# Patient Record
Sex: FEMALE | Race: WHITE | Employment: OTHER
[De-identification: names, ages, dates, MRNs, and addresses within clinical notes are randomized per-mention and may not be internally consistent; named-entity substitution may affect disease eponyms.]

---

## 2017-02-06 ENCOUNTER — HOSPITAL ENCOUNTER (OUTPATIENT)
Dept: PHYSICAL THERAPY | Facility: CLINIC | Age: 67
Setting detail: THERAPIES SERIES
Discharge: HOME | End: 2017-02-06
Payer: COMMERCIAL

## 2017-02-06 PROCEDURE — 97110 THERAPEUTIC EXERCISES: CPT

## 2017-02-08 ENCOUNTER — HOSPITAL ENCOUNTER (OUTPATIENT)
Dept: PHYSICAL THERAPY | Facility: CLINIC | Age: 67
Setting detail: THERAPIES SERIES
Discharge: HOME | End: 2017-02-08
Payer: COMMERCIAL

## 2017-02-08 PROCEDURE — 97110 THERAPEUTIC EXERCISES: CPT

## 2017-02-15 ENCOUNTER — HOSPITAL ENCOUNTER (OUTPATIENT)
Dept: PHYSICAL THERAPY | Facility: CLINIC | Age: 67
Setting detail: THERAPIES SERIES
Discharge: HOME | End: 2017-02-15
Payer: COMMERCIAL

## 2017-02-15 PROCEDURE — 97016 VASOPNEUMATIC DEVICE THERAPY: CPT

## 2017-02-15 PROCEDURE — 97110 THERAPEUTIC EXERCISES: CPT

## 2017-02-16 ENCOUNTER — HOSPITAL ENCOUNTER (OUTPATIENT)
Dept: PHYSICAL THERAPY | Facility: CLINIC | Age: 67
Setting detail: THERAPIES SERIES
Discharge: HOME | End: 2017-02-16
Payer: COMMERCIAL

## 2017-02-16 PROCEDURE — 97110 THERAPEUTIC EXERCISES: CPT

## 2017-02-16 PROCEDURE — 97016 VASOPNEUMATIC DEVICE THERAPY: CPT

## 2017-02-20 ENCOUNTER — HOSPITAL ENCOUNTER (OUTPATIENT)
Dept: PHYSICAL THERAPY | Facility: CLINIC | Age: 67
Setting detail: THERAPIES SERIES
Discharge: HOME | End: 2017-02-20
Payer: COMMERCIAL

## 2017-02-20 PROCEDURE — 97110 THERAPEUTIC EXERCISES: CPT

## 2017-02-22 ENCOUNTER — HOSPITAL ENCOUNTER (OUTPATIENT)
Dept: PHYSICAL THERAPY | Facility: CLINIC | Age: 67
Setting detail: THERAPIES SERIES
Discharge: HOME | End: 2017-02-22
Payer: COMMERCIAL

## 2017-02-22 PROCEDURE — 97110 THERAPEUTIC EXERCISES: CPT

## 2017-03-01 ENCOUNTER — HOSPITAL ENCOUNTER (OUTPATIENT)
Dept: PHYSICAL THERAPY | Facility: CLINIC | Age: 67
Setting detail: THERAPIES SERIES
Discharge: HOME | End: 2017-03-01
Payer: COMMERCIAL

## 2017-03-01 PROCEDURE — 97110 THERAPEUTIC EXERCISES: CPT

## 2017-03-03 ENCOUNTER — HOSPITAL ENCOUNTER (OUTPATIENT)
Dept: PHYSICAL THERAPY | Facility: CLINIC | Age: 67
Setting detail: THERAPIES SERIES
Discharge: HOME | End: 2017-03-03
Payer: COMMERCIAL

## 2017-03-03 PROCEDURE — 97110 THERAPEUTIC EXERCISES: CPT

## 2017-03-06 ENCOUNTER — HOSPITAL ENCOUNTER (OUTPATIENT)
Dept: PHYSICAL THERAPY | Facility: CLINIC | Age: 67
Setting detail: THERAPIES SERIES
Discharge: HOME | End: 2017-03-06
Payer: COMMERCIAL

## 2017-03-06 PROCEDURE — 97110 THERAPEUTIC EXERCISES: CPT

## 2017-03-07 ENCOUNTER — HOSPITAL ENCOUNTER (OUTPATIENT)
Dept: PHYSICAL THERAPY | Facility: CLINIC | Age: 67
Setting detail: THERAPIES SERIES
Discharge: HOME | End: 2017-03-07
Payer: COMMERCIAL

## 2017-03-07 PROCEDURE — 97110 THERAPEUTIC EXERCISES: CPT

## 2017-06-15 ENCOUNTER — OFFICE VISIT (OUTPATIENT)
Dept: INTERNAL MEDICINE CLINIC | Age: 67
End: 2017-06-15
Payer: COMMERCIAL

## 2017-06-15 VITALS
TEMPERATURE: 97.9 F | WEIGHT: 249 LBS | HEIGHT: 67 IN | BODY MASS INDEX: 39.08 KG/M2 | SYSTOLIC BLOOD PRESSURE: 100 MMHG | HEART RATE: 80 BPM | RESPIRATION RATE: 20 BRPM | DIASTOLIC BLOOD PRESSURE: 70 MMHG

## 2017-06-15 DIAGNOSIS — K21.9 GASTROESOPHAGEAL REFLUX DISEASE WITHOUT ESOPHAGITIS: ICD-10-CM

## 2017-06-15 DIAGNOSIS — R10.9 ABDOMINAL PAIN, UNSPECIFIED LOCATION: Primary | ICD-10-CM

## 2017-06-15 DIAGNOSIS — K57.92 DIVERTICULITIS OF INTESTINE WITHOUT PERFORATION OR ABSCESS WITHOUT BLEEDING, UNSPECIFIED PART OF INTESTINAL TRACT: ICD-10-CM

## 2017-06-15 DIAGNOSIS — I10 ESSENTIAL HYPERTENSION: ICD-10-CM

## 2017-06-15 PROCEDURE — 99214 OFFICE O/P EST MOD 30 MIN: CPT | Performed by: INTERNAL MEDICINE

## 2017-06-15 RX ORDER — METRONIDAZOLE 500 MG/1
500 TABLET ORAL 3 TIMES DAILY
Qty: 30 TABLET | Refills: 0 | Status: SHIPPED | OUTPATIENT
Start: 2017-06-15 | End: 2017-06-25

## 2017-06-15 RX ORDER — PANTOPRAZOLE SODIUM 40 MG/1
40 TABLET, DELAYED RELEASE ORAL DAILY
Qty: 30 TABLET | Refills: 0 | Status: SHIPPED | OUTPATIENT
Start: 2017-06-15 | End: 2018-01-13 | Stop reason: SDUPTHER

## 2017-06-15 RX ORDER — LOSARTAN POTASSIUM 50 MG/1
50 TABLET ORAL DAILY
Qty: 90 TABLET | Refills: 1 | Status: SHIPPED | OUTPATIENT
Start: 2017-06-15 | End: 2018-02-12 | Stop reason: SDUPTHER

## 2017-06-15 ASSESSMENT — PATIENT HEALTH QUESTIONNAIRE - PHQ9
2. FEELING DOWN, DEPRESSED OR HOPELESS: 0
SUM OF ALL RESPONSES TO PHQ9 QUESTIONS 1 & 2: 0
1. LITTLE INTEREST OR PLEASURE IN DOING THINGS: 0
SUM OF ALL RESPONSES TO PHQ QUESTIONS 1-9: 0

## 2017-06-26 ENCOUNTER — TELEPHONE (OUTPATIENT)
Dept: INTERNAL MEDICINE CLINIC | Age: 67
End: 2017-06-26

## 2017-06-26 DIAGNOSIS — K57.92 DIVERTICULITIS OF INTESTINE WITHOUT PERFORATION OR ABSCESS WITHOUT BLEEDING, UNSPECIFIED PART OF INTESTINAL TRACT: ICD-10-CM

## 2017-06-26 DIAGNOSIS — K21.9 GASTROESOPHAGEAL REFLUX DISEASE WITHOUT ESOPHAGITIS: ICD-10-CM

## 2017-06-26 DIAGNOSIS — R10.9 ABDOMINAL PAIN, UNSPECIFIED SITE: Primary | ICD-10-CM

## 2017-07-25 ENCOUNTER — OFFICE VISIT (OUTPATIENT)
Dept: GASTROENTEROLOGY | Age: 67
End: 2017-07-25
Payer: COMMERCIAL

## 2017-07-25 ENCOUNTER — HOSPITAL ENCOUNTER (OUTPATIENT)
Age: 67
Discharge: HOME | End: 2017-07-25
Payer: COMMERCIAL

## 2017-07-25 VITALS
HEIGHT: 67 IN | OXYGEN SATURATION: 95 % | SYSTOLIC BLOOD PRESSURE: 135 MMHG | DIASTOLIC BLOOD PRESSURE: 76 MMHG | BODY MASS INDEX: 39.41 KG/M2 | TEMPERATURE: 97.7 F | HEART RATE: 66 BPM | WEIGHT: 251.1 LBS

## 2017-07-25 DIAGNOSIS — K58.0 IRRITABLE BOWEL SYNDROME WITH DIARRHEA: ICD-10-CM

## 2017-07-25 DIAGNOSIS — K21.9 GASTROESOPHAGEAL REFLUX DISEASE WITHOUT ESOPHAGITIS: Primary | ICD-10-CM

## 2017-07-25 DIAGNOSIS — R10.11 RIGHT UPPER QUADRANT ABDOMINAL PAIN: ICD-10-CM

## 2017-07-25 LAB — TSH SERPL DL<=0.05 MIU/L-ACNC: 1.97 MIU/L (ref 0.3–5)

## 2017-07-25 PROCEDURE — 83516 IMMUNOASSAY NONANTIBODY: CPT

## 2017-07-25 PROCEDURE — 36415 COLL VENOUS BLD VENIPUNCTURE: CPT

## 2017-07-25 PROCEDURE — 99244 OFF/OP CNSLTJ NEW/EST MOD 40: CPT | Performed by: INTERNAL MEDICINE

## 2017-07-25 PROCEDURE — 84443 ASSAY THYROID STIM HORMONE: CPT

## 2017-07-25 ASSESSMENT — ENCOUNTER SYMPTOMS
DIARRHEA: 0
RECTAL PAIN: 0
WHEEZING: 0
BACK PAIN: 0
SINUS PRESSURE: 0
SHORTNESS OF BREATH: 0
ABDOMINAL PAIN: 1
CONSTIPATION: 0
ANAL BLEEDING: 0
VOMITING: 0
CHOKING: 0
SORE THROAT: 0
NAUSEA: 1
COUGH: 1
ABDOMINAL DISTENTION: 0
TROUBLE SWALLOWING: 0
BLOOD IN STOOL: 0
VOICE CHANGE: 0
RHINORRHEA: 0
FACIAL SWELLING: 0

## 2017-07-26 LAB — TISSUE TRANSGLUTAMINASE IGA: 0.4 U/ML

## 2017-08-17 ENCOUNTER — HOSPITAL ENCOUNTER (OUTPATIENT)
Age: 67
Setting detail: OUTPATIENT SURGERY
Discharge: HOME | End: 2017-08-17
Attending: INTERNAL MEDICINE | Admitting: INTERNAL MEDICINE
Payer: COMMERCIAL

## 2017-08-17 VITALS
RESPIRATION RATE: 16 BRPM | SYSTOLIC BLOOD PRESSURE: 125 MMHG | OXYGEN SATURATION: 95 % | WEIGHT: 249.9 LBS | HEIGHT: 67 IN | HEART RATE: 60 BPM | TEMPERATURE: 97.7 F | DIASTOLIC BLOOD PRESSURE: 57 MMHG | BODY MASS INDEX: 39.22 KG/M2

## 2017-08-17 PROCEDURE — 99153 MOD SED SAME PHYS/QHP EA: CPT | Performed by: INTERNAL MEDICINE

## 2017-08-17 PROCEDURE — 3609012400 HC EGD TRANSORAL BIOPSY SINGLE/MULTIPLE: Performed by: INTERNAL MEDICINE

## 2017-08-17 PROCEDURE — 88305 TISSUE EXAM BY PATHOLOGIST: CPT

## 2017-08-17 PROCEDURE — 2580000003 HC RX 258: Performed by: INTERNAL MEDICINE

## 2017-08-17 PROCEDURE — 7100000011 HC PHASE II RECOVERY - ADDTL 15 MIN: Performed by: INTERNAL MEDICINE

## 2017-08-17 PROCEDURE — 99152 MOD SED SAME PHYS/QHP 5/>YRS: CPT | Performed by: INTERNAL MEDICINE

## 2017-08-17 PROCEDURE — 7100000010 HC PHASE II RECOVERY - FIRST 15 MIN: Performed by: INTERNAL MEDICINE

## 2017-08-17 RX ORDER — FENTANYL CITRATE 50 UG/ML
INJECTION, SOLUTION INTRAMUSCULAR; INTRAVENOUS PRN
Status: DISCONTINUED | OUTPATIENT
Start: 2017-08-17 | End: 2017-08-18 | Stop reason: HOSPADM

## 2017-08-17 RX ORDER — MIDAZOLAM HYDROCHLORIDE 1 MG/ML
INJECTION INTRAMUSCULAR; INTRAVENOUS PRN
Status: DISCONTINUED | OUTPATIENT
Start: 2017-08-17 | End: 2017-08-18 | Stop reason: HOSPADM

## 2017-08-17 RX ORDER — SODIUM CHLORIDE, SODIUM LACTATE, POTASSIUM CHLORIDE, CALCIUM CHLORIDE 600; 310; 30; 20 MG/100ML; MG/100ML; MG/100ML; MG/100ML
INJECTION, SOLUTION INTRAVENOUS CONTINUOUS
Status: DISCONTINUED | OUTPATIENT
Start: 2017-08-17 | End: 2017-08-18 | Stop reason: HOSPADM

## 2017-08-17 RX ADMIN — SODIUM CHLORIDE, POTASSIUM CHLORIDE, SODIUM LACTATE AND CALCIUM CHLORIDE: 600; 310; 30; 20 INJECTION, SOLUTION INTRAVENOUS at 07:56

## 2017-08-17 ASSESSMENT — PAIN SCALES - GENERAL
PAINLEVEL_OUTOF10: 0

## 2017-08-17 ASSESSMENT — PAIN - FUNCTIONAL ASSESSMENT: PAIN_FUNCTIONAL_ASSESSMENT: 0-10

## 2017-08-18 ENCOUNTER — TELEPHONE (OUTPATIENT)
Dept: INTERNAL MEDICINE CLINIC | Age: 67
End: 2017-08-18

## 2017-08-18 LAB — SURGICAL PATHOLOGY REPORT: NORMAL

## 2017-08-23 ENCOUNTER — OFFICE VISIT (OUTPATIENT)
Dept: GASTROENTEROLOGY | Age: 67
End: 2017-08-23
Payer: COMMERCIAL

## 2017-08-23 VITALS
OXYGEN SATURATION: 95 % | WEIGHT: 251 LBS | TEMPERATURE: 98.1 F | SYSTOLIC BLOOD PRESSURE: 137 MMHG | BODY MASS INDEX: 39.39 KG/M2 | DIASTOLIC BLOOD PRESSURE: 79 MMHG | HEART RATE: 65 BPM | HEIGHT: 67 IN

## 2017-08-23 DIAGNOSIS — R10.11 RIGHT UPPER QUADRANT ABDOMINAL PAIN: Primary | ICD-10-CM

## 2017-08-23 DIAGNOSIS — K21.9 GASTROESOPHAGEAL REFLUX DISEASE WITHOUT ESOPHAGITIS: ICD-10-CM

## 2017-08-23 PROCEDURE — 99214 OFFICE O/P EST MOD 30 MIN: CPT | Performed by: INTERNAL MEDICINE

## 2017-08-23 RX ORDER — CITALOPRAM 10 MG/1
10 TABLET ORAL DAILY
Qty: 30 TABLET | Refills: 3 | Status: SHIPPED | OUTPATIENT
Start: 2017-08-23 | End: 2018-01-13

## 2017-08-23 ASSESSMENT — ENCOUNTER SYMPTOMS
ABDOMINAL DISTENTION: 0
BLOOD IN STOOL: 0
SINUS PRESSURE: 0
WHEEZING: 0
RECTAL PAIN: 0
FACIAL SWELLING: 0
CONSTIPATION: 0
ABDOMINAL PAIN: 1
SORE THROAT: 0
COUGH: 1
TROUBLE SWALLOWING: 0
VOICE CHANGE: 0
NAUSEA: 0
ANAL BLEEDING: 0
SHORTNESS OF BREATH: 0
VOMITING: 0
CHOKING: 0
RHINORRHEA: 0
BACK PAIN: 0
DIARRHEA: 0

## 2017-09-06 DIAGNOSIS — K21.9 GASTROESOPHAGEAL REFLUX DISEASE WITHOUT ESOPHAGITIS: ICD-10-CM

## 2017-09-06 PROBLEM — K31.7 FUNDIC GLAND POLYPOSIS OF STOMACH: Status: ACTIVE | Noted: 2017-08-17

## 2017-10-12 ENCOUNTER — OFFICE VISIT (OUTPATIENT)
Dept: INTERNAL MEDICINE CLINIC | Age: 67
End: 2017-10-12
Payer: COMMERCIAL

## 2017-10-12 VITALS
TEMPERATURE: 98.4 F | BODY MASS INDEX: 40.18 KG/M2 | WEIGHT: 256 LBS | HEART RATE: 60 BPM | DIASTOLIC BLOOD PRESSURE: 70 MMHG | HEIGHT: 67 IN | SYSTOLIC BLOOD PRESSURE: 128 MMHG | RESPIRATION RATE: 16 BRPM

## 2017-10-12 DIAGNOSIS — K21.9 GASTROESOPHAGEAL REFLUX DISEASE WITHOUT ESOPHAGITIS: ICD-10-CM

## 2017-10-12 DIAGNOSIS — E78.00 HIGH CHOLESTEROL: ICD-10-CM

## 2017-10-12 DIAGNOSIS — I10 ESSENTIAL HYPERTENSION: Primary | ICD-10-CM

## 2017-10-12 PROCEDURE — 99214 OFFICE O/P EST MOD 30 MIN: CPT | Performed by: INTERNAL MEDICINE

## 2017-10-12 RX ORDER — PANTOPRAZOLE SODIUM 40 MG/1
40 TABLET, DELAYED RELEASE ORAL DAILY
Qty: 90 TABLET | Refills: 1 | Status: SHIPPED | OUTPATIENT
Start: 2017-10-12 | End: 2018-03-25

## 2017-10-12 NOTE — PROGRESS NOTES
Benjy Saravia is a 74 y.o. female who presents for   Chief Complaint   Patient presents with    Hypertension     4 month check    Other     c/o yeast infection under stomach    Other     had egd in aug by Dr Margot Zavaleta Maintenance     due for tdap and flu will get at work. due dexa and dm screen    Other     not on protonix at this time but does want to go back    and follow up of chronic medical problems. Patient Active Problem List   Diagnosis    HTN (hypertension)    Dyslipidemia    Osteoarthritis    GERD (gastroesophageal reflux disease)    Hyperlipidemia    Hypertension    Obesity    Allergic rhinitis    Osteoporosis    Arthritis of knee    Biliary dyskinesia    Right upper quadrant abdominal pain    Fundic gland polyposis of stomach     HPI  Here for follow-up on blood pressure and cholesterol and patient wants a refill on Protonix and also wants to discuss about the rash under the abdomen    Current Outpatient Prescriptions   Medication Sig Dispense Refill    citalopram (CELEXA) 10 MG tablet Take 1 tablet by mouth daily 30 tablet 3    losartan (COZAAR) 50 MG tablet Take 1 tablet by mouth daily 90 tablet 1    pantoprazole (PROTONIX) 40 MG tablet Take 1 tablet by mouth daily 30 tablet 0     No current facility-administered medications for this visit.         No Known Allergies    Past Medical History:   Diagnosis Date    Allergic rhinitis     Bladder prolapse     Constipation     Fundic gland polyposis of stomach 08/17/2017    per EGD    GERD (gastroesophageal reflux disease)     Hiatal hernia     History of cardiac cath 12/2002    pt denies blockage    Hyperlipidemia     borderline    Hypertension     MRSA (methicillin resistant staph aureus) culture positive 11/01/2016    nasal    MRSA carrier     follows with ID    Obesity     Osteoarthritis     Thyroid disease     goiter    Wears glasses        Past Surgical History:   Procedure Laterality Date    BMI 40.09 kg/m²   BP Readings from Last 3 Encounters:   10/12/17 128/70   08/23/17 137/79   08/17/17 (!) 125/57         Constitutional:  Matheus Perez is oriented to place, person and time ,appears well-developed and well-nourished  HEENT:  Atraumatic and normocephalic, external ears normal bilaterally, nose normal no oropharyngeal exudate and is clear and moist  Eyes:  EOCM normal; conjunctivae normal; PERRLA bilaterally  Neck:  Normal range of motion, neck supple, no JVD and no thyromegaly  Cardiovascular:  RRR, normal heart sounds and intact distal pulses  Pulmonary:  effort normal and breath sounds normal bilaterally,no wheezes or rales, no respiratory distress  Abdominal:  Soft, non-tender; normal bowel sounds, no masses  Musculoskeletal:  Normal range of motion and no edema or tenderness bilaterally  No lymphadenopathy  Neurological:  alert, oriented, and normal reflexes bilaterally  Skin: warm and dry  Psychiatric:  normal mood and effect; behavior normal.    Labs:   Lab Results   Component Value Date    LABA1C 5.5 12/06/2011     Lab Results   Component Value Date    CHOL 249 (H) 07/16/2013     Lab Results   Component Value Date    HDL 45 07/16/2013     No results found for: [facility]  Lab Results   Component Value Date    TRIG 168 (H) 07/16/2013     No components found for: [place], [place]  Lab Results   Component Value Date    WBC 6.3 11/14/2016    HGB 13.7 11/14/2016    HCT 41.2 11/14/2016    MCV 90.4 11/14/2016     11/14/2016     Lab Results   Component Value Date    INR 0.9 11/01/2016    PROTIME 10.0 11/01/2016     Lab Results   Component Value Date    GLUCOSE 115 (H) 11/14/2016    CREATININE 0.68 11/14/2016    BUN 17 11/14/2016     11/14/2016    K 3.9 11/14/2016     11/14/2016    CO2 25 11/14/2016     Lab Results   Component Value Date    ALT 17 11/01/2016    AST 16 11/01/2016    ALKPHOS 74 11/01/2016    BILITOT 0.26 (L) 11/01/2016     Lab Results   Component Value Date    LABPROT 6.4 08/14/2012 BRYANTBU 4.2 11/01/2016     Lab Results   Component Value Date    TSH 1.97 07/25/2017     Assessment:  1. Essential hypertension    2. Gastroesophageal reflux disease without esophagitis    3. High cholesterol        Plan:  Patient's blood pressure stable on current medications and continue losartan as before  Patient's last cholesterol was 249 and HDL 45 and will repeat lab work  Restart on [employer] for acid reflux and patient's EGD findings reviewed  Advised patient to use nystatin cream for rash under the abdomen which is almost resolved  Review in 6 months           1. Jennifer received counseling on the following healthy behaviors: nutrition and exercise    2. Prior labs and health maintenance reviewed. 3.  Discussed use, benefit, and side effects of prescribed medications. Barriers to medication compliance addressed. All her questions were answered. Pt voiced understanding. Aida Alvarado will continue current medications, diet and exercise. No orders of the defined types were placed in this encounter. Completed Refills               Requested Prescriptions      No prescriptions requested or ordered in this encounter     4. Patient given educational materials - see patient instructions    5. Was a self-tracking handout given in paper form or via [employer]?   NO    Orders Placed This Encounter   Procedures    Comprehensive Metabolic Panel     Standing Status:   Future     Standing Expiration Date:   10/12/2018    Lipid Panel     Standing Status:   Future     Standing Expiration Date:   10/12/2018     Order Specific Question:   Is Patient Fasting?/# of Hours     Answer:   12    CBC     Standing Status:   Future     Standing Expiration Date:   10/12/2018    TSH without Reflex     Standing Status:   Future     Standing Expiration Date:   10/12/2018    Vitamin D 25 Hydroxy     Standing Status:   Future     Standing Expiration Date:   10/12/2018    Magnesium     Standing Status:   Future

## 2017-10-18 ENCOUNTER — HOSPITAL ENCOUNTER (OUTPATIENT)
Age: 67
Discharge: HOME | End: 2017-10-18
Payer: COMMERCIAL

## 2017-10-18 DIAGNOSIS — E78.00 HIGH CHOLESTEROL: ICD-10-CM

## 2017-10-18 DIAGNOSIS — K21.9 GASTROESOPHAGEAL REFLUX DISEASE WITHOUT ESOPHAGITIS: ICD-10-CM

## 2017-10-18 DIAGNOSIS — I10 ESSENTIAL HYPERTENSION: ICD-10-CM

## 2017-10-18 LAB
ALBUMIN SERPL-MCNC: 4.3 G/DL (ref 3.5–5.2)
ALBUMIN/GLOBULIN RATIO: ABNORMAL (ref 1–2.5)
ALP BLD-CCNC: 84 U/L (ref 35–104)
ALT SERPL-CCNC: 19 U/L (ref 5–33)
ANION GAP SERPL CALCULATED.3IONS-SCNC: 11 MMOL/L (ref 9–17)
AST SERPL-CCNC: 16 U/L
BILIRUB SERPL-MCNC: 0.38 MG/DL (ref 0.3–1.2)
BUN BLDV-MCNC: 15 MG/DL (ref 8–23)
BUN/CREAT BLD: 19 (ref 9–20)
CALCIUM SERPL-MCNC: 9.4 MG/DL (ref 8.6–10.4)
CHLORIDE BLD-SCNC: 104 MMOL/L (ref 98–107)
CHOLESTEROL/HDL RATIO: 5
CHOLESTEROL: 229 MG/DL
CO2: 26 MMOL/L (ref 20–31)
CREAT SERPL-MCNC: 0.78 MG/DL (ref 0.5–0.9)
GFR AFRICAN AMERICAN: >60 ML/MIN
GFR NON-AFRICAN AMERICAN: >60 ML/MIN
GFR SERPL CREATININE-BSD FRML MDRD: ABNORMAL ML/MIN/{1.73_M2}
GFR SERPL CREATININE-BSD FRML MDRD: ABNORMAL ML/MIN/{1.73_M2}
GLUCOSE BLD-MCNC: 103 MG/DL (ref 70–99)
HCT VFR BLD CALC: 42.1 % (ref 36–46)
HDLC SERPL-MCNC: 46 MG/DL
HEMOGLOBIN: 13.9 G/DL (ref 12–16)
LDL CHOLESTEROL: 139 MG/DL (ref 0–130)
MAGNESIUM: 2.2 MG/DL (ref 1.6–2.6)
MCH RBC QN AUTO: 30.2 PG (ref 26–34)
MCHC RBC AUTO-ENTMCNC: 33.1 G/DL (ref 31–37)
MCV RBC AUTO: 91.2 FL (ref 80–100)
PDW BLD-RTO: 14.7 % (ref 11.5–14.5)
PLATELET # BLD: 239 K/UL (ref 130–400)
PMV BLD AUTO: 8.2 FL (ref 6–12)
POTASSIUM SERPL-SCNC: 4.2 MMOL/L (ref 3.7–5.3)
RBC # BLD: 4.61 M/UL (ref 4–5.2)
SODIUM BLD-SCNC: 141 MMOL/L (ref 135–144)
TOTAL PROTEIN: 7.4 G/DL (ref 6.4–8.3)
TRIGL SERPL-MCNC: 222 MG/DL
TSH SERPL DL<=0.05 MIU/L-ACNC: 1.87 MIU/L (ref 0.3–5)
VITAMIN B-12: 810 PG/ML (ref 211–946)
VITAMIN D 25-HYDROXY: 17.5 NG/ML (ref 30–100)
VLDLC SERPL CALC-MCNC: ABNORMAL MG/DL (ref 1–30)
WBC # BLD: 6.7 K/UL (ref 3.5–11)

## 2017-10-18 PROCEDURE — 85027 COMPLETE CBC AUTOMATED: CPT

## 2017-10-18 PROCEDURE — 82607 VITAMIN B-12: CPT

## 2017-10-18 PROCEDURE — 80061 LIPID PANEL: CPT

## 2017-10-18 PROCEDURE — 83735 ASSAY OF MAGNESIUM: CPT

## 2017-10-18 PROCEDURE — 84443 ASSAY THYROID STIM HORMONE: CPT

## 2017-10-18 PROCEDURE — 82306 VITAMIN D 25 HYDROXY: CPT

## 2017-10-18 PROCEDURE — 80053 COMPREHEN METABOLIC PANEL: CPT

## 2017-10-18 PROCEDURE — 36415 COLL VENOUS BLD VENIPUNCTURE: CPT

## 2017-10-19 ENCOUNTER — TELEPHONE (OUTPATIENT)
Dept: INTERNAL MEDICINE CLINIC | Age: 67
End: 2017-10-19

## 2017-10-19 DIAGNOSIS — R79.89 LOW VITAMIN D LEVEL: Primary | ICD-10-CM

## 2018-01-13 ENCOUNTER — HOSPITAL ENCOUNTER (EMERGENCY)
Age: 68
Discharge: HOME | End: 2018-01-13
Attending: EMERGENCY MEDICINE
Payer: MEDICARE

## 2018-01-13 VITALS
SYSTOLIC BLOOD PRESSURE: 144 MMHG | DIASTOLIC BLOOD PRESSURE: 83 MMHG | WEIGHT: 251 LBS | HEIGHT: 67 IN | TEMPERATURE: 98 F | RESPIRATION RATE: 16 BRPM | BODY MASS INDEX: 39.39 KG/M2 | HEART RATE: 82 BPM | OXYGEN SATURATION: 96 %

## 2018-01-13 DIAGNOSIS — R10.13 ABDOMINAL PAIN, EPIGASTRIC: Primary | ICD-10-CM

## 2018-01-13 LAB
-: ABNORMAL
ABSOLUTE EOS #: 0.2 K/UL (ref 0–0.4)
ABSOLUTE IMMATURE GRANULOCYTE: ABNORMAL K/UL (ref 0–0.3)
ABSOLUTE LYMPH #: 1.3 K/UL (ref 1–4.8)
ABSOLUTE MONO #: 0.3 K/UL (ref 0.2–0.8)
ALBUMIN SERPL-MCNC: 4.2 G/DL (ref 3.5–5.2)
ALBUMIN/GLOBULIN RATIO: ABNORMAL (ref 1–2.5)
ALP BLD-CCNC: 87 U/L (ref 35–104)
ALT SERPL-CCNC: 15 U/L (ref 5–33)
AMORPHOUS: ABNORMAL
ANION GAP SERPL CALCULATED.3IONS-SCNC: 12 MMOL/L (ref 9–17)
AST SERPL-CCNC: 16 U/L
BACTERIA: ABNORMAL
BASOPHILS # BLD: 0 % (ref 0–2)
BASOPHILS ABSOLUTE: 0 K/UL (ref 0–0.2)
BILIRUB SERPL-MCNC: 0.26 MG/DL (ref 0.3–1.2)
BILIRUBIN DIRECT: 0.1 MG/DL
BILIRUBIN URINE: NEGATIVE
BILIRUBIN, INDIRECT: 0.16 MG/DL (ref 0–1)
BUN BLDV-MCNC: 18 MG/DL (ref 8–23)
BUN/CREAT BLD: 26 (ref 9–20)
CALCIUM SERPL-MCNC: 9.5 MG/DL (ref 8.6–10.4)
CASTS UA: ABNORMAL /LPF
CHLORIDE BLD-SCNC: 103 MMOL/L (ref 98–107)
CO2: 27 MMOL/L (ref 20–31)
COLOR: YELLOW
COMMENT UA: ABNORMAL
CREAT SERPL-MCNC: 0.7 MG/DL (ref 0.5–0.9)
CRYSTALS, UA: ABNORMAL /HPF
DIFFERENTIAL TYPE: ABNORMAL
EOSINOPHILS RELATIVE PERCENT: 3 % (ref 1–4)
EPITHELIAL CELLS UA: ABNORMAL /HPF (ref 0–5)
GFR AFRICAN AMERICAN: >60 ML/MIN
GFR NON-AFRICAN AMERICAN: >60 ML/MIN
GFR SERPL CREATININE-BSD FRML MDRD: ABNORMAL ML/MIN/{1.73_M2}
GFR SERPL CREATININE-BSD FRML MDRD: ABNORMAL ML/MIN/{1.73_M2}
GLOBULIN: ABNORMAL G/DL (ref 1.5–3.8)
GLUCOSE BLD-MCNC: 130 MG/DL (ref 70–99)
GLUCOSE URINE: NEGATIVE
HCT VFR BLD CALC: 38.8 % (ref 36–46)
HEMOGLOBIN: 13 G/DL (ref 12–16)
IMMATURE GRANULOCYTES: ABNORMAL %
KETONES, URINE: NEGATIVE
LEUKOCYTE ESTERASE, URINE: NEGATIVE
LIPASE: 46 U/L (ref 13–60)
LYMPHOCYTES # BLD: 18 % (ref 24–44)
MCH RBC QN AUTO: 30.5 PG (ref 26–34)
MCHC RBC AUTO-ENTMCNC: 33.5 G/DL (ref 31–37)
MCV RBC AUTO: 91 FL (ref 80–100)
MONOCYTES # BLD: 5 % (ref 1–7)
MUCUS: ABNORMAL
NITRITE, URINE: NEGATIVE
OTHER OBSERVATIONS UA: ABNORMAL
PDW BLD-RTO: 14.1 % (ref 11.5–14.5)
PH UA: 7 (ref 5–8)
PLATELET # BLD: 252 K/UL (ref 130–400)
PLATELET ESTIMATE: ABNORMAL
PMV BLD AUTO: 8.7 FL (ref 6–12)
POTASSIUM SERPL-SCNC: 4.1 MMOL/L (ref 3.7–5.3)
PROTEIN UA: NEGATIVE
RBC # BLD: 4.26 M/UL (ref 4–5.2)
RBC # BLD: ABNORMAL 10*6/UL
RBC UA: ABNORMAL /HPF (ref 0–2)
RENAL EPITHELIAL, UA: ABNORMAL /HPF
SEG NEUTROPHILS: 74 % (ref 36–66)
SEGMENTED NEUTROPHILS ABSOLUTE COUNT: 5.5 K/UL (ref 1.8–7.7)
SODIUM BLD-SCNC: 142 MMOL/L (ref 135–144)
SPECIFIC GRAVITY UA: 1.01 (ref 1–1.03)
TOTAL PROTEIN: 7.2 G/DL (ref 6.4–8.3)
TRICHOMONAS: ABNORMAL
TURBIDITY: ABNORMAL
URINE HGB: NEGATIVE
UROBILINOGEN, URINE: NORMAL
WBC # BLD: 7.4 K/UL (ref 3.5–11)
WBC # BLD: ABNORMAL 10*3/UL
WBC UA: ABNORMAL /HPF (ref 0–5)
YEAST: ABNORMAL

## 2018-01-13 PROCEDURE — 6360000002 HC RX W HCPCS: Performed by: NURSE PRACTITIONER

## 2018-01-13 PROCEDURE — 81001 URINALYSIS AUTO W/SCOPE: CPT

## 2018-01-13 PROCEDURE — 85025 COMPLETE CBC W/AUTO DIFF WBC: CPT

## 2018-01-13 PROCEDURE — 80076 HEPATIC FUNCTION PANEL: CPT

## 2018-01-13 PROCEDURE — 96365 THER/PROPH/DIAG IV INF INIT: CPT

## 2018-01-13 PROCEDURE — 96361 HYDRATE IV INFUSION ADD-ON: CPT

## 2018-01-13 PROCEDURE — 83690 ASSAY OF LIPASE: CPT

## 2018-01-13 PROCEDURE — 80048 BASIC METABOLIC PNL TOTAL CA: CPT

## 2018-01-13 PROCEDURE — C9113 INJ PANTOPRAZOLE SODIUM, VIA: HCPCS | Performed by: NURSE PRACTITIONER

## 2018-01-13 PROCEDURE — 2580000003 HC RX 258: Performed by: NURSE PRACTITIONER

## 2018-01-13 PROCEDURE — 99284 EMERGENCY DEPT VISIT MOD MDM: CPT

## 2018-01-13 RX ORDER — SODIUM CHLORIDE 9 MG/ML
INJECTION, SOLUTION INTRAVENOUS CONTINUOUS
Status: DISCONTINUED | OUTPATIENT
Start: 2018-01-13 | End: 2018-01-13 | Stop reason: HOSPADM

## 2018-01-13 RX ORDER — SUCRALFATE 1 G/1
1 TABLET ORAL 4 TIMES DAILY
Qty: 40 TABLET | Refills: 0 | Status: SHIPPED | OUTPATIENT
Start: 2018-01-13 | End: 2018-03-25

## 2018-01-13 RX ADMIN — SODIUM CHLORIDE 100 ML/HR: 9 INJECTION, SOLUTION INTRAVENOUS at 11:32

## 2018-01-13 RX ADMIN — SODIUM CHLORIDE 80 MG: 9 INJECTION, SOLUTION INTRAVENOUS at 11:32

## 2018-01-14 ASSESSMENT — ENCOUNTER SYMPTOMS
WHEEZING: 0
SINUS PRESSURE: 0
ABDOMINAL PAIN: 1
SHORTNESS OF BREATH: 0
COUGH: 0
SORE THROAT: 0
CONSTIPATION: 0
COLOR CHANGE: 0
DIARRHEA: 0
VOMITING: 0
NAUSEA: 0
RHINORRHEA: 0

## 2018-02-12 DIAGNOSIS — I10 ESSENTIAL HYPERTENSION: ICD-10-CM

## 2018-02-12 RX ORDER — LOSARTAN POTASSIUM 50 MG/1
50 TABLET ORAL DAILY
Qty: 90 TABLET | Refills: 0 | Status: SHIPPED | OUTPATIENT
Start: 2018-02-12 | End: 2018-04-30 | Stop reason: SDUPTHER

## 2018-03-25 ENCOUNTER — APPOINTMENT (OUTPATIENT)
Dept: CT IMAGING | Age: 68
DRG: 394 | End: 2018-03-25
Payer: MEDICARE

## 2018-03-25 ENCOUNTER — HOSPITAL ENCOUNTER (INPATIENT)
Age: 68
LOS: 1 days | Discharge: HOME | DRG: 394 | End: 2018-03-26
Attending: EMERGENCY MEDICINE | Admitting: INTERNAL MEDICINE
Payer: MEDICARE

## 2018-03-25 DIAGNOSIS — K42.9 PERIUMBILICAL HERNIA: ICD-10-CM

## 2018-03-25 DIAGNOSIS — K57.32 DIVERTICULITIS OF LARGE INTESTINE WITHOUT PERFORATION OR ABSCESS WITHOUT BLEEDING: ICD-10-CM

## 2018-03-25 DIAGNOSIS — K56.609 SBO (SMALL BOWEL OBSTRUCTION) (HCC): Primary | ICD-10-CM

## 2018-03-25 PROBLEM — K43.0 INCARCERATED INCISIONAL HERNIA: Status: ACTIVE | Noted: 2018-03-25

## 2018-03-25 LAB
ABSOLUTE EOS #: 0.2 K/UL (ref 0–0.4)
ABSOLUTE IMMATURE GRANULOCYTE: ABNORMAL K/UL (ref 0–0.3)
ABSOLUTE LYMPH #: 1.8 K/UL (ref 1–4.8)
ABSOLUTE MONO #: 0.8 K/UL (ref 0.2–0.8)
ALBUMIN SERPL-MCNC: 4.1 G/DL (ref 3.5–5.2)
ALBUMIN/GLOBULIN RATIO: ABNORMAL (ref 1–2.5)
ALP BLD-CCNC: 86 U/L (ref 35–104)
ALT SERPL-CCNC: 14 U/L (ref 5–33)
ANION GAP SERPL CALCULATED.3IONS-SCNC: 14 MMOL/L (ref 9–17)
APPEARANCE: NORMAL
AST SERPL-CCNC: 11 U/L
BASOPHILS # BLD: 1 % (ref 0–2)
BASOPHILS ABSOLUTE: 0.1 K/UL (ref 0–0.2)
BILIRUB SERPL-MCNC: 0.28 MG/DL (ref 0.3–1.2)
BILIRUBIN DIRECT: 0.09 MG/DL
BILIRUBIN URINE: NEGATIVE
BILIRUBIN, INDIRECT: 0.19 MG/DL (ref 0–1)
BILIRUBIN, POC: NORMAL
BLOOD URINE, POC: NORMAL
BUN BLDV-MCNC: 24 MG/DL (ref 8–23)
BUN/CREAT BLD: 43 (ref 9–20)
CALCIUM SERPL-MCNC: 9.6 MG/DL (ref 8.6–10.4)
CHLORIDE BLD-SCNC: 103 MMOL/L (ref 98–107)
CHP ED QC CHECK: NORMAL
CLARITY, POC: CLEAR
CO2: 22 MMOL/L (ref 20–31)
COLOR, POC: NORMAL
COLOR: YELLOW
COMMENT UA: NORMAL
CREAT SERPL-MCNC: 0.56 MG/DL (ref 0.5–0.9)
DIFFERENTIAL TYPE: ABNORMAL
EOSINOPHILS RELATIVE PERCENT: 2 % (ref 1–4)
GFR AFRICAN AMERICAN: >60 ML/MIN
GFR NON-AFRICAN AMERICAN: >60 ML/MIN
GFR SERPL CREATININE-BSD FRML MDRD: ABNORMAL ML/MIN/{1.73_M2}
GFR SERPL CREATININE-BSD FRML MDRD: ABNORMAL ML/MIN/{1.73_M2}
GLOBULIN: ABNORMAL G/DL (ref 1.5–3.8)
GLUCOSE BLD-MCNC: 128 MG/DL (ref 70–99)
GLUCOSE URINE, POC: NORMAL
GLUCOSE URINE: NEGATIVE
HCT VFR BLD CALC: 41.2 % (ref 36–46)
HEMOGLOBIN: 13.3 G/DL (ref 12–16)
IMMATURE GRANULOCYTES: ABNORMAL %
KETONES, POC: NORMAL
KETONES, URINE: NEGATIVE
LACTIC ACID: 0.6 MMOL/L (ref 0.5–2.2)
LEUKOCYTE EST, POC: NORMAL
LEUKOCYTE ESTERASE, URINE: NEGATIVE
LIPASE: 51 U/L (ref 13–60)
LYMPHOCYTES # BLD: 16 % (ref 24–44)
MCH RBC QN AUTO: 29.7 PG (ref 26–34)
MCHC RBC AUTO-ENTMCNC: 32.4 G/DL (ref 31–37)
MCV RBC AUTO: 91.5 FL (ref 80–100)
MONOCYTES # BLD: 7 % (ref 1–7)
NITRITE, POC: NORMAL
NITRITE, URINE: NEGATIVE
NRBC AUTOMATED: ABNORMAL PER 100 WBC
PDW BLD-RTO: 13.2 % (ref 11.5–14.5)
PH UA: 5.5 (ref 5–8)
PH, POC: 5.5
PLATELET # BLD: 233 K/UL (ref 130–400)
PLATELET ESTIMATE: ABNORMAL
PMV BLD AUTO: ABNORMAL FL (ref 6–12)
POTASSIUM SERPL-SCNC: 3.9 MMOL/L (ref 3.7–5.3)
PROTEIN UA: NEGATIVE
PROTEIN, POC: NORMAL
RBC # BLD: 4.5 M/UL (ref 4–5.2)
RBC # BLD: ABNORMAL 10*6/UL
SEG NEUTROPHILS: 74 % (ref 36–66)
SEGMENTED NEUTROPHILS ABSOLUTE COUNT: 8 K/UL (ref 1.8–7.7)
SODIUM BLD-SCNC: 139 MMOL/L (ref 135–144)
SPECIFIC GRAVITY UA: 1.02 (ref 1–1.03)
SPECIFIC GRAVITY, POC: 1.02
TOTAL PROTEIN: 7.2 G/DL (ref 6.4–8.3)
TURBIDITY: CLEAR
URINE HGB: NEGATIVE
UROBILINOGEN, POC: 0.2
UROBILINOGEN, URINE: NORMAL
WBC # BLD: 10.9 K/UL (ref 3.5–11)
WBC # BLD: ABNORMAL 10*3/UL

## 2018-03-25 PROCEDURE — 2500000003 HC RX 250 WO HCPCS: Performed by: INTERNAL MEDICINE

## 2018-03-25 PROCEDURE — C9113 INJ PANTOPRAZOLE SODIUM, VIA: HCPCS | Performed by: EMERGENCY MEDICINE

## 2018-03-25 PROCEDURE — 80076 HEPATIC FUNCTION PANEL: CPT

## 2018-03-25 PROCEDURE — 74177 CT ABD & PELVIS W/CONTRAST: CPT

## 2018-03-25 PROCEDURE — 85025 COMPLETE CBC W/AUTO DIFF WBC: CPT

## 2018-03-25 PROCEDURE — 2500000003 HC RX 250 WO HCPCS: Performed by: EMERGENCY MEDICINE

## 2018-03-25 PROCEDURE — 96374 THER/PROPH/DIAG INJ IV PUSH: CPT

## 2018-03-25 PROCEDURE — 99285 EMERGENCY DEPT VISIT HI MDM: CPT

## 2018-03-25 PROCEDURE — 2580000003 HC RX 258: Performed by: INTERNAL MEDICINE

## 2018-03-25 PROCEDURE — 96361 HYDRATE IV INFUSION ADD-ON: CPT

## 2018-03-25 PROCEDURE — 81003 URINALYSIS AUTO W/O SCOPE: CPT

## 2018-03-25 PROCEDURE — 6370000000 HC RX 637 (ALT 250 FOR IP): Performed by: INTERNAL MEDICINE

## 2018-03-25 PROCEDURE — 6360000002 HC RX W HCPCS: Performed by: INTERNAL MEDICINE

## 2018-03-25 PROCEDURE — 83605 ASSAY OF LACTIC ACID: CPT

## 2018-03-25 PROCEDURE — 6360000004 HC RX CONTRAST MEDICATION: Performed by: EMERGENCY MEDICINE

## 2018-03-25 PROCEDURE — 2580000003 HC RX 258: Performed by: EMERGENCY MEDICINE

## 2018-03-25 PROCEDURE — 83690 ASSAY OF LIPASE: CPT

## 2018-03-25 PROCEDURE — 80048 BASIC METABOLIC PNL TOTAL CA: CPT

## 2018-03-25 PROCEDURE — 99222 1ST HOSP IP/OBS MODERATE 55: CPT | Performed by: INTERNAL MEDICINE

## 2018-03-25 PROCEDURE — 6360000002 HC RX W HCPCS: Performed by: EMERGENCY MEDICINE

## 2018-03-25 PROCEDURE — 1200000000 HC SEMI PRIVATE

## 2018-03-25 RX ORDER — ACETAMINOPHEN 325 MG/1
650 TABLET ORAL EVERY 4 HOURS PRN
Status: DISCONTINUED | OUTPATIENT
Start: 2018-03-25 | End: 2018-03-26 | Stop reason: HOSPADM

## 2018-03-25 RX ORDER — SODIUM CHLORIDE 0.9 % (FLUSH) 0.9 %
10 SYRINGE (ML) INJECTION EVERY 12 HOURS SCHEDULED
Status: DISCONTINUED | OUTPATIENT
Start: 2018-03-25 | End: 2018-03-26 | Stop reason: HOSPADM

## 2018-03-25 RX ORDER — LOSARTAN POTASSIUM 50 MG/1
50 TABLET ORAL DAILY
Status: DISCONTINUED | OUTPATIENT
Start: 2018-03-25 | End: 2018-03-26 | Stop reason: HOSPADM

## 2018-03-25 RX ORDER — NICOTINE 21 MG/24HR
1 PATCH, TRANSDERMAL 24 HOURS TRANSDERMAL DAILY PRN
Status: DISCONTINUED | OUTPATIENT
Start: 2018-03-25 | End: 2018-03-26 | Stop reason: HOSPADM

## 2018-03-25 RX ORDER — CIPROFLOXACIN 2 MG/ML
400 INJECTION, SOLUTION INTRAVENOUS ONCE
Status: DISCONTINUED | OUTPATIENT
Start: 2018-03-25 | End: 2018-03-25 | Stop reason: SDUPTHER

## 2018-03-25 RX ORDER — MAGNESIUM SULFATE 1 G/100ML
1 INJECTION INTRAVENOUS PRN
Status: DISCONTINUED | OUTPATIENT
Start: 2018-03-25 | End: 2018-03-26 | Stop reason: HOSPADM

## 2018-03-25 RX ORDER — ONDANSETRON 2 MG/ML
4 INJECTION INTRAMUSCULAR; INTRAVENOUS ONCE
Status: COMPLETED | OUTPATIENT
Start: 2018-03-25 | End: 2018-03-25

## 2018-03-25 RX ORDER — SODIUM CHLORIDE 0.9 % (FLUSH) 0.9 %
10 SYRINGE (ML) INJECTION PRN
Status: DISCONTINUED | OUTPATIENT
Start: 2018-03-25 | End: 2018-03-26 | Stop reason: HOSPADM

## 2018-03-25 RX ORDER — SODIUM CHLORIDE 9 MG/ML
INJECTION, SOLUTION INTRAVENOUS CONTINUOUS
Status: DISCONTINUED | OUTPATIENT
Start: 2018-03-25 | End: 2018-03-26

## 2018-03-25 RX ORDER — BISACODYL 10 MG
10 SUPPOSITORY, RECTAL RECTAL DAILY PRN
Status: DISCONTINUED | OUTPATIENT
Start: 2018-03-25 | End: 2018-03-26 | Stop reason: HOSPADM

## 2018-03-25 RX ORDER — 0.9 % SODIUM CHLORIDE 0.9 %
50 INTRAVENOUS SOLUTION INTRAVENOUS ONCE
Status: COMPLETED | OUTPATIENT
Start: 2018-03-25 | End: 2018-03-25

## 2018-03-25 RX ORDER — PANTOPRAZOLE SODIUM 40 MG/10ML
40 INJECTION, POWDER, LYOPHILIZED, FOR SOLUTION INTRAVENOUS ONCE
Status: COMPLETED | OUTPATIENT
Start: 2018-03-25 | End: 2018-03-25

## 2018-03-25 RX ORDER — SODIUM CHLORIDE 0.9 % (FLUSH) 0.9 %
10 SYRINGE (ML) INJECTION
Status: DISCONTINUED | OUTPATIENT
Start: 2018-03-25 | End: 2018-03-26 | Stop reason: HOSPADM

## 2018-03-25 RX ORDER — SODIUM CHLORIDE 9 MG/ML
INJECTION, SOLUTION INTRAVENOUS CONTINUOUS
Status: DISCONTINUED | OUTPATIENT
Start: 2018-03-25 | End: 2018-03-25

## 2018-03-25 RX ORDER — CIPROFLOXACIN 2 MG/ML
400 INJECTION, SOLUTION INTRAVENOUS EVERY 12 HOURS
Status: DISCONTINUED | OUTPATIENT
Start: 2018-03-25 | End: 2018-03-26 | Stop reason: HOSPADM

## 2018-03-25 RX ORDER — 0.9 % SODIUM CHLORIDE 0.9 %
10 VIAL (ML) INJECTION ONCE
Status: COMPLETED | OUTPATIENT
Start: 2018-03-25 | End: 2018-03-25

## 2018-03-25 RX ORDER — MORPHINE SULFATE 4 MG/ML
4 INJECTION, SOLUTION INTRAMUSCULAR; INTRAVENOUS
Status: DISCONTINUED | OUTPATIENT
Start: 2018-03-25 | End: 2018-03-26 | Stop reason: HOSPADM

## 2018-03-25 RX ORDER — ONDANSETRON 2 MG/ML
4 INJECTION INTRAMUSCULAR; INTRAVENOUS EVERY 6 HOURS PRN
Status: DISCONTINUED | OUTPATIENT
Start: 2018-03-25 | End: 2018-03-26 | Stop reason: HOSPADM

## 2018-03-25 RX ORDER — MORPHINE SULFATE 2 MG/ML
2 INJECTION, SOLUTION INTRAMUSCULAR; INTRAVENOUS
Status: DISCONTINUED | OUTPATIENT
Start: 2018-03-25 | End: 2018-03-26 | Stop reason: HOSPADM

## 2018-03-25 RX ADMIN — CIPROFLOXACIN 400 MG: 2 INJECTION, SOLUTION INTRAVENOUS at 13:03

## 2018-03-25 RX ADMIN — SODIUM CHLORIDE 50 ML: 9 INJECTION, SOLUTION INTRAVENOUS at 09:01

## 2018-03-25 RX ADMIN — SODIUM CHLORIDE: 9 INJECTION, SOLUTION INTRAVENOUS at 07:57

## 2018-03-25 RX ADMIN — ACETAMINOPHEN 650 MG: 325 TABLET ORAL at 14:19

## 2018-03-25 RX ADMIN — SODIUM CHLORIDE: 9 INJECTION, SOLUTION INTRAVENOUS at 12:54

## 2018-03-25 RX ADMIN — PANTOPRAZOLE SODIUM 40 MG: 40 INJECTION, POWDER, FOR SOLUTION INTRAVENOUS at 08:00

## 2018-03-25 RX ADMIN — Medication 0.5 MG: at 11:11

## 2018-03-25 RX ADMIN — SODIUM CHLORIDE: 9 INJECTION, SOLUTION INTRAVENOUS at 17:08

## 2018-03-25 RX ADMIN — ONDANSETRON 4 MG: 2 INJECTION INTRAMUSCULAR; INTRAVENOUS at 11:08

## 2018-03-25 RX ADMIN — LOSARTAN POTASSIUM 50 MG: 50 TABLET, FILM COATED ORAL at 14:20

## 2018-03-25 RX ADMIN — METRONIDAZOLE 500 MG: 500 INJECTION, SOLUTION INTRAVENOUS at 20:13

## 2018-03-25 RX ADMIN — METRONIDAZOLE 500 MG: 500 INJECTION, SOLUTION INTRAVENOUS at 11:16

## 2018-03-25 RX ADMIN — Medication 10 ML: at 09:01

## 2018-03-25 RX ADMIN — IOPAMIDOL 125 ML: 755 INJECTION, SOLUTION INTRAVENOUS at 09:01

## 2018-03-25 RX ADMIN — ACETAMINOPHEN 650 MG: 325 TABLET ORAL at 23:28

## 2018-03-25 ASSESSMENT — PAIN DESCRIPTION - LOCATION
LOCATION: HEAD

## 2018-03-25 ASSESSMENT — PAIN SCALES - GENERAL
PAINLEVEL_OUTOF10: 0
PAINLEVEL_OUTOF10: 4
PAINLEVEL_OUTOF10: 8
PAINLEVEL_OUTOF10: 2
PAINLEVEL_OUTOF10: 2
PAINLEVEL_OUTOF10: 3

## 2018-03-25 ASSESSMENT — PAIN DESCRIPTION - ONSET
ONSET: ON-GOING
ONSET: GRADUAL

## 2018-03-25 ASSESSMENT — PAIN DESCRIPTION - FREQUENCY
FREQUENCY: CONTINUOUS
FREQUENCY: CONTINUOUS

## 2018-03-25 ASSESSMENT — PAIN DESCRIPTION - PROGRESSION
CLINICAL_PROGRESSION: GRADUALLY IMPROVING
CLINICAL_PROGRESSION: GRADUALLY WORSENING

## 2018-03-25 ASSESSMENT — PAIN DESCRIPTION - ORIENTATION
ORIENTATION: ANTERIOR
ORIENTATION: ANTERIOR

## 2018-03-25 ASSESSMENT — PAIN DESCRIPTION - PAIN TYPE
TYPE: ACUTE PAIN

## 2018-03-25 ASSESSMENT — PAIN DESCRIPTION - DESCRIPTORS
DESCRIPTORS: ACHING;DISCOMFORT
DESCRIPTORS: ACHING;HEADACHE;THROBBING
DESCRIPTORS: HEADACHE

## 2018-03-25 NOTE — PLAN OF CARE
Problem: Pain:  Intervention: Opioid analgesia side-effects  Pt has not  Requested any oral or  IV meds except for tylenol for  Her headache. Intervention: Assess barriers to pain control  No barriers noted. Intervention: Promote participation in pain management plan  Pt able to   Understand the meds ordered. Comments: Continue plan of care. Monitor for  abd. Pain and headache .

## 2018-03-25 NOTE — H&P
[facility]    HISTORY AND PHYSICAL EXAMINATION            Date:   3/25/2018  Patient name: Ca Denis  Date of admission:  3/25/2018  7:23 AM  MRN:   [id number]  Account:  [de-identified]  YOB: 1950  PCP:    yRanne Villa MD  Room:   2011/2011-02  Code Status:    Full Code    Chief Complaint:     Chief Complaint   Patient presents with    Abdominal Pain     past several days       History Obtained From:     patient, electronic medical record    History of Present Illness: The patient is a 74 y.o.   female who presents with Abdominal Pain (past several days)   and she is admitted to the hospital for the management of  abd pain. She has had this for many months. She has seen her pcp who offered to do ct scan, but she declined. She also saw gi and had egd done. After that, was diagnosed by gi with IBS and placed on meds, which she stopped. She has tried a multitude of meds for gerd without relief. This past week the pain got much worse and last night she couldn't sleep. She has noticed an umbilical bulge for months, tender at times. Usually the pain has been epigastric, but also sometimes blq. Yesterday had nausea and diarrhea also. No vomiting/f/c/s/s/bleeding.         Past Medical History:     Past Medical History:   Diagnosis Date    Allergic rhinitis     Bladder prolapse     Constipation     Fundic gland polyposis of stomach 08/17/2017    per EGD    GERD (gastroesophageal reflux disease)     Hiatal hernia     History of cardiac cath 12/2002    pt denies blockage    Hyperlipidemia     borderline    Hypertension     MRSA (methicillin resistant staph aureus) culture positive 11/01/2016    nasal    MRSA carrier     follows with ID    Obesity     Osteoarthritis     Thyroid disease     goiter    Wears glasses         Past Surgical History:     Past Surgical History:   Procedure Laterality Date negative for swelling/edema   ALLERGIC/IMMUNOLOGIC:  negative for urticaria , itching  ENDOCRINE:  negative increase in drinking, increase in urination, hot or cold intolerance  MUSCULOSKELETAL:  negative joint pains, muscle aches, swelling of joints  NEUROLOGICAL:  negative for headaches, dizziness, lightheadedness, numbness, pain, tingling extremities  BEHAVIOR/PSYCH:  negative for depression, anxiety    Physical Exam:   BP (!) 143/90   Pulse 77   Temp 98.2 °F (36.8 °C) (Oral)   Resp 16   Ht 5' 6.93\" (1.7 m)   Wt 258 lb (117 kg)   LMP  (LMP Unknown)   SpO2 97%   BMI 40.49 kg/m²   Temp (24hrs), Av.2 °F (36.8 °C), Min:98.2 °F (36.8 °C), Max:98.2 °F (36.8 °C)    No results for input(s): POCGLU in the last 72 hours. No intake or output data in the 24 hours ending 18 1243    General Appearance:  alert, well appearing, and in no acute distress  Mental status: oriented to person, place, and time with normal affect  Head:  normocephalic, atraumatic. Eye: no icterus, redness, pupils equal and reactive, extraocular eye movements intact, conjunctiva clear  Ear: normal external ear, no discharge, hearing intact  Nose:  no drainage noted  Mouth: mucous membranes moist  Neck: supple, no carotid bruits, thyroid not palpable  Lungs: Bilateral equal air entry, clear to ausculation, no wheezing, rales or rhonchi, normal effort  Cardiovascular: normal rate, regular rhythm, no murmur, gallop, rub.   Abdomen: Soft, nondistended, normal bowel sounds, no hepatomegaly or splenomegaly; obese, mildly ttp LLQ and periumbilical  Neurologic: There are no new focal motor or sensory deficits, normal muscle tone and bulk, no abnormal sensation, normal speech, cranial nerves II through XII grossly intact  Skin: No gross lesions, rashes, bruising or bleeding on exposed skin area  Extremities:  peripheral pulses palpable, no pedal edema or calf pain with palpation  Psych: normal affect     Investigations:      Laboratory Testing:  Recent Results (from the past 24 hour(s))   Basic Metabolic Prof    Collection Time: 03/25/18  7:33 AM   Result Value Ref Range    Glucose 128 (H) 70 - 99 mg/dL    BUN 24 (H) 8 - 23 mg/dL    CREATININE 0.56 0.50 - 0.90 mg/dL    Bun/Cre Ratio 43 (H) 9 - 20    Calcium 9.6 8.6 - 10.4 mg/dL    Sodium 139 135 - 144 mmol/L    Potassium 3.9 3.7 - 5.3 mmol/L    Chloride 103 98 - 107 mmol/L    CO2 22 20 - 31 mmol/L    Anion Gap 14 9 - 17 mmol/L    GFR Non-African American >60 >60 mL/min    GFR African American >60 >60 mL/min    GFR Comment          GFR Staging NOT REPORTED    CBC with DIFF    Collection Time: 03/25/18  7:33 AM   Result Value Ref Range    WBC 10.9 3.5 - 11.0 k/uL    RBC 4.50 4.0 - 5.2 m/uL    Hemoglobin 13.3 12.0 - 16.0 g/dL    Hematocrit 41.2 36 - 46 %    MCV 91.5 80 - 100 fL    MCH 29.7 26 - 34 pg    MCHC 32.4 31 - 37 g/dL    RDW 13.2 11.5 - 14.5 %    Platelets 473 693 - 107 k/uL    MPV NOT REPORTED 6.0 - 12.0 fL    NRBC Automated NOT REPORTED per 100 WBC    Differential Type NOT REPORTED     Immature Granulocytes NOT REPORTED 0 %    Absolute Immature Granulocyte NOT REPORTED 0.00 - 0.30 k/uL    WBC Morphology NOT REPORTED     RBC Morphology NOT REPORTED     Platelet Estimate NOT REPORTED     Seg Neutrophils 74 (H) 36 - 66 %    Lymphocytes 16 (L) 24 - 44 %    Monocytes 7 1 - 7 %    Eosinophils % 2 1 - 4 %    Basophils 1 0 - 2 %    Segs Absolute 8.00 (H) 1.8 - 7.7 k/uL    Absolute Lymph # 1.80 1.0 - 4.8 k/uL    Absolute Mono # 0.80 0.2 - 0.8 k/uL    Absolute Eos # 0.20 0.0 - 0.4 k/uL    Basophils # 0.10 0.0 - 0.2 k/uL   Lipase    Collection Time: 03/25/18  7:33 AM   Result Value Ref Range    Lipase 51 13 - 60 U/L   Liver Profile    Collection Time: 03/25/18  7:33 AM   Result Value Ref Range    Alb 4.1 3.5 - 5.2 g/dL    Alkaline Phosphatase 86 35 - 104 U/L    ALT 14 5 - 33 U/L    AST 11 <32 U/L    Total Bilirubin 0.28 (L) 0.3 - 1.2 mg/dL    Bilirubin, Direct 0.09 <0.31 mg/dL    Bilirubin, Indirect

## 2018-03-25 NOTE — ED PROVIDER NOTES
[facility] [place] [facility]  eMERGENCY dEPARTMENT eNCOUnter      Pt Name: Jermain Ramos  MRN: [id number]  [place] 1950  Date of evaluation: 3/25/2018  Provider: Genoveva Taylor MD    CHIEF COMPLAINT       Chief Complaint   Patient presents with    Abdominal Pain     past several days         HISTORY OF PRESENT ILLNESS   (Location/Symptom, Timing/Onset, Context/Setting, Quality, Duration, Modifying Factors, Severity)  Note limiting factors. Jermain Ramos is a 74 y.o. female who presents to the emergency department With a chief complaint of upper and midabdominal pain that she has had all week, worse in the last 3 mornings. She has had this problem when she would drink coffee in the past and she did have some coffee a couple days in a row. Patient had a negative EGD in August of last year. Last colonoscopy was 5 years ago. She reports nausea but no vomiting, hematemesis or hematochezia. The history is provided by the patient. Nursing Notes were reviewed. REVIEW OF SYSTEMS    (2-9 systems for level 4, 10 or more for level 5)     Review of Systems   All other systems reviewed and are negative. Except as noted above the remainder of the review of systems was reviewed and negative.        PAST MEDICAL HISTORY     Past Medical History:   Diagnosis Date    Allergic rhinitis     Bladder prolapse     Constipation     Fundic gland polyposis of stomach 08/17/2017    per EGD    GERD (gastroesophageal reflux disease)     Hiatal hernia     History of cardiac cath 12/2002    pt denies blockage    Hyperlipidemia     borderline    Hypertension     MRSA (methicillin resistant staph aureus) culture positive 11/01/2016    nasal    MRSA carrier     follows with ID    Obesity     Osteoarthritis     Thyroid disease     goiter    Wears glasses          SURGICAL HISTORY       Past Surgical History:   Procedure Laterality Date    CHOLECYSTECTOMY, LAPAROSCOPIC  11/15/2016    CHOLECYSTECTOMY, LAPAROSCOPIC  11/15/2016    COLONOSCOPY  2013    neg    ENDOSCOPY, COLON, DIAGNOSTIC      EGD    KNEE ARTHROSCOPY Left     lateral release    [place] EGD TRANSORAL BIOPSY SINGLE/MULTIPLE N/A 8/17/2017    EGD BIOPSY performed by Lloyd Red MD at [address] Left 9/24/2012    knee    TOTAL KNEE ARTHROPLASTY Right 11/10/2014    knee    UPPER GASTROINTESTINAL ENDOSCOPY  08/17/2017    Multiple small gastric polyps, no esophagitis noted no Melara's mucosa. No hiatal hernia, pathology benign fundic gland polyps         CURRENT MEDICATIONS       Current Discharge Medication List      CONTINUE these medications which have NOT CHANGED    Details   losartan (COZAAR) 50 MG tablet Take 1 tablet by mouth daily  Qty: 90 tablet, Refills: 0    Associated Diagnoses: Essential hypertension             ALLERGIES     Patient has no known allergies. FAMILY HISTORY       Family History   Problem Relation Age of Onset    Heart Disease Mother     COPD Father     Cancer Brother      thyroid, prostate, lung          SOCIAL HISTORY       Social History     Social History    Marital status: Single     Spouse name: N/A    Number of children: N/A    Years of education: N/A     Social History Main Topics    Smoking status: Former Smoker     Packs/day: 1.00     Years: 3.00    Smokeless tobacco: Never Used      Comment: quit smoking age 23    Alcohol use Yes      Comment: very rare    Drug use: No    Sexual activity: Not Asked     Other Topics Concern    None     Social History Narrative    None       SCREENINGS             PHYSICAL EXAM    (up to 7 for level 4, 8 or more for level 5)     ED Triage Vitals [03/25/18 0723]   BP Temp Temp Source Pulse Resp SpO2 Height Weight   (!) 152/79 98.2 °F (36.8 °C) Oral 81 15 96 % -- 258 lb (117 kg)       Physical Exam   Constitutional: She is oriented to person, place, and time. She appears well-developed and well-nourished. She appears distressed.    HENT: Head: Normocephalic. Right Ear: External ear normal.   Left Ear: External ear normal.   Mouth/Throat: Oropharynx is clear and moist.   Eyes: EOM are normal. No scleral icterus. Neck: Neck supple. Cardiovascular: Normal rate, regular rhythm and normal heart sounds. Pulmonary/Chest: Effort normal. No respiratory distress. She has wheezes. She has no rales. Abdominal: Normal appearance. She exhibits no distension and no fluid wave. Bowel sounds are decreased. There is no hepatosplenomegaly. There is tenderness in the epigastric area and periumbilical area. There is no rigidity and no guarding. Musculoskeletal: She exhibits no edema or tenderness. Neurological: She is alert and oriented to person, place, and time. No cranial nerve deficit. She exhibits normal muscle tone. Coordination normal.   Skin: Skin is warm and dry. No rash noted. She is not diaphoretic. No pallor. Nursing note and vitals reviewed. DIAGNOSTIC RESULTS     EKG: All EKG's are interpreted by the Emergency Department Physician who either signs or Co-signs this chart in the absence of a cardiologist.    RADIOLOGY:   Non-plain film images such as CT, Ultrasound and MRI are read by the radiologist. Plain radiographic images are visualized and preliminarily interpreted by the emergency physician with the below findings:    Interpretation per the Radiologist below, if available at the time of this note:    CT ABDOMEN PELVIS W IV CONTRAST   Final Result   Narrow neck periumbilical small bowel containing hernia with findings of   early obstruction. Sigmoid diverticulosis. There is mild pericolonic stranding, which may   represent mild acute diverticulitis in the appropriate clinical setting.                ED BEDSIDE ULTRASOUND:   Performed by ED Physician - none    LABS:  Labs Reviewed   BASIC METABOLIC PANEL - Abnormal; Notable for the following:        Result Value    Glucose 128 (*)     BUN 24 (*)     Bun/Cre Ratio 43 (*) All other components within normal limits   CBC WITH AUTO DIFFERENTIAL - Abnormal; Notable for the following:     Seg Neutrophils 74 (*)     Lymphocytes 16 (*)     Segs Absolute 8.00 (*)     All other components within normal limits   HEPATIC FUNCTION PANEL - Abnormal; Notable for the following: Total Bilirubin 0.28 (*)     All other components within normal limits   POCT URINALYSIS DIPSTICK - Normal   LIPASE   URINE RT REFLEX TO CULTURE   LACTIC ACID       All other labs were within normal range or not returned as of this dictation. EMERGENCY DEPARTMENT COURSE and DIFFERENTIAL DIAGNOSIS/MDM:   Vitals:    Vitals:    03/25/18 0723 03/25/18 1116 03/25/18 1158 03/25/18 1322   BP: (!) 152/79 (!) 143/90  (!) 153/75   Pulse: 81 77 76    Resp: 15 16 18    Temp: 98.2 °F (36.8 °C)  98 °F (36.7 °C)    TempSrc: Oral  Oral    SpO2: 96% 97% 97%    Weight: 258 lb (117 kg)      Height:   5' 6.93\" (1.7 m)        CT scan of the abdomen revealed sigmoid diverticulitis and periumbilical hernia with concern for partial small bowel obstruction. Patient's findings were discussed with Dr. Gopal Dubois who is admitting her. Dr. Savanah Hamilton has been consulted for surgical management. She was started on IV antibiotics in the emergency department. MDM    CONSULTS:  IP CONSULT TO HOSPITALIST  IP CONSULT TO GENERAL SURGERY  IP CONSULT TO GENERAL SURGERY    PROCEDURES:  Unless otherwise noted below, none     Procedures    FINAL IMPRESSION      1. SBO (small bowel obstruction)    2. Periumbilical hernia    3.  Diverticulitis of large intestine without perforation or abscess without bleeding          DISPOSITION/PLAN   DISPOSITION Admitted 03/25/2018 10:47:31 AM      PATIENT REFERRED TO:  Reginaldo Herrera MD  [address] [place] [address] [id number]            DISCHARGE MEDICATIONS:  Current Discharge Medication List             Problem List:  Patient Active Problem List   Diagnosis Code    HTN (hypertension) I10    Dyslipidemia E78.5    Osteoarthritis M19.90    GERD (gastroesophageal reflux disease) K21.9    Hyperlipidemia E78.5    Hypertension I10    Obesity E66.9    Allergic rhinitis J30.9    Osteoporosis M81.0    Arthritis of knee M17.10    Biliary dyskinesia K82.8    Right upper quadrant abdominal pain R10.11    Fundic gland polyposis of stomach K31.7    SBO (small bowel obstruction) K56.609    Incarcerated incisional hernia K43.0    Sigmoid diverticulitis K57.32           Summation      Patient Course:  Stable    ED Medications administered this visit:    Medications   0.9 % sodium chloride infusion ( Intravenous Handoff 3/25/18 1135)   sodium chloride flush 0.9 % injection 10 mL (not administered)   losartan (COZAAR) tablet 50 mg (not administered)   sodium chloride flush 0.9 % injection 10 mL (not administered)   sodium chloride flush 0.9 % injection 10 mL (not administered)   magnesium sulfate 1 g in dextrose 5% 100 mL IVPB (not administered)   acetaminophen (TYLENOL) tablet 650 mg (not administered)   morphine injection 2 mg (not administered)     Or   morphine injection 4 mg (not administered)   magnesium hydroxide (MILK OF MAGNESIA) 400 MG/5ML suspension 30 mL (not administered)   bisacodyl (DULCOLAX) suppository 10 mg (not administered)   ondansetron (ZOFRAN) injection 4 mg (not administered)   nicotine (NICODERM CQ) 21 MG/24HR 1 patch (not administered)   0.9 % sodium chloride infusion ( Intravenous New Bag 3/25/18 1254)   enoxaparin (LOVENOX) injection 40 mg (not administered)   ciprofloxacin (CIPRO) IVPB 400 mg (400 mg Intravenous New Bag 3/25/18 1303)   metronidazole (FLAGYL) 500 mg in NaCl 100 mL IVPB premix (not administered)   pantoprazole (PROTONIX) injection 40 mg (40 mg Intravenous Given 3/25/18 0800)     And   sodium chloride (PF) 0.9 % injection 10 mL (10 mLs Intravenous Given 3/25/18 0901)   0.9 % sodium chloride bolus (0 mLs Intravenous Stopped 3/25/18 0908)   iopamidol

## 2018-03-25 NOTE — CONSULTS
General Surgery   Consultation        PATIENT NAME: Nuzhat Naylor   YOB: 1950    ADMISSION DATE: 3/25/2018  7:23 AM     Admitting Provider: Dr. Otto Rincon Physician: Dr. Lorin Berumen: 3/25/2018    Consult Regarding:  Incarcerated incisional ventral hernia and mild sigmoid diverticulitis    HISTORY OF PRESENT ILLNESS:  The patient is a 74 y.o. female being consulted for incarcerated incisional ventral hernia and mild sigmoid diverticulitis on CT abd/pelvis. Patient has been dealing with diffuse abdominal pain for past 4 months off and on. Denies nausea/vomiting. But for past week, patient's abdominal pain has gradually been getting worse. Last night, patient could not tolerate the pain and came to ED for further evaluation. WBC of 10.9. No fever. Due to CT abd/pelvis finding, our service was consulted for further evaluation. Patient's s/p laparoscopic cholecystectomy in 2016. S/p EGD with finding of multiple small gastric polyps, but no other pathologic findings. Path report was benign. Patient had two colonoscopies done in the past once in 2005 (benign polyp removed) and another one in 2013 (no pathology).         Past Medical History:        Diagnosis Date    Allergic rhinitis     Bladder prolapse     Constipation     Fundic gland polyposis of stomach 08/17/2017    per EGD    GERD (gastroesophageal reflux disease)     Hiatal hernia     History of cardiac cath 12/2002    pt denies blockage    Hyperlipidemia     borderline    Hypertension     MRSA (methicillin resistant staph aureus) culture positive 11/01/2016    nasal    MRSA carrier     follows with ID    Obesity     Osteoarthritis     Thyroid disease     goiter    Wears glasses        Past Surgical History:        Procedure Laterality Date    CHOLECYSTECTOMY, LAPAROSCOPIC  11/15/2016    CHOLECYSTECTOMY, LAPAROSCOPIC  11/15/2016    COLONOSCOPY  2013    neg    ENDOSCOPY, COLON, DIAGNOSTIC

## 2018-03-26 VITALS
WEIGHT: 258.7 LBS | RESPIRATION RATE: 16 BRPM | HEIGHT: 67 IN | OXYGEN SATURATION: 96 % | SYSTOLIC BLOOD PRESSURE: 139 MMHG | TEMPERATURE: 97.7 F | DIASTOLIC BLOOD PRESSURE: 68 MMHG | BODY MASS INDEX: 40.6 KG/M2 | HEART RATE: 60 BPM

## 2018-03-26 LAB
ANION GAP SERPL CALCULATED.3IONS-SCNC: 10 MMOL/L (ref 9–17)
BUN BLDV-MCNC: 12 MG/DL (ref 8–23)
BUN/CREAT BLD: 18 (ref 9–20)
CALCIUM SERPL-MCNC: 8.8 MG/DL (ref 8.6–10.4)
CHLORIDE BLD-SCNC: 104 MMOL/L (ref 98–107)
CO2: 25 MMOL/L (ref 20–31)
CREAT SERPL-MCNC: 0.65 MG/DL (ref 0.5–0.9)
GFR AFRICAN AMERICAN: >60 ML/MIN
GFR NON-AFRICAN AMERICAN: >60 ML/MIN
GFR SERPL CREATININE-BSD FRML MDRD: NORMAL ML/MIN/{1.73_M2}
GFR SERPL CREATININE-BSD FRML MDRD: NORMAL ML/MIN/{1.73_M2}
GLUCOSE BLD-MCNC: 96 MG/DL (ref 70–99)
HCT VFR BLD CALC: 37.8 % (ref 36–46)
HEMOGLOBIN: 12.2 G/DL (ref 12–16)
LACTIC ACID: 0.7 MMOL/L (ref 0.5–2.2)
MCH RBC QN AUTO: 29.3 PG (ref 26–34)
MCHC RBC AUTO-ENTMCNC: 32.4 G/DL (ref 31–37)
MCV RBC AUTO: 90.4 FL (ref 80–100)
NRBC AUTOMATED: NORMAL PER 100 WBC
PDW BLD-RTO: 13.3 % (ref 11.5–14.5)
PLATELET # BLD: 220 K/UL (ref 130–400)
PMV BLD AUTO: NORMAL FL (ref 6–12)
POTASSIUM SERPL-SCNC: 4.2 MMOL/L (ref 3.7–5.3)
RBC # BLD: 4.18 M/UL (ref 4–5.2)
SODIUM BLD-SCNC: 139 MMOL/L (ref 135–144)
WBC # BLD: 6.3 K/UL (ref 3.5–11)

## 2018-03-26 PROCEDURE — 6370000000 HC RX 637 (ALT 250 FOR IP): Performed by: INTERNAL MEDICINE

## 2018-03-26 PROCEDURE — 83605 ASSAY OF LACTIC ACID: CPT

## 2018-03-26 PROCEDURE — 2580000003 HC RX 258: Performed by: INTERNAL MEDICINE

## 2018-03-26 PROCEDURE — 6360000002 HC RX W HCPCS: Performed by: INTERNAL MEDICINE

## 2018-03-26 PROCEDURE — 2500000003 HC RX 250 WO HCPCS: Performed by: INTERNAL MEDICINE

## 2018-03-26 PROCEDURE — 85027 COMPLETE CBC AUTOMATED: CPT

## 2018-03-26 PROCEDURE — 36415 COLL VENOUS BLD VENIPUNCTURE: CPT

## 2018-03-26 PROCEDURE — 80048 BASIC METABOLIC PNL TOTAL CA: CPT

## 2018-03-26 PROCEDURE — 99232 SBSQ HOSP IP/OBS MODERATE 35: CPT | Performed by: INTERNAL MEDICINE

## 2018-03-26 RX ORDER — ACETAMINOPHEN 325 MG/1
650 TABLET ORAL EVERY 4 HOURS PRN
Qty: 120 TABLET | Refills: 3 | Status: ON HOLD | COMMUNITY
Start: 2018-03-26 | End: 2019-10-04 | Stop reason: HOSPADM

## 2018-03-26 RX ORDER — METRONIDAZOLE 500 MG/1
500 TABLET ORAL 3 TIMES DAILY
Qty: 21 TABLET | Refills: 0 | Status: SHIPPED | OUTPATIENT
Start: 2018-03-26 | End: 2018-04-02

## 2018-03-26 RX ORDER — CIPROFLOXACIN 500 MG/1
500 TABLET, FILM COATED ORAL 2 TIMES DAILY
Qty: 14 TABLET | Refills: 0 | Status: SHIPPED | OUTPATIENT
Start: 2018-03-26 | End: 2018-04-02

## 2018-03-26 RX ADMIN — Medication 10 ML: at 07:45

## 2018-03-26 RX ADMIN — LOSARTAN POTASSIUM 50 MG: 50 TABLET, FILM COATED ORAL at 07:45

## 2018-03-26 RX ADMIN — CIPROFLOXACIN 400 MG: 2 INJECTION, SOLUTION INTRAVENOUS at 00:56

## 2018-03-26 RX ADMIN — METRONIDAZOLE 500 MG: 500 INJECTION, SOLUTION INTRAVENOUS at 04:44

## 2018-03-26 NOTE — PROGRESS NOTES
D/C home. 3. Will start low fiber diet   4. Will need C-scope in  6 weeks  5. OOB/IS , DVT/GI prophylaxis  6. Supportive care  7. Medical management per primary        Electronically signed by Chantelle Mccoy DO  on 3/26/2018 at 5:33 AM   Pager: [phone]    General Surgery Attending Attestation Note    Patient was seen and examined. I agree with the above resident's exam, assessment and plan.      Doing well  Ok to advance to low fiber diet  Hernia remains reduced  Oral abx for one week  Ok to discharge home and follow up as outpatient for hernia repair    Reinier Fine, [address] [facility], [facility], [facility], [address]  Office: [phone]  Pager: [phone]

## 2018-03-26 NOTE — DISCHARGE SUMMARY
[facility]    Discharge Summary     Patient ID: Sudarshan Boyer  :  1950   MRN: [id number]     ACCOUNT:  [de-identified]   Patient's PCP: Adalgisa Hernandez MD  Admit Date: 3/25/2018   Discharge Date: 3/26/2018     Length of Stay: 1  Code Status:  Full Code  Admitting Physician: Julián Light,   Discharge Physician: Julián Light DO     Active Discharge Diagnoses:     Primary Problem  Incarcerated incisional hernia      Hospital Problems  Active Hospital Problems    Diagnosis Date Noted    SBO (small bowel obstruction) [K56.609] 2018    Incarcerated incisional hernia [K43.0] 2018    Sigmoid diverticulitis [K57.32] 2018    Obesity [E66.9]     Dyslipidemia [E78.5] 10/20/2011    HTN (hypertension) [I10] 10/20/2011       Admission Condition:  fair     Discharged Condition: good    Hospital Stay:     Hospital Course: Sudarshan Boyer is a 74 y.o. female who was admitted for the management of   Incarcerated incisional hernia , presented to ER with Abdominal Pain (past several days)    Admitted with abd pain-initial ct scan concerning for sbo. Evaluated by surgery and had incarcerated ventral hernia reduced-had immediate pain relief. Also saw diverticulitis (mild) on ct scan and was on iv cipro/flagyl. Diet advanced and tolerated.   Will need colonoscopy in 6 weeks      Significant therapeutic interventions: see above    Significant Diagnostic Studies:   Labs / Micro:  CBC:   Lab Results   Component Value Date    WBC 6.3 2018    RBC 4.18 2018    RBC 3.98 2011    HGB 12.2 2018    HCT 37.8 2018    MCV 90.4 2018    MCH 29.3 2018    MCHC 32.4 2018    RDW 13.3 2018     2018     2011     CMP:    Lab Results   Component Value Date    GLUCOSE 96 2018    GLUCOSE 94 2011     2018    K 4.2 2018     2018 tablet by mouth 2 times daily for 7 days     metroNIDAZOLE 500 MG tablet  Commonly known as:  FLAGYL  Take 1 tablet by mouth 3 times daily for 7 days        CONTINUE taking these medications    losartan 50 MG tablet  Commonly known as:  COZAAR  Take 1 tablet by mouth daily        STOP taking these medications    pantoprazole 40 MG tablet  Commonly known as:  PROTONIX     sucralfate 1 GM tablet  Commonly known as:  CARAFATE           Where to Get Your Medications      These medications were sent to [facility], [address], [facility] [address]    Phone:  [phone]   · ciprofloxacin 500 MG tablet  · metroNIDAZOLE 500 MG tablet     You can get these medications from any pharmacy    You don't need a prescription for these medications  · acetaminophen 325 MG tablet         Time Spent on discharge is  20 mins in patient examination, evaluation, counseling as well as medication reconciliation, prescriptions for required medications, discharge plan and follow up. Electronically signed by   Mirna Light DO  3/26/2018  11:59 AM      Thank you Dr. Ella Warner MD for the opportunity to be involved in this patient's care.

## 2018-03-27 ENCOUNTER — HOSPITAL ENCOUNTER (EMERGENCY)
Age: 68
Discharge: HOME | End: 2018-03-27
Attending: EMERGENCY MEDICINE
Payer: MEDICARE

## 2018-03-27 ENCOUNTER — TELEPHONE (OUTPATIENT)
Dept: INTERNAL MEDICINE CLINIC | Age: 68
End: 2018-03-27

## 2018-03-27 VITALS
HEART RATE: 88 BPM | RESPIRATION RATE: 18 BRPM | DIASTOLIC BLOOD PRESSURE: 71 MMHG | SYSTOLIC BLOOD PRESSURE: 144 MMHG | OXYGEN SATURATION: 95 % | HEIGHT: 67 IN | BODY MASS INDEX: 40.55 KG/M2 | TEMPERATURE: 98.1 F | WEIGHT: 258.38 LBS

## 2018-03-27 DIAGNOSIS — K29.00 ACUTE SUPERFICIAL GASTRITIS WITHOUT HEMORRHAGE: Primary | ICD-10-CM

## 2018-03-27 PROCEDURE — 99283 EMERGENCY DEPT VISIT LOW MDM: CPT

## 2018-03-27 PROCEDURE — 6370000000 HC RX 637 (ALT 250 FOR IP): Performed by: NURSE PRACTITIONER

## 2018-03-27 RX ORDER — RANITIDINE 150 MG/1
150 TABLET ORAL 2 TIMES DAILY
Qty: 60 TABLET | Refills: 0 | Status: SHIPPED | OUTPATIENT
Start: 2018-03-27 | End: 2018-04-30 | Stop reason: CLARIF

## 2018-03-27 RX ORDER — FAMOTIDINE 20 MG/1
20 TABLET, FILM COATED ORAL DAILY
Status: DISCONTINUED | OUTPATIENT
Start: 2018-03-27 | End: 2018-03-27 | Stop reason: HOSPADM

## 2018-03-27 RX ADMIN — FAMOTIDINE 20 MG: 20 TABLET, FILM COATED ORAL at 17:59

## 2018-03-27 ASSESSMENT — PAIN SCALES - GENERAL: PAINLEVEL_OUTOF10: 10

## 2018-03-27 NOTE — ED PROVIDER NOTES
[address] [place] [address] ED  eMERGENCY dEPARTMENT eNCOUnter      Pt Name: Radha Hammond  MRN: [id number]  [place] 1950  Date of evaluation: 3/27/2018  Provider: Marlon Cash NP    [address] [place] [address]       Chief Complaint   Patient presents with    Abdominal Pain    Emesis         HISTORY OF PRESENT ILLNESS  (Location/Symptom, Timing/Onset, Context/Setting, Quality, Duration, Modifying Factors, Severity.)   Radha Hammond is a 74 y.o. female who presents to the emergency department Complains of a burning feeling in the stomach. She was discharged yesterday from the hospital for a small incarcerated hernia as well as diverticulitis. She was started on IV Cipro and Flagyl and tolerated very well. Last night she began oral antibiotics and stated she had some stomach burning after taking Flagyl. Today she took a second dose of Flagyl and had intense stomach burning. She ate and felt the burning had decreased and she had also taken an antacid. She then took a dose of Cipro and ended up vomiting and having an increase in stomach pain and burning. She denies any hematemesis, hematochezia, melena. No rash. She states she is passing gas as well as tolerating solid food well. Nursing Notes were reviewed. ALLERGIES     Patient has no known allergies.     CURRENT MEDICATIONS       Previous Medications    ACETAMINOPHEN (TYLENOL) 325 MG TABLET    Take 2 tablets by mouth every 4 hours as needed for Pain    CIPROFLOXACIN (CIPRO) 500 MG TABLET    Take 1 tablet by mouth 2 times daily for 7 days    LOSARTAN (COZAAR) 50 MG TABLET    Take 1 tablet by mouth daily    METRONIDAZOLE (FLAGYL) 500 MG TABLET    Take 1 tablet by mouth 3 times daily for 7 days       PAST MEDICAL HISTORY         Diagnosis Date    Allergic rhinitis     Bladder prolapse     Constipation     Fundic gland polyposis of stomach 08/17/2017    per EGD    GERD (gastroesophageal reflux disease)     Hiatal hernia     History of cardiac cath review of systems was reviewed and negative. PHYSICAL EXAM    (up to 7 for level 4, 8 or more for level 5)     ED Triage Vitals [18 1705]   BP Temp Temp Source Pulse Resp SpO2 Height Weight   (!) 174/105 98.1 °F (36.7 °C) Oral 90 20 96 % 5' 7\" (1.702 m) 258 lb 6.1 oz (117.2 kg)     General Appearance:  Alert, cooperative, Ears comfortable. Head:  Normocephalic, without obvious abnormality, atraumatic. Eyes:  conjunctiva/corneas clear, EOM's intact. Sclera anicteric. ENT: Mucous membranes moist.     Neck: Supple, symmetrical, trachea midline, no adenopathy. Lungs:   Respirations eupneic. Lungs CTA   Chest Wall:  Nontender   Heart:   Abdomen:           Extremities:   Pulses:   Skin:   Neurologic:      RRR  Soft, nontender. Bowel sounds in all 4 quadrants. Palpation of the abdomen reveals very mild tenderness over the right lower quadrant. No tenderness with palpation over the central abdomen around the umbilicus. No discoloration noted. .      Full range of motion. Radial/ulnar pulses 3+    No rashes or lesions to exposed skin. Alert and oriented X 3. Motor grossly normal.  Speech clear. No orders to display           LABS:  Labs Reviewed - No data to display    All other labs were within normal range or not returned as of this dictation. EMERGENCY DEPARTMENT COURSE and DIFFERENTIAL DIAGNOSIS/MDM:   Vitals:    Vitals:    18 1705 18 1736   BP: (!) 174/105 (!) 144/71   Pulse: 90 88   Resp: 20 18   Temp: 98.1 °F (36.7 °C)    TempSrc: Oral    SpO2: 96% 95%   Weight: 258 lb 6.1 oz (117.2 kg)    Height: 5' 7\" (1.702 m)        Medical Decision Makin-year-old female with complaints of gastritis-like discomfort after taking oral doses of Flagyl and she is taking Carafate in the past however Carafate will be avoided secondary to her frequency of antibiotic dosing. She takes a total of 5 doses a day between Cipro and Flagyl. She will be started on Zantac.

## 2018-04-01 ENCOUNTER — HOSPITAL ENCOUNTER (EMERGENCY)
Age: 68
Discharge: HOME | End: 2018-04-01
Attending: EMERGENCY MEDICINE
Payer: MEDICARE

## 2018-04-01 ENCOUNTER — APPOINTMENT (OUTPATIENT)
Dept: GENERAL RADIOLOGY | Age: 68
End: 2018-04-01
Payer: MEDICARE

## 2018-04-01 VITALS
RESPIRATION RATE: 18 BRPM | OXYGEN SATURATION: 98 % | WEIGHT: 258 LBS | HEIGHT: 67 IN | SYSTOLIC BLOOD PRESSURE: 158 MMHG | HEART RATE: 71 BPM | DIASTOLIC BLOOD PRESSURE: 82 MMHG | BODY MASS INDEX: 40.49 KG/M2 | TEMPERATURE: 97.8 F

## 2018-04-01 DIAGNOSIS — Z87.19 HISTORY OF UMBILICAL HERNIA: ICD-10-CM

## 2018-04-01 DIAGNOSIS — K59.00 CONSTIPATION, UNSPECIFIED CONSTIPATION TYPE: ICD-10-CM

## 2018-04-01 DIAGNOSIS — R10.84 GENERALIZED ABDOMINAL PAIN: Primary | ICD-10-CM

## 2018-04-01 LAB
ABSOLUTE EOS #: 0.3 K/UL (ref 0–0.4)
ABSOLUTE IMMATURE GRANULOCYTE: ABNORMAL K/UL (ref 0–0.3)
ABSOLUTE LYMPH #: 1.9 K/UL (ref 1–4.8)
ABSOLUTE MONO #: 0.6 K/UL (ref 0.2–0.8)
ANION GAP SERPL CALCULATED.3IONS-SCNC: 14 MMOL/L (ref 9–17)
BASOPHILS # BLD: 1 % (ref 0–2)
BASOPHILS ABSOLUTE: 0.1 K/UL (ref 0–0.2)
BUN BLDV-MCNC: 21 MG/DL (ref 8–23)
BUN/CREAT BLD: 28 (ref 9–20)
CALCIUM SERPL-MCNC: 8.7 MG/DL (ref 8.6–10.4)
CHLORIDE BLD-SCNC: 103 MMOL/L (ref 98–107)
CO2: 24 MMOL/L (ref 20–31)
CREAT SERPL-MCNC: 0.74 MG/DL (ref 0.5–0.9)
DIFFERENTIAL TYPE: ABNORMAL
EOSINOPHILS RELATIVE PERCENT: 3 % (ref 1–4)
GFR AFRICAN AMERICAN: >60 ML/MIN
GFR NON-AFRICAN AMERICAN: >60 ML/MIN
GFR SERPL CREATININE-BSD FRML MDRD: ABNORMAL ML/MIN/{1.73_M2}
GFR SERPL CREATININE-BSD FRML MDRD: ABNORMAL ML/MIN/{1.73_M2}
GLUCOSE BLD-MCNC: 111 MG/DL (ref 70–99)
HCT VFR BLD CALC: 40 % (ref 36–46)
HEMOGLOBIN: 13.4 G/DL (ref 12–16)
IMMATURE GRANULOCYTES: ABNORMAL %
LACTIC ACID: 0.6 MMOL/L (ref 0.5–2.2)
LYMPHOCYTES # BLD: 20 % (ref 24–44)
MCH RBC QN AUTO: 30 PG (ref 26–34)
MCHC RBC AUTO-ENTMCNC: 33.6 G/DL (ref 31–37)
MCV RBC AUTO: 89.2 FL (ref 80–100)
MONOCYTES # BLD: 6 % (ref 1–7)
NRBC AUTOMATED: ABNORMAL PER 100 WBC
PDW BLD-RTO: 13.4 % (ref 11.5–14.5)
PLATELET # BLD: 273 K/UL (ref 130–400)
PLATELET ESTIMATE: ABNORMAL
PMV BLD AUTO: ABNORMAL FL (ref 6–12)
POTASSIUM SERPL-SCNC: 3.8 MMOL/L (ref 3.7–5.3)
RBC # BLD: 4.48 M/UL (ref 4–5.2)
RBC # BLD: ABNORMAL 10*6/UL
SEG NEUTROPHILS: 70 % (ref 36–66)
SEGMENTED NEUTROPHILS ABSOLUTE COUNT: 6.7 K/UL (ref 1.8–7.7)
SODIUM BLD-SCNC: 141 MMOL/L (ref 135–144)
WBC # BLD: 9.6 K/UL (ref 3.5–11)
WBC # BLD: ABNORMAL 10*3/UL

## 2018-04-01 PROCEDURE — C9113 INJ PANTOPRAZOLE SODIUM, VIA: HCPCS | Performed by: NURSE PRACTITIONER

## 2018-04-01 PROCEDURE — 96374 THER/PROPH/DIAG INJ IV PUSH: CPT

## 2018-04-01 PROCEDURE — 85025 COMPLETE CBC W/AUTO DIFF WBC: CPT

## 2018-04-01 PROCEDURE — 6360000002 HC RX W HCPCS: Performed by: NURSE PRACTITIONER

## 2018-04-01 PROCEDURE — 99284 EMERGENCY DEPT VISIT MOD MDM: CPT

## 2018-04-01 PROCEDURE — 80048 BASIC METABOLIC PNL TOTAL CA: CPT

## 2018-04-01 PROCEDURE — 96375 TX/PRO/DX INJ NEW DRUG ADDON: CPT

## 2018-04-01 PROCEDURE — 74022 RADEX COMPL AQT ABD SERIES: CPT

## 2018-04-01 PROCEDURE — 83605 ASSAY OF LACTIC ACID: CPT

## 2018-04-01 PROCEDURE — 96376 TX/PRO/DX INJ SAME DRUG ADON: CPT

## 2018-04-01 RX ORDER — MORPHINE SULFATE 2 MG/ML
2 INJECTION, SOLUTION INTRAMUSCULAR; INTRAVENOUS ONCE
Status: COMPLETED | OUTPATIENT
Start: 2018-04-01 | End: 2018-04-01

## 2018-04-01 RX ORDER — MORPHINE SULFATE 2 MG/ML
1 INJECTION, SOLUTION INTRAMUSCULAR; INTRAVENOUS ONCE
Status: COMPLETED | OUTPATIENT
Start: 2018-04-01 | End: 2018-04-01

## 2018-04-01 RX ORDER — 0.9 % SODIUM CHLORIDE 0.9 %
10 VIAL (ML) INJECTION ONCE
Status: DISCONTINUED | OUTPATIENT
Start: 2018-04-01 | End: 2018-04-02 | Stop reason: HOSPADM

## 2018-04-01 RX ORDER — ONDANSETRON 2 MG/ML
4 INJECTION INTRAMUSCULAR; INTRAVENOUS ONCE
Status: COMPLETED | OUTPATIENT
Start: 2018-04-01 | End: 2018-04-01

## 2018-04-01 RX ORDER — DICYCLOMINE HYDROCHLORIDE 10 MG/1
10 CAPSULE ORAL EVERY 6 HOURS PRN
Qty: 20 CAPSULE | Refills: 0 | Status: SHIPPED | OUTPATIENT
Start: 2018-04-01 | End: 2018-04-30 | Stop reason: CLARIF

## 2018-04-01 RX ORDER — POLYETHYLENE GLYCOL 3350 17 G/17G
17 POWDER, FOR SOLUTION ORAL DAILY
Qty: 1 BOTTLE | Refills: 0 | Status: SHIPPED | OUTPATIENT
Start: 2018-04-01 | End: 2018-04-08

## 2018-04-01 RX ORDER — PANTOPRAZOLE SODIUM 40 MG/10ML
40 INJECTION, POWDER, LYOPHILIZED, FOR SOLUTION INTRAVENOUS ONCE
Status: COMPLETED | OUTPATIENT
Start: 2018-04-01 | End: 2018-04-01

## 2018-04-01 RX ADMIN — ONDANSETRON 4 MG: 2 INJECTION INTRAMUSCULAR; INTRAVENOUS at 21:14

## 2018-04-01 RX ADMIN — PANTOPRAZOLE SODIUM 40 MG: 40 INJECTION, POWDER, FOR SOLUTION INTRAVENOUS at 21:13

## 2018-04-01 RX ADMIN — MORPHINE SULFATE 1 MG: 2 INJECTION, SOLUTION INTRAMUSCULAR; INTRAVENOUS at 22:21

## 2018-04-01 RX ADMIN — MORPHINE SULFATE 1 MG: 2 INJECTION, SOLUTION INTRAMUSCULAR; INTRAVENOUS at 21:20

## 2018-04-01 ASSESSMENT — ENCOUNTER SYMPTOMS
VOMITING: 0
SHORTNESS OF BREATH: 0
BACK PAIN: 0
NAUSEA: 1
DIARRHEA: 0
ABDOMINAL PAIN: 1
CONSTIPATION: 0

## 2018-04-01 ASSESSMENT — PAIN SCALES - GENERAL
PAINLEVEL_OUTOF10: 4
PAINLEVEL_OUTOF10: 10
PAINLEVEL_OUTOF10: 3
PAINLEVEL_OUTOF10: 4
PAINLEVEL_OUTOF10: 2

## 2018-04-01 ASSESSMENT — PAIN DESCRIPTION - DESCRIPTORS: DESCRIPTORS: SHARP;STABBING

## 2018-04-01 ASSESSMENT — PAIN DESCRIPTION - ORIENTATION: ORIENTATION: MID

## 2018-04-01 ASSESSMENT — PAIN DESCRIPTION - PAIN TYPE: TYPE: ACUTE PAIN

## 2018-04-01 ASSESSMENT — PAIN DESCRIPTION - LOCATION: LOCATION: ABDOMEN

## 2018-04-30 ENCOUNTER — OFFICE VISIT (OUTPATIENT)
Dept: INTERNAL MEDICINE CLINIC | Age: 68
End: 2018-04-30
Payer: MEDICARE

## 2018-04-30 VITALS
SYSTOLIC BLOOD PRESSURE: 120 MMHG | HEIGHT: 67 IN | BODY MASS INDEX: 40.78 KG/M2 | WEIGHT: 259.8 LBS | TEMPERATURE: 98.5 F | RESPIRATION RATE: 16 BRPM | HEART RATE: 64 BPM | DIASTOLIC BLOOD PRESSURE: 70 MMHG

## 2018-04-30 DIAGNOSIS — I10 ESSENTIAL HYPERTENSION: ICD-10-CM

## 2018-04-30 DIAGNOSIS — K43.6 VENTRAL HERNIA WITH OBSTRUCTION AND WITHOUT GANGRENE: ICD-10-CM

## 2018-04-30 DIAGNOSIS — R79.89 LOW VITAMIN D LEVEL: Primary | ICD-10-CM

## 2018-04-30 PROCEDURE — 99214 OFFICE O/P EST MOD 30 MIN: CPT | Performed by: INTERNAL MEDICINE

## 2018-04-30 PROCEDURE — 1090F PRES/ABSN URINE INCON ASSESS: CPT | Performed by: INTERNAL MEDICINE

## 2018-04-30 PROCEDURE — G8427 DOCREV CUR MEDS BY ELIG CLIN: HCPCS | Performed by: INTERNAL MEDICINE

## 2018-04-30 PROCEDURE — G8400 PT W/DXA NO RESULTS DOC: HCPCS | Performed by: INTERNAL MEDICINE

## 2018-04-30 PROCEDURE — 4040F PNEUMOC VAC/ADMIN/RCVD: CPT | Performed by: INTERNAL MEDICINE

## 2018-04-30 PROCEDURE — 1036F TOBACCO NON-USER: CPT | Performed by: INTERNAL MEDICINE

## 2018-04-30 PROCEDURE — 3017F COLORECTAL CA SCREEN DOC REV: CPT | Performed by: INTERNAL MEDICINE

## 2018-04-30 PROCEDURE — 1123F ACP DISCUSS/DSCN MKR DOCD: CPT | Performed by: INTERNAL MEDICINE

## 2018-04-30 PROCEDURE — G8417 CALC BMI ABV UP PARAM F/U: HCPCS | Performed by: INTERNAL MEDICINE

## 2018-04-30 RX ORDER — LOSARTAN POTASSIUM 50 MG/1
50 TABLET ORAL DAILY
Qty: 90 TABLET | Refills: 1 | Status: SHIPPED | OUTPATIENT
Start: 2018-04-30 | End: 2018-07-11 | Stop reason: SDUPTHER

## 2018-04-30 ASSESSMENT — PATIENT HEALTH QUESTIONNAIRE - PHQ9
2. FEELING DOWN, DEPRESSED OR HOPELESS: 0
1. LITTLE INTEREST OR PLEASURE IN DOING THINGS: 0
SUM OF ALL RESPONSES TO PHQ9 QUESTIONS 1 & 2: 0
SUM OF ALL RESPONSES TO PHQ QUESTIONS 1-9: 0

## 2018-05-21 ENCOUNTER — ANESTHESIA EVENT (OUTPATIENT)
Dept: OPERATING ROOM | Age: 68
End: 2018-05-21
Payer: MEDICARE

## 2018-05-22 ENCOUNTER — HOSPITAL ENCOUNTER (OUTPATIENT)
Age: 68
Setting detail: OBSERVATION
Discharge: HOME | End: 2018-05-23
Attending: SURGERY | Admitting: SURGERY
Payer: MEDICARE

## 2018-05-22 ENCOUNTER — ANESTHESIA (OUTPATIENT)
Dept: OPERATING ROOM | Age: 68
End: 2018-05-22
Payer: MEDICARE

## 2018-05-22 VITALS — OXYGEN SATURATION: 99 % | TEMPERATURE: 86.5 F | DIASTOLIC BLOOD PRESSURE: 74 MMHG | SYSTOLIC BLOOD PRESSURE: 137 MMHG

## 2018-05-22 DIAGNOSIS — Z98.890 S/P REPAIR OF VENTRAL HERNIA: Primary | ICD-10-CM

## 2018-05-22 DIAGNOSIS — Z87.19 S/P REPAIR OF VENTRAL HERNIA: Primary | ICD-10-CM

## 2018-05-22 PROBLEM — K43.2 INCISIONAL HERNIA WITHOUT OBSTRUCTION OR GANGRENE: Status: ACTIVE | Noted: 2018-05-22

## 2018-05-22 LAB
GFR NON-AFRICAN AMERICAN: >60 ML/MIN
GFR SERPL CREATININE-BSD FRML MDRD: >60 ML/MIN
GFR SERPL CREATININE-BSD FRML MDRD: NORMAL ML/MIN/{1.73_M2}
HCT VFR BLD CALC: 41.4 % (ref 36.3–47.1)
HEMOGLOBIN: 13 G/DL (ref 11.9–15.1)
MCH RBC QN AUTO: 29.6 PG (ref 25.2–33.5)
MCHC RBC AUTO-ENTMCNC: 31.4 G/DL (ref 28.4–34.8)
MCV RBC AUTO: 94.3 FL (ref 82.6–102.9)
NRBC AUTOMATED: 0 PER 100 WBC
PDW BLD-RTO: 13.9 % (ref 11.8–14.4)
PLATELET # BLD: 219 K/UL (ref 138–453)
PMV BLD AUTO: 10.6 FL (ref 8.1–13.5)
POC CHLORIDE: 110 MMOL/L (ref 98–107)
POC CREATININE: 0.63 MG/DL (ref 0.51–1.19)
POC IONIZED CALCIUM: 1.21 MMOL/L (ref 1.15–1.33)
POC POTASSIUM: 4.1 MMOL/L (ref 3.5–4.5)
POC SODIUM: 142 MMOL/L (ref 138–146)
RBC # BLD: 4.39 M/UL (ref 3.95–5.11)
WBC # BLD: 6.5 K/UL (ref 3.5–11.3)

## 2018-05-22 PROCEDURE — 6360000002 HC RX W HCPCS: Performed by: SURGERY

## 2018-05-22 PROCEDURE — G0378 HOSPITAL OBSERVATION PER HR: HCPCS

## 2018-05-22 PROCEDURE — 6370000000 HC RX 637 (ALT 250 FOR IP): Performed by: ANESTHESIOLOGY

## 2018-05-22 PROCEDURE — S2900 ROBOTIC SURGICAL SYSTEM: HCPCS | Performed by: SURGERY

## 2018-05-22 PROCEDURE — 6360000002 HC RX W HCPCS: Performed by: NURSE ANESTHETIST, CERTIFIED REGISTERED

## 2018-05-22 PROCEDURE — 84295 ASSAY OF SERUM SODIUM: CPT

## 2018-05-22 PROCEDURE — 2500000003 HC RX 250 WO HCPCS: Performed by: NURSE ANESTHETIST, CERTIFIED REGISTERED

## 2018-05-22 PROCEDURE — 82565 ASSAY OF CREATININE: CPT

## 2018-05-22 PROCEDURE — 2580000003 HC RX 258: Performed by: ANESTHESIOLOGY

## 2018-05-22 PROCEDURE — 3700000000 HC ANESTHESIA ATTENDED CARE: Performed by: SURGERY

## 2018-05-22 PROCEDURE — 2580000003 HC RX 258: Performed by: SURGERY

## 2018-05-22 PROCEDURE — 6360000002 HC RX W HCPCS: Performed by: ANESTHESIOLOGY

## 2018-05-22 PROCEDURE — 3600000009 HC SURGERY ROBOT BASE: Performed by: SURGERY

## 2018-05-22 PROCEDURE — 84132 ASSAY OF SERUM POTASSIUM: CPT

## 2018-05-22 PROCEDURE — 82330 ASSAY OF CALCIUM: CPT

## 2018-05-22 PROCEDURE — 7100000001 HC PACU RECOVERY - ADDTL 15 MIN: Performed by: SURGERY

## 2018-05-22 PROCEDURE — 2720000003 HC MISC SUTURE/STAPLES/RELOADS/ETC: Performed by: SURGERY

## 2018-05-22 PROCEDURE — C1781 MESH (IMPLANTABLE): HCPCS | Performed by: SURGERY

## 2018-05-22 PROCEDURE — 2580000003 HC RX 258: Performed by: STUDENT IN AN ORGANIZED HEALTH CARE EDUCATION/TRAINING PROGRAM

## 2018-05-22 PROCEDURE — 3700000001 HC ADD 15 MINUTES (ANESTHESIA): Performed by: SURGERY

## 2018-05-22 PROCEDURE — 85027 COMPLETE CBC AUTOMATED: CPT

## 2018-05-22 PROCEDURE — C1760 CLOSURE DEV, VASC: HCPCS | Performed by: SURGERY

## 2018-05-22 PROCEDURE — 7100000000 HC PACU RECOVERY - FIRST 15 MIN: Performed by: SURGERY

## 2018-05-22 PROCEDURE — 6370000000 HC RX 637 (ALT 250 FOR IP): Performed by: STUDENT IN AN ORGANIZED HEALTH CARE EDUCATION/TRAINING PROGRAM

## 2018-05-22 PROCEDURE — 2500000003 HC RX 250 WO HCPCS: Performed by: SURGERY

## 2018-05-22 PROCEDURE — 82435 ASSAY OF BLOOD CHLORIDE: CPT

## 2018-05-22 PROCEDURE — 3600000019 HC SURGERY ROBOT ADDTL 15MIN: Performed by: SURGERY

## 2018-05-22 PROCEDURE — S0028 INJECTION, FAMOTIDINE, 20 MG: HCPCS | Performed by: ANESTHESIOLOGY

## 2018-05-22 PROCEDURE — 2500000003 HC RX 250 WO HCPCS: Performed by: ANESTHESIOLOGY

## 2018-05-22 DEVICE — MESH HERN DIA4.5IN CIR W/ ECHO PS POS SYS VENTRALIGHT ST: Type: IMPLANTABLE DEVICE | Status: FUNCTIONAL

## 2018-05-22 RX ORDER — BUPIVACAINE HYDROCHLORIDE 5 MG/ML
INJECTION, SOLUTION EPIDURAL; INTRACAUDAL PRN
Status: DISCONTINUED | OUTPATIENT
Start: 2018-05-22 | End: 2018-05-22 | Stop reason: HOSPADM

## 2018-05-22 RX ORDER — MAGNESIUM HYDROXIDE 1200 MG/15ML
LIQUID ORAL CONTINUOUS PRN
Status: COMPLETED | OUTPATIENT
Start: 2018-05-22 | End: 2018-05-22

## 2018-05-22 RX ORDER — SODIUM CHLORIDE, SODIUM LACTATE, POTASSIUM CHLORIDE, CALCIUM CHLORIDE 600; 310; 30; 20 MG/100ML; MG/100ML; MG/100ML; MG/100ML
INJECTION, SOLUTION INTRAVENOUS CONTINUOUS
Status: DISCONTINUED | OUTPATIENT
Start: 2018-05-22 | End: 2018-05-22

## 2018-05-22 RX ORDER — ACETAMINOPHEN 325 MG/1
650 TABLET ORAL EVERY 4 HOURS PRN
Status: DISCONTINUED | OUTPATIENT
Start: 2018-05-22 | End: 2018-05-23 | Stop reason: HOSPADM

## 2018-05-22 RX ORDER — FENTANYL CITRATE 50 UG/ML
50 INJECTION, SOLUTION INTRAMUSCULAR; INTRAVENOUS EVERY 5 MIN PRN
Status: COMPLETED | OUTPATIENT
Start: 2018-05-22 | End: 2018-05-22

## 2018-05-22 RX ORDER — HYDROCODONE BITARTRATE AND ACETAMINOPHEN 5; 325 MG/1; MG/1
1 TABLET ORAL EVERY 6 HOURS PRN
Qty: 30 TABLET | Refills: 0 | Status: SHIPPED | OUTPATIENT
Start: 2018-05-22 | End: 2018-05-29

## 2018-05-22 RX ORDER — DEXAMETHASONE SODIUM PHOSPHATE 10 MG/ML
INJECTION INTRAMUSCULAR; INTRAVENOUS PRN
Status: DISCONTINUED | OUTPATIENT
Start: 2018-05-22 | End: 2018-05-22 | Stop reason: SDUPTHER

## 2018-05-22 RX ORDER — FENTANYL CITRATE 50 UG/ML
50 INJECTION, SOLUTION INTRAMUSCULAR; INTRAVENOUS EVERY 5 MIN PRN
Status: DISCONTINUED | OUTPATIENT
Start: 2018-05-22 | End: 2018-05-23 | Stop reason: HOSPADM

## 2018-05-22 RX ORDER — KETOROLAC TROMETHAMINE 30 MG/ML
INJECTION, SOLUTION INTRAMUSCULAR; INTRAVENOUS PRN
Status: DISCONTINUED | OUTPATIENT
Start: 2018-05-22 | End: 2018-05-22 | Stop reason: SDUPTHER

## 2018-05-22 RX ORDER — MORPHINE SULFATE 4 MG/ML
2 INJECTION, SOLUTION INTRAMUSCULAR; INTRAVENOUS
Status: DISCONTINUED | OUTPATIENT
Start: 2018-05-22 | End: 2018-05-23 | Stop reason: HOSPADM

## 2018-05-22 RX ORDER — FENTANYL CITRATE 50 UG/ML
INJECTION, SOLUTION INTRAMUSCULAR; INTRAVENOUS
Status: DISPENSED
Start: 2018-05-22 | End: 2018-05-23

## 2018-05-22 RX ORDER — ONDANSETRON 2 MG/ML
INJECTION INTRAMUSCULAR; INTRAVENOUS PRN
Status: DISCONTINUED | OUTPATIENT
Start: 2018-05-22 | End: 2018-05-22 | Stop reason: SDUPTHER

## 2018-05-22 RX ORDER — ONDANSETRON 2 MG/ML
4 INJECTION INTRAMUSCULAR; INTRAVENOUS EVERY 6 HOURS PRN
Status: DISCONTINUED | OUTPATIENT
Start: 2018-05-22 | End: 2018-05-23 | Stop reason: HOSPADM

## 2018-05-22 RX ORDER — OXYCODONE HYDROCHLORIDE AND ACETAMINOPHEN 5; 325 MG/1; MG/1
1 TABLET ORAL EVERY 4 HOURS PRN
Status: DISCONTINUED | OUTPATIENT
Start: 2018-05-22 | End: 2018-05-23 | Stop reason: HOSPADM

## 2018-05-22 RX ORDER — SODIUM CHLORIDE 0.9 % (FLUSH) 0.9 %
10 SYRINGE (ML) INJECTION PRN
Status: DISCONTINUED | OUTPATIENT
Start: 2018-05-22 | End: 2018-05-23 | Stop reason: HOSPADM

## 2018-05-22 RX ORDER — HYDROCODONE BITARTRATE AND ACETAMINOPHEN 5; 325 MG/1; MG/1
1 TABLET ORAL
Status: COMPLETED | OUTPATIENT
Start: 2018-05-22 | End: 2018-05-22

## 2018-05-22 RX ORDER — ROCURONIUM BROMIDE 10 MG/ML
INJECTION, SOLUTION INTRAVENOUS PRN
Status: DISCONTINUED | OUTPATIENT
Start: 2018-05-22 | End: 2018-05-22 | Stop reason: SDUPTHER

## 2018-05-22 RX ORDER — SODIUM CHLORIDE 0.9 % (FLUSH) 0.9 %
10 SYRINGE (ML) INJECTION EVERY 12 HOURS SCHEDULED
Status: DISCONTINUED | OUTPATIENT
Start: 2018-05-22 | End: 2018-05-23 | Stop reason: HOSPADM

## 2018-05-22 RX ORDER — FENTANYL CITRATE 50 UG/ML
INJECTION, SOLUTION INTRAMUSCULAR; INTRAVENOUS PRN
Status: DISCONTINUED | OUTPATIENT
Start: 2018-05-22 | End: 2018-05-22 | Stop reason: SDUPTHER

## 2018-05-22 RX ORDER — MIDAZOLAM HYDROCHLORIDE 1 MG/ML
INJECTION INTRAMUSCULAR; INTRAVENOUS PRN
Status: DISCONTINUED | OUTPATIENT
Start: 2018-05-22 | End: 2018-05-22 | Stop reason: SDUPTHER

## 2018-05-22 RX ORDER — ONDANSETRON 4 MG/1
4 TABLET, FILM COATED ORAL EVERY 8 HOURS PRN
Qty: 20 TABLET | Refills: 0 | Status: SHIPPED | OUTPATIENT
Start: 2018-05-22 | End: 2018-06-19 | Stop reason: CLARIF

## 2018-05-22 RX ORDER — DOCUSATE SODIUM 100 MG/1
100 CAPSULE, LIQUID FILLED ORAL 2 TIMES DAILY PRN
Qty: 30 CAPSULE | Refills: 0 | Status: SHIPPED | OUTPATIENT
Start: 2018-05-22 | End: 2018-06-19 | Stop reason: CLARIF

## 2018-05-22 RX ORDER — LIDOCAINE HYDROCHLORIDE 10 MG/ML
1 INJECTION, SOLUTION EPIDURAL; INFILTRATION; INTRACAUDAL; PERINEURAL
Status: DISCONTINUED | OUTPATIENT
Start: 2018-05-22 | End: 2018-05-22 | Stop reason: HOSPADM

## 2018-05-22 RX ORDER — OXYCODONE HYDROCHLORIDE AND ACETAMINOPHEN 5; 325 MG/1; MG/1
2 TABLET ORAL EVERY 4 HOURS PRN
Status: DISCONTINUED | OUTPATIENT
Start: 2018-05-22 | End: 2018-05-23 | Stop reason: HOSPADM

## 2018-05-22 RX ORDER — GLYCOPYRROLATE 1 MG/5 ML
SYRINGE (ML) INTRAVENOUS PRN
Status: DISCONTINUED | OUTPATIENT
Start: 2018-05-22 | End: 2018-05-22 | Stop reason: SDUPTHER

## 2018-05-22 RX ORDER — LOSARTAN POTASSIUM 50 MG/1
50 TABLET ORAL DAILY
Status: DISCONTINUED | OUTPATIENT
Start: 2018-05-22 | End: 2018-05-23 | Stop reason: HOSPADM

## 2018-05-22 RX ORDER — LIDOCAINE HYDROCHLORIDE 10 MG/ML
INJECTION, SOLUTION EPIDURAL; INFILTRATION; INTRACAUDAL; PERINEURAL PRN
Status: DISCONTINUED | OUTPATIENT
Start: 2018-05-22 | End: 2018-05-22 | Stop reason: SDUPTHER

## 2018-05-22 RX ORDER — PROPOFOL 10 MG/ML
INJECTION, EMULSION INTRAVENOUS PRN
Status: DISCONTINUED | OUTPATIENT
Start: 2018-05-22 | End: 2018-05-22 | Stop reason: SDUPTHER

## 2018-05-22 RX ADMIN — ROCURONIUM BROMIDE 50 MG: 10 INJECTION INTRAVENOUS at 10:33

## 2018-05-22 RX ADMIN — FENTANYL CITRATE 50 MCG: 50 INJECTION INTRAMUSCULAR; INTRAVENOUS at 12:41

## 2018-05-22 RX ADMIN — Medication 0.2 MG: at 11:09

## 2018-05-22 RX ADMIN — FENTANYL CITRATE 50 MCG: 50 INJECTION INTRAMUSCULAR; INTRAVENOUS at 12:09

## 2018-05-22 RX ADMIN — FAMOTIDINE 20 MG: 10 INJECTION INTRAVENOUS at 09:59

## 2018-05-22 RX ADMIN — LIDOCAINE HYDROCHLORIDE 50 MG: 10 INJECTION, SOLUTION EPIDURAL; INFILTRATION; INTRACAUDAL; PERINEURAL at 10:31

## 2018-05-22 RX ADMIN — LOSARTAN POTASSIUM 50 MG: 50 TABLET, FILM COATED ORAL at 17:21

## 2018-05-22 RX ADMIN — ONDANSETRON 4 MG: 2 INJECTION INTRAMUSCULAR; INTRAVENOUS at 11:51

## 2018-05-22 RX ADMIN — HYDROCODONE BITARTRATE AND ACETAMINOPHEN 1 TABLET: 5; 325 TABLET ORAL at 14:05

## 2018-05-22 RX ADMIN — SUGAMMADEX 200 MG: 100 INJECTION, SOLUTION INTRAVENOUS at 12:01

## 2018-05-22 RX ADMIN — PROPOFOL 200 MG: 10 INJECTION, EMULSION INTRAVENOUS at 10:31

## 2018-05-22 RX ADMIN — Medication 2 G: at 10:48

## 2018-05-22 RX ADMIN — Medication 10 ML: at 20:09

## 2018-05-22 RX ADMIN — FENTANYL CITRATE 50 MCG: 50 INJECTION INTRAMUSCULAR; INTRAVENOUS at 10:39

## 2018-05-22 RX ADMIN — DEXAMETHASONE SODIUM PHOSPHATE 10 MG: 10 INJECTION INTRAMUSCULAR; INTRAVENOUS at 11:12

## 2018-05-22 RX ADMIN — HYDROCODONE BITARTRATE AND ACETAMINOPHEN 1 TABLET: 5; 325 TABLET ORAL at 13:15

## 2018-05-22 RX ADMIN — FENTANYL CITRATE 50 MCG: 50 INJECTION INTRAMUSCULAR; INTRAVENOUS at 12:20

## 2018-05-22 RX ADMIN — MIDAZOLAM HYDROCHLORIDE 2 MG: 1 INJECTION, SOLUTION INTRAMUSCULAR; INTRAVENOUS at 10:28

## 2018-05-22 RX ADMIN — FENTANYL CITRATE 50 MCG: 50 INJECTION INTRAMUSCULAR; INTRAVENOUS at 12:54

## 2018-05-22 RX ADMIN — SODIUM CHLORIDE, POTASSIUM CHLORIDE, SODIUM LACTATE AND CALCIUM CHLORIDE: 600; 310; 30; 20 INJECTION, SOLUTION INTRAVENOUS at 09:44

## 2018-05-22 RX ADMIN — FENTANYL CITRATE 50 MCG: 50 INJECTION INTRAMUSCULAR; INTRAVENOUS at 13:16

## 2018-05-22 RX ADMIN — FENTANYL CITRATE 50 MCG: 50 INJECTION INTRAMUSCULAR; INTRAVENOUS at 12:34

## 2018-05-22 RX ADMIN — ROCURONIUM BROMIDE 10 MG: 10 INJECTION INTRAVENOUS at 11:32

## 2018-05-22 RX ADMIN — OXYCODONE HYDROCHLORIDE AND ACETAMINOPHEN 1 TABLET: 5; 325 TABLET ORAL at 18:09

## 2018-05-22 RX ADMIN — OXYCODONE HYDROCHLORIDE AND ACETAMINOPHEN 2 TABLET: 5; 325 TABLET ORAL at 23:38

## 2018-05-22 RX ADMIN — FENTANYL CITRATE 50 MCG: 50 INJECTION INTRAMUSCULAR; INTRAVENOUS at 10:31

## 2018-05-22 RX ADMIN — KETOROLAC TROMETHAMINE 30 MG: 30 INJECTION, SOLUTION INTRAMUSCULAR; INTRAVENOUS at 11:52

## 2018-05-22 ASSESSMENT — PULMONARY FUNCTION TESTS
PIF_VALUE: 19
PIF_VALUE: 24
PIF_VALUE: 20
PIF_VALUE: 18
PIF_VALUE: 23
PIF_VALUE: 52
PIF_VALUE: 26
PIF_VALUE: 25
PIF_VALUE: 26
PIF_VALUE: 27
PIF_VALUE: 20
PIF_VALUE: 0
PIF_VALUE: 27
PIF_VALUE: 24
PIF_VALUE: 26
PIF_VALUE: 19
PIF_VALUE: 27
PIF_VALUE: 1
PIF_VALUE: 0
PIF_VALUE: 26
PIF_VALUE: 3
PIF_VALUE: 3
PIF_VALUE: 19
PIF_VALUE: 5
PIF_VALUE: 27
PIF_VALUE: 22
PIF_VALUE: 27
PIF_VALUE: 18
PIF_VALUE: 23
PIF_VALUE: 26
PIF_VALUE: 27
PIF_VALUE: 1
PIF_VALUE: 20
PIF_VALUE: 27
PIF_VALUE: 26
PIF_VALUE: 27
PIF_VALUE: 24
PIF_VALUE: 18
PIF_VALUE: 12
PIF_VALUE: 24
PIF_VALUE: 19
PIF_VALUE: 3
PIF_VALUE: 25
PIF_VALUE: 19
PIF_VALUE: 18
PIF_VALUE: 27
PIF_VALUE: 24
PIF_VALUE: 24
PIF_VALUE: 27
PIF_VALUE: 24
PIF_VALUE: 27
PIF_VALUE: 4
PIF_VALUE: 27
PIF_VALUE: 4
PIF_VALUE: 1
PIF_VALUE: 49
PIF_VALUE: 26
PIF_VALUE: 26
PIF_VALUE: 3
PIF_VALUE: 27
PIF_VALUE: 26
PIF_VALUE: 1
PIF_VALUE: 4
PIF_VALUE: 20
PIF_VALUE: 26
PIF_VALUE: 4
PIF_VALUE: 26
PIF_VALUE: 6
PIF_VALUE: 20
PIF_VALUE: 5
PIF_VALUE: 27
PIF_VALUE: 4
PIF_VALUE: 37
PIF_VALUE: 18
PIF_VALUE: 27
PIF_VALUE: 19
PIF_VALUE: 18
PIF_VALUE: 24
PIF_VALUE: 26
PIF_VALUE: 19
PIF_VALUE: 4
PIF_VALUE: 19
PIF_VALUE: 0
PIF_VALUE: 26
PIF_VALUE: 4
PIF_VALUE: 27
PIF_VALUE: 19
PIF_VALUE: 5
PIF_VALUE: 27
PIF_VALUE: 19
PIF_VALUE: 1
PIF_VALUE: 27
PIF_VALUE: 24
PIF_VALUE: 25
PIF_VALUE: 26
PIF_VALUE: 18
PIF_VALUE: 27
PIF_VALUE: 27
PIF_VALUE: 26
PIF_VALUE: 18
PIF_VALUE: 20
PIF_VALUE: 24
PIF_VALUE: 24
PIF_VALUE: 3
PIF_VALUE: 24
PIF_VALUE: 20
PIF_VALUE: 0
PIF_VALUE: 15
PIF_VALUE: 27
PIF_VALUE: 24
PIF_VALUE: 19

## 2018-05-22 ASSESSMENT — PAIN SCALES - GENERAL
PAINLEVEL_OUTOF10: 10
PAINLEVEL_OUTOF10: 6
PAINLEVEL_OUTOF10: 10
PAINLEVEL_OUTOF10: 7
PAINLEVEL_OUTOF10: 10
PAINLEVEL_OUTOF10: 5
PAINLEVEL_OUTOF10: 10
PAINLEVEL_OUTOF10: 3
PAINLEVEL_OUTOF10: 10

## 2018-05-22 ASSESSMENT — PAIN SCALES - WONG BAKER
WONGBAKER_NUMERICALRESPONSE: 0

## 2018-05-22 ASSESSMENT — PAIN - FUNCTIONAL ASSESSMENT: PAIN_FUNCTIONAL_ASSESSMENT: 0-10

## 2018-05-23 VITALS
BODY MASS INDEX: 40.02 KG/M2 | HEART RATE: 78 BPM | RESPIRATION RATE: 16 BRPM | WEIGHT: 255 LBS | HEIGHT: 67 IN | OXYGEN SATURATION: 94 % | DIASTOLIC BLOOD PRESSURE: 73 MMHG | TEMPERATURE: 98.2 F | SYSTOLIC BLOOD PRESSURE: 144 MMHG

## 2018-05-23 PROCEDURE — 96374 THER/PROPH/DIAG INJ IV PUSH: CPT

## 2018-05-23 PROCEDURE — G0378 HOSPITAL OBSERVATION PER HR: HCPCS

## 2018-05-23 PROCEDURE — 2580000003 HC RX 258: Performed by: STUDENT IN AN ORGANIZED HEALTH CARE EDUCATION/TRAINING PROGRAM

## 2018-05-23 PROCEDURE — 6360000002 HC RX W HCPCS: Performed by: STUDENT IN AN ORGANIZED HEALTH CARE EDUCATION/TRAINING PROGRAM

## 2018-05-23 PROCEDURE — 6370000000 HC RX 637 (ALT 250 FOR IP): Performed by: STUDENT IN AN ORGANIZED HEALTH CARE EDUCATION/TRAINING PROGRAM

## 2018-05-23 RX ADMIN — MORPHINE SULFATE 2 MG: 4 INJECTION INTRAVENOUS at 02:03

## 2018-05-23 RX ADMIN — Medication 10 ML: at 08:46

## 2018-05-23 RX ADMIN — LOSARTAN POTASSIUM 50 MG: 50 TABLET, FILM COATED ORAL at 08:46

## 2018-05-23 RX ADMIN — OXYCODONE HYDROCHLORIDE AND ACETAMINOPHEN 2 TABLET: 5; 325 TABLET ORAL at 12:40

## 2018-05-23 RX ADMIN — OXYCODONE HYDROCHLORIDE AND ACETAMINOPHEN 1 TABLET: 5; 325 TABLET ORAL at 04:53

## 2018-05-23 RX ADMIN — MAGNESIUM HYDROXIDE 30 ML: 400 SUSPENSION ORAL at 08:50

## 2018-05-23 RX ADMIN — OXYCODONE HYDROCHLORIDE AND ACETAMINOPHEN 1 TABLET: 5; 325 TABLET ORAL at 04:07

## 2018-05-23 RX ADMIN — OXYCODONE HYDROCHLORIDE AND ACETAMINOPHEN 2 TABLET: 5; 325 TABLET ORAL at 08:46

## 2018-05-23 ASSESSMENT — PAIN SCALES - GENERAL
PAINLEVEL_OUTOF10: 4
PAINLEVEL_OUTOF10: 4
PAINLEVEL_OUTOF10: 7
PAINLEVEL_OUTOF10: 4
PAINLEVEL_OUTOF10: 7
PAINLEVEL_OUTOF10: 9
PAINLEVEL_OUTOF10: 8

## 2018-05-29 ENCOUNTER — TELEPHONE (OUTPATIENT)
Dept: INTERNAL MEDICINE CLINIC | Age: 68
End: 2018-05-29

## 2018-06-19 ENCOUNTER — OFFICE VISIT (OUTPATIENT)
Dept: INTERNAL MEDICINE CLINIC | Age: 68
End: 2018-06-19
Payer: MEDICARE

## 2018-06-19 VITALS
BODY MASS INDEX: 39.96 KG/M2 | TEMPERATURE: 98.4 F | WEIGHT: 254.6 LBS | SYSTOLIC BLOOD PRESSURE: 122 MMHG | HEART RATE: 68 BPM | DIASTOLIC BLOOD PRESSURE: 74 MMHG | HEIGHT: 67 IN

## 2018-06-19 DIAGNOSIS — H00.011 HORDEOLUM EXTERNUM OF RIGHT UPPER EYELID: Primary | ICD-10-CM

## 2018-06-19 DIAGNOSIS — Z87.19 S/P REPAIR OF VENTRAL HERNIA: ICD-10-CM

## 2018-06-19 DIAGNOSIS — Z98.890 S/P REPAIR OF VENTRAL HERNIA: ICD-10-CM

## 2018-06-19 DIAGNOSIS — I10 ESSENTIAL HYPERTENSION: ICD-10-CM

## 2018-06-19 PROCEDURE — 4040F PNEUMOC VAC/ADMIN/RCVD: CPT | Performed by: INTERNAL MEDICINE

## 2018-06-19 PROCEDURE — G8400 PT W/DXA NO RESULTS DOC: HCPCS | Performed by: INTERNAL MEDICINE

## 2018-06-19 PROCEDURE — 1123F ACP DISCUSS/DSCN MKR DOCD: CPT | Performed by: INTERNAL MEDICINE

## 2018-06-19 PROCEDURE — 1090F PRES/ABSN URINE INCON ASSESS: CPT | Performed by: INTERNAL MEDICINE

## 2018-06-19 PROCEDURE — 3017F COLORECTAL CA SCREEN DOC REV: CPT | Performed by: INTERNAL MEDICINE

## 2018-06-19 PROCEDURE — 1036F TOBACCO NON-USER: CPT | Performed by: INTERNAL MEDICINE

## 2018-06-19 PROCEDURE — 99214 OFFICE O/P EST MOD 30 MIN: CPT | Performed by: INTERNAL MEDICINE

## 2018-06-19 PROCEDURE — G8417 CALC BMI ABV UP PARAM F/U: HCPCS | Performed by: INTERNAL MEDICINE

## 2018-06-19 PROCEDURE — G8427 DOCREV CUR MEDS BY ELIG CLIN: HCPCS | Performed by: INTERNAL MEDICINE

## 2018-06-19 RX ORDER — CEPHALEXIN 500 MG/1
500 CAPSULE ORAL 3 TIMES DAILY
Qty: 21 CAPSULE | Refills: 0 | Status: SHIPPED | OUTPATIENT
Start: 2018-06-19 | End: 2018-06-26

## 2018-07-11 ENCOUNTER — TELEPHONE (OUTPATIENT)
Dept: INTERNAL MEDICINE CLINIC | Age: 68
End: 2018-07-11

## 2018-07-11 DIAGNOSIS — I10 ESSENTIAL HYPERTENSION: ICD-10-CM

## 2018-07-11 RX ORDER — LOSARTAN POTASSIUM 50 MG/1
50 TABLET ORAL DAILY
Qty: 10 TABLET | Refills: 0 | Status: SHIPPED | OUTPATIENT
Start: 2018-07-11 | End: 2019-01-07 | Stop reason: SDUPTHER

## 2018-08-07 ENCOUNTER — HOSPITAL ENCOUNTER (OUTPATIENT)
Age: 68
Discharge: HOME | End: 2018-08-07

## 2018-08-07 LAB — RUBV IGG SER QL: 18.3 IU/ML

## 2018-08-07 PROCEDURE — 86765 RUBEOLA ANTIBODY: CPT

## 2018-08-07 PROCEDURE — 86762 RUBELLA ANTIBODY: CPT

## 2018-08-07 PROCEDURE — 86735 MUMPS ANTIBODY: CPT

## 2018-08-07 PROCEDURE — 86787 VARICELLA-ZOSTER ANTIBODY: CPT

## 2018-08-07 PROCEDURE — 86481 TB AG RESPONSE T-CELL SUSP: CPT

## 2018-08-08 LAB
MEASLES IMMUNE (IGG): 4.05
MUV IGG SER QL: 2.72
VZV IGG SER QL IA: 1.48

## 2018-08-10 LAB — T-SPOT TB TEST: NORMAL

## 2019-01-07 DIAGNOSIS — I10 ESSENTIAL HYPERTENSION: ICD-10-CM

## 2019-01-07 RX ORDER — LOSARTAN POTASSIUM 50 MG/1
50 TABLET ORAL DAILY
Qty: 30 TABLET | Refills: 0 | Status: ON HOLD | OUTPATIENT
Start: 2019-01-07 | End: 2019-02-19 | Stop reason: SDUPTHER

## 2019-01-16 ENCOUNTER — APPOINTMENT (OUTPATIENT)
Dept: GENERAL RADIOLOGY | Age: 69
End: 2019-01-16
Payer: MEDICARE

## 2019-01-16 ENCOUNTER — HOSPITAL ENCOUNTER (EMERGENCY)
Age: 69
Discharge: HOME | End: 2019-01-16
Attending: EMERGENCY MEDICINE
Payer: MEDICARE

## 2019-01-16 VITALS
HEIGHT: 67 IN | BODY MASS INDEX: 39.24 KG/M2 | SYSTOLIC BLOOD PRESSURE: 154 MMHG | DIASTOLIC BLOOD PRESSURE: 69 MMHG | WEIGHT: 250 LBS | HEART RATE: 96 BPM | TEMPERATURE: 98.8 F | RESPIRATION RATE: 18 BRPM | OXYGEN SATURATION: 94 %

## 2019-01-16 DIAGNOSIS — J20.9 ACUTE BRONCHITIS, UNSPECIFIED ORGANISM: Primary | ICD-10-CM

## 2019-01-16 PROCEDURE — 6360000002 HC RX W HCPCS: Performed by: EMERGENCY MEDICINE

## 2019-01-16 PROCEDURE — 94664 DEMO&/EVAL PT USE INHALER: CPT

## 2019-01-16 PROCEDURE — 99283 EMERGENCY DEPT VISIT LOW MDM: CPT

## 2019-01-16 PROCEDURE — 6370000000 HC RX 637 (ALT 250 FOR IP): Performed by: EMERGENCY MEDICINE

## 2019-01-16 PROCEDURE — 71046 X-RAY EXAM CHEST 2 VIEWS: CPT

## 2019-01-16 PROCEDURE — 94640 AIRWAY INHALATION TREATMENT: CPT

## 2019-01-16 RX ORDER — ALBUTEROL SULFATE 2.5 MG/3ML
2.5 SOLUTION RESPIRATORY (INHALATION) ONCE
Status: COMPLETED | OUTPATIENT
Start: 2019-01-16 | End: 2019-01-16

## 2019-01-16 RX ORDER — PREDNISONE 10 MG/1
TABLET ORAL
Qty: 30 TABLET | Refills: 0 | Status: SHIPPED | OUTPATIENT
Start: 2019-01-16 | End: 2019-01-28 | Stop reason: CLARIF

## 2019-01-16 RX ORDER — AZITHROMYCIN 250 MG/1
500 TABLET, FILM COATED ORAL ONCE
Status: COMPLETED | OUTPATIENT
Start: 2019-01-16 | End: 2019-01-16

## 2019-01-16 RX ORDER — AZITHROMYCIN 250 MG/1
TABLET, FILM COATED ORAL
Qty: 1 PACKET | Refills: 0 | Status: SHIPPED | OUTPATIENT
Start: 2019-01-16 | End: 2019-01-28 | Stop reason: CLARIF

## 2019-01-16 RX ORDER — PREDNISONE 20 MG/1
40 TABLET ORAL ONCE
Status: COMPLETED | OUTPATIENT
Start: 2019-01-16 | End: 2019-01-16

## 2019-01-16 RX ADMIN — PREDNISONE 40 MG: 20 TABLET ORAL at 21:37

## 2019-01-16 RX ADMIN — AZITHROMYCIN 500 MG: 250 TABLET, FILM COATED ORAL at 21:37

## 2019-01-16 RX ADMIN — ALBUTEROL SULFATE 2.5 MG: 2.5 SOLUTION RESPIRATORY (INHALATION) at 21:34

## 2019-01-16 ASSESSMENT — ENCOUNTER SYMPTOMS
EYE DISCHARGE: 0
VOMITING: 0
COLOR CHANGE: 0
EYE REDNESS: 0
ABDOMINAL PAIN: 0
WHEEZING: 1
DIARRHEA: 0
FACIAL SWELLING: 0
SHORTNESS OF BREATH: 1
CONSTIPATION: 0
COUGH: 1

## 2019-01-24 ENCOUNTER — HOSPITAL ENCOUNTER (OUTPATIENT)
Dept: MAMMOGRAPHY | Age: 69
Discharge: HOME | End: 2019-01-26
Payer: MEDICARE

## 2019-01-24 DIAGNOSIS — Z12.31 ENCOUNTER FOR SCREENING MAMMOGRAM FOR BREAST CANCER: ICD-10-CM

## 2019-01-24 PROCEDURE — 77063 BREAST TOMOSYNTHESIS BI: CPT

## 2019-01-28 ENCOUNTER — OFFICE VISIT (OUTPATIENT)
Dept: INTERNAL MEDICINE CLINIC | Age: 69
End: 2019-01-28
Payer: MEDICARE

## 2019-01-28 VITALS
HEART RATE: 72 BPM | SYSTOLIC BLOOD PRESSURE: 128 MMHG | RESPIRATION RATE: 169 BRPM | BODY MASS INDEX: 40.1 KG/M2 | WEIGHT: 256 LBS | TEMPERATURE: 98.1 F | DIASTOLIC BLOOD PRESSURE: 74 MMHG

## 2019-01-28 DIAGNOSIS — E66.01 MORBID OBESITY WITH BMI OF 40.0-44.9, ADULT (HCC): ICD-10-CM

## 2019-01-28 DIAGNOSIS — I51.7 LEFT VENTRICULAR HYPERTROPHY: Primary | ICD-10-CM

## 2019-01-28 DIAGNOSIS — J01.90 ACUTE BACTERIAL SINUSITIS: ICD-10-CM

## 2019-01-28 DIAGNOSIS — R21 RASH: ICD-10-CM

## 2019-01-28 DIAGNOSIS — I10 ESSENTIAL HYPERTENSION: ICD-10-CM

## 2019-01-28 DIAGNOSIS — B96.89 ACUTE BACTERIAL SINUSITIS: ICD-10-CM

## 2019-01-28 DIAGNOSIS — E55.9 VITAMIN D DEFICIENCY: ICD-10-CM

## 2019-01-28 PROCEDURE — G8484 FLU IMMUNIZE NO ADMIN: HCPCS | Performed by: INTERNAL MEDICINE

## 2019-01-28 PROCEDURE — G8400 PT W/DXA NO RESULTS DOC: HCPCS | Performed by: INTERNAL MEDICINE

## 2019-01-28 PROCEDURE — 1090F PRES/ABSN URINE INCON ASSESS: CPT | Performed by: INTERNAL MEDICINE

## 2019-01-28 PROCEDURE — 1123F ACP DISCUSS/DSCN MKR DOCD: CPT | Performed by: INTERNAL MEDICINE

## 2019-01-28 PROCEDURE — 3017F COLORECTAL CA SCREEN DOC REV: CPT | Performed by: INTERNAL MEDICINE

## 2019-01-28 PROCEDURE — 4040F PNEUMOC VAC/ADMIN/RCVD: CPT | Performed by: INTERNAL MEDICINE

## 2019-01-28 PROCEDURE — 99214 OFFICE O/P EST MOD 30 MIN: CPT | Performed by: INTERNAL MEDICINE

## 2019-01-28 PROCEDURE — 1101F PT FALLS ASSESS-DOCD LE1/YR: CPT | Performed by: INTERNAL MEDICINE

## 2019-01-28 PROCEDURE — G8417 CALC BMI ABV UP PARAM F/U: HCPCS | Performed by: INTERNAL MEDICINE

## 2019-01-28 PROCEDURE — G8427 DOCREV CUR MEDS BY ELIG CLIN: HCPCS | Performed by: INTERNAL MEDICINE

## 2019-01-28 PROCEDURE — 1036F TOBACCO NON-USER: CPT | Performed by: INTERNAL MEDICINE

## 2019-01-28 RX ORDER — LOSARTAN POTASSIUM 50 MG/1
50 TABLET ORAL DAILY
Qty: 90 TABLET | Refills: 1 | Status: CANCELLED | OUTPATIENT
Start: 2019-01-28

## 2019-01-28 RX ORDER — LOSARTAN POTASSIUM 50 MG/1
50 TABLET ORAL DAILY
Qty: 90 TABLET | Refills: 1 | Status: SHIPPED | OUTPATIENT
Start: 2019-01-28 | End: 2019-07-25 | Stop reason: SDUPTHER

## 2019-01-28 RX ORDER — CLOTRIMAZOLE AND BETAMETHASONE DIPROPIONATE 10; .64 MG/G; MG/G
CREAM TOPICAL
Qty: 1 TUBE | Refills: 0 | Status: ON HOLD | OUTPATIENT
Start: 2019-01-28 | End: 2019-11-13

## 2019-01-28 ASSESSMENT — PATIENT HEALTH QUESTIONNAIRE - PHQ9
SUM OF ALL RESPONSES TO PHQ QUESTIONS 1-9: 0
SUM OF ALL RESPONSES TO PHQ9 QUESTIONS 1 & 2: 0
SUM OF ALL RESPONSES TO PHQ QUESTIONS 1-9: 0
2. FEELING DOWN, DEPRESSED OR HOPELESS: 0
1. LITTLE INTEREST OR PLEASURE IN DOING THINGS: 0

## 2019-02-08 ENCOUNTER — HOSPITAL ENCOUNTER (OUTPATIENT)
Age: 69
Discharge: HOME | End: 2019-02-08
Payer: MEDICARE

## 2019-02-08 DIAGNOSIS — E55.9 VITAMIN D DEFICIENCY: ICD-10-CM

## 2019-02-08 DIAGNOSIS — I10 ESSENTIAL HYPERTENSION: ICD-10-CM

## 2019-02-08 DIAGNOSIS — I51.7 LEFT VENTRICULAR HYPERTROPHY: ICD-10-CM

## 2019-02-08 LAB
ALBUMIN SERPL-MCNC: 4 G/DL (ref 3.5–5.2)
ALBUMIN/GLOBULIN RATIO: 1.4 (ref 1–2.5)
ALP BLD-CCNC: 84 U/L (ref 35–104)
ALT SERPL-CCNC: 15 U/L (ref 5–33)
ANION GAP SERPL CALCULATED.3IONS-SCNC: 12 MMOL/L (ref 9–17)
AST SERPL-CCNC: 12 U/L
BILIRUB SERPL-MCNC: 0.37 MG/DL (ref 0.3–1.2)
BUN BLDV-MCNC: 15 MG/DL (ref 8–23)
BUN/CREAT BLD: ABNORMAL (ref 9–20)
CALCIUM SERPL-MCNC: 9.5 MG/DL (ref 8.6–10.4)
CHLORIDE BLD-SCNC: 106 MMOL/L (ref 98–107)
CHOLESTEROL/HDL RATIO: 5.2
CHOLESTEROL: 206 MG/DL
CO2: 24 MMOL/L (ref 20–31)
CREAT SERPL-MCNC: 0.71 MG/DL (ref 0.5–0.9)
GFR AFRICAN AMERICAN: >60 ML/MIN
GFR NON-AFRICAN AMERICAN: >60 ML/MIN
GFR SERPL CREATININE-BSD FRML MDRD: ABNORMAL ML/MIN/{1.73_M2}
GFR SERPL CREATININE-BSD FRML MDRD: ABNORMAL ML/MIN/{1.73_M2}
GLUCOSE BLD-MCNC: 109 MG/DL (ref 70–99)
HCT VFR BLD CALC: 41.6 % (ref 36.3–47.1)
HDLC SERPL-MCNC: 40 MG/DL
HEMOGLOBIN: 13.1 G/DL (ref 11.9–15.1)
LDL CHOLESTEROL: 131 MG/DL (ref 0–130)
MCH RBC QN AUTO: 30.1 PG (ref 25.2–33.5)
MCHC RBC AUTO-ENTMCNC: 31.5 G/DL (ref 28.4–34.8)
MCV RBC AUTO: 95.6 FL (ref 82.6–102.9)
NRBC AUTOMATED: 0 PER 100 WBC
PDW BLD-RTO: 14.3 % (ref 11.8–14.4)
PLATELET # BLD: 252 K/UL (ref 138–453)
PMV BLD AUTO: 10.7 FL (ref 8.1–13.5)
POTASSIUM SERPL-SCNC: 4.1 MMOL/L (ref 3.7–5.3)
RBC # BLD: 4.35 M/UL (ref 3.95–5.11)
SODIUM BLD-SCNC: 142 MMOL/L (ref 135–144)
TOTAL PROTEIN: 6.9 G/DL (ref 6.4–8.3)
TRIGL SERPL-MCNC: 177 MG/DL
TSH SERPL DL<=0.05 MIU/L-ACNC: 1.55 MIU/L (ref 0.3–5)
VITAMIN D 25-HYDROXY: 12.6 NG/ML (ref 30–100)
VLDLC SERPL CALC-MCNC: ABNORMAL MG/DL (ref 1–30)
WBC # BLD: 5.8 K/UL (ref 3.5–11.3)

## 2019-02-08 PROCEDURE — 80061 LIPID PANEL: CPT

## 2019-02-08 PROCEDURE — 80053 COMPREHEN METABOLIC PANEL: CPT

## 2019-02-08 PROCEDURE — 82306 VITAMIN D 25 HYDROXY: CPT

## 2019-02-08 PROCEDURE — 84443 ASSAY THYROID STIM HORMONE: CPT

## 2019-02-08 PROCEDURE — 36415 COLL VENOUS BLD VENIPUNCTURE: CPT

## 2019-02-08 PROCEDURE — 85027 COMPLETE CBC AUTOMATED: CPT

## 2019-02-19 ENCOUNTER — ANESTHESIA (OUTPATIENT)
Dept: OPERATING ROOM | Age: 69
End: 2019-02-19
Payer: MEDICARE

## 2019-02-19 ENCOUNTER — HOSPITAL ENCOUNTER (OUTPATIENT)
Age: 69
Setting detail: OUTPATIENT SURGERY
Discharge: HOME | End: 2019-02-19
Attending: SURGERY | Admitting: SURGERY
Payer: MEDICARE

## 2019-02-19 ENCOUNTER — ANESTHESIA EVENT (OUTPATIENT)
Dept: OPERATING ROOM | Age: 69
End: 2019-02-19
Payer: MEDICARE

## 2019-02-19 VITALS
TEMPERATURE: 98.6 F | BODY MASS INDEX: 39.39 KG/M2 | HEIGHT: 67 IN | SYSTOLIC BLOOD PRESSURE: 150 MMHG | HEART RATE: 82 BPM | DIASTOLIC BLOOD PRESSURE: 63 MMHG | RESPIRATION RATE: 18 BRPM | OXYGEN SATURATION: 99 % | WEIGHT: 251 LBS

## 2019-02-19 VITALS — SYSTOLIC BLOOD PRESSURE: 118 MMHG | DIASTOLIC BLOOD PRESSURE: 62 MMHG | OXYGEN SATURATION: 98 %

## 2019-02-19 DIAGNOSIS — Z98.890 S/P COLONOSCOPY: Primary | ICD-10-CM

## 2019-02-19 PROCEDURE — 2580000003 HC RX 258: Performed by: ANESTHESIOLOGY

## 2019-02-19 PROCEDURE — 3609008300 HC SIGMOIDOSCOPY W/BIOPSY SINGLE/MULTIPLE: Performed by: SURGERY

## 2019-02-19 PROCEDURE — 2500000003 HC RX 250 WO HCPCS: Performed by: NURSE ANESTHETIST, CERTIFIED REGISTERED

## 2019-02-19 PROCEDURE — 3700000001 HC ADD 15 MINUTES (ANESTHESIA): Performed by: SURGERY

## 2019-02-19 PROCEDURE — 7100000040 HC SPAR PHASE II RECOVERY - FIRST 15 MIN: Performed by: SURGERY

## 2019-02-19 PROCEDURE — 7100000041 HC SPAR PHASE II RECOVERY - ADDTL 15 MIN: Performed by: SURGERY

## 2019-02-19 PROCEDURE — 6360000002 HC RX W HCPCS: Performed by: NURSE ANESTHETIST, CERTIFIED REGISTERED

## 2019-02-19 PROCEDURE — 3700000000 HC ANESTHESIA ATTENDED CARE: Performed by: SURGERY

## 2019-02-19 RX ORDER — LIDOCAINE HYDROCHLORIDE 10 MG/ML
INJECTION, SOLUTION EPIDURAL; INFILTRATION; INTRACAUDAL; PERINEURAL PRN
Status: DISCONTINUED | OUTPATIENT
Start: 2019-02-19 | End: 2019-02-19 | Stop reason: SDUPTHER

## 2019-02-19 RX ORDER — SODIUM CHLORIDE, SODIUM LACTATE, POTASSIUM CHLORIDE, CALCIUM CHLORIDE 600; 310; 30; 20 MG/100ML; MG/100ML; MG/100ML; MG/100ML
INJECTION, SOLUTION INTRAVENOUS CONTINUOUS
Status: CANCELLED | OUTPATIENT
Start: 2019-02-19

## 2019-02-19 RX ORDER — GLYCOPYRROLATE 1 MG/5 ML
SYRINGE (ML) INTRAVENOUS PRN
Status: DISCONTINUED | OUTPATIENT
Start: 2019-02-19 | End: 2019-02-19 | Stop reason: SDUPTHER

## 2019-02-19 RX ORDER — LIDOCAINE HYDROCHLORIDE 10 MG/ML
1 INJECTION, SOLUTION EPIDURAL; INFILTRATION; INTRACAUDAL; PERINEURAL
Status: CANCELLED | OUTPATIENT
Start: 2019-02-19 | End: 2019-02-19

## 2019-02-19 RX ORDER — SODIUM CHLORIDE, SODIUM LACTATE, POTASSIUM CHLORIDE, CALCIUM CHLORIDE 600; 310; 30; 20 MG/100ML; MG/100ML; MG/100ML; MG/100ML
INJECTION, SOLUTION INTRAVENOUS CONTINUOUS
Status: DISCONTINUED | OUTPATIENT
Start: 2019-02-19 | End: 2019-02-19 | Stop reason: HOSPADM

## 2019-02-19 RX ORDER — PROPOFOL 10 MG/ML
INJECTION, EMULSION INTRAVENOUS PRN
Status: DISCONTINUED | OUTPATIENT
Start: 2019-02-19 | End: 2019-02-19 | Stop reason: SDUPTHER

## 2019-02-19 RX ORDER — MIDAZOLAM HYDROCHLORIDE 1 MG/ML
1 INJECTION INTRAMUSCULAR; INTRAVENOUS EVERY 5 MIN PRN
Status: CANCELLED | OUTPATIENT
Start: 2019-02-19

## 2019-02-19 RX ADMIN — SODIUM CHLORIDE, POTASSIUM CHLORIDE, SODIUM LACTATE AND CALCIUM CHLORIDE: 600; 310; 30; 20 INJECTION, SOLUTION INTRAVENOUS at 11:18

## 2019-02-19 RX ADMIN — PROPOFOL 20 MG: 10 INJECTION, EMULSION INTRAVENOUS at 13:00

## 2019-02-19 RX ADMIN — PROPOFOL 20 MG: 10 INJECTION, EMULSION INTRAVENOUS at 13:14

## 2019-02-19 RX ADMIN — PROPOFOL 20 MG: 10 INJECTION, EMULSION INTRAVENOUS at 13:08

## 2019-02-19 RX ADMIN — Medication 0.1 MG: at 13:01

## 2019-02-19 RX ADMIN — LIDOCAINE HYDROCHLORIDE 50 MG: 10 INJECTION, SOLUTION EPIDURAL; INFILTRATION; INTRACAUDAL; PERINEURAL at 12:45

## 2019-02-19 RX ADMIN — PHENYLEPHRINE HYDROCHLORIDE 100 MCG: 10 INJECTION INTRAVENOUS at 12:54

## 2019-02-19 RX ADMIN — Medication 0.1 MG: at 12:53

## 2019-02-19 RX ADMIN — PROPOFOL 20 MG: 10 INJECTION, EMULSION INTRAVENOUS at 13:17

## 2019-02-19 RX ADMIN — PHENYLEPHRINE HYDROCHLORIDE 100 MCG: 10 INJECTION INTRAVENOUS at 13:00

## 2019-02-19 RX ADMIN — Medication 0.1 MG: at 13:07

## 2019-02-19 RX ADMIN — PROPOFOL 20 MG: 10 INJECTION, EMULSION INTRAVENOUS at 13:04

## 2019-02-19 RX ADMIN — PHENYLEPHRINE HYDROCHLORIDE 100 MCG: 10 INJECTION INTRAVENOUS at 13:07

## 2019-02-19 RX ADMIN — PROPOFOL 30 MG: 10 INJECTION, EMULSION INTRAVENOUS at 12:47

## 2019-02-19 RX ADMIN — PROPOFOL 20 MG: 10 INJECTION, EMULSION INTRAVENOUS at 12:48

## 2019-02-19 RX ADMIN — PROPOFOL 20 MG: 10 INJECTION, EMULSION INTRAVENOUS at 13:20

## 2019-02-19 RX ADMIN — PROPOFOL 20 MG: 10 INJECTION, EMULSION INTRAVENOUS at 13:03

## 2019-02-19 RX ADMIN — GLUCAGON HYDROCHLORIDE 1 MG: KIT at 13:13

## 2019-02-19 RX ADMIN — PROPOFOL 20 MG: 10 INJECTION, EMULSION INTRAVENOUS at 13:11

## 2019-02-19 RX ADMIN — PROPOFOL 20 MG: 10 INJECTION, EMULSION INTRAVENOUS at 12:52

## 2019-02-19 RX ADMIN — PROPOFOL 20 MG: 10 INJECTION, EMULSION INTRAVENOUS at 12:50

## 2019-02-19 RX ADMIN — PROPOFOL 30 MG: 10 INJECTION, EMULSION INTRAVENOUS at 12:58

## 2019-02-19 RX ADMIN — PROPOFOL 50 MG: 10 INJECTION, EMULSION INTRAVENOUS at 12:55

## 2019-02-19 RX ADMIN — PROPOFOL 20 MG: 10 INJECTION, EMULSION INTRAVENOUS at 12:51

## 2019-02-19 ASSESSMENT — PULMONARY FUNCTION TESTS
PIF_VALUE: 0
PIF_VALUE: 0
PIF_VALUE: 1
PIF_VALUE: 0
PIF_VALUE: 1
PIF_VALUE: 0
PIF_VALUE: 1
PIF_VALUE: 0
PIF_VALUE: 1
PIF_VALUE: 2
PIF_VALUE: 0
PIF_VALUE: 1
PIF_VALUE: 1
PIF_VALUE: 0
PIF_VALUE: 1
PIF_VALUE: 0
PIF_VALUE: 1
PIF_VALUE: 0
PIF_VALUE: 1
PIF_VALUE: 1
PIF_VALUE: 0
PIF_VALUE: 0

## 2019-02-19 ASSESSMENT — PAIN - FUNCTIONAL ASSESSMENT: PAIN_FUNCTIONAL_ASSESSMENT: 0-10

## 2019-02-19 ASSESSMENT — ENCOUNTER SYMPTOMS
STRIDOR: 0
SHORTNESS OF BREATH: 0

## 2019-02-19 ASSESSMENT — PAIN SCALES - GENERAL
PAINLEVEL_OUTOF10: 0

## 2019-03-05 ENCOUNTER — HOSPITAL ENCOUNTER (OUTPATIENT)
Dept: GENERAL RADIOLOGY | Age: 69
Discharge: HOME | End: 2019-03-07
Payer: MEDICARE

## 2019-03-05 DIAGNOSIS — Z98.890 S/P COLONOSCOPY: ICD-10-CM

## 2019-03-05 PROCEDURE — 74270 X-RAY XM COLON 1CNTRST STD: CPT

## 2019-03-05 PROCEDURE — A4641 RADIOPHARM DX AGENT NOC: HCPCS | Performed by: STUDENT IN AN ORGANIZED HEALTH CARE EDUCATION/TRAINING PROGRAM

## 2019-03-05 PROCEDURE — 6360000004 HC RX CONTRAST MEDICATION: Performed by: STUDENT IN AN ORGANIZED HEALTH CARE EDUCATION/TRAINING PROGRAM

## 2019-03-05 RX ADMIN — BARIUM SULFATE 500 ML: 1.05 SUSPENSION ORAL; RECTAL at 10:14

## 2019-03-07 ENCOUNTER — HOSPITAL ENCOUNTER (OUTPATIENT)
Dept: CT IMAGING | Age: 69
Discharge: HOME | End: 2019-03-09
Payer: MEDICARE

## 2019-03-07 ENCOUNTER — TELEPHONE (OUTPATIENT)
Dept: SURGERY | Age: 69
End: 2019-03-07

## 2019-03-07 DIAGNOSIS — Z87.19 HISTORY OF DIVERTICULITIS: ICD-10-CM

## 2019-03-07 PROCEDURE — 2580000003 HC RX 258: Performed by: SURGERY

## 2019-03-07 PROCEDURE — 74177 CT ABD & PELVIS W/CONTRAST: CPT

## 2019-03-07 PROCEDURE — 6360000004 HC RX CONTRAST MEDICATION: Performed by: SURGERY

## 2019-03-07 RX ORDER — SODIUM CHLORIDE 0.9 % (FLUSH) 0.9 %
10 SYRINGE (ML) INJECTION PRN
Status: DISCONTINUED | OUTPATIENT
Start: 2019-03-07 | End: 2019-03-10 | Stop reason: HOSPADM

## 2019-03-07 RX ADMIN — SODIUM CHLORIDE, PRESERVATIVE FREE 10 ML: 5 INJECTION INTRAVENOUS at 16:38

## 2019-03-07 RX ADMIN — IOHEXOL 50 ML: 240 INJECTION, SOLUTION INTRATHECAL; INTRAVASCULAR; INTRAVENOUS; ORAL at 16:38

## 2019-03-07 RX ADMIN — IOPAMIDOL 100 ML: 755 INJECTION, SOLUTION INTRAVENOUS at 16:39

## 2019-03-08 ENCOUNTER — TELEPHONE (OUTPATIENT)
Dept: SURGERY | Age: 69
End: 2019-03-08

## 2019-03-08 RX ORDER — CIPROFLOXACIN 500 MG/1
500 TABLET, FILM COATED ORAL 2 TIMES DAILY
Qty: 14 TABLET | Refills: 0 | Status: SHIPPED | OUTPATIENT
Start: 2019-03-08 | End: 2019-03-15

## 2019-03-08 RX ORDER — METRONIDAZOLE 500 MG/1
500 TABLET ORAL 3 TIMES DAILY
Qty: 21 TABLET | Refills: 0 | Status: SHIPPED | OUTPATIENT
Start: 2019-03-08 | End: 2019-03-15

## 2019-03-27 ENCOUNTER — TELEPHONE (OUTPATIENT)
Dept: INTERNAL MEDICINE CLINIC | Age: 69
End: 2019-03-27

## 2019-03-28 ENCOUNTER — HOSPITAL ENCOUNTER (OUTPATIENT)
Dept: NON INVASIVE DIAGNOSTICS | Age: 69
Discharge: HOME | End: 2019-03-28
Payer: MEDICARE

## 2019-03-28 DIAGNOSIS — I10 ESSENTIAL HYPERTENSION: ICD-10-CM

## 2019-03-28 DIAGNOSIS — I51.7 LEFT VENTRICULAR HYPERTROPHY: ICD-10-CM

## 2019-03-28 LAB
LV EF: 65 %
LVEF MODALITY: NORMAL

## 2019-03-28 PROCEDURE — 93306 TTE W/DOPPLER COMPLETE: CPT

## 2019-04-03 ENCOUNTER — TELEPHONE (OUTPATIENT)
Dept: INTERNAL MEDICINE CLINIC | Age: 69
End: 2019-04-03

## 2019-04-09 ENCOUNTER — HOSPITAL ENCOUNTER (OUTPATIENT)
Age: 69
Setting detail: SPECIMEN
Discharge: HOME | End: 2019-04-09
Payer: MEDICARE

## 2019-04-09 ENCOUNTER — TELEPHONE (OUTPATIENT)
Dept: INTERNAL MEDICINE CLINIC | Age: 69
End: 2019-04-09

## 2019-04-09 DIAGNOSIS — N39.0 URINARY TRACT INFECTION WITHOUT HEMATURIA, SITE UNSPECIFIED: Primary | ICD-10-CM

## 2019-04-09 LAB
-: ABNORMAL
AMORPHOUS: ABNORMAL
BACTERIA: ABNORMAL
BILIRUBIN URINE: NEGATIVE
CASTS UA: ABNORMAL /LPF
COLOR: YELLOW
COMMENT UA: ABNORMAL
CRYSTALS, UA: ABNORMAL /HPF
EPITHELIAL CELLS UA: ABNORMAL /HPF (ref 0–5)
GLUCOSE URINE: NEGATIVE
KETONES, URINE: NEGATIVE
LEUKOCYTE ESTERASE, URINE: ABNORMAL
MUCUS: ABNORMAL
NITRITE, URINE: NEGATIVE
OTHER OBSERVATIONS UA: ABNORMAL
PH UA: 6.5 (ref 5–8)
PROTEIN UA: NEGATIVE
RBC UA: ABNORMAL /HPF (ref 0–2)
RENAL EPITHELIAL, UA: ABNORMAL /HPF
SPECIFIC GRAVITY UA: 1 (ref 1–1.03)
TRICHOMONAS: ABNORMAL
TURBIDITY: CLEAR
URINE HGB: ABNORMAL
UROBILINOGEN, URINE: NORMAL
WBC UA: ABNORMAL /HPF (ref 0–5)
YEAST: ABNORMAL

## 2019-04-09 PROCEDURE — 87086 URINE CULTURE/COLONY COUNT: CPT

## 2019-04-09 PROCEDURE — 81001 URINALYSIS AUTO W/SCOPE: CPT

## 2019-04-09 RX ORDER — CIPROFLOXACIN 500 MG/1
500 TABLET, FILM COATED ORAL 2 TIMES DAILY
Qty: 14 TABLET | Refills: 0 | Status: SHIPPED | OUTPATIENT
Start: 2019-04-09 | End: 2019-04-16 | Stop reason: ALTCHOICE

## 2019-04-10 LAB
CULTURE: NORMAL
Lab: NORMAL
SPECIMEN DESCRIPTION: NORMAL

## 2019-04-16 ENCOUNTER — OFFICE VISIT (OUTPATIENT)
Dept: INTERNAL MEDICINE CLINIC | Age: 69
End: 2019-04-16
Payer: MEDICARE

## 2019-04-16 VITALS
SYSTOLIC BLOOD PRESSURE: 128 MMHG | HEIGHT: 67 IN | OXYGEN SATURATION: 95 % | DIASTOLIC BLOOD PRESSURE: 88 MMHG | WEIGHT: 248.2 LBS | TEMPERATURE: 98.8 F | HEART RATE: 85 BPM | BODY MASS INDEX: 38.96 KG/M2

## 2019-04-16 DIAGNOSIS — K62.5 RECTAL BLEEDING: ICD-10-CM

## 2019-04-16 DIAGNOSIS — I10 ESSENTIAL HYPERTENSION: ICD-10-CM

## 2019-04-16 DIAGNOSIS — N39.0 URINARY TRACT INFECTION WITHOUT HEMATURIA, SITE UNSPECIFIED: Primary | ICD-10-CM

## 2019-04-16 PROCEDURE — 1036F TOBACCO NON-USER: CPT | Performed by: INTERNAL MEDICINE

## 2019-04-16 PROCEDURE — 3017F COLORECTAL CA SCREEN DOC REV: CPT | Performed by: INTERNAL MEDICINE

## 2019-04-16 PROCEDURE — G8400 PT W/DXA NO RESULTS DOC: HCPCS | Performed by: INTERNAL MEDICINE

## 2019-04-16 PROCEDURE — 4040F PNEUMOC VAC/ADMIN/RCVD: CPT | Performed by: INTERNAL MEDICINE

## 2019-04-16 PROCEDURE — 1090F PRES/ABSN URINE INCON ASSESS: CPT | Performed by: INTERNAL MEDICINE

## 2019-04-16 PROCEDURE — G8427 DOCREV CUR MEDS BY ELIG CLIN: HCPCS | Performed by: INTERNAL MEDICINE

## 2019-04-16 PROCEDURE — G8417 CALC BMI ABV UP PARAM F/U: HCPCS | Performed by: INTERNAL MEDICINE

## 2019-04-16 PROCEDURE — 1123F ACP DISCUSS/DSCN MKR DOCD: CPT | Performed by: INTERNAL MEDICINE

## 2019-04-16 PROCEDURE — 99214 OFFICE O/P EST MOD 30 MIN: CPT | Performed by: INTERNAL MEDICINE

## 2019-04-16 NOTE — PROGRESS NOTES
Renaldo Salinas is a 79 y.o. female who presents for   Chief Complaint   Patient presents with    Urinary Tract Infection     follow up on UTI she had 4/9/19   [facility] Health Maintenance     will discuss with provider    and follow up of chronic medical problems. Patient Active Problem List   Diagnosis    HTN (hypertension)    Dyslipidemia    Osteoarthritis    GERD (gastroesophageal reflux disease)    Hyperlipidemia    Hypertension    Obesity    Allergic rhinitis    Osteoporosis    Arthritis of knee    Biliary dyskinesia    Right upper quadrant abdominal pain    Fundic gland polyposis of stomach    SBO (small bowel obstruction) ([address]    Incarcerated incisional hernia    Sigmoid diverticulitis    Incisional hernia without obstruction or gangrene    Chalazion     HPI  Here for follow-up on blood pressure and also follow-up on urinary tract infection and also wants to discuss about upcoming surgery and now patient feeling better denies any complaints    Current Outpatient Medications   Medication Sig Dispense Refill    losartan (COZAAR) 50 MG tablet Take 1 tablet by mouth daily 90 tablet 1    clotrimazole-betamethasone (LOTRISONE) 1-0.05 % cream Apply topically 2 times daily. 1 Tube 0    acetaminophen (TYLENOL) 325 MG tablet Take 2 tablets by mouth every 4 hours as needed for Pain 120 tablet 3     No current facility-administered medications for this visit.         No Known Allergies    Past Medical History:   Diagnosis Date    Allergic rhinitis     Bladder prolapse     Constipation     Fundic gland polyposis of stomach 08/17/2017    per EGD    GERD (gastroesophageal reflux disease)     Hiatal hernia     History of cardiac cath 12/2002    pt denies blockage    Hyperlipidemia     borderline    Hypertension     MRSA (methicillin resistant staph aureus) culture positive 11/01/2016    nasal    MRSA carrier     follows with ID    Obesity     Osteoarthritis     Thyroid disease     goiter    headedness or syncope   Psychiatric       : Negative for dysphoric mood, sleep disturbances, nervous or anxious, or decreased concentration   All other review of systems was negative    Objective  Physical Examination:    Nursing note reviewed    /88 (Site: Left Upper Arm, Position: Sitting, Cuff Size: Large Adult)   Pulse 85   Temp 98.8 °F (37.1 °C) (Oral)   Ht 5' 7\" (1.702 m)   Wt 248 lb 3.2 oz (112.6 kg)   LMP  (LMP Unknown)   SpO2 95%   BMI 38.87 kg/m²   BP Readings from Last 3 Encounters:   04/16/19 128/88   02/19/19 (!) 150/63   02/19/19 118/62         Constitutional:  Donte Rodriguez is oriented to place, person and time ,appears well-developed and well-nourished  HEENT:  Atraumatic and normocephalic, external ears normal bilaterally, nose normal no oropharyngeal exudate and is clear and moist  Eyes:  EOCM normal; conjunctivae normal; PERRLA bilaterally  Neck:  Normal range of motion, neck supple, no JVD and no thyromegaly  Cardiovascular:  RRR, normal heart sounds and intact distal pulses  Pulmonary:  effort normal and breath sounds normal bilaterally,no wheezes or rales, no respiratory distress  Abdominal:  Soft, non-tender; normal bowel sounds, no masses  Musculoskeletal:  Normal range of motion and no edema or tenderness bilaterally  No lymphadenopathy  Neurological:  alert, oriented, and normal reflexes bilaterally  Skin: warm and dry  Psychiatric:  normal mood and effect; behavior normal.    Labs:   Lab Results   Component Value Date    LABA1C 5.5 12/06/2011     Lab Results   Component Value Date    CHOL 206 (H) 02/08/2019     Lab Results   Component Value Date    HDL 40 (L) 02/08/2019     No results found for: [facility]  Lab Results   Component Value Date    TRIG 177 (H) 02/08/2019     No components found for: CHOLHDL  Lab Results   Component Value Date    WBC 5.8 02/08/2019    HGB 13.1 02/08/2019    HCT 41.6 02/08/2019    MCV 95.6 02/08/2019     02/08/2019     Lab Results   Component Value Date    INR 0.9 11/01/2016    PROTIME 10.0 11/01/2016     Lab Results   Component Value Date    GLUCOSE 109 (H) 02/08/2019    CREATININE 0.71 02/08/2019    BUN 15 02/08/2019     02/08/2019    K 4.1 02/08/2019     02/08/2019    CO2 24 02/08/2019     Lab Results   Component Value Date    ALT 15 02/08/2019    AST 12 02/08/2019    ALKPHOS 84 02/08/2019    BILITOT 0.37 02/08/2019     Lab Results   Component Value Date    LABPROT 6.4 08/14/2012    LABALBU 4.0 02/08/2019     Lab Results   Component Value Date    TSH 1.55 02/08/2019     Assessment:  1. Urinary tract infection without hematuria, site unspecified    2. Essential hypertension    3. Rectal bleeding        Plan:  Patient's urinalysis shows trace of 2-5 RBC otherwise negative for any infection and patient completed Cipro and feeling better and advised patient to repeat the urinalysis  Patient blood pressure stable on current medications including losartan and advised patient to check with pharmacy about any recall issues  Patient had evaluation done by surgery for rectal bleeding and started on Flagyl and Cipro and patient's symptoms improved and patient had a barium enema done and also had a colonoscopy done showed a day persistent nondraining in the sigmoid colon and colonoscopy was not able to be done because of the narrowing and so patient was referred to Dr. Bettina Mehta colorectal surgery and having scheduled procedure on 16 May  Review as scheduled           1. Jennifer received counseling on the following healthy behaviors: nutrition and exercise    2. Prior labs and health maintenance reviewed. 3.  Discussed use, benefit, and side effects of prescribed medications. Barriers to medication compliance addressed. All her questions were answered. Pt voiced understanding. Maddie will continue current medications, diet and exercise. No orders of the defined types were placed in this encounter.          Completed Refills               Requested Prescriptions      No prescriptions requested or ordered in this encounter     4. Patient given educational materials - see patient instructions    5. Was a self-tracking handout given in paper form or via e-volohart? NO    Orders Placed This Encounter   Procedures    Urinalysis     Standing Status:   Future     Standing Expiration Date:   4/15/2020     Order Specific Question:   SPECIFY(EX-CATH,MIDSTREAM,CYSTO,ETC)? Answer:   midstream     No follow-ups on file. Patient voiced understanding and agreed to treatment plan. Electronically signed by Moses Ruby MD on 4/16/2019 at 2:52 PM    This note is created with a voice recognition program and while intend to generate a document that accurately reflects the content of the visit, no guarantee can be provided that every mistake has been identified and corrected by editing.

## 2019-04-16 NOTE — PROGRESS NOTES
Visit Information    Have you changed or started any medications since your last visit including any over-the-counter medicines, vitamins, or herbal medicines? no   Are you having any side effects from any of your medications? -  no  Have you stopped taking any of your medications? Is so, why? -  no    Have you seen any other physician or provider since your last visit? No  Have you had any other diagnostic tests since your last visit? No  Have you been seen in the emergency room and/or had an admission to a hospital since we last saw you? No  Have you had your routine dental cleaning in the past 6 months? yes     Have you activated your [employer] account? If not, what are your barriers?  Yes     Patient Care Team:  Sandy Hernandez MD as PCP - General (Internal Medicine)  Sandy Hernandez MD as PCP - S Attributed Provider    Medical History Review  Past Medical, Family, and Social History reviewed and does not contribute to the patient presenting condition    Health Maintenance   Topic Date Due    Hepatitis C screen  1950    DTaP/Tdap/Td vaccine (1 - Tdap) 05/14/1971    Shingles Vaccine (1 of 2) 05/14/2002    Diabetes screen  12/06/2014    DEXA (modify frequency per FRAX score)  05/14/2017    Pneumococcal 65+ years Vaccine (1 of 2 - PCV13) 05/14/2017    Flu vaccine (Season Ended) 09/01/2019    Potassium monitoring  02/08/2020    Creatinine monitoring  02/08/2020    Breast cancer screen  01/24/2021    Colon cancer screen colonoscopy  05/17/2023    Lipid screen  02/08/2024

## 2019-05-03 ENCOUNTER — HOSPITAL ENCOUNTER (OUTPATIENT)
Dept: PREADMISSION TESTING | Age: 69
Discharge: HOME | End: 2019-05-07
Payer: MEDICARE

## 2019-05-03 VITALS
SYSTOLIC BLOOD PRESSURE: 146 MMHG | DIASTOLIC BLOOD PRESSURE: 80 MMHG | OXYGEN SATURATION: 97 % | HEART RATE: 60 BPM | RESPIRATION RATE: 14 BRPM | WEIGHT: 250.31 LBS | BODY MASS INDEX: 39.29 KG/M2 | HEIGHT: 67 IN

## 2019-05-03 LAB
ABO/RH: NORMAL
ANION GAP SERPL CALCULATED.3IONS-SCNC: 8 MMOL/L (ref 9–17)
ANTIBODY SCREEN: NEGATIVE
ARM BAND NUMBER: NORMAL
BUN BLDV-MCNC: 24 MG/DL (ref 8–23)
BUN/CREAT BLD: 39 (ref 9–20)
CALCIUM SERPL-MCNC: 9.4 MG/DL (ref 8.6–10.4)
CHLORIDE BLD-SCNC: 107 MMOL/L (ref 98–107)
CO2: 25 MMOL/L (ref 20–31)
CREAT SERPL-MCNC: 0.62 MG/DL (ref 0.5–0.9)
EXPIRATION DATE: NORMAL
GFR AFRICAN AMERICAN: >60 ML/MIN
GFR NON-AFRICAN AMERICAN: >60 ML/MIN
GFR SERPL CREATININE-BSD FRML MDRD: ABNORMAL ML/MIN/{1.73_M2}
GFR SERPL CREATININE-BSD FRML MDRD: ABNORMAL ML/MIN/{1.73_M2}
GLUCOSE BLD-MCNC: 102 MG/DL (ref 70–99)
HCT VFR BLD CALC: 40.7 % (ref 36.3–47.1)
HEMOGLOBIN: 12.8 G/DL (ref 11.9–15.1)
MCH RBC QN AUTO: 30 PG (ref 25.2–33.5)
MCHC RBC AUTO-ENTMCNC: 31.4 G/DL (ref 28.4–34.8)
MCV RBC AUTO: 95.5 FL (ref 82.6–102.9)
NRBC AUTOMATED: 0 PER 100 WBC
PDW BLD-RTO: 13.9 % (ref 11.8–14.4)
PLATELET # BLD: 188 K/UL (ref 138–453)
PMV BLD AUTO: 10.4 FL (ref 8.1–13.5)
POTASSIUM SERPL-SCNC: 4.3 MMOL/L (ref 3.7–5.3)
RBC # BLD: 4.26 M/UL (ref 3.95–5.11)
SODIUM BLD-SCNC: 140 MMOL/L (ref 135–144)
WBC # BLD: 7.5 K/UL (ref 3.5–11.3)

## 2019-05-03 PROCEDURE — 86850 RBC ANTIBODY SCREEN: CPT

## 2019-05-03 PROCEDURE — 85027 COMPLETE CBC AUTOMATED: CPT

## 2019-05-03 PROCEDURE — 80048 BASIC METABOLIC PNL TOTAL CA: CPT

## 2019-05-03 PROCEDURE — 93005 ELECTROCARDIOGRAM TRACING: CPT

## 2019-05-03 PROCEDURE — 86901 BLOOD TYPING SEROLOGIC RH(D): CPT

## 2019-05-03 PROCEDURE — 36415 COLL VENOUS BLD VENIPUNCTURE: CPT

## 2019-05-03 PROCEDURE — 86900 BLOOD TYPING SEROLOGIC ABO: CPT

## 2019-05-03 RX ORDER — MULTIVIT-MIN/IRON/FOLIC ACID/K 18-600-40
2 CAPSULE ORAL DAILY
COMMUNITY

## 2019-05-03 NOTE — PRE-PROCEDURE INSTRUCTIONS
ARRIVE AT [facility] [address] ON Thursday, 5/16/2019 at 10:30 AM    Once you enter the hospital lobby, take the elevators to the second floor. Check-In is at the surgery registration desk. If you have been given a blood band, you must bring it with you the day of surgery. Continue to take your home medications as you normally do up to and including the night before surgery with the exception of any blood thinning medications. Please stop any blood thinning medications as directed by your surgeon or prescribing physician. Failure to stop certain medications may interfere with your scheduled surgery. These may include:  Aspirin, Warfarin (Coumadin), Clopidogrel (Plavix), Ibuprofen (Motrin, Advil), Naproxen (Aleve), Meloxicam (Mobic), Celecoxib (Celebrex), Eliquis, Pradaxa, Xarelto, Effient, Fish Oil, Herbal supplements. Motrin   Tylenol is OK for pain. Please take the following medication(s) the day of surgery with a small sip of water:  Losartan    Please use your inhalers at home the day of surgery. PREPARING FOR YOUR SURGERY:     Before surgery, you can play an important role in your own health. Because skin is not sterile, we need to be sure that your skin is as free of germs as possible before surgery by carefully washing before surgery. Preparing or prepping skin before surgery can reduce the risk of a surgical site infection.   Do not shave the area of your body where your surgery will be performed unless you received specific permission from your physician. You will need to shower at home the night before surgery and the morning of surgery with a special soap called chlorhexidine gluconate (CHG*). *Not to be used by people allergic to Chlorhexidine Gluconate (CHG). Following these instructions will help you be sure that your skin is clean before surgery. Instructions on cleaning your skin before surgery:      The night before your surgery:      You will need to shower with of surgery. Also, NO lotion, perfume or deodorant to be used the day of surgery. No nail polish on the operative extremity (arm/leg surgeries)    · If you are staying overnight with us, please bring a small bag of necessary personal items.  Please wear loose, comfortable clothing. If you are potentially going to have a cast or brace bring clothing that will fit over them.  In case of illness - If you have cold or flu like symptoms (high fever, runny nose, sore throat, cough, etc.) rash, nausea, vomiting, loose stools, and/or recent contact with someone who has a contagious disease (chicken pox, measles, etc.) Please call your doctor before coming to the hospital.     If your child is having surgery please make arrangements for any other children to be cared for at home on the day of surgery. Other children are not permitted in recovery room and we want you to be able to spend time with the patient. If other arrangements are not available then we suggest that you have a second adult to stay in the waiting room. Day of Surgery/Procedure:    As a patient at [place] [address] you can expect quality medical and nursing care that is centered on your individual needs. Our goal is to make your surgical experience as comfortable as possible    . Transportation After Your Surgery/Procedure: You will need a friend or family member to drive you home after your procedure. Your  must be 25years of age or older and able to sign off on your discharge instructions. A taxi cab or any other form of public transportation is not acceptable. Your friend or family member must stay at the hospital throughout your procedure. Someone must remain with you for the first 24 hours after your surgery if you receive anesthesia or medication.   If you do not have someone to stay with you, your

## 2019-05-03 NOTE — H&P
History and Physical Service   [facility] 12    HISTORY AND PHYSICAL EXAMINATION            Date of Evaluation: 5/3/2019  Patient name: Hillary Kelly  MRN:   [id number]  YOB: 1950  PCP:    Srinivasan Montoya MD    History Obtained From:     Patient, medical records    History of Present Illness: This is Hillary Kelly a 79 y.o. female who presents today for a PAT appointment for scheduled surgery on 5/16/19 at 12pm for a colonoscopy with abdominal exploration and bowel resection low anterior with possible colostomy vs. Ileostomy by Dr. Bernardo Dodd for diverticulitis. [facility] was found to have a mass and a stricture in the sigmoid area after Dr Janet Whitmore was unable to perform a colonoscopy due to stricture. [facility] has had a history of diverticulitis for the last 9 years. Denies any abdominal pain at this time but states when lifting heavy objects has right lower quadrant pain. Left lower quadrant pain occurs with palpation. Denies recent illness fever or chills. Denies chest pain or shortness of breath.           Past Medical History:     Past Medical History:   Diagnosis Date    Allergic rhinitis     Bladder prolapse     Constipation     5/2019 resolved    Fundic gland polyposis of stomach 08/17/2017    per EGD    GERD (gastroesophageal reflux disease)     Hiatal hernia     History of cardiac cath 12/2002    pt denies blockage    Hyperlipidemia     borderline    Hypertension     MRSA (methicillin resistant staph aureus) culture positive 11/01/2016    nasal    MRSA carrier     follows with ID    Obesity     Osteoarthritis     Thyroid disease     goiter    Wears glasses         Past Surgical History:     Past Surgical History:   Procedure Laterality Date    CHOLECYSTECTOMY, LAPAROSCOPIC  11/15/2016    CHOLECYSTECTOMY, LAPAROSCOPIC  11/15/2016    COLONOSCOPY  2013    neg    ENDOSCOPY, COLON, DIAGNOSTIC      EGD    KNEE ARTHROSCOPY Left     lateral release    [facility] EGD TRANSORAL BIOPSY SINGLE/MULTIPLE N/A 8/17/2017    EGD BIOPSY performed by Conchita Sanchez MD at [address] [facility] OFFICE/OUTPT [facility] N/A 5/22/2018    XI ROBOTIC LAPAROSCOPIC UMBILICAL HERNIA REPAIR WITH MESH, POSSIBLE OPEN performed by Albaro Morris IV, DO at [facility]  02/19/2019    SIGMOIDOSCOPY N/A 2/19/2019    SIGMOIDOSCOPY BIOPSY FLEXIBLE performed by Karishma Messina DO at [address] [address] Left 9/24/2012    knee    TOTAL KNEE ARTHROPLASTY Right 11/10/2014    knee    UMBILICAL HERNIA REPAIR  05/22/2018    UPPER GASTROINTESTINAL ENDOSCOPY  08/17/2017    Multiple small gastric polyps, no esophagitis noted no Melara's mucosa. No hiatal hernia, pathology benign fundic gland polyps        Medications Prior to Admission:     Prior to Admission medications    Medication Sig Start Date End Date Taking? Authorizing Provider   Cholecalciferol (VITAMIN D) 2000 units CAPS capsule Take 1 capsule by mouth daily   Yes Historical Provider, MD   losartan (COZAAR) 50 MG tablet Take 1 tablet by mouth daily 1/28/19   Jarret Dalton MD   clotrimazole-betamethasone (LOTRISONE) 1-0.05 % cream Apply topically 2 times daily. 1/28/19   Jarret Dalton MD   acetaminophen (TYLENOL) 325 MG tablet Take 2 tablets by mouth every 4 hours as needed for Pain 3/26/18   David Light DO        Allergies:     Patient has no known allergies. Social History:     Tobacco:    reports that she quit smoking about 44 years ago. She has a 3.00 pack-year smoking history. She has never used smokeless tobacco.  Alcohol:      reports that she drinks alcohol. Drug Use:  reports that she does not use drugs. Family History:     Family History   Problem Relation Age of Onset    Heart Disease Mother     COPD Father     Cancer Brother         thyroid, prostate, lung       Review of Systems:     Positive and Negative as described in HPI.     CONSTITUTIONAL:  negative for fevers, chills, sweats, fatigue, weight loss  HEENT:  negative for vision, hearing changes, runny nose, throat pain  RESPIRATORY:  negative for shortness of breath, cough, congestion, wheezing. CARDIOVASCULAR:  negative for chest pain, palpitations. GASTROINTESTINAL:  negative for nausea, vomiting, diarrhea, constipation, change in bowel habits, abdominal pain   GENITOURINARY:  negative for difficulty of urination, burning with urination, frequency   INTEGUMENT:  negative for rash, skin lesions, easy bruising   HEMATOLOGIC/LYMPHATIC:  negative for swelling/edema   ALLERGIC/IMMUNOLOGIC:  negative for urticaria , itching  ENDOCRINE:  negative increase in drinking, increase in urination, hot or cold intolerance  MUSCULOSKELETAL:  negative joint pains, muscle aches, Right foot pain when walking. NEUROLOGICAL:  negative for headaches, dizziness,   BEHAVIOR/PSYCH:  negative for depression, anxiety    Physical Exam:   BP (!) 146/80   Pulse 60   Resp 14   Ht 5' 7\" (1.702 m)   Wt 250 lb 5 oz (113.5 kg)   LMP  (LMP Unknown)   SpO2 97%   BMI 39.20 kg/m²   No LMP recorded (lmp unknown). Patient is postmenopausal.  No obstetric history on file. No results for input(s): POCGLU in the last 72 hours. General Appearance:  alert, well appearing, and in no acute distress  Mental status: oriented to person, place, and time with normal affect  Head:  normocephalic, atraumatic. Eye: no icterus, redness, pupils equal and reactive, extraocular eye movements intact, conjunctiva clear  Ear: normal external ear, no discharge, hearing intact  Nose:  no drainage noted  Mouth: mucous membranes moist  Neck: supple, no carotid bruits, thyroid not palpable  Lungs: Bilateral equal air entry, clear to ausculation, no wheezing, rales or rhonchi, normal effort  Cardiovascular: normal rate, regular rhythm, no murmur, gallop, rub. Abdomen: Soft,  nondistended, normal bowel sounds, no hepatomegaly or splenomegaly.  Left abdominal tenderness on palpation. Neurologic: There are no new focal motor or sensory deficits, normal muscle tone and bulk, no abnormal sensation, normal speech, cranial nerves II through XII grossly intact  Skin: No gross lesions, rashes, bruising or bleeding on exposed skin area  Extremities:  peripheral pulses palpable, no pedal edema or calf pain with palpation  Psych: normal affect     Investigations:      Laboratory Testing:  Recent Results (from the past 24 hour(s))   CBC    Collection Time: 05/03/19 12:38 PM   Result Value Ref Range    WBC 7.5 3.5 - 11.3 k/uL    RBC 4.26 3.95 - 5.11 m/uL    Hemoglobin 12.8 11.9 - 15.1 g/dL    Hematocrit 40.7 36.3 - 47.1 %    MCV 95.5 82.6 - 102.9 fL    MCH 30.0 25.2 - 33.5 pg    MCHC 31.4 28.4 - 34.8 g/dL    RDW 13.9 11.8 - 14.4 %    Platelets 315 304 - 408 k/uL    MPV 10.4 8.1 - 13.5 fL    NRBC Automated 0.0 0.0 per 100 WBC       Recent Labs     05/03/19  1238   HGB 12.8   HCT 40.7   WBC 7.5   MCV 95.5       Imaging/Diagnostics:        Diagnosis:      1. Diverticulitis    Plans:     1.  Colonoscopy with abdominal exploration and bowel resection low anterior with possible colostomy vs. ileostomy    Earlean Leventhal, APRN - CNP  5/3/2019  12:58 PM

## 2019-05-06 LAB
EKG ATRIAL RATE: 55 BPM
EKG P AXIS: 49 DEGREES
EKG P-R INTERVAL: 194 MS
EKG Q-T INTERVAL: 430 MS
EKG QRS DURATION: 104 MS
EKG QTC CALCULATION (BAZETT): 411 MS
EKG R AXIS: 1 DEGREES
EKG T AXIS: 45 DEGREES
EKG VENTRICULAR RATE: 55 BPM

## 2019-05-13 ENCOUNTER — HOSPITAL ENCOUNTER (OUTPATIENT)
Age: 69
Setting detail: SPECIMEN
Discharge: HOME | DRG: 330 | End: 2019-05-13
Payer: MEDICARE

## 2019-05-13 DIAGNOSIS — N39.0 URINARY TRACT INFECTION WITHOUT HEMATURIA, SITE UNSPECIFIED: ICD-10-CM

## 2019-05-13 DIAGNOSIS — I10 ESSENTIAL HYPERTENSION: ICD-10-CM

## 2019-05-13 LAB
BILIRUBIN URINE: NEGATIVE
COLOR: YELLOW
COMMENT UA: NORMAL
GLUCOSE URINE: NEGATIVE
KETONES, URINE: NEGATIVE
LEUKOCYTE ESTERASE, URINE: NEGATIVE
NITRITE, URINE: NEGATIVE
PH UA: 6 (ref 5–8)
PROTEIN UA: NEGATIVE
SPECIFIC GRAVITY UA: 1.02 (ref 1–1.03)
TURBIDITY: CLEAR
URINE HGB: NEGATIVE
UROBILINOGEN, URINE: NORMAL

## 2019-05-13 PROCEDURE — 81003 URINALYSIS AUTO W/O SCOPE: CPT

## 2019-05-15 ENCOUNTER — ANESTHESIA EVENT (OUTPATIENT)
Dept: OPERATING ROOM | Age: 69
DRG: 330 | End: 2019-05-15
Payer: MEDICARE

## 2019-05-16 ENCOUNTER — HOSPITAL ENCOUNTER (INPATIENT)
Age: 69
LOS: 6 days | Discharge: HOME HEALTH | DRG: 330 | End: 2019-05-22
Attending: COLON & RECTAL SURGERY | Admitting: COLON & RECTAL SURGERY
Payer: MEDICARE

## 2019-05-16 ENCOUNTER — ANESTHESIA (OUTPATIENT)
Dept: OPERATING ROOM | Age: 69
DRG: 330 | End: 2019-05-16
Payer: MEDICARE

## 2019-05-16 VITALS — SYSTOLIC BLOOD PRESSURE: 105 MMHG | OXYGEN SATURATION: 97 % | TEMPERATURE: 98.2 F | DIASTOLIC BLOOD PRESSURE: 53 MMHG

## 2019-05-16 DIAGNOSIS — Z90.49 S/P COLECTOMY: Primary | ICD-10-CM

## 2019-05-16 LAB
GLUCOSE BLD-MCNC: 193 MG/DL (ref 65–105)
GLUCOSE BLD-MCNC: 99 MG/DL (ref 65–105)

## 2019-05-16 PROCEDURE — 0DN80ZZ RELEASE SMALL INTESTINE, OPEN APPROACH: ICD-10-PCS | Performed by: COLON & RECTAL SURGERY

## 2019-05-16 PROCEDURE — 2580000003 HC RX 258: Performed by: ANESTHESIOLOGY

## 2019-05-16 PROCEDURE — 3700000001 HC ADD 15 MINUTES (ANESTHESIA): Performed by: COLON & RECTAL SURGERY

## 2019-05-16 PROCEDURE — 0WPG0JZ REMOVAL OF SYNTHETIC SUBSTITUTE FROM PERITONEAL CAVITY, OPEN APPROACH: ICD-10-PCS | Performed by: COLON & RECTAL SURGERY

## 2019-05-16 PROCEDURE — 2500000003 HC RX 250 WO HCPCS: Performed by: NURSE ANESTHETIST, CERTIFIED REGISTERED

## 2019-05-16 PROCEDURE — 3600000012 HC SURGERY LEVEL 2 ADDTL 15MIN: Performed by: COLON & RECTAL SURGERY

## 2019-05-16 PROCEDURE — 2709999900 HC NON-CHARGEABLE SUPPLY: Performed by: COLON & RECTAL SURGERY

## 2019-05-16 PROCEDURE — 2780000010 HC IMPLANT OTHER: Performed by: COLON & RECTAL SURGERY

## 2019-05-16 PROCEDURE — 6370000000 HC RX 637 (ALT 250 FOR IP): Performed by: ANESTHESIOLOGY

## 2019-05-16 PROCEDURE — 2500000003 HC RX 250 WO HCPCS: Performed by: STUDENT IN AN ORGANIZED HEALTH CARE EDUCATION/TRAINING PROGRAM

## 2019-05-16 PROCEDURE — 0DQ80ZZ REPAIR SMALL INTESTINE, OPEN APPROACH: ICD-10-PCS | Performed by: COLON & RECTAL SURGERY

## 2019-05-16 PROCEDURE — 6360000002 HC RX W HCPCS: Performed by: NURSE ANESTHETIST, CERTIFIED REGISTERED

## 2019-05-16 PROCEDURE — 6360000002 HC RX W HCPCS: Performed by: ANESTHESIOLOGY

## 2019-05-16 PROCEDURE — 2720000010 HC SURG SUPPLY STERILE: Performed by: COLON & RECTAL SURGERY

## 2019-05-16 PROCEDURE — 0D1M0Z4 BYPASS DESCENDING COLON TO CUTANEOUS, OPEN APPROACH: ICD-10-PCS | Performed by: COLON & RECTAL SURGERY

## 2019-05-16 PROCEDURE — 76942 ECHO GUIDE FOR BIOPSY: CPT | Performed by: ANESTHESIOLOGY

## 2019-05-16 PROCEDURE — 88307 TISSUE EXAM BY PATHOLOGIST: CPT

## 2019-05-16 PROCEDURE — 3600000002 HC SURGERY LEVEL 2 BASE: Performed by: COLON & RECTAL SURGERY

## 2019-05-16 PROCEDURE — 82947 ASSAY GLUCOSE BLOOD QUANT: CPT

## 2019-05-16 PROCEDURE — 3700000000 HC ANESTHESIA ATTENDED CARE: Performed by: COLON & RECTAL SURGERY

## 2019-05-16 PROCEDURE — 2500000003 HC RX 250 WO HCPCS: Performed by: ANESTHESIOLOGY

## 2019-05-16 PROCEDURE — 7100000001 HC PACU RECOVERY - ADDTL 15 MIN: Performed by: COLON & RECTAL SURGERY

## 2019-05-16 PROCEDURE — 7100000000 HC PACU RECOVERY - FIRST 15 MIN: Performed by: COLON & RECTAL SURGERY

## 2019-05-16 PROCEDURE — 2000000000 HC ICU R&B

## 2019-05-16 DEVICE — CLIP LIG L TI 6 YEL HNDL FOR OPN AND ENDOSCP SGL APPL: Type: IMPLANTABLE DEVICE | Site: ABDOMEN | Status: FUNCTIONAL

## 2019-05-16 DEVICE — CLIP LIG M TI 6 SIL HNDL FOR OPN AND ENDOSCP SGL APPL: Type: IMPLANTABLE DEVICE | Site: ABDOMEN | Status: FUNCTIONAL

## 2019-05-16 DEVICE — CLIP LIG M L TI 6 GRN HNDL FOR OPN AND ENDOSCP SGL APPL: Type: IMPLANTABLE DEVICE | Site: ABDOMEN | Status: FUNCTIONAL

## 2019-05-16 RX ORDER — POTASSIUM CHLORIDE 7.45 MG/ML
10 INJECTION INTRAVENOUS PRN
Status: DISCONTINUED | OUTPATIENT
Start: 2019-05-16 | End: 2019-05-22 | Stop reason: HOSPADM

## 2019-05-16 RX ORDER — EPHEDRINE SULFATE/0.9% NACL/PF 50 MG/5 ML
SYRINGE (ML) INTRAVENOUS PRN
Status: DISCONTINUED | OUTPATIENT
Start: 2019-05-16 | End: 2019-05-16 | Stop reason: SDUPTHER

## 2019-05-16 RX ORDER — SODIUM CHLORIDE 0.9 % (FLUSH) 0.9 %
10 SYRINGE (ML) INJECTION EVERY 12 HOURS SCHEDULED
Status: DISCONTINUED | OUTPATIENT
Start: 2019-05-16 | End: 2019-05-16 | Stop reason: HOSPADM

## 2019-05-16 RX ORDER — ROPIVACAINE HYDROCHLORIDE 2 MG/ML
INJECTION, SOLUTION EPIDURAL; INFILTRATION; PERINEURAL PRN
Status: DISCONTINUED | OUTPATIENT
Start: 2019-05-16 | End: 2019-05-16

## 2019-05-16 RX ORDER — OXYCODONE HYDROCHLORIDE AND ACETAMINOPHEN 5; 325 MG/1; MG/1
1 TABLET ORAL
Status: DISCONTINUED | OUTPATIENT
Start: 2019-05-16 | End: 2019-05-16 | Stop reason: HOSPADM

## 2019-05-16 RX ORDER — FENTANYL CITRATE 50 UG/ML
25 INJECTION, SOLUTION INTRAMUSCULAR; INTRAVENOUS EVERY 5 MIN PRN
Status: DISCONTINUED | OUTPATIENT
Start: 2019-05-16 | End: 2019-05-16 | Stop reason: HOSPADM

## 2019-05-16 RX ORDER — ONDANSETRON 4 MG/1
4 TABLET, FILM COATED ORAL EVERY 12 HOURS PRN
Qty: 10 TABLET | Refills: 0 | Status: SHIPPED | OUTPATIENT
Start: 2019-05-16 | End: 2019-07-25 | Stop reason: ALTCHOICE

## 2019-05-16 RX ORDER — SODIUM CHLORIDE 0.9 % (FLUSH) 0.9 %
10 SYRINGE (ML) INJECTION EVERY 12 HOURS SCHEDULED
Status: DISCONTINUED | OUTPATIENT
Start: 2019-05-17 | End: 2019-05-22 | Stop reason: HOSPADM

## 2019-05-16 RX ORDER — PIPERACILLIN SODIUM, TAZOBACTAM SODIUM 3; .375 G/15ML; G/15ML
INJECTION, POWDER, LYOPHILIZED, FOR SOLUTION INTRAVENOUS PRN
Status: DISCONTINUED | OUTPATIENT
Start: 2019-05-16 | End: 2019-05-16 | Stop reason: SDUPTHER

## 2019-05-16 RX ORDER — MORPHINE SULFATE 2 MG/ML
2 INJECTION, SOLUTION INTRAMUSCULAR; INTRAVENOUS
Status: DISCONTINUED | OUTPATIENT
Start: 2019-05-16 | End: 2019-05-22 | Stop reason: HOSPADM

## 2019-05-16 RX ORDER — FENTANYL CITRATE 50 UG/ML
INJECTION, SOLUTION INTRAMUSCULAR; INTRAVENOUS PRN
Status: DISCONTINUED | OUTPATIENT
Start: 2019-05-16 | End: 2019-05-16 | Stop reason: SDUPTHER

## 2019-05-16 RX ORDER — PROPOFOL 10 MG/ML
INJECTION, EMULSION INTRAVENOUS PRN
Status: DISCONTINUED | OUTPATIENT
Start: 2019-05-16 | End: 2019-05-16 | Stop reason: SDUPTHER

## 2019-05-16 RX ORDER — DOCUSATE SODIUM 100 MG/1
100 CAPSULE, LIQUID FILLED ORAL 2 TIMES DAILY PRN
Qty: 40 CAPSULE | Refills: 0 | Status: SHIPPED | OUTPATIENT
Start: 2019-05-16 | End: 2019-07-25 | Stop reason: ALTCHOICE

## 2019-05-16 RX ORDER — OXYCODONE HYDROCHLORIDE AND ACETAMINOPHEN 5; 325 MG/1; MG/1
2 TABLET ORAL EVERY 4 HOURS PRN
Status: DISCONTINUED | OUTPATIENT
Start: 2019-05-16 | End: 2019-05-20

## 2019-05-16 RX ORDER — POTASSIUM CHLORIDE 20 MEQ/1
40 TABLET, EXTENDED RELEASE ORAL PRN
Status: DISCONTINUED | OUTPATIENT
Start: 2019-05-16 | End: 2019-05-22 | Stop reason: HOSPADM

## 2019-05-16 RX ORDER — ROCURONIUM BROMIDE 10 MG/ML
INJECTION, SOLUTION INTRAVENOUS PRN
Status: DISCONTINUED | OUTPATIENT
Start: 2019-05-16 | End: 2019-05-16 | Stop reason: SDUPTHER

## 2019-05-16 RX ORDER — SODIUM CHLORIDE 0.9 % (FLUSH) 0.9 %
10 SYRINGE (ML) INJECTION PRN
Status: DISCONTINUED | OUTPATIENT
Start: 2019-05-16 | End: 2019-05-22 | Stop reason: HOSPADM

## 2019-05-16 RX ORDER — SODIUM CHLORIDE, SODIUM LACTATE, POTASSIUM CHLORIDE, CALCIUM CHLORIDE 600; 310; 30; 20 MG/100ML; MG/100ML; MG/100ML; MG/100ML
INJECTION, SOLUTION INTRAVENOUS CONTINUOUS
Status: DISCONTINUED | OUTPATIENT
Start: 2019-05-17 | End: 2019-05-18

## 2019-05-16 RX ORDER — ALBUTEROL SULFATE 90 UG/1
AEROSOL, METERED RESPIRATORY (INHALATION) PRN
Status: DISCONTINUED | OUTPATIENT
Start: 2019-05-16 | End: 2019-05-16 | Stop reason: SDUPTHER

## 2019-05-16 RX ORDER — ONDANSETRON 2 MG/ML
4 INJECTION INTRAMUSCULAR; INTRAVENOUS
Status: DISCONTINUED | OUTPATIENT
Start: 2019-05-16 | End: 2019-05-16 | Stop reason: HOSPADM

## 2019-05-16 RX ORDER — SODIUM CHLORIDE 9 MG/ML
INJECTION, SOLUTION INTRAVENOUS CONTINUOUS
Status: DISCONTINUED | OUTPATIENT
Start: 2019-05-17 | End: 2019-05-16

## 2019-05-16 RX ORDER — DEXAMETHASONE SODIUM PHOSPHATE 10 MG/ML
INJECTION INTRAMUSCULAR; INTRAVENOUS PRN
Status: DISCONTINUED | OUTPATIENT
Start: 2019-05-16 | End: 2019-05-16 | Stop reason: SDUPTHER

## 2019-05-16 RX ORDER — ROPIVACAINE HYDROCHLORIDE 2 MG/ML
INJECTION, SOLUTION EPIDURAL; INFILTRATION; PERINEURAL CONTINUOUS PRN
Status: DISCONTINUED | OUTPATIENT
Start: 2019-05-16 | End: 2019-05-16 | Stop reason: SDUPTHER

## 2019-05-16 RX ORDER — MAGNESIUM SULFATE 1 G/100ML
1 INJECTION INTRAVENOUS PRN
Status: DISCONTINUED | OUTPATIENT
Start: 2019-05-16 | End: 2019-05-22 | Stop reason: HOSPADM

## 2019-05-16 RX ORDER — ROPIVACAINE HYDROCHLORIDE 2 MG/ML
INJECTION, SOLUTION EPIDURAL; INFILTRATION; PERINEURAL PRN
Status: DISCONTINUED | OUTPATIENT
Start: 2019-05-16 | End: 2019-05-16 | Stop reason: SDUPTHER

## 2019-05-16 RX ORDER — SODIUM CHLORIDE, SODIUM LACTATE, POTASSIUM CHLORIDE, CALCIUM CHLORIDE 600; 310; 30; 20 MG/100ML; MG/100ML; MG/100ML; MG/100ML
INJECTION, SOLUTION INTRAVENOUS CONTINUOUS
Status: DISCONTINUED | OUTPATIENT
Start: 2019-05-17 | End: 2019-05-16

## 2019-05-16 RX ORDER — MIDAZOLAM HYDROCHLORIDE 1 MG/ML
INJECTION INTRAMUSCULAR; INTRAVENOUS PRN
Status: DISCONTINUED | OUTPATIENT
Start: 2019-05-16 | End: 2019-05-16 | Stop reason: SDUPTHER

## 2019-05-16 RX ORDER — LIDOCAINE HYDROCHLORIDE 10 MG/ML
1 INJECTION, SOLUTION EPIDURAL; INFILTRATION; INTRACAUDAL; PERINEURAL
Status: DISCONTINUED | OUTPATIENT
Start: 2019-05-17 | End: 2019-05-16 | Stop reason: HOSPADM

## 2019-05-16 RX ORDER — CEFAZOLIN SODIUM 1 G/3ML
INJECTION, POWDER, FOR SOLUTION INTRAMUSCULAR; INTRAVENOUS PRN
Status: DISCONTINUED | OUTPATIENT
Start: 2019-05-16 | End: 2019-05-16 | Stop reason: SDUPTHER

## 2019-05-16 RX ORDER — OXYCODONE HYDROCHLORIDE AND ACETAMINOPHEN 5; 325 MG/1; MG/1
1 TABLET ORAL EVERY 6 HOURS PRN
Qty: 20 TABLET | Refills: 0 | Status: SHIPPED | OUTPATIENT
Start: 2019-05-16 | End: 2019-05-21

## 2019-05-16 RX ORDER — FENTANYL CITRATE 50 UG/ML
50 INJECTION, SOLUTION INTRAMUSCULAR; INTRAVENOUS EVERY 5 MIN PRN
Status: DISCONTINUED | OUTPATIENT
Start: 2019-05-16 | End: 2019-05-16 | Stop reason: HOSPADM

## 2019-05-16 RX ORDER — OXYCODONE HYDROCHLORIDE AND ACETAMINOPHEN 5; 325 MG/1; MG/1
1 TABLET ORAL EVERY 4 HOURS PRN
Status: DISCONTINUED | OUTPATIENT
Start: 2019-05-16 | End: 2019-05-20

## 2019-05-16 RX ORDER — ONDANSETRON 2 MG/ML
INJECTION INTRAMUSCULAR; INTRAVENOUS PRN
Status: DISCONTINUED | OUTPATIENT
Start: 2019-05-16 | End: 2019-05-16 | Stop reason: SDUPTHER

## 2019-05-16 RX ORDER — MORPHINE SULFATE 4 MG/ML
4 INJECTION, SOLUTION INTRAMUSCULAR; INTRAVENOUS
Status: DISCONTINUED | OUTPATIENT
Start: 2019-05-16 | End: 2019-05-22 | Stop reason: HOSPADM

## 2019-05-16 RX ORDER — LIDOCAINE HYDROCHLORIDE 20 MG/ML
INJECTION, SOLUTION EPIDURAL; INFILTRATION; INTRACAUDAL; PERINEURAL PRN
Status: DISCONTINUED | OUTPATIENT
Start: 2019-05-16 | End: 2019-05-16 | Stop reason: SDUPTHER

## 2019-05-16 RX ORDER — METOPROLOL TARTRATE 5 MG/5ML
INJECTION INTRAVENOUS PRN
Status: DISCONTINUED | OUTPATIENT
Start: 2019-05-16 | End: 2019-05-16 | Stop reason: SDUPTHER

## 2019-05-16 RX ORDER — SODIUM CHLORIDE 0.9 % (FLUSH) 0.9 %
10 SYRINGE (ML) INJECTION PRN
Status: DISCONTINUED | OUTPATIENT
Start: 2019-05-16 | End: 2019-05-16 | Stop reason: HOSPADM

## 2019-05-16 RX ORDER — HYDROMORPHONE HCL 110MG/55ML
PATIENT CONTROLLED ANALGESIA SYRINGE INTRAVENOUS PRN
Status: DISCONTINUED | OUTPATIENT
Start: 2019-05-16 | End: 2019-05-16 | Stop reason: SDUPTHER

## 2019-05-16 RX ORDER — ONDANSETRON 2 MG/ML
4 INJECTION INTRAMUSCULAR; INTRAVENOUS EVERY 6 HOURS PRN
Status: DISCONTINUED | OUTPATIENT
Start: 2019-05-16 | End: 2019-05-17 | Stop reason: SDUPTHER

## 2019-05-16 RX ADMIN — MIDAZOLAM 2 MG: 1 INJECTION INTRAMUSCULAR; INTRAVENOUS at 16:45

## 2019-05-16 RX ADMIN — SODIUM CHLORIDE, POTASSIUM CHLORIDE, SODIUM LACTATE AND CALCIUM CHLORIDE: 600; 310; 30; 20 INJECTION, SOLUTION INTRAVENOUS at 18:32

## 2019-05-16 RX ADMIN — FENTANYL CITRATE 50 MCG: 50 INJECTION, SOLUTION INTRAMUSCULAR; INTRAVENOUS at 23:03

## 2019-05-16 RX ADMIN — ROPIVACAINE HYDROCHLORIDE 20 ML: 2 INJECTION, SOLUTION EPIDURAL; INFILTRATION at 22:03

## 2019-05-16 RX ADMIN — METOPROLOL TARTRATE 2.5 MG: 1 INJECTION, SOLUTION INTRAVENOUS at 17:49

## 2019-05-16 RX ADMIN — ROCURONIUM BROMIDE 25 MG: 10 INJECTION, SOLUTION INTRAVENOUS at 17:36

## 2019-05-16 RX ADMIN — ROPIVACAINE HYDROCHLORIDE 20 ML: 2 INJECTION, SOLUTION EPIDURAL; INFILTRATION at 21:59

## 2019-05-16 RX ADMIN — FENTANYL CITRATE 100 MCG: 50 INJECTION, SOLUTION INTRAMUSCULAR; INTRAVENOUS at 16:58

## 2019-05-16 RX ADMIN — HYDROMORPHONE HYDROCHLORIDE 1 MG: 2 INJECTION INTRAMUSCULAR; INTRAVENOUS; SUBCUTANEOUS at 17:43

## 2019-05-16 RX ADMIN — SODIUM CHLORIDE, POTASSIUM CHLORIDE, SODIUM LACTATE AND CALCIUM CHLORIDE: 600; 310; 30; 20 INJECTION, SOLUTION INTRAVENOUS at 13:23

## 2019-05-16 RX ADMIN — ROCURONIUM BROMIDE 30 MG: 10 INJECTION, SOLUTION INTRAVENOUS at 19:32

## 2019-05-16 RX ADMIN — SODIUM CHLORIDE, POTASSIUM CHLORIDE, SODIUM LACTATE AND CALCIUM CHLORIDE: 600; 310; 30; 20 INJECTION, SOLUTION INTRAVENOUS at 21:05

## 2019-05-16 RX ADMIN — Medication 10 MG: at 17:23

## 2019-05-16 RX ADMIN — HYDROMORPHONE HYDROCHLORIDE 0.5 MG: 2 INJECTION INTRAMUSCULAR; INTRAVENOUS; SUBCUTANEOUS at 17:39

## 2019-05-16 RX ADMIN — SUGAMMADEX 200 MG: 100 INJECTION, SOLUTION INTRAVENOUS at 22:08

## 2019-05-16 RX ADMIN — METRONIDAZOLE 500 MG: 500 INJECTION, SOLUTION INTRAVENOUS at 17:03

## 2019-05-16 RX ADMIN — SODIUM CHLORIDE, POTASSIUM CHLORIDE, SODIUM LACTATE AND CALCIUM CHLORIDE: 600; 310; 30; 20 INJECTION, SOLUTION INTRAVENOUS at 19:27

## 2019-05-16 RX ADMIN — ROCURONIUM BROMIDE 50 MG: 10 INJECTION, SOLUTION INTRAVENOUS at 16:48

## 2019-05-16 RX ADMIN — FENTANYL CITRATE 50 MCG: 50 INJECTION, SOLUTION INTRAMUSCULAR; INTRAVENOUS at 23:21

## 2019-05-16 RX ADMIN — METOPROLOL TARTRATE 2.5 MG: 1 INJECTION, SOLUTION INTRAVENOUS at 18:00

## 2019-05-16 RX ADMIN — LIDOCAINE HYDROCHLORIDE 100 MG: 20 INJECTION, SOLUTION EPIDURAL; INFILTRATION; INTRACAUDAL; PERINEURAL at 16:48

## 2019-05-16 RX ADMIN — SODIUM CHLORIDE, POTASSIUM CHLORIDE, SODIUM LACTATE AND CALCIUM CHLORIDE: 600; 310; 30; 20 INJECTION, SOLUTION INTRAVENOUS at 17:43

## 2019-05-16 RX ADMIN — ROCURONIUM BROMIDE 25 MG: 10 INJECTION, SOLUTION INTRAVENOUS at 18:00

## 2019-05-16 RX ADMIN — HYDROMORPHONE HYDROCHLORIDE 0.5 MG: 2 INJECTION INTRAMUSCULAR; INTRAVENOUS; SUBCUTANEOUS at 17:57

## 2019-05-16 RX ADMIN — ONDANSETRON 4 MG: 2 INJECTION, SOLUTION INTRAMUSCULAR; INTRAVENOUS at 16:59

## 2019-05-16 RX ADMIN — DEXAMETHASONE SODIUM PHOSPHATE 10 MG: 10 INJECTION INTRAMUSCULAR; INTRAVENOUS at 16:59

## 2019-05-16 RX ADMIN — Medication 2 PUFF: at 19:31

## 2019-05-16 RX ADMIN — CEFAZOLIN 2000 MG: 1 INJECTION, POWDER, FOR SOLUTION INTRAMUSCULAR; INTRAVENOUS at 16:54

## 2019-05-16 RX ADMIN — PIPERACILLIN AND TAZOBACTAM 3.38 G: 3; .375 INJECTION, POWDER, LYOPHILIZED, FOR SOLUTION INTRAVENOUS; PARENTERAL at 21:05

## 2019-05-16 RX ADMIN — Medication 10 MG: at 17:33

## 2019-05-16 RX ADMIN — FENTANYL CITRATE 50 MCG: 50 INJECTION, SOLUTION INTRAMUSCULAR; INTRAVENOUS at 22:40

## 2019-05-16 RX ADMIN — PROPOFOL 150 MG: 10 INJECTION, EMULSION INTRAVENOUS at 16:48

## 2019-05-16 RX ADMIN — FENTANYL CITRATE 150 MCG: 50 INJECTION, SOLUTION INTRAMUSCULAR; INTRAVENOUS at 16:48

## 2019-05-16 RX ADMIN — FAMOTIDINE 20 MG: 10 INJECTION, SOLUTION INTRAVENOUS at 23:48

## 2019-05-16 RX ADMIN — Medication 2 PUFF: at 19:38

## 2019-05-16 ASSESSMENT — PULMONARY FUNCTION TESTS
PIF_VALUE: 28
PIF_VALUE: 21
PIF_VALUE: 29
PIF_VALUE: 28
PIF_VALUE: 29
PIF_VALUE: 21
PIF_VALUE: 25
PIF_VALUE: 26
PIF_VALUE: 28
PIF_VALUE: 13
PIF_VALUE: 27
PIF_VALUE: 24
PIF_VALUE: 27
PIF_VALUE: 23
PIF_VALUE: 30
PIF_VALUE: 31
PIF_VALUE: 13
PIF_VALUE: 30
PIF_VALUE: 25
PIF_VALUE: 28
PIF_VALUE: 15
PIF_VALUE: 28
PIF_VALUE: 30
PIF_VALUE: 26
PIF_VALUE: 2
PIF_VALUE: 29
PIF_VALUE: 21
PIF_VALUE: 14
PIF_VALUE: 29
PIF_VALUE: 26
PIF_VALUE: 27
PIF_VALUE: 27
PIF_VALUE: 28
PIF_VALUE: 27
PIF_VALUE: 12
PIF_VALUE: 32
PIF_VALUE: 13
PIF_VALUE: 27
PIF_VALUE: 28
PIF_VALUE: 24
PIF_VALUE: 31
PIF_VALUE: 26
PIF_VALUE: 1
PIF_VALUE: 28
PIF_VALUE: 27
PIF_VALUE: 26
PIF_VALUE: 3
PIF_VALUE: 27
PIF_VALUE: 30
PIF_VALUE: 27
PIF_VALUE: 20
PIF_VALUE: 21
PIF_VALUE: 27
PIF_VALUE: 26
PIF_VALUE: 27
PIF_VALUE: 27
PIF_VALUE: 25
PIF_VALUE: 30
PIF_VALUE: 23
PIF_VALUE: 28
PIF_VALUE: 25
PIF_VALUE: 25
PIF_VALUE: 31
PIF_VALUE: 24
PIF_VALUE: 26
PIF_VALUE: 24
PIF_VALUE: 30
PIF_VALUE: 27
PIF_VALUE: 29
PIF_VALUE: 28
PIF_VALUE: 27
PIF_VALUE: 29
PIF_VALUE: 21
PIF_VALUE: 26
PIF_VALUE: 24
PIF_VALUE: 18
PIF_VALUE: 21
PIF_VALUE: 26
PIF_VALUE: 28
PIF_VALUE: 27
PIF_VALUE: 28
PIF_VALUE: 13
PIF_VALUE: 31
PIF_VALUE: 30
PIF_VALUE: 21
PIF_VALUE: 27
PIF_VALUE: 26
PIF_VALUE: 27
PIF_VALUE: 27
PIF_VALUE: 26
PIF_VALUE: 13
PIF_VALUE: 25
PIF_VALUE: 27
PIF_VALUE: 26
PIF_VALUE: 27
PIF_VALUE: 28
PIF_VALUE: 32
PIF_VALUE: 26
PIF_VALUE: 26
PIF_VALUE: 3
PIF_VALUE: 27
PIF_VALUE: 26
PIF_VALUE: 13
PIF_VALUE: 27
PIF_VALUE: 0
PIF_VALUE: 26
PIF_VALUE: 15
PIF_VALUE: 30
PIF_VALUE: 28
PIF_VALUE: 30
PIF_VALUE: 17
PIF_VALUE: 26
PIF_VALUE: 27
PIF_VALUE: 27
PIF_VALUE: 10
PIF_VALUE: 29
PIF_VALUE: 31
PIF_VALUE: 25
PIF_VALUE: 27
PIF_VALUE: 24
PIF_VALUE: 27
PIF_VALUE: 32
PIF_VALUE: 30
PIF_VALUE: 29
PIF_VALUE: 27
PIF_VALUE: 24
PIF_VALUE: 27
PIF_VALUE: 27
PIF_VALUE: 21
PIF_VALUE: 29
PIF_VALUE: 2
PIF_VALUE: 28
PIF_VALUE: 26
PIF_VALUE: 30
PIF_VALUE: 16
PIF_VALUE: 27
PIF_VALUE: 26
PIF_VALUE: 27
PIF_VALUE: 27
PIF_VALUE: 29
PIF_VALUE: 27
PIF_VALUE: 24
PIF_VALUE: 36
PIF_VALUE: 30
PIF_VALUE: 26
PIF_VALUE: 32
PIF_VALUE: 30
PIF_VALUE: 29
PIF_VALUE: 27
PIF_VALUE: 19
PIF_VALUE: 28
PIF_VALUE: 31
PIF_VALUE: 26
PIF_VALUE: 21
PIF_VALUE: 27
PIF_VALUE: 30
PIF_VALUE: 28
PIF_VALUE: 27
PIF_VALUE: 30
PIF_VALUE: 25
PIF_VALUE: 21
PIF_VALUE: 32
PIF_VALUE: 25
PIF_VALUE: 24
PIF_VALUE: 30
PIF_VALUE: 21
PIF_VALUE: 25
PIF_VALUE: 26
PIF_VALUE: 32
PIF_VALUE: 23
PIF_VALUE: 22
PIF_VALUE: 25
PIF_VALUE: 20
PIF_VALUE: 28
PIF_VALUE: 27
PIF_VALUE: 31
PIF_VALUE: 3
PIF_VALUE: 33
PIF_VALUE: 24
PIF_VALUE: 27
PIF_VALUE: 35
PIF_VALUE: 3
PIF_VALUE: 26
PIF_VALUE: 30
PIF_VALUE: 29
PIF_VALUE: 14
PIF_VALUE: 24
PIF_VALUE: 27
PIF_VALUE: 29
PIF_VALUE: 28
PIF_VALUE: 31
PIF_VALUE: 27
PIF_VALUE: 21
PIF_VALUE: 24
PIF_VALUE: 26
PIF_VALUE: 28
PIF_VALUE: 13
PIF_VALUE: 26
PIF_VALUE: 33
PIF_VALUE: 22
PIF_VALUE: 26
PIF_VALUE: 26
PIF_VALUE: 30
PIF_VALUE: 26
PIF_VALUE: 28
PIF_VALUE: 27
PIF_VALUE: 21
PIF_VALUE: 21
PIF_VALUE: 26
PIF_VALUE: 27
PIF_VALUE: 29
PIF_VALUE: 26
PIF_VALUE: 24
PIF_VALUE: 24
PIF_VALUE: 27
PIF_VALUE: 26
PIF_VALUE: 21
PIF_VALUE: 27
PIF_VALUE: 30
PIF_VALUE: 13
PIF_VALUE: 31
PIF_VALUE: 26
PIF_VALUE: 28
PIF_VALUE: 27
PIF_VALUE: 26
PIF_VALUE: 30
PIF_VALUE: 31
PIF_VALUE: 26
PIF_VALUE: 21
PIF_VALUE: 25
PIF_VALUE: 23
PIF_VALUE: 2
PIF_VALUE: 28
PIF_VALUE: 21
PIF_VALUE: 27
PIF_VALUE: 14
PIF_VALUE: 24
PIF_VALUE: 25
PIF_VALUE: 29
PIF_VALUE: 26
PIF_VALUE: 3
PIF_VALUE: 21
PIF_VALUE: 25
PIF_VALUE: 32
PIF_VALUE: 18
PIF_VALUE: 26
PIF_VALUE: 21
PIF_VALUE: 29
PIF_VALUE: 27
PIF_VALUE: 21
PIF_VALUE: 0
PIF_VALUE: 3
PIF_VALUE: 28
PIF_VALUE: 1
PIF_VALUE: 24
PIF_VALUE: 26
PIF_VALUE: 31
PIF_VALUE: 29
PIF_VALUE: 28
PIF_VALUE: 26
PIF_VALUE: 23
PIF_VALUE: 27
PIF_VALUE: 22
PIF_VALUE: 27
PIF_VALUE: 29
PIF_VALUE: 26
PIF_VALUE: 28
PIF_VALUE: 13
PIF_VALUE: 28
PIF_VALUE: 28
PIF_VALUE: 34
PIF_VALUE: 25
PIF_VALUE: 27
PIF_VALUE: 13
PIF_VALUE: 32
PIF_VALUE: 31
PIF_VALUE: 27
PIF_VALUE: 27
PIF_VALUE: 18
PIF_VALUE: 27
PIF_VALUE: 23
PIF_VALUE: 27
PIF_VALUE: 29
PIF_VALUE: 27
PIF_VALUE: 31
PIF_VALUE: 30
PIF_VALUE: 31
PIF_VALUE: 30
PIF_VALUE: 28
PIF_VALUE: 29
PIF_VALUE: 28
PIF_VALUE: 27
PIF_VALUE: 27
PIF_VALUE: 26
PIF_VALUE: 27
PIF_VALUE: 26
PIF_VALUE: 31
PIF_VALUE: 24
PIF_VALUE: 25
PIF_VALUE: 26
PIF_VALUE: 26
PIF_VALUE: 24
PIF_VALUE: 25
PIF_VALUE: 27
PIF_VALUE: 27
PIF_VALUE: 26
PIF_VALUE: 26
PIF_VALUE: 2
PIF_VALUE: 26
PIF_VALUE: 20
PIF_VALUE: 27
PIF_VALUE: 28
PIF_VALUE: 30
PIF_VALUE: 30
PIF_VALUE: 24
PIF_VALUE: 27
PIF_VALUE: 26
PIF_VALUE: 26
PIF_VALUE: 22
PIF_VALUE: 21
PIF_VALUE: 29
PIF_VALUE: 29

## 2019-05-16 ASSESSMENT — PAIN DESCRIPTION - LOCATION
LOCATION: ABDOMEN
LOCATION: ABDOMEN

## 2019-05-16 ASSESSMENT — PAIN DESCRIPTION - FREQUENCY
FREQUENCY: CONTINUOUS
FREQUENCY: CONTINUOUS

## 2019-05-16 ASSESSMENT — PAIN DESCRIPTION - ONSET
ONSET: GRADUAL
ONSET: AWAKENED FROM SLEEP

## 2019-05-16 ASSESSMENT — PAIN - FUNCTIONAL ASSESSMENT
PAIN_FUNCTIONAL_ASSESSMENT: PREVENTS OR INTERFERES WITH ALL ACTIVE AND SOME PASSIVE ACTIVITIES
PAIN_FUNCTIONAL_ASSESSMENT: PREVENTS OR INTERFERES SOME ACTIVE ACTIVITIES AND ADLS
PAIN_FUNCTIONAL_ASSESSMENT: 0-10

## 2019-05-16 ASSESSMENT — PAIN SCALES - GENERAL
PAINLEVEL_OUTOF10: 10

## 2019-05-16 ASSESSMENT — PAIN DESCRIPTION - PROGRESSION
CLINICAL_PROGRESSION: GRADUALLY WORSENING
CLINICAL_PROGRESSION: NOT CHANGED

## 2019-05-16 ASSESSMENT — PAIN DESCRIPTION - ORIENTATION
ORIENTATION: ANTERIOR
ORIENTATION: ANTERIOR

## 2019-05-16 ASSESSMENT — PAIN DESCRIPTION - DESCRIPTORS
DESCRIPTORS: BURNING
DESCRIPTORS: BURNING

## 2019-05-16 ASSESSMENT — PAIN DESCRIPTION - PAIN TYPE
TYPE: ACUTE PAIN;SURGICAL PAIN
TYPE: ACUTE PAIN;SURGICAL PAIN

## 2019-05-16 NOTE — ANESTHESIA PRE PROCEDURE
Right upper quadrant abdominal pain R10.11    Fundic gland polyposis of stomach K31.7    SBO (small bowel obstruction) ([address] K56.609    Incarcerated incisional hernia K43.0    Sigmoid diverticulitis K57.32    Incisional hernia without obstruction or gangrene K43.2    Chalazion H00.19       Past Medical History:        Diagnosis Date    Allergic rhinitis     Bladder prolapse     Constipation     5/2019 resolved    Fundic gland polyposis of stomach 08/17/2017    per EGD    GERD (gastroesophageal reflux disease)     Hiatal hernia     History of cardiac cath 12/2002    pt denies blockage    Hyperlipidemia     borderline    Hypertension     MRSA (methicillin resistant staph aureus) culture positive 11/01/2016    nasal    MRSA carrier     follows with ID    Obesity     Osteoarthritis     Thyroid disease     goiter    Wears glasses        Past Surgical History:        Procedure Laterality Date    CHOLECYSTECTOMY, LAPAROSCOPIC  11/15/2016    CHOLECYSTECTOMY, LAPAROSCOPIC  11/15/2016    COLONOSCOPY  2013    neg    ENDOSCOPY, COLON, DIAGNOSTIC      EGD    KNEE ARTHROSCOPY Left     lateral release    [facility] EGD TRANSORAL BIOPSY SINGLE/MULTIPLE N/A 8/17/2017    EGD BIOPSY performed by Jacinda Wilde MD at [address] [facility] OFFICE/OUTPT VISIT,PROCEDURE ONLY N/A 5/22/2018    XI ROBOTIC LAPAROSCOPIC UMBILICAL HERNIA REPAIR WITH MESH, POSSIBLE OPEN performed by Derian Morris IV, DO at [facility] 20  02/19/2019    SIGMOIDOSCOPY N/A 2/19/2019    SIGMOIDOSCOPY BIOPSY FLEXIBLE performed by Max Cotter DO at [address] [facility] Left 9/24/2012    knee    TOTAL KNEE ARTHROPLASTY Right 11/10/2014    knee    UMBILICAL HERNIA REPAIR  05/22/2018    UPPER GASTROINTESTINAL ENDOSCOPY  08/17/2017    Multiple small gastric polyps, no esophagitis noted no Melara's mucosa.   No hiatal hernia, pathology benign fundic gland polyps       Social History:    Social History Tobacco Use    Smoking status: Former Smoker     Packs/day: 1.00     Years: 3.00     Pack years: 3.00     Last attempt to quit: 1975     Years since quittin.4    Smokeless tobacco: Never Used    Tobacco comment: quit smoking age 23   Substance Use Topics    Alcohol use: Yes     Comment: very rare                                Counseling given: Not Answered  Comment: quit smoking age 23      Vital Signs (Current):   Vitals:    19 1302 19 1302 19 1321   BP:  (!) 14466    Pulse:  95    Resp:  16    Temp:  98.2 °F (36.8 °C)    TempSrc:  Oral    SpO2:  94%    Weight: 250 lb 3.6 oz (113.5 kg)  239 lb (108.4 kg)   Height: 5' 7\" (1.702 m)                                                BP Readings from Last 3 Encounters:   19 (!) 144/66   19 (!) 146/80   19 128/88       NPO Status: Time of last liquid consumption:                         Time of last solid consumption:                         Date of last liquid consumption: 05/15/19                        Date of last solid food consumption: 05/15/19    BMI:   Wt Readings from Last 3 Encounters:   19 239 lb (108.4 kg)   19 250 lb 5 oz (113.5 kg)   19 248 lb 3.2 oz (112.6 kg)     Body mass index is 37.43 kg/m².     CBC:   Lab Results   Component Value Date    WBC 7.5 2019    RBC 4.26 2019    RBC 3.98 2011    HGB 12.8 2019    HCT 40.7 2019    MCV 95.5 2019    RDW 13.9 2019     2019     2011       CMP:   Lab Results   Component Value Date     2019    K 4.3 2019     2019    CO2 25 2019    BUN 24 2019    CREATININE 0.62 2019    GFRAA >60 2019    LABGLOM >60 2019    GLUCOSE 102 2019    GLUCOSE 94 2011    PROT 6.9 2019    CALCIUM 9.4 2019    BILITOT 0.37 2019    ALKPHOS 84 2019    AST 12 2019    ALT 15 2019       POC Tests: Recent Labs     05/16/19  1322   POCGLU 99       Coags:   Lab Results   Component Value Date    PROTIME 10.0 11/01/2016    INR 0.9 11/01/2016       HCG (If Applicable): No results found for: PREGTESTUR, PREGSERUM, HCG, HCGQUANT     ABGs: No results found for: PHART, PO2ART, QDS0KVY, UJI5WMJ, BEART, R9CGMWDC     Type & Screen (If Applicable):  No results found for: LABABO, LABRH    Anesthesia Evaluation    Airway: Mallampati: I  TM distance: >3 FB   Neck ROM: full  Mouth opening: > = 3 FB Dental:          Pulmonary:                              Cardiovascular:    (+) hypertension:,     (-)  angina                Neuro/Psych:               GI/Hepatic/Renal:             Endo/Other:                     Abdominal:           Vascular:                                        Anesthesia Plan      general     ASA 2             Anesthetic plan and risks discussed with patient.                       Marisol Limon MD   5/16/2019

## 2019-05-17 LAB
ABSOLUTE EOS #: 0 K/UL (ref 0–0.44)
ABSOLUTE IMMATURE GRANULOCYTE: 0 K/UL (ref 0–0.3)
ABSOLUTE LYMPH #: 0.5 K/UL (ref 1.1–3.7)
ABSOLUTE MONO #: 1 K/UL (ref 0.1–1.2)
ANION GAP SERPL CALCULATED.3IONS-SCNC: 11 MMOL/L (ref 9–17)
BASOPHILS # BLD: 0 % (ref 0–2)
BASOPHILS ABSOLUTE: 0 K/UL (ref 0–0.2)
BUN BLDV-MCNC: 13 MG/DL (ref 8–23)
BUN/CREAT BLD: 18 (ref 9–20)
CALCIUM IONIZED: 1.25 MMOL/L (ref 1.13–1.33)
CALCIUM SERPL-MCNC: 8.6 MG/DL (ref 8.6–10.4)
CHLORIDE BLD-SCNC: 105 MMOL/L (ref 98–107)
CO2: 24 MMOL/L (ref 20–31)
CREAT SERPL-MCNC: 0.73 MG/DL (ref 0.5–0.9)
DIFFERENTIAL TYPE: ABNORMAL
EOSINOPHILS RELATIVE PERCENT: 0 % (ref 1–4)
ESTIMATED AVERAGE GLUCOSE: 105 MG/DL
GFR AFRICAN AMERICAN: >60 ML/MIN
GFR NON-AFRICAN AMERICAN: >60 ML/MIN
GFR SERPL CREATININE-BSD FRML MDRD: ABNORMAL ML/MIN/{1.73_M2}
GFR SERPL CREATININE-BSD FRML MDRD: ABNORMAL ML/MIN/{1.73_M2}
GLUCOSE BLD-MCNC: 204 MG/DL (ref 70–99)
HBA1C MFR BLD: 5.3 % (ref 4–6)
HCT VFR BLD CALC: 40.7 % (ref 36.3–47.1)
HEMOGLOBIN: 12.8 G/DL (ref 11.9–15.1)
IMMATURE GRANULOCYTES: 0 %
LYMPHOCYTES # BLD: 3 % (ref 24–43)
MAGNESIUM: 1.7 MG/DL (ref 1.6–2.6)
MCH RBC QN AUTO: 30.1 PG (ref 25.2–33.5)
MCHC RBC AUTO-ENTMCNC: 31.4 G/DL (ref 28.4–34.8)
MCV RBC AUTO: 95.8 FL (ref 82.6–102.9)
MONOCYTES # BLD: 6 % (ref 3–12)
NRBC AUTOMATED: 0 PER 100 WBC
PDW BLD-RTO: 13.8 % (ref 11.8–14.4)
PHOSPHORUS: 3.6 MG/DL (ref 2.6–4.5)
PLATELET # BLD: 203 K/UL (ref 138–453)
PLATELET ESTIMATE: ABNORMAL
PMV BLD AUTO: 10.6 FL (ref 8.1–13.5)
POTASSIUM SERPL-SCNC: 3.9 MMOL/L (ref 3.7–5.3)
RBC # BLD: 4.25 M/UL (ref 3.95–5.11)
RBC # BLD: ABNORMAL 10*6/UL
SEG NEUTROPHILS: 91 % (ref 36–65)
SEGMENTED NEUTROPHILS ABSOLUTE COUNT: 15.2 K/UL (ref 1.5–8.1)
SODIUM BLD-SCNC: 140 MMOL/L (ref 135–144)
WBC # BLD: 16.7 K/UL (ref 3.5–11.3)
WBC # BLD: ABNORMAL 10*3/UL

## 2019-05-17 PROCEDURE — 80048 BASIC METABOLIC PNL TOTAL CA: CPT

## 2019-05-17 PROCEDURE — 83735 ASSAY OF MAGNESIUM: CPT

## 2019-05-17 PROCEDURE — 6360000002 HC RX W HCPCS: Performed by: STUDENT IN AN ORGANIZED HEALTH CARE EDUCATION/TRAINING PROGRAM

## 2019-05-17 PROCEDURE — 83036 HEMOGLOBIN GLYCOSYLATED A1C: CPT

## 2019-05-17 PROCEDURE — 6370000000 HC RX 637 (ALT 250 FOR IP): Performed by: STUDENT IN AN ORGANIZED HEALTH CARE EDUCATION/TRAINING PROGRAM

## 2019-05-17 PROCEDURE — 99232 SBSQ HOSP IP/OBS MODERATE 35: CPT | Performed by: INTERNAL MEDICINE

## 2019-05-17 PROCEDURE — 2580000003 HC RX 258: Performed by: STUDENT IN AN ORGANIZED HEALTH CARE EDUCATION/TRAINING PROGRAM

## 2019-05-17 PROCEDURE — 99211 OFF/OP EST MAY X REQ PHY/QHP: CPT

## 2019-05-17 PROCEDURE — 82330 ASSAY OF CALCIUM: CPT

## 2019-05-17 PROCEDURE — 2500000003 HC RX 250 WO HCPCS: Performed by: STUDENT IN AN ORGANIZED HEALTH CARE EDUCATION/TRAINING PROGRAM

## 2019-05-17 PROCEDURE — 84100 ASSAY OF PHOSPHORUS: CPT

## 2019-05-17 PROCEDURE — 85025 COMPLETE CBC W/AUTO DIFF WBC: CPT

## 2019-05-17 PROCEDURE — 6360000002 HC RX W HCPCS: Performed by: SURGERY

## 2019-05-17 PROCEDURE — 36415 COLL VENOUS BLD VENIPUNCTURE: CPT

## 2019-05-17 PROCEDURE — 2060000000 HC ICU INTERMEDIATE R&B

## 2019-05-17 RX ORDER — ONDANSETRON 4 MG/1
4 TABLET, ORALLY DISINTEGRATING ORAL EVERY 6 HOURS PRN
Status: DISCONTINUED | OUTPATIENT
Start: 2019-05-17 | End: 2019-05-22 | Stop reason: HOSPADM

## 2019-05-17 RX ORDER — ONDANSETRON 2 MG/ML
4 INJECTION INTRAMUSCULAR; INTRAVENOUS EVERY 6 HOURS PRN
Status: DISCONTINUED | OUTPATIENT
Start: 2019-05-17 | End: 2019-05-22 | Stop reason: HOSPADM

## 2019-05-17 RX ORDER — MAGNESIUM SULFATE 1 G/100ML
1 INJECTION INTRAVENOUS
Status: COMPLETED | OUTPATIENT
Start: 2019-05-17 | End: 2019-05-17

## 2019-05-17 RX ADMIN — OXYCODONE AND ACETAMINOPHEN 2 TABLET: 5; 325 TABLET ORAL at 20:11

## 2019-05-17 RX ADMIN — TAZOBACTAM SODIUM AND PIPERACILLIN SODIUM 3.38 G: 375; 3 INJECTION, SOLUTION INTRAVENOUS at 20:09

## 2019-05-17 RX ADMIN — MORPHINE SULFATE 4 MG: 4 INJECTION INTRAVENOUS at 09:18

## 2019-05-17 RX ADMIN — MORPHINE SULFATE 2 MG: 2 INJECTION, SOLUTION INTRAMUSCULAR; INTRAVENOUS at 00:42

## 2019-05-17 RX ADMIN — MORPHINE SULFATE 4 MG: 4 INJECTION INTRAVENOUS at 02:39

## 2019-05-17 RX ADMIN — SODIUM CHLORIDE, POTASSIUM CHLORIDE, SODIUM LACTATE AND CALCIUM CHLORIDE: 600; 310; 30; 20 INJECTION, SOLUTION INTRAVENOUS at 00:42

## 2019-05-17 RX ADMIN — TAZOBACTAM SODIUM AND PIPERACILLIN SODIUM 3.38 G: 375; 3 INJECTION, SOLUTION INTRAVENOUS at 02:19

## 2019-05-17 RX ADMIN — TAZOBACTAM SODIUM AND PIPERACILLIN SODIUM 3.38 G: 375; 3 INJECTION, SOLUTION INTRAVENOUS at 11:01

## 2019-05-17 RX ADMIN — FAMOTIDINE 20 MG: 10 INJECTION, SOLUTION INTRAVENOUS at 09:18

## 2019-05-17 RX ADMIN — SODIUM CHLORIDE, POTASSIUM CHLORIDE, SODIUM LACTATE AND CALCIUM CHLORIDE: 600; 310; 30; 20 INJECTION, SOLUTION INTRAVENOUS at 09:00

## 2019-05-17 RX ADMIN — MAGNESIUM SULFATE HEPTAHYDRATE 1 G: 1 INJECTION, SOLUTION INTRAVENOUS at 07:43

## 2019-05-17 RX ADMIN — SODIUM CHLORIDE, POTASSIUM CHLORIDE, SODIUM LACTATE AND CALCIUM CHLORIDE: 600; 310; 30; 20 INJECTION, SOLUTION INTRAVENOUS at 16:32

## 2019-05-17 RX ADMIN — FAMOTIDINE 20 MG: 10 INJECTION, SOLUTION INTRAVENOUS at 20:10

## 2019-05-17 RX ADMIN — ONDANSETRON 4 MG: 2 INJECTION INTRAMUSCULAR; INTRAVENOUS at 02:37

## 2019-05-17 RX ADMIN — MAGNESIUM SULFATE HEPTAHYDRATE 1 G: 1 INJECTION, SOLUTION INTRAVENOUS at 06:19

## 2019-05-17 RX ADMIN — MORPHINE SULFATE 4 MG: 4 INJECTION INTRAVENOUS at 05:37

## 2019-05-17 RX ADMIN — OXYCODONE AND ACETAMINOPHEN 2 TABLET: 5; 325 TABLET ORAL at 13:20

## 2019-05-17 ASSESSMENT — PAIN DESCRIPTION - LOCATION
LOCATION: ABDOMEN

## 2019-05-17 ASSESSMENT — PAIN DESCRIPTION - PROGRESSION
CLINICAL_PROGRESSION: GRADUALLY IMPROVING
CLINICAL_PROGRESSION: NOT CHANGED
CLINICAL_PROGRESSION: NOT CHANGED

## 2019-05-17 ASSESSMENT — PAIN DESCRIPTION - ORIENTATION
ORIENTATION: ANTERIOR;UPPER
ORIENTATION: MID;UPPER
ORIENTATION: ANTERIOR;UPPER
ORIENTATION: ANTERIOR;UPPER
ORIENTATION: MID;LEFT;UPPER

## 2019-05-17 ASSESSMENT — PAIN - FUNCTIONAL ASSESSMENT
PAIN_FUNCTIONAL_ASSESSMENT: ACTIVITIES ARE NOT PREVENTED
PAIN_FUNCTIONAL_ASSESSMENT: PREVENTS OR INTERFERES SOME ACTIVE ACTIVITIES AND ADLS
PAIN_FUNCTIONAL_ASSESSMENT: PREVENTS OR INTERFERES SOME ACTIVE ACTIVITIES AND ADLS

## 2019-05-17 ASSESSMENT — PAIN DESCRIPTION - FREQUENCY
FREQUENCY: CONTINUOUS

## 2019-05-17 ASSESSMENT — PAIN DESCRIPTION - DESCRIPTORS
DESCRIPTORS: BURNING
DESCRIPTORS: BURNING
DESCRIPTORS: ACHING;BURNING
DESCRIPTORS: BURNING;ACHING

## 2019-05-17 ASSESSMENT — PAIN DESCRIPTION - PAIN TYPE
TYPE: ACUTE PAIN;SURGICAL PAIN
TYPE: SURGICAL PAIN
TYPE: SURGICAL PAIN
TYPE: ACUTE PAIN;SURGICAL PAIN
TYPE: ACUTE PAIN;SURGICAL PAIN

## 2019-05-17 ASSESSMENT — PAIN SCALES - GENERAL
PAINLEVEL_OUTOF10: 8
PAINLEVEL_OUTOF10: 6
PAINLEVEL_OUTOF10: 8
PAINLEVEL_OUTOF10: 9
PAINLEVEL_OUTOF10: 7
PAINLEVEL_OUTOF10: 10

## 2019-05-17 ASSESSMENT — PAIN DESCRIPTION - ONSET
ONSET: GRADUAL
ONSET: ON-GOING

## 2019-05-17 NOTE — ANESTHESIA PROCEDURE NOTES
Peripheral Block bilateral TAP Block    Patient location during procedure: OR  Start time: 5/16/2019 9:50 PM  End time: 5/16/2019 10:04 PM  Staffing  Anesthesiologist: Glenys Ham MD  Preanesthetic Checklist  Completed: patient identified, site marked, surgical consent, pre-op evaluation, timeout performed, IV checked, risks and benefits discussed, monitors and equipment checked, anesthesia consent given, oxygen available and patient being monitored  Peripheral Block  Patient position: supine  Prep: ChloraPrep  Patient monitoring: continuous pulse ox, cardiac monitor, continuous capnometry, frequent blood pressure checks and IV access  Block type: TAP  Laterality: bilateral  Injection technique: single-shot  Procedures: ultrasound guided  Provider prep: sterile gloves and mask  Needle  Needle type: Tuohy   Needle gauge: 20 G  Needle length: 10 cm  Needle localization: anatomical landmarks and ultrasound guidance  Needle insertion depth: 10 cm  Assessment  Injection assessment: negative aspiration for heme  Paresthesia pain: none  Slow fractionated injection: yes  Hemodynamics: stable  Reason for block: procedure for pain and at surgeon's request

## 2019-05-18 LAB
ANION GAP SERPL CALCULATED.3IONS-SCNC: 9 MMOL/L (ref 9–17)
BUN BLDV-MCNC: 12 MG/DL (ref 8–23)
BUN/CREAT BLD: 17 (ref 9–20)
CALCIUM SERPL-MCNC: 8.9 MG/DL (ref 8.6–10.4)
CHLORIDE BLD-SCNC: 105 MMOL/L (ref 98–107)
CO2: 26 MMOL/L (ref 20–31)
CREAT SERPL-MCNC: 0.69 MG/DL (ref 0.5–0.9)
GFR AFRICAN AMERICAN: >60 ML/MIN
GFR NON-AFRICAN AMERICAN: >60 ML/MIN
GFR SERPL CREATININE-BSD FRML MDRD: ABNORMAL ML/MIN/{1.73_M2}
GFR SERPL CREATININE-BSD FRML MDRD: ABNORMAL ML/MIN/{1.73_M2}
GLUCOSE BLD-MCNC: 129 MG/DL (ref 70–99)
HCT VFR BLD CALC: 36.7 % (ref 36.3–47.1)
HEMOGLOBIN: 11.3 G/DL (ref 11.9–15.1)
MAGNESIUM: 2.1 MG/DL (ref 1.6–2.6)
MCH RBC QN AUTO: 29.8 PG (ref 25.2–33.5)
MCHC RBC AUTO-ENTMCNC: 30.8 G/DL (ref 28.4–34.8)
MCV RBC AUTO: 96.8 FL (ref 82.6–102.9)
NRBC AUTOMATED: 0 PER 100 WBC
PDW BLD-RTO: 14.5 % (ref 11.8–14.4)
PLATELET # BLD: 199 K/UL (ref 138–453)
PMV BLD AUTO: 10.7 FL (ref 8.1–13.5)
POTASSIUM SERPL-SCNC: 3.9 MMOL/L (ref 3.7–5.3)
RBC # BLD: 3.79 M/UL (ref 3.95–5.11)
SODIUM BLD-SCNC: 140 MMOL/L (ref 135–144)
WBC # BLD: 16.9 K/UL (ref 3.5–11.3)

## 2019-05-18 PROCEDURE — 83735 ASSAY OF MAGNESIUM: CPT

## 2019-05-18 PROCEDURE — 97163 PT EVAL HIGH COMPLEX 45 MIN: CPT

## 2019-05-18 PROCEDURE — 97165 OT EVAL LOW COMPLEX 30 MIN: CPT

## 2019-05-18 PROCEDURE — 80048 BASIC METABOLIC PNL TOTAL CA: CPT

## 2019-05-18 PROCEDURE — 6360000002 HC RX W HCPCS: Performed by: STUDENT IN AN ORGANIZED HEALTH CARE EDUCATION/TRAINING PROGRAM

## 2019-05-18 PROCEDURE — 2500000003 HC RX 250 WO HCPCS: Performed by: STUDENT IN AN ORGANIZED HEALTH CARE EDUCATION/TRAINING PROGRAM

## 2019-05-18 PROCEDURE — 97535 SELF CARE MNGMENT TRAINING: CPT

## 2019-05-18 PROCEDURE — 97530 THERAPEUTIC ACTIVITIES: CPT

## 2019-05-18 PROCEDURE — 97162 PT EVAL MOD COMPLEX 30 MIN: CPT

## 2019-05-18 PROCEDURE — 2060000000 HC ICU INTERMEDIATE R&B

## 2019-05-18 PROCEDURE — 2580000003 HC RX 258: Performed by: STUDENT IN AN ORGANIZED HEALTH CARE EDUCATION/TRAINING PROGRAM

## 2019-05-18 PROCEDURE — 6370000000 HC RX 637 (ALT 250 FOR IP): Performed by: STUDENT IN AN ORGANIZED HEALTH CARE EDUCATION/TRAINING PROGRAM

## 2019-05-18 PROCEDURE — 85027 COMPLETE CBC AUTOMATED: CPT

## 2019-05-18 PROCEDURE — 36415 COLL VENOUS BLD VENIPUNCTURE: CPT

## 2019-05-18 RX ORDER — DEXTROSE, SODIUM CHLORIDE, AND POTASSIUM CHLORIDE 5; .45; .15 G/100ML; G/100ML; G/100ML
INJECTION INTRAVENOUS CONTINUOUS
Status: DISCONTINUED | OUTPATIENT
Start: 2019-05-18 | End: 2019-05-21

## 2019-05-18 RX ORDER — LOSARTAN POTASSIUM 50 MG/1
50 TABLET ORAL DAILY
Status: DISCONTINUED | OUTPATIENT
Start: 2019-05-18 | End: 2019-05-22 | Stop reason: HOSPADM

## 2019-05-18 RX ADMIN — POTASSIUM CHLORIDE, DEXTROSE MONOHYDRATE AND SODIUM CHLORIDE: 150; 5; 450 INJECTION, SOLUTION INTRAVENOUS at 18:58

## 2019-05-18 RX ADMIN — ONDANSETRON 4 MG: 2 INJECTION INTRAMUSCULAR; INTRAVENOUS at 15:46

## 2019-05-18 RX ADMIN — SODIUM CHLORIDE, PRESERVATIVE FREE 10 ML: 5 INJECTION INTRAVENOUS at 08:52

## 2019-05-18 RX ADMIN — SODIUM CHLORIDE, PRESERVATIVE FREE 10 ML: 5 INJECTION INTRAVENOUS at 20:58

## 2019-05-18 RX ADMIN — TAZOBACTAM SODIUM AND PIPERACILLIN SODIUM 3.38 G: 375; 3 INJECTION, SOLUTION INTRAVENOUS at 04:15

## 2019-05-18 RX ADMIN — TAZOBACTAM SODIUM AND PIPERACILLIN SODIUM 3.38 G: 375; 3 INJECTION, SOLUTION INTRAVENOUS at 11:20

## 2019-05-18 RX ADMIN — FAMOTIDINE 20 MG: 10 INJECTION, SOLUTION INTRAVENOUS at 20:58

## 2019-05-18 RX ADMIN — OXYCODONE AND ACETAMINOPHEN 2 TABLET: 5; 325 TABLET ORAL at 04:54

## 2019-05-18 RX ADMIN — LOSARTAN POTASSIUM 50 MG: 50 TABLET, FILM COATED ORAL at 08:47

## 2019-05-18 RX ADMIN — FAMOTIDINE 20 MG: 10 INJECTION, SOLUTION INTRAVENOUS at 08:46

## 2019-05-18 RX ADMIN — OXYCODONE AND ACETAMINOPHEN 2 TABLET: 5; 325 TABLET ORAL at 14:07

## 2019-05-18 RX ADMIN — MORPHINE SULFATE 4 MG: 4 INJECTION INTRAVENOUS at 15:46

## 2019-05-18 RX ADMIN — POTASSIUM CHLORIDE, DEXTROSE MONOHYDRATE AND SODIUM CHLORIDE: 150; 5; 450 INJECTION, SOLUTION INTRAVENOUS at 08:46

## 2019-05-18 RX ADMIN — ENOXAPARIN SODIUM 30 MG: 30 INJECTION SUBCUTANEOUS at 08:46

## 2019-05-18 RX ADMIN — TAZOBACTAM SODIUM AND PIPERACILLIN SODIUM 3.38 G: 375; 3 INJECTION, SOLUTION INTRAVENOUS at 18:58

## 2019-05-18 RX ADMIN — SODIUM CHLORIDE, POTASSIUM CHLORIDE, SODIUM LACTATE AND CALCIUM CHLORIDE: 600; 310; 30; 20 INJECTION, SOLUTION INTRAVENOUS at 00:58

## 2019-05-18 RX ADMIN — ENOXAPARIN SODIUM 30 MG: 30 INJECTION SUBCUTANEOUS at 20:57

## 2019-05-18 ASSESSMENT — PAIN DESCRIPTION - PAIN TYPE
TYPE: SURGICAL PAIN
TYPE: SURGICAL PAIN
TYPE: ACUTE PAIN;SURGICAL PAIN
TYPE: ACUTE PAIN;SURGICAL PAIN

## 2019-05-18 ASSESSMENT — PAIN SCALES - GENERAL
PAINLEVEL_OUTOF10: 8
PAINLEVEL_OUTOF10: 8
PAINLEVEL_OUTOF10: 10
PAINLEVEL_OUTOF10: 3
PAINLEVEL_OUTOF10: 3
PAINLEVEL_OUTOF10: 2

## 2019-05-18 ASSESSMENT — PAIN DESCRIPTION - DESCRIPTORS
DESCRIPTORS: ACHING
DESCRIPTORS: ACHING;BURNING

## 2019-05-18 ASSESSMENT — PAIN DESCRIPTION - PROGRESSION: CLINICAL_PROGRESSION: GRADUALLY IMPROVING

## 2019-05-18 ASSESSMENT — PAIN DESCRIPTION - LOCATION
LOCATION: ABDOMEN

## 2019-05-18 ASSESSMENT — PAIN - FUNCTIONAL ASSESSMENT: PAIN_FUNCTIONAL_ASSESSMENT: PREVENTS OR INTERFERES SOME ACTIVE ACTIVITIES AND ADLS

## 2019-05-18 ASSESSMENT — PAIN DESCRIPTION - ORIENTATION: ORIENTATION: MID;UPPER;LEFT

## 2019-05-18 ASSESSMENT — PAIN DESCRIPTION - ONSET: ONSET: ON-GOING

## 2019-05-18 ASSESSMENT — PAIN DESCRIPTION - FREQUENCY
FREQUENCY: INTERMITTENT
FREQUENCY: CONTINUOUS

## 2019-05-19 LAB
ANION GAP SERPL CALCULATED.3IONS-SCNC: 8 MMOL/L (ref 9–17)
BUN BLDV-MCNC: 9 MG/DL (ref 8–23)
BUN/CREAT BLD: 16 (ref 9–20)
CALCIUM SERPL-MCNC: 8.6 MG/DL (ref 8.6–10.4)
CHLORIDE BLD-SCNC: 104 MMOL/L (ref 98–107)
CO2: 25 MMOL/L (ref 20–31)
CREAT SERPL-MCNC: 0.57 MG/DL (ref 0.5–0.9)
GFR AFRICAN AMERICAN: >60 ML/MIN
GFR NON-AFRICAN AMERICAN: >60 ML/MIN
GFR SERPL CREATININE-BSD FRML MDRD: ABNORMAL ML/MIN/{1.73_M2}
GFR SERPL CREATININE-BSD FRML MDRD: ABNORMAL ML/MIN/{1.73_M2}
GLUCOSE BLD-MCNC: 131 MG/DL (ref 70–99)
HCT VFR BLD CALC: 32.2 % (ref 36.3–47.1)
HEMOGLOBIN: 9.9 G/DL (ref 11.9–15.1)
MAGNESIUM: 1.9 MG/DL (ref 1.6–2.6)
MCH RBC QN AUTO: 29.9 PG (ref 25.2–33.5)
MCHC RBC AUTO-ENTMCNC: 30.7 G/DL (ref 28.4–34.8)
MCV RBC AUTO: 97.3 FL (ref 82.6–102.9)
NRBC AUTOMATED: ABNORMAL PER 100 WBC
PDW BLD-RTO: 14.3 % (ref 11.8–14.4)
PLATELET # BLD: 187 K/UL (ref 138–453)
PMV BLD AUTO: 10.5 FL (ref 8.1–13.5)
POTASSIUM SERPL-SCNC: 3.7 MMOL/L (ref 3.7–5.3)
RBC # BLD: 3.31 M/UL (ref 3.95–5.11)
SODIUM BLD-SCNC: 137 MMOL/L (ref 135–144)
WBC # BLD: 13.3 K/UL (ref 3.5–11.3)

## 2019-05-19 PROCEDURE — 2500000003 HC RX 250 WO HCPCS: Performed by: STUDENT IN AN ORGANIZED HEALTH CARE EDUCATION/TRAINING PROGRAM

## 2019-05-19 PROCEDURE — 6360000002 HC RX W HCPCS: Performed by: STUDENT IN AN ORGANIZED HEALTH CARE EDUCATION/TRAINING PROGRAM

## 2019-05-19 PROCEDURE — 97110 THERAPEUTIC EXERCISES: CPT

## 2019-05-19 PROCEDURE — 80048 BASIC METABOLIC PNL TOTAL CA: CPT

## 2019-05-19 PROCEDURE — 2580000003 HC RX 258: Performed by: STUDENT IN AN ORGANIZED HEALTH CARE EDUCATION/TRAINING PROGRAM

## 2019-05-19 PROCEDURE — 6370000000 HC RX 637 (ALT 250 FOR IP): Performed by: STUDENT IN AN ORGANIZED HEALTH CARE EDUCATION/TRAINING PROGRAM

## 2019-05-19 PROCEDURE — 83735 ASSAY OF MAGNESIUM: CPT

## 2019-05-19 PROCEDURE — 99232 SBSQ HOSP IP/OBS MODERATE 35: CPT | Performed by: INTERNAL MEDICINE

## 2019-05-19 PROCEDURE — 85027 COMPLETE CBC AUTOMATED: CPT

## 2019-05-19 PROCEDURE — 36415 COLL VENOUS BLD VENIPUNCTURE: CPT

## 2019-05-19 PROCEDURE — 2060000000 HC ICU INTERMEDIATE R&B

## 2019-05-19 RX ADMIN — TAZOBACTAM SODIUM AND PIPERACILLIN SODIUM 3.38 G: 375; 3 INJECTION, SOLUTION INTRAVENOUS at 03:32

## 2019-05-19 RX ADMIN — POTASSIUM CHLORIDE, DEXTROSE MONOHYDRATE AND SODIUM CHLORIDE: 150; 5; 450 INJECTION, SOLUTION INTRAVENOUS at 04:50

## 2019-05-19 RX ADMIN — MORPHINE SULFATE 2 MG: 2 INJECTION, SOLUTION INTRAMUSCULAR; INTRAVENOUS at 05:36

## 2019-05-19 RX ADMIN — MORPHINE SULFATE 4 MG: 4 INJECTION INTRAVENOUS at 22:18

## 2019-05-19 RX ADMIN — ENOXAPARIN SODIUM 30 MG: 30 INJECTION SUBCUTANEOUS at 19:56

## 2019-05-19 RX ADMIN — TAZOBACTAM SODIUM AND PIPERACILLIN SODIUM 3.38 G: 375; 3 INJECTION, SOLUTION INTRAVENOUS at 19:16

## 2019-05-19 RX ADMIN — MORPHINE SULFATE 4 MG: 4 INJECTION INTRAVENOUS at 13:56

## 2019-05-19 RX ADMIN — ENOXAPARIN SODIUM 30 MG: 30 INJECTION SUBCUTANEOUS at 10:02

## 2019-05-19 RX ADMIN — FAMOTIDINE 20 MG: 10 INJECTION, SOLUTION INTRAVENOUS at 10:01

## 2019-05-19 RX ADMIN — SODIUM CHLORIDE, PRESERVATIVE FREE 10 ML: 5 INJECTION INTRAVENOUS at 19:57

## 2019-05-19 RX ADMIN — LOSARTAN POTASSIUM 50 MG: 50 TABLET, FILM COATED ORAL at 10:01

## 2019-05-19 RX ADMIN — SODIUM CHLORIDE, PRESERVATIVE FREE 10 ML: 5 INJECTION INTRAVENOUS at 10:06

## 2019-05-19 RX ADMIN — FAMOTIDINE 20 MG: 10 INJECTION, SOLUTION INTRAVENOUS at 19:57

## 2019-05-19 RX ADMIN — TAZOBACTAM SODIUM AND PIPERACILLIN SODIUM 3.38 G: 375; 3 INJECTION, SOLUTION INTRAVENOUS at 12:01

## 2019-05-19 RX ADMIN — MORPHINE SULFATE 2 MG: 2 INJECTION, SOLUTION INTRAMUSCULAR; INTRAVENOUS at 10:02

## 2019-05-19 RX ADMIN — MORPHINE SULFATE 4 MG: 4 INJECTION INTRAVENOUS at 01:19

## 2019-05-19 ASSESSMENT — PAIN SCALES - GENERAL
PAINLEVEL_OUTOF10: 4
PAINLEVEL_OUTOF10: 7
PAINLEVEL_OUTOF10: 9
PAINLEVEL_OUTOF10: 6
PAINLEVEL_OUTOF10: 7
PAINLEVEL_OUTOF10: 9
PAINLEVEL_OUTOF10: 4
PAINLEVEL_OUTOF10: 5

## 2019-05-19 ASSESSMENT — PAIN DESCRIPTION - PROGRESSION: CLINICAL_PROGRESSION: GRADUALLY IMPROVING

## 2019-05-19 ASSESSMENT — PAIN DESCRIPTION - FREQUENCY: FREQUENCY: INTERMITTENT

## 2019-05-20 LAB
ANION GAP SERPL CALCULATED.3IONS-SCNC: 9 MMOL/L (ref 9–17)
BUN BLDV-MCNC: 5 MG/DL (ref 8–23)
BUN/CREAT BLD: 9 (ref 9–20)
CALCIUM SERPL-MCNC: 8.7 MG/DL (ref 8.6–10.4)
CHLORIDE BLD-SCNC: 103 MMOL/L (ref 98–107)
CO2: 26 MMOL/L (ref 20–31)
CREAT SERPL-MCNC: 0.54 MG/DL (ref 0.5–0.9)
GFR AFRICAN AMERICAN: >60 ML/MIN
GFR NON-AFRICAN AMERICAN: >60 ML/MIN
GFR SERPL CREATININE-BSD FRML MDRD: ABNORMAL ML/MIN/{1.73_M2}
GFR SERPL CREATININE-BSD FRML MDRD: ABNORMAL ML/MIN/{1.73_M2}
GLUCOSE BLD-MCNC: 120 MG/DL (ref 70–99)
HCT VFR BLD CALC: 30.8 % (ref 36.3–47.1)
HEMOGLOBIN: 9.6 G/DL (ref 11.9–15.1)
MAGNESIUM: 1.8 MG/DL (ref 1.6–2.6)
MCH RBC QN AUTO: 30.1 PG (ref 25.2–33.5)
MCHC RBC AUTO-ENTMCNC: 31.2 G/DL (ref 28.4–34.8)
MCV RBC AUTO: 96.6 FL (ref 82.6–102.9)
NRBC AUTOMATED: 0 PER 100 WBC
PDW BLD-RTO: 14.1 % (ref 11.8–14.4)
PLATELET # BLD: 207 K/UL (ref 138–453)
PMV BLD AUTO: 10.7 FL (ref 8.1–13.5)
POTASSIUM SERPL-SCNC: 3.5 MMOL/L (ref 3.7–5.3)
RBC # BLD: 3.19 M/UL (ref 3.95–5.11)
SODIUM BLD-SCNC: 138 MMOL/L (ref 135–144)
SURGICAL PATHOLOGY REPORT: NORMAL
WBC # BLD: 8.8 K/UL (ref 3.5–11.3)

## 2019-05-20 PROCEDURE — 6370000000 HC RX 637 (ALT 250 FOR IP): Performed by: SURGERY

## 2019-05-20 PROCEDURE — 97535 SELF CARE MNGMENT TRAINING: CPT

## 2019-05-20 PROCEDURE — 36415 COLL VENOUS BLD VENIPUNCTURE: CPT

## 2019-05-20 PROCEDURE — 80048 BASIC METABOLIC PNL TOTAL CA: CPT

## 2019-05-20 PROCEDURE — 6370000000 HC RX 637 (ALT 250 FOR IP): Performed by: STUDENT IN AN ORGANIZED HEALTH CARE EDUCATION/TRAINING PROGRAM

## 2019-05-20 PROCEDURE — 2060000000 HC ICU INTERMEDIATE R&B

## 2019-05-20 PROCEDURE — 6360000002 HC RX W HCPCS: Performed by: STUDENT IN AN ORGANIZED HEALTH CARE EDUCATION/TRAINING PROGRAM

## 2019-05-20 PROCEDURE — 83735 ASSAY OF MAGNESIUM: CPT

## 2019-05-20 PROCEDURE — 85027 COMPLETE CBC AUTOMATED: CPT

## 2019-05-20 PROCEDURE — 99212 OFFICE O/P EST SF 10 MIN: CPT

## 2019-05-20 PROCEDURE — 2500000003 HC RX 250 WO HCPCS: Performed by: SURGERY

## 2019-05-20 PROCEDURE — 2500000003 HC RX 250 WO HCPCS: Performed by: STUDENT IN AN ORGANIZED HEALTH CARE EDUCATION/TRAINING PROGRAM

## 2019-05-20 PROCEDURE — 97530 THERAPEUTIC ACTIVITIES: CPT

## 2019-05-20 PROCEDURE — 6370000000 HC RX 637 (ALT 250 FOR IP): Performed by: COLON & RECTAL SURGERY

## 2019-05-20 RX ORDER — ACETAMINOPHEN 325 MG/1
650 TABLET ORAL EVERY 4 HOURS PRN
Status: DISCONTINUED | OUTPATIENT
Start: 2019-05-20 | End: 2019-05-22 | Stop reason: HOSPADM

## 2019-05-20 RX ORDER — POTASSIUM CHLORIDE 20 MEQ/1
40 TABLET, EXTENDED RELEASE ORAL ONCE
Status: COMPLETED | OUTPATIENT
Start: 2019-05-20 | End: 2019-05-20

## 2019-05-20 RX ADMIN — MORPHINE SULFATE 4 MG: 4 INJECTION INTRAVENOUS at 08:53

## 2019-05-20 RX ADMIN — FAMOTIDINE 20 MG: 10 INJECTION, SOLUTION INTRAVENOUS at 21:48

## 2019-05-20 RX ADMIN — ACETAMINOPHEN 650 MG: 325 TABLET ORAL at 12:00

## 2019-05-20 RX ADMIN — MORPHINE SULFATE 4 MG: 4 INJECTION INTRAVENOUS at 21:48

## 2019-05-20 RX ADMIN — POTASSIUM CHLORIDE 40 MEQ: 20 TABLET, EXTENDED RELEASE ORAL at 08:53

## 2019-05-20 RX ADMIN — TAZOBACTAM SODIUM AND PIPERACILLIN SODIUM 3.38 G: 375; 3 INJECTION, SOLUTION INTRAVENOUS at 02:33

## 2019-05-20 RX ADMIN — FAMOTIDINE 20 MG: 10 INJECTION, SOLUTION INTRAVENOUS at 08:52

## 2019-05-20 RX ADMIN — ENOXAPARIN SODIUM 30 MG: 30 INJECTION SUBCUTANEOUS at 21:48

## 2019-05-20 RX ADMIN — LOSARTAN POTASSIUM 50 MG: 50 TABLET, FILM COATED ORAL at 08:53

## 2019-05-20 RX ADMIN — TAZOBACTAM SODIUM AND PIPERACILLIN SODIUM 3.38 G: 375; 3 INJECTION, SOLUTION INTRAVENOUS at 11:32

## 2019-05-20 RX ADMIN — ENOXAPARIN SODIUM 30 MG: 30 INJECTION SUBCUTANEOUS at 08:52

## 2019-05-20 RX ADMIN — TAZOBACTAM SODIUM AND PIPERACILLIN SODIUM 3.38 G: 375; 3 INJECTION, SOLUTION INTRAVENOUS at 17:35

## 2019-05-20 RX ADMIN — POTASSIUM CHLORIDE, DEXTROSE MONOHYDRATE AND SODIUM CHLORIDE: 150; 5; 450 INJECTION, SOLUTION INTRAVENOUS at 08:53

## 2019-05-20 RX ADMIN — POTASSIUM CHLORIDE, DEXTROSE MONOHYDRATE AND SODIUM CHLORIDE: 150; 5; 450 INJECTION, SOLUTION INTRAVENOUS at 02:34

## 2019-05-20 ASSESSMENT — PAIN DESCRIPTION - PROGRESSION

## 2019-05-20 ASSESSMENT — PAIN DESCRIPTION - DESCRIPTORS
DESCRIPTORS: ACHING;DISCOMFORT
DESCRIPTORS: ACHING;DISCOMFORT

## 2019-05-20 ASSESSMENT — PAIN DESCRIPTION - FREQUENCY
FREQUENCY: CONTINUOUS
FREQUENCY: CONTINUOUS

## 2019-05-20 ASSESSMENT — PAIN DESCRIPTION - PAIN TYPE
TYPE: SURGICAL PAIN
TYPE: SURGICAL PAIN

## 2019-05-20 ASSESSMENT — PAIN DESCRIPTION - LOCATION
LOCATION: ABDOMEN
LOCATION: ABDOMEN

## 2019-05-20 ASSESSMENT — PAIN SCALES - GENERAL
PAINLEVEL_OUTOF10: 6
PAINLEVEL_OUTOF10: 3
PAINLEVEL_OUTOF10: 7
PAINLEVEL_OUTOF10: 2
PAINLEVEL_OUTOF10: 7

## 2019-05-20 ASSESSMENT — PAIN DESCRIPTION - ONSET
ONSET: ON-GOING
ONSET: ON-GOING

## 2019-05-20 ASSESSMENT — PAIN - FUNCTIONAL ASSESSMENT
PAIN_FUNCTIONAL_ASSESSMENT: ACTIVITIES ARE NOT PREVENTED
PAIN_FUNCTIONAL_ASSESSMENT: ACTIVITIES ARE NOT PREVENTED

## 2019-05-20 ASSESSMENT — PAIN DESCRIPTION - ORIENTATION
ORIENTATION: MID
ORIENTATION: LOWER;MID

## 2019-05-21 LAB
ANION GAP SERPL CALCULATED.3IONS-SCNC: 10 MMOL/L (ref 9–17)
BUN BLDV-MCNC: 4 MG/DL (ref 8–23)
BUN/CREAT BLD: 8 (ref 9–20)
CALCIUM SERPL-MCNC: 9 MG/DL (ref 8.6–10.4)
CHLORIDE BLD-SCNC: 103 MMOL/L (ref 98–107)
CO2: 25 MMOL/L (ref 20–31)
CREAT SERPL-MCNC: 0.53 MG/DL (ref 0.5–0.9)
GFR AFRICAN AMERICAN: >60 ML/MIN
GFR NON-AFRICAN AMERICAN: >60 ML/MIN
GFR SERPL CREATININE-BSD FRML MDRD: ABNORMAL ML/MIN/{1.73_M2}
GFR SERPL CREATININE-BSD FRML MDRD: ABNORMAL ML/MIN/{1.73_M2}
GLUCOSE BLD-MCNC: 109 MG/DL (ref 70–99)
HCT VFR BLD CALC: 31.9 % (ref 36.3–47.1)
HEMOGLOBIN: 10.1 G/DL (ref 11.9–15.1)
MCH RBC QN AUTO: 29.9 PG (ref 25.2–33.5)
MCHC RBC AUTO-ENTMCNC: 31.7 G/DL (ref 28.4–34.8)
MCV RBC AUTO: 94.4 FL (ref 82.6–102.9)
NRBC AUTOMATED: 0 PER 100 WBC
PDW BLD-RTO: 14 % (ref 11.8–14.4)
PLATELET # BLD: 262 K/UL (ref 138–453)
PMV BLD AUTO: 10 FL (ref 8.1–13.5)
POTASSIUM SERPL-SCNC: 3.9 MMOL/L (ref 3.7–5.3)
RBC # BLD: 3.38 M/UL (ref 3.95–5.11)
SODIUM BLD-SCNC: 138 MMOL/L (ref 135–144)
WBC # BLD: 8.1 K/UL (ref 3.5–11.3)

## 2019-05-21 PROCEDURE — 2500000003 HC RX 250 WO HCPCS: Performed by: STUDENT IN AN ORGANIZED HEALTH CARE EDUCATION/TRAINING PROGRAM

## 2019-05-21 PROCEDURE — 2060000000 HC ICU INTERMEDIATE R&B

## 2019-05-21 PROCEDURE — 6370000000 HC RX 637 (ALT 250 FOR IP): Performed by: COLON & RECTAL SURGERY

## 2019-05-21 PROCEDURE — 2500000003 HC RX 250 WO HCPCS: Performed by: SURGERY

## 2019-05-21 PROCEDURE — 6370000000 HC RX 637 (ALT 250 FOR IP): Performed by: STUDENT IN AN ORGANIZED HEALTH CARE EDUCATION/TRAINING PROGRAM

## 2019-05-21 PROCEDURE — 6360000002 HC RX W HCPCS: Performed by: STUDENT IN AN ORGANIZED HEALTH CARE EDUCATION/TRAINING PROGRAM

## 2019-05-21 PROCEDURE — 97535 SELF CARE MNGMENT TRAINING: CPT

## 2019-05-21 PROCEDURE — 99212 OFFICE O/P EST SF 10 MIN: CPT

## 2019-05-21 PROCEDURE — 85027 COMPLETE CBC AUTOMATED: CPT

## 2019-05-21 PROCEDURE — 36415 COLL VENOUS BLD VENIPUNCTURE: CPT

## 2019-05-21 PROCEDURE — 80048 BASIC METABOLIC PNL TOTAL CA: CPT

## 2019-05-21 PROCEDURE — 6360000002 HC RX W HCPCS: Performed by: SURGERY

## 2019-05-21 PROCEDURE — 99232 SBSQ HOSP IP/OBS MODERATE 35: CPT | Performed by: INTERNAL MEDICINE

## 2019-05-21 RX ORDER — OXYCODONE HYDROCHLORIDE AND ACETAMINOPHEN 5; 325 MG/1; MG/1
1 TABLET ORAL EVERY 4 HOURS PRN
Status: DISCONTINUED | OUTPATIENT
Start: 2019-05-21 | End: 2019-05-22 | Stop reason: HOSPADM

## 2019-05-21 RX ORDER — OXYCODONE HYDROCHLORIDE AND ACETAMINOPHEN 5; 325 MG/1; MG/1
2 TABLET ORAL EVERY 4 HOURS PRN
Status: DISCONTINUED | OUTPATIENT
Start: 2019-05-21 | End: 2019-05-22 | Stop reason: HOSPADM

## 2019-05-21 RX ADMIN — ACETAMINOPHEN 650 MG: 325 TABLET ORAL at 11:03

## 2019-05-21 RX ADMIN — POTASSIUM CHLORIDE, DEXTROSE MONOHYDRATE AND SODIUM CHLORIDE: 150; 5; 450 INJECTION, SOLUTION INTRAVENOUS at 04:55

## 2019-05-21 RX ADMIN — ACETAMINOPHEN 650 MG: 325 TABLET ORAL at 04:14

## 2019-05-21 RX ADMIN — LOSARTAN POTASSIUM 50 MG: 50 TABLET, FILM COATED ORAL at 08:59

## 2019-05-21 RX ADMIN — TAZOBACTAM SODIUM AND PIPERACILLIN SODIUM 3.38 G: 375; 3 INJECTION, SOLUTION INTRAVENOUS at 03:24

## 2019-05-21 RX ADMIN — MORPHINE SULFATE 2 MG: 2 INJECTION, SOLUTION INTRAMUSCULAR; INTRAVENOUS at 13:22

## 2019-05-21 RX ADMIN — ENOXAPARIN SODIUM 40 MG: 40 INJECTION SUBCUTANEOUS at 08:59

## 2019-05-21 RX ADMIN — ONDANSETRON 4 MG: 2 INJECTION INTRAMUSCULAR; INTRAVENOUS at 13:56

## 2019-05-21 ASSESSMENT — PAIN DESCRIPTION - PROGRESSION
CLINICAL_PROGRESSION: NOT CHANGED

## 2019-05-21 ASSESSMENT — PAIN SCALES - GENERAL
PAINLEVEL_OUTOF10: 6
PAINLEVEL_OUTOF10: 4
PAINLEVEL_OUTOF10: 1
PAINLEVEL_OUTOF10: 6

## 2019-05-22 VITALS
OXYGEN SATURATION: 96 % | WEIGHT: 239 LBS | RESPIRATION RATE: 20 BRPM | SYSTOLIC BLOOD PRESSURE: 128 MMHG | TEMPERATURE: 98.1 F | HEART RATE: 79 BPM | BODY MASS INDEX: 37.51 KG/M2 | DIASTOLIC BLOOD PRESSURE: 73 MMHG | HEIGHT: 67 IN

## 2019-05-22 LAB
HCT VFR BLD CALC: 30.6 % (ref 36.3–47.1)
HEMOGLOBIN: 9.7 G/DL (ref 11.9–15.1)
MCH RBC QN AUTO: 30.1 PG (ref 25.2–33.5)
MCHC RBC AUTO-ENTMCNC: 31.7 G/DL (ref 28.4–34.8)
MCV RBC AUTO: 95 FL (ref 82.6–102.9)
NRBC AUTOMATED: 0 PER 100 WBC
PDW BLD-RTO: 14 % (ref 11.8–14.4)
PLATELET # BLD: 264 K/UL (ref 138–453)
PMV BLD AUTO: 10 FL (ref 8.1–13.5)
RBC # BLD: 3.22 M/UL (ref 3.95–5.11)
WBC # BLD: 8.4 K/UL (ref 3.5–11.3)

## 2019-05-22 PROCEDURE — 6370000000 HC RX 637 (ALT 250 FOR IP): Performed by: COLON & RECTAL SURGERY

## 2019-05-22 PROCEDURE — 6370000000 HC RX 637 (ALT 250 FOR IP): Performed by: STUDENT IN AN ORGANIZED HEALTH CARE EDUCATION/TRAINING PROGRAM

## 2019-05-22 PROCEDURE — 85027 COMPLETE CBC AUTOMATED: CPT

## 2019-05-22 PROCEDURE — 6370000000 HC RX 637 (ALT 250 FOR IP): Performed by: SURGERY

## 2019-05-22 PROCEDURE — 2580000003 HC RX 258: Performed by: STUDENT IN AN ORGANIZED HEALTH CARE EDUCATION/TRAINING PROGRAM

## 2019-05-22 PROCEDURE — 36415 COLL VENOUS BLD VENIPUNCTURE: CPT

## 2019-05-22 RX ADMIN — OXYCODONE AND ACETAMINOPHEN 1 TABLET: 5; 325 TABLET ORAL at 06:42

## 2019-05-22 RX ADMIN — OXYCODONE AND ACETAMINOPHEN 2 TABLET: 5; 325 TABLET ORAL at 11:31

## 2019-05-22 RX ADMIN — ACETAMINOPHEN 650 MG: 325 TABLET ORAL at 00:36

## 2019-05-22 RX ADMIN — SODIUM CHLORIDE, PRESERVATIVE FREE 10 ML: 5 INJECTION INTRAVENOUS at 09:00

## 2019-05-22 RX ADMIN — LOSARTAN POTASSIUM 50 MG: 50 TABLET, FILM COATED ORAL at 10:27

## 2019-05-22 RX ADMIN — SODIUM CHLORIDE, PRESERVATIVE FREE 10 ML: 5 INJECTION INTRAVENOUS at 03:14

## 2019-05-22 ASSESSMENT — PAIN SCALES - GENERAL
PAINLEVEL_OUTOF10: 8
PAINLEVEL_OUTOF10: 6
PAINLEVEL_OUTOF10: 7

## 2019-05-23 ENCOUNTER — TELEPHONE (OUTPATIENT)
Dept: INTERNAL MEDICINE CLINIC | Age: 69
End: 2019-05-23

## 2019-05-23 NOTE — TELEPHONE ENCOUNTER
[facility] Transitions Initial Follow Up Call    Outreach made within 2 business days of discharge: Yes    Patient: Vinita Fontenot Patient : 1950   MRN: [id number]  Reason for Admission: There are no discharge diagnoses documented for the most recent discharge. Discharge Date: 19       Spoke with: Gio    Discharge department/facility: [facility]    TCM Interactive Patient Contact:      Scheduled appointment with PCP within 7-14 days    Follow Up  No future appointments.     Madeleine Bazzi RN

## 2019-05-23 NOTE — TELEPHONE ENCOUNTER
[place] Mendoza calling from [facility] [phone] ext [phone]  Asking if you will  follow for home care (nursing HHA)

## 2019-05-24 ENCOUNTER — TELEPHONE (OUTPATIENT)
Dept: INTERNAL MEDICINE CLINIC | Age: 69
End: 2019-05-24

## 2019-06-18 ENCOUNTER — HOSPITAL ENCOUNTER (OUTPATIENT)
Dept: WOUND CARE | Age: 69
Discharge: HOME | End: 2019-06-18
Payer: MEDICARE

## 2019-06-18 PROCEDURE — 99211 OFF/OP EST MAY X REQ PHY/QHP: CPT

## 2019-06-18 NOTE — PROGRESS NOTES
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  Path report consistent with recurrent diverticulitis on 5/16.19 with Dr Valencia Seip. Called for an appointment regarding difficulty with measuring stoma correctly and some peristomal skin irritation. 06/18/19 1300   Colostomy LLQ   Placement Date/Time: 05/16/19 5509   Location: LLQ   Stomal Appliance 1 piece; Changed   Flange Size (inches) 2.5 Inches   Stoma  Assessment Pink;Moist;Protrudes   Mucocutaneous Junction Intact   Peristomal Assessment Maceration;Denuded  (2-4 o'clock. )   Treatment Bag change;Site care;Stoma powder;Liquid skin barrier   Stool Appearance Formed; Soft   Stool Color Brown   Stool Amount Small     Patient is utilizing the [employer] Inspector #[id number] 1 piece, cut to fit, drainable pouching system as used in hospital post op. She is satisfied with the system. Patient has been cutting pouching system at 1 1/2\" circular. Stoma measures today 1 1/2\" x 2\" oval.  Stitch granuloma at 11 o'clock. Scant bloody drainage noted with care. There is an area of blanchable erythema on the left lateral skin extending laterally approximately 5cm x 8cm. No pain, no induration. Maceration and denuded skin noted at mucocutaneous junction 2-7 o'clock. Plan:   Cut barrier to fit stoma with no more than 1/8\" of exposed skin- template and oval measuring guide provided. Will ask Oscar to send additional oval measuring guides to her home. Sprinkle stoma powder onto the denuded skin, brush away excess, seal in with no-sting liquid skin prep. Recommend using [employer] SurePrep No sting liquid skin barrier rather than the Oscar gel type of skin barrier- sample provided and she sates her home health RN can order more. Stoma powder sample provided. As always:   Empty the pouch when 1/3 to 1/2 full.   Change the pouching system twice weekly and

## 2019-07-25 ENCOUNTER — HOSPITAL ENCOUNTER (OUTPATIENT)
Age: 69
Setting detail: SPECIMEN
Discharge: HOME | End: 2019-07-25
Payer: MEDICARE

## 2019-07-25 ENCOUNTER — HOSPITAL ENCOUNTER (OUTPATIENT)
Dept: GENERAL RADIOLOGY | Age: 69
Discharge: HOME | End: 2019-07-27
Payer: MEDICARE

## 2019-07-25 ENCOUNTER — HOSPITAL ENCOUNTER (OUTPATIENT)
Age: 69
Discharge: HOME | End: 2019-07-27
Payer: MEDICARE

## 2019-07-25 ENCOUNTER — OFFICE VISIT (OUTPATIENT)
Dept: INTERNAL MEDICINE CLINIC | Age: 69
End: 2019-07-25
Payer: MEDICARE

## 2019-07-25 VITALS
SYSTOLIC BLOOD PRESSURE: 108 MMHG | HEIGHT: 67 IN | BODY MASS INDEX: 36.73 KG/M2 | HEART RATE: 64 BPM | WEIGHT: 234 LBS | TEMPERATURE: 98.2 F | DIASTOLIC BLOOD PRESSURE: 60 MMHG | RESPIRATION RATE: 16 BRPM

## 2019-07-25 DIAGNOSIS — G89.29 CHRONIC RIGHT-SIDED LOW BACK PAIN WITHOUT SCIATICA: ICD-10-CM

## 2019-07-25 DIAGNOSIS — Z93.3 COLOSTOMY PRESENT (HCC): ICD-10-CM

## 2019-07-25 DIAGNOSIS — Z90.49 STATUS POST COLECTOMY: ICD-10-CM

## 2019-07-25 DIAGNOSIS — I10 ESSENTIAL HYPERTENSION: ICD-10-CM

## 2019-07-25 DIAGNOSIS — I10 ESSENTIAL HYPERTENSION: Primary | ICD-10-CM

## 2019-07-25 DIAGNOSIS — M54.50 CHRONIC RIGHT-SIDED LOW BACK PAIN WITHOUT SCIATICA: ICD-10-CM

## 2019-07-25 DIAGNOSIS — N39.0 URINARY TRACT INFECTION WITHOUT HEMATURIA, SITE UNSPECIFIED: ICD-10-CM

## 2019-07-25 LAB
BILIRUBIN, POC: ABNORMAL
BLOOD URINE, POC: ABNORMAL
CLARITY, POC: CLEAR
COLOR, POC: ABNORMAL
GLUCOSE URINE, POC: ABNORMAL
KETONES, POC: ABNORMAL
LEUKOCYTE EST, POC: ABNORMAL
NITRITE, POC: ABNORMAL
PH, POC: 6
PROTEIN, POC: ABNORMAL
SPECIFIC GRAVITY, POC: 1.02
UROBILINOGEN, POC: 0.2

## 2019-07-25 PROCEDURE — 1123F ACP DISCUSS/DSCN MKR DOCD: CPT | Performed by: INTERNAL MEDICINE

## 2019-07-25 PROCEDURE — 99214 OFFICE O/P EST MOD 30 MIN: CPT | Performed by: INTERNAL MEDICINE

## 2019-07-25 PROCEDURE — 81003 URINALYSIS AUTO W/O SCOPE: CPT | Performed by: INTERNAL MEDICINE

## 2019-07-25 PROCEDURE — G8427 DOCREV CUR MEDS BY ELIG CLIN: HCPCS | Performed by: INTERNAL MEDICINE

## 2019-07-25 PROCEDURE — 3017F COLORECTAL CA SCREEN DOC REV: CPT | Performed by: INTERNAL MEDICINE

## 2019-07-25 PROCEDURE — 72100 X-RAY EXAM L-S SPINE 2/3 VWS: CPT

## 2019-07-25 PROCEDURE — 1090F PRES/ABSN URINE INCON ASSESS: CPT | Performed by: INTERNAL MEDICINE

## 2019-07-25 PROCEDURE — G8400 PT W/DXA NO RESULTS DOC: HCPCS | Performed by: INTERNAL MEDICINE

## 2019-07-25 PROCEDURE — G8417 CALC BMI ABV UP PARAM F/U: HCPCS | Performed by: INTERNAL MEDICINE

## 2019-07-25 PROCEDURE — 1036F TOBACCO NON-USER: CPT | Performed by: INTERNAL MEDICINE

## 2019-07-25 PROCEDURE — 4040F PNEUMOC VAC/ADMIN/RCVD: CPT | Performed by: INTERNAL MEDICINE

## 2019-07-25 RX ORDER — LOSARTAN POTASSIUM 50 MG/1
50 TABLET ORAL DAILY
Qty: 90 TABLET | Refills: 1 | Status: ON HOLD | OUTPATIENT
Start: 2019-07-25 | End: 2019-12-09 | Stop reason: HOSPADM

## 2019-07-25 RX ORDER — CEPHALEXIN 500 MG/1
500 CAPSULE ORAL 3 TIMES DAILY
Qty: 21 CAPSULE | Refills: 0 | Status: SHIPPED | OUTPATIENT
Start: 2019-07-25 | End: 2019-08-01

## 2019-07-25 RX ORDER — PREDNISONE 10 MG/1
10 TABLET ORAL
Qty: 15 TABLET | Refills: 0 | Status: SHIPPED | OUTPATIENT
Start: 2019-07-25 | End: 2019-07-30

## 2019-07-25 NOTE — PROGRESS NOTES
Visit Information    Have you changed or started any medications since your last visit including any over-the-counter medicines, vitamins, or herbal medicines? no   Are you having any side effects from any of your medications? -  no  Have you stopped taking any of your medications? Is so, why? -  no    Have you seen any other physician or provider since your last visit? Yes - Records Obtained  Have you had any other diagnostic tests since your last visit? Yes - Records Obtained  Have you been seen in the emergency room and/or had an admission to a hospital since we last saw you? Yes - Records Obtained  Have you had your routine dental cleaning in the past 6 months? yes -     Have you activated your [employer] account? If not, what are your barriers?  Yes     Patient Care Team:  Robin Reid MD as PCP - General (Internal Medicine)  Robin Reid MD as PCP - [facility]    Medical History Review  Past Medical, Family, and Social History reviewed and does contribute to the patient presenting condition    Health Maintenance   Topic Date Due    Hepatitis C screen  1950    DTaP/Tdap/Td vaccine (1 - Tdap) 05/14/1971    Shingles Vaccine (1 of 2) 05/14/2002    Annual Wellness Visit (AWV)  05/14/2015    DEXA (modify frequency per FRAX score)  05/14/2017    Pneumococcal 65+ years Vaccine (1 of 2 - PCV13) 05/14/2017    Flu vaccine (1) 09/01/2019    Potassium monitoring  05/21/2020    Creatinine monitoring  05/21/2020    Breast cancer screen  01/24/2021    Diabetes screen  05/17/2022    Lipid screen  02/08/2024
pain, no congestion or sinus pressure   Eyes                : No visual disturbance or pain   Cardiovascular: No chest pain or palpitations or leg swelling   Respiratory      : Negative for cough, shortness of breath or wheezing   Gastrointestinal: Negative for abdominal pain, constipation or diarrhea and bloating No nausea or vomiting   Genitourinary:     No urgency or frequency, no burning or hematuria   Musculoskeletal: Positive for arthralgias, back pain or myalgias   Skin                  : Negative for rash or erythema   Neurological    : Negative for dizziness, weakness, tremors ,light headedness or syncope   Psychiatric       : Negative for dysphoric mood, sleep disturbances, nervous or anxious, or decreased concentration   All other review of systems was negative    Objective  Physical Examination:    Nursing note reviewed    /60 (Site: Right Upper Arm, Position: Sitting, Cuff Size: Large Adult)   Pulse 64   Temp 98.2 °F (36.8 °C) (Oral)   Resp 16   Ht 5' 7\" (1.702 m)   Wt 234 lb (106.1 kg)   LMP  (LMP Unknown)   BMI 36.65 kg/m²   BP Readings from Last 3 Encounters:   07/25/19 108/60   05/22/19 128/73   05/16/19 (!) 105/53         Constitutional:  Dinah Brian is oriented to place, person and time ,appears well-developed and well-nourished  HEENT:  Atraumatic and normocephalic, external ears normal bilaterally, nose normal no oropharyngeal exudate and is clear and moist  Eyes:  EOCM normal; conjunctivae normal; PERRLA bilaterally  Neck:  Normal range of motion, neck supple, no JVD and no thyromegaly  Cardiovascular:  RRR, normal heart sounds and intact distal pulses  Pulmonary:  effort normal and breath sounds normal bilaterally,no wheezes or rales, no respiratory distress  Abdominal:  Soft, non-tender; normal bowel sounds, no masses  Musculoskeletal:  Normal range of motion and no edema or tenderness bilaterally  No lymphadenopathy  Neurological:  alert, oriented, and normal reflexes

## 2019-07-27 LAB
CULTURE: ABNORMAL
Lab: ABNORMAL
SPECIMEN DESCRIPTION: ABNORMAL

## 2019-07-30 ENCOUNTER — TELEPHONE (OUTPATIENT)
Dept: INTERNAL MEDICINE CLINIC | Age: 69
End: 2019-07-30

## 2019-07-30 DIAGNOSIS — N39.0 URINARY TRACT INFECTION WITHOUT HEMATURIA, SITE UNSPECIFIED: Primary | ICD-10-CM

## 2019-08-07 ENCOUNTER — HOSPITAL ENCOUNTER (OUTPATIENT)
Age: 69
Discharge: HOME | End: 2019-08-07
Payer: MEDICARE

## 2019-08-07 DIAGNOSIS — N39.0 URINARY TRACT INFECTION WITHOUT HEMATURIA, SITE UNSPECIFIED: ICD-10-CM

## 2019-08-07 PROCEDURE — 87086 URINE CULTURE/COLONY COUNT: CPT

## 2019-08-08 LAB
CULTURE: NORMAL
Lab: NORMAL
SPECIMEN DESCRIPTION: NORMAL

## 2019-08-14 ENCOUNTER — TELEPHONE (OUTPATIENT)
Dept: INTERNAL MEDICINE CLINIC | Age: 69
End: 2019-08-14

## 2019-08-14 RX ORDER — CEPHALEXIN 500 MG/1
500 CAPSULE ORAL 3 TIMES DAILY
Qty: 21 CAPSULE | Refills: 0 | Status: SHIPPED | OUTPATIENT
Start: 2019-08-14 | End: 2019-08-21

## 2019-09-25 ENCOUNTER — HOSPITAL ENCOUNTER (OUTPATIENT)
Dept: PREADMISSION TESTING | Age: 69
Discharge: HOME | End: 2019-09-29
Payer: MEDICARE

## 2019-09-25 VITALS
HEIGHT: 67 IN | SYSTOLIC BLOOD PRESSURE: 146 MMHG | WEIGHT: 234.57 LBS | BODY MASS INDEX: 36.82 KG/M2 | OXYGEN SATURATION: 94 % | RESPIRATION RATE: 16 BRPM | DIASTOLIC BLOOD PRESSURE: 80 MMHG | HEART RATE: 74 BPM

## 2019-09-25 LAB
ABSOLUTE EOS #: 0.28 K/UL (ref 0–0.44)
ABSOLUTE IMMATURE GRANULOCYTE: 0.01 K/UL (ref 0–0.3)
ABSOLUTE LYMPH #: 1.63 K/UL (ref 1.1–3.7)
ABSOLUTE MONO #: 0.51 K/UL (ref 0.1–1.2)
ANION GAP SERPL CALCULATED.3IONS-SCNC: 9 MMOL/L (ref 9–17)
BASOPHILS # BLD: 1 % (ref 0–2)
BASOPHILS ABSOLUTE: 0.04 K/UL (ref 0–0.2)
BUN BLDV-MCNC: 14 MG/DL (ref 8–23)
CHLORIDE BLD-SCNC: 103 MMOL/L (ref 98–107)
CO2: 27 MMOL/L (ref 20–31)
CREAT SERPL-MCNC: 0.7 MG/DL (ref 0.5–0.9)
DIFFERENTIAL TYPE: NORMAL
EOSINOPHILS RELATIVE PERCENT: 4 % (ref 1–4)
GFR AFRICAN AMERICAN: >60 ML/MIN
GFR NON-AFRICAN AMERICAN: >60 ML/MIN
GFR SERPL CREATININE-BSD FRML MDRD: NORMAL ML/MIN/{1.73_M2}
GFR SERPL CREATININE-BSD FRML MDRD: NORMAL ML/MIN/{1.73_M2}
HCT VFR BLD CALC: 41 % (ref 36.3–47.1)
HEMOGLOBIN: 12.7 G/DL (ref 11.9–15.1)
IMMATURE GRANULOCYTES: 0 %
LYMPHOCYTES # BLD: 24 % (ref 24–43)
MCH RBC QN AUTO: 28.5 PG (ref 25.2–33.5)
MCHC RBC AUTO-ENTMCNC: 31 G/DL (ref 28.4–34.8)
MCV RBC AUTO: 92.1 FL (ref 82.6–102.9)
MONOCYTES # BLD: 8 % (ref 3–12)
NRBC AUTOMATED: 0 PER 100 WBC
PDW BLD-RTO: 14.3 % (ref 11.8–14.4)
PLATELET # BLD: 260 K/UL (ref 138–453)
PLATELET ESTIMATE: NORMAL
PMV BLD AUTO: 10.3 FL (ref 8.1–13.5)
POTASSIUM SERPL-SCNC: 4.2 MMOL/L (ref 3.7–5.3)
RBC # BLD: 4.45 M/UL (ref 3.95–5.11)
RBC # BLD: NORMAL 10*6/UL
SEG NEUTROPHILS: 63 % (ref 36–65)
SEGMENTED NEUTROPHILS ABSOLUTE COUNT: 4.26 K/UL (ref 1.5–8.1)
SODIUM BLD-SCNC: 139 MMOL/L (ref 135–144)
WBC # BLD: 6.7 K/UL (ref 3.5–11.3)
WBC # BLD: NORMAL 10*3/UL

## 2019-09-25 PROCEDURE — 80051 ELECTROLYTE PANEL: CPT

## 2019-09-25 PROCEDURE — 85025 COMPLETE CBC W/AUTO DIFF WBC: CPT

## 2019-09-25 PROCEDURE — 82565 ASSAY OF CREATININE: CPT

## 2019-09-25 PROCEDURE — 36415 COLL VENOUS BLD VENIPUNCTURE: CPT

## 2019-09-25 PROCEDURE — 84520 ASSAY OF UREA NITROGEN: CPT

## 2019-09-25 NOTE — PRE-PROCEDURE INSTRUCTIONS
hospital.     If your child is having surgery please make arrangements for any other children to be cared for at home on the day of surgery. Other children are not permitted in recovery room and we want you to be able to spend time with the patient. If other arrangements are not available then we suggest that you have a second adult to stay in the waiting room. Day of Surgery/Procedure:    As a patient at [facility] [address] you can expect quality medical and nursing care that is centered on your individual needs. Our goal is to make your surgical experience as comfortable as possible    . Transportation After Your Surgery/Procedure: You will need a friend or family member to drive you home after your procedure. Your  must be 25years of age or older and able to sign off on your discharge instructions. A taxi cab or any other form of public transportation is not acceptable. Your friend or family member must stay at the hospital throughout your procedure. Someone must remain with you for the first 24 hours after your surgery if you receive anesthesia or medication. If you do not have someone to stay with you, your procedure may be cancelled.       If you have any other questions regarding your procedure or the day of surgery, please call [phone]      _________________________  ____________________________  Signature (Patient)              Signature (Provider) & date

## 2019-09-25 NOTE — H&P
mouth every 4 hours as needed for Pain 3/26/18   Agus Saeed P Blood, DO        Allergies:     Patient has no known allergies. Social History:     Tobacco:    reports that she quit smoking about 44 years ago. She has a 3.00 pack-year smoking history. She has never used smokeless tobacco.  Alcohol:      reports that she drinks alcohol. Drug Use:  reports that she does not use drugs. Functional Capacity:   1) Pt is able to walk 2 city blocks or more on level ground, but she would be \"a little out of breath\". 2) Pt is able to climb 2 flights of stairs without SOB. 3) Pt is not able to walk up a hill for 1-2 city blocks without SOB. Family History:     Family History   Problem Relation Age of Onset    Heart Disease Mother     COPD Father     Cancer Brother         thyroid, prostate, lung       Review of Systems:     Positive and Negative as described in HPI. CONSTITUTIONAL: Negative for fevers, chills, sweats, fatigue, and weight loss. HEENT: Pt wears glasses. Hearing loss in the right ear. Nasal congestion. Negative for rhinorrhea and throat pain. RESPIRATORY: Productive cough since the beginning of 09/2019, but the cough is improving. Negative for shortness of breath, congestion, and wheezing. CARDIOVASCULAR: Negative for chest pain, blood clot, irregular heartbeat, and palpitations. GASTROINTESTINAL: See HPI. GENITOURINARY: Urgency. Negative for difficulty of urination, burning with urination, and frequency. INTEGUMENT: Negative for rash, skin lesions, and easy bruising. HEMATOLOGIC/LYMPHATIC: Negative for swelling/edema. ALLERGIC/IMMUNOLOGIC: Negative for urticaria and itching. ENDOCRINE: Negative for increase in thirst, increase in urination, and heat or cold intolerance. MUSCULOSKELETAL: Low back pain. NEUROLOGICAL: Negative for seizures, strokes, headaches, dizziness, lightheadedness, numbness, and tingling extremities.   BEHAVIOR/PSYCH: Negative for depression and

## 2019-10-03 ENCOUNTER — ANESTHESIA EVENT (OUTPATIENT)
Dept: OPERATING ROOM | Age: 69
End: 2019-10-03
Payer: MEDICARE

## 2019-10-04 ENCOUNTER — HOSPITAL ENCOUNTER (OUTPATIENT)
Age: 69
Setting detail: OUTPATIENT SURGERY
Discharge: HOME | End: 2019-10-04
Attending: COLON & RECTAL SURGERY | Admitting: COLON & RECTAL SURGERY
Payer: MEDICARE

## 2019-10-04 ENCOUNTER — ANESTHESIA (OUTPATIENT)
Dept: OPERATING ROOM | Age: 69
End: 2019-10-04
Payer: MEDICARE

## 2019-10-04 VITALS
OXYGEN SATURATION: 95 % | HEIGHT: 67 IN | SYSTOLIC BLOOD PRESSURE: 140 MMHG | RESPIRATION RATE: 14 BRPM | BODY MASS INDEX: 36.82 KG/M2 | HEART RATE: 70 BPM | TEMPERATURE: 97.3 F | DIASTOLIC BLOOD PRESSURE: 66 MMHG | WEIGHT: 234.57 LBS

## 2019-10-04 VITALS — DIASTOLIC BLOOD PRESSURE: 73 MMHG | OXYGEN SATURATION: 98 % | SYSTOLIC BLOOD PRESSURE: 133 MMHG | TEMPERATURE: 96.8 F

## 2019-10-04 PROCEDURE — 7100000001 HC PACU RECOVERY - ADDTL 15 MIN: Performed by: COLON & RECTAL SURGERY

## 2019-10-04 PROCEDURE — 3700000000 HC ANESTHESIA ATTENDED CARE: Performed by: COLON & RECTAL SURGERY

## 2019-10-04 PROCEDURE — 3600000002 HC SURGERY LEVEL 2 BASE: Performed by: COLON & RECTAL SURGERY

## 2019-10-04 PROCEDURE — 2500000003 HC RX 250 WO HCPCS: Performed by: COLON & RECTAL SURGERY

## 2019-10-04 PROCEDURE — 6360000002 HC RX W HCPCS: Performed by: ANESTHESIOLOGY

## 2019-10-04 PROCEDURE — 6360000002 HC RX W HCPCS: Performed by: NURSE ANESTHETIST, CERTIFIED REGISTERED

## 2019-10-04 PROCEDURE — 3600000012 HC SURGERY LEVEL 2 ADDTL 15MIN: Performed by: COLON & RECTAL SURGERY

## 2019-10-04 PROCEDURE — 2580000003 HC RX 258: Performed by: ANESTHESIOLOGY

## 2019-10-04 PROCEDURE — 3700000001 HC ADD 15 MINUTES (ANESTHESIA): Performed by: COLON & RECTAL SURGERY

## 2019-10-04 PROCEDURE — 6370000000 HC RX 637 (ALT 250 FOR IP): Performed by: COLON & RECTAL SURGERY

## 2019-10-04 PROCEDURE — 7100000000 HC PACU RECOVERY - FIRST 15 MIN: Performed by: COLON & RECTAL SURGERY

## 2019-10-04 PROCEDURE — 2500000003 HC RX 250 WO HCPCS: Performed by: NURSE ANESTHETIST, CERTIFIED REGISTERED

## 2019-10-04 PROCEDURE — 7100000011 HC PHASE II RECOVERY - ADDTL 15 MIN: Performed by: COLON & RECTAL SURGERY

## 2019-10-04 PROCEDURE — 7100000010 HC PHASE II RECOVERY - FIRST 15 MIN: Performed by: COLON & RECTAL SURGERY

## 2019-10-04 PROCEDURE — 2709999900 HC NON-CHARGEABLE SUPPLY: Performed by: COLON & RECTAL SURGERY

## 2019-10-04 RX ORDER — LIDOCAINE HYDROCHLORIDE 20 MG/ML
INJECTION, SOLUTION EPIDURAL; INFILTRATION; INTRACAUDAL; PERINEURAL PRN
Status: DISCONTINUED | OUTPATIENT
Start: 2019-10-04 | End: 2019-10-04 | Stop reason: SDUPTHER

## 2019-10-04 RX ORDER — ROCURONIUM BROMIDE 10 MG/ML
INJECTION, SOLUTION INTRAVENOUS PRN
Status: DISCONTINUED | OUTPATIENT
Start: 2019-10-04 | End: 2019-10-04 | Stop reason: SDUPTHER

## 2019-10-04 RX ORDER — GINSENG 100 MG
CAPSULE ORAL PRN
Status: DISCONTINUED | OUTPATIENT
Start: 2019-10-04 | End: 2019-10-04 | Stop reason: ALTCHOICE

## 2019-10-04 RX ORDER — FENTANYL CITRATE 50 UG/ML
25 INJECTION, SOLUTION INTRAMUSCULAR; INTRAVENOUS EVERY 5 MIN PRN
Status: DISCONTINUED | OUTPATIENT
Start: 2019-10-04 | End: 2019-10-04 | Stop reason: HOSPADM

## 2019-10-04 RX ORDER — SUCCINYLCHOLINE/SOD CL,ISO/PF 100 MG/5ML
SYRINGE (ML) INTRAVENOUS PRN
Status: DISCONTINUED | OUTPATIENT
Start: 2019-10-04 | End: 2019-10-04 | Stop reason: SDUPTHER

## 2019-10-04 RX ORDER — FENTANYL CITRATE 50 UG/ML
INJECTION, SOLUTION INTRAMUSCULAR; INTRAVENOUS PRN
Status: DISCONTINUED | OUTPATIENT
Start: 2019-10-04 | End: 2019-10-04 | Stop reason: SDUPTHER

## 2019-10-04 RX ORDER — DEXAMETHASONE SODIUM PHOSPHATE 10 MG/ML
4 INJECTION INTRAMUSCULAR; INTRAVENOUS
Status: COMPLETED | OUTPATIENT
Start: 2019-10-04 | End: 2019-10-04

## 2019-10-04 RX ORDER — ACETAMINOPHEN 325 MG/1
1000 TABLET ORAL EVERY 8 HOURS
Qty: 63 TABLET | Refills: 0 | Status: ON HOLD | OUTPATIENT
Start: 2019-10-04 | End: 2019-11-13

## 2019-10-04 RX ORDER — ONDANSETRON 2 MG/ML
4 INJECTION INTRAMUSCULAR; INTRAVENOUS
Status: DISCONTINUED | OUTPATIENT
Start: 2019-10-04 | End: 2019-10-04 | Stop reason: HOSPADM

## 2019-10-04 RX ORDER — LIDOCAINE HYDROCHLORIDE 10 MG/ML
1 INJECTION, SOLUTION EPIDURAL; INFILTRATION; INTRACAUDAL; PERINEURAL
Status: DISCONTINUED | OUTPATIENT
Start: 2019-10-05 | End: 2019-10-04 | Stop reason: HOSPADM

## 2019-10-04 RX ORDER — IBUPROFEN 200 MG
400 TABLET ORAL EVERY 6 HOURS
Qty: 56 TABLET | Refills: 0 | Status: ON HOLD | OUTPATIENT
Start: 2019-10-04 | End: 2019-11-13

## 2019-10-04 RX ORDER — OXYCODONE HYDROCHLORIDE AND ACETAMINOPHEN 5; 325 MG/1; MG/1
2 TABLET ORAL PRN
Status: DISCONTINUED | OUTPATIENT
Start: 2019-10-04 | End: 2019-10-04 | Stop reason: HOSPADM

## 2019-10-04 RX ORDER — BUPIVACAINE HYDROCHLORIDE AND EPINEPHRINE 2.5; 5 MG/ML; UG/ML
INJECTION, SOLUTION EPIDURAL; INFILTRATION; INTRACAUDAL; PERINEURAL PRN
Status: DISCONTINUED | OUTPATIENT
Start: 2019-10-04 | End: 2019-10-04 | Stop reason: ALTCHOICE

## 2019-10-04 RX ORDER — MEPERIDINE HYDROCHLORIDE 50 MG/ML
12.5 INJECTION INTRAMUSCULAR; INTRAVENOUS; SUBCUTANEOUS EVERY 5 MIN PRN
Status: DISCONTINUED | OUTPATIENT
Start: 2019-10-04 | End: 2019-10-04 | Stop reason: HOSPADM

## 2019-10-04 RX ORDER — PHENYLEPHRINE HCL IN 0.9% NACL 1 MG/10 ML
SYRINGE (ML) INTRAVENOUS PRN
Status: DISCONTINUED | OUTPATIENT
Start: 2019-10-04 | End: 2019-10-04 | Stop reason: SDUPTHER

## 2019-10-04 RX ORDER — PROPOFOL 10 MG/ML
INJECTION, EMULSION INTRAVENOUS PRN
Status: DISCONTINUED | OUTPATIENT
Start: 2019-10-04 | End: 2019-10-04 | Stop reason: SDUPTHER

## 2019-10-04 RX ORDER — OXYCODONE HYDROCHLORIDE AND ACETAMINOPHEN 5; 325 MG/1; MG/1
1 TABLET ORAL PRN
Status: DISCONTINUED | OUTPATIENT
Start: 2019-10-04 | End: 2019-10-04 | Stop reason: HOSPADM

## 2019-10-04 RX ORDER — LABETALOL HYDROCHLORIDE 5 MG/ML
5 INJECTION, SOLUTION INTRAVENOUS EVERY 10 MIN PRN
Status: DISCONTINUED | OUTPATIENT
Start: 2019-10-04 | End: 2019-10-04 | Stop reason: HOSPADM

## 2019-10-04 RX ORDER — MIDAZOLAM HYDROCHLORIDE 1 MG/ML
INJECTION INTRAMUSCULAR; INTRAVENOUS PRN
Status: DISCONTINUED | OUTPATIENT
Start: 2019-10-04 | End: 2019-10-04 | Stop reason: SDUPTHER

## 2019-10-04 RX ORDER — SODIUM CHLORIDE, SODIUM LACTATE, POTASSIUM CHLORIDE, CALCIUM CHLORIDE 600; 310; 30; 20 MG/100ML; MG/100ML; MG/100ML; MG/100ML
INJECTION, SOLUTION INTRAVENOUS CONTINUOUS
Status: DISCONTINUED | OUTPATIENT
Start: 2019-10-05 | End: 2019-10-04 | Stop reason: HOSPADM

## 2019-10-04 RX ORDER — HYDRALAZINE HYDROCHLORIDE 20 MG/ML
5 INJECTION INTRAMUSCULAR; INTRAVENOUS EVERY 10 MIN PRN
Status: DISCONTINUED | OUTPATIENT
Start: 2019-10-04 | End: 2019-10-04 | Stop reason: HOSPADM

## 2019-10-04 RX ORDER — DIPHENHYDRAMINE HYDROCHLORIDE 50 MG/ML
12.5 INJECTION INTRAMUSCULAR; INTRAVENOUS
Status: DISCONTINUED | OUTPATIENT
Start: 2019-10-04 | End: 2019-10-04 | Stop reason: HOSPADM

## 2019-10-04 RX ORDER — GLYCOPYRROLATE 1 MG/5 ML
SYRINGE (ML) INTRAVENOUS PRN
Status: DISCONTINUED | OUTPATIENT
Start: 2019-10-04 | End: 2019-10-04 | Stop reason: SDUPTHER

## 2019-10-04 RX ORDER — HYDROMORPHONE HCL 110MG/55ML
0.5 PATIENT CONTROLLED ANALGESIA SYRINGE INTRAVENOUS EVERY 5 MIN PRN
Status: DISCONTINUED | OUTPATIENT
Start: 2019-10-04 | End: 2019-10-04 | Stop reason: HOSPADM

## 2019-10-04 RX ORDER — ONDANSETRON 2 MG/ML
INJECTION INTRAMUSCULAR; INTRAVENOUS PRN
Status: DISCONTINUED | OUTPATIENT
Start: 2019-10-04 | End: 2019-10-04 | Stop reason: SDUPTHER

## 2019-10-04 RX ADMIN — SODIUM CHLORIDE, POTASSIUM CHLORIDE, SODIUM LACTATE AND CALCIUM CHLORIDE: 600; 310; 30; 20 INJECTION, SOLUTION INTRAVENOUS at 06:45

## 2019-10-04 RX ADMIN — Medication 100 MG: at 07:22

## 2019-10-04 RX ADMIN — Medication 0.2 MG: at 07:45

## 2019-10-04 RX ADMIN — LIDOCAINE HYDROCHLORIDE 100 MG: 20 INJECTION, SOLUTION EPIDURAL; INFILTRATION; INTRACAUDAL; PERINEURAL at 07:21

## 2019-10-04 RX ADMIN — MIDAZOLAM 2 MG: 1 INJECTION INTRAMUSCULAR; INTRAVENOUS at 07:17

## 2019-10-04 RX ADMIN — ONDANSETRON 4 MG: 2 INJECTION, SOLUTION INTRAMUSCULAR; INTRAVENOUS at 07:25

## 2019-10-04 RX ADMIN — PROPOFOL 150 MG: 10 INJECTION, EMULSION INTRAVENOUS at 07:21

## 2019-10-04 RX ADMIN — Medication 100 MCG: at 07:45

## 2019-10-04 RX ADMIN — DEXAMETHASONE SODIUM PHOSPHATE 10 MG: 10 INJECTION INTRAMUSCULAR; INTRAVENOUS at 07:25

## 2019-10-04 RX ADMIN — Medication 100 MCG: at 07:20

## 2019-10-04 RX ADMIN — ROCURONIUM BROMIDE 50 MG: 10 INJECTION, SOLUTION INTRAVENOUS at 07:25

## 2019-10-04 ASSESSMENT — PULMONARY FUNCTION TESTS
PIF_VALUE: 18
PIF_VALUE: 18
PIF_VALUE: 19
PIF_VALUE: 1
PIF_VALUE: 1
PIF_VALUE: 18
PIF_VALUE: 1
PIF_VALUE: 19
PIF_VALUE: 5
PIF_VALUE: 12
PIF_VALUE: 17
PIF_VALUE: 16
PIF_VALUE: 15
PIF_VALUE: 18
PIF_VALUE: 19
PIF_VALUE: 18
PIF_VALUE: 19
PIF_VALUE: 18
PIF_VALUE: 17
PIF_VALUE: 18
PIF_VALUE: 17
PIF_VALUE: 18
PIF_VALUE: 19
PIF_VALUE: 18
PIF_VALUE: 19
PIF_VALUE: 1
PIF_VALUE: 18
PIF_VALUE: 19
PIF_VALUE: 0
PIF_VALUE: 17
PIF_VALUE: 19
PIF_VALUE: 18
PIF_VALUE: 18
PIF_VALUE: 1
PIF_VALUE: 18
PIF_VALUE: 1
PIF_VALUE: 18
PIF_VALUE: 18
PIF_VALUE: 19

## 2019-10-04 ASSESSMENT — PAIN SCALES - GENERAL
PAINLEVEL_OUTOF10: 0

## 2019-10-04 ASSESSMENT — PAIN - FUNCTIONAL ASSESSMENT: PAIN_FUNCTIONAL_ASSESSMENT: 0-10

## 2019-11-11 ENCOUNTER — ANESTHESIA EVENT (OUTPATIENT)
Dept: OPERATING ROOM | Age: 69
DRG: 336 | End: 2019-11-11
Payer: MEDICARE

## 2019-11-12 ENCOUNTER — HOSPITAL ENCOUNTER (INPATIENT)
Age: 69
LOS: 6 days | Discharge: HOME HEALTH | DRG: 336 | End: 2019-11-18
Attending: COLON & RECTAL SURGERY | Admitting: COLON & RECTAL SURGERY
Payer: MEDICARE

## 2019-11-12 ENCOUNTER — ANESTHESIA (OUTPATIENT)
Dept: OPERATING ROOM | Age: 69
DRG: 336 | End: 2019-11-12
Payer: MEDICARE

## 2019-11-12 VITALS
OXYGEN SATURATION: 100 % | SYSTOLIC BLOOD PRESSURE: 149 MMHG | DIASTOLIC BLOOD PRESSURE: 63 MMHG | TEMPERATURE: 66.4 F | RESPIRATION RATE: 22 BRPM

## 2019-11-12 PROBLEM — Z98.890 HISTORY OF COLOSTOMY REVERSAL: Status: ACTIVE | Noted: 2019-11-12

## 2019-11-12 LAB
ANION GAP SERPL CALCULATED.3IONS-SCNC: 12 MMOL/L (ref 9–17)
BUN BLDV-MCNC: 18 MG/DL (ref 8–23)
CHLORIDE BLD-SCNC: 102 MMOL/L (ref 98–107)
CO2: 24 MMOL/L (ref 20–31)
CREAT SERPL-MCNC: 0.79 MG/DL (ref 0.5–0.9)
GFR AFRICAN AMERICAN: >60 ML/MIN
GFR NON-AFRICAN AMERICAN: >60 ML/MIN
GFR SERPL CREATININE-BSD FRML MDRD: NORMAL ML/MIN/{1.73_M2}
GFR SERPL CREATININE-BSD FRML MDRD: NORMAL ML/MIN/{1.73_M2}
GLUCOSE BLD-MCNC: 102 MG/DL (ref 65–105)
GLUCOSE BLD-MCNC: 183 MG/DL (ref 65–105)
HCT VFR BLD CALC: 40.8 % (ref 36.3–47.1)
HEMOGLOBIN: 12.9 G/DL (ref 11.9–15.1)
MCH RBC QN AUTO: 29 PG (ref 25.2–33.5)
MCHC RBC AUTO-ENTMCNC: 31.6 G/DL (ref 28.4–34.8)
MCV RBC AUTO: 91.7 FL (ref 82.6–102.9)
NRBC AUTOMATED: 0 PER 100 WBC
PDW BLD-RTO: 14.4 % (ref 11.8–14.4)
PLATELET # BLD: 259 K/UL (ref 138–453)
PMV BLD AUTO: 10.2 FL (ref 8.1–13.5)
POTASSIUM SERPL-SCNC: 3.6 MMOL/L (ref 3.7–5.3)
RBC # BLD: 4.45 M/UL (ref 3.95–5.11)
SODIUM BLD-SCNC: 138 MMOL/L (ref 135–144)
WBC # BLD: 7.7 K/UL (ref 3.5–11.3)

## 2019-11-12 PROCEDURE — 6360000002 HC RX W HCPCS: Performed by: ANESTHESIOLOGY

## 2019-11-12 PROCEDURE — 2500000003 HC RX 250 WO HCPCS: Performed by: ANESTHESIOLOGY

## 2019-11-12 PROCEDURE — C9113 INJ PANTOPRAZOLE SODIUM, VIA: HCPCS | Performed by: ANESTHESIOLOGY

## 2019-11-12 PROCEDURE — 2580000003 HC RX 258: Performed by: COLON & RECTAL SURGERY

## 2019-11-12 PROCEDURE — 7100000001 HC PACU RECOVERY - ADDTL 15 MIN: Performed by: COLON & RECTAL SURGERY

## 2019-11-12 PROCEDURE — 3700000000 HC ANESTHESIA ATTENDED CARE: Performed by: COLON & RECTAL SURGERY

## 2019-11-12 PROCEDURE — 85027 COMPLETE CBC AUTOMATED: CPT

## 2019-11-12 PROCEDURE — 6370000000 HC RX 637 (ALT 250 FOR IP): Performed by: COLON & RECTAL SURGERY

## 2019-11-12 PROCEDURE — 82565 ASSAY OF CREATININE: CPT

## 2019-11-12 PROCEDURE — 2000000000 HC ICU R&B

## 2019-11-12 PROCEDURE — 7100000000 HC PACU RECOVERY - FIRST 15 MIN: Performed by: COLON & RECTAL SURGERY

## 2019-11-12 PROCEDURE — 80051 ELECTROLYTE PANEL: CPT

## 2019-11-12 PROCEDURE — 88304 TISSUE EXAM BY PATHOLOGIST: CPT

## 2019-11-12 PROCEDURE — 0WQF0ZZ REPAIR ABDOMINAL WALL, OPEN APPROACH: ICD-10-PCS | Performed by: COLON & RECTAL SURGERY

## 2019-11-12 PROCEDURE — 2720000010 HC SURG SUPPLY STERILE: Performed by: COLON & RECTAL SURGERY

## 2019-11-12 PROCEDURE — 82947 ASSAY GLUCOSE BLOOD QUANT: CPT

## 2019-11-12 PROCEDURE — 6360000002 HC RX W HCPCS: Performed by: COLON & RECTAL SURGERY

## 2019-11-12 PROCEDURE — 2709999900 HC NON-CHARGEABLE SUPPLY: Performed by: COLON & RECTAL SURGERY

## 2019-11-12 PROCEDURE — 3600000002 HC SURGERY LEVEL 2 BASE: Performed by: COLON & RECTAL SURGERY

## 2019-11-12 PROCEDURE — 6360000002 HC RX W HCPCS: Performed by: NURSE ANESTHETIST, CERTIFIED REGISTERED

## 2019-11-12 PROCEDURE — 2500000003 HC RX 250 WO HCPCS: Performed by: COLON & RECTAL SURGERY

## 2019-11-12 PROCEDURE — 0DNE0ZZ RELEASE LARGE INTESTINE, OPEN APPROACH: ICD-10-PCS | Performed by: COLON & RECTAL SURGERY

## 2019-11-12 PROCEDURE — 6360000002 HC RX W HCPCS

## 2019-11-12 PROCEDURE — 2500000003 HC RX 250 WO HCPCS: Performed by: NURSE ANESTHETIST, CERTIFIED REGISTERED

## 2019-11-12 PROCEDURE — 2500000003 HC RX 250 WO HCPCS: Performed by: STUDENT IN AN ORGANIZED HEALTH CARE EDUCATION/TRAINING PROGRAM

## 2019-11-12 PROCEDURE — 2580000003 HC RX 258: Performed by: STUDENT IN AN ORGANIZED HEALTH CARE EDUCATION/TRAINING PROGRAM

## 2019-11-12 PROCEDURE — 84520 ASSAY OF UREA NITROGEN: CPT

## 2019-11-12 PROCEDURE — 2580000003 HC RX 258: Performed by: ANESTHESIOLOGY

## 2019-11-12 PROCEDURE — 0DSL0ZZ REPOSITION TRANSVERSE COLON, OPEN APPROACH: ICD-10-PCS | Performed by: COLON & RECTAL SURGERY

## 2019-11-12 PROCEDURE — 3700000001 HC ADD 15 MINUTES (ANESTHESIA): Performed by: COLON & RECTAL SURGERY

## 2019-11-12 PROCEDURE — 3600000012 HC SURGERY LEVEL 2 ADDTL 15MIN: Performed by: COLON & RECTAL SURGERY

## 2019-11-12 PROCEDURE — 6360000002 HC RX W HCPCS: Performed by: STUDENT IN AN ORGANIZED HEALTH CARE EDUCATION/TRAINING PROGRAM

## 2019-11-12 RX ORDER — HYDROMORPHONE HCL 110MG/55ML
0.5 PATIENT CONTROLLED ANALGESIA SYRINGE INTRAVENOUS EVERY 5 MIN PRN
Status: DISCONTINUED | OUTPATIENT
Start: 2019-11-12 | End: 2019-11-12

## 2019-11-12 RX ORDER — ONDANSETRON 4 MG/1
4 TABLET, ORALLY DISINTEGRATING ORAL EVERY 6 HOURS PRN
Status: DISCONTINUED | OUTPATIENT
Start: 2019-11-12 | End: 2019-11-18 | Stop reason: HOSPADM

## 2019-11-12 RX ORDER — SODIUM CHLORIDE 0.9 % (FLUSH) 0.9 %
10 SYRINGE (ML) INJECTION PRN
Status: DISCONTINUED | OUTPATIENT
Start: 2019-11-12 | End: 2019-11-12

## 2019-11-12 RX ORDER — OXYCODONE HYDROCHLORIDE 5 MG/1
10 TABLET ORAL EVERY 4 HOURS PRN
Status: DISCONTINUED | OUTPATIENT
Start: 2019-11-12 | End: 2019-11-18 | Stop reason: HOSPADM

## 2019-11-12 RX ORDER — FENTANYL CITRATE 50 UG/ML
INJECTION, SOLUTION INTRAMUSCULAR; INTRAVENOUS PRN
Status: DISCONTINUED | OUTPATIENT
Start: 2019-11-12 | End: 2019-11-12 | Stop reason: SDUPTHER

## 2019-11-12 RX ORDER — OXYCODONE HYDROCHLORIDE 5 MG/1
5 TABLET ORAL EVERY 4 HOURS PRN
Status: DISCONTINUED | OUTPATIENT
Start: 2019-11-12 | End: 2019-11-18 | Stop reason: HOSPADM

## 2019-11-12 RX ORDER — FENTANYL CITRATE 50 UG/ML
25 INJECTION, SOLUTION INTRAMUSCULAR; INTRAVENOUS EVERY 5 MIN PRN
Status: DISCONTINUED | OUTPATIENT
Start: 2019-11-12 | End: 2019-11-12

## 2019-11-12 RX ORDER — ALVIMOPAN 12 MG/1
12 CAPSULE ORAL 2 TIMES DAILY
Status: DISCONTINUED | OUTPATIENT
Start: 2019-11-12 | End: 2019-11-16

## 2019-11-12 RX ORDER — LABETALOL HYDROCHLORIDE 5 MG/ML
5 INJECTION, SOLUTION INTRAVENOUS EVERY 10 MIN PRN
Status: DISCONTINUED | OUTPATIENT
Start: 2019-11-12 | End: 2019-11-12

## 2019-11-12 RX ORDER — OXYCODONE HYDROCHLORIDE AND ACETAMINOPHEN 5; 325 MG/1; MG/1
1 TABLET ORAL PRN
Status: DISCONTINUED | OUTPATIENT
Start: 2019-11-12 | End: 2019-11-12

## 2019-11-12 RX ORDER — SODIUM CHLORIDE, SODIUM LACTATE, POTASSIUM CHLORIDE, CALCIUM CHLORIDE 600; 310; 30; 20 MG/100ML; MG/100ML; MG/100ML; MG/100ML
INJECTION, SOLUTION INTRAVENOUS CONTINUOUS
Status: DISCONTINUED | OUTPATIENT
Start: 2019-11-13 | End: 2019-11-12

## 2019-11-12 RX ORDER — DEXAMETHASONE SODIUM PHOSPHATE 10 MG/ML
4 INJECTION INTRAMUSCULAR; INTRAVENOUS
Status: DISCONTINUED | OUTPATIENT
Start: 2019-11-12 | End: 2019-11-12

## 2019-11-12 RX ORDER — PANTOPRAZOLE SODIUM 40 MG/10ML
40 INJECTION, POWDER, LYOPHILIZED, FOR SOLUTION INTRAVENOUS ONCE
Status: COMPLETED | OUTPATIENT
Start: 2019-11-12 | End: 2019-11-12

## 2019-11-12 RX ORDER — LIDOCAINE HYDROCHLORIDE 20 MG/ML
INJECTION, SOLUTION EPIDURAL; INFILTRATION; INTRACAUDAL; PERINEURAL PRN
Status: DISCONTINUED | OUTPATIENT
Start: 2019-11-12 | End: 2019-11-12 | Stop reason: SDUPTHER

## 2019-11-12 RX ORDER — SODIUM CHLORIDE 0.9 % (FLUSH) 0.9 %
10 SYRINGE (ML) INJECTION EVERY 12 HOURS SCHEDULED
Status: DISCONTINUED | OUTPATIENT
Start: 2019-11-12 | End: 2019-11-18 | Stop reason: HOSPADM

## 2019-11-12 RX ORDER — HYDROMORPHONE HCL 110MG/55ML
PATIENT CONTROLLED ANALGESIA SYRINGE INTRAVENOUS PRN
Status: DISCONTINUED | OUTPATIENT
Start: 2019-11-12 | End: 2019-11-12 | Stop reason: SDUPTHER

## 2019-11-12 RX ORDER — DIPHENHYDRAMINE HYDROCHLORIDE 50 MG/ML
12.5 INJECTION INTRAMUSCULAR; INTRAVENOUS
Status: DISCONTINUED | OUTPATIENT
Start: 2019-11-12 | End: 2019-11-12

## 2019-11-12 RX ORDER — MIDAZOLAM HYDROCHLORIDE 1 MG/ML
INJECTION INTRAMUSCULAR; INTRAVENOUS PRN
Status: DISCONTINUED | OUTPATIENT
Start: 2019-11-12 | End: 2019-11-12

## 2019-11-12 RX ORDER — LIDOCAINE HYDROCHLORIDE 10 MG/ML
1 INJECTION, SOLUTION EPIDURAL; INFILTRATION; INTRACAUDAL; PERINEURAL
Status: DISCONTINUED | OUTPATIENT
Start: 2019-11-12 | End: 2019-11-12

## 2019-11-12 RX ORDER — ALVIMOPAN 12 MG/1
12 CAPSULE ORAL ONCE
Status: COMPLETED | OUTPATIENT
Start: 2019-11-12 | End: 2019-11-12

## 2019-11-12 RX ORDER — SODIUM CHLORIDE 0.9 % (FLUSH) 0.9 %
10 SYRINGE (ML) INJECTION PRN
Status: DISCONTINUED | OUTPATIENT
Start: 2019-11-12 | End: 2019-11-18 | Stop reason: HOSPADM

## 2019-11-12 RX ORDER — LABETALOL HYDROCHLORIDE 5 MG/ML
INJECTION, SOLUTION INTRAVENOUS PRN
Status: DISCONTINUED | OUTPATIENT
Start: 2019-11-12 | End: 2019-11-12 | Stop reason: SDUPTHER

## 2019-11-12 RX ORDER — ONDANSETRON 2 MG/ML
4 INJECTION INTRAMUSCULAR; INTRAVENOUS EVERY 6 HOURS PRN
Status: DISCONTINUED | OUTPATIENT
Start: 2019-11-12 | End: 2019-11-18 | Stop reason: HOSPADM

## 2019-11-12 RX ORDER — SODIUM CHLORIDE, SODIUM LACTATE, POTASSIUM CHLORIDE, CALCIUM CHLORIDE 600; 310; 30; 20 MG/100ML; MG/100ML; MG/100ML; MG/100ML
INJECTION, SOLUTION INTRAVENOUS CONTINUOUS
Status: DISCONTINUED | OUTPATIENT
Start: 2019-11-12 | End: 2019-11-15

## 2019-11-12 RX ORDER — HYDRALAZINE HYDROCHLORIDE 20 MG/ML
10 INJECTION INTRAMUSCULAR; INTRAVENOUS EVERY 4 HOURS PRN
Status: DISCONTINUED | OUTPATIENT
Start: 2019-11-12 | End: 2019-11-18 | Stop reason: HOSPADM

## 2019-11-12 RX ORDER — KETOROLAC TROMETHAMINE 15 MG/ML
15 INJECTION, SOLUTION INTRAMUSCULAR; INTRAVENOUS EVERY 6 HOURS
Status: DISPENSED | OUTPATIENT
Start: 2019-11-12 | End: 2019-11-17

## 2019-11-12 RX ORDER — ROCURONIUM BROMIDE 10 MG/ML
INJECTION, SOLUTION INTRAVENOUS PRN
Status: DISCONTINUED | OUTPATIENT
Start: 2019-11-12 | End: 2019-11-12 | Stop reason: SDUPTHER

## 2019-11-12 RX ORDER — SODIUM CHLORIDE, SODIUM LACTATE, POTASSIUM CHLORIDE, CALCIUM CHLORIDE 600; 310; 30; 20 MG/100ML; MG/100ML; MG/100ML; MG/100ML
INJECTION, SOLUTION INTRAVENOUS CONTINUOUS PRN
Status: DISCONTINUED | OUTPATIENT
Start: 2019-11-12 | End: 2019-11-12

## 2019-11-12 RX ORDER — OXYCODONE HYDROCHLORIDE AND ACETAMINOPHEN 5; 325 MG/1; MG/1
2 TABLET ORAL PRN
Status: DISCONTINUED | OUTPATIENT
Start: 2019-11-12 | End: 2019-11-12

## 2019-11-12 RX ORDER — PHENYLEPHRINE HCL IN 0.9% NACL 1 MG/10 ML
SYRINGE (ML) INTRAVENOUS PRN
Status: DISCONTINUED | OUTPATIENT
Start: 2019-11-12 | End: 2019-11-12 | Stop reason: SDUPTHER

## 2019-11-12 RX ORDER — HYDRALAZINE HYDROCHLORIDE 20 MG/ML
5 INJECTION INTRAMUSCULAR; INTRAVENOUS EVERY 10 MIN PRN
Status: DISCONTINUED | OUTPATIENT
Start: 2019-11-12 | End: 2019-11-12

## 2019-11-12 RX ORDER — MORPHINE SULFATE 2 MG/ML
2 INJECTION, SOLUTION INTRAMUSCULAR; INTRAVENOUS
Status: DISCONTINUED | OUTPATIENT
Start: 2019-11-12 | End: 2019-11-17

## 2019-11-12 RX ORDER — ONDANSETRON 2 MG/ML
INJECTION INTRAMUSCULAR; INTRAVENOUS
Status: DISCONTINUED
Start: 2019-11-12 | End: 2019-11-12 | Stop reason: SDUPTHER

## 2019-11-12 RX ORDER — MORPHINE SULFATE 4 MG/ML
4 INJECTION, SOLUTION INTRAMUSCULAR; INTRAVENOUS
Status: DISCONTINUED | OUTPATIENT
Start: 2019-11-12 | End: 2019-11-17

## 2019-11-12 RX ORDER — CYCLOBENZAPRINE HCL 10 MG
10 TABLET ORAL NIGHTLY
Status: DISCONTINUED | OUTPATIENT
Start: 2019-11-12 | End: 2019-11-18 | Stop reason: HOSPADM

## 2019-11-12 RX ORDER — ONDANSETRON 2 MG/ML
INJECTION INTRAMUSCULAR; INTRAVENOUS PRN
Status: DISCONTINUED | OUTPATIENT
Start: 2019-11-12 | End: 2019-11-12 | Stop reason: SDUPTHER

## 2019-11-12 RX ORDER — ONDANSETRON 2 MG/ML
4 INJECTION INTRAMUSCULAR; INTRAVENOUS
Status: DISCONTINUED | OUTPATIENT
Start: 2019-11-12 | End: 2019-11-12

## 2019-11-12 RX ORDER — PROPOFOL 10 MG/ML
INJECTION, EMULSION INTRAVENOUS PRN
Status: DISCONTINUED | OUTPATIENT
Start: 2019-11-12 | End: 2019-11-12 | Stop reason: SDUPTHER

## 2019-11-12 RX ORDER — SODIUM CHLORIDE 9 MG/ML
10 INJECTION INTRAVENOUS ONCE
Status: DISCONTINUED | OUTPATIENT
Start: 2019-11-12 | End: 2019-11-18 | Stop reason: HOSPADM

## 2019-11-12 RX ORDER — SODIUM CHLORIDE 0.9 % (FLUSH) 0.9 %
10 SYRINGE (ML) INJECTION EVERY 12 HOURS SCHEDULED
Status: DISCONTINUED | OUTPATIENT
Start: 2019-11-12 | End: 2019-11-12

## 2019-11-12 RX ORDER — GABAPENTIN 100 MG/1
100 CAPSULE ORAL 3 TIMES DAILY
Status: DISCONTINUED | OUTPATIENT
Start: 2019-11-12 | End: 2019-11-16

## 2019-11-12 RX ORDER — DEXAMETHASONE SODIUM PHOSPHATE 10 MG/ML
INJECTION INTRAMUSCULAR; INTRAVENOUS PRN
Status: DISCONTINUED | OUTPATIENT
Start: 2019-11-12 | End: 2019-11-12 | Stop reason: SDUPTHER

## 2019-11-12 RX ORDER — LOSARTAN POTASSIUM 50 MG/1
50 TABLET ORAL DAILY
Status: DISCONTINUED | OUTPATIENT
Start: 2019-11-13 | End: 2019-11-18 | Stop reason: HOSPADM

## 2019-11-12 RX ORDER — ONDANSETRON 2 MG/ML
4 INJECTION INTRAMUSCULAR; INTRAVENOUS EVERY 6 HOURS PRN
Status: DISCONTINUED | OUTPATIENT
Start: 2019-11-12 | End: 2019-11-12 | Stop reason: SDUPTHER

## 2019-11-12 RX ORDER — SODIUM CHLORIDE 9 MG/ML
INJECTION, SOLUTION INTRAVENOUS CONTINUOUS
Status: DISCONTINUED | OUTPATIENT
Start: 2019-11-13 | End: 2019-11-12

## 2019-11-12 RX ORDER — MEPERIDINE HYDROCHLORIDE 50 MG/ML
12.5 INJECTION INTRAMUSCULAR; INTRAVENOUS; SUBCUTANEOUS EVERY 5 MIN PRN
Status: DISCONTINUED | OUTPATIENT
Start: 2019-11-12 | End: 2019-11-12

## 2019-11-12 RX ORDER — MIDAZOLAM HYDROCHLORIDE 1 MG/ML
INJECTION INTRAMUSCULAR; INTRAVENOUS PRN
Status: DISCONTINUED | OUTPATIENT
Start: 2019-11-12 | End: 2019-11-12 | Stop reason: SDUPTHER

## 2019-11-12 RX ADMIN — SODIUM CHLORIDE, POTASSIUM CHLORIDE, SODIUM LACTATE AND CALCIUM CHLORIDE: 600; 310; 30; 20 INJECTION, SOLUTION INTRAVENOUS at 22:45

## 2019-11-12 RX ADMIN — HYDROMORPHONE HYDROCHLORIDE 0.5 MG: 2 INJECTION INTRAMUSCULAR; INTRAVENOUS; SUBCUTANEOUS at 16:40

## 2019-11-12 RX ADMIN — LABETALOL HYDROCHLORIDE 5 MG: 5 INJECTION, SOLUTION INTRAVENOUS at 16:20

## 2019-11-12 RX ADMIN — ROCURONIUM BROMIDE 20 MG: 10 INJECTION, SOLUTION INTRAVENOUS at 19:18

## 2019-11-12 RX ADMIN — FAMOTIDINE 20 MG: 10 INJECTION, SOLUTION INTRAVENOUS at 23:17

## 2019-11-12 RX ADMIN — SODIUM CHLORIDE: 9 INJECTION, SOLUTION INTRAVENOUS at 19:06

## 2019-11-12 RX ADMIN — ALVIMOPAN 12 MG: 12 CAPSULE ORAL at 14:08

## 2019-11-12 RX ADMIN — ROCURONIUM BROMIDE 20 MG: 10 INJECTION, SOLUTION INTRAVENOUS at 16:00

## 2019-11-12 RX ADMIN — ONDANSETRON 4 MG: 2 INJECTION, SOLUTION INTRAMUSCULAR; INTRAVENOUS at 15:11

## 2019-11-12 RX ADMIN — SODIUM CHLORIDE, POTASSIUM CHLORIDE, SODIUM LACTATE AND CALCIUM CHLORIDE: 600; 310; 30; 20 INJECTION, SOLUTION INTRAVENOUS at 12:26

## 2019-11-12 RX ADMIN — LABETALOL HYDROCHLORIDE 5 MG: 5 INJECTION, SOLUTION INTRAVENOUS at 16:03

## 2019-11-12 RX ADMIN — MIDAZOLAM 2 MG: 1 INJECTION INTRAMUSCULAR; INTRAVENOUS at 14:13

## 2019-11-12 RX ADMIN — SODIUM CHLORIDE: 9 INJECTION, SOLUTION INTRAVENOUS at 18:13

## 2019-11-12 RX ADMIN — ROCURONIUM BROMIDE 20 MG: 10 INJECTION, SOLUTION INTRAVENOUS at 15:12

## 2019-11-12 RX ADMIN — CEFAZOLIN SODIUM 2 G: 10 INJECTION, POWDER, FOR SOLUTION INTRAVENOUS at 14:35

## 2019-11-12 RX ADMIN — SODIUM CHLORIDE: 9 INJECTION, SOLUTION INTRAVENOUS at 19:57

## 2019-11-12 RX ADMIN — Medication 100 MCG: at 18:22

## 2019-11-12 RX ADMIN — HYDROMORPHONE HYDROCHLORIDE 0.5 MG: 2 INJECTION, SOLUTION INTRAMUSCULAR; INTRAVENOUS; SUBCUTANEOUS at 22:01

## 2019-11-12 RX ADMIN — PROPOFOL 200 MG: 10 INJECTION, EMULSION INTRAVENOUS at 14:17

## 2019-11-12 RX ADMIN — ONDANSETRON HYDROCHLORIDE 4 MG: 2 INJECTION, SOLUTION INTRAVENOUS at 22:43

## 2019-11-12 RX ADMIN — FENTANYL CITRATE 50 MCG: 50 INJECTION, SOLUTION INTRAMUSCULAR; INTRAVENOUS at 14:46

## 2019-11-12 RX ADMIN — ONDANSETRON 4 MG: 2 INJECTION INTRAMUSCULAR; INTRAVENOUS at 22:43

## 2019-11-12 RX ADMIN — DEXAMETHASONE SODIUM PHOSPHATE 10 MG: 10 INJECTION INTRAMUSCULAR; INTRAVENOUS at 14:17

## 2019-11-12 RX ADMIN — HYDROMORPHONE HYDROCHLORIDE 1 MG: 2 INJECTION INTRAMUSCULAR; INTRAVENOUS; SUBCUTANEOUS at 20:40

## 2019-11-12 RX ADMIN — METRONIDAZOLE 500 MG: 500 INJECTION, SOLUTION INTRAVENOUS at 23:05

## 2019-11-12 RX ADMIN — ROCURONIUM BROMIDE 50 MG: 10 INJECTION, SOLUTION INTRAVENOUS at 14:17

## 2019-11-12 RX ADMIN — ROCURONIUM BROMIDE 20 MG: 10 INJECTION, SOLUTION INTRAVENOUS at 18:09

## 2019-11-12 RX ADMIN — HYDROMORPHONE HYDROCHLORIDE 0.5 MG: 2 INJECTION INTRAMUSCULAR; INTRAVENOUS; SUBCUTANEOUS at 19:03

## 2019-11-12 RX ADMIN — FENTANYL CITRATE 50 MCG: 50 INJECTION, SOLUTION INTRAMUSCULAR; INTRAVENOUS at 14:52

## 2019-11-12 RX ADMIN — CEFAZOLIN SODIUM 2 G: 10 INJECTION, POWDER, FOR SOLUTION INTRAVENOUS at 22:52

## 2019-11-12 RX ADMIN — SUGAMMADEX 200 MG: 100 INJECTION, SOLUTION INTRAVENOUS at 21:30

## 2019-11-12 RX ADMIN — ROCURONIUM BROMIDE 10 MG: 10 INJECTION, SOLUTION INTRAVENOUS at 14:52

## 2019-11-12 RX ADMIN — KETOROLAC TROMETHAMINE 15 MG: 15 INJECTION, SOLUTION INTRAMUSCULAR; INTRAVENOUS at 23:05

## 2019-11-12 RX ADMIN — SODIUM CHLORIDE: 9 INJECTION, SOLUTION INTRAVENOUS at 19:47

## 2019-11-12 RX ADMIN — Medication 10 ML: at 23:09

## 2019-11-12 RX ADMIN — Medication 10 ML: at 13:39

## 2019-11-12 RX ADMIN — SODIUM CHLORIDE: 9 INJECTION, SOLUTION INTRAVENOUS at 15:03

## 2019-11-12 RX ADMIN — PANTOPRAZOLE SODIUM 40 MG: 40 INJECTION, POWDER, FOR SOLUTION INTRAVENOUS at 13:39

## 2019-11-12 RX ADMIN — METRONIDAZOLE 500 MG: 500 INJECTION, SOLUTION INTRAVENOUS at 14:22

## 2019-11-12 RX ADMIN — SODIUM CHLORIDE: 9 INJECTION, SOLUTION INTRAVENOUS at 14:33

## 2019-11-12 RX ADMIN — FENTANYL CITRATE 100 MCG: 50 INJECTION, SOLUTION INTRAMUSCULAR; INTRAVENOUS at 14:17

## 2019-11-12 RX ADMIN — ROCURONIUM BROMIDE 20 MG: 10 INJECTION, SOLUTION INTRAVENOUS at 17:04

## 2019-11-12 RX ADMIN — LIDOCAINE HYDROCHLORIDE 60 MG: 20 INJECTION, SOLUTION EPIDURAL; INFILTRATION; INTRACAUDAL; PERINEURAL at 14:17

## 2019-11-12 RX ADMIN — FENTANYL CITRATE 50 MCG: 50 INJECTION, SOLUTION INTRAMUSCULAR; INTRAVENOUS at 15:11

## 2019-11-12 RX ADMIN — MORPHINE SULFATE 4 MG: 4 INJECTION INTRAVENOUS at 23:55

## 2019-11-12 RX ADMIN — LABETALOL HYDROCHLORIDE 5 MG: 5 INJECTION, SOLUTION INTRAVENOUS at 16:11

## 2019-11-12 ASSESSMENT — PULMONARY FUNCTION TESTS
PIF_VALUE: 2
PIF_VALUE: 23
PIF_VALUE: 23
PIF_VALUE: 25
PIF_VALUE: 22
PIF_VALUE: 24
PIF_VALUE: 23
PIF_VALUE: 22
PIF_VALUE: 22
PIF_VALUE: 24
PIF_VALUE: 23
PIF_VALUE: 22
PIF_VALUE: 2
PIF_VALUE: 26
PIF_VALUE: 19
PIF_VALUE: 22
PIF_VALUE: 23
PIF_VALUE: 22
PIF_VALUE: 24
PIF_VALUE: 20
PIF_VALUE: 25
PIF_VALUE: 22
PIF_VALUE: 23
PIF_VALUE: 23
PIF_VALUE: 24
PIF_VALUE: 22
PIF_VALUE: 2
PIF_VALUE: 25
PIF_VALUE: 20
PIF_VALUE: 19
PIF_VALUE: 20
PIF_VALUE: 24
PIF_VALUE: 24
PIF_VALUE: 25
PIF_VALUE: 23
PIF_VALUE: 24
PIF_VALUE: 23
PIF_VALUE: 26
PIF_VALUE: 22
PIF_VALUE: 23
PIF_VALUE: 22
PIF_VALUE: 24
PIF_VALUE: 24
PIF_VALUE: 23
PIF_VALUE: 3
PIF_VALUE: 22
PIF_VALUE: 24
PIF_VALUE: 19
PIF_VALUE: 2
PIF_VALUE: 24
PIF_VALUE: 23
PIF_VALUE: 20
PIF_VALUE: 21
PIF_VALUE: 24
PIF_VALUE: 23
PIF_VALUE: 23
PIF_VALUE: 22
PIF_VALUE: 23
PIF_VALUE: 3
PIF_VALUE: 20
PIF_VALUE: 23
PIF_VALUE: 22
PIF_VALUE: 24
PIF_VALUE: 23
PIF_VALUE: 24
PIF_VALUE: 25
PIF_VALUE: 25
PIF_VALUE: 23
PIF_VALUE: 22
PIF_VALUE: 25
PIF_VALUE: 24
PIF_VALUE: 23
PIF_VALUE: 22
PIF_VALUE: 23
PIF_VALUE: 24
PIF_VALUE: 19
PIF_VALUE: 23
PIF_VALUE: 23
PIF_VALUE: 22
PIF_VALUE: 21
PIF_VALUE: 22
PIF_VALUE: 25
PIF_VALUE: 23
PIF_VALUE: 22
PIF_VALUE: 22
PIF_VALUE: 24
PIF_VALUE: 21
PIF_VALUE: 24
PIF_VALUE: 23
PIF_VALUE: 21
PIF_VALUE: 22
PIF_VALUE: 21
PIF_VALUE: 21
PIF_VALUE: 22
PIF_VALUE: 23
PIF_VALUE: 22
PIF_VALUE: 23
PIF_VALUE: 25
PIF_VALUE: 24
PIF_VALUE: 25
PIF_VALUE: 24
PIF_VALUE: 23
PIF_VALUE: 22
PIF_VALUE: 2
PIF_VALUE: 21
PIF_VALUE: 22
PIF_VALUE: 23
PIF_VALUE: 24
PIF_VALUE: 23
PIF_VALUE: 25
PIF_VALUE: 22
PIF_VALUE: 24
PIF_VALUE: 22
PIF_VALUE: 23
PIF_VALUE: 22
PIF_VALUE: 22
PIF_VALUE: 18
PIF_VALUE: 22
PIF_VALUE: 23
PIF_VALUE: 21
PIF_VALUE: 24
PIF_VALUE: 23
PIF_VALUE: 22
PIF_VALUE: 24
PIF_VALUE: 23
PIF_VALUE: 22
PIF_VALUE: 19
PIF_VALUE: 23
PIF_VALUE: 24
PIF_VALUE: 23
PIF_VALUE: 24
PIF_VALUE: 23
PIF_VALUE: 22
PIF_VALUE: 3
PIF_VALUE: 21
PIF_VALUE: 24
PIF_VALUE: 4
PIF_VALUE: 20
PIF_VALUE: 22
PIF_VALUE: 25
PIF_VALUE: 4
PIF_VALUE: 24
PIF_VALUE: 23
PIF_VALUE: 24
PIF_VALUE: 20
PIF_VALUE: 2
PIF_VALUE: 22
PIF_VALUE: 25
PIF_VALUE: 22
PIF_VALUE: 24
PIF_VALUE: 24
PIF_VALUE: 22
PIF_VALUE: 22
PIF_VALUE: 23
PIF_VALUE: 22
PIF_VALUE: 23
PIF_VALUE: 21
PIF_VALUE: 23
PIF_VALUE: 23
PIF_VALUE: 22
PIF_VALUE: 24
PIF_VALUE: 23
PIF_VALUE: 0
PIF_VALUE: 24
PIF_VALUE: 22
PIF_VALUE: 24
PIF_VALUE: 22
PIF_VALUE: 20
PIF_VALUE: 19
PIF_VALUE: 21
PIF_VALUE: 22
PIF_VALUE: 24
PIF_VALUE: 17
PIF_VALUE: 26
PIF_VALUE: 21
PIF_VALUE: 19
PIF_VALUE: 22
PIF_VALUE: 24
PIF_VALUE: 23
PIF_VALUE: 21
PIF_VALUE: 25
PIF_VALUE: 22
PIF_VALUE: 23
PIF_VALUE: 23
PIF_VALUE: 24
PIF_VALUE: 24
PIF_VALUE: 22
PIF_VALUE: 23
PIF_VALUE: 24
PIF_VALUE: 23
PIF_VALUE: 24
PIF_VALUE: 20
PIF_VALUE: 22
PIF_VALUE: 22
PIF_VALUE: 25
PIF_VALUE: 23
PIF_VALUE: 23
PIF_VALUE: 17
PIF_VALUE: 22
PIF_VALUE: 20
PIF_VALUE: 22
PIF_VALUE: 21
PIF_VALUE: 22
PIF_VALUE: 24
PIF_VALUE: 23
PIF_VALUE: 24
PIF_VALUE: 23
PIF_VALUE: 24
PIF_VALUE: 23
PIF_VALUE: 22
PIF_VALUE: 21
PIF_VALUE: 24
PIF_VALUE: 23
PIF_VALUE: 22
PIF_VALUE: 22
PIF_VALUE: 26
PIF_VALUE: 23
PIF_VALUE: 22
PIF_VALUE: 23
PIF_VALUE: 24
PIF_VALUE: 24
PIF_VALUE: 22
PIF_VALUE: 23
PIF_VALUE: 22
PIF_VALUE: 22
PIF_VALUE: 23
PIF_VALUE: 22
PIF_VALUE: 24
PIF_VALUE: 24
PIF_VALUE: 22
PIF_VALUE: 24
PIF_VALUE: 20
PIF_VALUE: 24
PIF_VALUE: 22
PIF_VALUE: 2
PIF_VALUE: 22
PIF_VALUE: 24
PIF_VALUE: 20
PIF_VALUE: 23
PIF_VALUE: 22
PIF_VALUE: 23
PIF_VALUE: 24
PIF_VALUE: 25
PIF_VALUE: 27
PIF_VALUE: 23
PIF_VALUE: 25
PIF_VALUE: 22
PIF_VALUE: 19
PIF_VALUE: 24
PIF_VALUE: 18
PIF_VALUE: 24
PIF_VALUE: 21
PIF_VALUE: 26
PIF_VALUE: 20
PIF_VALUE: 24
PIF_VALUE: 2
PIF_VALUE: 25
PIF_VALUE: 24
PIF_VALUE: 22
PIF_VALUE: 24
PIF_VALUE: 25
PIF_VALUE: 25
PIF_VALUE: 24
PIF_VALUE: 22
PIF_VALUE: 24
PIF_VALUE: 22
PIF_VALUE: 20
PIF_VALUE: 23
PIF_VALUE: 0
PIF_VALUE: 21
PIF_VALUE: 23
PIF_VALUE: 22
PIF_VALUE: 24
PIF_VALUE: 22
PIF_VALUE: 21
PIF_VALUE: 23
PIF_VALUE: 24
PIF_VALUE: 22
PIF_VALUE: 24
PIF_VALUE: 23
PIF_VALUE: 23
PIF_VALUE: 22
PIF_VALUE: 23
PIF_VALUE: 22
PIF_VALUE: 22
PIF_VALUE: 23
PIF_VALUE: 23
PIF_VALUE: 22
PIF_VALUE: 23
PIF_VALUE: 24
PIF_VALUE: 24
PIF_VALUE: 22
PIF_VALUE: 23
PIF_VALUE: 23
PIF_VALUE: 24
PIF_VALUE: 22
PIF_VALUE: 24
PIF_VALUE: 22
PIF_VALUE: 24
PIF_VALUE: 23
PIF_VALUE: 24
PIF_VALUE: 23
PIF_VALUE: 23
PIF_VALUE: 24
PIF_VALUE: 23
PIF_VALUE: 22
PIF_VALUE: 2
PIF_VALUE: 23
PIF_VALUE: 23
PIF_VALUE: 24
PIF_VALUE: 23
PIF_VALUE: 23
PIF_VALUE: 22
PIF_VALUE: 22
PIF_VALUE: 20
PIF_VALUE: 23
PIF_VALUE: 21
PIF_VALUE: 2
PIF_VALUE: 24
PIF_VALUE: 23
PIF_VALUE: 22
PIF_VALUE: 20
PIF_VALUE: 24
PIF_VALUE: 23
PIF_VALUE: 24
PIF_VALUE: 22
PIF_VALUE: 24
PIF_VALUE: 22
PIF_VALUE: 23
PIF_VALUE: 22
PIF_VALUE: 22
PIF_VALUE: 24
PIF_VALUE: 23
PIF_VALUE: 25
PIF_VALUE: 23
PIF_VALUE: 22
PIF_VALUE: 17
PIF_VALUE: 24
PIF_VALUE: 22
PIF_VALUE: 18
PIF_VALUE: 23
PIF_VALUE: 25
PIF_VALUE: 23
PIF_VALUE: 22
PIF_VALUE: 22
PIF_VALUE: 23
PIF_VALUE: 21
PIF_VALUE: 24
PIF_VALUE: 22
PIF_VALUE: 22
PIF_VALUE: 23
PIF_VALUE: 0
PIF_VALUE: 26
PIF_VALUE: 21
PIF_VALUE: 22
PIF_VALUE: 22
PIF_VALUE: 21
PIF_VALUE: 22
PIF_VALUE: 24
PIF_VALUE: 23
PIF_VALUE: 24
PIF_VALUE: 17
PIF_VALUE: 24
PIF_VALUE: 22
PIF_VALUE: 24
PIF_VALUE: 22
PIF_VALUE: 24
PIF_VALUE: 21
PIF_VALUE: 22
PIF_VALUE: 21
PIF_VALUE: 24
PIF_VALUE: 23
PIF_VALUE: 24
PIF_VALUE: 22
PIF_VALUE: 21
PIF_VALUE: 22
PIF_VALUE: 21
PIF_VALUE: 22
PIF_VALUE: 21
PIF_VALUE: 23
PIF_VALUE: 22
PIF_VALUE: 24
PIF_VALUE: 25
PIF_VALUE: 22
PIF_VALUE: 23
PIF_VALUE: 24
PIF_VALUE: 23
PIF_VALUE: 22
PIF_VALUE: 21
PIF_VALUE: 24
PIF_VALUE: 22
PIF_VALUE: 22
PIF_VALUE: 17
PIF_VALUE: 23
PIF_VALUE: 22
PIF_VALUE: 23
PIF_VALUE: 2
PIF_VALUE: 23
PIF_VALUE: 22
PIF_VALUE: 23
PIF_VALUE: 21
PIF_VALUE: 24
PIF_VALUE: 23
PIF_VALUE: 23
PIF_VALUE: 22
PIF_VALUE: 25
PIF_VALUE: 25
PIF_VALUE: 24
PIF_VALUE: 23
PIF_VALUE: 20
PIF_VALUE: 24
PIF_VALUE: 25
PIF_VALUE: 22

## 2019-11-12 ASSESSMENT — PAIN DESCRIPTION - FREQUENCY: FREQUENCY: CONTINUOUS

## 2019-11-12 ASSESSMENT — PAIN SCALES - GENERAL
PAINLEVEL_OUTOF10: 10
PAINLEVEL_OUTOF10: 10
PAINLEVEL_OUTOF10: 6
PAINLEVEL_OUTOF10: 9

## 2019-11-12 ASSESSMENT — PAIN DESCRIPTION - PROGRESSION
CLINICAL_PROGRESSION: NOT CHANGED

## 2019-11-12 ASSESSMENT — PAIN DESCRIPTION - ONSET: ONSET: ON-GOING

## 2019-11-12 ASSESSMENT — PAIN - FUNCTIONAL ASSESSMENT: PAIN_FUNCTIONAL_ASSESSMENT: 0-10

## 2019-11-12 ASSESSMENT — PAIN DESCRIPTION - ORIENTATION: ORIENTATION: MID;LEFT

## 2019-11-12 ASSESSMENT — PAIN DESCRIPTION - LOCATION: LOCATION: ABDOMEN

## 2019-11-12 ASSESSMENT — PAIN DESCRIPTION - PAIN TYPE: TYPE: SURGICAL PAIN

## 2019-11-12 NOTE — H&P
History and Physical Update    Pt Name: Jakob Anguiano  MRN: [id number]  YOB: 1950  Date of evaluation: 11/12/2019      [x] I have reviewed the hardcopy Surgeon Progress Note by Dr Arsh Morales dated 10/18/19 in short chart which meets the criteria for an Interval History and Physical note. [x] I have examined  Jakob Anguiano  There are no changes to the patient who is scheduled for a Colostomy closure by Dr Arsh Morales for unwanted colostomy. The patient followed her bowel prep as directed until clear  She denies health changes since her visit with Dr Xi Guevara. She denies fever, chills, productive cough, SOB, chest pain, open sores or wounds. Last Motrin 11/4/19    Vital signs: BP (!) 131/59   Pulse 92   Temp 97.9 °F (36.6 °C) (Oral)   Resp 16   LMP  (LMP Unknown)   SpO2 96%     Allergies:  Patient has no known allergies. Medications:    Prior to Admission medications    Medication Sig Start Date End Date Taking? Authorizing Provider   acetaminophen (AMINOFEN) 325 MG tablet Take 3 tablets by mouth every 8 hours for 7 days 10/4/19 10/11/19  Savanna Wilson DO   ibuprofen (ADVIL) 200 MG tablet Take 2 tablets by mouth every 6 hours for 7 days 10/4/19 10/11/19  Savanna Wilson DO   losartan (COZAAR) 50 MG tablet Take 1 tablet by mouth daily 7/25/19   Alisia Martinez MD   Cholecalciferol (VITAMIN D) 2000 units CAPS capsule Take 1 capsule by mouth daily    Historical Provider, MD   clotrimazole-betamethasone (LOTRISONE) 1-0.05 % cream Apply topically 2 times daily. Patient taking differently: 2 times daily as needed TO SPOT ON ARM. 1/28/19   Alisia Martinez MD         This is a 81 y.o. female who is pleasant, cooperative, alert and oriented x3, in no acute distress. Heart: Heart sounds are normal.  HR 92 regular rate and rhythm without murmur, gallop or rub.    Lungs: Normal respiratory effort with good air exchange, unlabored and clear to auscultation without wheezes or rales bilaterally

## 2019-11-13 PROBLEM — E87.6 HYPOKALEMIA: Status: ACTIVE | Noted: 2019-11-13

## 2019-11-13 PROBLEM — K66.0 ABDOMINAL ADHESIONS: Status: ACTIVE | Noted: 2019-11-13

## 2019-11-13 LAB
ABSOLUTE EOS #: 0 K/UL (ref 0–0.4)
ABSOLUTE IMMATURE GRANULOCYTE: 0.2 K/UL (ref 0–0.3)
ABSOLUTE LYMPH #: 0.79 K/UL (ref 1–4.8)
ABSOLUTE MONO #: 0.99 K/UL (ref 0.2–0.8)
ANION GAP SERPL CALCULATED.3IONS-SCNC: 11 MMOL/L (ref 9–17)
BASOPHILS # BLD: 0 %
BASOPHILS ABSOLUTE: 0 K/UL (ref 0–0.2)
BUN BLDV-MCNC: 20 MG/DL (ref 8–23)
BUN/CREAT BLD: 25 (ref 9–20)
CALCIUM SERPL-MCNC: 8.8 MG/DL (ref 8.6–10.4)
CHLORIDE BLD-SCNC: 105 MMOL/L (ref 98–107)
CO2: 23 MMOL/L (ref 20–31)
CREAT SERPL-MCNC: 0.81 MG/DL (ref 0.5–0.9)
DIFFERENTIAL TYPE: ABNORMAL
EOSINOPHILS RELATIVE PERCENT: 0 % (ref 1–4)
GFR AFRICAN AMERICAN: >60 ML/MIN
GFR NON-AFRICAN AMERICAN: >60 ML/MIN
GFR SERPL CREATININE-BSD FRML MDRD: ABNORMAL ML/MIN/{1.73_M2}
GFR SERPL CREATININE-BSD FRML MDRD: ABNORMAL ML/MIN/{1.73_M2}
GLUCOSE BLD-MCNC: 175 MG/DL (ref 70–99)
HCT VFR BLD CALC: 38.8 % (ref 36.3–47.1)
HEMOGLOBIN: 12.1 G/DL (ref 11.9–15.1)
IMMATURE GRANULOCYTES: 1 %
LYMPHOCYTES # BLD: 4 % (ref 24–44)
MCH RBC QN AUTO: 28.9 PG (ref 25.2–33.5)
MCHC RBC AUTO-ENTMCNC: 31.2 G/DL (ref 28.4–34.8)
MCV RBC AUTO: 92.8 FL (ref 82.6–102.9)
MONOCYTES # BLD: 5 % (ref 1–7)
NRBC AUTOMATED: 0 PER 100 WBC
PDW BLD-RTO: 14.7 % (ref 11.8–14.4)
PLATELET # BLD: 227 K/UL (ref 138–453)
PLATELET ESTIMATE: ABNORMAL
PMV BLD AUTO: 10.2 FL (ref 8.1–13.5)
POTASSIUM SERPL-SCNC: 4.2 MMOL/L (ref 3.7–5.3)
RBC # BLD: 4.18 M/UL (ref 3.95–5.11)
RBC # BLD: ABNORMAL 10*6/UL
SEG NEUTROPHILS: 90 % (ref 36–66)
SEGMENTED NEUTROPHILS ABSOLUTE COUNT: 17.72 K/UL (ref 1.8–7.7)
SODIUM BLD-SCNC: 139 MMOL/L (ref 135–144)
WBC # BLD: 19.7 K/UL (ref 3.5–11.3)
WBC # BLD: ABNORMAL 10*3/UL

## 2019-11-13 PROCEDURE — 85025 COMPLETE CBC W/AUTO DIFF WBC: CPT

## 2019-11-13 PROCEDURE — 6360000002 HC RX W HCPCS: Performed by: INTERNAL MEDICINE

## 2019-11-13 PROCEDURE — 2500000003 HC RX 250 WO HCPCS: Performed by: STUDENT IN AN ORGANIZED HEALTH CARE EDUCATION/TRAINING PROGRAM

## 2019-11-13 PROCEDURE — 2580000003 HC RX 258: Performed by: STUDENT IN AN ORGANIZED HEALTH CARE EDUCATION/TRAINING PROGRAM

## 2019-11-13 PROCEDURE — 99221 1ST HOSP IP/OBS SF/LOW 40: CPT | Performed by: INTERNAL MEDICINE

## 2019-11-13 PROCEDURE — 97116 GAIT TRAINING THERAPY: CPT

## 2019-11-13 PROCEDURE — 97162 PT EVAL MOD COMPLEX 30 MIN: CPT

## 2019-11-13 PROCEDURE — 2060000000 HC ICU INTERMEDIATE R&B

## 2019-11-13 PROCEDURE — 97530 THERAPEUTIC ACTIVITIES: CPT

## 2019-11-13 PROCEDURE — 36415 COLL VENOUS BLD VENIPUNCTURE: CPT

## 2019-11-13 PROCEDURE — 80048 BASIC METABOLIC PNL TOTAL CA: CPT

## 2019-11-13 PROCEDURE — 6360000002 HC RX W HCPCS: Performed by: STUDENT IN AN ORGANIZED HEALTH CARE EDUCATION/TRAINING PROGRAM

## 2019-11-13 PROCEDURE — 6370000000 HC RX 637 (ALT 250 FOR IP): Performed by: STUDENT IN AN ORGANIZED HEALTH CARE EDUCATION/TRAINING PROGRAM

## 2019-11-13 RX ORDER — ACETAMINOPHEN 325 MG/1
650 TABLET ORAL DAILY PRN
Status: ON HOLD | COMMUNITY
End: 2019-11-14 | Stop reason: HOSPADM

## 2019-11-13 RX ORDER — METHYLPREDNISOLONE SODIUM SUCCINATE 40 MG/ML
40 INJECTION, POWDER, LYOPHILIZED, FOR SOLUTION INTRAMUSCULAR; INTRAVENOUS ONCE
Status: COMPLETED | OUTPATIENT
Start: 2019-11-13 | End: 2019-11-13

## 2019-11-13 RX ORDER — POTASSIUM CHLORIDE 20 MEQ/1
40 TABLET, EXTENDED RELEASE ORAL PRN
Status: DISCONTINUED | OUTPATIENT
Start: 2019-11-13 | End: 2019-11-18 | Stop reason: HOSPADM

## 2019-11-13 RX ORDER — CLOTRIMAZOLE AND BETAMETHASONE DIPROPIONATE 10; .64 MG/G; MG/G
CREAM TOPICAL DAILY PRN
COMMUNITY
End: 2020-07-30 | Stop reason: ALTCHOICE

## 2019-11-13 RX ORDER — METOPROLOL TARTRATE 5 MG/5ML
5 INJECTION INTRAVENOUS EVERY 6 HOURS PRN
Status: DISCONTINUED | OUTPATIENT
Start: 2019-11-13 | End: 2019-11-18 | Stop reason: HOSPADM

## 2019-11-13 RX ORDER — POTASSIUM CHLORIDE 7.45 MG/ML
10 INJECTION INTRAVENOUS PRN
Status: DISCONTINUED | OUTPATIENT
Start: 2019-11-13 | End: 2019-11-18 | Stop reason: HOSPADM

## 2019-11-13 RX ORDER — DIPHENHYDRAMINE HYDROCHLORIDE 50 MG/ML
12.5 INJECTION INTRAMUSCULAR; INTRAVENOUS ONCE
Status: COMPLETED | OUTPATIENT
Start: 2019-11-13 | End: 2019-11-13

## 2019-11-13 RX ADMIN — METHYLPREDNISOLONE SODIUM SUCCINATE 40 MG: 40 INJECTION, POWDER, FOR SOLUTION INTRAMUSCULAR; INTRAVENOUS at 02:00

## 2019-11-13 RX ADMIN — MORPHINE SULFATE 2 MG: 2 INJECTION, SOLUTION INTRAMUSCULAR; INTRAVENOUS at 09:20

## 2019-11-13 RX ADMIN — MORPHINE SULFATE 4 MG: 4 INJECTION INTRAVENOUS at 23:11

## 2019-11-13 RX ADMIN — FAMOTIDINE 20 MG: 10 INJECTION, SOLUTION INTRAVENOUS at 20:10

## 2019-11-13 RX ADMIN — Medication 10 ML: at 20:10

## 2019-11-13 RX ADMIN — METRONIDAZOLE 500 MG: 500 INJECTION, SOLUTION INTRAVENOUS at 23:11

## 2019-11-13 RX ADMIN — CEFAZOLIN SODIUM 2 G: 10 INJECTION, POWDER, FOR SOLUTION INTRAVENOUS at 06:02

## 2019-11-13 RX ADMIN — SODIUM CHLORIDE, POTASSIUM CHLORIDE, SODIUM LACTATE AND CALCIUM CHLORIDE: 600; 310; 30; 20 INJECTION, SOLUTION INTRAVENOUS at 20:20

## 2019-11-13 RX ADMIN — MORPHINE SULFATE 4 MG: 4 INJECTION INTRAVENOUS at 13:41

## 2019-11-13 RX ADMIN — ALVIMOPAN 12 MG: 12 CAPSULE ORAL at 08:32

## 2019-11-13 RX ADMIN — GABAPENTIN 100 MG: 100 CAPSULE ORAL at 08:32

## 2019-11-13 RX ADMIN — Medication 10 ML: at 04:18

## 2019-11-13 RX ADMIN — CEFAZOLIN SODIUM 2 G: 10 INJECTION, POWDER, FOR SOLUTION INTRAVENOUS at 23:11

## 2019-11-13 RX ADMIN — OXYCODONE HYDROCHLORIDE 10 MG: 5 TABLET ORAL at 00:51

## 2019-11-13 RX ADMIN — OXYCODONE HYDROCHLORIDE 10 MG: 5 TABLET ORAL at 08:32

## 2019-11-13 RX ADMIN — KETOROLAC TROMETHAMINE 15 MG: 15 INJECTION, SOLUTION INTRAMUSCULAR; INTRAVENOUS at 17:26

## 2019-11-13 RX ADMIN — DIPHENHYDRAMINE HYDROCHLORIDE 12.5 MG: 50 INJECTION, SOLUTION INTRAMUSCULAR; INTRAVENOUS at 02:00

## 2019-11-13 RX ADMIN — FAMOTIDINE 20 MG: 10 INJECTION, SOLUTION INTRAVENOUS at 08:32

## 2019-11-13 RX ADMIN — KETOROLAC TROMETHAMINE 15 MG: 15 INJECTION, SOLUTION INTRAMUSCULAR; INTRAVENOUS at 23:11

## 2019-11-13 RX ADMIN — MORPHINE SULFATE 4 MG: 4 INJECTION INTRAVENOUS at 04:16

## 2019-11-13 RX ADMIN — CEFAZOLIN SODIUM 2 G: 10 INJECTION, POWDER, FOR SOLUTION INTRAVENOUS at 15:38

## 2019-11-13 RX ADMIN — METRONIDAZOLE 500 MG: 500 INJECTION, SOLUTION INTRAVENOUS at 06:02

## 2019-11-13 RX ADMIN — MORPHINE SULFATE 4 MG: 4 INJECTION INTRAVENOUS at 20:10

## 2019-11-13 RX ADMIN — SODIUM CHLORIDE, POTASSIUM CHLORIDE, SODIUM LACTATE AND CALCIUM CHLORIDE: 600; 310; 30; 20 INJECTION, SOLUTION INTRAVENOUS at 09:20

## 2019-11-13 RX ADMIN — KETOROLAC TROMETHAMINE 15 MG: 15 INJECTION, SOLUTION INTRAMUSCULAR; INTRAVENOUS at 11:07

## 2019-11-13 RX ADMIN — ENOXAPARIN SODIUM 30 MG: 30 INJECTION SUBCUTANEOUS at 20:10

## 2019-11-13 RX ADMIN — KETOROLAC TROMETHAMINE 15 MG: 15 INJECTION, SOLUTION INTRAMUSCULAR; INTRAVENOUS at 04:16

## 2019-11-13 RX ADMIN — METRONIDAZOLE 500 MG: 500 INJECTION, SOLUTION INTRAVENOUS at 16:24

## 2019-11-13 RX ADMIN — LOSARTAN POTASSIUM 50 MG: 50 TABLET, FILM COATED ORAL at 08:32

## 2019-11-13 ASSESSMENT — PAIN DESCRIPTION - PROGRESSION

## 2019-11-13 ASSESSMENT — PAIN DESCRIPTION - DESCRIPTORS: DESCRIPTORS: ACHING

## 2019-11-13 ASSESSMENT — PAIN DESCRIPTION - ORIENTATION
ORIENTATION: MID;LOWER
ORIENTATION: LEFT;MID;LOWER
ORIENTATION: LEFT;MID

## 2019-11-13 ASSESSMENT — PAIN DESCRIPTION - FREQUENCY
FREQUENCY: CONTINUOUS
FREQUENCY: CONTINUOUS

## 2019-11-13 ASSESSMENT — PAIN DESCRIPTION - ONSET
ONSET: GRADUAL
ONSET: ON-GOING

## 2019-11-13 ASSESSMENT — PAIN SCALES - GENERAL
PAINLEVEL_OUTOF10: 6
PAINLEVEL_OUTOF10: 7
PAINLEVEL_OUTOF10: 6
PAINLEVEL_OUTOF10: 7
PAINLEVEL_OUTOF10: 7
PAINLEVEL_OUTOF10: 10
PAINLEVEL_OUTOF10: 6
PAINLEVEL_OUTOF10: 5
PAINLEVEL_OUTOF10: 4
PAINLEVEL_OUTOF10: 5
PAINLEVEL_OUTOF10: 8

## 2019-11-13 ASSESSMENT — PAIN DESCRIPTION - PAIN TYPE
TYPE: SURGICAL PAIN

## 2019-11-13 ASSESSMENT — PAIN DESCRIPTION - LOCATION
LOCATION: ABDOMEN

## 2019-11-13 ASSESSMENT — PAIN - FUNCTIONAL ASSESSMENT: PAIN_FUNCTIONAL_ASSESSMENT: ACTIVITIES ARE NOT PREVENTED

## 2019-11-13 NOTE — CARE COORDINATION
Case Management Initial Discharge Plan  Ann Mckeon,         Readmission Risk              Risk of Unplanned Readmission:        8             Met with:patient to discuss discharge plans. Information verified: address, contacts, phone number, , insurance Yes  PCP: Jourdan Dickens MD  Date of last visit: 2019    Insurance Provider: [employer]    Discharge Planning  Current Residence:  house  Living Arrangements:      Home has 2 stories/10 stairs to climb  Support Systems:     Current Services PTA:  SN Agency: [address]  Patient able to perform ADL's:Independent  DME in home:  cane  DME used to aid ambulation prior to admission:   cane  DME used during admission:  tbd    Potential Assistance Needed:       Pharmacy: [id number] [address] Medications:     Does patient want to participate in local refill/ meds to beds program?       Patient agreeable to home care: Yes  Freedom of choice provided:  yes  Used [facility] home care in the past, does not want them anymore  Options given, ok to use [facility] care      Type of Home Care Services:     Patient expects to be discharged to:       Prior SNF/Rehab Placement and Facility: none  Agreeable to SNF/Rehab: No  [place] of choice provided: no   Evaluation: no    Expected Discharge date:      Follow Up Appointment: Best Day/ Time:      Transportation provider: yes,   Transportation arrangements needed for discharge: No    Discharge Plan: home w [facility]  Lives alone in a 2 level home  B/b upstairs w/ 10 steps   Independent of her adl's  Open to placement vs home care  [facility], left voice msg in [facility] [facility] [employer]'s voice mail, requesting call back  [address] initiated  Will continue to follow  Pt had extensive abd surgery and colostomy closure, npo w/ ng tune in place      Electronically signed by Kevin Rendon RN on 19 at 1:53 PM

## 2019-11-13 NOTE — CONSULTS
Pulmonary Medicine and [address] Yenny Devries MD      Patient - Alda Rg   MRN -  [id number]   Betty # - [de-identified]   - 1950      Date of Admission -  2019 11:16 AM  Date of evaluation -  2019  Room - [address] [place] [address]   Shama Nunez MD Primary Care Physician - Anthony Haines MD     Reason for Consult    Atelectasis    Assessment   · Acute respiratory insufficiency  · Atelectasis  · Obesity with suspected obstructive sleep apnea  · S/P reversal of colostomy and lysis of adhesions, on 2019    Recommendations   · Incentive spirometry every hour while awake  · Continue IV antibiotics, cefazolin/Flagyl for total of 9 doses  · IV fluids  · Albuterol and Ipratropium Q 4 hours  prn  · X-ray chest in am  · Labs: CBC and BMP in am  · BiPAP, PRN  · 2 liters/min via nasal cannula  · Sleep studies as an outpatient  · DVT prophylaxis with low molecular weight heparin  · Will follow with you    MARYLU     Alda Rg is 81 y.o.,  female, admitted because of reversal of colostomy which was done in May for diverticulosis. Patient had uneventful night. She denies any chest pain, cough, shortness of breath. She suspect she has sleep apnea. No significant history of smoking    PMHx   Past Medical History      Diagnosis Date    Allergic rhinitis     Bladder prolapse     Constipation     2019 resolved    Fundic gland polyposis of stomach 2017    per EGD    GERD (gastroesophageal reflux disease)     on no medications    Hiatal hernia     History of cardiac cath 2002    pt denies blockage    History of diverticulitis     Hyperlipidemia     borderline, on no medication    Hypertension     MRSA (methicillin resistant staph aureus) culture positive 2016    nasal    MRSA carrier     follows with ID    Obesity     Osteoarthritis     Systolic murmur     benign systolic murmur (history of).  Pt does not follow-up with a Daily    sodium chloride flush  10 mL Intravenous 2 times per day    enoxaparin  30 mg Subcutaneous BID    ceFAZolin  2 g Intravenous Q8H    metroNIDAZOLE  500 mg Intravenous Q8H    famotidine (PEPCID) injection  20 mg Intravenous BID    ketorolac  15 mg Intravenous Q6H    alvimopan  12 mg Oral BID    gabapentin  100 mg Oral TID    cyclobenzaprine  10 mg Oral Nightly     potassium chloride **OR** potassium alternative oral replacement **OR** potassium chloride, metoprolol, sodium chloride flush, oxyCODONE **OR** oxyCODONE, morphine **OR** morphine, ondansetron **OR** ondansetron, hydrALAZINE  IV Drips/Infusions   lactated ringers 125 mL/hr at 19 0920     Home Medications  Medications Prior to Admission: acetaminophen (TYLENOL) 325 MG tablet, Take 650 mg by mouth daily as needed for Pain  clotrimazole-betamethasone (LOTRISONE) 1-0.05 % cream, Apply topically daily as needed (to spot on arm)  losartan (COZAAR) 50 MG tablet, Take 1 tablet by mouth daily  Cholecalciferol (VITAMIN D) 2000 units CAPS capsule, Take 1 capsule by mouth daily  [DISCONTINUED] acetaminophen (AMINOFEN) 325 MG tablet, Take 3 tablets by mouth every 8 hours for 7 days (Patient taking differently: Take 650 mg by mouth daily as needed for Pain )  [DISCONTINUED] ibuprofen (ADVIL) 200 MG tablet, Take 2 tablets by mouth every 6 hours for 7 days  [DISCONTINUED] clotrimazole-betamethasone (LOTRISONE) 1-0.05 % cream, Apply topically 2 times daily. (Patient taking differently: 2 times daily as needed TO SPOT ON ARM.)    Allergies    Patient has no known allergies.   Social History     Social History     Tobacco Use    Smoking status: Former Smoker     Packs/day: 1.00     Years: 3.00     Pack years: 3.00     Last attempt to quit: 1975     Years since quittin.8    Smokeless tobacco: Never Used    Tobacco comment: quit smoking age 23   Substance Use Topics    Alcohol use: Yes     Comment: very rare     Family History bruising or bleeding  Extremities - No cyanosis, clubbing or edema    Labs  - Old records and notes have been reviewed in [facility]   CBC     Lab Results   Component Value Date    WBC 19.7 11/13/2019    RBC 4.18 11/13/2019    RBC 3.98 12/06/2011    HGB 12.1 11/13/2019    HCT 38.8 11/13/2019     11/13/2019     12/06/2011    MCV 92.8 11/13/2019    MCH 28.9 11/13/2019    MCHC 31.2 11/13/2019    RDW 14.7 11/13/2019    LYMPHOPCT 4 11/13/2019    MONOPCT 5 11/13/2019    BASOPCT 0 11/13/2019    MONOSABS 0.99 11/13/2019    LYMPHSABS 0.79 11/13/2019    EOSABS 0.00 11/13/2019    BASOSABS 0.00 11/13/2019    DIFFTYPE NOT REPORTED 11/13/2019     BMP   Lab Results   Component Value Date     11/13/2019    K 4.2 11/13/2019     11/13/2019    CO2 23 11/13/2019    BUN 20 11/13/2019    CREATININE 0.81 11/13/2019    GLUCOSE 175 11/13/2019    GLUCOSE 94 12/06/2011    CALCIUM 8.8 11/13/2019    MG 1.8 05/20/2019     LFTS  Lab Results   Component Value Date    ALKPHOS 84 02/08/2019    ALT 15 02/08/2019    AST 12 02/08/2019    PROT 6.9 02/08/2019    BILITOT 0.37 02/08/2019    BILIDIR 0.09 03/25/2018    IBILI 0.19 03/25/2018    LABALBU 4.0 02/08/2019    LABALBU 4.0 12/06/2011     ABG   No results found for: PHART, AVZ3LKO, PO2ART, HYH0RUK, BEART, THGBART, XAS6PAQ, R9TJAUJF  PTT  No results found for: APTT  INR   Lab Results   Component Value Date    INR 0.9 11/01/2016    PROTIME 10.0 11/01/2016     \"Thank you for asking us to see this patient\"    Case discussed with nurse and patient. Questions and concerns addressed.     Electronically signed by     Rinku Bernal MD on 11/13/2019 at 2:44 PM  Pulmonary Critical Care and Sleep Medicine,  [facility]  Cell: [phone]  Office: [phone]

## 2019-11-13 NOTE — PROGRESS NOTES
Patient transferred to room 1021 from ICU. Report received from , ICU RN  Patient brought with belongings and reports phone is missing. Call placed to ICU, states will look for phone. Patient oriented to room, vitals taken and patient tucked in.

## 2019-11-13 NOTE — FLOWSHEET NOTE
Patient receives Sacrament of the Sick (anointing) from [facility].    [facility] will follow as needed. (writer charting for [employer].)     80/12/80 1319   Encounter Summary   Services provided to: Patient   Referral/Consult From: [facility]ing   [facility] None   Continue Visiting   (11/13/19 anointed)   Complexity of Encounter Low   Length of Encounter 15 minutes   Routine   Type Initial   Sacraments   Sacrament of Sick-Anointing Anointed  (11/13/19 Fr. Charly Cintron)

## 2019-11-13 NOTE — PROGRESS NOTES
Pt arrived to the unit 11/12 @ 2230 Pt was drowsy but able to answer questions appropriate. Pt medicated for pain rated 10/10. Pt did wake up more around 0000 and c/o pain in her left eye which is swollen and red, this nurse provided ice pack and wash cloth, pt requested that I call Dr. NORMAN [facility] in the ED as they are coworkers, Dr. NORMAN [facility] came down to evaluate and noted scleral edema possible allergic response to dust and suggested decadron and benadryl. This nurse attempts to get orders from Dr. Zacarias Remy and is not able to get a hold of answering service and left 2 messages. Will contact Dr. Erwin Lundberg for orders and continue to monitor.

## 2019-11-13 NOTE — DISCHARGE INSTR - COC
Continuity of Care Form    Patient Name: Lety Adjutant   :  1950  MRN:  [id number]    [facility] date:  2019  Discharge date:  19    Code Status Order: Full Code   Advance Directives:   Advance Care Flowsheet Documentation     Date/Time Healthcare Directive Type of Healthcare Directive Copy in [address] Agent's Name Healthcare Agent's Phone Number    19 1158  No, patient does not have an advance directive for healthcare treatment -- -- -- -- --    19 1157  No, patient does not have an advance directive for healthcare treatment -- -- -- -- --          Admitting Physician:  Marisol Alba MD  PCP: Claudia Umana MD    Discharging Nurse:   [address] Unit/Room#: 1110/1110-01  Discharging Unit Phone Number: [phone]    Emergency Contact:   Extended Emergency Contact Information  Primary Emergency Contact: Meg Barone  Address: [address] [facility] [address] Phone: [phone]  Work Phone: [phone]  Mobile Phone: [phone]  Relation: Child  Secondary Emergency Contact: Arnaldo Kenny  Address: [address] Phone: [phone]  Work Phone: [phone]  Mobile Phone: [phone]  Relation: Parent    Past Surgical History:  Past Surgical History:   Procedure Laterality Date    ABDOMINAL EXPLORATION SURGERY  2019    CHOLECYSTECTOMY, LAPAROSCOPIC  11/15/2016    CHOLECYSTECTOMY, LAPAROSCOPIC  11/15/2016    COLONOSCOPY      neg    COLONOSCOPY N/A 2019    COLONOSCOPY DIAGNOSTIC performed by Marisol Alba MD at [address], COLON, DIAGNOSTIC      EGD    FISSURECTOMY ANAL N/A 10/4/2019    FLEXIBLE SIGMOIDOSCOPY & ANAL FISTULECTOMY performed by Marisol Alba MD at [facility] ARTHROSCOPY Left     lateral release    CT EGD TRANSORAL BIOPSY SINGLE/MULTIPLE N/A 2017    EGD BIOPSY performed by Virgen Torres MD at [address] OFFICE/OUTPT VISIT,PROCEDURE ONLY N/A 5/22/2018    XI ROBOTIC LAPAROSCOPIC UMBILICAL HERNIA REPAIR WITH MESH, POSSIBLE OPEN performed by Hillary Morris IV, DO at [facility] 20  02/19/2019    SIGMOIDOSCOPY N/A 2/19/2019    SIGMOIDOSCOPY BIOPSY FLEXIBLE performed by Saad Decker DO at [place] [address] N/A 5/16/2019    ABDOMINAL EXPLORATION ANDBOWEL RESECTION LOW ANTERIOR WITH COLOSTOMY performed by Geovany Zepeda MD at [place] [address] N/A 11/12/2019    COLOSTOMY  CLOSURE EXTENSIVE LYSIS OF ADHESIONS REPAIR OF SMALL BOWEL TEAR, REPAIR OF MULTIPLE VENTRAL HERNIAS, MOBILIZATION OF THE SPLENIC FLEXURE AND TRANSVERSE COLON, ABDOMINAL EXPLORATION performed by Geovany Zepeda MD at [place] [address] Left 9/24/2012    knee    TOTAL KNEE ARTHROPLASTY Right 11/10/2014    knee    UMBILICAL HERNIA REPAIR  05/22/2018    UPPER GASTROINTESTINAL ENDOSCOPY  08/17/2017    Multiple small gastric polyps, no esophagitis noted no Melara's mucosa.   No hiatal hernia, pathology benign fundic gland polyps       Immunization History:   Immunization History   Administered Date(s) Administered    Influenza Vaccine, unspecified formulation 10/03/2016    Influenza Virus Vaccine 09/17/2014, 10/01/2015, 10/23/2017       Active Problems:  Patient Active Problem List   Diagnosis Code    Dyslipidemia E78.5    Osteoarthritis M19.90    GERD (gastroesophageal reflux disease) K21.9    Essential hypertension I10    Obesity E66.9    Allergic rhinitis J30.9    Osteoporosis M81.0    Arthritis of knee M17.10    Biliary dyskinesia K82.8    Right upper quadrant abdominal pain R10.11    Fundic gland polyposis of stomach K31.7    SBO (small bowel obstruction) ([facility] [address] K61.56    Incarcerated incisional hernia K43.0    Sigmoid diverticulitis K57.32    Incisional hernia without obstruction or gangrene K43.2    Chalazion H00.19    S/P colectomy Z90.49    is not on home oxygen therapy. Ventilator:    - No ventilator support    Rehab Therapies: Physical Therapy and Occupational Therapy  Weight Bearing Status/Restrictions: No weight bearing restirctions  Other Medical Equipment (for information only, NOT a DME order):  cane  Other Treatments:   1. Skilled RN assessment   2. Medication reconciliation   3. Dressing changes Plain packing to opens in incision between staples and old ostomy site BID. Dry dressing over it. Patient's personal belongings (please select all that are sent with patient):  None    RN SIGNATURE:  Electronically signed by Becky Lara RN on 11/18/19 at 10:24 AM    CASE MANAGEMENT/SOCIAL WORK SECTION    Inpatient Status Date: 11/12/19    Readmission Risk Assessment Score:  Readmission Risk              Risk of Unplanned Readmission:        8           Discharging to Facility/ Agency   · Name:  [facility]  · Address:  · Phone: [phone]  · Fax: [phone]    Dialysis Facility (if applicable)   · Name:  · Address:  · Dialysis Schedule:  · Phone:  · Fax:    / signature: Electronically signed by Oleg Osorio RN on 11/13/19 at 2:06 PM    PHYSICIAN SECTION    Prognosis: Good    Condition at Discharge: Stable    Rehab Potential (if transferring to Rehab): Good    Recommended Labs or Other Treatments After Discharge: Skilled VNS, physician follow-up as noted    Physician Certification: I certify the above information and transfer of Jarrod Avila  is necessary for the continuing treatment of the diagnosis listed and that she requires Home Care for less 30 days.      Update Admission H&P: No change in H&P    PHYSICIAN SIGNATURE:  Electronically signed by Marizol Guidry DO on 11/18/19 at 1:10 PM

## 2019-11-13 NOTE — CONSULTS
[facility] / HISTORY AND PHYSICAL EXAMINATION            Date:   11/13/2019  Patient name: Erick Landeros  Date of admission:  11/12/2019 11:16 AM  MRN:   [id number]  Account:  [de-identified]  YOB: 1950  PCP:    Linwood Chapman MD  Room:   [address] [place] [address]  Code Status:    Full Code    Physician Requesting Consult: Akhil Stuart MD    Reason for Consult: Postoperative medical management of hypertension and general medical care at the request of Dr. Darylene Motley    Chief Complaint:     Colostomy reversal    History Obtained From:     patient    History of Present Illness: This is a 28-year-old  female who was admitted for colostomy reversal.  She underwent a colectomy and ostomy formation this past May for recurrent diverticulitis. She had an outpatient colonoscopy which was unable to be advanced prior to her surgery as well as a barium enema which failed to the back of the colon. Subsequent exams were inconclusive and the recommendation was to proceed with exploratory laparotomy and resection of the bowel. She underwent surgery this past May and recovered nicely and is now admitted for colostomy reversal postoperative medical management. She is currently seen in room her pain is well controlled. She denies any chest pain, shortness of breath, nausea or vomiting. She has long-standing history of hypertension, dyslipidemia and reflux disease which are well controlled.     Past Medical History:     Past Medical History:   Diagnosis Date    Allergic rhinitis     Bladder prolapse     Constipation     5/2019 resolved    Fundic gland polyposis of stomach 08/17/2017    per EGD    GERD (gastroesophageal reflux disease)     on no medications    Hiatal hernia     History of cardiac cath 12/2002    pt denies blockage    History of diverticulitis     Hyperlipidemia     borderline, on no medication    Hypertension     MRSA (methicillin resistant staph aureus) culture positive 11/01/2016    nasal    MRSA carrier     follows with ID    Obesity     Osteoarthritis     Systolic murmur     benign systolic murmur (history of). Pt does not follow-up with a cardiologist (Written 09/25/2019).     Thyroid disease     goiter    Wears glasses         Past Surgical History:     Past Surgical History:   Procedure Laterality Date    ABDOMINAL EXPLORATION SURGERY  05/16/2019    CHOLECYSTECTOMY, LAPAROSCOPIC  11/15/2016    CHOLECYSTECTOMY, LAPAROSCOPIC  11/15/2016    COLONOSCOPY  2013    neg    COLONOSCOPY N/A 5/16/2019    COLONOSCOPY DIAGNOSTIC performed by Ousmane Germain MD at [address], COLON, DIAGNOSTIC      EGD    FISSURECTOMY ANAL N/A 10/4/2019    FLEXIBLE SIGMOIDOSCOPY & ANAL FISTULECTOMY performed by Ousmane Germain MD at [address] [facility] ARTHROSCOPY Left     lateral release    MO EGD TRANSORAL BIOPSY SINGLE/MULTIPLE N/A 8/17/2017    EGD BIOPSY performed by Michael Mueller MD at [address] OFFICE/OUTPT [facility] N/A 5/22/2018    XI ROBOTIC LAPAROSCOPIC UMBILICAL HERNIA REPAIR WITH MESH, POSSIBLE OPEN performed by Mobeonstacie Siemens Canos IVDO at [facility] [address]  02/19/2019    SIGMOIDOSCOPY N/A 2/19/2019    SIGMOIDOSCOPY BIOPSY FLEXIBLE performed by Angie Amin DO at [address] [facility] N/A 5/16/2019    [address] performed by Ousmane Germain MD at [address] [facility] N/A 11/12/2019    COLOSTOMY  [address] [facility], REPAIR OF MULTIPLE VENTRAL HERNIAS, MOBILIZATION OF THE SPLENIC FLEXURE AND TRANSVERSE COLON, ABDOMINAL EXPLORATION performed by Ousmane Germain MD at [address] [facility] Left 9/24/2012    knee    TOTAL KNEE ARTHROPLASTY Right 11/10/2014    knee    UMBILICAL HERNIA REPAIR  05/22/2018    UPPER GASTROINTESTINAL

## 2019-11-13 NOTE — OP NOTE
incision. The subcutaneous tissues were dissected using electrocautery. There were multiple ventral hernias which were encountered upon exposing the fascia. We then were able to enter the peritoneal cavity using a combination of blunt and sharp dissection. Once we had gain entrance into the peritoneal cavity we extended the fascial opening of the length of our incision. Upon initial entrance into the abdomen there were severe adhesions. There were no free pockets that were loose but the entire peritoneal cavity was completely the filled with adhesions. At that time we undertook a massive lysis of adhesions which took greater than 3 hours. During the lysis of adhesions there were multiple serosal tears which were closed with 4-0 silk. There was a loop of small bowel which was adhered down in the pelvis which after meticulous dissection was freed from the pelvis but did have a small serosal injury which was repaired with 4-0 silk. After our extensive lysis of adhesions we were able to gain exposure to the pelvis. The uterus was anterior to the rectal stump and was completely covering the rectal stump. The uterus was then carefully dissected off of the rectal stump. We then proceeded to mobilize the rectal stump while preserving the lateral stalks to preserve the blood supply. When the rectal stump was mobilized we then used a 24 Western Katharine dilator through the anus and the rectum up to the site of the rectal stump. The dilator was not able to reach the end of the rectal stump. We then used Betadine which was injected through the rectum which could be seen coming out of the end of the rectal stump. There was a small opening in the rectum at this time. We then cleaned off the fatty tissue from around the distal aspect of the rectal stump and matured the opening in the rectum. On palpation of the rectum from the end of the stump there was a stricture which could be palpated.   This precluded us from using a stapler exclude decision was made to proceed with anastomosis using the handsewn technique. We then turned our attention to the left side of the abdomen where the colostomy was located. We mobilized the surrounding tissues of the colostomy on the peritoneal side. We then mobilized the colostomy by making a circular incision around the colostomy preserving approximately 2 mm of skin. We then dissected the subcutaneous tissues to the level of the fascia around the colostomy. We then dissected the colon free from the fascia and it was replaced back within the peritoneal cavity. We then did an extensive dissection which included mobilizing the splenic flexure as well as mobilizing the transverse colon. This was done so that the proximal colon had enough length to create a tension-free anastomosis. We then resected the distal portion of the colon to create a healthy and of tissue to create our anastomosis. We then reapproximated the 2 ends of the colon and created a handsewn anastomosis. We did this by using 4-0 silk to create the posterior outer layer. We did this in an interrupted fashion. When this was completed we then created the inner layer on the posterior side using 4-0 Vicryl. This was also done in an interrupted fashion. We then created the inner layer on the anterior surface using 4-0 Vicryl in interrupted fashion. We then completed the anastomosis by using 4-0 silk to create the anterior outer layer of the anastomosis. When the anastomosis was complete we then irrigated the abdomen with a copious amount of normal saline. During the anastomosis there was a small amount of stool which spilled into the abdomen. This was prior to placing a bowel clamp on the proximal bowel. Normal saline was used to irrigate out the abdomen. Hemostasis was achieved using electrocautery and there was no bleeding apparent at the conclusion of the case.       We then turned our attention to the multiple

## 2019-11-13 NOTE — PROGRESS NOTES
Physical Therapy    Facility/Department: [facility]  Initial Assessment    NAME: Saint Mood  : 1950  MRN: [id number]    Date of Service: 2019    Discharge Recommendations:  Home with Home health PT, Home with assist PRN     Pt presented to surgery on 19  for a Colostomy closure by Dr Jaylyn Fair for unwanted colostomy. She underwent a colectomy and ostomy formation this past May for recurrent diverticulitis. She had an outpatient colonoscopy which was unable to be advanced prior to her surgery as well as a barium enema which failed to the back of the colon. Subsequent exams were inconclusive and the recommendation was to proceed with exploratory laparotomy and resection of the bowel. She underwent surgery this past May and recovered nicely and is now admitted for colostomy reversal postoperative medical management. She is currently seen in room her pain is well controlled. She denies any chest pain, shortness of breath, nausea or vomiting. She has long-standing history of hypertension, dyslipidemia and reflux disease which are well controlled. RN reports patient is medically stable for therapy treatment this date. Chart reviewed prior to treatment and patient is agreeable for therapy. Assessment   Body structures, Functions, Activity limitations: Decreased functional mobility ; Decreased safe awareness;Decreased endurance;Decreased balance  Assessment: Pt tolerated session with minimal to moderate deficits of bed mobility, transfers, ambulation & endurance s/p colostomy reversal. Pt requires continued skilled IP PT & should be appropriate to D/C Home with assist & HH PT to maximize independence with functional mobility, balance, safety awareness & activity tolerance.   Prognosis: Good  Decision Making: Medium Complexity  Exam: ROM, MMT, functional mobility, activity tolerance, Balance, & [employer] AM-PAC 6 Clicks Basic Mobility   Clinical Presentation: evolving  PT Education: Functional Mobility Training;Gait Training;Transfer Training  Patient Education: safety awareness, prevention of sedentary complications, deep breathing ex's  REQUIRES PT FOLLOW UP: Yes  Activity Tolerance  Activity Tolerance: Patient limited by endurance       Patient Diagnosis(es): There were no encounter diagnoses. has a past medical history of Allergic rhinitis, Bladder prolapse, Constipation, Fundic gland polyposis of stomach, GERD (gastroesophageal reflux disease), Hiatal hernia, History of cardiac cath, History of diverticulitis, Hyperlipidemia, Hypertension, MRSA (methicillin resistant staph aureus) culture positive, MRSA carrier, Obesity, Osteoarthritis, Systolic murmur, Thyroid disease, and Wears glasses. has a past surgical history that includes Endoscopy, colon, diagnostic; Knee arthroscopy (Left); Total knee arthroplasty (Left, 9/24/2012); Total knee arthroplasty (Right, 11/10/2014); Cholecystectomy, laparoscopic (11/15/2016); Cholecystectomy, laparoscopic (11/15/2016); Colonoscopy (2013); Upper gastrointestinal endoscopy (08/17/2017); pr egd transoral biopsy single/multiple (N/A, 8/17/2017); Umbilical hernia repair (05/22/2018); pr office/outpt visit,procedure only (N/A, 5/22/2018); Sigmoidoscopy (02/19/2019); sigmoidoscopy (N/A, 2/19/2019); Abdominal exploration surgery (05/16/2019); Small intestine surgery (N/A, 5/16/2019); Colonoscopy (N/A, 5/16/2019); joint replacement; FISSURECTOMY ANAL (N/A, 10/4/2019); and Small intestine surgery (N/A, 11/12/2019).     Restrictions  Restrictions/Precautions  Restrictions/Precautions: General Precautions, Fall Risk  Required Braces or Orthoses?: Yes  Required Braces or Orthoses  Other: Abdominal Binder  Position Activity Restriction  Other position/activity restrictions: NGT, telemetry, up as tolerated  Vision/Hearing  Vision: Impaired  Vision Exceptions: Wears glasses at all times  Hearing: Within functional limits     Subjective  General  Chart Minimal assistance  Supine to Sit: Moderate assistance  Scooting: Minimal assistance  Comment: cues/assist on technique. Ed on log rolling to protect abd   Pt sat at EOB for 10 minutes to rest after bed mobility,  & prepare lines for standing & ambulation. Transfers  Sit to Stand: Minimal Assistance  Pt stood 4 minutes & assisted to don abd binder with modA  Stand to sit: Minimal Assistance  Bed to Chair: Minimal assistance  Stand Pivot Transfers: Minimal Assistance  Lateral Transfers: Minimal Assistance  Comment: Ed on correct use of UB for sit/stand, extra assist needed for multiple lines  Ambulation  Ambulation?: Yes  Ambulation 1  Surface: level tile  Device: Rolling Walker  Assistance: Moderate assistance  Quality of Gait: step to pattern  Gait Deviations: Slow [place]  Distance: 4ft     Balance  Sitting - Static: Good  Sitting - Dynamic: Good  Standing - Static: Good  Standing - Dynamic: Good;-  Exercises  Comments: Ed functional mobility, safety, prevention sedentary complications & breathing techniques    All lines intact, call light within reach, and patient positioned comfortably at end of treatment. All patient needs addressed prior to ending therapy session.         Plan   Plan  Times per week: 1-2x/D,7D/week  Current Treatment Recommendations: Strengthening, Balance Training, Functional Mobility Training, Transfer Training, Gait Training, Endurance Training, Stair training, Safety Education & Training, Home Exercise Program, Patient/Caregiver Education & Training  Safety Devices  Type of devices: Call light within reach, Gait belt, Patient at risk for falls, Left in chair, Nurse notified    G-Code       OutComes Score                                                  AM-PAC Score  AM-PAC Inpatient Mobility Raw Score : 14 (11/13/19 1256)  AM-PAC Inpatient T-Scale Score : 38.1 (11/13/19 1256)  Mobility Inpatient CMS 0-100% Score: 61.29 (11/13/19 1256)  Mobility Inpatient CMS G-Code Modifier : CL

## 2019-11-13 NOTE — BRIEF OP NOTE
Brief Postoperative Note  ______________________________________________________________    Patient: Lin Cash  YOB: 1950  MRN: [id number]  Date of Procedure: 11/12/2019    Pre-Op Diagnosis: DX UNWANTED COLOSTOMY    Post-Op Diagnosis: Same       Procedure(s):  COLOSTOMY  CLOSURE EXTENSIVE LYSIS OF ADHESIONS REPAIR OF SMALL BOWEL TEAR, REPAIR OF MULTIPLE VENTRAL HERNIAS, MOBILIZATION OF THE SPLENIC FLEXURE AND TRANSVERSE COLON, ABDOMINAL EXPLORATION AND PARTIAL COLECTOMY    Anesthesia: General    Surgeon(s):  Geoffrey Saenz MD    Assistant: Urszula Lopez PGY 5    Estimated Blood Loss (mL): 300 ml    Fluids: 0667 ml    Complications: None    Specimens:   ID Type Source Tests Collected by Time Destination   A : SIGMOID COLON Tissue Sigmoid Colon SURGICAL PATHOLOGY Valerie Goetz MD 11/12/2019 1942        Implants:  * No implants in log *      Drains:   NG/OG/NJ/NE Tube Nasogastric Left nostril (Active)   Surrounding Skin Intact 11/12/2019  9:53 PM   Securement device Yes 11/12/2019  9:53 PM   Status Suction-low intermittent 11/12/2019  9:53 PM   Placement Verified by Gastric Contents 11/12/2019  9:53 PM   NG/OG/NJ/NE External Measurement (cm) 62 cm 11/12/2019  9:53 PM   Drainage Appearance Bile 11/12/2019  9:53 PM       Urethral Catheter Non-latex 16 fr (Active)   Site Assessment No urethral drainage 11/12/2019  9:53 PM   Urine Color Yellow 11/12/2019  9:53 PM   Urine Appearance Clear 11/12/2019  9:53 PM       [REMOVED] Colostomy LLQ (Removed)       Findings: Wound class 4. Hand sewn pelvic anastomosis. Extensive SANCHEZ. Multiple ventral wall hernias repaired. Mobilization of splenic flexure and transverse colon.     Isiah Whyte DO  Date: 11/12/2019  Time: 10:05 PM

## 2019-11-14 ENCOUNTER — APPOINTMENT (OUTPATIENT)
Dept: GENERAL RADIOLOGY | Age: 69
DRG: 336 | End: 2019-11-14
Attending: COLON & RECTAL SURGERY
Payer: MEDICARE

## 2019-11-14 PROBLEM — J98.11 ATELECTASIS OF LEFT LUNG: Status: ACTIVE | Noted: 2019-11-14

## 2019-11-14 LAB
ABSOLUTE EOS #: <0.03 K/UL (ref 0–0.44)
ABSOLUTE IMMATURE GRANULOCYTE: 0.1 K/UL (ref 0–0.3)
ABSOLUTE LYMPH #: 0.77 K/UL (ref 1.1–3.7)
ABSOLUTE MONO #: 0.8 K/UL (ref 0.1–1.2)
ANION GAP SERPL CALCULATED.3IONS-SCNC: 8 MMOL/L (ref 9–17)
BASOPHILS # BLD: 0 % (ref 0–2)
BASOPHILS ABSOLUTE: 0.05 K/UL (ref 0–0.2)
BUN BLDV-MCNC: 14 MG/DL (ref 8–23)
BUN/CREAT BLD: 25 (ref 9–20)
CALCIUM SERPL-MCNC: 8.6 MG/DL (ref 8.6–10.4)
CHLORIDE BLD-SCNC: 106 MMOL/L (ref 98–107)
CO2: 26 MMOL/L (ref 20–31)
CREAT SERPL-MCNC: 0.55 MG/DL (ref 0.5–0.9)
DIFFERENTIAL TYPE: ABNORMAL
EOSINOPHILS RELATIVE PERCENT: 0 % (ref 1–4)
GFR AFRICAN AMERICAN: >60 ML/MIN
GFR NON-AFRICAN AMERICAN: >60 ML/MIN
GFR SERPL CREATININE-BSD FRML MDRD: ABNORMAL ML/MIN/{1.73_M2}
GFR SERPL CREATININE-BSD FRML MDRD: ABNORMAL ML/MIN/{1.73_M2}
GLUCOSE BLD-MCNC: 120 MG/DL (ref 70–99)
HCT VFR BLD CALC: 31.9 % (ref 36.3–47.1)
HEMOGLOBIN: 10.1 G/DL (ref 11.9–15.1)
IMMATURE GRANULOCYTES: 1 %
LYMPHOCYTES # BLD: 5 % (ref 24–43)
MCH RBC QN AUTO: 29.8 PG (ref 25.2–33.5)
MCHC RBC AUTO-ENTMCNC: 31.7 G/DL (ref 28.4–34.8)
MCV RBC AUTO: 94.1 FL (ref 82.6–102.9)
MONOCYTES # BLD: 5 % (ref 3–12)
NRBC AUTOMATED: 0 PER 100 WBC
PDW BLD-RTO: 15.2 % (ref 11.8–14.4)
PLATELET # BLD: 174 K/UL (ref 138–453)
PLATELET ESTIMATE: ABNORMAL
PMV BLD AUTO: 9.7 FL (ref 8.1–13.5)
POTASSIUM SERPL-SCNC: 3.8 MMOL/L (ref 3.7–5.3)
RBC # BLD: 3.39 M/UL (ref 3.95–5.11)
RBC # BLD: ABNORMAL 10*6/UL
SEG NEUTROPHILS: 89 % (ref 36–65)
SEGMENTED NEUTROPHILS ABSOLUTE COUNT: 13.22 K/UL (ref 1.5–8.1)
SODIUM BLD-SCNC: 140 MMOL/L (ref 135–144)
SURGICAL PATHOLOGY REPORT: NORMAL
WBC # BLD: 15 K/UL (ref 3.5–11.3)
WBC # BLD: ABNORMAL 10*3/UL

## 2019-11-14 PROCEDURE — 6360000002 HC RX W HCPCS: Performed by: STUDENT IN AN ORGANIZED HEALTH CARE EDUCATION/TRAINING PROGRAM

## 2019-11-14 PROCEDURE — 97530 THERAPEUTIC ACTIVITIES: CPT

## 2019-11-14 PROCEDURE — 97166 OT EVAL MOD COMPLEX 45 MIN: CPT

## 2019-11-14 PROCEDURE — 97116 GAIT TRAINING THERAPY: CPT

## 2019-11-14 PROCEDURE — 71045 X-RAY EXAM CHEST 1 VIEW: CPT

## 2019-11-14 PROCEDURE — 2060000000 HC ICU INTERMEDIATE R&B

## 2019-11-14 PROCEDURE — 97110 THERAPEUTIC EXERCISES: CPT

## 2019-11-14 PROCEDURE — 85025 COMPLETE CBC W/AUTO DIFF WBC: CPT

## 2019-11-14 PROCEDURE — 2500000003 HC RX 250 WO HCPCS: Performed by: STUDENT IN AN ORGANIZED HEALTH CARE EDUCATION/TRAINING PROGRAM

## 2019-11-14 PROCEDURE — 99232 SBSQ HOSP IP/OBS MODERATE 35: CPT | Performed by: INTERNAL MEDICINE

## 2019-11-14 PROCEDURE — 97535 SELF CARE MNGMENT TRAINING: CPT

## 2019-11-14 PROCEDURE — 2580000003 HC RX 258: Performed by: STUDENT IN AN ORGANIZED HEALTH CARE EDUCATION/TRAINING PROGRAM

## 2019-11-14 PROCEDURE — 36415 COLL VENOUS BLD VENIPUNCTURE: CPT

## 2019-11-14 PROCEDURE — 6370000000 HC RX 637 (ALT 250 FOR IP): Performed by: STUDENT IN AN ORGANIZED HEALTH CARE EDUCATION/TRAINING PROGRAM

## 2019-11-14 PROCEDURE — 80048 BASIC METABOLIC PNL TOTAL CA: CPT

## 2019-11-14 RX ORDER — ONDANSETRON 4 MG/1
4 TABLET, FILM COATED ORAL EVERY 12 HOURS PRN
Qty: 10 TABLET | Refills: 0 | Status: SHIPPED | OUTPATIENT
Start: 2019-11-14 | End: 2020-07-30 | Stop reason: ALTCHOICE

## 2019-11-14 RX ORDER — HYDROCODONE BITARTRATE AND ACETAMINOPHEN 5; 325 MG/1; MG/1
1 TABLET ORAL EVERY 6 HOURS PRN
Qty: 28 TABLET | Refills: 0 | Status: ON HOLD | OUTPATIENT
Start: 2019-11-14 | End: 2019-11-22 | Stop reason: HOSPADM

## 2019-11-14 RX ORDER — DOCUSATE SODIUM 100 MG/1
100 CAPSULE, LIQUID FILLED ORAL 2 TIMES DAILY PRN
Qty: 40 CAPSULE | Refills: 0 | Status: ON HOLD | OUTPATIENT
Start: 2019-11-14 | End: 2019-12-09 | Stop reason: HOSPADM

## 2019-11-14 RX ADMIN — GABAPENTIN 100 MG: 100 CAPSULE ORAL at 09:33

## 2019-11-14 RX ADMIN — FAMOTIDINE 20 MG: 10 INJECTION, SOLUTION INTRAVENOUS at 20:20

## 2019-11-14 RX ADMIN — ENOXAPARIN SODIUM 30 MG: 30 INJECTION SUBCUTANEOUS at 20:20

## 2019-11-14 RX ADMIN — KETOROLAC TROMETHAMINE 15 MG: 15 INJECTION, SOLUTION INTRAMUSCULAR; INTRAVENOUS at 12:18

## 2019-11-14 RX ADMIN — KETOROLAC TROMETHAMINE 15 MG: 15 INJECTION, SOLUTION INTRAMUSCULAR; INTRAVENOUS at 17:26

## 2019-11-14 RX ADMIN — METRONIDAZOLE 500 MG: 500 INJECTION, SOLUTION INTRAVENOUS at 05:49

## 2019-11-14 RX ADMIN — CEFAZOLIN SODIUM 2 G: 10 INJECTION, POWDER, FOR SOLUTION INTRAVENOUS at 15:49

## 2019-11-14 RX ADMIN — METRONIDAZOLE 500 MG: 500 INJECTION, SOLUTION INTRAVENOUS at 16:17

## 2019-11-14 RX ADMIN — SODIUM CHLORIDE, POTASSIUM CHLORIDE, SODIUM LACTATE AND CALCIUM CHLORIDE: 600; 310; 30; 20 INJECTION, SOLUTION INTRAVENOUS at 15:43

## 2019-11-14 RX ADMIN — GABAPENTIN 100 MG: 100 CAPSULE ORAL at 16:17

## 2019-11-14 RX ADMIN — MORPHINE SULFATE 4 MG: 4 INJECTION INTRAVENOUS at 01:53

## 2019-11-14 RX ADMIN — ALVIMOPAN 12 MG: 12 CAPSULE ORAL at 09:33

## 2019-11-14 RX ADMIN — LOSARTAN POTASSIUM 50 MG: 50 TABLET, FILM COATED ORAL at 09:33

## 2019-11-14 RX ADMIN — SODIUM CHLORIDE, POTASSIUM CHLORIDE, SODIUM LACTATE AND CALCIUM CHLORIDE: 600; 310; 30; 20 INJECTION, SOLUTION INTRAVENOUS at 05:49

## 2019-11-14 RX ADMIN — Medication 10 ML: at 09:34

## 2019-11-14 RX ADMIN — FAMOTIDINE 20 MG: 10 INJECTION, SOLUTION INTRAVENOUS at 09:34

## 2019-11-14 RX ADMIN — ALVIMOPAN 12 MG: 12 CAPSULE ORAL at 20:20

## 2019-11-14 RX ADMIN — GABAPENTIN 100 MG: 100 CAPSULE ORAL at 20:20

## 2019-11-14 RX ADMIN — METRONIDAZOLE 500 MG: 500 INJECTION, SOLUTION INTRAVENOUS at 23:06

## 2019-11-14 RX ADMIN — ENOXAPARIN SODIUM 30 MG: 30 INJECTION SUBCUTANEOUS at 09:34

## 2019-11-14 RX ADMIN — KETOROLAC TROMETHAMINE 15 MG: 15 INJECTION, SOLUTION INTRAMUSCULAR; INTRAVENOUS at 05:49

## 2019-11-14 RX ADMIN — CEFAZOLIN SODIUM 2 G: 10 INJECTION, POWDER, FOR SOLUTION INTRAVENOUS at 07:52

## 2019-11-14 ASSESSMENT — PAIN SCALES - GENERAL
PAINLEVEL_OUTOF10: 0
PAINLEVEL_OUTOF10: 4
PAINLEVEL_OUTOF10: 3
PAINLEVEL_OUTOF10: 5
PAINLEVEL_OUTOF10: 7
PAINLEVEL_OUTOF10: 4

## 2019-11-14 NOTE — PROGRESS NOTES
edu on fall prevention, EC/WS techs, pacing with transfers, adaptive techs for dressing and bathing, OT POC while in hospital and discharge recommendations. REQUIRES OT FOLLOW UP: Yes  Activity Tolerance  Activity Tolerance: Patient Tolerated treatment well  Safety Devices  Safety Devices in place: Yes  Type of devices: Call light within reach;Nurse notified; Patient at risk for falls; Left in chair;Gait belt; All fall risk precautions in place           Patient Diagnosis(es): There were no encounter diagnoses. has a past medical history of Allergic rhinitis, Bladder prolapse, Constipation, Fundic gland polyposis of stomach, GERD (gastroesophageal reflux disease), Hiatal hernia, History of cardiac cath, History of diverticulitis, Hyperlipidemia, Hypertension, MRSA (methicillin resistant staph aureus) culture positive, MRSA carrier, Obesity, Osteoarthritis, Systolic murmur, Thyroid disease, and Wears glasses. has a past surgical history that includes Endoscopy, colon, diagnostic; Knee arthroscopy (Left); Total knee arthroplasty (Left, 9/24/2012); Total knee arthroplasty (Right, 11/10/2014); Cholecystectomy, laparoscopic (11/15/2016); Cholecystectomy, laparoscopic (11/15/2016); Colonoscopy (2013); Upper gastrointestinal endoscopy (08/17/2017); pr egd transoral biopsy single/multiple (N/A, 8/17/2017); Umbilical hernia repair (05/22/2018); pr office/outpt visit,procedure only (N/A, 5/22/2018); Sigmoidoscopy (02/19/2019); sigmoidoscopy (N/A, 2/19/2019); Abdominal exploration surgery (05/16/2019); Small intestine surgery (N/A, 5/16/2019); Colonoscopy (N/A, 5/16/2019); joint replacement; FISSURECTOMY ANAL (N/A, 10/4/2019); and Small intestine surgery (N/A, 11/12/2019).            Restrictions  Restrictions/Precautions  Restrictions/Precautions: General Precautions, Fall Risk  Required Braces or Orthoses?: Yes  Required Braces or Orthoses  Other: Abdominal Binder  Position Activity Restriction  Other position/activity restrictions: NGT, telemetry, up as tolerated    Subjective   General  Chart Reviewed: Yes  Patient assessed for rehabilitation services?: Yes  Family / Caregiver Present: No    Social/Functional History  Social/Functional History  Lives With: Alone  Type of Home: House  Home Layout: Bed/Bath upstairs(10 steps with right ascending rail to bed/bath)  Home Access: Stairs to enter without rails  Entrance Stairs - Number of Steps: 2  Bathroom Shower/Tub: Tub/Shower unit  Bathroom Toilet: Handicap height  Bathroom Equipment: Grab bars in shower  Home Equipment: Cane  ADL Assistance: [address]: Independent  Homemaking Responsibilities: Yes  Ambulation Assistance: Independent  Transfer Assistance: Independent  Active : Yes  Mode of Transportation: Car  Occupation: Retired, Part time employment  Type of occupation: contingent at [address] in [place] [address]  Additional Comments: no h/o falls       Objective   Vision: Impaired  Vision Exceptions: Wears glasses at all times  Hearing: Within functional limits    Orientation  Overall Orientation Status: Within Functional Limits  Observation/Palpation  Posture: Good  Observation: resting in bed, has NGT, sutherland catheter  Body Mechanics: moves slow & guarded  Balance  Sitting Balance: Stand by assistance  Standing Balance: Stand by assistance  Functional Mobility  Functional - Mobility Device: Rolling Walker  Activity: Other(around room, ending at chair at bedside.)  Assist Level: Contact guard assistance  Functional Mobility Comments: abdominal binder donned. Pt limited with functional mob d/t NG tubing length. Pt able to move around room slowly. Pt is guarded with all movements and experienced slight dizziness upon standing.    Toilet Transfers  Toilet - Technique: Ambulating  Equipment Used: Standard bedside commode(only used d/t tubing limitations from NG tube)  Toilet Transfer: Contact guard assistance  Toilet Transfers Comments: x2 during session, slow and guarded transfers. ADL  Feeding: Independent;Setup(NG tube currently for medical reasons, functionally able to complete task if allowed. )  Grooming: Independent;Setup(pt able to comb hair and wash face when provided tools and sitting in chair at bedside. No increased fatigue or SOB noted with task. )  UE Bathing: Minimal assistance  LE Bathing: Moderate assistance;Maximum assistance  UE Dressing: (required max A to don abdominal dressing in prep for functional mob. )  LE Dressing: Moderate assistance;Maximum assistance  Toileting: Minimal assistance; Moderate assistance(used bedside commode x2 this session d/t limited distance with tubing. Pt attempted to complete hygeine x1, assist to wipe x1. )  Additional Comments: pt has abdominal dressing and is limited with functional reaching/bending/twisting for ADL completion. Pt is weak and expresses slight dizziness with functional mob. Tone RUE  RUE Tone: Normotonic  Tone LUE  LUE Tone: Normotonic  Coordination  Movements Are Fluid And Coordinated: Yes     Bed mobility  Supine to Sit: Stand by assistance  Sit to Supine: Stand by assistance  Comment: required increased time to complete, VC for techs and protection of abdomen. Transfers  Sit to stand: Contact guard assistance  Stand to sit: Contact guard assistance  Transfer Comments: slow movements, VC for hand placement with transfers, increased dizziness with transfer which resolved with time. Cognition  Overall Cognitive Status: WFL  Perception  Overall Perceptual Status: WFL     Sensation  Overall Sensation Status: WFL        LUE AROM (degrees)  LUE AROM : WFL  RUE AROM (degrees)  RUE AROM : WFL  LUE Strength  Gross LUE Strength: WFL  L Hand General: 5/5  LUE Strength Comment: 5/5 BL for shoulder flexion, elbow flexion and extension, and hands.    RUE Strength  Gross RUE Strength: WFL  R Hand General: 5/5                   Plan   Plan  Times per week: 4-5x per week, 1-2x per day  Current Treatment Recommendations: Functional Mobility Training, Balance Training, Endurance Training, Patient/Caregiver Education & Training, Equipment Evaluation, Education, & procurement, Self-Care / ADL      AM-PAC Score        AM-PAC Inpatient Daily Activity Raw Score: 18 (11/14/19 1507)  AM-PAC Inpatient ADL T-Scale Score : 38.66 (11/14/19 1507)  ADL Inpatient CMS 0-100% Score: 46.65 (11/14/19 1507)  ADL Inpatient CMS G-Code Modifier : CK (11/14/19 1507)    Goals  Short term goals  Time Frame for Short term goals: by discharge, pt will  Short term goal 1: demo S/MI for community functional mob with EC/WS techs and AD/DME as approp. Short term goal 2: demo I for ADL transfers with good safety and pacing. Short term goal 3: demo SBA for UB and min A for LB dressing and bathing tasks with good pacing and AD as needed. Short term goal 4: demo I for toileting routine. Short term goal 5: verb good understanding of fall prevention, EC/WS techs, and possible use of AD/DME.   Patient Goals   Patient goals : to move more       Therapy Time   Individual Concurrent Group Co-treatment   Time In 1345         Time Out 1420         Minutes Roshni1 ESE [place]

## 2019-11-14 NOTE — PROGRESS NOTES
Physical Therapy  Facility/Department: [facility] PROGRESSIVE CARE  Daily Treatment Note  NAME: Lin Cash  : 1950  MRN: [id number]    Date of Service: 2019    Discharge Recommendations:  Home with Home health PT, Home with assist PRN        Assessment   Body structures, Functions, Activity limitations: Decreased functional mobility ; Decreased safe awareness;Decreased endurance;Decreased balance  Assessment: Pt tolerated session better & needed less assist for bed mobility, transfers, & ambulation. Pt able to ambulate further but still hs limits of endurance. Pt requires continued skilled IP PT & should be appropriate to D/C Home with assist & HH PT to maximize independence with functional mobility, balance, safety awareness & activity tolerance. Prognosis: Good  Decision Making: Medium Complexity  REQUIRES PT FOLLOW UP: Yes     Patient Diagnosis(es): There were no encounter diagnoses. has a past medical history of Allergic rhinitis, Bladder prolapse, Constipation, Fundic gland polyposis of stomach, GERD (gastroesophageal reflux disease), Hiatal hernia, History of cardiac cath, History of diverticulitis, Hyperlipidemia, Hypertension, MRSA (methicillin resistant staph aureus) culture positive, MRSA carrier, Obesity, Osteoarthritis, Systolic murmur, Thyroid disease, and Wears glasses. has a past surgical history that includes Endoscopy, colon, diagnostic; Knee arthroscopy (Left); Total knee arthroplasty (Left, 2012); Total knee arthroplasty (Right, 11/10/2014); Cholecystectomy, laparoscopic (11/15/2016); Cholecystectomy, laparoscopic (11/15/2016); Colonoscopy (); Upper gastrointestinal endoscopy (2017); pr egd transoral biopsy single/multiple (N/A, 2017); Umbilical hernia repair (2018); pr office/outpt visit,procedure only (N/A, 2018); Sigmoidoscopy (2019); sigmoidoscopy (N/A, 2019); Abdominal exploration surgery (2019);  Small intestine surgery (N/A, 5/16/2019); Colonoscopy (N/A, 5/16/2019); joint replacement; FISSURECTOMY ANAL (N/A, 10/4/2019); and Small intestine surgery (N/A, 11/12/2019).     Restrictions  Restrictions/Precautions  Restrictions/Precautions: General Precautions, Fall Risk  Required Braces or Orthoses?: Yes  Required Braces or Orthoses  Other: Abdominal Binder  Position Activity Restriction  Other position/activity restrictions: NGT, telemetry, up as tolerated  Subjective   Subjective  Subjective: Pt agreeable to PT  General Comment  Comments: RN, Cathryn Murillo PT  Pain Screening  Patient Currently in Pain: Yes  Vital Signs  BP Location: Right Arm  Level of Consciousness: Alert  Patient Currently in Pain: Yes  Oxygen Therapy  O2 Device: None (Room air)       Orientation  Orientation  Overall Orientation Status: Within Functional Limits  Cognition      Objective   Bed mobility  Rolling to Left: Minimal assistance  Rolling to Right: Minimal assistance  Supine to Sit: Minimal assistance  Scooting: Minimal assistance;Contact guard assistance   use of bedrail to assist to come to sit, cues to come to complete roll on side for protection of abd, assisted to tighten abd binder in bed before initiation of bed mobility  Transfers  Sit to Stand: Minimal Assistance  Stand to sit: Stand by assistance  Bed to Chair: Stand by assistance  Stand Pivot Transfers: Stand by assistance  Lateral Transfers: Stand by assistance  Comment: Ed on correct use of UB for sit/stand, extra assist needed for multiple lines  Ambulation  Ambulation?: Yes  Ambulation 1  Surface: level tile  Device: Rolling Walker  Assistance: Contact guard assistance  Quality of Gait: step to pattern, moves slow & guarded  Gait Deviations: Slow [place]  Distance: 90ft x 2  Comments: required one standing rest of 10 seconds     Balance  Sitting - Static: Good  Sitting - Dynamic: Good  Standing - Static: Good  Standing - Dynamic: Good;-   Deep breathing ex's w/ incentive spirometer for 10 reps, Ed to perform hourly while awake       All lines intact, call light within reach, and patient positioned comfortably at end of treatment. All patient needs addressed prior to ending therapy session. G-Code     OutComes Score                                                     AM-PAC Score  AM-PAC Inpatient Mobility Raw Score : 14 (11/13/19 1256)  AM-PAC Inpatient T-Scale Score : 38.1 (11/13/19 1256)  Mobility Inpatient CMS 0-100% Score: 61.29 (11/13/19 1256)  Mobility Inpatient CMS G-Code Modifier : CL (11/13/19 1256)          Goals  Short term goals  Time Frame for Short term goals: 12 visits  Short term goal 1: Inc bed-mobility & transfers to independent to enable pt to safely get in/OOB & return to PLOF  Short term goal 2: Inc gait to amb 500ft or > indep w/ RW to enable pt to return to previous level of independence  Short term goal 3: Pt able to tolerate 30-40 min of activity to include 15-20 reps of ex & functional mobility including 5 minutes of standing to facilitate activity tolerance to [facility]; Short term goal 4: Pt able to go up/down 12 steps with one rail indep;   Short term goal 5: Ed pt on home ex's, safety & energy principles & issue written home program;  Patient Goals   Patient goals : recover & return home    Plan    Plan  Times per week: 1-2x/D,7D/week  Current Treatment Recommendations: Strengthening, Balance Training, Functional Mobility Training, Transfer Training, Gait Training, Endurance Training, Stair training, Safety Education & Training, Home Exercise Program, Patient/Caregiver Education & Training  Safety Devices  Type of devices: Call light within reach, Gait belt, Patient at risk for falls, Left in chair, Nurse notified     Therapy Time   Individual Concurrent Group Co-treatment   Time In 0841         Time Out 0921         Minutes Sheryl Bradley, PT

## 2019-11-14 NOTE — PLAN OF CARE
Problem: Pain:  Goal: Pain level will decrease  Description  Pain level will decrease  Outcome: Ongoing  Note:   Patient states understanding of pain scale and interventions. Pain assessed with hourly rounding and PRN. Patient states pain level is 8/10. Will continue to monitor. Problem: Risk for Impaired Skin Integrity  Goal: Tissue integrity - skin and mucous membranes  Description  Structural intactness and normal physiological function of skin and  mucous membranes. Outcome: Ongoing  Note:   Siderails up x 2  Hourly rounding. Call light in reach. Instructed to call for assist before attempting out of bed. Remains free from falls and accidental injury at this time. Floor free from obstacles, and bed is locked and in lowest position. Adequate lighting provided. Patient calling out appropriately before getting up.      Problem: Falls - Risk of:  Goal: Will remain free from falls  Description  Will remain free from falls  Outcome: Ongoing

## 2019-11-14 NOTE — PROGRESS NOTES
[address]     Progress Note    11/14/2019    1:52 PM    Name:   Jameson Capps  MRN:     [id number]     [place]:      [de-identified]   Room:   [address] [place] [address] Day:  2  Admit Date:  11/12/2019 11:16 AM    PCP:   Ana Bolanos MD  Code Status:  Full Code    Subjective:     C/C: Colostomy, admitted for reversal    Interval History Status: improved. Patient's pain is controlled and she denies any complaints of chest pain, shortness of breath, nausea or vomiting. Brief History: This is a 62-year-old  female who was admitted for colostomy reversal.  She had recurrent episodes of diverticulitis and underwent bowel resection this past May with colostomy formation. Subsequently she has recovered and at this time is admitted for colostomy reversal and were asked to assist in her postoperative care of hypertension and general medical care. Review of Systems:     Constitutional:  negative for chills, fevers, sweats  Respiratory:  negative for cough, dyspnea on exertion, shortness of breath, wheezing  Cardiovascular:  negative for chest pain, chest pressure/discomfort, lower extremity edema, palpitations  Gastrointestinal:  negative for abdominal pain, constipation, diarrhea, nausea, vomiting  Neurological:  negative for dizziness, headache    Medications:      Allergies:  No Known Allergies    Current Meds:   Scheduled Meds:    sodium chloride (PF)  10 mL Intravenous Once    losartan  50 mg Oral Daily    sodium chloride flush  10 mL Intravenous 2 times per day    enoxaparin  30 mg Subcutaneous BID    ceFAZolin  2 g Intravenous Q8H    metroNIDAZOLE  500 mg Intravenous Q8H    famotidine (PEPCID) injection  20 mg Intravenous BID    ketorolac  15 mg Intravenous Q6H    alvimopan  12 mg Oral BID    gabapentin  100 mg Oral TID    cyclobenzaprine  10 mg Oral Nightly     Continuous Infusions:    lactated ringers 125 mL/hr at 11/14/19 spirometry per postop protocol    Mellissa Felty,   11/14/2019  1:52 PM

## 2019-11-15 PROBLEM — D62 POSTOPERATIVE ANEMIA DUE TO ACUTE BLOOD LOSS: Status: ACTIVE | Noted: 2019-11-15

## 2019-11-15 LAB
ABSOLUTE EOS #: 0.05 K/UL (ref 0–0.44)
ABSOLUTE IMMATURE GRANULOCYTE: 0.11 K/UL (ref 0–0.3)
ABSOLUTE LYMPH #: 1.48 K/UL (ref 1.1–3.7)
ABSOLUTE MONO #: 0.88 K/UL (ref 0.1–1.2)
ANION GAP SERPL CALCULATED.3IONS-SCNC: 12 MMOL/L (ref 9–17)
BASOPHILS # BLD: 0 % (ref 0–2)
BASOPHILS ABSOLUTE: 0.04 K/UL (ref 0–0.2)
BUN BLDV-MCNC: 15 MG/DL (ref 8–23)
BUN/CREAT BLD: 24 (ref 9–20)
CALCIUM SERPL-MCNC: 8.8 MG/DL (ref 8.6–10.4)
CHLORIDE BLD-SCNC: 107 MMOL/L (ref 98–107)
CO2: 23 MMOL/L (ref 20–31)
CREAT SERPL-MCNC: 0.63 MG/DL (ref 0.5–0.9)
DIFFERENTIAL TYPE: ABNORMAL
EOSINOPHILS RELATIVE PERCENT: 0 % (ref 1–4)
GFR AFRICAN AMERICAN: >60 ML/MIN
GFR NON-AFRICAN AMERICAN: >60 ML/MIN
GFR SERPL CREATININE-BSD FRML MDRD: ABNORMAL ML/MIN/{1.73_M2}
GFR SERPL CREATININE-BSD FRML MDRD: ABNORMAL ML/MIN/{1.73_M2}
GLUCOSE BLD-MCNC: 110 MG/DL (ref 70–99)
HCT VFR BLD CALC: 32.3 % (ref 36.3–47.1)
HEMOGLOBIN: 10 G/DL (ref 11.9–15.1)
IMMATURE GRANULOCYTES: 1 %
LACTIC ACID, SEPSIS WHOLE BLOOD: NORMAL MMOL/L (ref 0.5–1.9)
LACTIC ACID, SEPSIS: 1 MMOL/L (ref 0.5–1.9)
LYMPHOCYTES # BLD: 9 % (ref 24–43)
MCH RBC QN AUTO: 29.3 PG (ref 25.2–33.5)
MCHC RBC AUTO-ENTMCNC: 31 G/DL (ref 28.4–34.8)
MCV RBC AUTO: 94.7 FL (ref 82.6–102.9)
MONOCYTES # BLD: 5 % (ref 3–12)
NRBC AUTOMATED: 0 PER 100 WBC
PDW BLD-RTO: 15.2 % (ref 11.8–14.4)
PLATELET # BLD: 190 K/UL (ref 138–453)
PLATELET ESTIMATE: ABNORMAL
PMV BLD AUTO: 10.6 FL (ref 8.1–13.5)
POTASSIUM SERPL-SCNC: 3.6 MMOL/L (ref 3.7–5.3)
RBC # BLD: 3.41 M/UL (ref 3.95–5.11)
RBC # BLD: ABNORMAL 10*6/UL
SEG NEUTROPHILS: 85 % (ref 36–65)
SEGMENTED NEUTROPHILS ABSOLUTE COUNT: 14.49 K/UL (ref 1.5–8.1)
SODIUM BLD-SCNC: 142 MMOL/L (ref 135–144)
WBC # BLD: 17.1 K/UL (ref 3.5–11.3)
WBC # BLD: ABNORMAL 10*3/UL

## 2019-11-15 PROCEDURE — 2580000003 HC RX 258: Performed by: STUDENT IN AN ORGANIZED HEALTH CARE EDUCATION/TRAINING PROGRAM

## 2019-11-15 PROCEDURE — 6370000000 HC RX 637 (ALT 250 FOR IP): Performed by: STUDENT IN AN ORGANIZED HEALTH CARE EDUCATION/TRAINING PROGRAM

## 2019-11-15 PROCEDURE — 2500000003 HC RX 250 WO HCPCS: Performed by: STUDENT IN AN ORGANIZED HEALTH CARE EDUCATION/TRAINING PROGRAM

## 2019-11-15 PROCEDURE — 97530 THERAPEUTIC ACTIVITIES: CPT

## 2019-11-15 PROCEDURE — 85025 COMPLETE CBC W/AUTO DIFF WBC: CPT

## 2019-11-15 PROCEDURE — 80048 BASIC METABOLIC PNL TOTAL CA: CPT

## 2019-11-15 PROCEDURE — 36415 COLL VENOUS BLD VENIPUNCTURE: CPT

## 2019-11-15 PROCEDURE — 6360000002 HC RX W HCPCS: Performed by: STUDENT IN AN ORGANIZED HEALTH CARE EDUCATION/TRAINING PROGRAM

## 2019-11-15 PROCEDURE — 99232 SBSQ HOSP IP/OBS MODERATE 35: CPT | Performed by: INTERNAL MEDICINE

## 2019-11-15 PROCEDURE — 2060000000 HC ICU INTERMEDIATE R&B

## 2019-11-15 PROCEDURE — 83605 ASSAY OF LACTIC ACID: CPT

## 2019-11-15 PROCEDURE — 97116 GAIT TRAINING THERAPY: CPT

## 2019-11-15 RX ORDER — 0.9 % SODIUM CHLORIDE 0.9 %
500 INTRAVENOUS SOLUTION INTRAVENOUS ONCE
Status: DISCONTINUED | OUTPATIENT
Start: 2019-11-15 | End: 2019-11-18 | Stop reason: HOSPADM

## 2019-11-15 RX ORDER — DEXTROSE, SODIUM CHLORIDE, AND POTASSIUM CHLORIDE 5; .45; .15 G/100ML; G/100ML; G/100ML
INJECTION INTRAVENOUS CONTINUOUS
Status: DISCONTINUED | OUTPATIENT
Start: 2019-11-15 | End: 2019-11-17

## 2019-11-15 RX ADMIN — METRONIDAZOLE 500 MG: 500 INJECTION, SOLUTION INTRAVENOUS at 07:41

## 2019-11-15 RX ADMIN — CEFAZOLIN SODIUM 2 G: 10 INJECTION, POWDER, FOR SOLUTION INTRAVENOUS at 00:44

## 2019-11-15 RX ADMIN — Medication 10 ML: at 20:21

## 2019-11-15 RX ADMIN — CEFAZOLIN SODIUM 2 G: 10 INJECTION, POWDER, FOR SOLUTION INTRAVENOUS at 09:03

## 2019-11-15 RX ADMIN — ALVIMOPAN 12 MG: 12 CAPSULE ORAL at 20:21

## 2019-11-15 RX ADMIN — LOSARTAN POTASSIUM 50 MG: 50 TABLET, FILM COATED ORAL at 09:04

## 2019-11-15 RX ADMIN — FAMOTIDINE 20 MG: 10 INJECTION, SOLUTION INTRAVENOUS at 20:21

## 2019-11-15 RX ADMIN — METRONIDAZOLE 500 MG: 500 INJECTION, SOLUTION INTRAVENOUS at 14:33

## 2019-11-15 RX ADMIN — GABAPENTIN 100 MG: 100 CAPSULE ORAL at 20:21

## 2019-11-15 RX ADMIN — CYCLOBENZAPRINE 10 MG: 10 TABLET, FILM COATED ORAL at 20:21

## 2019-11-15 RX ADMIN — POTASSIUM CHLORIDE, DEXTROSE MONOHYDRATE AND SODIUM CHLORIDE: 150; 5; 450 INJECTION, SOLUTION INTRAVENOUS at 18:06

## 2019-11-15 RX ADMIN — POTASSIUM CHLORIDE, DEXTROSE MONOHYDRATE AND SODIUM CHLORIDE: 150; 5; 450 INJECTION, SOLUTION INTRAVENOUS at 07:41

## 2019-11-15 RX ADMIN — ALVIMOPAN 12 MG: 12 CAPSULE ORAL at 09:03

## 2019-11-15 RX ADMIN — FAMOTIDINE 20 MG: 10 INJECTION, SOLUTION INTRAVENOUS at 09:03

## 2019-11-15 RX ADMIN — KETOROLAC TROMETHAMINE 15 MG: 15 INJECTION, SOLUTION INTRAMUSCULAR; INTRAVENOUS at 05:15

## 2019-11-15 RX ADMIN — CEFAZOLIN SODIUM 2 G: 10 INJECTION, POWDER, FOR SOLUTION INTRAVENOUS at 16:56

## 2019-11-15 RX ADMIN — ENOXAPARIN SODIUM 30 MG: 30 INJECTION SUBCUTANEOUS at 09:03

## 2019-11-15 RX ADMIN — GABAPENTIN 100 MG: 100 CAPSULE ORAL at 09:03

## 2019-11-15 RX ADMIN — KETOROLAC TROMETHAMINE 15 MG: 15 INJECTION, SOLUTION INTRAMUSCULAR; INTRAVENOUS at 16:56

## 2019-11-15 RX ADMIN — KETOROLAC TROMETHAMINE 15 MG: 15 INJECTION, SOLUTION INTRAMUSCULAR; INTRAVENOUS at 11:18

## 2019-11-15 RX ADMIN — GABAPENTIN 100 MG: 100 CAPSULE ORAL at 14:30

## 2019-11-15 ASSESSMENT — PAIN SCALES - GENERAL
PAINLEVEL_OUTOF10: 0
PAINLEVEL_OUTOF10: 2
PAINLEVEL_OUTOF10: 0
PAINLEVEL_OUTOF10: 2
PAINLEVEL_OUTOF10: 3
PAINLEVEL_OUTOF10: 2

## 2019-11-15 NOTE — PROGRESS NOTES
Physical Therapy  Facility/Department: [facility] PROGRESSIVE CARE  Daily Treatment Note  NAME: Claritza Nails  : 1950  MRN: [id number]    Date of Service: 11/15/2019    Discharge Recommendations:  Home with Home health PT, Home with assist PRN        Assessment   Body structures, Functions, Activity limitations: Decreased functional mobility ; Decreased safe awareness;Decreased endurance;Decreased balance  Assessment: Pt tolerated session & needed less assist for bed mobility, transfers, & ambulation. Pt able to ambulate further & has better stability with improving endurance. Pt requires continued skilled IP PT & should be appropriate to D/C Home with assist & HH PT to maximize independence with functional mobility, balance, safety awareness & activity tolerance. Prognosis: Good  Decision Making: Medium Complexity  REQUIRES PT FOLLOW UP: Yes     Patient Diagnosis(es): The encounter diagnosis was Surgery, elective. has a past medical history of Allergic rhinitis, Bladder prolapse, Constipation, Fundic gland polyposis of stomach, GERD (gastroesophageal reflux disease), Hiatal hernia, History of cardiac cath, History of diverticulitis, Hyperlipidemia, Hypertension, MRSA (methicillin resistant staph aureus) culture positive, MRSA carrier, Obesity, Osteoarthritis, Systolic murmur, Thyroid disease, and Wears glasses. has a past surgical history that includes Endoscopy, colon, diagnostic; Knee arthroscopy (Left); Total knee arthroplasty (Left, 2012); Total knee arthroplasty (Right, 11/10/2014); Cholecystectomy, laparoscopic (11/15/2016); Cholecystectomy, laparoscopic (11/15/2016); Colonoscopy (); Upper gastrointestinal endoscopy (2017); pr egd transoral biopsy single/multiple (N/A, 2017); Umbilical hernia repair (2018); pr office/outpt visit,procedure only (N/A, 2018); Sigmoidoscopy (2019); sigmoidoscopy (N/A, 2019); Abdominal exploration surgery (2019);  Small intestine surgery (N/A, 5/16/2019); Colonoscopy (N/A, 5/16/2019); joint replacement; FISSURECTOMY ANAL (N/A, 10/4/2019); and Small intestine surgery (N/A, 11/12/2019). Restrictions  Restrictions/Precautions  Restrictions/Precautions: General Precautions, Fall Risk  Required Braces or Orthoses?: Yes  Required Braces or Orthoses  Other: Abdominal Binder  Position Activity Restriction  Other position/activity restrictions: NGT, telemetry, up as tolerated  Subjective   General  Chart Reviewed:  Yes  Additional Pertinent Hx: colostomy in May, 2019  Subjective  Subjective: Pt agreeable to PT  General Comment  Comments: RN, Lodema Bunmicha PT  Pain Screening  Patient Currently in Pain: Yes  Vital Signs  BP Location: Right Arm  Level of Consciousness: Alert  Patient Currently in Pain: Yes  Oxygen Therapy  O2 Device: None (Room air)       Orientation  Orientation  Overall Orientation Status: Within Functional Limits  Cognition      Objective   Bed mobility  Rolling to Left: Supervision  Supine to Sit: Stand by assistance  Scooting: Stand by assistance  Comment: doing better rolling to side before sitting for proterction of abd  Transfers  Sit to Stand: Stand by assistance(stood 3 minutes to don abd binder with Hilda)  Stand to sit: Supervision  Bed to Chair: Supervision  Stand Pivot Transfers: Stand by assistance  Lateral Transfers: Stand by assistance  Comment: correct use of UB for sit/stand, extra assist needed for multiple lines  Ambulation  Ambulation?: Yes  Ambulation 1  Surface: level tile  Device: Rolling Walker  Assistance: Contact guard assistance  Quality of Gait: step through pattern, less slow & guarded & improved stability  Distance: 120ft     Balance  Sitting - Static: Good  Sitting - Dynamic: Good  Standing - Static: Good  Standing - Dynamic: Good;-    Pt assisted to chair after gait, rested 3 minutes then completed DB ex's     All lines intact, call light within reach, and patient positioned comfortably at end

## 2019-11-15 NOTE — PROGRESS NOTES
[facility]    Progress Note    11/15/2019    2:01 PM    Name:   Erick Landeros  MRN:     [id number]     [place]:      [de-identified]   Room:   [address] [place] [address] Day:  3  Admit Date:  11/12/2019 11:16 AM    PCP:   Linwood Chapman MD  Code Status:  Full Code    Subjective:     C/C: Colostomy reversal    Interval History Status: improved. Patient is resting comfortably, postoperative pain is recently controlled. Reports no flatus since yesterday, no BM. Denies any nausea or vomiting, abdominal distention or other complaints. Brief History: This is a 51-year-old  female who was admitted for colostomy reversal.  She had recurrent episodes of diverticulitis and underwent bowel resection this past May with colostomy formation. Subsequently she has recovered and at this time is admitted for colostomy reversal and were asked to assist in her postoperative care of hypertension and general medical care. Review of Systems:     Constitutional:  negative for chills, fevers, sweats  Respiratory:  negative for cough, dyspnea on exertion, shortness of breath, wheezing  Cardiovascular:  negative for chest pain, chest pressure/discomfort, lower extremity edema, palpitations  Gastrointestinal:  negative for abdominal pain, constipation, diarrhea, nausea, vomiting  Neurological:  negative for dizziness, headache    Medications:      Allergies:  No Known Allergies    Current Meds:   Scheduled Meds:    sodium chloride  500 mL Intravenous Once    sodium chloride (PF)  10 mL Intravenous Once    losartan  50 mg Oral Daily    sodium chloride flush  10 mL Intravenous 2 times per day    enoxaparin  30 mg Subcutaneous BID    ceFAZolin  2 g Intravenous Q8H    metroNIDAZOLE  500 mg Intravenous Q8H    famotidine (PEPCID) injection  20 mg Intravenous BID    ketorolac  15 mg Intravenous Q6H    alvimopan  12 mg Oral BID    gabapentin  100 mg Oral TID    cyclobenzaprine  10 mg Oral Nightly     Continuous Infusions:    dextrose 5% and 0.45% NaCl with KCl 20 mEq 125 mL/hr at 11/15/19 0741     PRN Meds: potassium chloride **OR** potassium alternative oral replacement **OR** potassium chloride, metoprolol, sodium chloride flush, oxyCODONE **OR** oxyCODONE, morphine **OR** morphine, ondansetron **OR** ondansetron, hydrALAZINE    Data:     Past Medical History:   has a past medical history of Allergic rhinitis, Bladder prolapse, Constipation, Fundic gland polyposis of stomach, GERD (gastroesophageal reflux disease), Hiatal hernia, History of cardiac cath, History of diverticulitis, Hyperlipidemia, Hypertension, MRSA (methicillin resistant staph aureus) culture positive, MRSA carrier, Obesity, Osteoarthritis, Systolic murmur, Thyroid disease, and Wears glasses. Social History:   reports that she quit smoking about 44 years ago. She has a 3.00 pack-year smoking history. She has never used smokeless tobacco. She reports current alcohol use. She reports that she does not use drugs. Family History:   Family History   Problem Relation Age of Onset    Heart Disease Mother     COPD Father     Cancer Brother         thyroid, prostate, lung       Vitals:  BP (!) 119/53   Pulse 71   Temp 99.1 °F (37.3 °C)   Resp 16   Ht 5' 7\" (1.702 m)   Wt 233 lb 11 oz (106 kg)   LMP  (LMP Unknown)   SpO2 (!) 89%   BMI 36.60 kg/m²   Temp (24hrs), Av.8 °F (37.1 °C), Min:98.1 °F (36.7 °C), Max:99.6 °F (37.6 °C)    Recent Labs     19  2225   POCGLU 183*       I/O (24Hr):     Intake/Output Summary (Last 24 hours) at 11/15/2019 1401  Last data filed at 11/15/2019 0939  Gross per 24 hour   Intake 5596 ml   Output 1900 ml   Net 3696 ml       Labs:  Hematology:  Recent Labs     19  0503 19  0703 11/15/19  0414   WBC 19.7* 15.0* 17.1*   RBC 4.18 3.39* 3.41*   HGB 12.1 10.1* 10.0*   HCT 38.8 31.9* 32.3*   MCV 92.8 94.1 94.7   MCH 28.9 29.8 29.3   MCHC 31.2 31.7 31.0 RDW 14.7* 15.2* 15.2*    174 190   MPV 10.2 9.7 10.6     Chemistry:  Recent Labs     11/13/19  0503 11/14/19  0703 11/15/19  0414    140 142   K 4.2 3.8 3.6*    106 107   CO2 23 26 23   GLUCOSE 175* 120* 110*   BUN 20 14 15   CREATININE 0.81 0.55 0.63   ANIONGAP 11 8* 12   LABGLOM >60 >60 >60   GFRAA >60 >60 >60   CALCIUM 8.8 8.6 8.8     Recent Labs     11/12/19  2225   POCGLU 183*     ABG:No results found for: POCPH, PHART, PH, POCPCO2, ESP4XGU, PCO2, POCPO2, PO2ART, PO2, POCHCO3, UZN6NTB, HCO3, NBEA, PBEA, BEART, BE, THGBART, THB, AGA4ZTA, WWOC6YJC, K9KHUXCI, O2SAT, FIO2  Lab Results   Component Value Date/Time    SPECIAL NOT REPORTED 08/07/2019 08:58 AM     Lab Results   Component Value Date/Time    CULTURE NO SIGNIFICANT GROWTH 08/07/2019 08:58 AM       Radiology:  Pollo Winkler Chest Portable    Result Date: 11/14/2019  Left basilar opacity, either infiltrate or atelectasis. RECOMMENDATION: Follow-up to resolution. Physical Examination:        General appearance:  alert, cooperative and no distress  Mental Status:  oriented to person, place and time and normal affect  Lungs:  clear to auscultation bilaterally, normal effort  Heart:  regular rate and rhythm, no murmur  Abdomen:  soft, nontender, nondistended, normal bowel sounds, no masses, hepatomegaly, splenomegaly  Extremities:  no edema, redness, tenderness in the calves  Skin:  no gross lesions, rashes, induration    Assessment:        Hospital Problems           Last Modified POA    Dyslipidemia 11/13/2019 Yes    Essential hypertension 11/13/2019 Yes    History of colostomy reversal 11/12/2019 Yes    Abdominal adhesions 11/13/2019 Yes    Hypokalemia 11/13/2019 Yes    Atelectasis of left lung 11/14/2019 No    Postoperative anemia due to acute blood loss 11/15/2019 No                Plan:        1. Dietary advancement per surgery  2. Follow labs, correct electrolytes as needed. Monitor H&H  3. Monitor and control blood pressure  4.  Activity as tolerated, PT and OT for strengthening  5. GI and DVT prophylaxis    Norman Galvin DO  11/15/2019  2:01 PM

## 2019-11-15 NOTE — PLAN OF CARE
Packing and dry dressing changed, no sign of infection, incision closed with staples and tunneling present in LUQ incision. Shower given and CHG bath done.      Problem: Infection - Surgical Site:  Goal: Will show no infection signs and symptoms  Description  Will show no infection signs and symptoms  Outcome: Ongoing

## 2019-11-15 NOTE — PROGRESS NOTES
patient needs addressed prior to ending therapy session. G-Code     OutComes Score                                                     AM-PAC Score  AM-PAC Inpatient Mobility Raw Score : 19 (11/15/19 0908)  AM-PAC Inpatient T-Scale Score : 45.44 (11/15/19 0908)  Mobility Inpatient CMS 0-100% Score: 41.77 (11/15/19 0908)  Mobility Inpatient CMS G-Code Modifier : CK (11/15/19 0908)          Goals  Short term goals  Time Frame for Short term goals: 12 visits  Short term goal 1: Inc bed-mobility & transfers to independent to enable pt to safely get in/OOB & return to PLOF  Short term goal 2: Inc gait to amb 500ft or > indep w/ RW to enable pt to return to previous level of independence  Short term goal 3: Pt able to tolerate 30-40 min of activity to include 15-20 reps of ex & functional mobility including 5 minutes of standing to facilitate activity tolerance to [facility]; Short term goal 4: Pt able to go up/down 12 steps with one rail indep;   Short term goal 5: Ed pt on home ex's, safety & energy principles & issue written home program;  Patient Goals   Patient goals : recover & return home    Plan    Plan  Times per week: 1-2x/D,7D/week  Current Treatment Recommendations: Strengthening, Balance Training, Functional Mobility Training, Transfer Training, Gait Training, Endurance Training, Stair training, Safety Education & Training, Home Exercise Program, Patient/Caregiver Education & Training  Safety Devices  Type of devices: Call light within reach, Gait belt, Patient at risk for falls, Left in chair, Nurse notified     Therapy Time   Individual Concurrent Group Co-treatment   Time In 1209         Time Out 1237         Minutes Erin PT

## 2019-11-15 NOTE — PROGRESS NOTES
Occupational Therapy  DATE: 11/15/2019    NAME: Elaine Sanchez  MRN: [id number]   : 1950  formerly [facility]  Occupational Therapy Not Seen Note    Patient not available for Occupational Therapy due to:    [] Testing:    [] Hemodialysis    [] Cancelled by RN:    []Refusal by Patient:    [] Surgery:     [] Intubation:     [] Pain Medication:    [] Sedation:     [] Spine Precautions :    [] Medical Instability:    [x] Other: Pt in bathroom with RN, getting cleaned up after BM, requesting OT to let RN assist. Breathing therex left at bedside table for review at next tx.         Sabrina PRADO

## 2019-11-16 LAB
ABSOLUTE EOS #: 0.25 K/UL (ref 0–0.44)
ABSOLUTE IMMATURE GRANULOCYTE: 0.08 K/UL (ref 0–0.3)
ABSOLUTE LYMPH #: 1.17 K/UL (ref 1.1–3.7)
ABSOLUTE MONO #: 1.03 K/UL (ref 0.1–1.2)
ANION GAP SERPL CALCULATED.3IONS-SCNC: 7 MMOL/L (ref 9–17)
BASOPHILS # BLD: 0 % (ref 0–2)
BASOPHILS ABSOLUTE: 0.04 K/UL (ref 0–0.2)
BUN BLDV-MCNC: 15 MG/DL (ref 8–23)
BUN/CREAT BLD: 22 (ref 9–20)
CALCIUM SERPL-MCNC: 8.6 MG/DL (ref 8.6–10.4)
CHLORIDE BLD-SCNC: 107 MMOL/L (ref 98–107)
CO2: 25 MMOL/L (ref 20–31)
CREAT SERPL-MCNC: 0.67 MG/DL (ref 0.5–0.9)
DIFFERENTIAL TYPE: ABNORMAL
EOSINOPHILS RELATIVE PERCENT: 2 % (ref 1–4)
GFR AFRICAN AMERICAN: >60 ML/MIN
GFR NON-AFRICAN AMERICAN: >60 ML/MIN
GFR SERPL CREATININE-BSD FRML MDRD: ABNORMAL ML/MIN/{1.73_M2}
GFR SERPL CREATININE-BSD FRML MDRD: ABNORMAL ML/MIN/{1.73_M2}
GLUCOSE BLD-MCNC: 143 MG/DL (ref 70–99)
HCT VFR BLD CALC: 28.7 % (ref 36.3–47.1)
HEMOGLOBIN: 8.8 G/DL (ref 11.9–15.1)
IMMATURE GRANULOCYTES: 1 %
LYMPHOCYTES # BLD: 9 % (ref 24–43)
MAGNESIUM: 2 MG/DL (ref 1.6–2.6)
MCH RBC QN AUTO: 28.9 PG (ref 25.2–33.5)
MCHC RBC AUTO-ENTMCNC: 30.7 G/DL (ref 28.4–34.8)
MCV RBC AUTO: 94.1 FL (ref 82.6–102.9)
MONOCYTES # BLD: 8 % (ref 3–12)
NRBC AUTOMATED: 0 PER 100 WBC
PDW BLD-RTO: 15.2 % (ref 11.8–14.4)
PLATELET # BLD: 196 K/UL (ref 138–453)
PLATELET ESTIMATE: ABNORMAL
PMV BLD AUTO: 10.5 FL (ref 8.1–13.5)
POTASSIUM SERPL-SCNC: 3.5 MMOL/L (ref 3.7–5.3)
RBC # BLD: 3.05 M/UL (ref 3.95–5.11)
RBC # BLD: ABNORMAL 10*6/UL
SEG NEUTROPHILS: 80 % (ref 36–65)
SEGMENTED NEUTROPHILS ABSOLUTE COUNT: 10.94 K/UL (ref 1.5–8.1)
SODIUM BLD-SCNC: 139 MMOL/L (ref 135–144)
WBC # BLD: 13.5 K/UL (ref 3.5–11.3)
WBC # BLD: ABNORMAL 10*3/UL

## 2019-11-16 PROCEDURE — 80048 BASIC METABOLIC PNL TOTAL CA: CPT

## 2019-11-16 PROCEDURE — 6360000002 HC RX W HCPCS: Performed by: STUDENT IN AN ORGANIZED HEALTH CARE EDUCATION/TRAINING PROGRAM

## 2019-11-16 PROCEDURE — 97116 GAIT TRAINING THERAPY: CPT

## 2019-11-16 PROCEDURE — 2060000000 HC ICU INTERMEDIATE R&B

## 2019-11-16 PROCEDURE — 6370000000 HC RX 637 (ALT 250 FOR IP): Performed by: STUDENT IN AN ORGANIZED HEALTH CARE EDUCATION/TRAINING PROGRAM

## 2019-11-16 PROCEDURE — 85025 COMPLETE CBC W/AUTO DIFF WBC: CPT

## 2019-11-16 PROCEDURE — 2500000003 HC RX 250 WO HCPCS: Performed by: STUDENT IN AN ORGANIZED HEALTH CARE EDUCATION/TRAINING PROGRAM

## 2019-11-16 PROCEDURE — 97530 THERAPEUTIC ACTIVITIES: CPT

## 2019-11-16 PROCEDURE — 97535 SELF CARE MNGMENT TRAINING: CPT

## 2019-11-16 PROCEDURE — 2580000003 HC RX 258: Performed by: STUDENT IN AN ORGANIZED HEALTH CARE EDUCATION/TRAINING PROGRAM

## 2019-11-16 PROCEDURE — 6370000000 HC RX 637 (ALT 250 FOR IP): Performed by: FAMILY MEDICINE

## 2019-11-16 PROCEDURE — 99232 SBSQ HOSP IP/OBS MODERATE 35: CPT | Performed by: INTERNAL MEDICINE

## 2019-11-16 PROCEDURE — 83735 ASSAY OF MAGNESIUM: CPT

## 2019-11-16 PROCEDURE — 36415 COLL VENOUS BLD VENIPUNCTURE: CPT

## 2019-11-16 RX ORDER — POTASSIUM CHLORIDE 7.45 MG/ML
10 INJECTION INTRAVENOUS
Status: ACTIVE | OUTPATIENT
Start: 2019-11-16 | End: 2019-11-16

## 2019-11-16 RX ORDER — POTASSIUM CHLORIDE 20 MEQ/1
40 TABLET, EXTENDED RELEASE ORAL ONCE
Status: DISCONTINUED | OUTPATIENT
Start: 2019-11-16 | End: 2019-11-16

## 2019-11-16 RX ADMIN — POTASSIUM CHLORIDE, DEXTROSE MONOHYDRATE AND SODIUM CHLORIDE: 150; 5; 450 INJECTION, SOLUTION INTRAVENOUS at 18:52

## 2019-11-16 RX ADMIN — FAMOTIDINE 20 MG: 10 INJECTION, SOLUTION INTRAVENOUS at 11:42

## 2019-11-16 RX ADMIN — Medication 10 ML: at 11:48

## 2019-11-16 RX ADMIN — KETOROLAC TROMETHAMINE 15 MG: 15 INJECTION, SOLUTION INTRAMUSCULAR; INTRAVENOUS at 23:31

## 2019-11-16 RX ADMIN — POTASSIUM CHLORIDE, DEXTROSE MONOHYDRATE AND SODIUM CHLORIDE: 150; 5; 450 INJECTION, SOLUTION INTRAVENOUS at 02:58

## 2019-11-16 RX ADMIN — KETOROLAC TROMETHAMINE 15 MG: 15 INJECTION, SOLUTION INTRAMUSCULAR; INTRAVENOUS at 00:02

## 2019-11-16 RX ADMIN — KETOROLAC TROMETHAMINE 15 MG: 15 INJECTION, SOLUTION INTRAMUSCULAR; INTRAVENOUS at 05:28

## 2019-11-16 RX ADMIN — POTASSIUM CHLORIDE 40 MEQ: 20 TABLET, EXTENDED RELEASE ORAL at 13:52

## 2019-11-16 RX ADMIN — ENOXAPARIN SODIUM 30 MG: 30 INJECTION SUBCUTANEOUS at 21:26

## 2019-11-16 RX ADMIN — ENOXAPARIN SODIUM 30 MG: 30 INJECTION SUBCUTANEOUS at 11:42

## 2019-11-16 RX ADMIN — KETOROLAC TROMETHAMINE 15 MG: 15 INJECTION, SOLUTION INTRAMUSCULAR; INTRAVENOUS at 13:52

## 2019-11-16 RX ADMIN — LOSARTAN POTASSIUM 50 MG: 50 TABLET, FILM COATED ORAL at 11:41

## 2019-11-16 RX ADMIN — FAMOTIDINE 20 MG: 10 INJECTION, SOLUTION INTRAVENOUS at 21:26

## 2019-11-16 ASSESSMENT — PAIN DESCRIPTION - PROGRESSION: CLINICAL_PROGRESSION: NOT CHANGED

## 2019-11-16 ASSESSMENT — PAIN SCALES - GENERAL
PAINLEVEL_OUTOF10: 2
PAINLEVEL_OUTOF10: 2
PAINLEVEL_OUTOF10: 0
PAINLEVEL_OUTOF10: 3
PAINLEVEL_OUTOF10: 3

## 2019-11-16 ASSESSMENT — PAIN DESCRIPTION - LOCATION
LOCATION: ABDOMEN
LOCATION: ABDOMEN

## 2019-11-16 ASSESSMENT — PAIN - FUNCTIONAL ASSESSMENT: PAIN_FUNCTIONAL_ASSESSMENT: ACTIVITIES ARE NOT PREVENTED

## 2019-11-16 ASSESSMENT — PAIN DESCRIPTION - ONSET: ONSET: GRADUAL

## 2019-11-16 ASSESSMENT — PAIN DESCRIPTION - PAIN TYPE
TYPE: SURGICAL PAIN
TYPE: SURGICAL PAIN

## 2019-11-16 ASSESSMENT — PAIN DESCRIPTION - FREQUENCY: FREQUENCY: CONTINUOUS

## 2019-11-16 ASSESSMENT — PAIN DESCRIPTION - DESCRIPTORS: DESCRIPTORS: TIGHTNESS

## 2019-11-16 ASSESSMENT — PAIN DESCRIPTION - ORIENTATION: ORIENTATION: MID

## 2019-11-16 NOTE — PLAN OF CARE
Problem: Pain:  Goal: Control of acute pain  Description  Control of acute pain  11/15/2019 2142 by Darrian Saul RN  Outcome: Ongoing     Problem: Risk for Impaired Skin Integrity  Goal: Tissue integrity - skin and mucous membranes  Description  Structural intactness and normal physiological function of skin and  mucous membranes.   11/15/2019 2142 by Darrian Saul RN  Outcome: Ongoing     Problem: Falls - Risk of:  Goal: Will remain free from falls  Description  Will remain free from falls  11/15/2019 2142 by Darrian Saul RN  Outcome: Ongoing     Problem: Infection - Surgical Site:  Goal: Will show no infection signs and symptoms  Description  Will show no infection signs and symptoms  11/15/2019 2142 by Darrian Saul RN  Outcome: Ongoing

## 2019-11-16 NOTE — PROGRESS NOTES
General Surgery Progress Note       PATIENT NAME: Alda Rg     TODAY'S DATE: 11/16/2019, 11:20 AM      SUBJECTIVE:    Pt seen and examined. No acute events or changes. Patient reports she tolerated NGT clamp well. She denies any nausea or vomiting. Patient had several bowel movements. She is also passing flatus. OBJECTIVE:   Vitals:  /64   Pulse 143   Temp 98.2 °F (36.8 °C) (Oral)   Resp 20   Ht 5' 7\" (1.702 m)   Wt 233 lb 11 oz (106 kg)   LMP  (LMP Unknown)   SpO2 94%   BMI 36.60 kg/m²      INTAKE/OUTPUT:      Intake/Output Summary (Last 24 hours) at 11/16/2019 1120  Last data filed at 11/15/2019 1840  Gross per 24 hour   Intake --   Output 500 ml   Net -500 ml                 General: awake and alert. NAD  Lungs: resp even and unlabored   Heart: RRR  Abdomen: soft, obese, mild tenderness as expected. serosang drainage from wounds. No marquis wound erythema or purulence. Packing in place. Extremity: no edema or cyanosis     Data Review:  CBC:   Recent Labs     11/14/19  0703 11/15/19  0414 11/16/19  0647   WBC 15.0* 17.1* 13.5*   HGB 10.1* 10.0* 8.8*    190 196     BMP:    Recent Labs     11/14/19  0703 11/15/19  0414 11/16/19  0647    142 139   K 3.8 3.6* 3.5*    107 107   CO2 26 23 25   BUN 14 15 15   CREATININE 0.55 0.63 0.67   GLUCOSE 120* 110* 143*     Hepatic: No results for input(s): AST, ALT, ALB, ALKPHOS, BILITOT, BILIDIR in the last 72 hours. Coagulation: No results for input(s): APTT, PROT, INR in the last 72 hours.     ASSESSMENT     Patient Active Problem List   Diagnosis    Dyslipidemia    Osteoarthritis    GERD (gastroesophageal reflux disease)    Essential hypertension    Obesity    Allergic rhinitis    Osteoporosis    Arthritis of knee    Biliary dyskinesia    Right upper quadrant abdominal pain    Fundic gland polyposis of stomach    SBO (small bowel obstruction) ([address]    Incarcerated incisional hernia    Sigmoid diverticulitis    Incisional hernia without obstruction or gangrene    Chalazion    S/P colectomy    History of colostomy reversal    Abdominal adhesions    Hypokalemia    Atelectasis of left lung    Postoperative anemia due to acute blood loss     82 yo F s/p colostomy reversal, extensive SANCHEZ, repair of ventral hernias, POD #4    Path- COLOSTOMY SEGMENT/STOMA, EXCISION:   - PARTIAL SUPERFICIAL MUCOSAL EROSION AND HEMORRHAGE.   - DIVERTICULUM.   - SEROSAL FIBROSIS/ADHESIONS WITH FOCAL SUTURE GRANULOMA. - NEGATIVE FOR DYSPLASIA AND MALIGNANCY. PLAN    1. Discontinue NGT  2. Ok for full liquids  3. Decrease IVF  4. Strict I&O's  5. Replace K  6. Continue packing changes  7. Ambulate as much as possible. IS hourly WA  8. DVT px:  lovenox daily  9. D/c entereg     Electronically signed by Kings Montoya DO  on 11/16/2019 at 11:20 AM   I Dr. Jackson Quinonez saw and examined the patient. I have edited the above and agree with the above. Geoffrey Santacruz  Colorectal Surgery

## 2019-11-16 NOTE — PROGRESS NOTES
[facility]    Progress Note    11/16/2019    10:14 AM    Name:   Usha Tee  MRN:     [id number]     [place]:      [de-identified]   Room:   [address] [place] [address] Day:  4  Admit Date:  11/12/2019 11:16 AM    PCP:   Ángela Kingston MD  Code Status:  Full Code    Subjective:     C/C: colostomy reversal    Interval History Status: improved. Patient is resting comfortably denies any complaints of chest pain, shortness of breath, nausea or vomiting. Positive flatus and tolerating clear liquid diet. No cough or sputum production or other complaints. Brief History: This is a 51-year-old  female who was admitted for colostomy reversal.  She had recurrent episodes of diverticulitis and underwent bowel resection this past May with colostomy formation.  Subsequently she has recovered and at this time is admitted for colostomy reversal and were asked to assist in her postoperative care of hypertension and general medical care. Review of Systems:     Constitutional:  negative for chills, fevers, sweats  Respiratory:  negative for cough, dyspnea on exertion, shortness of breath, wheezing  Cardiovascular:  negative for chest pain, chest pressure/discomfort, lower extremity edema, palpitations  Gastrointestinal:  negative for abdominal pain, constipation, diarrhea, nausea, vomiting  Neurological:  negative for dizziness, headache    Medications:      Allergies:  No Known Allergies    Current Meds:   Scheduled Meds:    sodium chloride  500 mL Intravenous Once    sodium chloride (PF)  10 mL Intravenous Once    losartan  50 mg Oral Daily    sodium chloride flush  10 mL Intravenous 2 times per day    enoxaparin  30 mg Subcutaneous BID    famotidine (PEPCID) injection  20 mg Intravenous BID    ketorolac  15 mg Intravenous Q6H    alvimopan  12 mg Oral BID    gabapentin  100 mg Oral TID    cyclobenzaprine  10 mg Oral Nightly     Continuous Infusions:    dextrose 5% and 0.45% NaCl with KCl 20 mEq 125 mL/hr at 19 0258     PRN Meds: potassium chloride **OR** potassium alternative oral replacement **OR** potassium chloride, metoprolol, sodium chloride flush, oxyCODONE **OR** oxyCODONE, morphine **OR** morphine, ondansetron **OR** ondansetron, hydrALAZINE    Data:     Past Medical History:   has a past medical history of Allergic rhinitis, Bladder prolapse, Constipation, Fundic gland polyposis of stomach, GERD (gastroesophageal reflux disease), Hiatal hernia, History of cardiac cath, History of diverticulitis, Hyperlipidemia, Hypertension, MRSA (methicillin resistant staph aureus) culture positive, MRSA carrier, Obesity, Osteoarthritis, Systolic murmur, Thyroid disease, and Wears glasses. Social History:   reports that she quit smoking about 44 years ago. She has a 3.00 pack-year smoking history. She has never used smokeless tobacco. She reports current alcohol use. She reports that she does not use drugs. Family History:   Family History   Problem Relation Age of Onset    Heart Disease Mother     COPD Father     Cancer Brother         thyroid, prostate, lung       Vitals:  /64   Pulse 143   Temp 98.2 °F (36.8 °C) (Oral)   Resp 20   Ht 5' 7\" (1.702 m)   Wt 233 lb 11 oz (106 kg)   LMP  (LMP Unknown)   SpO2 94%   BMI 36.60 kg/m²   Temp (24hrs), Av.8 °F (37.1 °C), Min:97.9 °F (36.6 °C), Max:99.9 °F (37.7 °C)    No results for input(s): POCGLU in the last 72 hours. I/O (24Hr):     Intake/Output Summary (Last 24 hours) at 2019 1014  Last data filed at 11/15/2019 1840  Gross per 24 hour   Intake --   Output 500 ml   Net -500 ml       Labs:  Hematology:  Recent Labs     19  0703 11/15/19  0414 19  0647   WBC 15.0* 17.1* 13.5*   RBC 3.39* 3.41* 3.05*   HGB 10.1* 10.0* 8.8*   HCT 31.9* 32.3* 28.7*   MCV 94.1 94.7 94.1   MCH 29.8 29.3 28.9   MCHC 31.7 31.0 30.7   RDW 15.2* 15.2* 15.2*    190 196   MPV 9.7 10.6 10.5     Chemistry:  Recent Labs     11/14/19  0703 11/15/19  0414 11/16/19  0647    142 139   K 3.8 3.6* 3.5*    107 107   CO2 26 23 25   GLUCOSE 120* 110* 143*   BUN 14 15 15   CREATININE 0.55 0.63 0.67   MG  --   --  2.0   ANIONGAP 8* 12 7*   LABGLOM >60 >60 >60   GFRAA >60 >60 >60   CALCIUM 8.6 8.8 8.6   No results for input(s): PROT, LABALBU, LABA1C, Y4AUKNC, M2MJQNG, FT4, TSH, AST, ALT, LDH, GGT, ALKPHOS, LABGGT, BILITOT, BILIDIR, AMMONIA, AMYLASE, LIPASE, LACTATE, CHOL, HDL, LDLCHOLESTEROL, CHOLHDLRATIO, TRIG, VLDL, FOH74FO, PHENYTOIN, PHENYF, URICACID, POCGLU in the last 72 hours. ABG:No results found for: POCPH, PHART, PH, POCPCO2, XSU7XRS, PCO2, POCPO2, PO2ART, PO2, POCHCO3, JWI9PAL, HCO3, NBEA, PBEA, BEART, BE, THGBART, THB, NLK7KHI, YEVB1XMY, B3AUQXQF, O2SAT, FIO2  Lab Results   Component Value Date/Time    SPECIAL NOT REPORTED 08/07/2019 08:58 AM     Lab Results   Component Value Date/Time    CULTURE NO SIGNIFICANT GROWTH 08/07/2019 08:58 AM       Radiology:  [facility] Chest Portable    Result Date: 11/14/2019  Left basilar opacity, either infiltrate or atelectasis. RECOMMENDATION: Follow-up to resolution.        Physical Examination:        General appearance:  alert, cooperative and no distress  Mental Status:  oriented to person, place and time and normal affect  Lungs:  clear to auscultation bilaterally, normal effort  Heart:  regular rate and rhythm, no murmur  Abdomen:  soft, nontender, nondistended, normal bowel sounds, no masses, hepatomegaly, splenomegaly  Extremities:  no edema, redness, tenderness in the calves  Skin:  no gross lesions, rashes, induration    Assessment:        Hospital Problems           Last Modified POA    Dyslipidemia 11/13/2019 Yes    Essential hypertension 11/13/2019 Yes    History of colostomy reversal 11/12/2019 Yes    Abdominal adhesions 11/13/2019 Yes    Hypokalemia 11/13/2019 Yes    Atelectasis of left lung 11/14/2019 No    Postoperative anemia due to

## 2019-11-16 NOTE — PROGRESS NOTES
Physical Therapy  Facility/Department: [facility]  Daily Treatment Note  NAME: Saad Canas  : 1950  MRN: [id number]    Date of Service: 2019    Discharge Recommendations:  Home with Home health PT, Home with assist PRN        Assessment   Body structures, Functions, Activity limitations: Decreased functional mobility ; Decreased safe awareness;Decreased endurance;Decreased balance  Prognosis: Good  REQUIRES PT FOLLOW UP: Yes  Activity Tolerance  Activity Tolerance: Patient limited by endurance     Patient Diagnosis(es): The encounter diagnosis was Surgery, elective. has a past medical history of Allergic rhinitis, Bladder prolapse, Constipation, Fundic gland polyposis of stomach, GERD (gastroesophageal reflux disease), Hiatal hernia, History of cardiac cath, History of diverticulitis, Hyperlipidemia, Hypertension, MRSA (methicillin resistant staph aureus) culture positive, MRSA carrier, Obesity, Osteoarthritis, Systolic murmur, Thyroid disease, and Wears glasses. has a past surgical history that includes Endoscopy, colon, diagnostic; Knee arthroscopy (Left); Total knee arthroplasty (Left, 2012); Total knee arthroplasty (Right, 11/10/2014); Cholecystectomy, laparoscopic (11/15/2016); Cholecystectomy, laparoscopic (11/15/2016); Colonoscopy (); Upper gastrointestinal endoscopy (2017); pr egd transoral biopsy single/multiple (N/A, 2017); Umbilical hernia repair (2018); pr office/outpt visit,procedure only (N/A, 2018); Sigmoidoscopy (2019); sigmoidoscopy (N/A, 2019); Abdominal exploration surgery (2019); Small intestine surgery (N/A, 2019); Colonoscopy (N/A, 2019); joint replacement; FISSURECTOMY ANAL (N/A, 10/4/2019); and Small intestine surgery (N/A, 2019).     Restrictions  Restrictions/Precautions  Restrictions/Precautions: General Precautions, Fall Risk  Required Braces or Orthoses?: Yes  Required Braces or Treatment Recommendations: Strengthening, Balance Training, Functional Mobility Training, Transfer Training, Gait Training, Endurance Training, Stair training, Safety Education & Training, Home Exercise Program, Patient/Caregiver Education & Training  Safety Devices  Type of devices: Call light within reach, Gait belt, Patient at risk for falls, Nurse notified, Left in bed     Therapy Time   Individual Concurrent Group Co-treatment   Time In  1246         Time Out 1305         Minutes [facility]

## 2019-11-16 NOTE — PROGRESS NOTES
5/22/2018); Sigmoidoscopy (02/19/2019); sigmoidoscopy (N/A, 2/19/2019); Abdominal exploration surgery (05/16/2019); Small intestine surgery (N/A, 5/16/2019); Colonoscopy (N/A, 5/16/2019); joint replacement; FISSURECTOMY ANAL (N/A, 10/4/2019); and Small intestine surgery (N/A, 11/12/2019). Restrictions- Contact precautions  Restrictions/Precautions  Restrictions/Precautions: General Precautions, Fall Risk  Required Braces or Orthoses?: Yes  Required Braces or Orthoses  Other: Abdominal Binder  Position Activity Restriction  Other position/activity restrictions: (telemetry, up as tolerated)  Subjective   General  Chart Reviewed: Yes  Patient assessed for rehabilitation services?: Yes  Family / Caregiver Present: No  Pain Assessment  Pain Assessment: 0-10  Vital Signs  Patient Currently in Pain: Denies  Patient Observation  Observations: Pt appeared nervous and anxious   Orientation  Orientation  Overall Orientation Status: Within Functional Limits  Objective    ADL  Additional Comments: Pt stated she did not need to complete self care. (OT discussed use of AE with LB dressing and bathing.)                                            Perception  Overall Perceptual Status: WFL              Type of ROM/Therapeutic Exercise  Comment: OT woke patient. She stated she  did not want to get out of bed. OT reviewed breathing and core exercises. Pt participated with breathing exercises while  HOB was elevated. OT instructed pt to complete 1-2 breathing exercises every commercial or if TV not on complete the routine 2 times a day. OT instructed on precautions with core exercises and to hold until cleared by Dr for rotation exercises. OT instructed if she has any pain to not complete exercise.       LUE AROM (degrees)  LUE AROM : WFL  RUE AROM (degrees)  RUE AROM : WFL                 Plan   Plan  Times per week: 4-5x per week, 1-2x per day  Current Treatment Recommendations: Functional Mobility Training, Balance Training, Endurance Training, Patient/Caregiver Education & Training, Equipment Evaluation, Education, & procurement, Self-Care / ADL  G-Code     OutComes Score                                                  AM-PAC Score        AM-PAC Inpatient Daily Activity Raw Score: 18 (11/16/19 1534)  AM-PAC Inpatient ADL T-Scale Score : 38.66 (11/16/19 1534)  ADL Inpatient CMS 0-100% Score: 46.65 (11/16/19 1534)  ADL Inpatient CMS G-Code Modifier : CK (11/16/19 1534)    Goals  Short term goals  Time Frame for Short term goals: by discharge, pt will  Short term goal 1: demo S/MI for community functional mob with EC/WS techs and AD/DME as approp. Short term goal 2: demo I for ADL transfers with good safety and pacing. Short term goal 3: demo SBA for UB and min A for LB dressing and bathing tasks with good pacing and AD as needed. Short term goal 4: demo I for toileting routine. Short term goal 5: verb good understanding of fall prevention, EC/WS techs, and possible use of AD/DME.   Patient Goals   Patient goals : to move more       Therapy Time   Individual Concurrent Group Co-treatment   Time In  1500         Time Out  1530         Minutes  Harrydeshawn Campos [address] [place] [address]

## 2019-11-17 ENCOUNTER — APPOINTMENT (OUTPATIENT)
Dept: GENERAL RADIOLOGY | Age: 69
DRG: 336 | End: 2019-11-17
Attending: COLON & RECTAL SURGERY
Payer: MEDICARE

## 2019-11-17 LAB
ABSOLUTE EOS #: 0.38 K/UL (ref 0–0.44)
ABSOLUTE IMMATURE GRANULOCYTE: 0.04 K/UL (ref 0–0.3)
ABSOLUTE LYMPH #: 1.44 K/UL (ref 1.1–3.7)
ABSOLUTE MONO #: 1.02 K/UL (ref 0.1–1.2)
ANION GAP SERPL CALCULATED.3IONS-SCNC: 12 MMOL/L (ref 9–17)
BASOPHILS # BLD: 0 % (ref 0–2)
BASOPHILS ABSOLUTE: 0.04 K/UL (ref 0–0.2)
BUN BLDV-MCNC: 14 MG/DL (ref 8–23)
BUN/CREAT BLD: 20 (ref 9–20)
CALCIUM SERPL-MCNC: 8.7 MG/DL (ref 8.6–10.4)
CHLORIDE BLD-SCNC: 107 MMOL/L (ref 98–107)
CO2: 22 MMOL/L (ref 20–31)
CREAT SERPL-MCNC: 0.69 MG/DL (ref 0.5–0.9)
DIFFERENTIAL TYPE: ABNORMAL
EOSINOPHILS RELATIVE PERCENT: 3 % (ref 1–4)
GFR AFRICAN AMERICAN: >60 ML/MIN
GFR NON-AFRICAN AMERICAN: >60 ML/MIN
GFR SERPL CREATININE-BSD FRML MDRD: ABNORMAL ML/MIN/{1.73_M2}
GFR SERPL CREATININE-BSD FRML MDRD: ABNORMAL ML/MIN/{1.73_M2}
GLUCOSE BLD-MCNC: 121 MG/DL (ref 70–99)
HCT VFR BLD CALC: 30 % (ref 36.3–47.1)
HEMOGLOBIN: 9.2 G/DL (ref 11.9–15.1)
IMMATURE GRANULOCYTES: 0 %
LYMPHOCYTES # BLD: 12 % (ref 24–43)
MCH RBC QN AUTO: 28.9 PG (ref 25.2–33.5)
MCHC RBC AUTO-ENTMCNC: 30.7 G/DL (ref 28.4–34.8)
MCV RBC AUTO: 94.3 FL (ref 82.6–102.9)
MONOCYTES # BLD: 9 % (ref 3–12)
NRBC AUTOMATED: 0 PER 100 WBC
PDW BLD-RTO: 15.3 % (ref 11.8–14.4)
PLATELET # BLD: 246 K/UL (ref 138–453)
PLATELET ESTIMATE: ABNORMAL
PMV BLD AUTO: 10.4 FL (ref 8.1–13.5)
POTASSIUM SERPL-SCNC: 3.9 MMOL/L (ref 3.7–5.3)
RBC # BLD: 3.18 M/UL (ref 3.95–5.11)
RBC # BLD: ABNORMAL 10*6/UL
SEG NEUTROPHILS: 76 % (ref 36–65)
SEGMENTED NEUTROPHILS ABSOLUTE COUNT: 8.7 K/UL (ref 1.5–8.1)
SODIUM BLD-SCNC: 141 MMOL/L (ref 135–144)
WBC # BLD: 11.6 K/UL (ref 3.5–11.3)
WBC # BLD: ABNORMAL 10*3/UL

## 2019-11-17 PROCEDURE — 80048 BASIC METABOLIC PNL TOTAL CA: CPT

## 2019-11-17 PROCEDURE — 36415 COLL VENOUS BLD VENIPUNCTURE: CPT

## 2019-11-17 PROCEDURE — 6370000000 HC RX 637 (ALT 250 FOR IP): Performed by: STUDENT IN AN ORGANIZED HEALTH CARE EDUCATION/TRAINING PROGRAM

## 2019-11-17 PROCEDURE — 99232 SBSQ HOSP IP/OBS MODERATE 35: CPT | Performed by: INTERNAL MEDICINE

## 2019-11-17 PROCEDURE — 6370000000 HC RX 637 (ALT 250 FOR IP): Performed by: COLON & RECTAL SURGERY

## 2019-11-17 PROCEDURE — 6370000000 HC RX 637 (ALT 250 FOR IP): Performed by: INTERNAL MEDICINE

## 2019-11-17 PROCEDURE — 97530 THERAPEUTIC ACTIVITIES: CPT

## 2019-11-17 PROCEDURE — 71045 X-RAY EXAM CHEST 1 VIEW: CPT

## 2019-11-17 PROCEDURE — 2060000000 HC ICU INTERMEDIATE R&B

## 2019-11-17 PROCEDURE — 85025 COMPLETE CBC W/AUTO DIFF WBC: CPT

## 2019-11-17 PROCEDURE — 6360000002 HC RX W HCPCS: Performed by: STUDENT IN AN ORGANIZED HEALTH CARE EDUCATION/TRAINING PROGRAM

## 2019-11-17 PROCEDURE — 97116 GAIT TRAINING THERAPY: CPT

## 2019-11-17 RX ORDER — ACETAMINOPHEN 500 MG
500 TABLET ORAL EVERY 6 HOURS PRN
Status: DISCONTINUED | OUTPATIENT
Start: 2019-11-17 | End: 2019-11-18 | Stop reason: HOSPADM

## 2019-11-17 RX ORDER — FUROSEMIDE 40 MG/1
40 TABLET ORAL ONCE
Status: COMPLETED | OUTPATIENT
Start: 2019-11-17 | End: 2019-11-17

## 2019-11-17 RX ADMIN — ENOXAPARIN SODIUM 30 MG: 30 INJECTION SUBCUTANEOUS at 20:59

## 2019-11-17 RX ADMIN — FUROSEMIDE 40 MG: 40 TABLET ORAL at 13:16

## 2019-11-17 RX ADMIN — CYCLOBENZAPRINE 10 MG: 10 TABLET, FILM COATED ORAL at 20:59

## 2019-11-17 RX ADMIN — LOSARTAN POTASSIUM 50 MG: 50 TABLET, FILM COATED ORAL at 09:18

## 2019-11-17 RX ADMIN — ACETAMINOPHEN 500 MG: 500 TABLET ORAL at 16:22

## 2019-11-17 RX ADMIN — KETOROLAC TROMETHAMINE 15 MG: 15 INJECTION, SOLUTION INTRAMUSCULAR; INTRAVENOUS at 05:07

## 2019-11-17 ASSESSMENT — PAIN SCALES - GENERAL
PAINLEVEL_OUTOF10: 0
PAINLEVEL_OUTOF10: 4
PAINLEVEL_OUTOF10: 0
PAINLEVEL_OUTOF10: 0
PAINLEVEL_OUTOF10: 4

## 2019-11-17 NOTE — PROGRESS NOTES
hernia without obstruction or gangrene    Chalazion    S/P colectomy    History of colostomy reversal    Abdominal adhesions    Hypokalemia    Atelectasis of left lung    Postoperative anemia due to acute blood loss     80 yo F s/p colostomy reversal, extensive SANCHEZ, repair of ventral hernias, POD #5    Path- COLOSTOMY SEGMENT/STOMA, EXCISION:   - PARTIAL SUPERFICIAL MUCOSAL EROSION AND HEMORRHAGE.   - DIVERTICULUM.   - SEROSAL FIBROSIS/ADHESIONS WITH FOCAL SUTURE GRANULOMA. - NEGATIVE FOR DYSPLASIA AND MALIGNANCY. PLAN    1. Am labs pending   2. Diet as tolerated   3. SLIVF   4. Strict I&O's  5. Continue packing changes  6. Ambulate as much as possible. IS hourly WA  7. DVT px:  lovenox daily  8. Ok to shower   9. Wound Care: pack daily between staples, cover with a dry dressing. Reinforce as needed. 10. Plan for discharge in next 24 hours     Electronically signed by Mirna Leone DO  on 11/17/2019 at 8:09 AM   I Dr. Hay Vaughan saw and examined the patient. I have edited the above and agree with the above. Geoffrey Santacruz  Colorectal Surgery

## 2019-11-17 NOTE — PROGRESS NOTES
: 56.93 (11/17/19 1216)  Mobility Inpatient CMS 0-100% Score: 11.2 (11/17/19 1216)  Mobility Inpatient CMS G-Code Modifier : CI (11/17/19 1216)          Goals  Short term goals  Time Frame for Short term goals: 12 visits  Short term goal 1: Inc bed-mobility & transfers to independent to enable pt to safely get in/OOB & return to PLOF  Short term goal 2: Inc gait to amb 500ft or > indep w/ RW to enable pt to return to previous level of independence  Short term goal 3: Pt able to tolerate 30-40 min of activity to include 15-20 reps of ex & functional mobility including 5 minutes of standing to facilitate activity tolerance to [facility]; Short term goal 4: Pt able to go up/down 12 steps with one rail indep;   Short term goal 5: Ed pt on home ex's, safety & energy principles & issue written home program;  Patient Goals   Patient goals : recover & return home    Plan    Plan  Times per week: 1-2x/D,7D/week  Current Treatment Recommendations: Strengthening, Balance Training, Functional Mobility Training, Transfer Training, Gait Training, Endurance Training, Stair training, Safety Education & Training, Home Exercise Program, Patient/Caregiver Education & Training  Safety Devices  Type of devices: Call light within reach, Gait belt, Patient at risk for falls, Nurse notified, Left in bed     Therapy Time   Individual Concurrent Group Co-treatment   Time In  1103         Time Out  1126         Minutes  23                 [address], PTA

## 2019-11-17 NOTE — PROGRESS NOTES
replacement **OR** potassium chloride, metoprolol, sodium chloride flush, oxyCODONE **OR** oxyCODONE, ondansetron **OR** ondansetron, hydrALAZINE    Data:     Past Medical History:   has a past medical history of Allergic rhinitis, Bladder prolapse, Constipation, Fundic gland polyposis of stomach, GERD (gastroesophageal reflux disease), Hiatal hernia, History of cardiac cath, History of diverticulitis, Hyperlipidemia, Hypertension, MRSA (methicillin resistant staph aureus) culture positive, MRSA carrier, Obesity, Osteoarthritis, Systolic murmur, Thyroid disease, and Wears glasses. Social History:   reports that she quit smoking about 44 years ago. She has a 3.00 pack-year smoking history. She has never used smokeless tobacco. She reports current alcohol use. She reports that she does not use drugs. Family History:   Family History   Problem Relation Age of Onset    Heart Disease Mother     COPD Father     Cancer Brother         thyroid, prostate, lung       Vitals:  BP (!) 114/49   Pulse 90   Temp 98.6 °F (37 °C) (Oral)   Resp 18   Ht 5' 7\" (1.702 m)   Wt 233 lb 11 oz (106 kg)   LMP  (LMP Unknown)   SpO2 97%   BMI 36.60 kg/m²   Temp (24hrs), Av.1 °F (37.3 °C), Min:98.1 °F (36.7 °C), Max:101.3 °F (38.5 °C)    No results for input(s): POCGLU in the last 72 hours. I/O (24Hr):     Intake/Output Summary (Last 24 hours) at 2019 1159  Last data filed at 2019 0517  Gross per 24 hour   Intake 1247 ml   Output --   Net 1247 ml       Labs:  Hematology:  Recent Labs     11/15/19  0414 19  0647 19  0751   WBC 17.1* 13.5* 11.6*   RBC 3.41* 3.05* 3.18*   HGB 10.0* 8.8* 9.2*   HCT 32.3* 28.7* 30.0*   MCV 94.7 94.1 94.3   MCH 29.3 28.9 28.9   MCHC 31.0 30.7 30.7   RDW 15.2* 15.2* 15.3*    196 246   MPV 10.6 10.5 10.4     Chemistry:  Recent Labs     11/15/19  0414 19  0647 19  0751    139 141   K 3.6* 3.5* 3.9    107 107   CO2 23 25 22   GLUCOSE 110* 143* 121*   BUN 15 15 14   CREATININE 0.63 0.67 0.69   MG  --  2.0  --    ANIONGAP 12 7* 12   LABGLOM >60 >60 >60   GFRAA >60 >60 >60   CALCIUM 8.8 8.6 8.7   No results for input(s): PROT, LABALBU, LABA1C, Y2EFNND, M1VOCFR, FT4, TSH, AST, ALT, LDH, GGT, ALKPHOS, LABGGT, BILITOT, BILIDIR, AMMONIA, AMYLASE, LIPASE, LACTATE, CHOL, HDL, LDLCHOLESTEROL, CHOLHDLRATIO, TRIG, VLDL, EKR19AU, PHENYTOIN, PHENYF, URICACID, POCGLU in the last 72 hours. ABG:No results found for: POCPH, PHART, PH, POCPCO2, DCI8FDH, PCO2, POCPO2, PO2ART, PO2, POCHCO3, WXQ9JNO, HCO3, NBEA, PBEA, BEART, BE, THGBART, THB, HYJ8RBT, KTKD0WBX, S4MWDOSW, O2SAT, FIO2  Lab Results   Component Value Date/Time    SPECIAL NOT REPORTED 08/07/2019 08:58 AM     Lab Results   Component Value Date/Time    CULTURE NO SIGNIFICANT GROWTH 08/07/2019 08:58 AM       Radiology:  [facility]ag[facility]it Edgar Chest Portable    Result Date: 11/14/2019  Left basilar opacity, either infiltrate or atelectasis. RECOMMENDATION: Follow-up to resolution. Physical Examination:        General appearance:  alert, cooperative and no distress  Mental Status:  oriented to person, place and time and normal affect  Lungs:  clear to auscultation bilaterally, normal effort  Heart:  regular rate and rhythm, no murmur  Abdomen:  soft, nontender, nondistended, normal bowel sounds, no masses, hepatomegaly, splenomegaly  Extremities:  no redness, tenderness in the calves, 1+ edema in ankles bilaterally  Skin:  no gross lesions, rashes, induration    Assessment:        Hospital Problems           Last Modified POA    Dyslipidemia 11/13/2019 Yes    Essential hypertension 11/13/2019 Yes    History of colostomy reversal 11/12/2019 Yes    Abdominal adhesions 11/13/2019 Yes    Hypokalemia 11/13/2019 Yes    Atelectasis of left lung 11/14/2019 No    Postoperative anemia due to acute blood loss 11/15/2019 No                Plan:        1. Lasix x1 today, monitor I's and O's  2.  Monitor and control blood pressure, continue

## 2019-11-17 NOTE — PROGRESS NOTES
MD, [facility]  Pulmonary Critical Care and Sleep Medicine,  [facility]3] AT [place]: [phone]

## 2019-11-18 VITALS
TEMPERATURE: 98.8 F | OXYGEN SATURATION: 95 % | HEART RATE: 92 BPM | BODY MASS INDEX: 38.84 KG/M2 | WEIGHT: 247.5 LBS | DIASTOLIC BLOOD PRESSURE: 59 MMHG | HEIGHT: 67 IN | RESPIRATION RATE: 16 BRPM | SYSTOLIC BLOOD PRESSURE: 117 MMHG

## 2019-11-18 LAB
ABSOLUTE EOS #: 0.33 K/UL (ref 0–0.44)
ABSOLUTE IMMATURE GRANULOCYTE: 0.07 K/UL (ref 0–0.3)
ABSOLUTE LYMPH #: 1.51 K/UL (ref 1.1–3.7)
ABSOLUTE MONO #: 0.98 K/UL (ref 0.1–1.2)
ANION GAP SERPL CALCULATED.3IONS-SCNC: 10 MMOL/L (ref 9–17)
BASOPHILS # BLD: 0 % (ref 0–2)
BASOPHILS ABSOLUTE: 0.04 K/UL (ref 0–0.2)
BNP INTERPRETATION: ABNORMAL
BUN BLDV-MCNC: 10 MG/DL (ref 8–23)
BUN/CREAT BLD: 18 (ref 9–20)
CALCIUM SERPL-MCNC: 9.1 MG/DL (ref 8.6–10.4)
CHLORIDE BLD-SCNC: 106 MMOL/L (ref 98–107)
CO2: 23 MMOL/L (ref 20–31)
CREAT SERPL-MCNC: 0.56 MG/DL (ref 0.5–0.9)
DIFFERENTIAL TYPE: ABNORMAL
EOSINOPHILS RELATIVE PERCENT: 3 % (ref 1–4)
GFR AFRICAN AMERICAN: >60 ML/MIN
GFR NON-AFRICAN AMERICAN: >60 ML/MIN
GFR SERPL CREATININE-BSD FRML MDRD: ABNORMAL ML/MIN/{1.73_M2}
GFR SERPL CREATININE-BSD FRML MDRD: ABNORMAL ML/MIN/{1.73_M2}
GLUCOSE BLD-MCNC: 122 MG/DL (ref 70–99)
HCT VFR BLD CALC: 29.5 % (ref 36.3–47.1)
HEMOGLOBIN: 9.2 G/DL (ref 11.9–15.1)
IMMATURE GRANULOCYTES: 1 %
LYMPHOCYTES # BLD: 13 % (ref 24–43)
MCH RBC QN AUTO: 29.1 PG (ref 25.2–33.5)
MCHC RBC AUTO-ENTMCNC: 31.2 G/DL (ref 28.4–34.8)
MCV RBC AUTO: 93.4 FL (ref 82.6–102.9)
MONOCYTES # BLD: 9 % (ref 3–12)
NRBC AUTOMATED: 0 PER 100 WBC
PDW BLD-RTO: 15.2 % (ref 11.8–14.4)
PLATELET # BLD: 247 K/UL (ref 138–453)
PLATELET ESTIMATE: ABNORMAL
PMV BLD AUTO: 9.8 FL (ref 8.1–13.5)
POTASSIUM SERPL-SCNC: 3.7 MMOL/L (ref 3.7–5.3)
PRO-BNP: 2866 PG/ML
RBC # BLD: 3.16 M/UL (ref 3.95–5.11)
RBC # BLD: ABNORMAL 10*6/UL
SEG NEUTROPHILS: 74 % (ref 36–65)
SEGMENTED NEUTROPHILS ABSOLUTE COUNT: 8.42 K/UL (ref 1.5–8.1)
SODIUM BLD-SCNC: 139 MMOL/L (ref 135–144)
WBC # BLD: 11.4 K/UL (ref 3.5–11.3)
WBC # BLD: ABNORMAL 10*3/UL

## 2019-11-18 PROCEDURE — 85025 COMPLETE CBC W/AUTO DIFF WBC: CPT

## 2019-11-18 PROCEDURE — 36415 COLL VENOUS BLD VENIPUNCTURE: CPT

## 2019-11-18 PROCEDURE — 83880 ASSAY OF NATRIURETIC PEPTIDE: CPT

## 2019-11-18 PROCEDURE — 6370000000 HC RX 637 (ALT 250 FOR IP): Performed by: STUDENT IN AN ORGANIZED HEALTH CARE EDUCATION/TRAINING PROGRAM

## 2019-11-18 PROCEDURE — 99232 SBSQ HOSP IP/OBS MODERATE 35: CPT | Performed by: INTERNAL MEDICINE

## 2019-11-18 PROCEDURE — 80048 BASIC METABOLIC PNL TOTAL CA: CPT

## 2019-11-18 PROCEDURE — 6370000000 HC RX 637 (ALT 250 FOR IP): Performed by: COLON & RECTAL SURGERY

## 2019-11-18 RX ADMIN — ACETAMINOPHEN 500 MG: 500 TABLET ORAL at 00:00

## 2019-11-18 RX ADMIN — OXYCODONE HYDROCHLORIDE 5 MG: 5 TABLET ORAL at 12:18

## 2019-11-18 RX ADMIN — LOSARTAN POTASSIUM 50 MG: 50 TABLET, FILM COATED ORAL at 09:02

## 2019-11-18 RX ADMIN — OXYCODONE HYDROCHLORIDE 5 MG: 5 TABLET ORAL at 06:38

## 2019-11-18 ASSESSMENT — PAIN DESCRIPTION - PAIN TYPE: TYPE: SURGICAL PAIN

## 2019-11-18 ASSESSMENT — PAIN DESCRIPTION - LOCATION: LOCATION: ABDOMEN

## 2019-11-18 ASSESSMENT — PAIN SCALES - GENERAL
PAINLEVEL_OUTOF10: 6
PAINLEVEL_OUTOF10: 0
PAINLEVEL_OUTOF10: 4
PAINLEVEL_OUTOF10: 6

## 2019-11-18 ASSESSMENT — PAIN DESCRIPTION - ORIENTATION: ORIENTATION: MID

## 2019-11-18 NOTE — PROGRESS NOTES
pulmonary in 2-3 weeks. Plan was discussed with patient and she understands it.     Chase Cartwright MD, [facility]  Pulmonary Critical Care and Sleep Medicine,  [facility]  Cell: [phone]  Office: [phone]

## 2019-11-18 NOTE — PROGRESS NOTES
DATE: 2019    NAME: Meghan Lubin  MRN: [id number]   : 1950    Patient not seen this date for Physical Therapy due to:  [] Blood transfusion in progress  [] Cancel by RN  [] Hemodialysis  []  Refusal by Patient   [] Spine Precautions   [] Strict Bedrest  [] Surgery  [] Testing      [x] Other pt to be discharged home soon pt feels she is independent and is ambulating on her own         [] PT being discontinued at this time. Patient independent. No further needs. [] PT being discontinued at this time as the patient has been transferred to hospice care. No further needs.     BRYCE SANTORO, PTA

## 2019-11-18 NOTE — PROGRESS NOTES
[facility] - Progress Note    2019   11:25 AM    Name:  Saad Canas  :    1950  Age:  81 y.o. female  MRN:    [id number]     Acct:     [de-identified]   Room:  [facility]/1021-01   Day: [facility] Date: 2019 11:16 AM  PCP: Edith Flores MD    Subjective:     C/C: Elective reversal of colostomy initially performed for recurring diverticulitis    Interval History: Status: improved. Patient is tolerating diet. She is having bowel movements/diarrhea. She is cleared by surgery for discharge today. SERAFIN will be completed for home VNS. Follow-up will be with PCP, pulmonary and surgery. She will need outpatient work-up for KIM. Patient was not discharged yesterday secondary to fluid overload. She is actively diuresed 2250 mL's and currently states she has lost 5 pounds of water weight and is feeling much improved. She is agreeable to discharge today. Electrolytes have remained stable. History: Per my partner signout note  \"This is a 26-year-old  female who was admitted for colostomy reversal.  She had recurrent episodes of diverticulitis and underwent bowel resection this past May with colostomy formation.  Subsequently she has recovered and at this time is admitted for colostomy reversal and were asked to assist in her postoperative care of hypertension and general medical care. \"    ROS:  Constitutional: Negative for chills, diaphoresis, fever, malaise/fatigue and weight loss. HENT: Negative for ear pain, hearing loss, nosebleeds, sore throat and tinnitus. Eyes: Negative for blurred vision, double vision, photophobia and pain. Respiratory: Negative for cough, hemoptysis, sputum production, shortness of breath and wheezing. Cardiovascular: Negative for palpitations, orthopnea, claudication, leg swelling and PND. Gastrointestinal: Negative for blood in stool, constipation, heartburn, melena, nausea and vomiting. 3. 5* 3.9 3.7    107 106   CO2 25 22 23   GLUCOSE 143* 121* 122*   BUN 15 14 10   CREATININE 0.67 0.69 0.56   MG 2.0  --   --    ANIONGAP 7* 12 10   LABGLOM >60 >60 >60   GFRAA >60 >60 >60   CALCIUM 8.6 8.7 9.1   PROBNP  --   --  2,866*     Glucose:No results for input(s): LABA1C, POCGLU, LABINSU in the last 72 hours. Physical Examination:    BP (!) 129/57   Pulse 86   Temp 98.4 °F (36.9 °C) (Oral)   Resp 16   Ht 5' 7\" (1.702 m)   Wt 247 lb 8 oz (112.3 kg)   LMP  (LMP Unknown)   SpO2 96%   BMI 38.76 kg/m²     Intake/Output Summary (Last 24 hours) at 11/18/2019 1125  Last data filed at 11/18/2019 4169  Gross per 24 hour   Intake --   Output 2250 ml   Net -2250 ml       General Appearance:    Alert, cooperative, no distress, appears stated age   Head:    Normocephalic, without obvious abnormality, atraumatic   Eyes:    PERRL, conjunctiva/corneas clear, EOM's intact        Ears:    Normal external ear canals, both ears   Nose:   Nares normal, septum midline, mucosa normal, no drainage    or sinus tenderness   Throat:   Lips, mucosa, and tongue normal   Neck:   Supple, symmetrical, trachea midline, no carotid    bruit or JVD   Back:     Symmetric, no curvature, ROM normal, no CVA tenderness   Lungs:     Clear to auscultation bilaterally, respirations unlabored   Chest wall:    No tenderness or deformity   Heart:    Regular rate and rhythm, S1 and S2 normal, no murmur, rub   or gallop   Abdomen:     Soft, non-tender, bowel sounds active all four quadrants,     no masses, no organomegaly   Extremities:   Extremities normal, atraumatic, no cyanosis or edema   Pulses:   2+ and symmetric all extremities   Skin:   Skin color, texture, turgor normal, no rashes or lesions   Lymph nodes:   Cervical, supraclavicular, and axillary nodes normal   Neurologic:   CNII-XII intact.  Normal strength, sensation and reflexes       throughout       Assessment:     Hospital Problems           Last Modified POA    Dyslipidemia

## 2019-11-18 NOTE — PROGRESS NOTES
General Surgery Progress Note       PATIENT NAME: Elaine Sanchez     TODAY'S DATE: 11/18/2019, 7:31 AM      SUBJECTIVE:    Pt seen and examined. No acute events or changes. Doing well this AM. Pain controlled. Tolerating diet. +BM. Ambulating. Incision clean and dry with packing in place. OBJECTIVE:   Vitals:  /61   Pulse 99   Temp 99 °F (37.2 °C) (Oral)   Resp 18   Ht 5' 7\" (1.702 m)   Wt 247 lb 8 oz (112.3 kg)   LMP  (LMP Unknown)   SpO2 94%   BMI 38.76 kg/m²      INTAKE/OUTPUT:      Intake/Output Summary (Last 24 hours) at 11/18/2019 0731  Last data filed at 11/18/2019 8800  Gross per 24 hour   Intake --   Output 2250 ml   Net -2250 ml                 General: awake and alert. NAD  Lungs: resp even and unlabored   Heart: RRR  Abdomen: soft, obese, mild tenderness as expected. serosang drainage from wounds. No marquis wound erythema or purulence. Packing in place. Extremity: no edema or cyanosis     Data Review:  CBC:   Recent Labs     11/16/19  0647 11/17/19  0751 11/18/19  0631   WBC 13.5* 11.6* 11.4*   HGB 8.8* 9.2* 9.2*    246 247     BMP:    Recent Labs     11/16/19  0647 11/17/19  0751 11/18/19  0631    141 139   K 3.5* 3.9 3.7    107 106   CO2 25 22 23   BUN 15 14 10   CREATININE 0.67 0.69 0.56   GLUCOSE 143* 121* 122*     Hepatic: No results for input(s): AST, ALT, ALB, ALKPHOS, BILITOT, BILIDIR in the last 72 hours. Coagulation: No results for input(s): APTT, PROT, INR in the last 72 hours.     ASSESSMENT     Patient Active Problem List   Diagnosis    Dyslipidemia    Osteoarthritis    GERD (gastroesophageal reflux disease)    Essential hypertension    Obesity    Allergic rhinitis    Osteoporosis    Arthritis of knee    Biliary dyskinesia    Right upper quadrant abdominal pain    Fundic gland polyposis of stomach    SBO (small bowel obstruction) (HCC)    Incarcerated incisional hernia    Sigmoid diverticulitis    Incisional hernia without obstruction

## 2019-11-19 ENCOUNTER — TELEPHONE (OUTPATIENT)
Dept: INTERNAL MEDICINE CLINIC | Age: 69
End: 2019-11-19

## 2019-11-21 ENCOUNTER — HOSPITAL ENCOUNTER (INPATIENT)
Age: 69
LOS: 1 days | Discharge: HOME | DRG: 292 | End: 2019-11-22
Attending: EMERGENCY MEDICINE | Admitting: FAMILY MEDICINE
Payer: MEDICARE

## 2019-11-21 ENCOUNTER — OFFICE VISIT (OUTPATIENT)
Dept: FAMILY MEDICINE CLINIC | Age: 69
End: 2019-11-21
Payer: MEDICARE

## 2019-11-21 ENCOUNTER — APPOINTMENT (OUTPATIENT)
Dept: GENERAL RADIOLOGY | Age: 69
DRG: 292 | End: 2019-11-21
Payer: MEDICARE

## 2019-11-21 VITALS
OXYGEN SATURATION: 95 % | HEART RATE: 92 BPM | TEMPERATURE: 97.7 F | BODY MASS INDEX: 39.16 KG/M2 | DIASTOLIC BLOOD PRESSURE: 78 MMHG | SYSTOLIC BLOOD PRESSURE: 132 MMHG | WEIGHT: 250 LBS

## 2019-11-21 DIAGNOSIS — Z09 HOSPITAL DISCHARGE FOLLOW-UP: ICD-10-CM

## 2019-11-21 DIAGNOSIS — T81.31XA POSTOPERATIVE WOUND DEHISCENCE, INITIAL ENCOUNTER: ICD-10-CM

## 2019-11-21 DIAGNOSIS — Z98.890 S/P COLOSTOMY TAKEDOWN: Primary | ICD-10-CM

## 2019-11-21 PROBLEM — I50.9 NEW ONSET OF CONGESTIVE HEART FAILURE (HCC): Status: ACTIVE | Noted: 2019-11-21

## 2019-11-21 LAB
ABSOLUTE EOS #: 0.24 K/UL (ref 0–0.44)
ABSOLUTE IMMATURE GRANULOCYTE: 0.11 K/UL (ref 0–0.3)
ABSOLUTE LYMPH #: 1.3 K/UL (ref 1.1–3.7)
ABSOLUTE MONO #: 1.09 K/UL (ref 0.1–1.2)
ANION GAP SERPL CALCULATED.3IONS-SCNC: 10 MMOL/L (ref 9–17)
BASOPHILS # BLD: 0 % (ref 0–2)
BASOPHILS ABSOLUTE: 0.05 K/UL (ref 0–0.2)
BNP INTERPRETATION: ABNORMAL
BUN BLDV-MCNC: 10 MG/DL (ref 8–23)
BUN/CREAT BLD: 17 (ref 9–20)
CALCIUM SERPL-MCNC: 9.3 MG/DL (ref 8.6–10.4)
CHLORIDE BLD-SCNC: 100 MMOL/L (ref 98–107)
CO2: 26 MMOL/L (ref 20–31)
CREAT SERPL-MCNC: 0.6 MG/DL (ref 0.5–0.9)
DIFFERENTIAL TYPE: ABNORMAL
EOSINOPHILS RELATIVE PERCENT: 2 % (ref 1–4)
GFR AFRICAN AMERICAN: >60 ML/MIN
GFR NON-AFRICAN AMERICAN: >60 ML/MIN
GFR SERPL CREATININE-BSD FRML MDRD: ABNORMAL ML/MIN/{1.73_M2}
GFR SERPL CREATININE-BSD FRML MDRD: ABNORMAL ML/MIN/{1.73_M2}
GLUCOSE BLD-MCNC: 126 MG/DL (ref 70–99)
HCT VFR BLD CALC: 30.8 % (ref 36.3–47.1)
HEMOGLOBIN: 9.4 G/DL (ref 11.9–15.1)
IMMATURE GRANULOCYTES: 1 %
INR BLD: 1
IRON SATURATION: 9 % (ref 20–55)
IRON: 15 UG/DL (ref 37–145)
LACTIC ACID, SEPSIS WHOLE BLOOD: NORMAL MMOL/L (ref 0.5–1.9)
LACTIC ACID, SEPSIS WHOLE BLOOD: NORMAL MMOL/L (ref 0.5–1.9)
LACTIC ACID, SEPSIS: 0.7 MMOL/L (ref 0.5–1.9)
LACTIC ACID, SEPSIS: 1 MMOL/L (ref 0.5–1.9)
LV EF: 65 %
LVEF MODALITY: NORMAL
LYMPHOCYTES # BLD: 9 % (ref 24–43)
MCH RBC QN AUTO: 28.9 PG (ref 25.2–33.5)
MCHC RBC AUTO-ENTMCNC: 30.5 G/DL (ref 28.4–34.8)
MCV RBC AUTO: 94.8 FL (ref 82.6–102.9)
MONOCYTES # BLD: 7 % (ref 3–12)
MYOGLOBIN: 28 NG/ML (ref 25–58)
MYOGLOBIN: 30 NG/ML (ref 25–58)
NRBC AUTOMATED: 0 PER 100 WBC
PARTIAL THROMBOPLASTIN TIME: 25.5 SEC (ref 23–31)
PDW BLD-RTO: 15 % (ref 11.8–14.4)
PLATELET # BLD: 392 K/UL (ref 138–453)
PLATELET ESTIMATE: ABNORMAL
PMV BLD AUTO: 9.2 FL (ref 8.1–13.5)
POTASSIUM SERPL-SCNC: 4 MMOL/L (ref 3.7–5.3)
PRO-BNP: 3630 PG/ML
PROCALCITONIN: 0.12 NG/ML
PROTHROMBIN TIME: 10.2 SEC (ref 9.7–11.6)
RBC # BLD: 3.25 M/UL (ref 3.95–5.11)
RBC # BLD: ABNORMAL 10*6/UL
SEG NEUTROPHILS: 82 % (ref 36–65)
SEGMENTED NEUTROPHILS ABSOLUTE COUNT: 12.41 K/UL (ref 1.5–8.1)
SODIUM BLD-SCNC: 136 MMOL/L (ref 135–144)
TOTAL IRON BINDING CAPACITY: 164 UG/DL (ref 250–450)
TROPONIN INTERP: ABNORMAL
TROPONIN INTERP: ABNORMAL
TROPONIN T: ABNORMAL NG/ML
TROPONIN T: ABNORMAL NG/ML
TROPONIN, HIGH SENSITIVITY: 36 NG/L (ref 0–14)
TROPONIN, HIGH SENSITIVITY: 37 NG/L (ref 0–14)
UNSATURATED IRON BINDING CAPACITY: 149 UG/DL (ref 112–347)
WBC # BLD: 15.2 K/UL (ref 3.5–11.3)
WBC # BLD: ABNORMAL 10*3/UL

## 2019-11-21 PROCEDURE — 84145 PROCALCITONIN (PCT): CPT

## 2019-11-21 PROCEDURE — 85730 THROMBOPLASTIN TIME PARTIAL: CPT

## 2019-11-21 PROCEDURE — 85025 COMPLETE CBC W/AUTO DIFF WBC: CPT

## 2019-11-21 PROCEDURE — 83605 ASSAY OF LACTIC ACID: CPT

## 2019-11-21 PROCEDURE — 6370000000 HC RX 637 (ALT 250 FOR IP): Performed by: FAMILY MEDICINE

## 2019-11-21 PROCEDURE — 96361 HYDRATE IV INFUSION ADD-ON: CPT

## 2019-11-21 PROCEDURE — 86403 PARTICLE AGGLUT ANTBDY SCRN: CPT

## 2019-11-21 PROCEDURE — 6370000000 HC RX 637 (ALT 250 FOR IP): Performed by: NURSE PRACTITIONER

## 2019-11-21 PROCEDURE — 87040 BLOOD CULTURE FOR BACTERIA: CPT

## 2019-11-21 PROCEDURE — 99495 TRANSJ CARE MGMT MOD F2F 14D: CPT | Performed by: NURSE PRACTITIONER

## 2019-11-21 PROCEDURE — 6360000002 HC RX W HCPCS: Performed by: FAMILY MEDICINE

## 2019-11-21 PROCEDURE — 81003 URINALYSIS AUTO W/O SCOPE: CPT

## 2019-11-21 PROCEDURE — 85610 PROTHROMBIN TIME: CPT

## 2019-11-21 PROCEDURE — 96376 TX/PRO/DX INJ SAME DRUG ADON: CPT

## 2019-11-21 PROCEDURE — 6360000002 HC RX W HCPCS: Performed by: EMERGENCY MEDICINE

## 2019-11-21 PROCEDURE — 83540 ASSAY OF IRON: CPT

## 2019-11-21 PROCEDURE — 87086 URINE CULTURE/COLONY COUNT: CPT

## 2019-11-21 PROCEDURE — 80048 BASIC METABOLIC PNL TOTAL CA: CPT

## 2019-11-21 PROCEDURE — 96372 THER/PROPH/DIAG INJ SC/IM: CPT

## 2019-11-21 PROCEDURE — 6360000002 HC RX W HCPCS: Performed by: INTERNAL MEDICINE

## 2019-11-21 PROCEDURE — APPSS60 APP SPLIT SHARED TIME 46-60 MINUTES: Performed by: NURSE PRACTITIONER

## 2019-11-21 PROCEDURE — 83550 IRON BINDING TEST: CPT

## 2019-11-21 PROCEDURE — B246ZZZ ULTRASONOGRAPHY OF RIGHT AND LEFT HEART: ICD-10-PCS | Performed by: INTERNAL MEDICINE

## 2019-11-21 PROCEDURE — 99222 1ST HOSP IP/OBS MODERATE 55: CPT | Performed by: FAMILY MEDICINE

## 2019-11-21 PROCEDURE — 96375 TX/PRO/DX INJ NEW DRUG ADDON: CPT

## 2019-11-21 PROCEDURE — 83880 ASSAY OF NATRIURETIC PEPTIDE: CPT

## 2019-11-21 PROCEDURE — 87077 CULTURE AEROBIC IDENTIFY: CPT

## 2019-11-21 PROCEDURE — 84484 ASSAY OF TROPONIN QUANT: CPT

## 2019-11-21 PROCEDURE — 1111F DSCHRG MED/CURRENT MED MERGE: CPT | Performed by: NURSE PRACTITIONER

## 2019-11-21 PROCEDURE — 71045 X-RAY EXAM CHEST 1 VIEW: CPT

## 2019-11-21 PROCEDURE — 6360000002 HC RX W HCPCS: Performed by: NURSE PRACTITIONER

## 2019-11-21 PROCEDURE — 2580000003 HC RX 258: Performed by: NURSE PRACTITIONER

## 2019-11-21 PROCEDURE — 36415 COLL VENOUS BLD VENIPUNCTURE: CPT

## 2019-11-21 PROCEDURE — 93005 ELECTROCARDIOGRAM TRACING: CPT | Performed by: EMERGENCY MEDICINE

## 2019-11-21 PROCEDURE — 87070 CULTURE OTHR SPECIMN AEROBIC: CPT

## 2019-11-21 PROCEDURE — 87186 SC STD MICRODIL/AGAR DIL: CPT

## 2019-11-21 PROCEDURE — 51702 INSERT TEMP BLADDER CATH: CPT

## 2019-11-21 PROCEDURE — 87205 SMEAR GRAM STAIN: CPT

## 2019-11-21 PROCEDURE — 96374 THER/PROPH/DIAG INJ IV PUSH: CPT

## 2019-11-21 PROCEDURE — 87075 CULTR BACTERIA EXCEPT BLOOD: CPT

## 2019-11-21 PROCEDURE — 2060000000 HC ICU INTERMEDIATE R&B

## 2019-11-21 PROCEDURE — 93306 TTE W/DOPPLER COMPLETE: CPT

## 2019-11-21 PROCEDURE — 99285 EMERGENCY DEPT VISIT HI MDM: CPT

## 2019-11-21 PROCEDURE — 83874 ASSAY OF MYOGLOBIN: CPT

## 2019-11-21 RX ORDER — FUROSEMIDE 10 MG/ML
40 INJECTION INTRAMUSCULAR; INTRAVENOUS 2 TIMES DAILY
Status: DISCONTINUED | OUTPATIENT
Start: 2019-11-21 | End: 2019-11-21

## 2019-11-21 RX ORDER — ACETAMINOPHEN 325 MG/1
650 TABLET ORAL EVERY 4 HOURS PRN
Status: DISCONTINUED | OUTPATIENT
Start: 2019-11-21 | End: 2019-11-22 | Stop reason: HOSPADM

## 2019-11-21 RX ORDER — HYDROCODONE BITARTRATE AND ACETAMINOPHEN 5; 325 MG/1; MG/1
1 TABLET ORAL EVERY 4 HOURS PRN
Status: DISCONTINUED | OUTPATIENT
Start: 2019-11-21 | End: 2019-11-22 | Stop reason: HOSPADM

## 2019-11-21 RX ORDER — 0.9 % SODIUM CHLORIDE 0.9 %
250 INTRAVENOUS SOLUTION INTRAVENOUS ONCE
Status: COMPLETED | OUTPATIENT
Start: 2019-11-21 | End: 2019-11-21

## 2019-11-21 RX ORDER — NICOTINE 21 MG/24HR
1 PATCH, TRANSDERMAL 24 HOURS TRANSDERMAL DAILY PRN
Status: DISCONTINUED | OUTPATIENT
Start: 2019-11-21 | End: 2019-11-22 | Stop reason: HOSPADM

## 2019-11-21 RX ORDER — SODIUM CHLORIDE 0.9 % (FLUSH) 0.9 %
10 SYRINGE (ML) INJECTION EVERY 12 HOURS SCHEDULED
Status: DISCONTINUED | OUTPATIENT
Start: 2019-11-21 | End: 2019-11-22 | Stop reason: HOSPADM

## 2019-11-21 RX ORDER — SULFAMETHOXAZOLE AND TRIMETHOPRIM 800; 160 MG/1; MG/1
1 TABLET ORAL EVERY 12 HOURS SCHEDULED
Status: DISCONTINUED | OUTPATIENT
Start: 2019-11-21 | End: 2019-11-22 | Stop reason: HOSPADM

## 2019-11-21 RX ORDER — LANOLIN ALCOHOL/MO/W.PET/CERES
325 CREAM (GRAM) TOPICAL 2 TIMES DAILY WITH MEALS
Status: DISCONTINUED | OUTPATIENT
Start: 2019-11-21 | End: 2019-11-22 | Stop reason: HOSPADM

## 2019-11-21 RX ORDER — ONDANSETRON 2 MG/ML
4 INJECTION INTRAMUSCULAR; INTRAVENOUS EVERY 6 HOURS PRN
Status: DISCONTINUED | OUTPATIENT
Start: 2019-11-21 | End: 2019-11-22 | Stop reason: HOSPADM

## 2019-11-21 RX ORDER — FUROSEMIDE 10 MG/ML
20 INJECTION INTRAMUSCULAR; INTRAVENOUS 2 TIMES DAILY
Status: DISCONTINUED | OUTPATIENT
Start: 2019-11-21 | End: 2019-11-22 | Stop reason: HOSPADM

## 2019-11-21 RX ORDER — SODIUM CHLORIDE 0.9 % (FLUSH) 0.9 %
10 SYRINGE (ML) INJECTION PRN
Status: DISCONTINUED | OUTPATIENT
Start: 2019-11-21 | End: 2019-11-22 | Stop reason: HOSPADM

## 2019-11-21 RX ORDER — FUROSEMIDE 10 MG/ML
40 INJECTION INTRAMUSCULAR; INTRAVENOUS ONCE
Status: COMPLETED | OUTPATIENT
Start: 2019-11-21 | End: 2019-11-21

## 2019-11-21 RX ORDER — ONDANSETRON 2 MG/ML
4 INJECTION INTRAMUSCULAR; INTRAVENOUS EVERY 6 HOURS PRN
Status: DISCONTINUED | OUTPATIENT
Start: 2019-11-21 | End: 2019-11-21 | Stop reason: SDUPTHER

## 2019-11-21 RX ORDER — DOCUSATE SODIUM 100 MG/1
100 CAPSULE, LIQUID FILLED ORAL 2 TIMES DAILY PRN
Status: DISCONTINUED | OUTPATIENT
Start: 2019-11-21 | End: 2019-11-22 | Stop reason: HOSPADM

## 2019-11-21 RX ORDER — LOSARTAN POTASSIUM 50 MG/1
50 TABLET ORAL DAILY
Status: DISCONTINUED | OUTPATIENT
Start: 2019-11-21 | End: 2019-11-22 | Stop reason: HOSPADM

## 2019-11-21 RX ORDER — ONDANSETRON 4 MG/1
4 TABLET, ORALLY DISINTEGRATING ORAL EVERY 6 HOURS PRN
Status: DISCONTINUED | OUTPATIENT
Start: 2019-11-21 | End: 2019-11-22 | Stop reason: HOSPADM

## 2019-11-21 RX ADMIN — FUROSEMIDE 40 MG: 10 INJECTION, SOLUTION INTRAMUSCULAR; INTRAVENOUS at 10:58

## 2019-11-21 RX ADMIN — HYDROCODONE BITARTRATE AND ACETAMINOPHEN 1 TABLET: 5; 325 TABLET ORAL at 15:40

## 2019-11-21 RX ADMIN — FERROUS SULFATE TAB EC 325 MG (65 MG FE EQUIVALENT) 325 MG: 325 (65 FE) TABLET DELAYED RESPONSE at 17:36

## 2019-11-21 RX ADMIN — FUROSEMIDE 20 MG: 10 INJECTION, SOLUTION INTRAMUSCULAR; INTRAVENOUS at 17:36

## 2019-11-21 RX ADMIN — SULFAMETHOXAZOLE AND TRIMETHOPRIM 1 TABLET: 800; 160 TABLET ORAL at 14:38

## 2019-11-21 RX ADMIN — ACETAMINOPHEN 650 MG: 325 TABLET ORAL at 21:46

## 2019-11-21 RX ADMIN — ENOXAPARIN SODIUM 40 MG: 40 INJECTION SUBCUTANEOUS at 14:38

## 2019-11-21 RX ADMIN — SODIUM CHLORIDE 250 ML: 9 INJECTION, SOLUTION INTRAVENOUS at 20:33

## 2019-11-21 RX ADMIN — ACETAMINOPHEN 650 MG: 325 TABLET ORAL at 15:41

## 2019-11-21 RX ADMIN — ONDANSETRON 4 MG: 2 INJECTION INTRAMUSCULAR; INTRAVENOUS at 20:57

## 2019-11-21 ASSESSMENT — PAIN SCALES - GENERAL
PAINLEVEL_OUTOF10: 5
PAINLEVEL_OUTOF10: 5
PAINLEVEL_OUTOF10: 0
PAINLEVEL_OUTOF10: 0
PAINLEVEL_OUTOF10: 8

## 2019-11-21 ASSESSMENT — ENCOUNTER SYMPTOMS
CONSTIPATION: 0
ABDOMINAL PAIN: 0
COLOR CHANGE: 0
COUGH: 0
FACIAL SWELLING: 0
EYE REDNESS: 0
SHORTNESS OF BREATH: 1
DIARRHEA: 0
VOMITING: 0
EYE DISCHARGE: 0

## 2019-11-21 ASSESSMENT — PAIN DESCRIPTION - LOCATION: LOCATION: ABDOMEN

## 2019-11-21 ASSESSMENT — PAIN DESCRIPTION - DESCRIPTORS: DESCRIPTORS: PRESSURE

## 2019-11-21 ASSESSMENT — PAIN DESCRIPTION - FREQUENCY: FREQUENCY: CONTINUOUS

## 2019-11-21 NOTE — ED PROVIDER NOTES
[facility] ED  EMERGENCY DEPARTMENT ENCOUNTER      Pt Name: Kimberly Reardon  MRN: [id number]  [place] 1950  Date of evaluation: 11/21/2019  Provider: Srikanth Steiner MD    CHIEF COMPLAINT       Chief Complaint   Patient presents with    Post-op Problem     11/12 reverse colostomy         HISTORY OF PRESENT ILLNESS  (Location/Symptom, Timing/Onset, Context/Setting, Quality, Duration, Modifying Factors, Severity.)   Kimberly Reardon is a 81 y.o. female who presents to the emergency department to evaluate her surgical wound. She is postop day #9 from colostomy reversal.  She has surgical wounds that are dehisced and she was sent in from a doctor's office. The patient states she is not sure when it opened up, she has not been looking at them. No fever. She has been short of breath particularly with exertion. No fever or purulent drainage. Nursing Notes were reviewed. ALLERGIES     Patient has no known allergies. CURRENT MEDICATIONS       Previous Medications    CHOLECALCIFEROL (VITAMIN D) 2000 UNITS CAPS CAPSULE    Take 1 capsule by mouth daily    CLOTRIMAZOLE-BETAMETHASONE (LOTRISONE) 1-0.05 % CREAM    Apply topically daily as needed (to spot on arm)    DOCUSATE SODIUM (COLACE) 100 MG CAPSULE    Take 1 capsule by mouth 2 times daily as needed for Constipation    HYDROCODONE-ACETAMINOPHEN (NORCO) 5-325 MG PER TABLET    Take 1 tablet by mouth every 6 hours as needed for Pain for up to 7 days.     LOSARTAN (COZAAR) 50 MG TABLET    Take 1 tablet by mouth daily    ONDANSETRON (ZOFRAN) 4 MG TABLET    Take 1 tablet by mouth every 12 hours as needed for Nausea or Vomiting       PAST MEDICAL HISTORY         Diagnosis Date    Allergic rhinitis     Bladder prolapse     Constipation     5/2019 resolved    Fundic gland polyposis of stomach 08/17/2017    per EGD    GERD (gastroesophageal reflux disease)     on no medications    Hiatal hernia     History of cardiac cath 12/2002    pt denies blockage    History of diverticulitis     Hyperlipidemia     borderline, on no medication    Hypertension     MRSA (methicillin resistant staph aureus) culture positive 11/01/2016    nasal    MRSA carrier     follows with ID    Obesity     Osteoarthritis     Systolic murmur     benign systolic murmur (history of). Pt does not follow-up with a cardiologist (Written 09/25/2019).     Thyroid disease     goiter    Wears glasses        SURGICAL HISTORY           Procedure Laterality Date    ABDOMINAL EXPLORATION SURGERY  05/16/2019    CHOLECYSTECTOMY, LAPAROSCOPIC  11/15/2016    CHOLECYSTECTOMY, LAPAROSCOPIC  11/15/2016    COLONOSCOPY  2013    neg    COLONOSCOPY N/A 5/16/2019    COLONOSCOPY DIAGNOSTIC performed by Kathleen Mendoza MD at [address], COLON, DIAGNOSTIC      EGD    FISSURECTOMY ANAL N/A 10/4/2019    FLEXIBLE SIGMOIDOSCOPY & ANAL FISTULECTOMY performed by Kathleen Mendoza MD at [facility] ARTHROSCOPY Left     lateral release    SC EGD TRANSORAL BIOPSY SINGLE/MULTIPLE N/A 8/17/2017    EGD BIOPSY performed by Rayray Donato MD at [address] [facility] OFFICE/OUTPT [address] [facility] N/A 5/22/2018    XI ROBOTIC LAPAROSCOPIC UMBILICAL HERNIA REPAIR WITH MESH, POSSIBLE OPEN performed by Jasmin Morris IV, DO at [facility] [address]  02/19/2019    SIGMOIDOSCOPY N/A 2/19/2019    SIGMOIDOSCOPY BIOPSY FLEXIBLE performed by Rena Cody DO at [address] [facility] [address] [facility] [address] N/A 5/16/2019    [address] performed by Kathleen Mendoza MD at [address] [facility] N/A 11/12/2019    COLOSTOMY  CLOSURE EXTENSIVE LYSIS OF ADHESIONS REPAIR OF SMALL BOWEL TEAR, REPAIR OF MULTIPLE VENTRAL HERNIAS, MOBILIZATION OF THE SPLENIC FLEXURE AND TRANSVERSE COLON, ABDOMINAL EXPLORATION performed by Kathleen Mendoza MD at [address] [facility] Left 9/24/2012    knee    TOTAL KNEE ARTHROPLASTY urgently. I provided critical care time including documentation time, medication orders and management, reevaluation, vital sign assessment, ordering and reviewing of of lab tests ordering and reviewing of x-ray studies, and admission orders. Aggregate critical care time is 35 minutes including only time during which I was engaged in work directly related to her care and did not include time spent treating other patients simultaneously. CONSULTS:  IP CONSULT TO HOSPITALIST    PROCEDURES:  None    FINAL IMPRESSION      1. Acute congestive heart failure, unspecified heart failure type ([facility] [address]    2. Dehiscence of operative wound, initial encounter          DISPOSITION/PLAN   DISPOSITION Decision To Admit 11/21/2019 10:58:57 AM      PATIENT REFERRED TO:   No follow-up provider specified.     DISCHARGE MEDICATIONS:     New Prescriptions    No medications on file         (Please note that portions of this note were completed with a voice recognition program.  Efforts were made to edit the dictations but occasionally words are mis-transcribed.)    Leif Rowe MD  Attending Emergency Physician           Leif Rowe MD  11/21/19 7513

## 2019-11-21 NOTE — PLAN OF CARE
Problem: Falls - Risk of:  Goal: Will remain free from falls  Description  Will remain free from falls  Outcome: Ongoing  Note:   Hourly rounding. Call light in reach. Instructed to call for assist before attempting out of bed. Remains free from falls and accidental injury at this time. Floor free from obstacles, and bed is locked and in lowest position. Adequate lighting provided.

## 2019-11-21 NOTE — DISCHARGE INSTR - COC
Continuity of Care Form    Patient Name: Ann Mckeon   :  1950  MRN:  [id number]    Admit date:  2019  Discharge date:      Code Status Order: Full Code   Advance Directives:   Advance Care Flowsheet Documentation     Date/Time Healthcare Directive Type of Healthcare Directive Copy in [address] Agent's Name Healthcare Agent's Phone Number    19 1218  No, patient does not have an advance directive for healthcare treatment -- -- -- -- --          Admitting Physician:  Misael Patrick MD  PCP: Jourdan Dickens MD    Discharging Nurse: [facility] Unit/Room#: 3748/3667-47  Discharging Unit Phone Number: [phone]    Emergency Contact:   Extended Emergency Contact Information  Primary Emergency Contact: Brie Goldman  Address: [address] Phone: [phone]  Work Phone: [phone]  Mobile Phone: [phone]  Relation: Child  Secondary Emergency Contact: Sravanthi Menon  Address: [address] [place] [address], [facility] [address] Phone: [phone]  Work Phone: [phone]  Mobile Phone: [phone]  Relation: Parent    Past Surgical History:  Past Surgical History:   Procedure Laterality Date    ABDOMINAL EXPLORATION SURGERY  2019    CHOLECYSTECTOMY, LAPAROSCOPIC  11/15/2016    CHOLECYSTECTOMY, LAPAROSCOPIC  11/15/2016    COLONOSCOPY  2013    neg    COLONOSCOPY N/A 2019    COLONOSCOPY DIAGNOSTIC performed by Valentin Quintanilla MD at [address], COLON, DIAGNOSTIC      EGD    FISSURECTOMY ANAL N/A 10/4/2019    FLEXIBLE SIGMOIDOSCOPY & ANAL FISTULECTOMY performed by Valentin Quintanilla MD at [address] [facility] ARTHROSCOPY Left     lateral release    DE EGD TRANSORAL BIOPSY SINGLE/MULTIPLE N/A 2017    EGD BIOPSY performed by Brayan Purdy MD at [address] OFFICE/OUTPT VISIT,PROCEDURE ONLY N/A 2018    XI ROBOTIC LAPAROSCOPIC UMBILICAL HERNIA REPAIR WITH MESH, POSSIBLE OPEN performed by Ailyn Morris IV, DO at [facility] [address]  02/19/2019    SIGMOIDOSCOPY N/A 2/19/2019    SIGMOIDOSCOPY BIOPSY FLEXIBLE performed by Flaco Fuentes DO at [address] N/A 5/16/2019    ABDOMINAL EXPLORATION ANDBOWEL RESECTION LOW ANTERIOR WITH COLOSTOMY performed by Susan Regalado MD at [address] N/A 11/12/2019    COLOSTOMY  CLOSURE EXTENSIVE LYSIS OF ADHESIONS REPAIR OF SMALL BOWEL TEAR, REPAIR OF MULTIPLE VENTRAL HERNIAS, MOBILIZATION OF THE SPLENIC FLEXURE AND TRANSVERSE COLON, ABDOMINAL EXPLORATION performed by Susan [facility]alado MD [facility] [address] [facility] [address] Left 9/24/2012    knee    TOTAL KNEE ARTHROPLASTY Right 11/10/2014    knee    UMBILICAL HERNIA REPAIR  05/22/2018    UPPER GASTROINTESTINAL ENDOSCOPY  08/17/2017    Multiple small gastric polyps, no esophagitis noted no Melara's mucosa.   No hiatal hernia, pathology benign fundic gland polyps       Immunization History:   Immunization History   Administered Date(s) Administered    Influenza Vaccine, unspecified formulation 10/03/2016    Influenza Virus Vaccine 09/17/2014, 10/01/2015, 10/23/2017, 10/09/2019       Active Problems:  Patient Active Problem List   Diagnosis Code    Dyslipidemia E78.5    Osteoarthritis M19.90    GERD (gastroesophageal reflux disease) K21.9    Essential hypertension I10    Obesity E66.9    Allergic rhinitis J30.9    Osteoporosis M81.0    Arthritis of knee M17.10    Biliary dyskinesia K82.8    Right upper quadrant abdominal pain R10.11    Fundic gland polyposis of stomach K31.7    SBO (small bowel obstruction) ([address] K61.56    Incarcerated incisional hernia K43.0    Sigmoid diverticulitis K57.32    Incisional hernia without obstruction or gangrene K43.2    Chalazion H00.19    S/P colectomy Z90.49    History of colostomy reversal Z98.890    Abdominal adhesions K66.0    Hypokalemia Hospital Discharge:   Respiratory Treatments: na   Oxygen Therapy:  is not on home oxygen therapy. Ventilator:    - No ventilator support    Rehab Therapies: Physical Therapy and Occupational Therapy  Weight Bearing Status/Restrictions: No weight bearing restirctions  Other Medical Equipment (for information only, NOT a DME order):   Cane   Other Treatments:   1. Skilled RN assessment   2. Medication reconciliation   3. Dressing changes Plain packing to opens in incision between staples and old ostomy site BID. Dry dressing over it. Patient's personal belongings (please select all that are sent with patient):  Mignon    RN SIGNATURE:  {Esignature:[id number]}    CASE MANAGEMENT/SOCIAL WORK SECTION    Inpatient Status Date: 11/21    Readmission Risk Assessment Score:  Readmission Risk              Risk of Unplanned Readmission:        13           Discharging to Facility/ Agency   · Name: [facility]  · Address:  · Phone: [phone]  · Fax: [phone]    Dialysis Facility (if applicable)   · Name:  · Address:  · Dialysis Schedule:  · Phone:  · Fax:    / signature: Electronically signed by Suman Luo RN on 11/22/19 at 1:31 PM    PHYSICIAN SECTION    Prognosis: Good    Condition at Discharge: Stable    Rehab Potential (if transferring to Rehab): Good    Recommended Labs or Other Treatments After Discharge: Daily sterile dressing abdominal wound. Daily weight monitoring. Low-salt diet, fluid restriction 2000 mL. Lasix as directed    Physician Certification: I certify the above information and transfer of Nelly Lancaster  is necessary for the continuing treatment of the diagnosis listed and that she requires Home Care for less 30 days.      Update Admission H&P: No change in H&P    PHYSICIAN SIGNATURE:  Electronically signed by Genesis Haque MD on 11/22/19 at 1:06 PM

## 2019-11-21 NOTE — PROGRESS NOTES
Admitted from ER /PACU to room 1023. Pt alert and oriented. Pt oriented to call light system and agreeable to call for assistance. Family at bed side. Admission documentation completed  Flu not ordered. Pneumonia shot not indicated   Pt is nonsmoker   Will continue to monitor patient.

## 2019-11-21 NOTE — H&P
[facility]    HISTORY AND PHYSICAL EXAMINATION            Date:   11/21/2019  Patient name: Saint Mood  Date of admission:  11/21/2019  9:23 AM  MRN:   [id number]  Account:  [de-identified]  YOB: 1950  PCP:    Dyan Muhammad MD  Room:   5115/5575-58  Code Status:    Full Code    Chief Complaint:     Chief Complaint   Patient presents with    Post-op Problem     11/12 reverse colostomy   fatigue and BLE edema     History Obtained From:     patient    History of Present Illness: Saint Mood is a 81 y.o. Non-/non  female who presents with Post-op Problem (11/12 reverse colostomy)  and is admitted to the hospital for the management of New onset of congestive heart failure [facility])  Patient states she had a colostomy reversal done on 11/12/2019 per Dr. Jhon Jackson which was originally done for diverticulitis. Wounds noted along surgical incision that are dehisced with moderate amount of clear/yellow drainage without odor. She denies any fever at home. She does report feeling bloated since her surgery and increased weight gain, bilateral lower extremity edema as well as ongoing fatigue and dyspnea with exertion. She reports she was unable to wear an abdominal binder post-op due to abdominal bloating and increased nausea which was discussed with her surgeon.      She has a known history of a benign systolic murmur but does not see a cardiologist and denies any previous history of CHF or cardiac issues other than hypertension  Past Medical History:     Past Medical History:   Diagnosis Date    Allergic rhinitis     Bladder prolapse     Constipation     5/2019 resolved    Fundic gland polyposis of stomach 08/17/2017    per EGD    GERD (gastroesophageal reflux disease)     on no medications    Hiatal hernia     History of cardiac cath 12/2002    pt denies blockage    History of diverticulitis     Hyperlipidemia borderline, on no medication    Hypertension     MRSA (methicillin resistant staph aureus) culture positive 11/01/2016    nasal    MRSA carrier     follows with ID    Obesity     Osteoarthritis     Systolic murmur     benign systolic murmur (history of). Pt does not follow-up with a cardiologist (Written 09/25/2019).     Thyroid disease     goiter    Wears glasses         Past Surgical History:     Past Surgical History:   Procedure Laterality Date    ABDOMINAL EXPLORATION SURGERY  05/16/2019    CHOLECYSTECTOMY, LAPAROSCOPIC  11/15/2016    CHOLECYSTECTOMY, LAPAROSCOPIC  11/15/2016    COLONOSCOPY  2013    neg    COLONOSCOPY N/A 5/16/2019    COLONOSCOPY DIAGNOSTIC performed by Bernice Kirkpatrick MD at [address], COLON, DIAGNOSTIC      EGD    FISSURECTOMY ANAL N/A 10/4/2019    FLEXIBLE SIGMOIDOSCOPY & ANAL FISTULECTOMY performed by Bernice Kirkpatrick MD at [address] [facility] ARTHROSCOPY Left     lateral release    CT EGD TRANSORAL BIOPSY SINGLE/MULTIPLE N/A 8/17/2017    EGD BIOPSY performed by Machelle Welch MD at [address] [facility] OFFICE/OUTPT [address] [facility] N/A 5/22/2018    XI ROBOTIC LAPAROSCOPIC UMBILICAL HERNIA REPAIR WITH MESH, POSSIBLE OPEN performed by Julius Morris IV, DO at [facility] [address]  02/19/2019    SIGMOIDOSCOPY N/A 2/19/2019    SIGMOIDOSCOPY BIOPSY FLEXIBLE performed by Bonita Ovalle DO at [address] [facility] [address] [facility] [address] N/A 5/16/2019    [address] performed by Bernice Kirkpatrick MD at [address] [facility] [address] N/A 11/12/2019    COLOSTOMY  [address] [facility], REPAIR OF MULTIPLE VENTRAL HERNIAS, MOBILIZATION OF THE SPLENIC FLEXURE AND TRANSVERSE COLON, ABDOMINAL EXPLORATION performed by Bernice Kirkpatrick MD at [address] [facility] [address] Left 9/24/2012    knee    TOTAL KNEE ARTHROPLASTY Right 11/10/2014    knee    UMBILICAL moderate clear/yellow drainage, appears to have some tunneling. Staples along surgical incision appear to be holding it together superficially. There is an additional, smaller dehiscence along the left lateral surgical incision that is approx 2cm with scant clear/yellow drainage. Neurologic: There are no new focal motor or sensory deficits, normal muscle tone and bulk, no abnormal sensation, normal speech, cranial nerves II through XII grossly intact  Skin: No gross lesions, rashes, bruising or bleeding on exposed skin area  Extremities:  peripheral pulses palpable, +1 BLE nonpitting edema.  No calf pain with palpation  Psych: normal affect      Investigations:      Laboratory Testing:  Recent Results (from the past 24 hour(s))   CBC Auto Differential    Collection Time: 11/21/19  9:45 AM   Result Value Ref Range    WBC 15.2 (H) 3.5 - 11.3 k/uL    RBC 3.25 (L) 3.95 - 5.11 m/uL    Hemoglobin 9.4 (L) 11.9 - 15.1 g/dL    Hematocrit 30.8 (L) 36.3 - 47.1 %    MCV 94.8 82.6 - 102.9 fL    MCH 28.9 25.2 - 33.5 pg    MCHC 30.5 28.4 - 34.8 g/dL    RDW 15.0 (H) 11.8 - 14.4 %    Platelets 390 206 - 587 k/uL    MPV 9.2 8.1 - 13.5 fL    NRBC Automated 0.0 0.0 per 100 WBC    Differential Type NOT REPORTED     WBC Morphology NOT REPORTED     RBC Morphology ANISOCYTOSIS PRESENT     Platelet Estimate NOT REPORTED     Seg Neutrophils 82 (H) 36 - 65 %    Lymphocytes 9 (L) 24 - 43 %    Monocytes 7 3 - 12 %    Eosinophils % 2 1 - 4 %    Basophils 0 0 - 2 %    Immature Granulocytes 1 (H) 0 %    Segs Absolute 12.41 (H) 1.50 - 8.10 k/uL    Absolute Lymph # 1.30 1.10 - 3.70 k/uL    Absolute Mono # 1.09 0.10 - 1.20 k/uL    Absolute Eos # 0.24 0.00 - 0.44 k/uL    Basophils Absolute 0.05 0.00 - 0.20 k/uL    Absolute Immature Granulocyte 0.11 0.00 - 0.30 k/uL   Basic Metabolic Panel    Collection Time: 11/21/19  9:45 AM   Result Value Ref Range    Glucose 126 (H) 70 - 99 mg/dL    BUN 10 8 - 23 mg/dL    CREATININE 0.60 0.50 - 0.90 mg/dL Bun/Cre Ratio 17 9 - 20    Calcium 9.3 8.6 - 10.4 mg/dL    Sodium 136 135 - 144 mmol/L    Potassium 4.0 3.7 - 5.3 mmol/L    Chloride 100 98 - 107 mmol/L    CO2 26 20 - 31 mmol/L    Anion Gap 10 9 - 17 mmol/L    GFR Non-African American >60 >60 mL/min    GFR African American >60 >60 mL/min    GFR Comment          GFR Staging NOT REPORTED    Brain Natriuretic Peptide    Collection Time: 11/21/19  9:45 AM   Result Value Ref Range    Pro-BNP 3,630 (H) <300 pg/mL    BNP Interpretation Pro-BNP Reference Range:    TROP/MYOGLOBIN    Collection Time: 11/21/19  9:45 AM   Result Value Ref Range    Troponin, High Sensitivity 37 (H) 0 - 14 ng/L    Troponin T NOT REPORTED <0.03 ng/mL    Troponin Interp NOT REPORTED     Myoglobin 30 25 - 58 ng/mL   Protime-INR    Collection Time: 11/21/19  9:45 AM   Result Value Ref Range    Protime 10.2 9.7 - 11.6 sec    INR 1.0    APTT    Collection Time: 11/21/19  9:45 AM   Result Value Ref Range    PTT 25.5 23 - 31 sec   TROP/MYOGLOBIN    Collection Time: 11/21/19 11:45 AM   Result Value Ref Range    Troponin, High Sensitivity 36 (H) 0 - 14 ng/L    Troponin T NOT REPORTED <0.03 ng/mL    Troponin Interp NOT REPORTED     Myoglobin 28 25 - 58 ng/mL       Imaging/Diagnostics:  Xr Chest (single View Frontal)    Result Date: 11/17/2019  Findings consistent with interstitial edema. Left basilar opacity likely a combination of atelectasis/consolidation and small effusion. Cardiac silhouette enlargement. Xr Chest Portable    Result Date: 11/21/2019  Cardiomegaly and vascular congestion raise the possibility of volume overload.        Assessment :      Hospital Problems           Last Modified POA    * (Principal) New onset of congestive heart failure ([address] 11/21/2019 Yes    Dyslipidemia 11/21/2019 Yes    GERD (gastroesophageal reflux disease) 11/21/2019 Yes    Essential hypertension 11/21/2019 Yes    Obesity 11/21/2019 Yes    Allergic rhinitis 11/21/2019 Yes    History of colostomy reversal

## 2019-11-21 NOTE — CONSULTS
General Surgery   Consultation        PATIENT NAME: Jose Juan Cardozo OF BIRTH: 1950    ADMISSION DATE: 11/21/2019  9:23 AM     Admitting Provider: Dr. Elijah Jones Physician: Dr. Brown Ask: 11/21/2019    Consult Regarding:  Midline wound    HISTORY OF PRESENT ILLNESS:  The patient is a 81 y.o. female  who presents with complaints of her legs feeling heavy and retaining fluid. The patient is status post colostomy reversal.  She was discharged just last week. The patient states since going home she has felt short of breath and has had significant bilateral edema which is preventing her from maintaining her daily activities. The patient also states she has a few spots on her incision which have opened. Her wound was examined and there are some areas of dehiscence. The colostomy site also remains open but this was never closed so was not considered a dehiscence. There is no evidence of infection. There is mild erythema around the staples which are secondary to irritation from the staples. There is no purulence. There is no warmth. There is no need for wound cultures. There is some fibrinous exudate on the midline wound but this is not infected. She is tolerating a diet without any nausea. She continues to have bowel function. She is afebrile and hemodynamically stable.     Past Medical History:        Diagnosis Date    Allergic rhinitis     Bladder prolapse     Constipation     5/2019 resolved    Fundic gland polyposis of stomach 08/17/2017    per EGD    GERD (gastroesophageal reflux disease)     on no medications    Hiatal hernia     History of cardiac cath 12/2002    pt denies blockage    History of diverticulitis     Hyperlipidemia     borderline, on no medication    Hypertension     MRSA (methicillin resistant staph aureus) culture positive 11/01/2016    nasal    MRSA carrier     follows with ID    Obesity     Osteoarthritis     Systolic murmur     benign systolic murmur (history of). Pt does not follow-up with a cardiologist (Written 09/25/2019).  Thyroid disease     goiter    Wears glasses        Past Surgical History:        Procedure Laterality Date    ABDOMINAL EXPLORATION SURGERY  05/16/2019    CHOLECYSTECTOMY, LAPAROSCOPIC  11/15/2016    CHOLECYSTECTOMY, LAPAROSCOPIC  11/15/2016    COLONOSCOPY  2013    neg    COLONOSCOPY N/A 5/16/2019    COLONOSCOPY DIAGNOSTIC performed by Marisa Hunt MD at [address], COLON, DIAGNOSTIC      EGD    FISSURECTOMY ANAL N/A 10/4/2019    FLEXIBLE SIGMOIDOSCOPY & ANAL FISTULECTOMY performed by Marisa Hunt MD at [address] [facility] ARTHROSCOPY Left     lateral release    OK EGD TRANSORAL BIOPSY SINGLE/MULTIPLE N/A 8/17/2017    EGD BIOPSY performed by Georges Wrener MD at [address] OFFICE/OUTPT [address] [facility] N/A 5/22/2018    XI ROBOTIC LAPAROSCOPIC UMBILICAL HERNIA REPAIR WITH MESH, POSSIBLE OPEN performed by Harry Morris IV, DO at [facility] [address]  02/19/2019    SIGMOIDOSCOPY N/A 2/19/2019    SIGMOIDOSCOPY BIOPSY FLEXIBLE performed by Collin Thornton DO at [address] N/A 5/16/2019    [address] performed by Marisa Hunt MD at [address] N/A 11/12/2019    [address], REPAIR OF MULTIPLE VENTRAL HERNIAS, MOBILIZATION OF THE SPLENIC FLEXURE AND TRANSVERSE COLON, ABDOMINAL EXPLORATION performed by Marisa Hunt MD at [address] [facility] [address] Left 9/24/2012    knee    TOTAL KNEE ARTHROPLASTY Right 11/10/2014    knee    UMBILICAL HERNIA REPAIR  05/22/2018    UPPER GASTROINTESTINAL ENDOSCOPY  08/17/2017    Multiple small gastric polyps, no esophagitis noted no Melara's mucosa.   No hiatal hernia, pathology benign fundic gland polyps       Medications Prior to Admission:  Intimate partner violence:     Fear of current or ex partner: Not on file     Emotionally abused: Not on file     Physically abused: Not on file     Forced sexual activity: Not on file   Other Topics Concern    Not on file   Social History Narrative    Not on file       Family History:       Problem Relation Age of Onset    Heart Disease Mother     COPD Father     Cancer Brother         thyroid, prostate, lung       REVIEW OF SYSTEMS:    CONSTITUTIONAL:  No recent weight gain/loss. Energy level normal for pt. HEENT:  negative  CARDIOVASCULAR:  No chest pain  GASTROINTESTINAL:  No abdominal pain, nausea, or vomiting. No constipation/diarrhea. No rectal bleeding  GENITOURINARY:  No dysuria  HEMATOLOGIC/LYMPHATIC:  No easy bruising. No history of cancer  ENDOCRINE: negative  Review of systems negative unless above. PHYSICAL EXAM:    VITALS:  BP (!) 128/56   Pulse 85   Temp 98.4 °F (36.9 °C) (Oral)   Resp 20   Ht 5' 7\" (1.702 m)   Wt 247 lb 9.6 oz (112.3 kg)   LMP  (LMP Unknown)   SpO2 91%   BMI 38.78 kg/m²   INTAKE/OUTPUT: . Intake/Output Summary (Last 24 hours) at 11/21/2019 1515  Last data filed at 11/21/2019 1443  Gross per 24 hour   Intake 240 ml   Output 1650 ml   Net -1410 ml       CONSTITUTIONAL:  awake, alert, not distressed and moderately obese  ENT:  normocephalic/atraumatic, without obvious abnormality  NECK:  supple, symmetrical, trachea midline   LUNGS:  CTA bilaterally  CARDIOVASCULAR:  regular rate and rhythm and No Murmur  ABDOMEN: soft, non tender, non distended, midline incision with some small openings with no purulence, no signs of infection, colostomy site without infection  MUSCULOSKELETAL:  No joint swelling, deformity, or tenderness. , there is not obvious somatic dysfunction  NEUROLOGIC:  Mental Status Exam:  Level of Alertness:   alert  Orientation:   oriented to person, place, and time    CBC with Differential:    Lab Results   Component Value Date    WBC 15.2 11/21/2019    RBC 3.25 11/21/2019    RBC 3.98 12/06/2011    HGB 9.4 11/21/2019    HCT 30.8 11/21/2019     11/21/2019     12/06/2011    MCV 94.8 11/21/2019    MCH 28.9 11/21/2019    MCHC 30.5 11/21/2019    RDW 15.0 11/21/2019    LYMPHOPCT 9 11/21/2019    MONOPCT 7 11/21/2019    BASOPCT 0 11/21/2019    MONOSABS 1.09 11/21/2019    LYMPHSABS 1.30 11/21/2019    EOSABS 0.24 11/21/2019    BASOSABS 0.05 11/21/2019    DIFFTYPE NOT REPORTED 11/21/2019     BMP:    Lab Results   Component Value Date     11/21/2019    K 4.0 11/21/2019     11/21/2019    CO2 26 11/21/2019    BUN 10 11/21/2019    LABALBU 4.0 02/08/2019    LABALBU 4.0 12/06/2011    CREATININE 0.60 11/21/2019    CALCIUM 9.3 11/21/2019    GFRAA >60 11/21/2019    LABGLOM >60 11/21/2019    GLUCOSE 126 11/21/2019    GLUCOSE 94 12/06/2011       Pertinent Radiology:       ASSESSMENT   Principal Problem:    New onset of congestive heart failure (HCC)  Active Problems:    Dyslipidemia    GERD (gastroesophageal reflux disease)    Essential hypertension    Obesity    Allergic rhinitis    History of colostomy reversal    Surgical wound dehiscence  Resolved Problems:    * No resolved hospital problems. *  s/p colostomy reversal    PLAN    1. [place] for general diet  2. No evidence of infection of midline wound. Pack the wound daily with plan packing guaze. The colostomy site also needs daily packing. 3. Encourage ambulation and IS  4. Follow up cardiology recs  5. Medical management per primary  6. Will continue to follow along        Electronically signed by Chidi Muñoz DO  on 11/21/2019 at 3:15 PM     I Dr. David Marvin saw and examined the patient. I have edited the above and agree with the above. Geoffrey Santacruz  Colorectal Surgery

## 2019-11-21 NOTE — CONSULTS
performed by Meir Luu MD at [address], COLON, DIAGNOSTIC      EGD    FISSURECTOMY ANAL N/A 10/4/2019    FLEXIBLE SIGMOIDOSCOPY & ANAL FISTULECTOMY performed by Meir Luu MD at [address] [facility] ARTHROSCOPY Left     lateral release    TN EGD TRANSORAL BIOPSY SINGLE/MULTIPLE N/A 8/17/2017    EGD BIOPSY performed by Arsen Cat MD at [address] [facility] OFFICE/OUTPT [address] [facility] N/A 5/22/2018    XI ROBOTIC LAPAROSCOPIC UMBILICAL HERNIA REPAIR WITH MESH, POSSIBLE OPEN performed by Dominic Morris IV, DO at [facility] [address]  02/19/2019    SIGMOIDOSCOPY N/A 2/19/2019    SIGMOIDOSCOPY BIOPSY FLEXIBLE performed by Teresa Pendleton DO at [address] [facility] N/A 5/16/2019    [address] performed by Meir Luu MD at [address] [facility] N/A 11/12/2019    [address], REPAIR OF MULTIPLE VENTRAL HERNIAS, MOBILIZATION OF THE SPLENIC FLEXURE AND TRANSVERSE COLON, ABDOMINAL EXPLORATION performed by Meir Luu MD at [address] [facility] [address] Left 9/24/2012    knee    TOTAL KNEE ARTHROPLASTY Right 11/10/2014    knee    UMBILICAL HERNIA REPAIR  05/22/2018    UPPER GASTROINTESTINAL ENDOSCOPY  08/17/2017    Multiple small gastric polyps, no esophagitis noted no Melara's mucosa. No hiatal hernia, pathology benign fundic gland polyps     Current Medications:    Medications Prior to Admission: ondansetron (ZOFRAN) 4 MG tablet, Take 1 tablet by mouth every 12 hours as needed for Nausea or Vomiting  docusate sodium (COLACE) 100 MG capsule, Take 1 capsule by mouth 2 times daily as needed for Constipation  HYDROcodone-acetaminophen (NORCO) 5-325 MG per tablet, Take 1 tablet by mouth every 6 hours as needed for Pain for up to 7 days.   losartan (COZAAR) 50 MG tablet, Take 1 tablet by mouth daily  Cholecalciferol (VITAMIN D) 2000 units CAPS capsule, Take 1 capsule by mouth daily  clotrimazole-betamethasone (LOTRISONE) 1-0.05 % cream, Apply topically daily as needed (to spot on arm)  Allergies:    Patient has no known allergies. Social History:     reports that she quit smoking about 44 years ago. She has a 3.00 pack-year smoking history. She has never used smokeless tobacco. She reports current alcohol use. She reports that she does not use drugs. Family History:   family history includes COPD in her father; Cancer in her brother; Heart Disease in her mother.     REVIEW OF SYSTEMS:    CONSTITUTIONAL:  negative for  fevers, chills, sweats and fatigue  RESPIRATORY:  negative for  cough with sputum, dyspnea, hemoptysis and chest pain  CARDIOVASCULAR:  positive for  edema  negative for  chest pain, dyspnea, palpitations, orthopnea, PND, fatigue, syncope  GASTROINTESTINAL:  positive for bloating  negative for nausea, vomiting, diarrhea and constipation  GENITOURINARY:  negative for decreased stream and hematuria  INTEGUMENT/BREAST:  positive for abdominal surgical site opeing  negative for rash and skin color change  MUSCULOSKELETAL:  positive for  muscle weakness in BLE  negative for  arthralgias, pain and jaw pain  NEUROLOGICAL:  negative for headaches, dizziness, numbness, near syncope, pain and tingling  BEHAVIOR/PSYCH:  negative for poor appetite, fatigue and etoh, smoking, or drug use    PHYSICAL EXAM:      Vitals:    BP (!) 128/56   Pulse 85   Temp 98.4 °F (36.9 °C) (Oral)   Resp 20   Ht 5' 7\" (1.702 m)   Wt 247 lb 9.6 oz (112.3 kg)   LMP  (LMP Unknown)   SpO2 91%   BMI 38.78 kg/m²   CONSTITUTIONAL:  awake, alert, cooperative, no apparent distress, and appears stated age  NECK:  No JVD, no bruit  LUNGS:  No increased work of breathing, good air exchange, clear to auscultation bilaterally, no crackles or wheezing  CARDIOVASCULAR:  Normal apical impulse, regular rate and rhythm, normal S1 and

## 2019-11-22 VITALS
HEART RATE: 81 BPM | HEIGHT: 67 IN | BODY MASS INDEX: 38.58 KG/M2 | RESPIRATION RATE: 18 BRPM | SYSTOLIC BLOOD PRESSURE: 119 MMHG | DIASTOLIC BLOOD PRESSURE: 61 MMHG | WEIGHT: 245.8 LBS | OXYGEN SATURATION: 96 % | TEMPERATURE: 97.7 F

## 2019-11-22 LAB
ALBUMIN SERPL-MCNC: 3.2 G/DL (ref 3.5–5.2)
ALBUMIN/GLOBULIN RATIO: ABNORMAL (ref 1–2.5)
ALP BLD-CCNC: 101 U/L (ref 35–104)
ALT SERPL-CCNC: 15 U/L (ref 5–33)
ANION GAP SERPL CALCULATED.3IONS-SCNC: 10 MMOL/L (ref 9–17)
AST SERPL-CCNC: 19 U/L
BILIRUB SERPL-MCNC: 0.34 MG/DL (ref 0.3–1.2)
BNP INTERPRETATION: ABNORMAL
BUN BLDV-MCNC: 12 MG/DL (ref 8–23)
BUN/CREAT BLD: 15 (ref 9–20)
CALCIUM SERPL-MCNC: 9.3 MG/DL (ref 8.6–10.4)
CHLORIDE BLD-SCNC: 100 MMOL/L (ref 98–107)
CHOLESTEROL/HDL RATIO: 4.5
CHOLESTEROL: 135 MG/DL
CO2: 26 MMOL/L (ref 20–31)
CREAT SERPL-MCNC: 0.8 MG/DL (ref 0.5–0.9)
CULTURE: NO GROWTH
EKG ATRIAL RATE: 86 BPM
EKG P AXIS: 60 DEGREES
EKG P-R INTERVAL: 162 MS
EKG Q-T INTERVAL: 370 MS
EKG QRS DURATION: 98 MS
EKG QTC CALCULATION (BAZETT): 442 MS
EKG R AXIS: 0 DEGREES
EKG T AXIS: 80 DEGREES
EKG VENTRICULAR RATE: 86 BPM
GFR AFRICAN AMERICAN: >60 ML/MIN
GFR NON-AFRICAN AMERICAN: >60 ML/MIN
GFR SERPL CREATININE-BSD FRML MDRD: ABNORMAL ML/MIN/{1.73_M2}
GFR SERPL CREATININE-BSD FRML MDRD: ABNORMAL ML/MIN/{1.73_M2}
GLUCOSE BLD-MCNC: 112 MG/DL (ref 70–99)
HCT VFR BLD CALC: 30.3 % (ref 36.3–47.1)
HDLC SERPL-MCNC: 30 MG/DL
HEMOGLOBIN: 9.5 G/DL (ref 11.9–15.1)
LDL CHOLESTEROL: 74 MG/DL (ref 0–130)
Lab: NORMAL
MAGNESIUM: 1.9 MG/DL (ref 1.6–2.6)
MCH RBC QN AUTO: 28.9 PG (ref 25.2–33.5)
MCHC RBC AUTO-ENTMCNC: 31.4 G/DL (ref 28.4–34.8)
MCV RBC AUTO: 92.1 FL (ref 82.6–102.9)
NRBC AUTOMATED: 0 PER 100 WBC
PDW BLD-RTO: 15 % (ref 11.8–14.4)
PLATELET # BLD: 423 K/UL (ref 138–453)
PMV BLD AUTO: 9.5 FL (ref 8.1–13.5)
POTASSIUM SERPL-SCNC: 4 MMOL/L (ref 3.7–5.3)
PRO-BNP: 2194 PG/ML
RBC # BLD: 3.29 M/UL (ref 3.95–5.11)
SODIUM BLD-SCNC: 136 MMOL/L (ref 135–144)
SPECIMEN DESCRIPTION: NORMAL
TOTAL PROTEIN: 6.2 G/DL (ref 6.4–8.3)
TRIGL SERPL-MCNC: 156 MG/DL
TSH SERPL DL<=0.05 MIU/L-ACNC: 3.55 MIU/L (ref 0.3–5)
VLDLC SERPL CALC-MCNC: ABNORMAL MG/DL (ref 1–30)
WBC # BLD: 14.1 K/UL (ref 3.5–11.3)

## 2019-11-22 PROCEDURE — 83880 ASSAY OF NATRIURETIC PEPTIDE: CPT

## 2019-11-22 PROCEDURE — 6370000000 HC RX 637 (ALT 250 FOR IP): Performed by: NURSE PRACTITIONER

## 2019-11-22 PROCEDURE — 85027 COMPLETE CBC AUTOMATED: CPT

## 2019-11-22 PROCEDURE — 97535 SELF CARE MNGMENT TRAINING: CPT

## 2019-11-22 PROCEDURE — 97530 THERAPEUTIC ACTIVITIES: CPT

## 2019-11-22 PROCEDURE — 80053 COMPREHEN METABOLIC PANEL: CPT

## 2019-11-22 PROCEDURE — 97165 OT EVAL LOW COMPLEX 30 MIN: CPT

## 2019-11-22 PROCEDURE — 96376 TX/PRO/DX INJ SAME DRUG ADON: CPT

## 2019-11-22 PROCEDURE — 97162 PT EVAL MOD COMPLEX 30 MIN: CPT

## 2019-11-22 PROCEDURE — 97116 GAIT TRAINING THERAPY: CPT

## 2019-11-22 PROCEDURE — 84443 ASSAY THYROID STIM HORMONE: CPT

## 2019-11-22 PROCEDURE — 6370000000 HC RX 637 (ALT 250 FOR IP): Performed by: FAMILY MEDICINE

## 2019-11-22 PROCEDURE — 83735 ASSAY OF MAGNESIUM: CPT

## 2019-11-22 PROCEDURE — 2580000003 HC RX 258: Performed by: NURSE PRACTITIONER

## 2019-11-22 PROCEDURE — 6360000002 HC RX W HCPCS: Performed by: FAMILY MEDICINE

## 2019-11-22 PROCEDURE — 36415 COLL VENOUS BLD VENIPUNCTURE: CPT

## 2019-11-22 PROCEDURE — 80061 LIPID PANEL: CPT

## 2019-11-22 PROCEDURE — 93010 ELECTROCARDIOGRAM REPORT: CPT | Performed by: INTERNAL MEDICINE

## 2019-11-22 PROCEDURE — 99239 HOSP IP/OBS DSCHRG MGMT >30: CPT | Performed by: FAMILY MEDICINE

## 2019-11-22 RX ORDER — METOPROLOL SUCCINATE 25 MG/1
25 TABLET, EXTENDED RELEASE ORAL DAILY
Qty: 30 TABLET | Refills: 3 | Status: SHIPPED | OUTPATIENT
Start: 2019-11-22 | End: 2020-09-25 | Stop reason: SDUPTHER

## 2019-11-22 RX ORDER — LANOLIN ALCOHOL/MO/W.PET/CERES
CREAM (GRAM) TOPICAL
Qty: 30 TABLET | Refills: 0 | Status: SHIPPED | OUTPATIENT
Start: 2019-11-22 | End: 2019-11-22

## 2019-11-22 RX ORDER — LANOLIN ALCOHOL/MO/W.PET/CERES
325 CREAM (GRAM) TOPICAL
Qty: 30 TABLET | Refills: 3 | Status: SHIPPED | OUTPATIENT
Start: 2019-11-22 | End: 2019-12-11 | Stop reason: ALTCHOICE

## 2019-11-22 RX ORDER — FUROSEMIDE 20 MG/1
TABLET ORAL
Qty: 60 TABLET | Refills: 3 | Status: ON HOLD | OUTPATIENT
Start: 2019-11-22 | End: 2019-12-21 | Stop reason: HOSPADM

## 2019-11-22 RX ORDER — FUROSEMIDE 20 MG/1
20 TABLET ORAL DAILY
Qty: 60 TABLET | Refills: 3 | Status: SHIPPED | OUTPATIENT
Start: 2019-11-22 | End: 2019-11-22 | Stop reason: SDUPTHER

## 2019-11-22 RX ORDER — SULFAMETHOXAZOLE AND TRIMETHOPRIM 800; 160 MG/1; MG/1
1 TABLET ORAL EVERY 12 HOURS SCHEDULED
Qty: 14 TABLET | Refills: 0 | Status: SHIPPED | OUTPATIENT
Start: 2019-11-22 | End: 2019-12-09 | Stop reason: HOSPADM

## 2019-11-22 RX ADMIN — ACETAMINOPHEN 650 MG: 325 TABLET ORAL at 05:42

## 2019-11-22 RX ADMIN — FUROSEMIDE 20 MG: 10 INJECTION, SOLUTION INTRAMUSCULAR; INTRAVENOUS at 08:57

## 2019-11-22 RX ADMIN — Medication 10 ML: at 08:58

## 2019-11-22 RX ADMIN — LOSARTAN POTASSIUM 50 MG: 50 TABLET, FILM COATED ORAL at 08:57

## 2019-11-22 RX ADMIN — HYDROCODONE BITARTRATE AND ACETAMINOPHEN 1 TABLET: 5; 325 TABLET ORAL at 13:04

## 2019-11-22 RX ADMIN — SULFAMETHOXAZOLE AND TRIMETHOPRIM 1 TABLET: 800; 160 TABLET ORAL at 08:57

## 2019-11-22 RX ADMIN — ACETAMINOPHEN 650 MG: 325 TABLET ORAL at 13:08

## 2019-11-22 RX ADMIN — FERROUS SULFATE TAB EC 325 MG (65 MG FE EQUIVALENT) 325 MG: 325 (65 FE) TABLET DELAYED RESPONSE at 08:57

## 2019-11-22 ASSESSMENT — ENCOUNTER SYMPTOMS
NAUSEA: 0
CONSTIPATION: 0
BLOOD IN STOOL: 0
VOMITING: 0
WHEEZING: 0
ABDOMINAL PAIN: 0
DIARRHEA: 0
SHORTNESS OF BREATH: 0
RHINORRHEA: 0
COUGH: 0
CHEST TIGHTNESS: 0

## 2019-11-22 ASSESSMENT — PAIN SCALES - GENERAL
PAINLEVEL_OUTOF10: 5
PAINLEVEL_OUTOF10: 7
PAINLEVEL_OUTOF10: 7
PAINLEVEL_OUTOF10: 0

## 2019-11-22 NOTE — PROGRESS NOTES
anemia due to acute blood loss    Acute congestive heart failure (HCC)    Surgical wound dehiscence    Constipation    Systolic murmur    Thyroid disease    Mild pulmonary hypertension ([address]       PLAN:    1. I explained the findings on her echocardiogram which showed hyperdynamic ventricle. I agree with starting beta-blocker therapy. 2. Low-salt diet. 3. Lasix 20 mg daily  4. Follow-up in the office a few weeks [place] she would benefit from further noninvasive cardiac work-up. 5. The patient has diastolic dysfunction. Please see orders. Discussed with patient and nursing.     Daysi Mendez MD

## 2019-11-22 NOTE — PROGRESS NOTES
Occupational Therapy   Occupational Therapy Initial Assessment  Date: 2019   Patient Name: Lin Cash  MRN: [id number]     : 1950    Date of Service: 2019    Discharge Recommendations:  Home with assist PRN     Per chart,   Lin Cash is a 81 y.o. female who presents to the emergency department to evaluate her surgical wound. She is postop day #9 from colostomy reversal.  She has surgical wounds that are dehisced and she was sent in from a doctor's office. The patient states she is not sure when it opened up, she has not been looking at them. No fever. She has been short of breath particularly with exertion. No fever or purulent drainage.     RN reports patient is medically stable for therapy treatment this date. Chart reviewed prior to treatment and patient is agreeable for therapy. All lines intact and patient positioned comfortably at end of treatment. All patient needs addressed prior to ending therapy session. Assessment   Performance deficits / Impairments: Decreased endurance  Prognosis: Good  Decision Making: Low Complexity  OT Education: OT Role;Energy Conservation;Plan of Care;IADL Safety; ADL Adaptive Strategies  Patient Education: handout provided with review of EC/WS techs and adaptive strategies for ADLs and IADLs, verb edu on fall prevention, POC, and discharge recommendations. No Skilled OT: Independent with functional mobility; Independent with ADL's;At baseline function; Safe to return home; No OT goals identified  REQUIRES OT FOLLOW UP: No  Activity Tolerance  Activity Tolerance: Patient Tolerated treatment well  Safety Devices  Safety Devices in place: Yes  Type of devices: All fall risk precautions in place;Nurse notified;Gait belt;Call light within reach           Patient Diagnosis(es): The primary encounter diagnosis was Acute congestive heart failure, unspecified heart failure type ([facility] [address] [address]  A diagnosis of Dehiscence of operative wound, initial encounter was also pertinent to this visit. has a past medical history of Allergic rhinitis, Bladder prolapse, Constipation, Fundic gland polyposis of stomach, GERD (gastroesophageal reflux disease), Hiatal hernia, History of cardiac cath, History of diverticulitis, Hyperlipidemia, Hypertension, MRSA (methicillin resistant staph aureus) culture positive, MRSA carrier, Obesity, Osteoarthritis, Systolic murmur, Thyroid disease, and Wears glasses. has a past surgical history that includes Endoscopy, colon, diagnostic; Knee arthroscopy (Left); Total knee arthroplasty (Left, 9/24/2012); Total knee arthroplasty (Right, 11/10/2014); Cholecystectomy, laparoscopic (11/15/2016); Cholecystectomy, laparoscopic (11/15/2016); Colonoscopy (2013); Upper gastrointestinal endoscopy (08/17/2017); pr egd transoral biopsy single/multiple (N/A, 8/17/2017); Umbilical hernia repair (05/22/2018); pr office/outpt visit,procedure only (N/A, 5/22/2018); Sigmoidoscopy (02/19/2019); sigmoidoscopy (N/A, 2/19/2019); Abdominal exploration surgery (05/16/2019); Small intestine surgery (N/A, 5/16/2019); Colonoscopy (N/A, 5/16/2019); joint replacement; FISSURECTOMY ANAL (N/A, 10/4/2019); and Small intestine surgery (N/A, 11/12/2019).            Restrictions  Restrictions/Precautions  Restrictions/Precautions: General Precautions, Contact Precautions  Required Braces or Orthoses?: No    Subjective   General  Chart Reviewed: Yes  Patient assessed for rehabilitation services?: Yes  Family / Caregiver Present: No    Social/Functional History  Social/Functional History  Lives With: Alone  Type of Home: House  Home Layout: Two level, Bed/Bath upstairs(10 ascending steps with R side rail)  Home Access: Stairs to enter without rails  Entrance Stairs - Number of Steps: 2  Bathroom Shower/Tub: Tub/Shower unit  Bathroom Toilet: Handicap height  Bathroom Equipment: Grab bars in shower  Home Equipment: Cane  ADL Assistance: Independent  Homemaking Assistance:

## 2019-11-22 NOTE — PROGRESS NOTES
Physical Therapy    Facility/Department: [facility] PROGRESSIVE CARE  Initial Assessment    NAME: Awa Chang  : 1950  MRN: [id number]    Date of Service: 2019    Discharge Recommendations:  Home independently        Assessment   Prognosis: Good  Decision Making: Medium Complexity  Patient Education: LE's elevated instruction and ankle pumps handout  No Skilled PT: Independent with functional mobility   REQUIRES PT FOLLOW UP: No  Activity Tolerance  Activity Tolerance: Patient Tolerated treatment well       Patient Diagnosis(es): The primary encounter diagnosis was Acute congestive heart failure, unspecified heart failure type ([address] A diagnosis of Dehiscence of operative wound, initial encounter was also pertinent to this visit. has a past medical history of Allergic rhinitis, Bladder prolapse, Constipation, Fundic gland polyposis of stomach, GERD (gastroesophageal reflux disease), Hiatal hernia, History of cardiac cath, History of diverticulitis, Hyperlipidemia, Hypertension, MRSA (methicillin resistant staph aureus) culture positive, MRSA carrier, Obesity, Osteoarthritis, Systolic murmur, Thyroid disease, and Wears glasses. has a past surgical history that includes Endoscopy, colon, diagnostic; Knee arthroscopy (Left); Total knee arthroplasty (Left, 2012); Total knee arthroplasty (Right, 11/10/2014); Cholecystectomy, laparoscopic (11/15/2016); Cholecystectomy, laparoscopic (11/15/2016); Colonoscopy (); Upper gastrointestinal endoscopy (2017); pr egd transoral biopsy single/multiple (N/A, 2017); Umbilical hernia repair (2018); pr office/outpt visit,procedure only (N/A, 2018); Sigmoidoscopy (2019); sigmoidoscopy (N/A, 2019); Abdominal exploration surgery (2019); Small intestine surgery (N/A, 2019);  Colonoscopy (N/A, 2019); joint replacement; FISSURECTOMY ANAL (N/A, 10/4/2019); and Small intestine surgery (N/A,

## 2019-11-22 NOTE — PLAN OF CARE
Problem: Falls - Risk of:  Goal: Will remain free from falls  Description  Will remain free from falls  11/22/2019 0117 by Pieter Bright RN  Outcome: Ongoing  Note:   Pt fall risk, fall band present, falling star, safety alarm activated and in use as needed. Bed in lowest position, call light within reach. Hourly rounding performed. Pt encouraged to use call light. See Verla Course fall risk assessment. Patient offered toileting assistance during rounding. Hourly rounds performed. Problem: Pain:  Goal: Pain level will decrease  Description  Pain level will decrease  Outcome: Ongoing  Note:   Pt pain controlled with ordered medication.   Will continue to monitor

## 2019-11-22 NOTE — PROGRESS NOTES
[facility] - INPATIENT      Daily Progress Note     Admit Date: 11/21/2019  Bed/Room No.  7467/5182-64  Admitting Physician : Angi Neal MD  Code Status :[address] Day:  LOS: 1 day   Chief Complaint:     Chief Complaint   Patient presents with    Post-op Problem     11/12 reverse colostomy     Principal Problem:    Acute congestive heart failure ([address]  Active Problems:    Dyslipidemia    GERD (gastroesophageal reflux disease)    Essential hypertension    Obesity    Allergic rhinitis    History of colostomy reversal    Surgical wound dehiscence  Resolved Problems:    * No resolved hospital problems. *    Subjective : Interval History/Significant events :  11/22/19    Patient reports improvement in  abdominal distention and lower extremity swelling. She denies any dyspnea at rest, PND, palpitations, nausea, vomiting, cough, expectoration. Patient denies any chest pain. No change in abdominal wound. No discharge. Vitals - Stable afebrile  Labs -normal left lites, kidney function, glucose 112, decreased proBNP to 2194, negative troponin. Nursing notes , Consults notes reviewed. Overnight events and updates discussed with Nursing staff . Background History: Usha Tee is 81 y.o. female  Who was admitted to the hospital on 11/21/2019 for treatment of Acute congestive heart failure ([facility] Patient came to emergency room with leg edema, abdominal bloating, dyspnea on exertion. She was recently admitted in the hospital and underwent colostomy revision for perforated diverticulitis. Patient was about 20 pounds over her body weight. She received Lasix at that time. Patient still has been 7 pounds over her normal body weight. Breathing is worse on mild to moderate exertion. Patient denies any PND, chest pain, cough, sputum change. She has some redness on abdominal wound. Denies any discharge, fever, chills.   Initial evaluation showed elevated proBNP 3630, right side and 2+ on the left side. Heart sounds: Normal heart sounds. No murmur. Pulmonary:      Effort: Pulmonary effort is normal.      Breath sounds: Normal breath sounds. No wheezing or rales. Abdominal:      Palpations: Abdomen is soft. There is no mass. Tenderness: There is no tenderness. Comments: Surgical abdominal wound midline , granulation tissue, clear drainage, staples in place. Erythema at staple site. Iodoform packing in place x2   Lymphadenopathy:      Head:      Right side of head: No submandibular adenopathy. Left side of head: No submandibular adenopathy. Cervical: No cervical adenopathy. Skin:     General: Skin is warm. Neurological:      Mental Status: She is alert and oriented to person, place, and time. Motor: No tremor. Psychiatric:         Behavior: Behavior is cooperative.        Lower Extremities : No ankle Edema , No calf Tenderness     Laboratory findings:    Recent Labs     11/21/19  0945 11/22/19  0539   WBC 15.2* 14.1*   HGB 9.4* 9.5*   HCT 30.8* 30.3*    423   INR 1.0  --      Recent Labs     11/21/19  0945 11/22/19  0539    136   K 4.0 4.0    100   CO2 26 26   GLUCOSE 126* 112*   BUN 10 12   CREATININE 0.60 0.80   MG  --  1.9   CALCIUM 9.3 9.3     Recent Labs     11/22/19  0539   PROT 6.2*   LABALBU 3.2*   TSH 3.55   AST 19   ALT 15   ALKPHOS 101   BILITOT 0.34   CHOL 135   HDL 30*   LDLCHOLESTEROL 74   CHOLHDLRATIO 4.5   TRIG 156*   VLDL NOT REPORTED*          Specific Gravity, UA   Date Value Ref Range Status   05/13/2019 1.020 1.005 - 1.030 Final     Spec Grav, UA   Date Value Ref Range Status   07/25/2019 1.025  Final     Protein, UA   Date Value Ref Range Status   05/13/2019 NEGATIVE NEGATIVE Final     Protein, UA POC   Date Value Ref Range Status   07/25/2019 neg  Final     RBC, UA   Date Value Ref Range Status   04/09/2019 2 TO 5 0 - 2 /HPF Final     Blood, UA POC   Date Value Ref Range Status   07/25/2019 trace

## 2019-11-22 NOTE — PROGRESS NOTES
CLINICAL PHARMACY NOTE: MEDS TO [address] Select Patient?: Yes  Total # of Prescriptions Filled: 3   The following medications were delivered to the patient:  · Furosemide 20mg  · SULFAMETHOXAALE-TRIME 800-160  · METOPROLOL SUCC ER 25MG  Total # of Interventions Completed: 0  Time Spent (min): 30    Additional Documentation:  PT BOUGHT OTC BOTTLE OF FERROUS SUL 325MG

## 2019-11-22 NOTE — FLOWSHEET NOTE
Patient sleeping; receives Sacrament of the [address] (anointing) from [facility].    [facility] will follow as needed. (writer charting for [facility] [employer] .)     12/92/32 2453   Encounter Summary   Services provided to: Patient   Referral/Consult From: Craig   Continue Visiting   (11/22/19 sleeping/anointed)   Complexity of Encounter Low   Length of Encounter 15 minutes   Routine   Type Initial   Assessment Sleeping   Sacraments   Sacrament of Sick-Anointing Anointed  (11/22/19 Fr. Grabiel Badillo)

## 2019-11-22 NOTE — CARE COORDINATION
Case Management Initial Discharge Plan  Kiko Barnes,         Readmission Risk              Risk of Unplanned Readmission:        13             Met with:patient to discuss discharge plans. Information verified: address, contacts, phone number, , insurance Yes  PCP: Flor Brantley MD  Date of last visit:     Insurance Provider: medicare and MMO     Discharge Planning  Current Residence:  Private home   Living Arrangements:  Family Members       Home has 2 stories/1 stairs to climb to enter the home and one flight of steps to the upstairs. Support Systems:  Family Members      Current Services PTA:  Home care  Agency: [facility]       Patient able to perform ADL's:Independent  DME in home:  Cane   DME used to aid ambulation prior to admission:   None   DME used during admission:  None     Potential Assistance Needed:  Home Care([facility])    Pharmacy: [facility] pharmacy    Potential Assistance Purchasing Medications:  No  Does patient want to participate in local refill/ meds to beds program?  No    Patient agreeable to home care: Yes  Freedom of choice provided:  yes      Type of Home Care Services:  Nursing Services([facility])  Patient expects to be discharged to:  home    Prior SNF/Rehab Placement and Facility: none   Agreeable to SNF/Rehab: No  [place] of choice provided: n/a   Evaluation: n/a    Expected Discharge date:  19  Follow Up Appointment: Best Day/ Time: Monday AM    Transportation provider: per family  Transportation arrangements needed for discharge: none     Discharge Plan:   Patient recent admission - for elective surgery for reversal of colostomy. She was sent home with home care with OL for dressing changes. RN , PT and OT     Patient agreeable to continuing  home care and notified Rolene Nim of the admission. SERAFIN updated and is signed.      Patient will need follow up appointment for high readmission risk of 13%, call to office and follow up

## 2019-11-22 NOTE — DISCHARGE SUMMARY
shows preserved ejection fraction. Patient was treated with IV Lasix for fluid overload and pulmonary edema. Echocardiogram showed RV systolic dysfunction, mild pulmonary hypertension. Patient was discharged on Lasix, Toprol-XL. Significant therapeutic interventions:   1. Fluid overload with pulmonary edema-improved with Lasix IV. Discharged on Lasix as needed. Fluid restriction, low salt diet. Toprol-XL  2. Abdominal wound cellulitis-Bactrim for 7 days, h/o MRSA. DSD daily. Follow-up with surgeon for staple removal  3.  Hypothyroidism-continue Synthroid   4. obesity BMI 38.9.       Significant Diagnostic Studies:   Labs / Micro:/Radiology  Recent Labs     11/22/19  0539 11/21/19  0945 11/18/19  0631   WBC 14.1* 15.2* 11.4*   HGB 9.5* 9.4* 9.2*   HCT 30.3* 30.8* 29.5*   MCV 92.1 94.8 93.4    392 247     Labs Renal Latest Ref Rng & Units 11/22/2019 11/21/2019 11/18/2019 11/17/2019 11/16/2019   BUN 8 - 23 mg/dL 12 10 10 14 15   Cr 0.50 - 0.90 mg/dL 0.80 0.60 0.56 0.69 0.67   K 3.7 - 5.3 mmol/L 4.0 4.0 3.7 3.9 3.5(L)   Na 135 - 144 mmol/L 136 136 139 141 139     Lab Results   Component Value Date    ALT 15 11/22/2019    AST 19 11/22/2019    ALKPHOS 101 11/22/2019    BILITOT 0.34 11/22/2019     Lab Results   Component Value Date    TSH 3.55 11/22/2019     No results found for: HAV, HEPAIGM, HEPBIGM, HEPBCAB, HBEAG, HEPCAB  Lab Results   Component Value Date    APPEARANCE urine 03/25/2018    COLORU julian 07/25/2019    COLORU YELLOW 05/13/2019    NITRU NEGATIVE 05/13/2019    GLUCOSEU neg 07/25/2019    GLUCOSEU NEGATIVE 05/13/2019    GLUCOSEU NEGATIVE 09/10/2011    KETUA neg 07/25/2019    KETUA NEGATIVE 05/13/2019    UROBILINOGEN Normal 05/13/2019    BILIRUBINUR neg 07/25/2019    BILIRUBINUR NEGATIVE 09/10/2011     Lab Results   Component Value Date    LABA1C 5.3 05/17/2019     Lab Results   Component Value Date     05/17/2019     Lab Results   Component Value Date    INR 1.0 11/21/2019    INR needed to maintain dry weight     metoprolol succinate 25 MG extended release tablet  Commonly known as:  TOPROL XL  Take 1 tablet by mouth daily     sulfamethoxazole-trimethoprim 800-160 MG per tablet  Commonly known as:  BACTRIM DS;SEPTRA DS  Take 1 tablet by mouth every 12 hours for 7 days        CONTINUE taking these medications    clotrimazole-betamethasone 1-0.05 % cream  Commonly known as:  LOTRISONE     docusate sodium 100 MG capsule  Commonly known as:  COLACE  Take 1 capsule by mouth 2 times daily as needed for Constipation     losartan 50 MG tablet  Commonly known as:  COZAAR  Take 1 tablet by mouth daily     ondansetron 4 MG tablet  Commonly known as:  ZOFRAN  Take 1 tablet by mouth every 12 hours as needed for Nausea or Vomiting     vitamin D 50 MCG (2000 UT) Caps capsule        STOP taking these medications    HYDROcodone-acetaminophen 5-325 MG per tablet  Commonly known as:  Torrie Givens           Where to Get Your Medications      These medications were [facility] to [facility] [address] [place] [address], [address] [place] [place] [address] [address]    Phone:  [phone]   · ferrous sulfate 325 (65 Fe) MG EC tablet  · furosemide 20 MG tablet  · metoprolol succinate 25 MG extended release tablet  · sulfamethoxazole-trimethoprim 800-160 MG per tablet         Time Spent on discharge is  35 mins in patient examination, evaluation, counseling as well as medication reconciliation, prescriptions for required medications, discharge plan and follow up. Electronically signed by   Simi Downs MD  11/22/2019        Thank you Dr. Debra Vivar MD for the opportunity to be involved in this patient's care.

## 2019-11-22 NOTE — CARE COORDINATION
[address] Transitions Interview     2019    Patient: Zahida Fontenot Patient : 1950   MRN: [id number]  Reason for Admission: New onset of congestive heart failure ([address] [I50.9]  RARS: Readmission Risk Score: 13         Spoke with: Zahida Fontenot    Met with Casandra Way at bedside. She stated that she is feeling better. She is planning on returning home with [facility]. Explained CTN role and she was receptive to further outreach. Contact information provided. Readmission Risk  Patient Active Problem List   Diagnosis    Dyslipidemia    Osteoarthritis    GERD (gastroesophageal reflux disease)    Essential hypertension    Obesity    Allergic rhinitis    Osteoporosis    Arthritis of knee    Biliary dyskinesia    Right upper quadrant abdominal pain    Fundic gland polyposis of stomach    SBO (small bowel obstruction) (HCC)    Incarcerated incisional hernia    Sigmoid diverticulitis    Incisional hernia without obstruction or gangrene    Chalazion    S/P colectomy    History of colostomy reversal    Abdominal adhesions    Hypokalemia    Atelectasis of left lung    Postoperative anemia due to acute blood loss    Acute congestive heart failure ([address]    Surgical wound dehiscence    Constipation    Systolic murmur    Thyroid disease       Inpatient Assessment  Care Transitions Summary    Care Transitions Inpatient Review  Medication Review  Housing Review  Social Support  Durable Medical Equipment  Functional Review  Hearing and Vision  Care Transitions Interventions                                 Follow Up  No future appointments.     Health Maintenance  Health Maintenance Due   Topic Date Due    DEXA (modify frequency per FRAX score)  2017       Martha Dee RN

## 2019-11-23 ENCOUNTER — CARE COORDINATION (OUTPATIENT)
Dept: CASE MANAGEMENT | Age: 69
End: 2019-11-23

## 2019-11-23 LAB
CULTURE: ABNORMAL
DIRECT EXAM: ABNORMAL
DIRECT EXAM: ABNORMAL
Lab: ABNORMAL
SPECIMEN DESCRIPTION: ABNORMAL

## 2019-11-24 ENCOUNTER — CARE COORDINATION (OUTPATIENT)
Dept: CASE MANAGEMENT | Age: 69
End: 2019-11-24

## 2019-11-25 ENCOUNTER — CARE COORDINATION (OUTPATIENT)
Dept: CASE MANAGEMENT | Age: 69
End: 2019-11-25

## 2019-11-25 DIAGNOSIS — T81.31XA DEHISCENCE OF OPERATIVE WOUND, INITIAL ENCOUNTER: Primary | ICD-10-CM

## 2019-11-25 DIAGNOSIS — I50.9 ACUTE CONGESTIVE HEART FAILURE, UNSPECIFIED HEART FAILURE TYPE (HCC): ICD-10-CM

## 2019-11-25 PROCEDURE — 1111F DSCHRG MED/CURRENT MED MERGE: CPT | Performed by: INTERNAL MEDICINE

## 2019-11-26 ENCOUNTER — APPOINTMENT (OUTPATIENT)
Dept: GENERAL RADIOLOGY | Age: 69
End: 2019-11-26
Payer: MEDICARE

## 2019-11-26 ENCOUNTER — HOSPITAL ENCOUNTER (EMERGENCY)
Age: 69
Discharge: HOME | End: 2019-11-26
Attending: EMERGENCY MEDICINE
Payer: MEDICARE

## 2019-11-26 VITALS
OXYGEN SATURATION: 95 % | SYSTOLIC BLOOD PRESSURE: 102 MMHG | TEMPERATURE: 98.4 F | DIASTOLIC BLOOD PRESSURE: 55 MMHG | RESPIRATION RATE: 16 BRPM | HEART RATE: 97 BPM | BODY MASS INDEX: 36.97 KG/M2 | WEIGHT: 235.56 LBS | HEIGHT: 67 IN

## 2019-11-26 DIAGNOSIS — K59.03 DRUG-INDUCED CONSTIPATION: Primary | ICD-10-CM

## 2019-11-26 LAB
ABSOLUTE EOS #: <0.03 K/UL (ref 0–0.44)
ABSOLUTE IMMATURE GRANULOCYTE: 0.11 K/UL (ref 0–0.3)
ABSOLUTE LYMPH #: 1.43 K/UL (ref 1.1–3.7)
ABSOLUTE MONO #: 1.8 K/UL (ref 0.1–1.2)
ANION GAP SERPL CALCULATED.3IONS-SCNC: 16 MMOL/L (ref 9–17)
BASOPHILS # BLD: 0 % (ref 0–2)
BASOPHILS ABSOLUTE: 0.05 K/UL (ref 0–0.2)
BILIRUBIN, POC: NORMAL
BLOOD URINE, POC: NEGATIVE
BUN BLDV-MCNC: 15 MG/DL (ref 8–23)
BUN/CREAT BLD: 16 (ref 9–20)
CALCIUM SERPL-MCNC: 9.5 MG/DL (ref 8.6–10.4)
CHLORIDE BLD-SCNC: 96 MMOL/L (ref 98–107)
CHP ED QC CHECK: NORMAL
CLARITY, POC: NORMAL
CO2: 22 MMOL/L (ref 20–31)
COLOR, POC: YELLOW
CREAT SERPL-MCNC: 0.91 MG/DL (ref 0.5–0.9)
DIFFERENTIAL TYPE: ABNORMAL
EOSINOPHILS RELATIVE PERCENT: 0 % (ref 1–4)
GFR AFRICAN AMERICAN: >60 ML/MIN
GFR NON-AFRICAN AMERICAN: >60 ML/MIN
GFR SERPL CREATININE-BSD FRML MDRD: ABNORMAL ML/MIN/{1.73_M2}
GFR SERPL CREATININE-BSD FRML MDRD: ABNORMAL ML/MIN/{1.73_M2}
GLUCOSE BLD-MCNC: 128 MG/DL (ref 70–99)
GLUCOSE URINE, POC: NEGATIVE
HCT VFR BLD CALC: 29 % (ref 36.3–47.1)
HEMOGLOBIN: 9.1 G/DL (ref 11.9–15.1)
IMMATURE GRANULOCYTES: 1 %
KETONES, POC: NEGATIVE
LEUKOCYTE EST, POC: NEGATIVE
LYMPHOCYTES # BLD: 9 % (ref 24–43)
MCH RBC QN AUTO: 28.7 PG (ref 25.2–33.5)
MCHC RBC AUTO-ENTMCNC: 31.4 G/DL (ref 28.4–34.8)
MCV RBC AUTO: 91.5 FL (ref 82.6–102.9)
MONOCYTES # BLD: 11 % (ref 3–12)
NITRITE, POC: NEGATIVE
NRBC AUTOMATED: 0 PER 100 WBC
PDW BLD-RTO: 14.9 % (ref 11.8–14.4)
PH, POC: 7
PLATELET # BLD: 603 K/UL (ref 138–453)
PLATELET ESTIMATE: ABNORMAL
PMV BLD AUTO: 9.6 FL (ref 8.1–13.5)
POTASSIUM SERPL-SCNC: 4.2 MMOL/L (ref 3.7–5.3)
PROTEIN, POC: NEGATIVE
RBC # BLD: 3.17 M/UL (ref 3.95–5.11)
RBC # BLD: ABNORMAL 10*6/UL
SEG NEUTROPHILS: 79 % (ref 36–65)
SEGMENTED NEUTROPHILS ABSOLUTE COUNT: 12.59 K/UL (ref 1.5–8.1)
SODIUM BLD-SCNC: 134 MMOL/L (ref 135–144)
SPECIFIC GRAVITY, POC: 1.02
UROBILINOGEN, POC: 1
WBC # BLD: 16 K/UL (ref 3.5–11.3)
WBC # BLD: ABNORMAL 10*3/UL

## 2019-11-26 PROCEDURE — 99284 EMERGENCY DEPT VISIT MOD MDM: CPT

## 2019-11-26 PROCEDURE — 85025 COMPLETE CBC W/AUTO DIFF WBC: CPT

## 2019-11-26 PROCEDURE — 6360000002 HC RX W HCPCS: Performed by: EMERGENCY MEDICINE

## 2019-11-26 PROCEDURE — 81003 URINALYSIS AUTO W/O SCOPE: CPT

## 2019-11-26 PROCEDURE — 96376 TX/PRO/DX INJ SAME DRUG ADON: CPT

## 2019-11-26 PROCEDURE — 96374 THER/PROPH/DIAG INJ IV PUSH: CPT

## 2019-11-26 PROCEDURE — 74018 RADEX ABDOMEN 1 VIEW: CPT

## 2019-11-26 PROCEDURE — 6370000000 HC RX 637 (ALT 250 FOR IP): Performed by: EMERGENCY MEDICINE

## 2019-11-26 PROCEDURE — 80048 BASIC METABOLIC PNL TOTAL CA: CPT

## 2019-11-26 RX ORDER — MORPHINE SULFATE 4 MG/ML
4 INJECTION, SOLUTION INTRAMUSCULAR; INTRAVENOUS ONCE
Status: COMPLETED | OUTPATIENT
Start: 2019-11-26 | End: 2019-11-26

## 2019-11-26 RX ORDER — POLYETHYLENE GLYCOL 3350 17 G/17G
17 POWDER, FOR SOLUTION ORAL ONCE
Status: COMPLETED | OUTPATIENT
Start: 2019-11-26 | End: 2019-11-26

## 2019-11-26 RX ADMIN — POLYETHYLENE GLYCOL (3350) 17 G: 17 POWDER, FOR SOLUTION ORAL at 21:45

## 2019-11-26 RX ADMIN — MORPHINE SULFATE 4 MG: 4 INJECTION INTRAVENOUS at 20:44

## 2019-11-26 RX ADMIN — MORPHINE SULFATE 4 MG: 4 INJECTION INTRAVENOUS at 19:50

## 2019-11-26 ASSESSMENT — PAIN SCALES - GENERAL
PAINLEVEL_OUTOF10: 9
PAINLEVEL_OUTOF10: 9
PAINLEVEL_OUTOF10: 10

## 2019-11-26 ASSESSMENT — ENCOUNTER SYMPTOMS
DIARRHEA: 0
COLOR CHANGE: 0
SHORTNESS OF BREATH: 0
COUGH: 0
ABDOMINAL PAIN: 1
CONSTIPATION: 0
EYE DISCHARGE: 0
VOMITING: 0
EYE REDNESS: 0
FACIAL SWELLING: 0

## 2019-11-26 ASSESSMENT — PAIN DESCRIPTION - PAIN TYPE: TYPE: ACUTE PAIN

## 2019-11-27 ENCOUNTER — CARE COORDINATION (OUTPATIENT)
Dept: CASE MANAGEMENT | Age: 69
End: 2019-11-27

## 2019-11-27 LAB
CULTURE: NORMAL
CULTURE: NORMAL
Lab: NORMAL
Lab: NORMAL
SPECIMEN DESCRIPTION: NORMAL
SPECIMEN DESCRIPTION: NORMAL

## 2019-11-29 ENCOUNTER — CARE COORDINATION (OUTPATIENT)
Dept: CASE MANAGEMENT | Age: 69
End: 2019-11-29

## 2019-11-29 ENCOUNTER — HOSPITAL ENCOUNTER (INPATIENT)
Age: 69
LOS: 9 days | Discharge: HOME | DRG: 393 | End: 2019-12-09
Attending: EMERGENCY MEDICINE | Admitting: INTERNAL MEDICINE
Payer: MEDICARE

## 2019-11-29 DIAGNOSIS — K62.5 RECTAL BLEEDING: Primary | ICD-10-CM

## 2019-11-29 DIAGNOSIS — K91.89 ANASTOMOTIC LEAK OF INTESTINE: ICD-10-CM

## 2019-11-29 DIAGNOSIS — R19.8 PERFORATED ABDOMINAL VISCUS: ICD-10-CM

## 2019-11-29 PROCEDURE — 87506 IADNA-DNA/RNA PROBE TQ 6-11: CPT

## 2019-11-29 PROCEDURE — 99284 EMERGENCY DEPT VISIT MOD MDM: CPT

## 2019-11-29 PROCEDURE — 82272 OCCULT BLD FECES 1-3 TESTS: CPT

## 2019-11-30 ENCOUNTER — APPOINTMENT (OUTPATIENT)
Dept: CT IMAGING | Age: 69
DRG: 393 | End: 2019-11-30
Payer: MEDICARE

## 2019-11-30 PROBLEM — D64.9 NORMOCYTIC ANEMIA: Status: ACTIVE | Noted: 2019-11-30

## 2019-11-30 PROBLEM — K91.89 ANASTOMOTIC LEAK OF INTESTINE: Status: ACTIVE | Noted: 2019-11-30

## 2019-11-30 PROBLEM — K63.1 PERFORATED SIGMOID COLON (HCC): Status: ACTIVE | Noted: 2019-11-30

## 2019-11-30 LAB
ABSOLUTE EOS #: 0.04 K/UL (ref 0–0.44)
ABSOLUTE IMMATURE GRANULOCYTE: 0.12 K/UL (ref 0–0.3)
ABSOLUTE LYMPH #: 1.48 K/UL (ref 1.1–3.7)
ABSOLUTE MONO #: 1.33 K/UL (ref 0.1–1.2)
ALBUMIN SERPL-MCNC: 3 G/DL (ref 3.5–5.2)
ALBUMIN/GLOBULIN RATIO: ABNORMAL (ref 1–2.5)
ALP BLD-CCNC: 133 U/L (ref 35–104)
ALT SERPL-CCNC: 63 U/L (ref 5–33)
ANION GAP SERPL CALCULATED.3IONS-SCNC: 12 MMOL/L (ref 9–17)
AST SERPL-CCNC: 68 U/L
BASOPHILS # BLD: 0 % (ref 0–2)
BASOPHILS ABSOLUTE: 0.05 K/UL (ref 0–0.2)
BILIRUB SERPL-MCNC: 0.31 MG/DL (ref 0.3–1.2)
BUN BLDV-MCNC: 15 MG/DL (ref 8–23)
BUN/CREAT BLD: 23 (ref 9–20)
CALCIUM SERPL-MCNC: 9.3 MG/DL (ref 8.6–10.4)
CAMPYLOBACTER PCR: NORMAL
CHLORIDE BLD-SCNC: 100 MMOL/L (ref 98–107)
CO2: 23 MMOL/L (ref 20–31)
CREAT SERPL-MCNC: 0.64 MG/DL (ref 0.5–0.9)
DATE, STOOL #1: ABNORMAL
DATE, STOOL #2: ABNORMAL
DATE, STOOL #3: ABNORMAL
DIFFERENTIAL TYPE: ABNORMAL
E COLI ENTEROTOXIGENIC PCR: NORMAL
EOSINOPHILS RELATIVE PERCENT: 0 % (ref 1–4)
FERRITIN: 460 UG/L (ref 13–150)
FOLATE: >20 NG/ML
GFR AFRICAN AMERICAN: >60 ML/MIN
GFR NON-AFRICAN AMERICAN: >60 ML/MIN
GFR SERPL CREATININE-BSD FRML MDRD: ABNORMAL ML/MIN/{1.73_M2}
GFR SERPL CREATININE-BSD FRML MDRD: ABNORMAL ML/MIN/{1.73_M2}
GLUCOSE BLD-MCNC: 118 MG/DL (ref 70–99)
HCT VFR BLD CALC: 26.5 % (ref 36.3–47.1)
HCT VFR BLD CALC: 28.6 % (ref 36.3–47.1)
HEMOCCULT SP1 STL QL: POSITIVE
HEMOCCULT SP2 STL QL: ABNORMAL
HEMOCCULT SP3 STL QL: ABNORMAL
HEMOGLOBIN: 8.2 G/DL (ref 11.9–15.1)
HEMOGLOBIN: 8.7 G/DL (ref 11.9–15.1)
IMMATURE GRANULOCYTES: 1 %
IRON SATURATION: 9 % (ref 20–55)
IRON: 13 UG/DL (ref 37–145)
LACTIC ACID: 0.8 MMOL/L (ref 0.5–2.2)
LIPASE: 40 U/L (ref 13–60)
LYMPHOCYTES # BLD: 12 % (ref 24–43)
MCH RBC QN AUTO: 28.2 PG (ref 25.2–33.5)
MCHC RBC AUTO-ENTMCNC: 30.4 G/DL (ref 28.4–34.8)
MCV RBC AUTO: 92.6 FL (ref 82.6–102.9)
MONOCYTES # BLD: 11 % (ref 3–12)
NRBC AUTOMATED: 0 PER 100 WBC
PDW BLD-RTO: 15.1 % (ref 11.8–14.4)
PLATELET # BLD: 596 K/UL (ref 138–453)
PLATELET ESTIMATE: ABNORMAL
PLESIOMONAS SHIGELLOIDES PCR: NORMAL
PMV BLD AUTO: 9.2 FL (ref 8.1–13.5)
POTASSIUM SERPL-SCNC: 4 MMOL/L (ref 3.7–5.3)
RBC # BLD: 3.09 M/UL (ref 3.95–5.11)
RBC # BLD: ABNORMAL 10*6/UL
SALMONELLA PCR: NORMAL
SEG NEUTROPHILS: 76 % (ref 36–65)
SEGMENTED NEUTROPHILS ABSOLUTE COUNT: 9.21 K/UL (ref 1.5–8.1)
SHIGATOXIN GENE PCR: NORMAL
SHIGELLA SP PCR: NORMAL
SODIUM BLD-SCNC: 135 MMOL/L (ref 135–144)
SPECIMEN DESCRIPTION: NORMAL
TIME, STOOL #1: ABNORMAL
TIME, STOOL #2: ABNORMAL
TIME, STOOL #3: ABNORMAL
TOTAL IRON BINDING CAPACITY: 141 UG/DL (ref 250–450)
TOTAL PROTEIN: 6.4 G/DL (ref 6.4–8.3)
UNSATURATED IRON BINDING CAPACITY: 128 UG/DL (ref 112–347)
VIBRIO PCR: NORMAL
VITAMIN B-12: 1195 PG/ML (ref 232–1245)
WBC # BLD: 12.2 K/UL (ref 3.5–11.3)
WBC # BLD: ABNORMAL 10*3/UL
YERSINIA ENTEROCOLITICA PCR: NORMAL

## 2019-11-30 PROCEDURE — 80053 COMPREHEN METABOLIC PANEL: CPT

## 2019-11-30 PROCEDURE — 83540 ASSAY OF IRON: CPT

## 2019-11-30 PROCEDURE — 36415 COLL VENOUS BLD VENIPUNCTURE: CPT

## 2019-11-30 PROCEDURE — 96365 THER/PROPH/DIAG IV INF INIT: CPT

## 2019-11-30 PROCEDURE — 99222 1ST HOSP IP/OBS MODERATE 55: CPT | Performed by: INTERNAL MEDICINE

## 2019-11-30 PROCEDURE — 6360000002 HC RX W HCPCS: Performed by: NURSE PRACTITIONER

## 2019-11-30 PROCEDURE — 83605 ASSAY OF LACTIC ACID: CPT

## 2019-11-30 PROCEDURE — 85018 HEMOGLOBIN: CPT

## 2019-11-30 PROCEDURE — 82746 ASSAY OF FOLIC ACID SERUM: CPT

## 2019-11-30 PROCEDURE — 96375 TX/PRO/DX INJ NEW DRUG ADDON: CPT

## 2019-11-30 PROCEDURE — 74177 CT ABD & PELVIS W/CONTRAST: CPT

## 2019-11-30 PROCEDURE — 83690 ASSAY OF LIPASE: CPT

## 2019-11-30 PROCEDURE — 85014 HEMATOCRIT: CPT

## 2019-11-30 PROCEDURE — 85025 COMPLETE CBC W/AUTO DIFF WBC: CPT

## 2019-11-30 PROCEDURE — 6360000002 HC RX W HCPCS: Performed by: EMERGENCY MEDICINE

## 2019-11-30 PROCEDURE — 2500000003 HC RX 250 WO HCPCS: Performed by: NURSE PRACTITIONER

## 2019-11-30 PROCEDURE — 2580000003 HC RX 258: Performed by: NURSE PRACTITIONER

## 2019-11-30 PROCEDURE — 82728 ASSAY OF FERRITIN: CPT

## 2019-11-30 PROCEDURE — 82607 VITAMIN B-12: CPT

## 2019-11-30 PROCEDURE — 83550 IRON BINDING TEST: CPT

## 2019-11-30 PROCEDURE — 2060000000 HC ICU INTERMEDIATE R&B

## 2019-11-30 PROCEDURE — 6360000004 HC RX CONTRAST MEDICATION: Performed by: NURSE PRACTITIONER

## 2019-11-30 PROCEDURE — 2580000003 HC RX 258: Performed by: EMERGENCY MEDICINE

## 2019-11-30 RX ORDER — MORPHINE SULFATE 4 MG/ML
4 INJECTION, SOLUTION INTRAMUSCULAR; INTRAVENOUS
Status: DISCONTINUED | OUTPATIENT
Start: 2019-11-30 | End: 2019-12-08

## 2019-11-30 RX ORDER — SODIUM CHLORIDE 0.9 % (FLUSH) 0.9 %
10 SYRINGE (ML) INJECTION PRN
Status: DISCONTINUED | OUTPATIENT
Start: 2019-11-30 | End: 2019-12-09 | Stop reason: HOSPADM

## 2019-11-30 RX ORDER — SODIUM CHLORIDE 0.9 % (FLUSH) 0.9 %
10 SYRINGE (ML) INJECTION PRN
Status: DISCONTINUED | OUTPATIENT
Start: 2019-11-30 | End: 2019-12-03 | Stop reason: SDUPTHER

## 2019-11-30 RX ORDER — MORPHINE SULFATE 4 MG/ML
4 INJECTION, SOLUTION INTRAMUSCULAR; INTRAVENOUS ONCE
Status: COMPLETED | OUTPATIENT
Start: 2019-11-30 | End: 2019-11-30

## 2019-11-30 RX ORDER — NICOTINE 21 MG/24HR
1 PATCH, TRANSDERMAL 24 HOURS TRANSDERMAL DAILY PRN
Status: DISCONTINUED | OUTPATIENT
Start: 2019-11-30 | End: 2019-12-09 | Stop reason: HOSPADM

## 2019-11-30 RX ORDER — DEXTROSE, SODIUM CHLORIDE, AND POTASSIUM CHLORIDE 5; .45; .15 G/100ML; G/100ML; G/100ML
INJECTION INTRAVENOUS CONTINUOUS
Status: DISCONTINUED | OUTPATIENT
Start: 2019-11-30 | End: 2019-12-08

## 2019-11-30 RX ORDER — 0.9 % SODIUM CHLORIDE 0.9 %
80 INTRAVENOUS SOLUTION INTRAVENOUS ONCE
Status: COMPLETED | OUTPATIENT
Start: 2019-11-30 | End: 2019-11-30

## 2019-11-30 RX ORDER — MAGNESIUM SULFATE 1 G/100ML
1 INJECTION INTRAVENOUS PRN
Status: DISCONTINUED | OUTPATIENT
Start: 2019-11-30 | End: 2019-12-09 | Stop reason: HOSPADM

## 2019-11-30 RX ORDER — POTASSIUM CHLORIDE 7.45 MG/ML
10 INJECTION INTRAVENOUS PRN
Status: DISCONTINUED | OUTPATIENT
Start: 2019-11-30 | End: 2019-12-09 | Stop reason: HOSPADM

## 2019-11-30 RX ORDER — POTASSIUM CHLORIDE 20 MEQ/1
40 TABLET, EXTENDED RELEASE ORAL PRN
Status: DISCONTINUED | OUTPATIENT
Start: 2019-11-30 | End: 2019-12-09 | Stop reason: HOSPADM

## 2019-11-30 RX ORDER — ACETAMINOPHEN 325 MG/1
650 TABLET ORAL EVERY 4 HOURS PRN
Status: DISCONTINUED | OUTPATIENT
Start: 2019-11-30 | End: 2019-12-09 | Stop reason: HOSPADM

## 2019-11-30 RX ORDER — SODIUM CHLORIDE 0.9 % (FLUSH) 0.9 %
10 SYRINGE (ML) INJECTION EVERY 12 HOURS SCHEDULED
Status: DISCONTINUED | OUTPATIENT
Start: 2019-11-30 | End: 2019-12-09 | Stop reason: HOSPADM

## 2019-11-30 RX ORDER — MORPHINE SULFATE 2 MG/ML
2 INJECTION, SOLUTION INTRAMUSCULAR; INTRAVENOUS
Status: DISCONTINUED | OUTPATIENT
Start: 2019-11-30 | End: 2019-12-08

## 2019-11-30 RX ORDER — ONDANSETRON 2 MG/ML
4 INJECTION INTRAMUSCULAR; INTRAVENOUS EVERY 6 HOURS PRN
Status: DISCONTINUED | OUTPATIENT
Start: 2019-11-30 | End: 2019-12-09 | Stop reason: HOSPADM

## 2019-11-30 RX ORDER — ONDANSETRON 2 MG/ML
4 INJECTION INTRAMUSCULAR; INTRAVENOUS EVERY 6 HOURS PRN
Status: DISCONTINUED | OUTPATIENT
Start: 2019-11-30 | End: 2019-11-30 | Stop reason: SDUPTHER

## 2019-11-30 RX ORDER — ONDANSETRON 4 MG/1
4 TABLET, ORALLY DISINTEGRATING ORAL EVERY 6 HOURS PRN
Status: DISCONTINUED | OUTPATIENT
Start: 2019-11-30 | End: 2019-12-09 | Stop reason: HOSPADM

## 2019-11-30 RX ADMIN — IOPAMIDOL 75 ML: 755 INJECTION, SOLUTION INTRAVENOUS at 01:20

## 2019-11-30 RX ADMIN — Medication 2 MG: at 11:48

## 2019-11-30 RX ADMIN — Medication 4 MG: at 19:47

## 2019-11-30 RX ADMIN — POTASSIUM CHLORIDE, DEXTROSE MONOHYDRATE AND SODIUM CHLORIDE: 150; 5; 450 INJECTION, SOLUTION INTRAVENOUS at 06:07

## 2019-11-30 RX ADMIN — MORPHINE SULFATE 4 MG: 4 INJECTION INTRAVENOUS at 00:42

## 2019-11-30 RX ADMIN — Medication 2 MG: at 13:46

## 2019-11-30 RX ADMIN — PIPERACILLIN SODIUM AND TAZOBACTAM SODIUM 4.5 G: 4; .5 INJECTION, POWDER, LYOPHILIZED, FOR SOLUTION INTRAVENOUS at 02:39

## 2019-11-30 RX ADMIN — Medication 2 MG: at 08:44

## 2019-11-30 RX ADMIN — FAMOTIDINE 20 MG: 10 INJECTION, SOLUTION INTRAVENOUS at 08:43

## 2019-11-30 RX ADMIN — PIPERACILLIN AND TAZOBACTAM 3.38 G: 3; .375 INJECTION, POWDER, LYOPHILIZED, FOR SOLUTION INTRAVENOUS at 17:50

## 2019-11-30 RX ADMIN — SODIUM CHLORIDE 80 ML: 9 INJECTION, SOLUTION INTRAVENOUS at 01:30

## 2019-11-30 RX ADMIN — Medication 10 ML: at 01:30

## 2019-11-30 RX ADMIN — PIPERACILLIN AND TAZOBACTAM 3.38 G: 3; .375 INJECTION, POWDER, LYOPHILIZED, FOR SOLUTION INTRAVENOUS at 09:24

## 2019-11-30 RX ADMIN — FAMOTIDINE 20 MG: 10 INJECTION, SOLUTION INTRAVENOUS at 20:20

## 2019-11-30 ASSESSMENT — PAIN DESCRIPTION - PROGRESSION
CLINICAL_PROGRESSION: NOT CHANGED

## 2019-11-30 ASSESSMENT — PAIN DESCRIPTION - FREQUENCY: FREQUENCY: CONTINUOUS

## 2019-11-30 ASSESSMENT — PAIN SCALES - GENERAL
PAINLEVEL_OUTOF10: 7
PAINLEVEL_OUTOF10: 7
PAINLEVEL_OUTOF10: 9
PAINLEVEL_OUTOF10: 7
PAINLEVEL_OUTOF10: 7
PAINLEVEL_OUTOF10: 5
PAINLEVEL_OUTOF10: 8
PAINLEVEL_OUTOF10: 5

## 2019-11-30 ASSESSMENT — PAIN - FUNCTIONAL ASSESSMENT: PAIN_FUNCTIONAL_ASSESSMENT: ACTIVITIES ARE NOT PREVENTED

## 2019-11-30 ASSESSMENT — ENCOUNTER SYMPTOMS
NAUSEA: 0
ABDOMINAL PAIN: 1
CONSTIPATION: 1
BLOOD IN STOOL: 1

## 2019-11-30 ASSESSMENT — PAIN DESCRIPTION - LOCATION: LOCATION: ABDOMEN

## 2019-11-30 ASSESSMENT — PAIN DESCRIPTION - ORIENTATION: ORIENTATION: MID

## 2019-11-30 ASSESSMENT — PAIN DESCRIPTION - DESCRIPTORS: DESCRIPTORS: PRESSURE

## 2019-11-30 ASSESSMENT — VISUAL ACUITY: OU: 1

## 2019-11-30 ASSESSMENT — PAIN DESCRIPTION - PAIN TYPE: TYPE: ACUTE PAIN

## 2019-11-30 ASSESSMENT — PAIN DESCRIPTION - ONSET: ONSET: ON-GOING

## 2019-12-01 LAB
ALBUMIN SERPL-MCNC: 2.7 G/DL (ref 3.5–5.2)
ALBUMIN/GLOBULIN RATIO: ABNORMAL (ref 1–2.5)
ALP BLD-CCNC: 108 U/L (ref 35–104)
ALT SERPL-CCNC: 41 U/L (ref 5–33)
AMYLASE: 13 U/L (ref 28–100)
ANION GAP SERPL CALCULATED.3IONS-SCNC: 11 MMOL/L (ref 9–17)
AST SERPL-CCNC: 26 U/L
BILIRUB SERPL-MCNC: 0.27 MG/DL (ref 0.3–1.2)
BUN BLDV-MCNC: 8 MG/DL (ref 8–23)
BUN/CREAT BLD: 11 (ref 9–20)
CALCIUM SERPL-MCNC: 8.7 MG/DL (ref 8.6–10.4)
CHLORIDE BLD-SCNC: 102 MMOL/L (ref 98–107)
CO2: 23 MMOL/L (ref 20–31)
CREAT SERPL-MCNC: 0.72 MG/DL (ref 0.5–0.9)
GFR AFRICAN AMERICAN: >60 ML/MIN
GFR NON-AFRICAN AMERICAN: >60 ML/MIN
GFR SERPL CREATININE-BSD FRML MDRD: ABNORMAL ML/MIN/{1.73_M2}
GFR SERPL CREATININE-BSD FRML MDRD: ABNORMAL ML/MIN/{1.73_M2}
GLUCOSE BLD-MCNC: 111 MG/DL (ref 70–99)
HCT VFR BLD CALC: 27.5 % (ref 36.3–47.1)
HCT VFR BLD CALC: 27.7 % (ref 36.3–47.1)
HCT VFR BLD CALC: 30.6 % (ref 36.3–47.1)
HCT VFR BLD CALC: 30.9 % (ref 36.3–47.1)
HEMOGLOBIN: 8.4 G/DL (ref 11.9–15.1)
HEMOGLOBIN: 8.4 G/DL (ref 11.9–15.1)
HEMOGLOBIN: 9.3 G/DL (ref 11.9–15.1)
HEMOGLOBIN: 9.4 G/DL (ref 11.9–15.1)
LIPASE: 25 U/L (ref 13–60)
MAGNESIUM: 2.2 MG/DL (ref 1.6–2.6)
MCH RBC QN AUTO: 28.1 PG (ref 25.2–33.5)
MCHC RBC AUTO-ENTMCNC: 30.3 G/DL (ref 28.4–34.8)
MCV RBC AUTO: 92.6 FL (ref 82.6–102.9)
NRBC AUTOMATED: 0 PER 100 WBC
PDW BLD-RTO: 15.2 % (ref 11.8–14.4)
PHOSPHORUS: 3.2 MG/DL (ref 2.6–4.5)
PLATELET # BLD: 584 K/UL (ref 138–453)
PMV BLD AUTO: 9 FL (ref 8.1–13.5)
POTASSIUM SERPL-SCNC: 4.1 MMOL/L (ref 3.7–5.3)
RBC # BLD: 2.99 M/UL (ref 3.95–5.11)
SODIUM BLD-SCNC: 136 MMOL/L (ref 135–144)
TOTAL PROTEIN: 5.7 G/DL (ref 6.4–8.3)
WBC # BLD: 10.3 K/UL (ref 3.5–11.3)

## 2019-12-01 PROCEDURE — 84100 ASSAY OF PHOSPHORUS: CPT

## 2019-12-01 PROCEDURE — 2580000003 HC RX 258: Performed by: NURSE PRACTITIONER

## 2019-12-01 PROCEDURE — 83735 ASSAY OF MAGNESIUM: CPT

## 2019-12-01 PROCEDURE — 85018 HEMOGLOBIN: CPT

## 2019-12-01 PROCEDURE — 6360000002 HC RX W HCPCS: Performed by: NURSE PRACTITIONER

## 2019-12-01 PROCEDURE — 80053 COMPREHEN METABOLIC PANEL: CPT

## 2019-12-01 PROCEDURE — 85014 HEMATOCRIT: CPT

## 2019-12-01 PROCEDURE — 2060000000 HC ICU INTERMEDIATE R&B

## 2019-12-01 PROCEDURE — 2T015 HOSPITALIST 2ND TOUCH: CPT | Performed by: INTERNAL MEDICINE

## 2019-12-01 PROCEDURE — 82150 ASSAY OF AMYLASE: CPT

## 2019-12-01 PROCEDURE — 36415 COLL VENOUS BLD VENIPUNCTURE: CPT

## 2019-12-01 PROCEDURE — 2500000003 HC RX 250 WO HCPCS: Performed by: NURSE PRACTITIONER

## 2019-12-01 PROCEDURE — 83690 ASSAY OF LIPASE: CPT

## 2019-12-01 PROCEDURE — 99232 SBSQ HOSP IP/OBS MODERATE 35: CPT | Performed by: INTERNAL MEDICINE

## 2019-12-01 PROCEDURE — 85027 COMPLETE CBC AUTOMATED: CPT

## 2019-12-01 RX ADMIN — PIPERACILLIN AND TAZOBACTAM 3.38 G: 3; .375 INJECTION, POWDER, LYOPHILIZED, FOR SOLUTION INTRAVENOUS at 09:51

## 2019-12-01 RX ADMIN — SODIUM CHLORIDE, PRESERVATIVE FREE 10 ML: 5 INJECTION INTRAVENOUS at 20:33

## 2019-12-01 RX ADMIN — Medication 2 MG: at 11:26

## 2019-12-01 RX ADMIN — PIPERACILLIN AND TAZOBACTAM 3.38 G: 3; .375 INJECTION, POWDER, LYOPHILIZED, FOR SOLUTION INTRAVENOUS at 02:00

## 2019-12-01 RX ADMIN — FAMOTIDINE 20 MG: 10 INJECTION, SOLUTION INTRAVENOUS at 09:51

## 2019-12-01 RX ADMIN — PIPERACILLIN AND TAZOBACTAM 3.38 G: 3; .375 INJECTION, POWDER, LYOPHILIZED, FOR SOLUTION INTRAVENOUS at 17:45

## 2019-12-01 RX ADMIN — FAMOTIDINE 20 MG: 10 INJECTION, SOLUTION INTRAVENOUS at 20:32

## 2019-12-01 RX ADMIN — Medication 2 MG: at 03:11

## 2019-12-01 ASSESSMENT — PAIN DESCRIPTION - PROGRESSION
CLINICAL_PROGRESSION: NOT CHANGED

## 2019-12-01 ASSESSMENT — PAIN SCALES - GENERAL
PAINLEVEL_OUTOF10: 0
PAINLEVEL_OUTOF10: 0
PAINLEVEL_OUTOF10: 6
PAINLEVEL_OUTOF10: 8
PAINLEVEL_OUTOF10: 0

## 2019-12-02 LAB
ANION GAP SERPL CALCULATED.3IONS-SCNC: 9 MMOL/L (ref 9–17)
BUN BLDV-MCNC: 8 MG/DL (ref 8–23)
BUN/CREAT BLD: 10 (ref 9–20)
CALCIUM SERPL-MCNC: 8.8 MG/DL (ref 8.6–10.4)
CHLORIDE BLD-SCNC: 102 MMOL/L (ref 98–107)
CO2: 26 MMOL/L (ref 20–31)
CREAT SERPL-MCNC: 0.8 MG/DL (ref 0.5–0.9)
GFR AFRICAN AMERICAN: >60 ML/MIN
GFR NON-AFRICAN AMERICAN: >60 ML/MIN
GFR SERPL CREATININE-BSD FRML MDRD: ABNORMAL ML/MIN/{1.73_M2}
GFR SERPL CREATININE-BSD FRML MDRD: ABNORMAL ML/MIN/{1.73_M2}
GLUCOSE BLD-MCNC: 125 MG/DL (ref 70–99)
HCT VFR BLD CALC: 28.9 % (ref 36.3–47.1)
HEMOGLOBIN: 8.8 G/DL (ref 11.9–15.1)
MCH RBC QN AUTO: 28 PG (ref 25.2–33.5)
MCHC RBC AUTO-ENTMCNC: 30.4 G/DL (ref 28.4–34.8)
MCV RBC AUTO: 92 FL (ref 82.6–102.9)
NRBC AUTOMATED: 0 PER 100 WBC
PDW BLD-RTO: 14.9 % (ref 11.8–14.4)
PLATELET # BLD: 548 K/UL (ref 138–453)
PMV BLD AUTO: 8.5 FL (ref 8.1–13.5)
POTASSIUM SERPL-SCNC: 3.9 MMOL/L (ref 3.7–5.3)
RBC # BLD: 3.14 M/UL (ref 3.95–5.11)
SODIUM BLD-SCNC: 137 MMOL/L (ref 135–144)
WBC # BLD: 8.8 K/UL (ref 3.5–11.3)

## 2019-12-02 PROCEDURE — 2060000000 HC ICU INTERMEDIATE R&B

## 2019-12-02 PROCEDURE — 6360000002 HC RX W HCPCS: Performed by: NURSE PRACTITIONER

## 2019-12-02 PROCEDURE — 2580000003 HC RX 258: Performed by: NURSE PRACTITIONER

## 2019-12-02 PROCEDURE — 99232 SBSQ HOSP IP/OBS MODERATE 35: CPT | Performed by: INTERNAL MEDICINE

## 2019-12-02 PROCEDURE — 2500000003 HC RX 250 WO HCPCS: Performed by: NURSE PRACTITIONER

## 2019-12-02 PROCEDURE — 80048 BASIC METABOLIC PNL TOTAL CA: CPT

## 2019-12-02 PROCEDURE — 36415 COLL VENOUS BLD VENIPUNCTURE: CPT

## 2019-12-02 PROCEDURE — 85027 COMPLETE CBC AUTOMATED: CPT

## 2019-12-02 RX ADMIN — Medication 4 MG: at 20:45

## 2019-12-02 RX ADMIN — PIPERACILLIN AND TAZOBACTAM 3.38 G: 3; .375 INJECTION, POWDER, LYOPHILIZED, FOR SOLUTION INTRAVENOUS at 02:29

## 2019-12-02 RX ADMIN — Medication 2 MG: at 03:39

## 2019-12-02 RX ADMIN — PIPERACILLIN AND TAZOBACTAM 3.38 G: 3; .375 INJECTION, POWDER, LYOPHILIZED, FOR SOLUTION INTRAVENOUS at 09:31

## 2019-12-02 RX ADMIN — PIPERACILLIN AND TAZOBACTAM 3.38 G: 3; .375 INJECTION, POWDER, LYOPHILIZED, FOR SOLUTION INTRAVENOUS at 20:44

## 2019-12-02 RX ADMIN — FAMOTIDINE 20 MG: 10 INJECTION, SOLUTION INTRAVENOUS at 20:44

## 2019-12-02 RX ADMIN — FAMOTIDINE 20 MG: 10 INJECTION, SOLUTION INTRAVENOUS at 09:31

## 2019-12-02 RX ADMIN — SODIUM CHLORIDE, PRESERVATIVE FREE 10 ML: 5 INJECTION INTRAVENOUS at 21:04

## 2019-12-02 ASSESSMENT — PAIN DESCRIPTION - ORIENTATION
ORIENTATION: MID
ORIENTATION: MID

## 2019-12-02 ASSESSMENT — PAIN DESCRIPTION - DESCRIPTORS
DESCRIPTORS: PRESSURE
DESCRIPTORS: PRESSURE

## 2019-12-02 ASSESSMENT — PAIN DESCRIPTION - ONSET
ONSET: ON-GOING
ONSET: ON-GOING

## 2019-12-02 ASSESSMENT — PAIN DESCRIPTION - PAIN TYPE
TYPE: ACUTE PAIN
TYPE: ACUTE PAIN

## 2019-12-02 ASSESSMENT — PAIN SCALES - GENERAL
PAINLEVEL_OUTOF10: 0
PAINLEVEL_OUTOF10: 6
PAINLEVEL_OUTOF10: 8
PAINLEVEL_OUTOF10: 3

## 2019-12-02 ASSESSMENT — PAIN DESCRIPTION - FREQUENCY
FREQUENCY: CONTINUOUS
FREQUENCY: CONTINUOUS

## 2019-12-02 ASSESSMENT — PAIN DESCRIPTION - PROGRESSION
CLINICAL_PROGRESSION: GRADUALLY IMPROVING
CLINICAL_PROGRESSION: GRADUALLY WORSENING

## 2019-12-02 ASSESSMENT — PAIN DESCRIPTION - LOCATION
LOCATION: ABDOMEN
LOCATION: ABDOMEN

## 2019-12-03 ENCOUNTER — APPOINTMENT (OUTPATIENT)
Dept: GENERAL RADIOLOGY | Age: 69
DRG: 393 | End: 2019-12-03
Payer: MEDICARE

## 2019-12-03 LAB
HCT VFR BLD CALC: 29.5 % (ref 36.3–47.1)
HEMOGLOBIN: 8.9 G/DL (ref 11.9–15.1)
MCH RBC QN AUTO: 27.9 PG (ref 25.2–33.5)
MCHC RBC AUTO-ENTMCNC: 30.2 G/DL (ref 28.4–34.8)
MCV RBC AUTO: 92.5 FL (ref 82.6–102.9)
NRBC AUTOMATED: 0 PER 100 WBC
PDW BLD-RTO: 14.9 % (ref 11.8–14.4)
PLATELET # BLD: 555 K/UL (ref 138–453)
PMV BLD AUTO: 8.8 FL (ref 8.1–13.5)
RBC # BLD: 3.19 M/UL (ref 3.95–5.11)
WBC # BLD: 9.1 K/UL (ref 3.5–11.3)

## 2019-12-03 PROCEDURE — 6360000002 HC RX W HCPCS: Performed by: NURSE PRACTITIONER

## 2019-12-03 PROCEDURE — 2060000000 HC ICU INTERMEDIATE R&B

## 2019-12-03 PROCEDURE — 74018 RADEX ABDOMEN 1 VIEW: CPT

## 2019-12-03 PROCEDURE — 99232 SBSQ HOSP IP/OBS MODERATE 35: CPT | Performed by: INTERNAL MEDICINE

## 2019-12-03 PROCEDURE — 6370000000 HC RX 637 (ALT 250 FOR IP): Performed by: NURSE PRACTITIONER

## 2019-12-03 PROCEDURE — 85027 COMPLETE CBC AUTOMATED: CPT

## 2019-12-03 PROCEDURE — 2500000003 HC RX 250 WO HCPCS: Performed by: NURSE PRACTITIONER

## 2019-12-03 PROCEDURE — 36415 COLL VENOUS BLD VENIPUNCTURE: CPT

## 2019-12-03 PROCEDURE — 2580000003 HC RX 258: Performed by: NURSE PRACTITIONER

## 2019-12-03 RX ORDER — LORAZEPAM 2 MG/ML
1 INJECTION INTRAMUSCULAR ONCE
Status: DISCONTINUED | OUTPATIENT
Start: 2019-12-03 | End: 2019-12-09 | Stop reason: HOSPADM

## 2019-12-03 RX ADMIN — ENOXAPARIN SODIUM 30 MG: 30 INJECTION SUBCUTANEOUS at 20:38

## 2019-12-03 RX ADMIN — SODIUM CHLORIDE, PRESERVATIVE FREE 10 ML: 5 INJECTION INTRAVENOUS at 08:27

## 2019-12-03 RX ADMIN — Medication 4 MG: at 21:30

## 2019-12-03 RX ADMIN — Medication 2 MG: at 02:49

## 2019-12-03 RX ADMIN — ACETAMINOPHEN 650 MG: 325 TABLET ORAL at 14:34

## 2019-12-03 RX ADMIN — ONDANSETRON 4 MG: 2 INJECTION INTRAMUSCULAR; INTRAVENOUS at 02:50

## 2019-12-03 RX ADMIN — ACETAMINOPHEN 650 MG: 325 TABLET ORAL at 20:38

## 2019-12-03 RX ADMIN — PIPERACILLIN AND TAZOBACTAM 3.38 G: 3; .375 INJECTION, POWDER, LYOPHILIZED, FOR SOLUTION INTRAVENOUS at 04:36

## 2019-12-03 RX ADMIN — PIPERACILLIN AND TAZOBACTAM 3.38 G: 3; .375 INJECTION, POWDER, LYOPHILIZED, FOR SOLUTION INTRAVENOUS at 20:38

## 2019-12-03 RX ADMIN — PIPERACILLIN AND TAZOBACTAM 3.38 G: 3; .375 INJECTION, POWDER, LYOPHILIZED, FOR SOLUTION INTRAVENOUS at 14:27

## 2019-12-03 RX ADMIN — FAMOTIDINE 20 MG: 10 INJECTION, SOLUTION INTRAVENOUS at 08:28

## 2019-12-03 RX ADMIN — FAMOTIDINE 20 MG: 10 INJECTION, SOLUTION INTRAVENOUS at 20:38

## 2019-12-03 RX ADMIN — ONDANSETRON 4 MG: 2 INJECTION INTRAMUSCULAR; INTRAVENOUS at 21:30

## 2019-12-03 ASSESSMENT — PAIN - FUNCTIONAL ASSESSMENT
PAIN_FUNCTIONAL_ASSESSMENT: PREVENTS OR INTERFERES SOME ACTIVE ACTIVITIES AND ADLS
PAIN_FUNCTIONAL_ASSESSMENT: ACTIVITIES ARE NOT PREVENTED
PAIN_FUNCTIONAL_ASSESSMENT: PREVENTS OR INTERFERES SOME ACTIVE ACTIVITIES AND ADLS
PAIN_FUNCTIONAL_ASSESSMENT: ACTIVITIES ARE NOT PREVENTED
PAIN_FUNCTIONAL_ASSESSMENT: ACTIVITIES ARE NOT PREVENTED

## 2019-12-03 ASSESSMENT — PAIN DESCRIPTION - FREQUENCY
FREQUENCY: CONTINUOUS

## 2019-12-03 ASSESSMENT — PAIN DESCRIPTION - PROGRESSION
CLINICAL_PROGRESSION: NOT CHANGED
CLINICAL_PROGRESSION: GRADUALLY WORSENING
CLINICAL_PROGRESSION: NOT CHANGED
CLINICAL_PROGRESSION: GRADUALLY IMPROVING
CLINICAL_PROGRESSION: NOT CHANGED
CLINICAL_PROGRESSION: GRADUALLY WORSENING
CLINICAL_PROGRESSION: NOT CHANGED

## 2019-12-03 ASSESSMENT — PAIN SCALES - GENERAL
PAINLEVEL_OUTOF10: 0
PAINLEVEL_OUTOF10: 3
PAINLEVEL_OUTOF10: 4
PAINLEVEL_OUTOF10: 6
PAINLEVEL_OUTOF10: 9
PAINLEVEL_OUTOF10: 9
PAINLEVEL_OUTOF10: 0
PAINLEVEL_OUTOF10: 9
PAINLEVEL_OUTOF10: 4
PAINLEVEL_OUTOF10: 0
PAINLEVEL_OUTOF10: 9

## 2019-12-03 ASSESSMENT — PAIN DESCRIPTION - PAIN TYPE
TYPE: ACUTE PAIN

## 2019-12-03 ASSESSMENT — PAIN DESCRIPTION - LOCATION
LOCATION: ABDOMEN
LOCATION: ANKLE
LOCATION: ABDOMEN

## 2019-12-03 ASSESSMENT — PAIN DESCRIPTION - ONSET
ONSET: ON-GOING

## 2019-12-03 ASSESSMENT — PAIN DESCRIPTION - ORIENTATION
ORIENTATION: LOWER
ORIENTATION: MID
ORIENTATION: LOWER
ORIENTATION: LOWER
ORIENTATION: MID
ORIENTATION: LOWER
ORIENTATION: LOWER

## 2019-12-03 ASSESSMENT — PAIN DESCRIPTION - DESCRIPTORS
DESCRIPTORS: PRESSURE
DESCRIPTORS: CRAMPING
DESCRIPTORS: PRESSURE
DESCRIPTORS: CRAMPING
DESCRIPTORS: PRESSURE
DESCRIPTORS: CRAMPING
DESCRIPTORS: PRESSURE

## 2019-12-04 ENCOUNTER — APPOINTMENT (OUTPATIENT)
Dept: CT IMAGING | Age: 69
DRG: 393 | End: 2019-12-04
Payer: MEDICARE

## 2019-12-04 LAB
HCT VFR BLD CALC: 31 % (ref 36.3–47.1)
HEMOGLOBIN: 9.3 G/DL (ref 11.9–15.1)
MCH RBC QN AUTO: 28.2 PG (ref 25.2–33.5)
MCHC RBC AUTO-ENTMCNC: 30 G/DL (ref 28.4–34.8)
MCV RBC AUTO: 93.9 FL (ref 82.6–102.9)
NRBC AUTOMATED: 0 PER 100 WBC
PDW BLD-RTO: 15 % (ref 11.8–14.4)
PLATELET # BLD: 516 K/UL (ref 138–453)
PMV BLD AUTO: 8.5 FL (ref 8.1–13.5)
RBC # BLD: 3.3 M/UL (ref 3.95–5.11)
WBC # BLD: 12.8 K/UL (ref 3.5–11.3)

## 2019-12-04 PROCEDURE — 2580000003 HC RX 258: Performed by: NURSE PRACTITIONER

## 2019-12-04 PROCEDURE — 2500000003 HC RX 250 WO HCPCS: Performed by: COLON & RECTAL SURGERY

## 2019-12-04 PROCEDURE — 6360000004 HC RX CONTRAST MEDICATION: Performed by: COLON & RECTAL SURGERY

## 2019-12-04 PROCEDURE — 2500000003 HC RX 250 WO HCPCS: Performed by: NURSE PRACTITIONER

## 2019-12-04 PROCEDURE — 6360000002 HC RX W HCPCS: Performed by: NURSE PRACTITIONER

## 2019-12-04 PROCEDURE — 2580000003 HC RX 258: Performed by: COLON & RECTAL SURGERY

## 2019-12-04 PROCEDURE — 99232 SBSQ HOSP IP/OBS MODERATE 35: CPT | Performed by: INTERNAL MEDICINE

## 2019-12-04 PROCEDURE — 2060000000 HC ICU INTERMEDIATE R&B

## 2019-12-04 PROCEDURE — 74177 CT ABD & PELVIS W/CONTRAST: CPT

## 2019-12-04 PROCEDURE — 85027 COMPLETE CBC AUTOMATED: CPT

## 2019-12-04 PROCEDURE — 36415 COLL VENOUS BLD VENIPUNCTURE: CPT

## 2019-12-04 RX ORDER — SODIUM CHLORIDE 0.9 % (FLUSH) 0.9 %
10 SYRINGE (ML) INJECTION
Status: COMPLETED | OUTPATIENT
Start: 2019-12-04 | End: 2019-12-04

## 2019-12-04 RX ORDER — 0.9 % SODIUM CHLORIDE 0.9 %
80 INTRAVENOUS SOLUTION INTRAVENOUS ONCE
Status: COMPLETED | OUTPATIENT
Start: 2019-12-04 | End: 2019-12-04

## 2019-12-04 RX ADMIN — FAMOTIDINE 20 MG: 10 INJECTION, SOLUTION INTRAVENOUS at 10:46

## 2019-12-04 RX ADMIN — IOPAMIDOL 75 ML: 755 INJECTION, SOLUTION INTRAVENOUS at 10:30

## 2019-12-04 RX ADMIN — FAMOTIDINE 20 MG: 10 INJECTION, SOLUTION INTRAVENOUS at 20:41

## 2019-12-04 RX ADMIN — POTASSIUM CHLORIDE, DEXTROSE MONOHYDRATE AND SODIUM CHLORIDE: 150; 5; 450 INJECTION, SOLUTION INTRAVENOUS at 04:31

## 2019-12-04 RX ADMIN — Medication 2 MG: at 05:21

## 2019-12-04 RX ADMIN — IOHEXOL 30 ML: 300 INJECTION, SOLUTION INTRAVENOUS at 10:30

## 2019-12-04 RX ADMIN — Medication 2 MG: at 18:13

## 2019-12-04 RX ADMIN — SODIUM CHLORIDE, PRESERVATIVE FREE 10 ML: 5 INJECTION INTRAVENOUS at 10:48

## 2019-12-04 RX ADMIN — SODIUM CHLORIDE, PRESERVATIVE FREE 10 ML: 5 INJECTION INTRAVENOUS at 10:20

## 2019-12-04 RX ADMIN — Medication 2 MG: at 13:17

## 2019-12-04 RX ADMIN — PIPERACILLIN AND TAZOBACTAM 3.38 G: 3; .375 INJECTION, POWDER, LYOPHILIZED, FOR SOLUTION INTRAVENOUS at 04:31

## 2019-12-04 RX ADMIN — ONDANSETRON 4 MG: 2 INJECTION INTRAMUSCULAR; INTRAVENOUS at 10:19

## 2019-12-04 RX ADMIN — SODIUM CHLORIDE, PRESERVATIVE FREE 10 ML: 5 INJECTION INTRAVENOUS at 10:31

## 2019-12-04 RX ADMIN — PIPERACILLIN AND TAZOBACTAM 3.38 G: 3; .375 INJECTION, POWDER, LYOPHILIZED, FOR SOLUTION INTRAVENOUS at 13:14

## 2019-12-04 RX ADMIN — PIPERACILLIN AND TAZOBACTAM 3.38 G: 3; .375 INJECTION, POWDER, LYOPHILIZED, FOR SOLUTION INTRAVENOUS at 20:41

## 2019-12-04 RX ADMIN — Medication 2 MG: at 20:33

## 2019-12-04 RX ADMIN — SODIUM CHLORIDE 80 ML: 9 INJECTION, SOLUTION INTRAVENOUS at 10:29

## 2019-12-04 ASSESSMENT — PAIN SCALES - GENERAL
PAINLEVEL_OUTOF10: 0
PAINLEVEL_OUTOF10: 8
PAINLEVEL_OUTOF10: 0
PAINLEVEL_OUTOF10: 0
PAINLEVEL_OUTOF10: 7
PAINLEVEL_OUTOF10: 7
PAINLEVEL_OUTOF10: 0
PAINLEVEL_OUTOF10: 0
PAINLEVEL_OUTOF10: 7
PAINLEVEL_OUTOF10: 6
PAINLEVEL_OUTOF10: 0
PAINLEVEL_OUTOF10: 5

## 2019-12-04 ASSESSMENT — PAIN DESCRIPTION - PROGRESSION

## 2019-12-04 ASSESSMENT — PAIN SCALES - WONG BAKER: WONGBAKER_NUMERICALRESPONSE: 0

## 2019-12-05 LAB
HCT VFR BLD CALC: 30.8 % (ref 36.3–47.1)
HEMOGLOBIN: 9.2 G/DL (ref 11.9–15.1)
INR BLD: 1
MCH RBC QN AUTO: 27.7 PG (ref 25.2–33.5)
MCHC RBC AUTO-ENTMCNC: 29.9 G/DL (ref 28.4–34.8)
MCV RBC AUTO: 92.8 FL (ref 82.6–102.9)
NRBC AUTOMATED: 0 PER 100 WBC
PARTIAL THROMBOPLASTIN TIME: 24.2 SEC (ref 23–31)
PDW BLD-RTO: 15.3 % (ref 11.8–14.4)
PLATELET # BLD: 478 K/UL (ref 138–453)
PMV BLD AUTO: 8.7 FL (ref 8.1–13.5)
PROTHROMBIN TIME: 10.5 SEC (ref 9.7–11.6)
RBC # BLD: 3.32 M/UL (ref 3.95–5.11)
WBC # BLD: 11.4 K/UL (ref 3.5–11.3)

## 2019-12-05 PROCEDURE — 2500000003 HC RX 250 WO HCPCS: Performed by: COLON & RECTAL SURGERY

## 2019-12-05 PROCEDURE — 85730 THROMBOPLASTIN TIME PARTIAL: CPT

## 2019-12-05 PROCEDURE — 2580000003 HC RX 258: Performed by: NURSE PRACTITIONER

## 2019-12-05 PROCEDURE — 85027 COMPLETE CBC AUTOMATED: CPT

## 2019-12-05 PROCEDURE — 6360000002 HC RX W HCPCS: Performed by: NURSE PRACTITIONER

## 2019-12-05 PROCEDURE — 36415 COLL VENOUS BLD VENIPUNCTURE: CPT

## 2019-12-05 PROCEDURE — 2500000003 HC RX 250 WO HCPCS: Performed by: NURSE PRACTITIONER

## 2019-12-05 PROCEDURE — 2060000000 HC ICU INTERMEDIATE R&B

## 2019-12-05 PROCEDURE — 99232 SBSQ HOSP IP/OBS MODERATE 35: CPT | Performed by: INTERNAL MEDICINE

## 2019-12-05 PROCEDURE — 85610 PROTHROMBIN TIME: CPT

## 2019-12-05 PROCEDURE — 6370000000 HC RX 637 (ALT 250 FOR IP): Performed by: NURSE PRACTITIONER

## 2019-12-05 RX ADMIN — POTASSIUM CHLORIDE, DEXTROSE MONOHYDRATE AND SODIUM CHLORIDE: 150; 5; 450 INJECTION, SOLUTION INTRAVENOUS at 10:17

## 2019-12-05 RX ADMIN — Medication 4 MG: at 21:15

## 2019-12-05 RX ADMIN — Medication 2 MG: at 16:10

## 2019-12-05 RX ADMIN — SODIUM CHLORIDE, PRESERVATIVE FREE 10 ML: 5 INJECTION INTRAVENOUS at 21:16

## 2019-12-05 RX ADMIN — Medication 4 MG: at 03:09

## 2019-12-05 RX ADMIN — PIPERACILLIN AND TAZOBACTAM 3.38 G: 3; .375 INJECTION, POWDER, LYOPHILIZED, FOR SOLUTION INTRAVENOUS at 21:16

## 2019-12-05 RX ADMIN — SODIUM CHLORIDE, PRESERVATIVE FREE 10 ML: 5 INJECTION INTRAVENOUS at 08:54

## 2019-12-05 RX ADMIN — PIPERACILLIN AND TAZOBACTAM 3.38 G: 3; .375 INJECTION, POWDER, LYOPHILIZED, FOR SOLUTION INTRAVENOUS at 05:30

## 2019-12-05 RX ADMIN — ONDANSETRON 4 MG: 4 TABLET, ORALLY DISINTEGRATING ORAL at 16:10

## 2019-12-05 RX ADMIN — ACETAMINOPHEN 650 MG: 325 TABLET ORAL at 17:46

## 2019-12-05 RX ADMIN — FAMOTIDINE 20 MG: 10 INJECTION, SOLUTION INTRAVENOUS at 08:54

## 2019-12-05 RX ADMIN — PIPERACILLIN AND TAZOBACTAM 3.38 G: 3; .375 INJECTION, POWDER, LYOPHILIZED, FOR SOLUTION INTRAVENOUS at 12:46

## 2019-12-05 RX ADMIN — FAMOTIDINE 20 MG: 10 INJECTION, SOLUTION INTRAVENOUS at 21:15

## 2019-12-05 ASSESSMENT — PAIN SCALES - GENERAL
PAINLEVEL_OUTOF10: 7
PAINLEVEL_OUTOF10: 6
PAINLEVEL_OUTOF10: 5
PAINLEVEL_OUTOF10: 8
PAINLEVEL_OUTOF10: 7
PAINLEVEL_OUTOF10: 0
PAINLEVEL_OUTOF10: 6
PAINLEVEL_OUTOF10: 2
PAINLEVEL_OUTOF10: 0

## 2019-12-05 ASSESSMENT — PAIN DESCRIPTION - PROGRESSION
CLINICAL_PROGRESSION: NOT CHANGED

## 2019-12-05 ASSESSMENT — PAIN SCALES - WONG BAKER: WONGBAKER_NUMERICALRESPONSE: 0

## 2019-12-06 LAB
ANION GAP SERPL CALCULATED.3IONS-SCNC: 12 MMOL/L (ref 9–17)
BUN BLDV-MCNC: 6 MG/DL (ref 8–23)
BUN/CREAT BLD: 9 (ref 9–20)
CALCIUM SERPL-MCNC: 9.6 MG/DL (ref 8.6–10.4)
CHLORIDE BLD-SCNC: 101 MMOL/L (ref 98–107)
CO2: 21 MMOL/L (ref 20–31)
CREAT SERPL-MCNC: 0.66 MG/DL (ref 0.5–0.9)
GFR AFRICAN AMERICAN: >60 ML/MIN
GFR NON-AFRICAN AMERICAN: >60 ML/MIN
GFR SERPL CREATININE-BSD FRML MDRD: ABNORMAL ML/MIN/{1.73_M2}
GFR SERPL CREATININE-BSD FRML MDRD: ABNORMAL ML/MIN/{1.73_M2}
GLUCOSE BLD-MCNC: 119 MG/DL (ref 70–99)
GLUCOSE BLD-MCNC: 94 MG/DL (ref 65–105)
HCT VFR BLD CALC: 34.4 % (ref 36.3–47.1)
HEMOGLOBIN: 10.3 G/DL (ref 11.9–15.1)
INR BLD: 1
MCH RBC QN AUTO: 27.8 PG (ref 25.2–33.5)
MCHC RBC AUTO-ENTMCNC: 29.9 G/DL (ref 28.4–34.8)
MCV RBC AUTO: 93 FL (ref 82.6–102.9)
NRBC AUTOMATED: 0 PER 100 WBC
PDW BLD-RTO: 15.6 % (ref 11.8–14.4)
PLATELET # BLD: 509 K/UL (ref 138–453)
PMV BLD AUTO: 8.6 FL (ref 8.1–13.5)
POTASSIUM SERPL-SCNC: 4.1 MMOL/L (ref 3.7–5.3)
PROTHROMBIN TIME: 10.6 SEC (ref 9.7–11.6)
RBC # BLD: 3.7 M/UL (ref 3.95–5.11)
SODIUM BLD-SCNC: 134 MMOL/L (ref 135–144)
WBC # BLD: 12.5 K/UL (ref 3.5–11.3)

## 2019-12-06 PROCEDURE — 82947 ASSAY GLUCOSE BLOOD QUANT: CPT

## 2019-12-06 PROCEDURE — 6360000002 HC RX W HCPCS: Performed by: INTERNAL MEDICINE

## 2019-12-06 PROCEDURE — 2500000003 HC RX 250 WO HCPCS: Performed by: NURSE PRACTITIONER

## 2019-12-06 PROCEDURE — 85027 COMPLETE CBC AUTOMATED: CPT

## 2019-12-06 PROCEDURE — 80048 BASIC METABOLIC PNL TOTAL CA: CPT

## 2019-12-06 PROCEDURE — 2500000003 HC RX 250 WO HCPCS: Performed by: COLON & RECTAL SURGERY

## 2019-12-06 PROCEDURE — 2500000003 HC RX 250 WO HCPCS: Performed by: INTERNAL MEDICINE

## 2019-12-06 PROCEDURE — 6360000002 HC RX W HCPCS: Performed by: NURSE PRACTITIONER

## 2019-12-06 PROCEDURE — 2580000003 HC RX 258: Performed by: NURSE PRACTITIONER

## 2019-12-06 PROCEDURE — 85610 PROTHROMBIN TIME: CPT

## 2019-12-06 PROCEDURE — 36415 COLL VENOUS BLD VENIPUNCTURE: CPT

## 2019-12-06 PROCEDURE — 99232 SBSQ HOSP IP/OBS MODERATE 35: CPT | Performed by: INTERNAL MEDICINE

## 2019-12-06 PROCEDURE — 2060000000 HC ICU INTERMEDIATE R&B

## 2019-12-06 RX ORDER — CIPROFLOXACIN 2 MG/ML
400 INJECTION, SOLUTION INTRAVENOUS EVERY 12 HOURS
Status: DISCONTINUED | OUTPATIENT
Start: 2019-12-06 | End: 2019-12-09 | Stop reason: HOSPADM

## 2019-12-06 RX ADMIN — SODIUM CHLORIDE, PRESERVATIVE FREE 10 ML: 5 INJECTION INTRAVENOUS at 22:54

## 2019-12-06 RX ADMIN — SODIUM CHLORIDE, PRESERVATIVE FREE 10 ML: 5 INJECTION INTRAVENOUS at 10:19

## 2019-12-06 RX ADMIN — FAMOTIDINE 20 MG: 10 INJECTION, SOLUTION INTRAVENOUS at 10:19

## 2019-12-06 RX ADMIN — CIPROFLOXACIN 400 MG: 2 INJECTION, SOLUTION INTRAVENOUS at 22:57

## 2019-12-06 RX ADMIN — Medication 2 MG: at 22:53

## 2019-12-06 RX ADMIN — METRONIDAZOLE 500 MG: 500 INJECTION, SOLUTION INTRAVENOUS at 10:18

## 2019-12-06 RX ADMIN — FAMOTIDINE 20 MG: 10 INJECTION, SOLUTION INTRAVENOUS at 22:55

## 2019-12-06 RX ADMIN — PIPERACILLIN AND TAZOBACTAM 3.38 G: 3; .375 INJECTION, POWDER, LYOPHILIZED, FOR SOLUTION INTRAVENOUS at 05:22

## 2019-12-06 RX ADMIN — CIPROFLOXACIN 400 MG: 2 INJECTION, SOLUTION INTRAVENOUS at 10:19

## 2019-12-06 RX ADMIN — POTASSIUM CHLORIDE, DEXTROSE MONOHYDRATE AND SODIUM CHLORIDE: 150; 5; 450 INJECTION, SOLUTION INTRAVENOUS at 17:01

## 2019-12-06 RX ADMIN — METRONIDAZOLE 500 MG: 500 INJECTION, SOLUTION INTRAVENOUS at 17:01

## 2019-12-06 ASSESSMENT — PAIN DESCRIPTION - ORIENTATION: ORIENTATION: LOWER

## 2019-12-06 ASSESSMENT — PAIN DESCRIPTION - PROGRESSION

## 2019-12-06 ASSESSMENT — PAIN DESCRIPTION - PAIN TYPE: TYPE: ACUTE PAIN

## 2019-12-06 ASSESSMENT — PAIN - FUNCTIONAL ASSESSMENT: PAIN_FUNCTIONAL_ASSESSMENT: ACTIVITIES ARE NOT PREVENTED

## 2019-12-06 ASSESSMENT — PAIN SCALES - GENERAL
PAINLEVEL_OUTOF10: 5
PAINLEVEL_OUTOF10: 4

## 2019-12-06 ASSESSMENT — PAIN DESCRIPTION - DESCRIPTORS: DESCRIPTORS: CRAMPING

## 2019-12-06 ASSESSMENT — PAIN DESCRIPTION - FREQUENCY: FREQUENCY: CONTINUOUS

## 2019-12-06 ASSESSMENT — PAIN DESCRIPTION - LOCATION: LOCATION: ANKLE

## 2019-12-06 ASSESSMENT — PAIN DESCRIPTION - ONSET: ONSET: ON-GOING

## 2019-12-07 LAB
HCT VFR BLD CALC: 31.8 % (ref 36.3–47.1)
HEMOGLOBIN: 9.6 G/DL (ref 11.9–15.1)
MCH RBC QN AUTO: 27.8 PG (ref 25.2–33.5)
MCHC RBC AUTO-ENTMCNC: 30.2 G/DL (ref 28.4–34.8)
MCV RBC AUTO: 92.2 FL (ref 82.6–102.9)
NRBC AUTOMATED: 0 PER 100 WBC
PDW BLD-RTO: 15.6 % (ref 11.8–14.4)
PLATELET # BLD: 438 K/UL (ref 138–453)
PMV BLD AUTO: 8.6 FL (ref 8.1–13.5)
RBC # BLD: 3.45 M/UL (ref 3.95–5.11)
WBC # BLD: 9.1 K/UL (ref 3.5–11.3)

## 2019-12-07 PROCEDURE — 6370000000 HC RX 637 (ALT 250 FOR IP): Performed by: NURSE PRACTITIONER

## 2019-12-07 PROCEDURE — 2500000003 HC RX 250 WO HCPCS: Performed by: INTERNAL MEDICINE

## 2019-12-07 PROCEDURE — 2060000000 HC ICU INTERMEDIATE R&B

## 2019-12-07 PROCEDURE — 6360000002 HC RX W HCPCS: Performed by: INTERNAL MEDICINE

## 2019-12-07 PROCEDURE — 2500000003 HC RX 250 WO HCPCS: Performed by: COLON & RECTAL SURGERY

## 2019-12-07 PROCEDURE — 99232 SBSQ HOSP IP/OBS MODERATE 35: CPT | Performed by: INTERNAL MEDICINE

## 2019-12-07 PROCEDURE — 36415 COLL VENOUS BLD VENIPUNCTURE: CPT

## 2019-12-07 PROCEDURE — 2500000003 HC RX 250 WO HCPCS: Performed by: NURSE PRACTITIONER

## 2019-12-07 PROCEDURE — 85027 COMPLETE CBC AUTOMATED: CPT

## 2019-12-07 PROCEDURE — 6360000002 HC RX W HCPCS: Performed by: NURSE PRACTITIONER

## 2019-12-07 RX ADMIN — ACETAMINOPHEN 650 MG: 325 TABLET ORAL at 12:40

## 2019-12-07 RX ADMIN — METRONIDAZOLE 500 MG: 500 INJECTION, SOLUTION INTRAVENOUS at 09:16

## 2019-12-07 RX ADMIN — METRONIDAZOLE 500 MG: 500 INJECTION, SOLUTION INTRAVENOUS at 16:57

## 2019-12-07 RX ADMIN — CIPROFLOXACIN 400 MG: 2 INJECTION, SOLUTION INTRAVENOUS at 10:28

## 2019-12-07 RX ADMIN — CIPROFLOXACIN 400 MG: 2 INJECTION, SOLUTION INTRAVENOUS at 21:46

## 2019-12-07 RX ADMIN — POTASSIUM CHLORIDE, DEXTROSE MONOHYDRATE AND SODIUM CHLORIDE: 150; 5; 450 INJECTION, SOLUTION INTRAVENOUS at 20:48

## 2019-12-07 RX ADMIN — ONDANSETRON 4 MG: 2 INJECTION INTRAMUSCULAR; INTRAVENOUS at 02:02

## 2019-12-07 RX ADMIN — Medication 2 MG: at 18:27

## 2019-12-07 RX ADMIN — FAMOTIDINE 20 MG: 10 INJECTION, SOLUTION INTRAVENOUS at 20:50

## 2019-12-07 RX ADMIN — Medication 2 MG: at 09:12

## 2019-12-07 RX ADMIN — ACETAMINOPHEN 650 MG: 325 TABLET ORAL at 17:17

## 2019-12-07 RX ADMIN — FAMOTIDINE 20 MG: 10 INJECTION, SOLUTION INTRAVENOUS at 09:12

## 2019-12-07 RX ADMIN — Medication 2 MG: at 02:02

## 2019-12-07 RX ADMIN — POTASSIUM CHLORIDE, DEXTROSE MONOHYDRATE AND SODIUM CHLORIDE: 150; 5; 450 INJECTION, SOLUTION INTRAVENOUS at 06:18

## 2019-12-07 RX ADMIN — METRONIDAZOLE 500 MG: 500 INJECTION, SOLUTION INTRAVENOUS at 00:50

## 2019-12-07 RX ADMIN — ONDANSETRON 4 MG: 2 INJECTION INTRAMUSCULAR; INTRAVENOUS at 18:26

## 2019-12-07 ASSESSMENT — PAIN DESCRIPTION - PROGRESSION

## 2019-12-07 ASSESSMENT — PAIN SCALES - GENERAL
PAINLEVEL_OUTOF10: 6
PAINLEVEL_OUTOF10: 2
PAINLEVEL_OUTOF10: 5
PAINLEVEL_OUTOF10: 1

## 2019-12-08 LAB
HCT VFR BLD CALC: 33.1 % (ref 36.3–47.1)
HEMOGLOBIN: 10.1 G/DL (ref 11.9–15.1)
MCH RBC QN AUTO: 28.2 PG (ref 25.2–33.5)
MCHC RBC AUTO-ENTMCNC: 30.5 G/DL (ref 28.4–34.8)
MCV RBC AUTO: 92.5 FL (ref 82.6–102.9)
NRBC AUTOMATED: 0 PER 100 WBC
PDW BLD-RTO: 15.9 % (ref 11.8–14.4)
PLATELET # BLD: 459 K/UL (ref 138–453)
PMV BLD AUTO: 8.6 FL (ref 8.1–13.5)
RBC # BLD: 3.58 M/UL (ref 3.95–5.11)
WBC # BLD: 11.3 K/UL (ref 3.5–11.3)

## 2019-12-08 PROCEDURE — 6360000002 HC RX W HCPCS: Performed by: NURSE PRACTITIONER

## 2019-12-08 PROCEDURE — 6360000002 HC RX W HCPCS: Performed by: INTERNAL MEDICINE

## 2019-12-08 PROCEDURE — 2060000000 HC ICU INTERMEDIATE R&B

## 2019-12-08 PROCEDURE — 2500000003 HC RX 250 WO HCPCS: Performed by: NURSE PRACTITIONER

## 2019-12-08 PROCEDURE — 6370000000 HC RX 637 (ALT 250 FOR IP): Performed by: STUDENT IN AN ORGANIZED HEALTH CARE EDUCATION/TRAINING PROGRAM

## 2019-12-08 PROCEDURE — 2580000003 HC RX 258: Performed by: NURSE PRACTITIONER

## 2019-12-08 PROCEDURE — 85027 COMPLETE CBC AUTOMATED: CPT

## 2019-12-08 PROCEDURE — 6370000000 HC RX 637 (ALT 250 FOR IP): Performed by: INTERNAL MEDICINE

## 2019-12-08 PROCEDURE — 36415 COLL VENOUS BLD VENIPUNCTURE: CPT

## 2019-12-08 PROCEDURE — 2500000003 HC RX 250 WO HCPCS: Performed by: INTERNAL MEDICINE

## 2019-12-08 PROCEDURE — 99232 SBSQ HOSP IP/OBS MODERATE 35: CPT | Performed by: INTERNAL MEDICINE

## 2019-12-08 RX ORDER — OXYCODONE HYDROCHLORIDE 5 MG/1
5 TABLET ORAL EVERY 4 HOURS PRN
Status: DISCONTINUED | OUTPATIENT
Start: 2019-12-08 | End: 2019-12-09 | Stop reason: HOSPADM

## 2019-12-08 RX ORDER — VITAMIN B COMPLEX
2 TABLET ORAL DAILY
Status: DISCONTINUED | OUTPATIENT
Start: 2019-12-08 | End: 2019-12-09 | Stop reason: HOSPADM

## 2019-12-08 RX ORDER — METOPROLOL SUCCINATE 25 MG/1
25 TABLET, EXTENDED RELEASE ORAL DAILY
Status: DISCONTINUED | OUTPATIENT
Start: 2019-12-08 | End: 2019-12-09 | Stop reason: HOSPADM

## 2019-12-08 RX ORDER — OXYCODONE HYDROCHLORIDE 5 MG/1
10 TABLET ORAL EVERY 4 HOURS PRN
Status: DISCONTINUED | OUTPATIENT
Start: 2019-12-08 | End: 2019-12-09 | Stop reason: HOSPADM

## 2019-12-08 RX ADMIN — SODIUM CHLORIDE, PRESERVATIVE FREE 10 ML: 5 INJECTION INTRAVENOUS at 20:20

## 2019-12-08 RX ADMIN — FAMOTIDINE 20 MG: 10 INJECTION, SOLUTION INTRAVENOUS at 08:48

## 2019-12-08 RX ADMIN — Medication 2 MG: at 01:52

## 2019-12-08 RX ADMIN — OXYCODONE HYDROCHLORIDE 5 MG: 5 TABLET ORAL at 04:36

## 2019-12-08 RX ADMIN — VITAMIN D, TAB 1000IU (100/BT) 2000 UNITS: 25 TAB at 12:41

## 2019-12-08 RX ADMIN — METRONIDAZOLE 500 MG: 500 INJECTION, SOLUTION INTRAVENOUS at 01:25

## 2019-12-08 RX ADMIN — ONDANSETRON 4 MG: 2 INJECTION INTRAMUSCULAR; INTRAVENOUS at 16:36

## 2019-12-08 RX ADMIN — METRONIDAZOLE 500 MG: 500 INJECTION, SOLUTION INTRAVENOUS at 08:49

## 2019-12-08 RX ADMIN — OXYCODONE HYDROCHLORIDE 10 MG: 5 TABLET ORAL at 08:48

## 2019-12-08 RX ADMIN — CIPROFLOXACIN 400 MG: 2 INJECTION, SOLUTION INTRAVENOUS at 09:55

## 2019-12-08 RX ADMIN — METRONIDAZOLE 500 MG: 500 INJECTION, SOLUTION INTRAVENOUS at 16:36

## 2019-12-08 RX ADMIN — FAMOTIDINE 20 MG: 10 INJECTION, SOLUTION INTRAVENOUS at 20:19

## 2019-12-08 RX ADMIN — CIPROFLOXACIN 400 MG: 2 INJECTION, SOLUTION INTRAVENOUS at 21:43

## 2019-12-08 RX ADMIN — METOPROLOL SUCCINATE 25 MG: 25 TABLET, FILM COATED, EXTENDED RELEASE ORAL at 10:04

## 2019-12-08 ASSESSMENT — PAIN DESCRIPTION - PAIN TYPE
TYPE: CHRONIC PAIN
TYPE: ACUTE PAIN
TYPE: CHRONIC PAIN

## 2019-12-08 ASSESSMENT — PAIN DESCRIPTION - PROGRESSION

## 2019-12-08 ASSESSMENT — PAIN SCALES - GENERAL
PAINLEVEL_OUTOF10: 4
PAINLEVEL_OUTOF10: 0
PAINLEVEL_OUTOF10: 4
PAINLEVEL_OUTOF10: 2
PAINLEVEL_OUTOF10: 0
PAINLEVEL_OUTOF10: 7
PAINLEVEL_OUTOF10: 2
PAINLEVEL_OUTOF10: 0
PAINLEVEL_OUTOF10: 1
PAINLEVEL_OUTOF10: 6
PAINLEVEL_OUTOF10: 1
PAINLEVEL_OUTOF10: 0
PAINLEVEL_OUTOF10: 0

## 2019-12-08 ASSESSMENT — PAIN DESCRIPTION - LOCATION
LOCATION: ABDOMEN
LOCATION: ARM

## 2019-12-08 ASSESSMENT — PAIN DESCRIPTION - ONSET: ONSET: ON-GOING

## 2019-12-08 ASSESSMENT — PAIN DESCRIPTION - ORIENTATION: ORIENTATION: LEFT;UPPER

## 2019-12-09 VITALS
TEMPERATURE: 99 F | DIASTOLIC BLOOD PRESSURE: 44 MMHG | OXYGEN SATURATION: 95 % | HEART RATE: 78 BPM | SYSTOLIC BLOOD PRESSURE: 123 MMHG | BODY MASS INDEX: 34.21 KG/M2 | WEIGHT: 218 LBS | HEIGHT: 67 IN | RESPIRATION RATE: 18 BRPM

## 2019-12-09 LAB
HCT VFR BLD CALC: 29.5 % (ref 36.3–47.1)
HEMOGLOBIN: 8.8 G/DL (ref 11.9–15.1)
MCH RBC QN AUTO: 28.2 PG (ref 25.2–33.5)
MCHC RBC AUTO-ENTMCNC: 29.8 G/DL (ref 28.4–34.8)
MCV RBC AUTO: 94.6 FL (ref 82.6–102.9)
NRBC AUTOMATED: 0 PER 100 WBC
PDW BLD-RTO: 16.1 % (ref 11.8–14.4)
PLATELET # BLD: 369 K/UL (ref 138–453)
PMV BLD AUTO: 9 FL (ref 8.1–13.5)
RBC # BLD: 3.12 M/UL (ref 3.95–5.11)
WBC # BLD: 9.3 K/UL (ref 3.5–11.3)

## 2019-12-09 PROCEDURE — 99239 HOSP IP/OBS DSCHRG MGMT >30: CPT | Performed by: INTERNAL MEDICINE

## 2019-12-09 PROCEDURE — 6370000000 HC RX 637 (ALT 250 FOR IP): Performed by: INTERNAL MEDICINE

## 2019-12-09 PROCEDURE — 85027 COMPLETE CBC AUTOMATED: CPT

## 2019-12-09 PROCEDURE — 6370000000 HC RX 637 (ALT 250 FOR IP): Performed by: STUDENT IN AN ORGANIZED HEALTH CARE EDUCATION/TRAINING PROGRAM

## 2019-12-09 PROCEDURE — 2500000003 HC RX 250 WO HCPCS: Performed by: NURSE PRACTITIONER

## 2019-12-09 PROCEDURE — 2580000003 HC RX 258: Performed by: NURSE PRACTITIONER

## 2019-12-09 PROCEDURE — 93971 EXTREMITY STUDY: CPT

## 2019-12-09 PROCEDURE — 6360000002 HC RX W HCPCS: Performed by: INTERNAL MEDICINE

## 2019-12-09 PROCEDURE — 36415 COLL VENOUS BLD VENIPUNCTURE: CPT

## 2019-12-09 PROCEDURE — 2500000003 HC RX 250 WO HCPCS: Performed by: INTERNAL MEDICINE

## 2019-12-09 PROCEDURE — 6360000002 HC RX W HCPCS: Performed by: NURSE PRACTITIONER

## 2019-12-09 RX ORDER — OXYCODONE HYDROCHLORIDE 5 MG/1
5 TABLET ORAL EVERY 6 HOURS PRN
Qty: 20 TABLET | Refills: 0 | Status: SHIPPED | OUTPATIENT
Start: 2019-12-09 | End: 2019-12-14

## 2019-12-09 RX ORDER — METRONIDAZOLE 500 MG/1
500 TABLET ORAL 3 TIMES DAILY
Qty: 30 TABLET | Refills: 0 | Status: ON HOLD | OUTPATIENT
Start: 2019-12-09 | End: 2019-12-30 | Stop reason: HOSPADM

## 2019-12-09 RX ORDER — CIPROFLOXACIN 500 MG/1
500 TABLET, FILM COATED ORAL 2 TIMES DAILY
Qty: 20 TABLET | Refills: 0 | Status: ON HOLD | OUTPATIENT
Start: 2019-12-09 | End: 2019-12-21 | Stop reason: HOSPADM

## 2019-12-09 RX ORDER — ONDANSETRON 4 MG/1
4 TABLET, ORALLY DISINTEGRATING ORAL EVERY 8 HOURS PRN
Qty: 30 TABLET | Refills: 1 | Status: SHIPPED | OUTPATIENT
Start: 2019-12-09 | End: 2019-12-11

## 2019-12-09 RX ADMIN — FAMOTIDINE 20 MG: 10 INJECTION, SOLUTION INTRAVENOUS at 10:09

## 2019-12-09 RX ADMIN — SODIUM CHLORIDE, PRESERVATIVE FREE 10 ML: 5 INJECTION INTRAVENOUS at 10:13

## 2019-12-09 RX ADMIN — SODIUM CHLORIDE, PRESERVATIVE FREE 10 ML: 5 INJECTION INTRAVENOUS at 12:05

## 2019-12-09 RX ADMIN — METOPROLOL SUCCINATE 25 MG: 25 TABLET, FILM COATED, EXTENDED RELEASE ORAL at 10:22

## 2019-12-09 RX ADMIN — OXYCODONE HYDROCHLORIDE 10 MG: 5 TABLET ORAL at 00:12

## 2019-12-09 RX ADMIN — ENOXAPARIN SODIUM 40 MG: 40 INJECTION SUBCUTANEOUS at 10:09

## 2019-12-09 RX ADMIN — VITAMIN D, TAB 1000IU (100/BT) 2000 UNITS: 25 TAB at 10:22

## 2019-12-09 RX ADMIN — METRONIDAZOLE 500 MG: 500 INJECTION, SOLUTION INTRAVENOUS at 01:15

## 2019-12-09 RX ADMIN — CIPROFLOXACIN 400 MG: 2 INJECTION, SOLUTION INTRAVENOUS at 10:10

## 2019-12-09 RX ADMIN — METRONIDAZOLE 500 MG: 500 INJECTION, SOLUTION INTRAVENOUS at 10:09

## 2019-12-09 ASSESSMENT — PAIN SCALES - GENERAL
PAINLEVEL_OUTOF10: 7
PAINLEVEL_OUTOF10: 0
PAINLEVEL_OUTOF10: 1

## 2019-12-09 ASSESSMENT — PAIN DESCRIPTION - PROGRESSION

## 2019-12-09 ASSESSMENT — PAIN DESCRIPTION - FREQUENCY: FREQUENCY: INTERMITTENT

## 2019-12-09 ASSESSMENT — PAIN DESCRIPTION - DESCRIPTORS: DESCRIPTORS: CRAMPING

## 2019-12-09 ASSESSMENT — PAIN DESCRIPTION - LOCATION: LOCATION: ABDOMEN

## 2019-12-09 ASSESSMENT — PAIN DESCRIPTION - ONSET: ONSET: ON-GOING

## 2019-12-09 ASSESSMENT — PAIN DESCRIPTION - PAIN TYPE: TYPE: ACUTE PAIN

## 2019-12-10 ENCOUNTER — TELEPHONE (OUTPATIENT)
Dept: OTHER | Facility: CLINIC | Age: 69
End: 2019-12-10

## 2019-12-10 ENCOUNTER — CARE COORDINATION (OUTPATIENT)
Dept: CASE MANAGEMENT | Age: 69
End: 2019-12-10

## 2019-12-10 ENCOUNTER — HOSPITAL ENCOUNTER (EMERGENCY)
Age: 69
Discharge: HOME | End: 2019-12-10
Attending: EMERGENCY MEDICINE
Payer: MEDICARE

## 2019-12-10 ENCOUNTER — TELEPHONE (OUTPATIENT)
Dept: INTERNAL MEDICINE CLINIC | Age: 69
End: 2019-12-10

## 2019-12-10 VITALS
BODY MASS INDEX: 34.84 KG/M2 | TEMPERATURE: 97.7 F | HEIGHT: 67 IN | OXYGEN SATURATION: 99 % | DIASTOLIC BLOOD PRESSURE: 61 MMHG | WEIGHT: 222 LBS | HEART RATE: 91 BPM | RESPIRATION RATE: 18 BRPM | SYSTOLIC BLOOD PRESSURE: 131 MMHG

## 2019-12-10 DIAGNOSIS — R60.9 PERIPHERAL EDEMA: Primary | ICD-10-CM

## 2019-12-10 PROCEDURE — 99283 EMERGENCY DEPT VISIT LOW MDM: CPT

## 2019-12-10 PROCEDURE — 81003 URINALYSIS AUTO W/O SCOPE: CPT

## 2019-12-10 ASSESSMENT — ENCOUNTER SYMPTOMS: SHORTNESS OF BREATH: 0

## 2019-12-11 ENCOUNTER — CARE COORDINATION (OUTPATIENT)
Dept: CASE MANAGEMENT | Age: 69
End: 2019-12-11

## 2019-12-11 ENCOUNTER — OFFICE VISIT (OUTPATIENT)
Dept: INTERNAL MEDICINE CLINIC | Age: 69
End: 2019-12-11
Payer: MEDICARE

## 2019-12-11 VITALS
RESPIRATION RATE: 18 BRPM | HEART RATE: 96 BPM | BODY MASS INDEX: 34.84 KG/M2 | TEMPERATURE: 97.5 F | HEIGHT: 67 IN | WEIGHT: 222 LBS | DIASTOLIC BLOOD PRESSURE: 64 MMHG | SYSTOLIC BLOOD PRESSURE: 112 MMHG

## 2019-12-11 DIAGNOSIS — K62.5 RECTAL BLEEDING: Primary | ICD-10-CM

## 2019-12-11 DIAGNOSIS — I10 ESSENTIAL HYPERTENSION: ICD-10-CM

## 2019-12-11 DIAGNOSIS — M79.89 LEG SWELLING: Primary | ICD-10-CM

## 2019-12-11 DIAGNOSIS — K63.1 PERFORATED SIGMOID COLON (HCC): ICD-10-CM

## 2019-12-11 PROCEDURE — 99214 OFFICE O/P EST MOD 30 MIN: CPT | Performed by: INTERNAL MEDICINE

## 2019-12-11 PROCEDURE — 1123F ACP DISCUSS/DSCN MKR DOCD: CPT | Performed by: INTERNAL MEDICINE

## 2019-12-11 PROCEDURE — G8427 DOCREV CUR MEDS BY ELIG CLIN: HCPCS | Performed by: INTERNAL MEDICINE

## 2019-12-11 PROCEDURE — 1090F PRES/ABSN URINE INCON ASSESS: CPT | Performed by: INTERNAL MEDICINE

## 2019-12-11 PROCEDURE — G8417 CALC BMI ABV UP PARAM F/U: HCPCS | Performed by: INTERNAL MEDICINE

## 2019-12-11 PROCEDURE — 3017F COLORECTAL CA SCREEN DOC REV: CPT | Performed by: INTERNAL MEDICINE

## 2019-12-11 PROCEDURE — G8484 FLU IMMUNIZE NO ADMIN: HCPCS | Performed by: INTERNAL MEDICINE

## 2019-12-11 PROCEDURE — 1111F DSCHRG MED/CURRENT MED MERGE: CPT | Performed by: INTERNAL MEDICINE

## 2019-12-11 PROCEDURE — G8400 PT W/DXA NO RESULTS DOC: HCPCS | Performed by: INTERNAL MEDICINE

## 2019-12-11 PROCEDURE — 1036F TOBACCO NON-USER: CPT | Performed by: INTERNAL MEDICINE

## 2019-12-11 PROCEDURE — 4040F PNEUMOC VAC/ADMIN/RCVD: CPT | Performed by: INTERNAL MEDICINE

## 2019-12-15 ENCOUNTER — APPOINTMENT (OUTPATIENT)
Dept: GENERAL RADIOLOGY | Age: 69
DRG: 856 | End: 2019-12-15
Payer: MEDICARE

## 2019-12-15 ENCOUNTER — HOSPITAL ENCOUNTER (EMERGENCY)
Age: 69
Discharge: HOME | DRG: 856 | End: 2019-12-15
Attending: EMERGENCY MEDICINE
Payer: MEDICARE

## 2019-12-15 ENCOUNTER — HOSPITAL ENCOUNTER (INPATIENT)
Age: 69
LOS: 14 days | Discharge: NURSING, REHAB OR LONG-TERM CARE | DRG: 856 | End: 2019-12-30
Attending: EMERGENCY MEDICINE | Admitting: INTERNAL MEDICINE
Payer: MEDICARE

## 2019-12-15 ENCOUNTER — TELEPHONE (OUTPATIENT)
Dept: OTHER | Facility: CLINIC | Age: 69
End: 2019-12-15

## 2019-12-15 VITALS
TEMPERATURE: 98.2 F | RESPIRATION RATE: 18 BRPM | DIASTOLIC BLOOD PRESSURE: 53 MMHG | HEIGHT: 67 IN | SYSTOLIC BLOOD PRESSURE: 126 MMHG | HEART RATE: 107 BPM | WEIGHT: 220.9 LBS | OXYGEN SATURATION: 97 % | BODY MASS INDEX: 34.67 KG/M2

## 2019-12-15 LAB
-: ABNORMAL
ABSOLUTE EOS #: 0 K/UL (ref 0–0.4)
ABSOLUTE IMMATURE GRANULOCYTE: 0 K/UL (ref 0–0.3)
ABSOLUTE LYMPH #: 0.97 K/UL (ref 1–4.8)
ABSOLUTE MONO #: 1.53 K/UL (ref 0.2–0.8)
ALBUMIN SERPL-MCNC: 2.9 G/DL (ref 3.5–5.2)
ALBUMIN/GLOBULIN RATIO: ABNORMAL (ref 1–2.5)
ALP BLD-CCNC: 81 U/L (ref 35–104)
ALT SERPL-CCNC: 6 U/L (ref 5–33)
AMORPHOUS: ABNORMAL
ANION GAP SERPL CALCULATED.3IONS-SCNC: 15 MMOL/L (ref 9–17)
ANION GAP SERPL CALCULATED.3IONS-SCNC: 9 MMOL/L (ref 9–17)
AST SERPL-CCNC: 9 U/L
BACTERIA: ABNORMAL
BASOPHILS # BLD: 0 %
BASOPHILS ABSOLUTE: 0 K/UL (ref 0–0.2)
BILIRUB SERPL-MCNC: 0.25 MG/DL (ref 0.3–1.2)
BILIRUBIN URINE: ABNORMAL
BNP INTERPRETATION: ABNORMAL
BNP INTERPRETATION: ABNORMAL
BUN BLDV-MCNC: 12 MG/DL (ref 8–23)
BUN BLDV-MCNC: 9 MG/DL (ref 8–23)
BUN/CREAT BLD: 12 (ref 9–20)
BUN/CREAT BLD: 13 (ref 9–20)
CALCIUM SERPL-MCNC: 9.2 MG/DL (ref 8.6–10.4)
CALCIUM SERPL-MCNC: 9.4 MG/DL (ref 8.6–10.4)
CASTS UA: ABNORMAL /LPF
CHLORIDE BLD-SCNC: 95 MMOL/L (ref 98–107)
CHLORIDE BLD-SCNC: 98 MMOL/L (ref 98–107)
CO2: 21 MMOL/L (ref 20–31)
CO2: 25 MMOL/L (ref 20–31)
COLOR: ABNORMAL
COMMENT UA: ABNORMAL
CREAT SERPL-MCNC: 0.7 MG/DL (ref 0.5–0.9)
CREAT SERPL-MCNC: 0.99 MG/DL (ref 0.5–0.9)
CRYSTALS, UA: ABNORMAL /HPF
DIFFERENTIAL TYPE: ABNORMAL
DIRECT EXAM: NORMAL
EOSINOPHILS RELATIVE PERCENT: 0 % (ref 1–4)
EPITHELIAL CELLS UA: ABNORMAL /HPF (ref 0–5)
GFR AFRICAN AMERICAN: >60 ML/MIN
GFR AFRICAN AMERICAN: >60 ML/MIN
GFR NON-AFRICAN AMERICAN: 56 ML/MIN
GFR NON-AFRICAN AMERICAN: >60 ML/MIN
GFR SERPL CREATININE-BSD FRML MDRD: ABNORMAL ML/MIN/{1.73_M2}
GLUCOSE BLD-MCNC: 118 MG/DL (ref 70–99)
GLUCOSE BLD-MCNC: 133 MG/DL (ref 70–99)
GLUCOSE URINE: NEGATIVE
HCT VFR BLD CALC: 30.3 % (ref 36–46)
HEMOGLOBIN: 9.3 G/DL (ref 12–16)
IMMATURE GRANULOCYTES: 0 %
KETONES, URINE: NEGATIVE
LACTIC ACID, SEPSIS WHOLE BLOOD: NORMAL MMOL/L (ref 0.5–1.9)
LACTIC ACID, SEPSIS: 1.7 MMOL/L (ref 0.5–1.9)
LEUKOCYTE ESTERASE, URINE: ABNORMAL
LIPASE: 31 U/L (ref 13–60)
LYMPHOCYTES # BLD: 7 % (ref 24–44)
Lab: NORMAL
MCH RBC QN AUTO: 28.4 PG (ref 26–34)
MCHC RBC AUTO-ENTMCNC: 30.7 G/DL (ref 31–37)
MCV RBC AUTO: 92.4 FL (ref 80–100)
MONOCYTES # BLD: 11 % (ref 1–7)
MUCUS: ABNORMAL
NITRITE, URINE: POSITIVE
NRBC AUTOMATED: ABNORMAL PER 100 WBC
OTHER OBSERVATIONS UA: ABNORMAL
PDW BLD-RTO: 16.2 % (ref 11.5–14.5)
PH UA: 6 (ref 5–8)
PLATELET # BLD: 343 K/UL (ref 130–400)
PLATELET ESTIMATE: ABNORMAL
PMV BLD AUTO: ABNORMAL FL (ref 6–12)
POTASSIUM SERPL-SCNC: 4 MMOL/L (ref 3.7–5.3)
POTASSIUM SERPL-SCNC: 4.1 MMOL/L (ref 3.7–5.3)
PRO-BNP: 1016 PG/ML
PRO-BNP: 910 PG/ML
PROTEIN UA: ABNORMAL
RBC # BLD: 3.28 M/UL (ref 4–5.2)
RBC # BLD: ABNORMAL 10*6/UL
RBC UA: ABNORMAL /HPF (ref 0–2)
RENAL EPITHELIAL, UA: ABNORMAL /HPF
SEG NEUTROPHILS: 82 % (ref 36–66)
SEGMENTED NEUTROPHILS ABSOLUTE COUNT: 11.4 K/UL (ref 1.8–7.7)
SODIUM BLD-SCNC: 131 MMOL/L (ref 135–144)
SODIUM BLD-SCNC: 132 MMOL/L (ref 135–144)
SPECIFIC GRAVITY UA: 1.02 (ref 1–1.03)
SPECIMEN DESCRIPTION: NORMAL
TOTAL PROTEIN: 6.2 G/DL (ref 6.4–8.3)
TRICHOMONAS: ABNORMAL
TURBIDITY: ABNORMAL
URINE HGB: NEGATIVE
UROBILINOGEN, URINE: NORMAL
WBC # BLD: 13.9 K/UL (ref 3.5–11)
WBC # BLD: ABNORMAL 10*3/UL
WBC UA: ABNORMAL /HPF (ref 0–5)
YEAST: ABNORMAL

## 2019-12-15 PROCEDURE — 2580000003 HC RX 258: Performed by: EMERGENCY MEDICINE

## 2019-12-15 PROCEDURE — 87040 BLOOD CULTURE FOR BACTERIA: CPT

## 2019-12-15 PROCEDURE — 71045 X-RAY EXAM CHEST 1 VIEW: CPT

## 2019-12-15 PROCEDURE — 81001 URINALYSIS AUTO W/SCOPE: CPT

## 2019-12-15 PROCEDURE — 96365 THER/PROPH/DIAG IV INF INIT: CPT

## 2019-12-15 PROCEDURE — 96375 TX/PRO/DX INJ NEW DRUG ADDON: CPT

## 2019-12-15 PROCEDURE — 87086 URINE CULTURE/COLONY COUNT: CPT

## 2019-12-15 PROCEDURE — 80048 BASIC METABOLIC PNL TOTAL CA: CPT

## 2019-12-15 PROCEDURE — 83605 ASSAY OF LACTIC ACID: CPT

## 2019-12-15 PROCEDURE — 83880 ASSAY OF NATRIURETIC PEPTIDE: CPT

## 2019-12-15 PROCEDURE — 36415 COLL VENOUS BLD VENIPUNCTURE: CPT

## 2019-12-15 PROCEDURE — 6360000002 HC RX W HCPCS: Performed by: EMERGENCY MEDICINE

## 2019-12-15 PROCEDURE — 99283 EMERGENCY DEPT VISIT LOW MDM: CPT

## 2019-12-15 PROCEDURE — 87804 INFLUENZA ASSAY W/OPTIC: CPT

## 2019-12-15 PROCEDURE — 6370000000 HC RX 637 (ALT 250 FOR IP): Performed by: EMERGENCY MEDICINE

## 2019-12-15 PROCEDURE — 74018 RADEX ABDOMEN 1 VIEW: CPT

## 2019-12-15 PROCEDURE — 83690 ASSAY OF LIPASE: CPT

## 2019-12-15 PROCEDURE — 85025 COMPLETE CBC W/AUTO DIFF WBC: CPT

## 2019-12-15 PROCEDURE — 99284 EMERGENCY DEPT VISIT MOD MDM: CPT

## 2019-12-15 PROCEDURE — 80053 COMPREHEN METABOLIC PANEL: CPT

## 2019-12-15 PROCEDURE — 96374 THER/PROPH/DIAG INJ IV PUSH: CPT

## 2019-12-15 RX ORDER — KETOROLAC TROMETHAMINE 30 MG/ML
30 INJECTION, SOLUTION INTRAMUSCULAR; INTRAVENOUS ONCE
Status: COMPLETED | OUTPATIENT
Start: 2019-12-15 | End: 2019-12-15

## 2019-12-15 RX ORDER — ACETAMINOPHEN 500 MG
1000 TABLET ORAL ONCE
Status: COMPLETED | OUTPATIENT
Start: 2019-12-15 | End: 2019-12-15

## 2019-12-15 RX ORDER — SODIUM CHLORIDE 0.9 % (FLUSH) 0.9 %
10 SYRINGE (ML) INJECTION PRN
Status: DISCONTINUED | OUTPATIENT
Start: 2019-12-15 | End: 2019-12-16 | Stop reason: SDUPTHER

## 2019-12-15 RX ORDER — ONDANSETRON 2 MG/ML
4 INJECTION INTRAMUSCULAR; INTRAVENOUS ONCE
Status: COMPLETED | OUTPATIENT
Start: 2019-12-15 | End: 2019-12-15

## 2019-12-15 RX ORDER — 0.9 % SODIUM CHLORIDE 0.9 %
30 INTRAVENOUS SOLUTION INTRAVENOUS ONCE
Status: COMPLETED | OUTPATIENT
Start: 2019-12-15 | End: 2019-12-15

## 2019-12-15 RX ORDER — 0.9 % SODIUM CHLORIDE 0.9 %
1000 INTRAVENOUS SOLUTION INTRAVENOUS ONCE
Status: DISCONTINUED | OUTPATIENT
Start: 2019-12-15 | End: 2019-12-15

## 2019-12-15 RX ORDER — SODIUM CHLORIDE 0.9 % (FLUSH) 0.9 %
10 SYRINGE (ML) INJECTION EVERY 12 HOURS SCHEDULED
Status: DISCONTINUED | OUTPATIENT
Start: 2019-12-15 | End: 2019-12-16 | Stop reason: SDUPTHER

## 2019-12-15 RX ORDER — KETOROLAC TROMETHAMINE 30 MG/ML
INJECTION, SOLUTION INTRAMUSCULAR; INTRAVENOUS
Status: DISPENSED
Start: 2019-12-15 | End: 2019-12-16

## 2019-12-15 RX ADMIN — SODIUM CHLORIDE 3042 ML: 9 INJECTION, SOLUTION INTRAVENOUS at 23:09

## 2019-12-15 RX ADMIN — CEFTRIAXONE SODIUM 1 G: 1 INJECTION, POWDER, FOR SOLUTION INTRAMUSCULAR; INTRAVENOUS at 23:36

## 2019-12-15 RX ADMIN — ACETAMINOPHEN 1000 MG: 500 TABLET ORAL at 23:10

## 2019-12-15 RX ADMIN — KETOROLAC TROMETHAMINE 30 MG: 30 INJECTION, SOLUTION INTRAMUSCULAR at 12:07

## 2019-12-15 RX ADMIN — PROMETHAZINE HYDROCHLORIDE 25 MG: 25 INJECTION INTRAMUSCULAR; INTRAVENOUS at 23:11

## 2019-12-15 RX ADMIN — ONDANSETRON 4 MG: 2 INJECTION INTRAMUSCULAR; INTRAVENOUS at 23:11

## 2019-12-15 ASSESSMENT — PAIN SCALES - GENERAL
PAINLEVEL_OUTOF10: 7
PAINLEVEL_OUTOF10: 7
PAINLEVEL_OUTOF10: 0
PAINLEVEL_OUTOF10: 2

## 2019-12-15 ASSESSMENT — PAIN DESCRIPTION - FREQUENCY
FREQUENCY: CONTINUOUS
FREQUENCY: INTERMITTENT

## 2019-12-15 ASSESSMENT — PAIN DESCRIPTION - DESCRIPTORS
DESCRIPTORS: TENDER
DESCRIPTORS: ACHING

## 2019-12-15 ASSESSMENT — ENCOUNTER SYMPTOMS
FACIAL SWELLING: 0
COUGH: 0
COLOR CHANGE: 0
EYE REDNESS: 0
EYE DISCHARGE: 0
ABDOMINAL PAIN: 0
VOMITING: 0
CONSTIPATION: 0
SHORTNESS OF BREATH: 0
DIARRHEA: 0

## 2019-12-15 ASSESSMENT — PAIN DESCRIPTION - LOCATION
LOCATION: PELVIS
LOCATION: ABDOMEN

## 2019-12-15 NOTE — ED NOTES
Pt. Returns to ER for c/o inability to empty bladder and leg swelling. She was discharged from hospital on Monday with similar issues. She had a colostomy reversal last month and has had several difficulties since this. Denies SOB. Pt. States she has not been able to sleep. 3 + edema noted to legs. Pt. States she has not taken narcotic pain meds in a week.            Keith Schwab RN  12/15/19 8184

## 2019-12-15 NOTE — ED PROVIDER NOTES
126/53   Pulse: 107   Resp: 18   Temp: 98.2 °F (36.8 °C)   TempSrc: Oral   SpO2: 97%   Weight: 220 lb 14.4 oz (100.2 kg)   Height: 5' 7\" (1.702 m)       Physical Exam  Vitals signs reviewed. Constitutional:       General: She is not in acute distress. Appearance: She is well-developed. She is not diaphoretic. HENT:      Head: Normocephalic and atraumatic. Eyes:      General: No scleral icterus. Right eye: No discharge. Left eye: No discharge. Neck:      Musculoskeletal: Neck supple. Cardiovascular:      Rate and Rhythm: Normal rate and regular rhythm. Pulmonary:      Effort: Pulmonary effort is normal. No respiratory distress. Breath sounds: Normal breath sounds. No stridor. No wheezing or rales. Abdominal:      General: There is no distension. Palpations: Abdomen is soft. Tenderness: There is no tenderness. Comments: Abdomen is nondistended. The surgical wounds are healed. She has some mild suprapubic tenderness. Musculoskeletal: Normal range of motion. Right lower leg: Edema present. Left lower leg: Edema present. Lymphadenopathy:      Cervical: No cervical adenopathy. Skin:     General: Skin is warm and dry. Findings: No erythema or rash. Neurological:      Mental Status: She is alert and oriented to person, place, and time.    Psychiatric:         Behavior: Behavior normal.             DIAGNOSTIC RESULTS     EKG: All EKG's are interpreted by the Emergency Department Physician who either signs or Co-signs this chart in the absence of a cardiologist.    RADIOLOGY:   Non-plain film images such as CT, Ultrasound and MRI are read by the radiologist. Plain radiographic images are visualized and preliminarily interpreted by the emergency physician with the below findings:    KUB on my interpretation shows moderate amount of stool in the rectum    Interpretation per the Radiologist below, if available at the time of this note:    Xr Abdomen

## 2019-12-16 ENCOUNTER — APPOINTMENT (OUTPATIENT)
Dept: CT IMAGING | Age: 69
DRG: 856 | End: 2019-12-16
Payer: MEDICARE

## 2019-12-16 PROBLEM — A41.9 SEPSIS (HCC): Status: ACTIVE | Noted: 2019-12-16

## 2019-12-16 LAB
ABSOLUTE EOS #: 0 K/UL (ref 0–0.4)
ABSOLUTE IMMATURE GRANULOCYTE: 0 K/UL (ref 0–0.3)
ABSOLUTE LYMPH #: 1.61 K/UL (ref 1–4.8)
ABSOLUTE MONO #: 1.21 K/UL (ref 0.2–0.8)
ANION GAP SERPL CALCULATED.3IONS-SCNC: 13 MMOL/L (ref 9–17)
BASOPHILS # BLD: 1 %
BASOPHILS ABSOLUTE: 0.13 K/UL (ref 0–0.2)
BUN BLDV-MCNC: 12 MG/DL (ref 8–23)
BUN/CREAT BLD: 15 (ref 9–20)
CALCIUM SERPL-MCNC: 9.1 MG/DL (ref 8.6–10.4)
CHLORIDE BLD-SCNC: 98 MMOL/L (ref 98–107)
CO2: 20 MMOL/L (ref 20–31)
CREAT SERPL-MCNC: 0.82 MG/DL (ref 0.5–0.9)
DIFFERENTIAL TYPE: ABNORMAL
EOSINOPHILS RELATIVE PERCENT: 0 % (ref 1–4)
GFR AFRICAN AMERICAN: >60 ML/MIN
GFR NON-AFRICAN AMERICAN: >60 ML/MIN
GFR SERPL CREATININE-BSD FRML MDRD: ABNORMAL ML/MIN/{1.73_M2}
GFR SERPL CREATININE-BSD FRML MDRD: ABNORMAL ML/MIN/{1.73_M2}
GLUCOSE BLD-MCNC: 120 MG/DL (ref 70–99)
HCT VFR BLD CALC: 31 % (ref 36.3–47.1)
HCT VFR BLD CALC: 31.1 % (ref 36.3–47.1)
HEMOGLOBIN: 9.5 G/DL (ref 11.9–15.1)
HEMOGLOBIN: 9.5 G/DL (ref 11.9–15.1)
IMMATURE GRANULOCYTES: 0 %
LACTIC ACID, SEPSIS WHOLE BLOOD: NORMAL MMOL/L (ref 0.5–1.9)
LACTIC ACID, SEPSIS: 0.8 MMOL/L (ref 0.5–1.9)
LYMPHOCYTES # BLD: 12 % (ref 24–44)
MCH RBC QN AUTO: 27.9 PG (ref 25.2–33.5)
MCH RBC QN AUTO: 28.3 PG (ref 25.2–33.5)
MCHC RBC AUTO-ENTMCNC: 30.5 G/DL (ref 28.4–34.8)
MCHC RBC AUTO-ENTMCNC: 30.6 G/DL (ref 28.4–34.8)
MCV RBC AUTO: 90.9 FL (ref 82.6–102.9)
MCV RBC AUTO: 92.6 FL (ref 82.6–102.9)
MONOCYTES # BLD: 9 % (ref 1–7)
MORPHOLOGY: ABNORMAL
NRBC AUTOMATED: 0 PER 100 WBC
NRBC AUTOMATED: 0 PER 100 WBC
PDW BLD-RTO: 16.3 % (ref 11.8–14.4)
PDW BLD-RTO: 16.3 % (ref 11.8–14.4)
PLATELET # BLD: 319 K/UL (ref 138–453)
PLATELET # BLD: 321 K/UL (ref 138–453)
PLATELET ESTIMATE: ABNORMAL
PMV BLD AUTO: 8.9 FL (ref 8.1–13.5)
PMV BLD AUTO: 9.2 FL (ref 8.1–13.5)
POTASSIUM SERPL-SCNC: 4.1 MMOL/L (ref 3.7–5.3)
RBC # BLD: 3.36 M/UL (ref 3.95–5.11)
RBC # BLD: 3.41 M/UL (ref 3.95–5.11)
RBC # BLD: ABNORMAL 10*6/UL
SEG NEUTROPHILS: 78 % (ref 36–66)
SEGMENTED NEUTROPHILS ABSOLUTE COUNT: 10.45 K/UL (ref 1.8–7.7)
SODIUM BLD-SCNC: 131 MMOL/L (ref 135–144)
WBC # BLD: 13.4 K/UL (ref 3.5–11.3)
WBC # BLD: 14.4 K/UL (ref 3.5–11.3)
WBC # BLD: ABNORMAL 10*3/UL

## 2019-12-16 PROCEDURE — 83605 ASSAY OF LACTIC ACID: CPT

## 2019-12-16 PROCEDURE — 6360000004 HC RX CONTRAST MEDICATION: Performed by: COLON & RECTAL SURGERY

## 2019-12-16 PROCEDURE — 1200000000 HC SEMI PRIVATE

## 2019-12-16 PROCEDURE — 85027 COMPLETE CBC AUTOMATED: CPT

## 2019-12-16 PROCEDURE — 6370000000 HC RX 637 (ALT 250 FOR IP): Performed by: INTERNAL MEDICINE

## 2019-12-16 PROCEDURE — 74177 CT ABD & PELVIS W/CONTRAST: CPT

## 2019-12-16 PROCEDURE — 6360000002 HC RX W HCPCS: Performed by: INTERNAL MEDICINE

## 2019-12-16 PROCEDURE — 36415 COLL VENOUS BLD VENIPUNCTURE: CPT

## 2019-12-16 PROCEDURE — 6360000002 HC RX W HCPCS: Performed by: COLON & RECTAL SURGERY

## 2019-12-16 PROCEDURE — 99222 1ST HOSP IP/OBS MODERATE 55: CPT | Performed by: INTERNAL MEDICINE

## 2019-12-16 PROCEDURE — 99223 1ST HOSP IP/OBS HIGH 75: CPT | Performed by: INTERNAL MEDICINE

## 2019-12-16 PROCEDURE — 80048 BASIC METABOLIC PNL TOTAL CA: CPT

## 2019-12-16 PROCEDURE — 2580000003 HC RX 258: Performed by: INTERNAL MEDICINE

## 2019-12-16 PROCEDURE — 2580000003 HC RX 258: Performed by: COLON & RECTAL SURGERY

## 2019-12-16 RX ORDER — KETOROLAC TROMETHAMINE 15 MG/ML
15 INJECTION, SOLUTION INTRAMUSCULAR; INTRAVENOUS EVERY 6 HOURS PRN
Status: DISPENSED | OUTPATIENT
Start: 2019-12-16 | End: 2019-12-18

## 2019-12-16 RX ORDER — SODIUM CHLORIDE 0.9 % (FLUSH) 0.9 %
10 SYRINGE (ML) INJECTION PRN
Status: DISCONTINUED | OUTPATIENT
Start: 2019-12-16 | End: 2019-12-30 | Stop reason: HOSPADM

## 2019-12-16 RX ORDER — ONDANSETRON 4 MG/1
4 TABLET, ORALLY DISINTEGRATING ORAL EVERY 6 HOURS PRN
Status: DISCONTINUED | OUTPATIENT
Start: 2019-12-16 | End: 2019-12-30 | Stop reason: HOSPADM

## 2019-12-16 RX ORDER — SODIUM CHLORIDE 9 MG/ML
INJECTION, SOLUTION INTRAVENOUS CONTINUOUS
Status: DISCONTINUED | OUTPATIENT
Start: 2019-12-16 | End: 2019-12-20

## 2019-12-16 RX ORDER — ONDANSETRON 4 MG/1
4 TABLET, FILM COATED ORAL EVERY 12 HOURS PRN
Status: DISCONTINUED | OUTPATIENT
Start: 2019-12-16 | End: 2019-12-16 | Stop reason: SDUPTHER

## 2019-12-16 RX ORDER — POLYETHYLENE GLYCOL 3350 17 G/17G
17 POWDER, FOR SOLUTION ORAL DAILY PRN
Status: DISCONTINUED | OUTPATIENT
Start: 2019-12-16 | End: 2019-12-24

## 2019-12-16 RX ORDER — ONDANSETRON 2 MG/ML
4 INJECTION INTRAMUSCULAR; INTRAVENOUS EVERY 6 HOURS PRN
Status: DISCONTINUED | OUTPATIENT
Start: 2019-12-16 | End: 2019-12-30 | Stop reason: HOSPADM

## 2019-12-16 RX ORDER — METOPROLOL SUCCINATE 25 MG/1
25 TABLET, EXTENDED RELEASE ORAL DAILY
Status: DISCONTINUED | OUTPATIENT
Start: 2019-12-16 | End: 2019-12-30 | Stop reason: HOSPADM

## 2019-12-16 RX ORDER — ONDANSETRON 2 MG/ML
4 INJECTION INTRAMUSCULAR; INTRAVENOUS EVERY 6 HOURS PRN
Status: DISCONTINUED | OUTPATIENT
Start: 2019-12-16 | End: 2019-12-16

## 2019-12-16 RX ORDER — 0.9 % SODIUM CHLORIDE 0.9 %
80 INTRAVENOUS SOLUTION INTRAVENOUS ONCE
Status: COMPLETED | OUTPATIENT
Start: 2019-12-16 | End: 2019-12-16

## 2019-12-16 RX ORDER — SODIUM CHLORIDE 0.9 % (FLUSH) 0.9 %
10 SYRINGE (ML) INJECTION EVERY 12 HOURS SCHEDULED
Status: DISCONTINUED | OUTPATIENT
Start: 2019-12-16 | End: 2019-12-16 | Stop reason: SDUPTHER

## 2019-12-16 RX ORDER — SODIUM CHLORIDE 0.9 % (FLUSH) 0.9 %
10 SYRINGE (ML) INJECTION PRN
Status: DISCONTINUED | OUTPATIENT
Start: 2019-12-16 | End: 2019-12-16 | Stop reason: SDUPTHER

## 2019-12-16 RX ORDER — ACETAMINOPHEN 325 MG/1
650 TABLET ORAL EVERY 4 HOURS PRN
Status: DISCONTINUED | OUTPATIENT
Start: 2019-12-16 | End: 2019-12-16 | Stop reason: SDUPTHER

## 2019-12-16 RX ORDER — SODIUM CHLORIDE 0.9 % (FLUSH) 0.9 %
10 SYRINGE (ML) INJECTION
Status: COMPLETED | OUTPATIENT
Start: 2019-12-16 | End: 2019-12-16

## 2019-12-16 RX ORDER — ACETAMINOPHEN 325 MG/1
650 TABLET ORAL EVERY 4 HOURS PRN
Status: DISCONTINUED | OUTPATIENT
Start: 2019-12-16 | End: 2019-12-25

## 2019-12-16 RX ORDER — MORPHINE SULFATE 2 MG/ML
2 INJECTION, SOLUTION INTRAMUSCULAR; INTRAVENOUS
Status: DISCONTINUED | OUTPATIENT
Start: 2019-12-16 | End: 2019-12-27

## 2019-12-16 RX ORDER — VITAMIN B COMPLEX
2000 TABLET ORAL DAILY
Status: DISCONTINUED | OUTPATIENT
Start: 2019-12-16 | End: 2019-12-30 | Stop reason: HOSPADM

## 2019-12-16 RX ORDER — SODIUM CHLORIDE 0.9 % (FLUSH) 0.9 %
10 SYRINGE (ML) INJECTION EVERY 12 HOURS SCHEDULED
Status: DISCONTINUED | OUTPATIENT
Start: 2019-12-16 | End: 2019-12-30 | Stop reason: HOSPADM

## 2019-12-16 RX ORDER — MORPHINE SULFATE 4 MG/ML
4 INJECTION, SOLUTION INTRAMUSCULAR; INTRAVENOUS
Status: DISCONTINUED | OUTPATIENT
Start: 2019-12-16 | End: 2019-12-27

## 2019-12-16 RX ADMIN — MORPHINE SULFATE 4 MG: 4 INJECTION INTRAVENOUS at 23:16

## 2019-12-16 RX ADMIN — SODIUM CHLORIDE: 9 INJECTION, SOLUTION INTRAVENOUS at 18:39

## 2019-12-16 RX ADMIN — CEFTRIAXONE SODIUM 1 G: 1 INJECTION, POWDER, FOR SOLUTION INTRAMUSCULAR; INTRAVENOUS at 22:23

## 2019-12-16 RX ADMIN — SODIUM CHLORIDE 80 ML: 0.9 INJECTION, SOLUTION INTRAVENOUS at 19:45

## 2019-12-16 RX ADMIN — KETOROLAC TROMETHAMINE 15 MG: 15 INJECTION, SOLUTION INTRAMUSCULAR; INTRAVENOUS at 05:05

## 2019-12-16 RX ADMIN — VITAMIN D, TAB 1000IU (100/BT) 2000 UNITS: 25 TAB at 09:54

## 2019-12-16 RX ADMIN — IOHEXOL 30 ML: 300 INJECTION, SOLUTION INTRAVENOUS at 19:45

## 2019-12-16 RX ADMIN — ONDANSETRON 4 MG: 4 TABLET, ORALLY DISINTEGRATING ORAL at 22:23

## 2019-12-16 RX ADMIN — ACETAMINOPHEN 650 MG: 325 TABLET ORAL at 21:07

## 2019-12-16 RX ADMIN — ACETAMINOPHEN 650 MG: 325 TABLET ORAL at 10:51

## 2019-12-16 RX ADMIN — SODIUM CHLORIDE: 9 INJECTION, SOLUTION INTRAVENOUS at 05:01

## 2019-12-16 RX ADMIN — IOPAMIDOL 75 ML: 755 INJECTION, SOLUTION INTRAVENOUS at 19:46

## 2019-12-16 RX ADMIN — POLYETHYLENE GLYCOL 3350 17 G: 17 POWDER, FOR SOLUTION ORAL at 15:45

## 2019-12-16 RX ADMIN — ACETAMINOPHEN 650 MG: 325 TABLET ORAL at 15:45

## 2019-12-16 RX ADMIN — SODIUM CHLORIDE, PRESERVATIVE FREE 10 ML: 5 INJECTION INTRAVENOUS at 19:45

## 2019-12-16 RX ADMIN — Medication 10 ML: at 05:09

## 2019-12-16 RX ADMIN — METOPROLOL SUCCINATE 25 MG: 25 TABLET, FILM COATED, EXTENDED RELEASE ORAL at 09:54

## 2019-12-16 ASSESSMENT — PAIN DESCRIPTION - DESCRIPTORS: DESCRIPTORS: CRAMPING

## 2019-12-16 ASSESSMENT — ENCOUNTER SYMPTOMS
EYE DISCHARGE: 0
NAUSEA: 1
CHEST TIGHTNESS: 0
ABDOMINAL DISTENTION: 0
SHORTNESS OF BREATH: 0
FACIAL SWELLING: 0
BACK PAIN: 0
EYE PAIN: 0
ABDOMINAL PAIN: 0

## 2019-12-16 ASSESSMENT — PAIN SCALES - GENERAL
PAINLEVEL_OUTOF10: 10
PAINLEVEL_OUTOF10: 0
PAINLEVEL_OUTOF10: 7
PAINLEVEL_OUTOF10: 7
PAINLEVEL_OUTOF10: 0
PAINLEVEL_OUTOF10: 0
PAINLEVEL_OUTOF10: 4
PAINLEVEL_OUTOF10: 6

## 2019-12-16 ASSESSMENT — PAIN DESCRIPTION - PROGRESSION: CLINICAL_PROGRESSION: NOT CHANGED

## 2019-12-16 ASSESSMENT — PAIN DESCRIPTION - FREQUENCY: FREQUENCY: INTERMITTENT

## 2019-12-16 ASSESSMENT — PAIN DESCRIPTION - ONSET: ONSET: GRADUAL

## 2019-12-16 ASSESSMENT — PAIN DESCRIPTION - PAIN TYPE: TYPE: ACUTE PAIN

## 2019-12-16 ASSESSMENT — PAIN DESCRIPTION - ORIENTATION: ORIENTATION: LOWER

## 2019-12-16 ASSESSMENT — PAIN DESCRIPTION - LOCATION: LOCATION: ABDOMEN

## 2019-12-16 ASSESSMENT — PAIN - FUNCTIONAL ASSESSMENT: PAIN_FUNCTIONAL_ASSESSMENT: PREVENTS OR INTERFERES SOME ACTIVE ACTIVITIES AND ADLS

## 2019-12-16 NOTE — ED PROVIDER NOTES
EMERGENCY DEPARTMENT ENCOUNTER    Pt Name: Aleksandra Man  MRN: [id number]  [place] 1950  Date of evaluation: 12/15/19  CHIEF COMPLAINT       Chief Complaint   Patient presents with    Dehydration     seen this am for same    Nausea    Other     unable to urinate     HISTORY OF PRESENT ILLNESS   HPI   Patient is a 44-year-old female who presented to the emergency department secondary to nausea decreased fluid output with concern for dehydration. Patient underwent surgery on November 12, 2019 to reverse a colostomy secondary to complicated diverticulitis, patient states since having the surgery she has had chronic diarrhea. She was seen earlier today for concern of dehydration patient rehydrated. She also was diagnosed with a urinary tract infection. Initially on antibiotics. Discharged home after feeling better. Patient states since going home she has had decreased urine output not really eating or drinking and had increased nausea, she took Zofran has not had any vomiting. Patient feels she is dehydrated. Patient denies chest pain, shortness of breath, fevers or chills. REVIEW OF SYSTEMS     Review of Systems   Constitutional: Negative for chills, diaphoresis and fever. HENT: Negative for congestion, ear pain and facial swelling. Eyes: Negative for pain, discharge and visual disturbance. Respiratory: Negative for chest tightness and shortness of breath. Cardiovascular: Negative for chest pain and palpitations. Gastrointestinal: Positive for nausea. Negative for abdominal distention and abdominal pain. Genitourinary: Positive for decreased urine volume. Negative for difficulty urinating and flank pain. Musculoskeletal: Negative for back pain. Skin: Negative for wound. Neurological: Negative for dizziness, light-headedness and headaches.      PASTMEDICAL HISTORY     Past Medical History:   Diagnosis Date    Allergic rhinitis     Bladder prolapse     Constipation     5/2019 resolved    Fundic gland polyposis of stomach 08/17/2017    per EGD    GERD (gastroesophageal reflux disease)     on no medications    Hiatal hernia     History of cardiac cath 12/2002    pt denies blockage    History of diverticulitis     Hyperlipidemia     borderline, on no medication    Hypertension     MRSA (methicillin resistant staph aureus) culture positive 11/01/2016    nasal    MRSA carrier     follows with ID    Obesity     Osteoarthritis     Systolic murmur     benign systolic murmur (history of). Pt does not follow-up with a cardiologist (Written 09/25/2019).     Thyroid disease     goiter    Wears glasses      SURGICAL HISTORY       Past Surgical History:   Procedure Laterality Date    ABDOMINAL EXPLORATION SURGERY  05/16/2019    CHOLECYSTECTOMY, LAPAROSCOPIC  11/15/2016    CHOLECYSTECTOMY, LAPAROSCOPIC  11/15/2016    COLONOSCOPY  2013    neg    COLONOSCOPY N/A 5/16/2019    COLONOSCOPY DIAGNOSTIC performed by Geo Hawthorne MD at [address], COLON, DIAGNOSTIC      EGD    FISSURECTOMY ANAL N/A 10/4/2019    FLEXIBLE SIGMOIDOSCOPY & ANAL FISTULECTOMY performed by Geo Hawthorne MD at [address] [facility] ARTHROSCOPY Left     lateral release    NV EGD TRANSORAL BIOPSY SINGLE/MULTIPLE N/A 8/17/2017    EGD BIOPSY performed by Gabe Gilman MD at [address] OFFICE/OUTPT [facility] N/A 5/22/2018    XI ROBOTIC LAPAROSCOPIC UMBILICAL HERNIA REPAIR WITH MESH, POSSIBLE OPEN performed by Medardo Morris IV, DO at [address] [place] [address]  02/19/2019    SIGMOIDOSCOPY N/A 2/19/2019    SIGMOIDOSCOPY BIOPSY FLEXIBLE performed by Sharmila Montejo DO at [facility] N/A 5/16/2019    ABDOMINAL EXPLORATION ANDBOWEL RESECTION LOW ANTERIOR WITH COLOSTOMY performed by Geo Hawthorne MD at [facility] N/A 11/12/2019    COLOSTOMY  [address] [facility], REPAIR OF SpO2 96%   BMI 35.02 kg/m²    Physical Exam  Vitals signs and nursing note reviewed. Constitutional:       General: She is not in acute distress. Appearance: She is well-developed. She is not diaphoretic. HENT:      Head: Normocephalic and atraumatic. Mouth/Throat:      Mouth: Mucous membranes are dry. Eyes:      Pupils: Pupils are equal, round, and reactive to light. Neck:      Musculoskeletal: Normal range of motion and neck supple. Cardiovascular:      Rate and Rhythm: Normal rate and regular rhythm. Pulmonary:      Effort: Pulmonary effort is normal.      Breath sounds: Normal breath sounds. Abdominal:      General: Bowel sounds are normal.      Palpations: Abdomen is soft. Musculoskeletal: Normal range of motion. Skin:     General: Skin is warm. Capillary Refill: Capillary refill takes 2 to 3 seconds. Neurological:      Mental Status: She is alert and oriented to person, place, and time. MEDICAL DECISION MAKING:   Patient was seen and examined on arrival.  Patient is a 82-year-old female presented to the emergency department secondary to nausea and concern for dehydration. Patient tachycardic at 100, febrile 100.8 and hypotensive 105/66, given that she was just recently diagnosed with a UTI patient meets SIRS criteria with a likely source for sepsis. Orders for blood cultures x2, lactic acid every 2 hours, orders for ceftriaxone 1 g IV piggyback. Patient's initial lactic acid not elevated, blood cell count 13.2. Discussed with patient admission for IV hydration, IV antibiotics was amenable to this treatment plan.       1)  Sepsis Identified at Time:      22:36    2)  Sepsis Alert Protocol Guidelines:  · Blood Cultures drawn before antibiotics  · Broad Spectrum Antibiotics given stat within one hour  · Lactic Acid Q2 hours times 2 occurrences (if first lactic acid > 2.0)    3)  Fluid Bolus Guidelines:    If lactate > 4.0   OR   MAP less than 65  OR  systolic BP < 90 (two separate readings),            then 30ml/kg crystalloid fluid bolus infused, and may give over 4 hour duration. Is the patient Morbidly Obese (BMI > 30): Body mass index is 35.02 kg/m². Ideal body weight: 61.6 kg (135 lb 12.9 oz)  Adjusted ideal body weight: 77.5 kg (170 lb 14.7 oz)    If Morbidly Obese the Ideal Body  ([place] formula):   Ideal body weight (IBW) (men) = 50 kg + 2.3 kg x (height, inches - 60)    Ideal body weight (IBW) (women) = 45.5 kg + 2.3 kg x (height, inches - 60)    4)  Repeat Sepsis Exam completed during fluid bolus at time:     2300    5)  Vasopressors: For persistent hypotension after completion of 30ml/kg fluid bolus (need to measure BP Q 15 min in the hour after completion of fluid bolus). His blood pressure remained stable did not require vasopressors. Discuss risks and benefits of vasopressors and alternative therapies with patient and/or DPOA. Vitals:    12/15/19 2206 12/15/19 2230 12/15/19 2350 12/16/19 0017   BP: (!) 148/58 105/66 112/71    Pulse: 117 100     Resp: 18 16 16    Temp: 99.1 °F (37.3 °C) 100.8 °F (38.2 °C)     TempSrc: Oral      SpO2: 96% 96% 92% 96%   Weight: 223 lb 9.6 oz (101.4 kg)      Height: 5' 7\" (1.702 m)        Recent Results (from the past 24 hour(s))   CBC Auto Differential    Collection Time: 12/15/19 10:55 PM   Result Value Ref Range    WBC 13.4 (H) 3.5 - 11.3 k/uL   CBC Auto Differential    Collection Time: 12/15/19 11:49 AM   Result Value Ref Range    WBC 13.9 (H) 3.5 - 11.0 k/uL         ED Course as of Dec 16 0027   Mon Dec 16, 2019   0002 Patient discussed with Dr. Pat Otero who agreed to admit patient for further evaluation and treatment, he agreed to admit patient. Bridging order placed.     [AS]      ED Course User Index  [AS] Albina Fabry, MD       All patient's question's and concerns were answered prior to disposition and patient and/or family expressed understanding and agreement of IMPRESSION      1. Septicemia ([facility] [address]    2. Urinary tract infection without hematuria, site unspecified          DISPOSITION/PLAN   DISPOSITION        PATIENT REFERRED TO:  Mona Kamara MD  [facility]  [address]  [phone]          DISCHARGE MEDICATIONS:  New Prescriptions    No medications on file     Nima Best MD  Attending Emergency Physician    This note was created with the assistance of a speech-recognition program. While intending to generate a document that actually reflects the content of the visit, no guarantees can be provided that every mistake has been identified and corrected by editing.                     Nima Best MD  00/54/21 Yani Kidd MD  88/78/00 [id number]

## 2019-12-16 NOTE — CONSULTS
[facility]  Initial Consult Note  Date: 12/16/2019    Hospital day :0     Impression:   1. Recent colostomy reversal, extensive lysis of adhesions and ventral hernia repair and complicated by an anastomotic leak  2. Nausea, poor oral intake without any obstructive process noted on imaging  3. Mild pyuria but no significant clinical symptoms to suggest a urinary tract infection  4. Intermittent diarrhea    Recommendations   · At this point in time I do not at all think that the patient has a urinary tract infection. · Stop the intravenous antimicrobial therapy  · The patient is scheduled for a CT scan of the abdomen pelvis to evaluate for the prior anastomotic leak and to ensure no development of intra-abdominal collections  · The care was discussed with the surgical resident who was in the room at that time of my evaluation  · Overall clinically the patient is improved though she is not quite back to her baseline  · I will follow the findings of the CAT scan of the abdomen and pelvis which are scheduled to be done later this evening and adjust antimicrobial therapy accordingly    Chief complaint/reason for consultation:   Urinary tract infection/sepsis    History of Present Illness: Tyler Kenney is a 81y.o.-year-old female who was initially admitted on 12/15/2019. Sammi Upton has a history of diverticulitis and underwent a diverting colostomy in May and in early November was admitted and underwent a colostomy reversal.  The patient's hospital course was complicated by fluid overload for which she received some diuretic therapy. She also reports having a lot of diarrhea during that admission which was felt secondary to the Toradol and post discharge her diarrhea issues continued. The patient reports not really feeling very well ending up being seen by a nurse practitioner has her PCP Dr. Cody Bowers body was out of town.   The patient was sent back to the emergency room November 21 and was worked up and sent home. The patient reports that around Thanksgiving she started having rectal bleeding which prompted her to come back into the emergency room and at that point in time she was diagnosed with an anastomotic leak on CT imaging. The patient was treated conservatively with IV antibiotics and subsequently discharged on oral antimicrobial therapy with ciprofloxacin and metronidazole. The patient reports that her diarrhea had improved but recently resumed. She had been at home and still generally is not feeling very well. She does not report any significant abdominal pain but has had some nausea and chills. She has not been able to tolerate oral intake very well and was having difficulty urinating well as leg swelling. She came back into the emergency room and testing did show question of a urinary tract infection. Was asked to evaluate and help with antibiotic choice. I have personally reviewed the past medical history, past surgical history, medications, social history, and family history, and I have updated the database accordingly. Past Medical History:     Past Medical History:   Diagnosis Date    Allergic rhinitis     Bladder prolapse     Constipation     5/2019 resolved    Fundic gland polyposis of stomach 08/17/2017    per EGD    GERD (gastroesophageal reflux disease)     on no medications    Hiatal hernia     History of cardiac cath 12/2002    pt denies blockage    History of diverticulitis     Hyperlipidemia     borderline, on no medication    Hypertension     MRSA (methicillin resistant staph aureus) culture positive 11/01/2016    nasal    MRSA carrier     follows with ID    Obesity     Osteoarthritis     Systolic murmur     benign systolic murmur (history of). Pt does not follow-up with a cardiologist (Written 09/25/2019).     Thyroid disease     goiter    Wears glasses      Past Surgical  History:     Past Surgical History:   Procedure Laterality Date    mg Oral Daily     Social History:     Social History     Socioeconomic History    Marital status: Single     Spouse name: Not on file    Number of children: Not on file    Years of education: Not on file    Highest education level: Not on file   Occupational History    Not on file   Social Needs    Financial resource strain: Not on file    Food insecurity:     Worry: Not on file     Inability: Not on file    Transportation needs:     Medical: Not on file     Non-medical: Not on file   Tobacco Use    Smoking status: Former Smoker     Packs/day: 1.00     Years: 3.00     Pack years: 3.00     Last attempt to quit: 1975     Years since quittin.9    Smokeless tobacco: Never Used    Tobacco comment: quit smoking age 23   Substance and Sexual Activity    Alcohol use: Yes     Comment: very rare    Drug use: No    Sexual activity: Not on file   Lifestyle    Physical activity:     Days per week: Not on file     Minutes per session: Not on file    Stress: Not on file   Relationships    Social connections:     Talks on phone: Not on file     Gets together: Not on file     Attends Worship service: Not on file     Active member of club or organization: Not on file     Attends meetings of clubs or organizations: Not on file     Relationship status: Not on file    Intimate partner violence:     Fear of current or ex partner: Not on file     Emotionally abused: Not on file     Physically abused: Not on file     Forced sexual activity: Not on file   Other Topics Concern    Not on file   Social History Narrative    Not on file     Family History:     Family History   Problem Relation Age of Onset    Heart Disease Mother     COPD Father     Cancer Brother         thyroid, prostate, lung      Allergies:   Patient has no known allergies. Review of Systems:   General: No fever and some chillss, poor appetite  Eyes: No double vision or blurry vision. ENT: No sore throat or runny nose.   Cardiovascular: CREATININE 0.99* 0.82     Hepatic Function Panel:   Recent Labs     12/15/19  2255   PROT 6.2*   LABALBU 2.9*   BILITOT 0.25*   ALKPHOS 81   ALT 6   AST 9     No results found for: CRP  No results found for: SEDRATE    No results for input(s): PROCAL in the last 72 hours. Color, UA DARK YELLOWAbnormal   YELLOW Final 12/15/2019 12:15  [facility]   Turbidity UA SLIGHTLY CLOUDYAbnormal   CLEAR Final 12/15/2019 12:15  [facility]   Glucose, Ur NEGATIVE  NEGATIVE Final 12/15/2019 12:15  [facility]   Bilirubin Urine Abnormal   NEGATIVE Final 12/15/2019 12:15  [facility]   Presumptive positive. Unable to confirm due to unavailability of reagent.     Ketones, Urine NEGATIVE  NEGATIVE Final 12/15/2019 12:15  [facility]   Specific [place], [place] 1.022  1.005 - 1.030 Final 12/15/2019 12:15  [facility]   Urine Hgb NEGATIVE  NEGATIVE Final 12/15/2019 12:15  [facility]   pH, UA 6.0  5.0 - 8.0 Final 12/15/2019 12:15  [facility]   Protein, [place] TRACEAbnormal   NEGATIVE Final 12/15/2019 12:15 PM [facility] [address], Urine Normal  Normal Final 12/15/2019 12:15  [facility]   Nitrite, Urine POSITIVEAbnormal   NEGATIVE Final 12/15/2019 12:15  [facility]   Leukocyte Esterase, Urine SMALLAbnormal   NEGATIVE Final 12/15/2019 12:15  [facility]     WBC, UA 5 TO 10  0 - 5 /HPF Final 12/15/2019 12:15  [facility]   RBC, UA 0 TO 2  0 - 2 /HPF Final 12/15/2019 12:15  [facility]     Pro-BNP 1,016High   <300 pg/mL Final 12/15/2019 10:55  [facility]     Lactic Acid, Sepsis 1.7  0.5 - 1.9 mmol/L Final 12/15/2019 10:55  [facility]         Imaging Studies:   ONE XRAY VIEW OF THE CHEST; ONE SUPINE XRAY VIEW(S) OF THE ABDOMEN 12/15/2019 11:41 am FINDINGS:   Radiographs of the chest

## 2019-12-16 NOTE — DISCHARGE INSTR - COC
 Hypokalemia E87.6    Atelectasis of left lung J98.11    Postoperative anemia due to acute blood loss D62    Acute congestive heart failure (HCC) I50.9    Surgical wound dehiscence T81.31XA    Constipation F35.75    Systolic murmur E79.9    Thyroid disease E07.9    Mild pulmonary hypertension (HCC) I27.20    Perforated sigmoid colon (HCC) K63.1    Anastomotic leak of intestine K91.89    Normocytic anemia D64.9    Rectal bleeding K62.5    Sepsis (HCC) A41.9       Isolation/Infection:   Isolation          Contact        Patient Infection Status     Infection Onset Added Last Indicated Last Indicated By Review Planned Expiration Resolved Resolved By    MRSA  11/02/14 11/02/14 Bautista Anderson RN        Nasal - 11/1/2016    Resolved    C-diff Rule Out  12/06/19 12/06/19 C DIFF TOXIN/ANTIGEN (Ordered)   12/07/19 Dejan Cartagena RN    Order cancelled. Does not meet criteria          Nurse Assessment:  Last Vital Signs: /62   Pulse 100   Temp 98.8 °F (37.1 °C) (Oral)   Resp 17   Ht 5' 7\" (1.702 m)   Wt 224 lb 9.6 oz (101.9 kg)   LMP  (LMP Unknown)   SpO2 96%   BMI 35.18 kg/m²     Last documented pain score (0-10 scale): Pain Level: 6  Last Weight:   Wt Readings from Last 1 Encounters:   12/16/19 224 lb 9.6 oz (101.9 kg)     Mental Status:  oriented and alert    IV Access:  - PICC - site  R Basilic, insertion date: 12/17/19    Nursing Mobility/ADLs:  Walking   Assisted  Transfer  Assisted  Bathing  Assisted  Dressing  Assisted  Toileting  Assisted  Feeding  Assisted  Med Admin  Assisted  Med Delivery   whole    Wound Care Documentation and Therapy:        Elimination:  Continence:   · Bowel: Yes  · Bladder: Yes  Urinary Catheter: None   Colostomy/Ileostomy/Ileal Conduit: Yes       Date of Last BM:     Intake/Output Summary (Last 24 hours) at 12/16/2019 1224  Last data filed at 12/16/2019 0519  Gross per 24 hour   Intake 50 ml   Output 400 ml   Net -350 ml     I/O last 3 completed shifts:   In: 48 [IV Piggyback:50]  Out: 400 [Urine:400]    Safety Concerns: At Risk for Falls    Impairments/Disabilities:          Nutrition Therapy:  Current Nutrition Therapy:   - Parenteral Nutrition:  71.8 ml over 24 hrs per day (see PN orders for content)    Routes of Feeding: Parenteral Nutrition (PN)  Liquids: No Restrictions  Daily Fluid Restriction: no  Last Modified Barium Swallow with Video (Video Swallowing Test): not done    Treatments at the Time of Hospital Discharge:   Respiratory Treatments: ***  Oxygen Therapy:  is not on home oxygen therapy. Ventilator:    - No ventilator support    Rehab Therapies: none  Weight Bearing Status/Restrictions: No weight bearing restirctions  Other Medical Equipment (for information only, NOT a DME order):  walker  Other Treatments:   Skilled nurse assessment  Medication teaching and compliance    Patient's personal belongings (please select all that are sent with patient):  Mignon    RN SIGNATURE:  Electronically signed by Jesse Khan RN on 12/30/19 at 5:44 PM    CASE MANAGEMENT/SOCIAL WORK SECTION    Inpatient Status Date:12/16/19    Readmission Risk Assessment Score:  Readmission Risk              Risk of Unplanned Readmission:        19           Discharging to Facility/ Agency   · Name:  [facility]  · phone: [phone] [facility] (if applicable)   · Name:  · Address:  · Dialysis Schedule:  · Phone:  · Fax:    / signature: Electronically signed by Ashlyn Gastelum on 12/16/19 at 12:25 PM    PHYSICIAN SECTION    Prognosis: Fair    Condition at Discharge: Stable    Rehab Potential (if transferring to Rehab): Fair    Recommended Labs or Other Treatments After Discharge:     CBC/CMP 12/31/2019 & CBC/CMP weekly X 4 per nephrology, Dr. Barb Alcantara (Patient to see Dr. eJnnie Alonso while at [facility] and to follow as outpatient upon discharge).      Iron sucrose (VENOFER) 200 mg in sodium chloride 0.9% 100 ml IVPB, EVERY OTHER DAY,

## 2019-12-16 NOTE — PLAN OF CARE
Problem: Falls - Risk of:  Goal: Will remain free from falls  Description  Will remain free from falls  12/16/2019 1242 by Jarred Lewis RN  Outcome: Ongoing  Note:   Patient is a fall risk during this admission. Fall risk assessment was performed. Patient is absent of falls. Bed is in the lowest position. Wheels on the bed are locked. Call light and bed side table are within reach. Clutter is removed. Patient was educated to call out when needing assistance or wanting to get out of bed. Patient offered toileting assistance during rounding. Hourly rounds have been performed. Fall precautions in place  12/16/2019 0238 by Aaron Graf RN  Outcome: Ongoing     Problem: Skin Integrity:  Goal: Will show no infection signs and symptoms  Description  Will show no infection signs and symptoms  12/16/2019 1242 by Jarred Lewis RN  Outcome: Ongoing  Note:   Skin assessment performed. Patient turns self PRN. Pressure ulcer prevention being practiced.   , pt has old incision line, well approximated, reddened with some scabbing  12/16/2019 0238 by Aaron Graf RN  Outcome: Ongoing     Problem: Fluid Volume - Imbalance:  Goal: Absence of imbalanced fluid volume signs and symptoms  Description  Absence of imbalanced fluid volume signs and symptoms  Outcome: Ongoing  Note:   IV fluids cont, pt tolerating liquid intake, urine tea colored, oral mucosa dry     Problem: Urinary Elimination:  Goal: Signs and symptoms of infection will decrease  Description  Signs and symptoms of infection will decrease  Outcome: Ongoing  Goal: Ability to reestablish a normal urinary elimination pattern will improve - after catheter removal  Description  Ability to reestablish a normal urinary elimination pattern will improve  Outcome: Ongoing  Note:   Pt c/o urgency and some dysuria

## 2019-12-16 NOTE — CARE COORDINATION
Case Management Initial Discharge Plan  [facility],         Readmission Risk              Risk of Unplanned Readmission:        19             Met with:patient to discuss discharge plans. Information verified: address, contacts, phone number, , insurance Yes  PCP: Quoc Clark MD  Date of last visit: 2019    Insurance Provider: [address]    Discharge Planning  Current Residence:  Private home  Living Arrangements:  Alone   Home has 2 stories/1/2 bath on first floor and bedroom and full bathroom on second floor. Pt states no problems with steps. Support Systems:  Family Members, Friends/Neighbors  Current Services PTA:  SN Agency: Current with [facility]  Patient able to perform ADL's:Independent  DME in home:  Cane, handicap height toilet  DME used to aid ambulation prior to admission:   none  DME used during admission:  none    Potential Assistance Needed:  N/A    Pharmacy: [address] Medications:  No  Does patient want to participate in local refill/ meds to beds program?  No    Patient agreeable to home care: Yes  Freedom of choice provided:  yes      Type of Home Care Services:  None  Patient expects to be discharged to:  home    Prior SNF/Rehab Placement and Facility: none  Agreeable to SNF/Rehab: No  [place] of choice provided: n/a   Evaluation: n/a    Expected Discharge date: Follow Up Appointment: Best Day/ Time:      Transportation provider: friend  Transportation arrangements needed for discharge: No  The Plan for Transition of Care is related to the following treatment goals: home skilled nurse assessments for a patient post colostomy reversal with complications of perforation    The Patient  was provided with a choice of provider and agrees   with the discharge plan.  [x] Yes [] No    Freedom of choice list was provided with basic dialogue that supports the patient's individualized plan of care/goals,

## 2019-12-16 NOTE — PROGRESS NOTES
Patient weight is between 101-149kg. For prophylaxis with Enoxaparin, Pharmacy adjusted the dose to account for the patient's increased body weight in accordance with hospital approved protocol. The dose has been changed to 30mg BID. Please contact pharmacy with any concerns @ [phone]. Thank you.    Rosa Maria Kaminski  12/16/2019 12:15 AM

## 2019-12-17 ENCOUNTER — APPOINTMENT (OUTPATIENT)
Dept: CT IMAGING | Age: 69
DRG: 856 | End: 2019-12-17
Payer: MEDICARE

## 2019-12-17 ENCOUNTER — APPOINTMENT (OUTPATIENT)
Dept: INTERVENTIONAL RADIOLOGY/VASCULAR | Age: 69
DRG: 856 | End: 2019-12-17
Payer: MEDICARE

## 2019-12-17 PROBLEM — E44.1 MILD MALNUTRITION (HCC): Status: ACTIVE | Noted: 2019-12-17

## 2019-12-17 LAB
-: NORMAL
AMORPHOUS: NORMAL
ANION GAP SERPL CALCULATED.3IONS-SCNC: 9 MMOL/L (ref 9–17)
BACTERIA: NORMAL
BILIRUBIN URINE: NEGATIVE
BUN BLDV-MCNC: 10 MG/DL (ref 8–23)
BUN/CREAT BLD: 13 (ref 9–20)
CALCIUM SERPL-MCNC: 8.8 MG/DL (ref 8.6–10.4)
CASTS UA: NORMAL /LPF
CASTS UA: NORMAL /LPF
CHLORIDE BLD-SCNC: 100 MMOL/L (ref 98–107)
CO2: 22 MMOL/L (ref 20–31)
COLOR: YELLOW
COMMENT UA: NORMAL
CREAT SERPL-MCNC: 0.76 MG/DL (ref 0.5–0.9)
CRYSTALS, UA: NORMAL /HPF
CULTURE: NORMAL
DIRECT EXAM: NORMAL
EPITHELIAL CELLS UA: NORMAL /HPF (ref 0–5)
GFR AFRICAN AMERICAN: >60 ML/MIN
GFR NON-AFRICAN AMERICAN: >60 ML/MIN
GFR SERPL CREATININE-BSD FRML MDRD: ABNORMAL ML/MIN/{1.73_M2}
GFR SERPL CREATININE-BSD FRML MDRD: ABNORMAL ML/MIN/{1.73_M2}
GLUCOSE BLD-MCNC: 101 MG/DL (ref 65–105)
GLUCOSE BLD-MCNC: 107 MG/DL (ref 70–99)
GLUCOSE BLD-MCNC: 119 MG/DL (ref 65–105)
GLUCOSE URINE: NEGATIVE
HCT VFR BLD CALC: 27.7 % (ref 36.3–47.1)
HEMOGLOBIN: 8.7 G/DL (ref 11.9–15.1)
INR BLD: 1.2
KETONES, URINE: NEGATIVE
LEUKOCYTE ESTERASE, URINE: NEGATIVE
Lab: NORMAL
MAGNESIUM: 2.1 MG/DL (ref 1.6–2.6)
MCH RBC QN AUTO: 28.2 PG (ref 25.2–33.5)
MCHC RBC AUTO-ENTMCNC: 31.4 G/DL (ref 28.4–34.8)
MCV RBC AUTO: 89.9 FL (ref 82.6–102.9)
MUCUS: NORMAL
NITRITE, URINE: NEGATIVE
NRBC AUTOMATED: 0 PER 100 WBC
OTHER OBSERVATIONS UA: NORMAL
PDW BLD-RTO: 16.3 % (ref 11.8–14.4)
PH UA: 5.5 (ref 5–8)
PHOSPHORUS: 2.7 MG/DL (ref 2.6–4.5)
PLATELET # BLD: 301 K/UL (ref 138–453)
PMV BLD AUTO: 8.9 FL (ref 8.1–13.5)
POTASSIUM SERPL-SCNC: 4.2 MMOL/L (ref 3.7–5.3)
PROTEIN UA: NEGATIVE
PROTHROMBIN TIME: 11.8 SEC (ref 9.7–11.6)
RBC # BLD: 3.08 M/UL (ref 3.95–5.11)
RBC UA: NORMAL /HPF (ref 0–2)
RENAL EPITHELIAL, UA: NORMAL /HPF
SODIUM BLD-SCNC: 131 MMOL/L (ref 135–144)
SPECIFIC GRAVITY UA: 1.01 (ref 1–1.03)
SPECIMEN DESCRIPTION: NORMAL
TRICHOMONAS: NORMAL
TURBIDITY: CLEAR
URINE HGB: NEGATIVE
UROBILINOGEN, URINE: NORMAL
WBC # BLD: 11 K/UL (ref 3.5–11.3)
WBC UA: NORMAL /HPF (ref 0–5)
YEAST: NORMAL

## 2019-12-17 PROCEDURE — 87070 CULTURE OTHR SPECIMN AEROBIC: CPT

## 2019-12-17 PROCEDURE — 2709999900 IR PICC WO SQ PORT/PUMP > 5 YEARS

## 2019-12-17 PROCEDURE — 2580000003 HC RX 258: Performed by: INTERNAL MEDICINE

## 2019-12-17 PROCEDURE — 85610 PROTHROMBIN TIME: CPT

## 2019-12-17 PROCEDURE — 83735 ASSAY OF MAGNESIUM: CPT

## 2019-12-17 PROCEDURE — 86403 PARTICLE AGGLUT ANTBDY SCRN: CPT

## 2019-12-17 PROCEDURE — 6370000000 HC RX 637 (ALT 250 FOR IP): Performed by: INTERNAL MEDICINE

## 2019-12-17 PROCEDURE — 0W9J30Z DRAINAGE OF PELVIC CAVITY WITH DRAINAGE DEVICE, PERCUTANEOUS APPROACH: ICD-10-PCS | Performed by: RADIOLOGY

## 2019-12-17 PROCEDURE — 80048 BASIC METABOLIC PNL TOTAL CA: CPT

## 2019-12-17 PROCEDURE — 6360000002 HC RX W HCPCS: Performed by: RADIOLOGY

## 2019-12-17 PROCEDURE — 87186 SC STD MICRODIL/AGAR DIL: CPT

## 2019-12-17 PROCEDURE — 87205 SMEAR GRAM STAIN: CPT

## 2019-12-17 PROCEDURE — 87077 CULTURE AEROBIC IDENTIFY: CPT

## 2019-12-17 PROCEDURE — 99232 SBSQ HOSP IP/OBS MODERATE 35: CPT | Performed by: INTERNAL MEDICINE

## 2019-12-17 PROCEDURE — 85027 COMPLETE CBC AUTOMATED: CPT

## 2019-12-17 PROCEDURE — 2580000003 HC RX 258: Performed by: COLON & RECTAL SURGERY

## 2019-12-17 PROCEDURE — 84100 ASSAY OF PHOSPHORUS: CPT

## 2019-12-17 PROCEDURE — 2500000003 HC RX 250 WO HCPCS: Performed by: STUDENT IN AN ORGANIZED HEALTH CARE EDUCATION/TRAINING PROGRAM

## 2019-12-17 PROCEDURE — 6370000000 HC RX 637 (ALT 250 FOR IP): Performed by: STUDENT IN AN ORGANIZED HEALTH CARE EDUCATION/TRAINING PROGRAM

## 2019-12-17 PROCEDURE — 02HV33Z INSERTION OF INFUSION DEVICE INTO SUPERIOR VENA CAVA, PERCUTANEOUS APPROACH: ICD-10-PCS | Performed by: STUDENT IN AN ORGANIZED HEALTH CARE EDUCATION/TRAINING PROGRAM

## 2019-12-17 PROCEDURE — 87075 CULTR BACTERIA EXCEPT BLOOD: CPT

## 2019-12-17 PROCEDURE — 36573 INSJ PICC RS&I 5 YR+: CPT | Performed by: RADIOLOGY

## 2019-12-17 PROCEDURE — 82947 ASSAY GLUCOSE BLOOD QUANT: CPT

## 2019-12-17 PROCEDURE — 6360000002 HC RX W HCPCS: Performed by: INTERNAL MEDICINE

## 2019-12-17 PROCEDURE — 6360000002 HC RX W HCPCS: Performed by: COLON & RECTAL SURGERY

## 2019-12-17 PROCEDURE — 1200000000 HC SEMI PRIVATE

## 2019-12-17 PROCEDURE — 77012 CT SCAN FOR NEEDLE BIOPSY: CPT

## 2019-12-17 PROCEDURE — 36415 COLL VENOUS BLD VENIPUNCTURE: CPT

## 2019-12-17 PROCEDURE — C1894 INTRO/SHEATH, NON-LASER: HCPCS

## 2019-12-17 RX ORDER — MIDAZOLAM HYDROCHLORIDE 1 MG/ML
INJECTION INTRAMUSCULAR; INTRAVENOUS
Status: COMPLETED | OUTPATIENT
Start: 2019-12-17 | End: 2019-12-17

## 2019-12-17 RX ORDER — DEXTROSE MONOHYDRATE 50 MG/ML
100 INJECTION, SOLUTION INTRAVENOUS PRN
Status: DISCONTINUED | OUTPATIENT
Start: 2019-12-17 | End: 2019-12-30 | Stop reason: HOSPADM

## 2019-12-17 RX ORDER — FLUCONAZOLE 2 MG/ML
400 INJECTION, SOLUTION INTRAVENOUS EVERY 24 HOURS
Status: DISCONTINUED | OUTPATIENT
Start: 2019-12-17 | End: 2019-12-20

## 2019-12-17 RX ORDER — FENTANYL CITRATE 50 UG/ML
INJECTION, SOLUTION INTRAMUSCULAR; INTRAVENOUS
Status: COMPLETED | OUTPATIENT
Start: 2019-12-17 | End: 2019-12-17

## 2019-12-17 RX ORDER — OXYCODONE HYDROCHLORIDE AND ACETAMINOPHEN 5; 325 MG/1; MG/1
1 TABLET ORAL EVERY 4 HOURS PRN
Status: DISCONTINUED | OUTPATIENT
Start: 2019-12-17 | End: 2019-12-26

## 2019-12-17 RX ORDER — DEXTROSE MONOHYDRATE 25 G/50ML
12.5 INJECTION, SOLUTION INTRAVENOUS PRN
Status: DISCONTINUED | OUTPATIENT
Start: 2019-12-17 | End: 2019-12-30 | Stop reason: HOSPADM

## 2019-12-17 RX ORDER — SODIUM CHLORIDE 0.9 % (FLUSH) 0.9 %
10 SYRINGE (ML) INJECTION EVERY 12 HOURS SCHEDULED
Status: DISCONTINUED | OUTPATIENT
Start: 2019-12-17 | End: 2019-12-30 | Stop reason: HOSPADM

## 2019-12-17 RX ORDER — SODIUM CHLORIDE 0.9 % (FLUSH) 0.9 %
10 SYRINGE (ML) INJECTION PRN
Status: DISCONTINUED | OUTPATIENT
Start: 2019-12-17 | End: 2019-12-30 | Stop reason: HOSPADM

## 2019-12-17 RX ORDER — NICOTINE POLACRILEX 4 MG
15 LOZENGE BUCCAL PRN
Status: DISCONTINUED | OUTPATIENT
Start: 2019-12-17 | End: 2019-12-30 | Stop reason: HOSPADM

## 2019-12-17 RX ADMIN — PIPERACILLIN SODIUM AND TAZOBACTAM SODIUM 4.5 G: 4; .5 INJECTION, POWDER, LYOPHILIZED, FOR SOLUTION INTRAVENOUS at 00:06

## 2019-12-17 RX ADMIN — MIDAZOLAM 1 MG: 1 INJECTION INTRAMUSCULAR; INTRAVENOUS at 11:34

## 2019-12-17 RX ADMIN — SODIUM CHLORIDE: 9 INJECTION, SOLUTION INTRAVENOUS at 11:07

## 2019-12-17 RX ADMIN — ACETAMINOPHEN 650 MG: 325 TABLET ORAL at 23:38

## 2019-12-17 RX ADMIN — MORPHINE SULFATE 4 MG: 4 INJECTION INTRAVENOUS at 08:14

## 2019-12-17 RX ADMIN — FAMOTIDINE 20 MG: 10 INJECTION, SOLUTION INTRAVENOUS at 20:44

## 2019-12-17 RX ADMIN — MORPHINE SULFATE 4 MG: 4 INJECTION INTRAVENOUS at 01:09

## 2019-12-17 RX ADMIN — PIPERACILLIN AND TAZOBACTAM 3.38 G: 3; .375 INJECTION, POWDER, LYOPHILIZED, FOR SOLUTION INTRAVENOUS at 05:08

## 2019-12-17 RX ADMIN — Medication 50 MCG: at 11:34

## 2019-12-17 RX ADMIN — MORPHINE SULFATE 4 MG: 4 INJECTION INTRAVENOUS at 13:12

## 2019-12-17 RX ADMIN — PIPERACILLIN AND TAZOBACTAM 3.38 G: 3; .375 INJECTION, POWDER, LYOPHILIZED, FOR SOLUTION INTRAVENOUS at 21:42

## 2019-12-17 RX ADMIN — OXYCODONE AND ACETAMINOPHEN 1 TABLET: 5; 325 TABLET ORAL at 15:00

## 2019-12-17 RX ADMIN — PIPERACILLIN AND TAZOBACTAM 3.38 G: 3; .375 INJECTION, POWDER, LYOPHILIZED, FOR SOLUTION INTRAVENOUS at 13:12

## 2019-12-17 RX ADMIN — FLUCONAZOLE 400 MG: 400 INJECTION, SOLUTION INTRAVENOUS at 15:36

## 2019-12-17 RX ADMIN — FAMOTIDINE 20 MG: 10 INJECTION, SOLUTION INTRAVENOUS at 07:07

## 2019-12-17 RX ADMIN — CALCIUM GLUCONATE: 94 INJECTION, SOLUTION INTRAVENOUS at 18:07

## 2019-12-17 RX ADMIN — SODIUM CHLORIDE, PRESERVATIVE FREE 10 ML: 5 INJECTION INTRAVENOUS at 20:49

## 2019-12-17 RX ADMIN — ACETAMINOPHEN 650 MG: 325 TABLET ORAL at 19:14

## 2019-12-17 RX ADMIN — SODIUM CHLORIDE: 9 INJECTION, SOLUTION INTRAVENOUS at 18:14

## 2019-12-17 RX ADMIN — MORPHINE SULFATE 2 MG: 2 INJECTION, SOLUTION INTRAMUSCULAR; INTRAVENOUS at 04:47

## 2019-12-17 ASSESSMENT — PAIN DESCRIPTION - PROGRESSION
CLINICAL_PROGRESSION: GRADUALLY IMPROVING
CLINICAL_PROGRESSION: NOT CHANGED

## 2019-12-17 ASSESSMENT — PAIN DESCRIPTION - LOCATION
LOCATION: ABDOMEN

## 2019-12-17 ASSESSMENT — PAIN SCALES - GENERAL
PAINLEVEL_OUTOF10: 5
PAINLEVEL_OUTOF10: 9
PAINLEVEL_OUTOF10: 10
PAINLEVEL_OUTOF10: 9
PAINLEVEL_OUTOF10: 10
PAINLEVEL_OUTOF10: 7
PAINLEVEL_OUTOF10: 9
PAINLEVEL_OUTOF10: 5
PAINLEVEL_OUTOF10: 10

## 2019-12-17 ASSESSMENT — ENCOUNTER SYMPTOMS
RESPIRATORY NEGATIVE: 1
NAUSEA: 1

## 2019-12-17 ASSESSMENT — PAIN DESCRIPTION - PAIN TYPE
TYPE: ACUTE PAIN

## 2019-12-17 ASSESSMENT — PAIN DESCRIPTION - FREQUENCY
FREQUENCY: CONTINUOUS

## 2019-12-17 ASSESSMENT — PAIN DESCRIPTION - DESCRIPTORS
DESCRIPTORS: CRAMPING

## 2019-12-17 ASSESSMENT — PAIN DESCRIPTION - ONSET
ONSET: ON-GOING

## 2019-12-17 ASSESSMENT — PAIN DESCRIPTION - ORIENTATION
ORIENTATION: LOWER;MID
ORIENTATION: LOWER

## 2019-12-17 NOTE — BRIEF OP NOTE
Brief Postoperative Note    Usha Tee  YOB: 1950  [id number]    Pre-operative Diagnosis: Pelvic collection    Post-operative Diagnosis: Same    Procedure: CT guided 12 Fr pelvic drain placement    Anesthesia: Moderate Sedation    Surgeons/Assistants: [place]    Estimated Blood Loss: less than 50     Complications: None    Specimens: Was Obtained: foul thick brown fecal material;    Electronically signed by Concepcion Richter MD on 12/17/2019 at 1:12 PM

## 2019-12-17 NOTE — PROGRESS NOTES
Received call from [place] [facility], spoke with Dr. Clair Dominguez about results of pt's CT abd tonight. Put call out to Dr. Michi Loera re: results. Received order to keep pt NPO, consult IR for possible percutaneous drng of abscess , start iv Zosyn every 8 hrs, Morphine 2-4 mg iv every 3 hrs prn.

## 2019-12-17 NOTE — PROGRESS NOTES
Surgery Progress Note            PATIENT NAME: Betty Amanda     TODAY'S DATE: 12/17/2019, 6:39 AM    SUBJECTIVE:    Pt seen and examined. She has had intense pain to her lower abdomen overnight. No emesis with the zofran. She denies any chest pain or shortness of breath. Patient is voiding without any stool or air in her urine. She describes her stools as soft and mushy. .     OBJECTIVE:   VITALS:  BP (!) 121/55   Pulse 90   Temp 99.7 °F (37.6 °C) (Oral)   Resp 16   Ht 5' 7\" (1.702 m)   Wt 225 lb (102.1 kg)   LMP  (LMP Unknown)   SpO2 99%   BMI 35.24 kg/m²      INTAKE/OUTPUT:      Intake/Output Summary (Last 24 hours) at 12/17/2019 9106  Last data filed at 12/17/2019 3376  Gross per 24 hour   Intake 2617 ml   Output 400 ml   Net 2217 ml       PHYSICAL EXAM  General Appearance:  awake, alert, oriented, in no acute distress  Skin:  Warm and dry   Head/face:  NCAT and oral mucosa moist  Lungs:  Respirations even and unlabored   Heart:  Heart regular rate and rhythm  Abdomen:  Soft, non-distended, tender to lower abdomen. No peritoneal signs.  Incisions healing well  Extremities: no gross edema and no cyanosis   Neurologic:  No focal deficits         Data:  CBC with Differential:    Lab Results   Component Value Date    WBC 14.4 12/16/2019    RBC 3.41 12/16/2019    RBC 3.98 12/06/2011    HGB 9.5 12/16/2019    HCT 31.0 12/16/2019     12/16/2019     12/06/2011    MCV 90.9 12/16/2019    MCH 27.9 12/16/2019    MCHC 30.6 12/16/2019    RDW 16.3 12/16/2019    LYMPHOPCT 12 12/15/2019    MONOPCT 9 12/15/2019    BASOPCT 1 12/15/2019    MONOSABS 1.21 12/15/2019    LYMPHSABS 1.61 12/15/2019    EOSABS 0.00 12/15/2019    BASOSABS 0.13 12/15/2019    DIFFTYPE NOT REPORTED 12/15/2019     BMP:    Lab Results   Component Value Date     12/16/2019    K 4.1 12/16/2019    CL 98 12/16/2019    CO2 20 12/16/2019    BUN 12 12/16/2019    LABALBU 2.9 12/15/2019    LABALBU 4.0 12/06/2011    CREATININE 0.82 free air or free fluid in the peritoneal cavity. Bones/Soft Tissues: There is a left of midline incisional hernia with focal anterior protrusion increasing in size from the prior study. No acute bone finding. There is a large interloop mid pelvic abscess measuring up to 11.6 cm increasing in size from 12/04/2019. Contents of the abscess represent a mixture of stool-like material with mixed soft tissue and gas. There is no large air-fluid level suggesting percutaneous drainage may be problematic. [facility] Abdomen Pelvis W Iv Contrast Additional Contrast? Oral    Result Date: 12/4/2019  EXAMINATION: CT OF THE ABDOMEN AND PELVIS WITH CONTRAST 12/4/2019 10:14 am TECHNIQUE: CT of the abdomen and pelvis was performed with the administration of intravenous contrast. Multiplanar reformatted images are provided for review. Dose modulation, iterative reconstruction, and/or weight based adjustment of the mA/kV was utilized to reduce the radiation dose to as low as reasonably achievable. COMPARISON: 11/30/2019 HISTORY: ORDERING SYSTEM PROVIDED HISTORY: eval rectal anastomotic leak TECHNOLOGIST PROVIDED HISTORY: eval rectal anastomotic leak FINDINGS: Lower Chest: Subsegmental atelectasis in the left lung base. Organs: Findings suggestive of hepatic steatosis. Mild heterogeneous enhancement of the spleen is noted conscious may represent underlying splenic lesions or vascular lakes. The pancreas, adrenals and kidneys reveal no acute findings. GI/Bowel: Oral contrast is present in the stomach and small bowel. Contrast has not yet reached the colon. The fluid and gas collection adjacent to the distal colon within the midline pelvis is again demonstrated, however appears less prominent. The component on the left side of the colon on axial image 132 measures approximately 8.0 x 4.6 cm (upon my remeasurement this was 11.1 x 7.6 cm).   The component right pelvis measures approximately 7.1 x 2.4 cm on image 128 (I remeasure

## 2019-12-17 NOTE — PLAN OF CARE
Nutrition Problem: Inadequate oral intake  Intervention: Food and/or Nutrient Delivery: Continue NPO, Start Parenteral Nutrition  Nutritional Goals: PN to meet >90% of energy and protein needs

## 2019-12-17 NOTE — CONSULTS
General Surgery:  Consult Note        PATIENT NAME: Gisell Fraser   YOB: 1950    ADMISSION DATE: 12/15/2019 10:10 PM     Admitting Provider: Dr. Colleen Severin Physician: Dr. Deric Greco: 12/16/2019    Chief Complaint:  Dry mouth  Consult Regarding:  Recent colostomy reversal     HISTORY OF PRESENT ILLNESS:  The patient is a 81 y.o. female  who was admitted on 12/16/19 with c/o dry mouth and feeling dehydrated. Pt recently had a colostomy reversal on 38/33/47 with complicated recovery involving small anastomotic leak treated with bowel rest. Since she has been in and out of the hospital a couple times for fluid overload and new onset CHF which she had an echo that demonstrated mild pulm htn, she was treated with lasix, which she has been taking at home. Yesterday she felt her mouth was dry, unable to drink enough. She has been tolerating diet w/out n/v. She still does not have much of an appetite. Abdominal pain minimal slight pubic pressure. Urinating w/out difficulty. Diarrhea from last admission has resolved, now having normal bowel movements. She is doing her daily activities w/out problem. Denies fever, chills, cp, sob. GS  Consulted for leukocytosis 13K and recent surgery. Past Medical History:        Diagnosis Date    Allergic rhinitis     Bladder prolapse     Constipation     5/2019 resolved    Fundic gland polyposis of stomach 08/17/2017    per EGD    GERD (gastroesophageal reflux disease)     on no medications    Hiatal hernia     History of cardiac cath 12/2002    pt denies blockage    History of diverticulitis     Hyperlipidemia     borderline, on no medication    Hypertension     MRSA (methicillin resistant staph aureus) culture positive 11/01/2016    nasal    MRSA carrier     follows with ID    Obesity     Osteoarthritis     Systolic murmur     benign systolic murmur (history of).  Pt does not follow-up with a cardiologist (Written violence:     Fear of current or ex partner: Not on file     Emotionally abused: Not on file     Physically abused: Not on file     Forced sexual activity: Not on file   Other Topics Concern    Not on file   Social History Narrative    Not on file       Family History:       Problem Relation Age of Onset    Heart Disease Mother     COPD Father     Cancer Brother         thyroid, prostate, lung       REVIEW OF SYSTEMS:    CONSTITUTIONAL: Denies recent weight loss, fatigue, fevers, chills. HEENT: +dry mouth Denies rhinorrhea, dysphagia, odynphagia. CARDIOVASCULAR: new onset CHFDenies history of MI, recent chest pain. RESPIRATORY: +pulm htn Denies recent history of shortness of breath or history of PE. GASTROINTESTINAL: see hpi  GENITOURINARY: Denies increased frequency or dysuria. HEMATOLOGIC/LYMPHATIC: Denies history of anemia or DVTs. ENDOCRINE: Denies history of thyroid problems or diabetes. NEURO: Denies history of CVA, TIA. Review of systems negative unless listed above. PHYSICAL EXAM:    VITALS:  BP (!) 141/62   Pulse 95   Temp 98.2 °F (36.8 °C) (Oral)   Resp 16   Ht 5' 7\" (1.702 m)   Wt 224 lb 9.6 oz (101.9 kg)   LMP  (LMP Unknown)   SpO2 99%   BMI 35.18 kg/m²   INTAKE/OUTPUT:     Intake/Output Summary (Last 24 hours) at 12/16/2019 1919  Last data filed at 12/16/2019 1841  Gross per 24 hour   Intake 1732 ml   Output 800 ml   Net 932 ml       CONSTITUTIONAL:  awake, alert, not distressed and mildly obese  HEENT: Normocephalic/atraumatic, without obvious abnormality. NECK:  Supple, symmetrical, trachea midline   CARDIOVASCULAR: s1+s2  LUNGS: equal and symmetric chest rise/fall, non-labored  ABDOMEN: soft, nd, nttp. Incisions c/d/i healing well, scab noted to midline superior aspect and colostomy site. No surrounding erythema or induration. MUSCULOSKELETAL: Muscle strength intact in all extremities bilaterally. NEUROLOGIC: CN II- XII intact.  Gross motor intact without focal weakness. SKIN: No cyanosis, rashes, or edema noted. Orientation:   oriented to person, place, and time        CBC with Differential:    Lab Results   Component Value Date    WBC 14.4 12/16/2019    RBC 3.41 12/16/2019    RBC 3.98 12/06/2011    HGB 9.5 12/16/2019    HCT 31.0 12/16/2019     12/16/2019     12/06/2011    MCV 90.9 12/16/2019    MCH 27.9 12/16/2019    MCHC 30.6 12/16/2019    RDW 16.3 12/16/2019    LYMPHOPCT 12 12/15/2019    MONOPCT 9 12/15/2019    BASOPCT 1 12/15/2019    MONOSABS 1.21 12/15/2019    LYMPHSABS 1.61 12/15/2019    EOSABS 0.00 12/15/2019    BASOSABS 0.13 12/15/2019    DIFFTYPE NOT REPORTED 12/15/2019     BMP:    Lab Results   Component Value Date     12/16/2019    K 4.1 12/16/2019    CL 98 12/16/2019    CO2 20 12/16/2019    BUN 12 12/16/2019    LABALBU 2.9 12/15/2019    LABALBU 4.0 12/06/2011    CREATININE 0.82 12/16/2019    CALCIUM 9.1 12/16/2019    GFRAA >60 12/16/2019    LABGLOM >60 12/16/2019    GLUCOSE 120 12/16/2019    GLUCOSE 94 12/06/2011     U/A:    Lab Results   Component Value Date    NITRITE negative 11/26/2019    COLORU DARK YELLOW 12/15/2019    PROTEINU TRACE 12/15/2019    PHUR 6.0 12/15/2019    WBCUA 5 TO 10 12/15/2019    RBCUA 0 TO 2 12/15/2019    MUCUS 1+ 12/15/2019    TRICHOMONAS NOT REPORTED 12/15/2019    YEAST NOT REPORTED 12/15/2019    BACTERIA MODERATE 12/15/2019    CLARITYU cloudy 11/26/2019    SPECGRAV 1.022 12/15/2019    LEUKOCYTESUR SMALL 12/15/2019    UROBILINOGEN Normal 12/15/2019    BILIRUBINUR  12/15/2019     Presumptive positive. Unable to confirm due to unavailability of reagent.     BILIRUBINUR Small 11/26/2019    BILIRUBINUR NEGATIVE 09/10/2011    BLOODU negative 11/26/2019    GLUCOSEU NEGATIVE 12/15/2019    GLUCOSEU NEGATIVE 09/10/2011    AMORPHOUS NOT REPORTED 12/15/2019       Pertinent Radiology:   Xr Abdomen (kub) (single Ap View)    Result Date: 12/15/2019  EXAMINATION: ONE XRAY VIEW OF THE CHEST; ONE SUPINE XRAY VIEW(S) OF THE ABDOMEN 12/15/2019 11:41 am COMPARISON: Chest x-ray 11/21/2019, the min 12/03/2019. HISTORY: ORDERING SYSTEM PROVIDED HISTORY: SOB TECHNOLOGIST PROVIDED HISTORY: SOB Acuity: Acute Type of Exam: Unknown FINDINGS: Chest: Borderline cardiomegaly. No focal consolidation. No pulmonary edema. No pleural effusion or pneumothorax. Bones grossly intact. Abdomen: Single AP view shows normal bowel gas pattern. No suspicious masses. Bones grossly intact. Radiographs of the chest and abdomen show no acute process. Stable borderline cardiomegaly. Xr Chest Portable    Result Date: 12/15/2019  EXAMINATION: ONE XRAY VIEW OF THE CHEST; ONE SUPINE XRAY VIEW(S) OF THE ABDOMEN 12/15/2019 11:41 am COMPARISON: Chest x-ray 11/21/2019, the min 12/03/2019. HISTORY: ORDERING SYSTEM PROVIDED HISTORY: SOB TECHNOLOGIST PROVIDED HISTORY: SOB Acuity: Acute Type of Exam: Unknown FINDINGS: Chest: Borderline cardiomegaly. No focal consolidation. No pulmonary edema. No pleural effusion or pneumothorax. Bones grossly intact. Abdomen: Single AP view shows normal bowel gas pattern. No suspicious masses. Bones grossly intact. Radiographs of the chest and abdomen show no acute process. Stable borderline cardiomegaly. ASSESSMENT:  Active Hospital Problems    Diagnosis Date Noted    Sepsis ([facility] [address] [A41.9] 12/16/2019       Paulie Stephens is a 80 yo F s/p reversal of colostomy 14/92/07 with complicated recovery involving small anastomotic leak treated with bowel rest. Pt recovering well, now presents with c/o dry mouth. Abdominal pain improving, no fever or chills. Unlikely continued anastomotic leak    Plan:  1. Continue medical mgmt and supportive care per primary  2. Diet: CLD until CT abd/pelv reviewed   3. Ok to monitor off abx  4. F/u ct abd/pelv      Electronically signed by Carolina Richards DO  on 12/16/2019 at 7:19 PM     I Dr. Leonid Earl saw and examined the patient. I have edited the above and agree with the above. Geoffrey Santacruz  Colorectal Surgery

## 2019-12-17 NOTE — PROGRESS NOTES
Progress Note    12/17/2019   4:52 PM    Name:  Gilbert De La Garza  MRN:    [id number]     [place]:     [de-identified]   Room:  [facility] Day: 1     Admit Date: 12/15/2019 10:10 PM  PCP: Harmeet Curtis MD    Subjective:     C/C:   Chief Complaint   Patient presents with   [place] Dehydration     seen this am for same    Nausea    Other     unable to urinate       Interval History: Status: not changed and patient is resting comfortably and just had an dose of morphine for pain    ROS:  Constitutional: negative for chills, fevers, sweats  Respiratory: negative for cough, dyspnea on exertion, hemoptysis, shortness of breath, wheezing  Cardiovascular: negative for chest pain, chest pressure/discomfort, dyspnea, lower extremity edema, palpitations  Gastrointestinal: Positive for abdominal pain, constipation, diarrhea, nausea, vomiting  Neurological: negative for dizziness and headaches    Medications:      Allergies: No Known Allergies    Current Meds: famotidine (PEPCID) injection 20 mg, BID  PN-Adult 2-in-1 Central Line (Custom), Continuous TPN  insulin lispro (HUMALOG) injection vial 0-6 Units, 4 times per day  glucose (GLUTOSE) 40 % oral gel 15 g, PRN  dextrose 50 % IV solution, PRN  glucagon (rDNA) injection 1 mg, PRN  dextrose 5 % solution, PRN  fluconazole (DIFLUCAN) in 0.9 % sodium chloride IVPB 400 mg, Q24H  oxyCODONE-acetaminophen (PERCOCET) 5-325 MG per tablet 1 tablet, Q4H PRN  sodium chloride flush 0.9 % injection 10 mL, 2 times per day  sodium chloride flush 0.9 % injection 10 mL, PRN  0.9 % sodium chloride infusion, Continuous  sodium chloride flush 0.9 % injection 10 mL, 2 times per day  sodium chloride flush 0.9 % injection 10 mL, PRN  magnesium hydroxide (MILK OF MAGNESIA) 400 MG/5ML suspension 30 mL, Daily PRN  enoxaparin (LOVENOX) injection 30 mg, BID  acetaminophen (TYLENOL) tablet 650 mg, Q4H PRN  Vitamin D (CHOLECALCIFEROL) tablet 2,000 Units, Daily  metoprolol succinate (TOPROL XL) extended Not on file     Emotionally abused: Not on file     Physically abused: Not on file     Forced sexual activity: Not on file   Other Topics Concern    Not on file   Social History Narrative    Not on file       Vitals:  /73   Pulse 105   Temp 98.4 °F (36.9 °C) (Oral)   Resp 16   Ht 5' 7\" (1.702 m)   Wt 225 lb (102.1 kg)   LMP  (LMP Unknown)   SpO2 96%   BMI 35.24 kg/m²   Temp (24hrs), Av.3 °F (37.4 °C), Min:98.4 °F (36.9 °C), Max:100.1 °F (37.8 °C)    Invalid input(s): [facility]    I/O (24Hr):     Intake/Output Summary (Last 24 hours) at 2019 1652  Last data filed at 2019 8763  Gross per 24 hour   Intake 1707 ml   Output --   Net 1707 ml       Labs:  [unfilled]    Physical Examination:        General appearance: alert, cooperative and no distress  Mental Status: oriented to person, place and time and normal affect  Lungs: clear to auscultation bilaterally, normal effort  Heart: regular rate and rhythm, no murmur  Abdomen: soft, tender on palpation, bowel sounds present all four quadrants but diminished  Extremities: no edema, redness or tenderness in the calves  Skin: no gross lesions, rashes, or induration    Assessment:        Primary Problem  <principal problem not specified>     Active Hospital Problems    Diagnosis Date Noted    Mild malnutrition ([facility] [address] [E44.1] 2019    Sepsis ([facility] [address] [A41.9] 2019     Past Medical History:   Diagnosis Date    Allergic rhinitis     Bladder prolapse     Constipation     2019 resolved    Fundic gland polyposis of stomach 2017    per EGD    GERD (gastroesophageal reflux disease)     on no medications    Hiatal hernia     History of cardiac cath 2002    pt denies blockage    History of diverticulitis     Hyperlipidemia     borderline, on no medication    Hypertension     MRSA (methicillin resistant staph aureus) culture positive 2016    nasal    MRSA carrier     follows with ID    Obesity     Osteoarthritis     Systolic murmur     benign systolic murmur (history of). Pt does not follow-up with a cardiologist (Written 09/25/2019).  Thyroid disease     goiter    Wears glasses         Plan:        1. Patient CT scan of the abdomen and pelvis reviewed which showed an abscess in which was drained and patient is on currently on Zosyn antibiotic and pain control with morphine  2. Infectious diseases and colorectal surgery reports reviewed  3. Patient is currently n.p.o.  4. Supportive care  5.  All medications labs and history reviewed      Electronically signed by Anthony Haines MD on 12/17/2019 at 4:52 PM

## 2019-12-17 NOTE — PROGRESS NOTES
sent back to the emergency room  and was worked up and sent home. The patient reports that around  she started having rectal bleeding which prompted her to come back into the emergency room and at that point in time she was diagnosed with an anastomotic leak on CT imaging. The patient was treated conservatively with IV antibiotics and subsequently discharged on oral antimicrobial therapy with ciprofloxacin and metronidazole.     The patient reports that her diarrhea had improved but recently resumed. She had been at home and still generally is not feeling very well. She does not report any significant abdominal pain but has had some nausea and chills. She has not been able to tolerate oral intake very well and was having difficulty urinating well as leg swelling. She came back into the emergency room and testing did show question of a urinary tract infection. Current evaluation:2019    /73   Pulse 105   Temp 98.4 °F (36.9 °C) (Oral)   Resp 16   Ht 5' 7\" (1.702 m)   Wt 225 lb (102.1 kg)   LMP  (LMP Unknown)   SpO2 96%   BMI 35.24 kg/m²     Temperature Range: Temp: 98.4 °F (36.9 °C) Temp  Av.1 °F (37.3 °C)  Min: 98.2 °F (36.8 °C)  Max: 100.1 °F (37.8 °C)  The patient is seen and evaluated at bedside she is awake and alert and very distraught about the CAT scan findings. She does have some low-grade fevers. She is still has some mild nausea. Review of Systems   Constitutional: Negative. Respiratory: Negative. Cardiovascular: Negative. Gastrointestinal: Positive for nausea. Genitourinary: Negative. Musculoskeletal: Negative. Skin: Negative. Neurological: Negative. Psychiatric/Behavioral: Negative. Physical Examination :     Physical Exam  Constitutional:       Appearance: She is well-developed. HENT:      Head: Normocephalic and atraumatic. Neck:      Musculoskeletal: Normal range of motion and neck supple. Order Status: Completed Specimen: Blood Updated: 12/17/19 1002    Specimen Description . BLOOD    Special Requests 10ML LT WRIST    Culture NO GROWTH 1 DAY   Rapid influenza A/B antigens [[id number]] Collected: 12/15/19 2301   Order Status: Completed Specimen: Nasopharyngeal Swab Updated: 12/15/19 2353    Specimen Description . NASOPHARYNGEAL SWAB    Special Requests NOT REPORTED    Direct Exam PRESUMPTIVE NEGATIVE for Influenza A + B antigens.                                PCR testing to confirm this result is available upon request. Teofilo Miranda will be saved in the laboratory for 7 days.  Please call [phone] if PCR testing is indicated. Medications:      famotidine (PEPCID) injection  20 mg Intravenous BID    sodium chloride flush  10 mL Intravenous 2 times per day    enoxaparin  30 mg Subcutaneous BID    Vitamin D  2,000 Units Oral Daily    metoprolol succinate  25 mg Oral Daily    piperacillin-tazobactam  3.375 g Intravenous Q8H           [facility]  Nyasia Mcdonald MD  [employer] messaging  OFFICE: [phone]      Electronically signed by Nyasia Mcdonald MD on 12/17/2019 at 1:43 PM  Thank you for allowing us to participate in the care of this patient. Please call with questions. This note iscreated with the assistance of a speech recognition program.  While intending to generate a document that actually reflects the content of the visit, the document can still have some errors including those of syntax andsound a like substitutions which may escape proof reading. In such instances, actual meaning can be extrapolated by contextual diversion.

## 2019-12-17 NOTE — PROGRESS NOTES
Nutrition Assessment (Parenteral Nutrition)    Type and Reason for Visit: Initial, Consult, Positive Nutrition Screen(PN ordering and management)    Nutrition Recommendations:   1. Continue NPO status   2. Start TPN with Clinimix 5/20 custom at 41.7 ml/hr, continuous with following additives; Na Ace 50 mEq, NaCl 50 mEq, K+ Ace 10 mEq, K Phos 15 mmol, KCl 30 mEq, Ca++gluconate 8 mEq, MgSO4 8 mEq, MVI and trace elements. 3. No lipids  4. Monitor TPN tolerance, labs and adjust electrolytes as needed     Nutrition Assessment: Consult for Parenteral Nutrition ordering and management. Patient mildly malnourished upon admit as evidenced by suboptimal energy intake compared to needs for >7 days and unintentional weight loss (4%, although not considered to be significant). At risk for further nutrition compromise due to to NPO status/TPN related to needing bowel rest.  Will start PN and monitor labs and nutrition progression. Malnutrition Assessment:  · Malnutrition Status: Mild Malnutrition  · Context: Acute illness or injury  · Findings of the 6 clinical characteristics of malnutrition (Minimum of 2 out of 6 clinical characteristics is required to make the diagnosis of moderate or severe Protein Calorie Malnutrition based on AND/ASPEN Guidelines):  1. Energy Intake-Less than or equal to 75% of estimated energy requirement, Greater than or equal to 7 days    2. Weight Loss-2% loss or greater(Not considered to be significant), in 1 month  3. Fat Loss-No significant subcutaneous fat loss,    4. Muscle Loss-No significant muscle mass loss,    5. Fluid Accumulation-Mild fluid accumulation, Extremities  6.   Strength-Not measured    Nutrition Risk Level: High    Nutrient Needs:  · Estimated Daily Total Kcal: 4700-4635 kcal (15-18 kcal/kg admission weight)  · Estimated Daily Protein (g): 104-122 g (1.7-2 g/kg IBW)  · Estimated Daily Total Fluid (ml/day): 1 mL/kcal    Nutrition Diagnosis:   · Problem: Inadequate oral intake  · Etiology: related to Alteration in GI function     Signs and symptoms:  as evidenced by NPO status due to medical condition, Nutrition support - PN, Weight loss, Diet history of poor intake    Objective Information:  · Nutrition-Focused Physical Findings: Trace RLE, LLE edema  · Wound Type: None  · Current Nutrition Therapies:  · Oral Diet Orders: NPO   · ONS intake: NPO  · Parenteral Nutrition Orders:  · Type and Formula: 2-in-1 Custom   · Lipids: None  · Rate/Volume: 41.7 mL/hr  · Duration: Continuous  · Current PN Order Provides: at 41.7 mL/hr = 880 kcal, 50 gm protein, 200 gm dextrose    · Anthropometric Measures:  · Ht: 5' 7\" (170.2 cm)   · Current Body Wt: 225 lb 1.4 oz (102.1 kg)  · Admission Body Wt: 223 lb 8.7 oz (101.4 kg)  · Usual Body Wt: 235 lb (106.6 kg)  · % Weight Change:  ,  4% (10#) in one month  · Ideal Body Wt: 135 lb (61.2 kg), % Ideal Body 167%  · BMI Classification: BMI 35.0 - 39.9 Obese Class II    Nutrition Interventions:   Continue NPO, Start Parenteral Nutrition  Continued Inpatient Monitoring, Coordination of Care    Nutrition Evaluation:   · Evaluation: Goals set   · Goals: PN to meet >90% of energy and protein needs   · Monitoring: Nutrition Progression, PN Intake, PN Tolerance, Weight, Pertinent Labs      Cem Ferrell RDN, ISELA  RD Office Phone: [phone]

## 2019-12-18 ENCOUNTER — ANESTHESIA EVENT (OUTPATIENT)
Dept: OPERATING ROOM | Age: 69
DRG: 856 | End: 2019-12-18
Payer: MEDICARE

## 2019-12-18 ENCOUNTER — APPOINTMENT (OUTPATIENT)
Dept: GENERAL RADIOLOGY | Age: 69
DRG: 856 | End: 2019-12-18
Payer: MEDICARE

## 2019-12-18 LAB
ANION GAP SERPL CALCULATED.3IONS-SCNC: 5 MMOL/L (ref 9–17)
BUN BLDV-MCNC: 11 MG/DL (ref 8–23)
BUN/CREAT BLD: 14 (ref 9–20)
CALCIUM SERPL-MCNC: 8.7 MG/DL (ref 8.6–10.4)
CHLORIDE BLD-SCNC: 105 MMOL/L (ref 98–107)
CO2: 26 MMOL/L (ref 20–31)
CREAT SERPL-MCNC: 0.77 MG/DL (ref 0.5–0.9)
GFR AFRICAN AMERICAN: >60 ML/MIN
GFR NON-AFRICAN AMERICAN: >60 ML/MIN
GFR SERPL CREATININE-BSD FRML MDRD: ABNORMAL ML/MIN/{1.73_M2}
GFR SERPL CREATININE-BSD FRML MDRD: ABNORMAL ML/MIN/{1.73_M2}
GLUCOSE BLD-MCNC: 120 MG/DL (ref 65–105)
GLUCOSE BLD-MCNC: 128 MG/DL (ref 65–105)
GLUCOSE BLD-MCNC: 132 MG/DL (ref 70–99)
HCT VFR BLD CALC: 26.8 % (ref 36.3–47.1)
HEMOGLOBIN: 8 G/DL (ref 11.9–15.1)
MAGNESIUM: 2.2 MG/DL (ref 1.6–2.6)
MCH RBC QN AUTO: 27.6 PG (ref 25.2–33.5)
MCHC RBC AUTO-ENTMCNC: 29.9 G/DL (ref 28.4–34.8)
MCV RBC AUTO: 92.4 FL (ref 82.6–102.9)
NRBC AUTOMATED: 0 PER 100 WBC
PDW BLD-RTO: 16.3 % (ref 11.8–14.4)
PHOSPHORUS: 3 MG/DL (ref 2.6–4.5)
PLATELET # BLD: 294 K/UL (ref 138–453)
PMV BLD AUTO: 9.3 FL (ref 8.1–13.5)
POTASSIUM SERPL-SCNC: 4.2 MMOL/L (ref 3.7–5.3)
RBC # BLD: 2.9 M/UL (ref 3.95–5.11)
SODIUM BLD-SCNC: 136 MMOL/L (ref 135–144)
WBC # BLD: 8.3 K/UL (ref 3.5–11.3)

## 2019-12-18 PROCEDURE — 85027 COMPLETE CBC AUTOMATED: CPT

## 2019-12-18 PROCEDURE — 6360000002 HC RX W HCPCS: Performed by: COLON & RECTAL SURGERY

## 2019-12-18 PROCEDURE — 1200000000 HC SEMI PRIVATE

## 2019-12-18 PROCEDURE — 80048 BASIC METABOLIC PNL TOTAL CA: CPT

## 2019-12-18 PROCEDURE — 2500000003 HC RX 250 WO HCPCS: Performed by: STUDENT IN AN ORGANIZED HEALTH CARE EDUCATION/TRAINING PROGRAM

## 2019-12-18 PROCEDURE — 84100 ASSAY OF PHOSPHORUS: CPT

## 2019-12-18 PROCEDURE — 6360000002 HC RX W HCPCS: Performed by: INTERNAL MEDICINE

## 2019-12-18 PROCEDURE — 6370000000 HC RX 637 (ALT 250 FOR IP): Performed by: INTERNAL MEDICINE

## 2019-12-18 PROCEDURE — 2580000003 HC RX 258: Performed by: RADIOLOGY

## 2019-12-18 PROCEDURE — 99232 SBSQ HOSP IP/OBS MODERATE 35: CPT | Performed by: INTERNAL MEDICINE

## 2019-12-18 PROCEDURE — 36415 COLL VENOUS BLD VENIPUNCTURE: CPT

## 2019-12-18 PROCEDURE — 2580000003 HC RX 258: Performed by: COLON & RECTAL SURGERY

## 2019-12-18 PROCEDURE — 82947 ASSAY GLUCOSE BLOOD QUANT: CPT

## 2019-12-18 PROCEDURE — 83735 ASSAY OF MAGNESIUM: CPT

## 2019-12-18 PROCEDURE — 2580000003 HC RX 258: Performed by: INTERNAL MEDICINE

## 2019-12-18 RX ORDER — LIDOCAINE HYDROCHLORIDE 10 MG/ML
1 INJECTION, SOLUTION EPIDURAL; INFILTRATION; INTRACAUDAL; PERINEURAL
Status: CANCELLED | OUTPATIENT
Start: 2019-12-18 | End: 2019-12-18

## 2019-12-18 RX ORDER — SODIUM CHLORIDE 9 MG/ML
INJECTION, SOLUTION INTRAVENOUS CONTINUOUS
Status: CANCELLED | OUTPATIENT
Start: 2019-12-18

## 2019-12-18 RX ORDER — SODIUM CHLORIDE, SODIUM LACTATE, POTASSIUM CHLORIDE, CALCIUM CHLORIDE 600; 310; 30; 20 MG/100ML; MG/100ML; MG/100ML; MG/100ML
INJECTION, SOLUTION INTRAVENOUS CONTINUOUS
Status: CANCELLED | OUTPATIENT
Start: 2019-12-18

## 2019-12-18 RX ORDER — SODIUM CHLORIDE 0.9 % (FLUSH) 0.9 %
10 SYRINGE (ML) INJECTION EVERY 12 HOURS SCHEDULED
Status: CANCELLED | OUTPATIENT
Start: 2019-12-18

## 2019-12-18 RX ORDER — SODIUM CHLORIDE 0.9 % (FLUSH) 0.9 %
10 SYRINGE (ML) INJECTION PRN
Status: CANCELLED | OUTPATIENT
Start: 2019-12-18

## 2019-12-18 RX ADMIN — FAMOTIDINE 20 MG: 10 INJECTION, SOLUTION INTRAVENOUS at 21:38

## 2019-12-18 RX ADMIN — FAMOTIDINE 20 MG: 10 INJECTION, SOLUTION INTRAVENOUS at 09:06

## 2019-12-18 RX ADMIN — PIPERACILLIN AND TAZOBACTAM 3.38 G: 3; .375 INJECTION, POWDER, LYOPHILIZED, FOR SOLUTION INTRAVENOUS at 05:47

## 2019-12-18 RX ADMIN — PIPERACILLIN AND TAZOBACTAM 3.38 G: 3; .375 INJECTION, POWDER, LYOPHILIZED, FOR SOLUTION INTRAVENOUS at 21:38

## 2019-12-18 RX ADMIN — Medication 10 ML: at 21:52

## 2019-12-18 RX ADMIN — METOPROLOL SUCCINATE 25 MG: 25 TABLET, FILM COATED, EXTENDED RELEASE ORAL at 09:06

## 2019-12-18 RX ADMIN — PIPERACILLIN AND TAZOBACTAM 3.38 G: 3; .375 INJECTION, POWDER, LYOPHILIZED, FOR SOLUTION INTRAVENOUS at 13:08

## 2019-12-18 RX ADMIN — ACETAMINOPHEN 650 MG: 325 TABLET ORAL at 06:51

## 2019-12-18 RX ADMIN — VITAMIN D, TAB 1000IU (100/BT) 2000 UNITS: 25 TAB at 09:06

## 2019-12-18 RX ADMIN — ACETAMINOPHEN 650 MG: 325 TABLET ORAL at 21:37

## 2019-12-18 RX ADMIN — ACETAMINOPHEN 650 MG: 325 TABLET ORAL at 14:04

## 2019-12-18 RX ADMIN — SODIUM CHLORIDE: 9 INJECTION, SOLUTION INTRAVENOUS at 21:38

## 2019-12-18 RX ADMIN — CALCIUM GLUCONATE: 94 INJECTION, SOLUTION INTRAVENOUS at 17:27

## 2019-12-18 RX ADMIN — FLUCONAZOLE 400 MG: 400 INJECTION, SOLUTION INTRAVENOUS at 15:08

## 2019-12-18 ASSESSMENT — PAIN DESCRIPTION - PAIN TYPE: TYPE: ACUTE PAIN

## 2019-12-18 ASSESSMENT — PAIN DESCRIPTION - DESCRIPTORS: DESCRIPTORS: ACHING

## 2019-12-18 ASSESSMENT — ENCOUNTER SYMPTOMS
RESPIRATORY NEGATIVE: 1
NAUSEA: 1
ABDOMINAL PAIN: 1

## 2019-12-18 ASSESSMENT — PAIN SCALES - GENERAL
PAINLEVEL_OUTOF10: 3
PAINLEVEL_OUTOF10: 3
PAINLEVEL_OUTOF10: 6
PAINLEVEL_OUTOF10: 2

## 2019-12-18 ASSESSMENT — PAIN DESCRIPTION - LOCATION: LOCATION: ABDOMEN

## 2019-12-18 ASSESSMENT — PAIN DESCRIPTION - ORIENTATION: ORIENTATION: MID;LOWER

## 2019-12-18 ASSESSMENT — PAIN DESCRIPTION - ONSET: ONSET: ON-GOING

## 2019-12-18 ASSESSMENT — PAIN DESCRIPTION - FREQUENCY: FREQUENCY: INTERMITTENT

## 2019-12-18 NOTE — PROGRESS NOTES
Per Dr Xi Guevara to give patient tap water enema in the morning of 12/19/2019 before scheduled colonoscopy.

## 2019-12-18 NOTE — PLAN OF CARE
Problem: Falls - Risk of:  Goal: Will remain free from falls  Description  Will remain free from falls  Outcome: Ongoing  Note:   Room free of clutter  Hourly rounding   Non-skid socks worn  Side rails up x2  Bed low and locked  Call light in reach  Instructed to call out before getting out of bed  Anticipatory needs met  Bed alarm on  Falling star at the door and on wristband       Problem: Pain:  Goal: Pain level will decrease  Description  Pain level will decrease  12/18/2019 0114 by Rafat Mccartney RN  Outcome: Ongoing  Note:   Pain level assessed and rated on a 0-10 scale  Assess characteristics of pain  PRN pain medication given per pt request  Non-pharmacological interventions implemented  Report ineffective pain management to physician  Update pt and family of any changes  Pt instructed to call out with new onset of pain  Continue to monitor

## 2019-12-18 NOTE — H&P
breath, cough, or wheezing. No diarrhea. No  dizziness or blurred vision. All other review of systems was negative. PHYSICAL EXAMINATION:  GENERAL:  A 62-year-old white female, lying in bed, not in any acute  distress. VITAL SIGNS:  Stable. Afebrile at this time. The patient had fever  when she came into the emergency room, it was about 100 degrees  Fahrenheit. HEENT:  Atraumatic, normocephalic. Throat is clear. NECK:  No bruits. No JVD. HEART:  S1, S2 heard of normal intensity. LUNGS:  Clear to auscultation and percussion, unlabored breathing. ABDOMEN:  Soft. There is some tenderness in the lower abdomen, mostly  around the umbilical area. Bowel sounds are diminished, but heard. EXTREMITIES:  There is no edema. No calf tenderness. ASSESSMENT:  1. Abdominal pain. 2.  Difficulty urinating. 3.  Dehydration. 4.  Fever. PLAN:  Infectious Disease consult and colorectal consult. The patient  may need repeat CT scan and the patient had a CT scan done on  12/04/2019, which was stable and we will wait for ID input before  ordering CT. Continue supportive care, pain control, resume home  medications, continue Rocephin.         [facility]    D: 12/17/2019 16:42:03       T: 12/17/2019 20:31:55     [email or web address]  Job#: [id number]     Doc#: [id number]    CC:

## 2019-12-18 NOTE — PROGRESS NOTES
[facility]  Progress Note    Tyler Kenney  MRN: [id number]  Date: 12/18/2019    Reason for F/U :   Abdominal abscess    Impression :   1. Recent colostomy reversal, extensive lysis of adhesions and ventral hernia repair and complicated by an anastomotic leak  2. Large interloop mid pelvic abscess measuring 11.6 cm-increased in size from 12/4/2019  3. Nausea, poor oral intake without any obstructive process noted on imaging  4. Mild pyuria but no significant clinical symptoms to suggest a urinary tract infection  5. Intermittent diarrhea    Recommendations:   · The patient continues with Zosyn and fluconazole  · The plan is for the patient to have a colonoscopy with possible stenting/clipping of the anastomotic leak. · The patient will then subsequently be taken to the OR Thursday afternoon for washout/possible diversion    Infection Control Recommendations:   Universal precautions    Discharge Planning:   Estimated Length of IV antimicrobials: To be determined  Patient will need Midline Catheter Insertion/ PICC line Insertion: No  Patient will need: Home IV , [place],  SNF,  LTAC: Undetermined  Patient willneed outpatient wound care: No    MedicalDecision making / Summary of Stay:   Tlyer Kenney is a 81y.o.-year-old female who was initially admitted on 12/15/2019. Sammi Upton has a history of diverticulitis and underwent a diverting colostomy in May and in early November was admitted and underwent a colostomy reversal.  The patient's hospital course was complicated by fluid overload for which she received some diuretic therapy. She also reports having a lot of diarrhea during that admission which was felt secondary to the Toradol and post discharge her diarrhea issues continued. The patient reports not really feeling very well ending up being seen by a nurse practitioner has her PCP Dr. Cody Bowers body was out of town.   The patient was sent back to the emergency room November 21 and was worked up and sent home. The patient reports that around  she started having rectal bleeding which prompted her to come back into the emergency room and at that point in time she was diagnosed with an anastomotic leak on CT imaging. The patient was treated conservatively with IV antibiotics and subsequently discharged on oral antimicrobial therapy with ciprofloxacin and metronidazole.     The patient reports that her diarrhea had improved but recently resumed. She had been at home and still generally is not feeling very well. She does not report any significant abdominal pain but has had some nausea and chills. She has not been able to tolerate oral intake very well and was having difficulty urinating well as leg swelling. She came back into the emergency room and testing did show question of a urinary tract infection. Current evaluation:2019    BP (!) 118/53   Pulse 86   Temp 99.7 °F (37.6 °C) (Oral)   Resp 16   Ht 5' 7\" (1.702 m)   Wt 225 lb (102.1 kg)   LMP  (LMP Unknown)   SpO2 96%   BMI 35.24 kg/m²     Temperature Range: Temp: 99.7 °F (37.6 °C) Temp  Av.7 °F (37.6 °C)  Min: 99.7 °F (37.6 °C)  Max: 99.7 °F (37.6 °C)  The patient is seen and evaluated at bedside she is awake and alert sitting up in the chair. She reports that the pelvic pressure-like pain has resolved. She does have the drain in place and there is not much in the bulb. She does not report any subjective fever or chills. Review of Systems   Constitutional: Negative. Respiratory: Negative. Cardiovascular: Negative. Gastrointestinal: Positive for abdominal pain and nausea. Genitourinary: Negative. Musculoskeletal: Negative. Skin: Negative. Neurological: Negative. Psychiatric/Behavioral: Negative. Physical Examination :     Physical Exam  Constitutional:       Appearance: She is well-developed. HENT:      Head: Normocephalic and atraumatic.    Neck: Musculoskeletal: Normal range of motion and neck supple. Cardiovascular:      Rate and Rhythm: Regular rhythm. Heart sounds: Normal heart sounds. Pulmonary:      Effort: Pulmonary effort is normal.      Breath sounds: Normal breath sounds. Abdominal:      General: Bowel sounds are normal.      Palpations: Abdomen is soft. Comments: Percutaneous drain in place   Skin:     General: Skin is warm and dry. Neurological:      Mental Status: She is alert and oriented to person, place, and time. Laboratory data:   I have independently reviewed the followinglabs:  CBC with Differential:   Recent Labs     12/15/19  2255  12/17/19  0654 12/18/19  0547   WBC 13.4*   < > 11.0 8.3   HGB 9.5*   < > 8.7* 8.0*   HCT 31.1*   < > 27.7* 26.8*      < > 301 294   LYMPHOPCT 12*  --   --   --    MONOPCT 9*  --   --   --     < > = values in this interval not displayed. BMP:   Recent Labs     12/17/19  0654 12/17/19  1251 12/18/19  0547   *  --  136   K 4.2  --  4.2     --  105   CO2 22  --  26   BUN 10  --  11   CREATININE 0.76  --  0.77   MG  --  2.1 2.2     Hepatic Function Panel:   Recent Labs     12/15/19  2255   PROT 6.2*   LABALBU 2.9*   BILITOT 0.25*   ALKPHOS 81   ALT 6   AST 9     No results for input(s): VANCOTROUGH in the last 72 hours. No results found for: CRP  No results found for: SEDRATE    No results for input(s): PROCAL in the last 72 hours. Imaging Studies:   CT OF THE ABDOMEN AND PELVIS WITH CONTRAST 12/16/2019 7:24 pm  Impression   There is a large interloop mid pelvic abscess measuring up to 11.6 cm   increasing in size from 12/04/2019.  Contents of the abscess represent a   mixture of stool-like material with mixed soft tissue and gas.  There is no   large air-fluid level suggesting percutaneous drainage may be problematic.        Cultures:     Cult,Blood #1 [[id number]] Collected: 12/15/19 2255   Order Status: Completed Specimen: Blood Updated: 12/18/19 7296

## 2019-12-18 NOTE — PROGRESS NOTES
Progress Note    12/18/2019   12:43 PM    Name:  Usha Tee  MRN:    [id number]     [place]:     [de-identified]   Room:  [facility] Day: 2     Admit Date: 12/15/2019 10:10 PM  PCP: Ángela Kingston MD    Subjective:     C/C:   Chief Complaint   Patient presents with   [facility] Dehydration     seen this am for same    Nausea    Other     unable to urinate       Interval History: Status: not changed and patient states that her pain is slightly better otherwise no new changes    ROS:  Constitutional: negative for chills, fevers, sweats  Respiratory: negative for cough, dyspnea on exertion, hemoptysis, shortness of breath, wheezing  Cardiovascular: negative for chest pain, chest pressure/discomfort, dyspnea, lower extremity edema, palpitations  Gastrointestinal: Positive for abdominal pain, constipation, diarrhea, nausea, vomiting  Neurological: negative for dizziness and headaches    Medications:      Allergies: No Known Allergies    Current Meds: famotidine (PEPCID) injection 20 mg, BID  PN-Adult 2-in-1 Central Line (Custom), Continuous TPN  insulin lispro (HUMALOG) injection vial 0-6 Units, 4 times per day  glucose (GLUTOSE) 40 % oral gel 15 g, PRN  dextrose 50 % IV solution, PRN  glucagon (rDNA) injection 1 mg, PRN  dextrose 5 % solution, PRN  fluconazole (DIFLUCAN) in 0.9 % sodium chloride IVPB 400 mg, Q24H  oxyCODONE-acetaminophen (PERCOCET) 5-325 MG per tablet 1 tablet, Q4H PRN  sodium chloride flush 0.9 % injection 10 mL, 2 times per day  sodium chloride flush 0.9 % injection 10 mL, PRN  0.9 % sodium chloride infusion, Continuous  sodium chloride flush 0.9 % injection 10 mL, 2 times per day  sodium chloride flush 0.9 % injection 10 mL, PRN  magnesium hydroxide (MILK OF MAGNESIA) 400 MG/5ML suspension 30 mL, Daily PRN  enoxaparin (LOVENOX) injection 30 mg, BID  acetaminophen (TYLENOL) tablet 650 mg, Q4H PRN  Vitamin D (CHOLECALCIFEROL) tablet 2,000 Units, Daily  metoprolol succinate (TOPROL XL) extended Not on file     Emotionally abused: Not on file     Physically abused: Not on file     Forced sexual activity: Not on file   Other Topics Concern    Not on file   Social History Narrative    Not on file       Vitals:  BP (!) 118/53   Pulse 86   Temp 99.7 °F (37.6 °C) (Oral)   Resp 16   Ht 5' 7\" (1.702 m)   Wt 225 lb (102.1 kg)   LMP  (LMP Unknown)   SpO2 96%   BMI 35.24 kg/m²   Temp (24hrs), Av.8 °F (37.1 °C), Min:98.2 °F (36.8 °C), Max:99.7 °F (37.6 °C)    Invalid input(s): CBCBMPCMP    I/O (24Hr):   No intake or output data in the 24 hours ending 19 1243    Labs:  [unfilled]    Physical Examination:        General appearance: alert, cooperative and no distress  Mental Status: oriented to person, place and time and normal affect  Lungs: clear to auscultation bilaterally, normal effort  Heart: regular rate and rhythm, no murmur  Abdomen: soft, tender appropriately and drain placed, slightly distended, bowel sounds present all four quadrants, no masses, hepatomegaly or splenomegaly  Extremities: no edema, redness or tenderness in the calves  Skin: no gross lesions, rashes, or induration    Assessment:        Primary Problem  <principal problem not specified>     Active Hospital Problems    Diagnosis Date Noted    Mild malnutrition ([facility] [address] [E44.1] 2019    Sepsis ([facility] [address] [A41.9] 2019     Past Medical History:   Diagnosis Date    Allergic rhinitis     Bladder prolapse     Constipation     2019 resolved    Fundic gland polyposis of stomach 2017    per EGD    GERD (gastroesophageal reflux disease)     on no medications    Hiatal hernia     History of cardiac cath 2002    pt denies blockage    History of diverticulitis     Hyperlipidemia     borderline, on no medication    Hypertension     MRSA (methicillin resistant staph aureus) culture positive 2016    nasal    MRSA carrier     follows with ID    Obesity     Osteoarthritis     Systolic murmur     benign systolic murmur (history of). Pt does not follow-up with a cardiologist (Written 09/25/2019).  Thyroid disease     goiter    Wears glasses         Plan:        1. Patient's lab work within reasonable limits and hemoglobin is stable and patient is scheduled for colonoscopy and later d divercing colostomy tomorrow in the morning and afternoon and currently on antibiotics as per infectious diseases  2. Pain control with morphine and Zofran for nausea  3. Supportive care  4. Very little secretions are coming out of the drain  5.  All medications labs and history reviewed      Electronically signed by Ángela Kingston MD on 12/18/2019 at 12:43 PM

## 2019-12-18 NOTE — PROGRESS NOTES
Patient had two occurrences of soft, rust colored stools. RN notified Dr. David Marvin and inquired if he wished to send a stool sample to check for blood. Dr. David Marvin denied the need for a stool sample to be sent. Patient resting comfortably. Will continue to monitor.

## 2019-12-19 ENCOUNTER — ANESTHESIA (OUTPATIENT)
Dept: OPERATING ROOM | Age: 69
DRG: 856 | End: 2019-12-19
Payer: MEDICARE

## 2019-12-19 ENCOUNTER — APPOINTMENT (OUTPATIENT)
Dept: GENERAL RADIOLOGY | Age: 69
DRG: 856 | End: 2019-12-19
Payer: MEDICARE

## 2019-12-19 VITALS
DIASTOLIC BLOOD PRESSURE: 51 MMHG | SYSTOLIC BLOOD PRESSURE: 95 MMHG | OXYGEN SATURATION: 99 % | RESPIRATION RATE: 10 BRPM

## 2019-12-19 LAB
ANION GAP SERPL CALCULATED.3IONS-SCNC: 11 MMOL/L (ref 9–17)
BUN BLDV-MCNC: 12 MG/DL (ref 8–23)
BUN/CREAT BLD: 20 (ref 9–20)
CALCIUM SERPL-MCNC: 8.7 MG/DL (ref 8.6–10.4)
CHLORIDE BLD-SCNC: 103 MMOL/L (ref 98–107)
CO2: 23 MMOL/L (ref 20–31)
CREAT SERPL-MCNC: 0.59 MG/DL (ref 0.5–0.9)
CULTURE: NO GROWTH
GFR AFRICAN AMERICAN: >60 ML/MIN
GFR NON-AFRICAN AMERICAN: >60 ML/MIN
GFR SERPL CREATININE-BSD FRML MDRD: ABNORMAL ML/MIN/{1.73_M2}
GFR SERPL CREATININE-BSD FRML MDRD: ABNORMAL ML/MIN/{1.73_M2}
GLUCOSE BLD-MCNC: 118 MG/DL (ref 65–105)
GLUCOSE BLD-MCNC: 119 MG/DL (ref 65–105)
GLUCOSE BLD-MCNC: 121 MG/DL (ref 65–105)
GLUCOSE BLD-MCNC: 121 MG/DL (ref 65–105)
GLUCOSE BLD-MCNC: 135 MG/DL (ref 65–105)
GLUCOSE BLD-MCNC: 137 MG/DL (ref 70–99)
GLUCOSE BLD-MCNC: 149 MG/DL (ref 65–105)
HCT VFR BLD CALC: 26.2 % (ref 36.3–47.1)
HEMOGLOBIN: 7.9 G/DL (ref 11.9–15.1)
Lab: NORMAL
MCH RBC QN AUTO: 27.6 PG (ref 25.2–33.5)
MCHC RBC AUTO-ENTMCNC: 30.2 G/DL (ref 28.4–34.8)
MCV RBC AUTO: 91.6 FL (ref 82.6–102.9)
NRBC AUTOMATED: 0 PER 100 WBC
PDW BLD-RTO: 16.4 % (ref 11.8–14.4)
PLATELET # BLD: 323 K/UL (ref 138–453)
PMV BLD AUTO: 8.8 FL (ref 8.1–13.5)
POTASSIUM SERPL-SCNC: 3.6 MMOL/L (ref 3.7–5.3)
RBC # BLD: 2.86 M/UL (ref 3.95–5.11)
SODIUM BLD-SCNC: 137 MMOL/L (ref 135–144)
SPECIMEN DESCRIPTION: NORMAL
TRIGL SERPL-MCNC: 103 MG/DL
WBC # BLD: 8.5 K/UL (ref 3.5–11.3)

## 2019-12-19 PROCEDURE — 2580000003 HC RX 258: Performed by: NURSE ANESTHETIST, CERTIFIED REGISTERED

## 2019-12-19 PROCEDURE — 86850 RBC ANTIBODY SCREEN: CPT

## 2019-12-19 PROCEDURE — 7100000000 HC PACU RECOVERY - FIRST 15 MIN: Performed by: COLON & RECTAL SURGERY

## 2019-12-19 PROCEDURE — 2580000003 HC RX 258: Performed by: INTERNAL MEDICINE

## 2019-12-19 PROCEDURE — C1769 GUIDE WIRE: HCPCS | Performed by: COLON & RECTAL SURGERY

## 2019-12-19 PROCEDURE — C1874 STENT, COATED/COV W/DEL SYS: HCPCS | Performed by: COLON & RECTAL SURGERY

## 2019-12-19 PROCEDURE — 74018 RADEX ABDOMEN 1 VIEW: CPT

## 2019-12-19 PROCEDURE — 86900 BLOOD TYPING SEROLOGIC ABO: CPT

## 2019-12-19 PROCEDURE — 80048 BASIC METABOLIC PNL TOTAL CA: CPT

## 2019-12-19 PROCEDURE — 7100000001 HC PACU RECOVERY - ADDTL 15 MIN: Performed by: COLON & RECTAL SURGERY

## 2019-12-19 PROCEDURE — 2500000003 HC RX 250 WO HCPCS: Performed by: NURSE ANESTHETIST, CERTIFIED REGISTERED

## 2019-12-19 PROCEDURE — 3700000000 HC ANESTHESIA ATTENDED CARE: Performed by: COLON & RECTAL SURGERY

## 2019-12-19 PROCEDURE — 2500000003 HC RX 250 WO HCPCS: Performed by: STUDENT IN AN ORGANIZED HEALTH CARE EDUCATION/TRAINING PROGRAM

## 2019-12-19 PROCEDURE — 6360000002 HC RX W HCPCS: Performed by: COLON & RECTAL SURGERY

## 2019-12-19 PROCEDURE — 6360000002 HC RX W HCPCS: Performed by: INTERNAL MEDICINE

## 2019-12-19 PROCEDURE — 82947 ASSAY GLUCOSE BLOOD QUANT: CPT

## 2019-12-19 PROCEDURE — 1200000000 HC SEMI PRIVATE

## 2019-12-19 PROCEDURE — 3209999900 FLUORO FOR SURGICAL PROCEDURES

## 2019-12-19 PROCEDURE — 2500000003 HC RX 250 WO HCPCS: Performed by: INTERNAL MEDICINE

## 2019-12-19 PROCEDURE — 3609155200 HC COLONOSCOPY W/ENDOSCOPIC STENT PLACEMENT: Performed by: COLON & RECTAL SURGERY

## 2019-12-19 PROCEDURE — 0D7E8DZ DILATION OF LARGE INTESTINE WITH INTRALUMINAL DEVICE, VIA NATURAL OR ARTIFICIAL OPENING ENDOSCOPIC: ICD-10-PCS | Performed by: INTERNAL MEDICINE

## 2019-12-19 PROCEDURE — 2580000003 HC RX 258: Performed by: COLON & RECTAL SURGERY

## 2019-12-19 PROCEDURE — 84478 ASSAY OF TRIGLYCERIDES: CPT

## 2019-12-19 PROCEDURE — 86901 BLOOD TYPING SEROLOGIC RH(D): CPT

## 2019-12-19 PROCEDURE — 2709999900 HC NON-CHARGEABLE SUPPLY: Performed by: COLON & RECTAL SURGERY

## 2019-12-19 PROCEDURE — 3700000001 HC ADD 15 MINUTES (ANESTHESIA): Performed by: COLON & RECTAL SURGERY

## 2019-12-19 PROCEDURE — 6360000002 HC RX W HCPCS: Performed by: NURSE ANESTHETIST, CERTIFIED REGISTERED

## 2019-12-19 PROCEDURE — 36415 COLL VENOUS BLD VENIPUNCTURE: CPT

## 2019-12-19 PROCEDURE — 45347 SIGMOIDOSCOPY W/PLCMT STENT: CPT | Performed by: INTERNAL MEDICINE

## 2019-12-19 PROCEDURE — 99232 SBSQ HOSP IP/OBS MODERATE 35: CPT | Performed by: INTERNAL MEDICINE

## 2019-12-19 PROCEDURE — 85027 COMPLETE CBC AUTOMATED: CPT

## 2019-12-19 PROCEDURE — 6360000002 HC RX W HCPCS: Performed by: STUDENT IN AN ORGANIZED HEALTH CARE EDUCATION/TRAINING PROGRAM

## 2019-12-19 DEVICE — STENT SYSTEM
Type: IMPLANTABLE DEVICE | Status: FUNCTIONAL
Brand: WALLFLEX™ ESOPHAGEAL

## 2019-12-19 RX ORDER — LIDOCAINE HYDROCHLORIDE 20 MG/ML
INJECTION, SOLUTION EPIDURAL; INFILTRATION; INTRACAUDAL; PERINEURAL PRN
Status: DISCONTINUED | OUTPATIENT
Start: 2019-12-19 | End: 2019-12-19 | Stop reason: SDUPTHER

## 2019-12-19 RX ORDER — POTASSIUM CHLORIDE 7.45 MG/ML
10 INJECTION INTRAVENOUS
Status: COMPLETED | OUTPATIENT
Start: 2019-12-19 | End: 2019-12-19

## 2019-12-19 RX ORDER — ONDANSETRON 2 MG/ML
4 INJECTION INTRAMUSCULAR; INTRAVENOUS
Status: DISCONTINUED | OUTPATIENT
Start: 2019-12-19 | End: 2019-12-19 | Stop reason: HOSPADM

## 2019-12-19 RX ORDER — PROPOFOL 10 MG/ML
INJECTION, EMULSION INTRAVENOUS CONTINUOUS PRN
Status: DISCONTINUED | OUTPATIENT
Start: 2019-12-19 | End: 2019-12-19 | Stop reason: SDUPTHER

## 2019-12-19 RX ORDER — FENTANYL CITRATE 50 UG/ML
INJECTION, SOLUTION INTRAMUSCULAR; INTRAVENOUS PRN
Status: DISCONTINUED | OUTPATIENT
Start: 2019-12-19 | End: 2019-12-19 | Stop reason: SDUPTHER

## 2019-12-19 RX ORDER — POTASSIUM CHLORIDE 7.45 MG/ML
10 INJECTION INTRAVENOUS
Status: DISCONTINUED | OUTPATIENT
Start: 2019-12-19 | End: 2019-12-19

## 2019-12-19 RX ORDER — FENTANYL CITRATE 50 UG/ML
25 INJECTION, SOLUTION INTRAMUSCULAR; INTRAVENOUS EVERY 5 MIN PRN
Status: DISCONTINUED | OUTPATIENT
Start: 2019-12-19 | End: 2019-12-19 | Stop reason: HOSPADM

## 2019-12-19 RX ORDER — MIDAZOLAM HYDROCHLORIDE 1 MG/ML
INJECTION INTRAMUSCULAR; INTRAVENOUS PRN
Status: DISCONTINUED | OUTPATIENT
Start: 2019-12-19 | End: 2019-12-19 | Stop reason: SDUPTHER

## 2019-12-19 RX ORDER — PHENYLEPHRINE HCL IN 0.9% NACL 1 MG/10 ML
SYRINGE (ML) INTRAVENOUS PRN
Status: DISCONTINUED | OUTPATIENT
Start: 2019-12-19 | End: 2019-12-19 | Stop reason: SDUPTHER

## 2019-12-19 RX ORDER — SODIUM CHLORIDE 9 MG/ML
INJECTION, SOLUTION INTRAVENOUS CONTINUOUS PRN
Status: DISCONTINUED | OUTPATIENT
Start: 2019-12-19 | End: 2019-12-19 | Stop reason: SDUPTHER

## 2019-12-19 RX ADMIN — PIPERACILLIN AND TAZOBACTAM 3.38 G: 3; .375 INJECTION, POWDER, LYOPHILIZED, FOR SOLUTION INTRAVENOUS at 06:00

## 2019-12-19 RX ADMIN — PIPERACILLIN AND TAZOBACTAM 3.38 G: 3; .375 INJECTION, POWDER, LYOPHILIZED, FOR SOLUTION INTRAVENOUS at 22:07

## 2019-12-19 RX ADMIN — Medication 25 MCG: at 16:59

## 2019-12-19 RX ADMIN — Medication 100 MCG: at 16:59

## 2019-12-19 RX ADMIN — Medication 50 MCG: at 16:44

## 2019-12-19 RX ADMIN — PROPOFOL 120 MCG/KG/MIN: 10 INJECTION, EMULSION INTRAVENOUS at 16:08

## 2019-12-19 RX ADMIN — Medication 25 MCG: at 16:23

## 2019-12-19 RX ADMIN — CALCIUM GLUCONATE 64.9 ML/HR: 94 INJECTION, SOLUTION INTRAVENOUS at 15:42

## 2019-12-19 RX ADMIN — POTASSIUM CHLORIDE 10 MEQ: 7.46 INJECTION, SOLUTION INTRAVENOUS at 08:33

## 2019-12-19 RX ADMIN — Medication 25 MCG: at 16:54

## 2019-12-19 RX ADMIN — PIPERACILLIN AND TAZOBACTAM 3.38 G: 3; .375 INJECTION, POWDER, LYOPHILIZED, FOR SOLUTION INTRAVENOUS at 14:47

## 2019-12-19 RX ADMIN — FAMOTIDINE 20 MG: 10 INJECTION, SOLUTION INTRAVENOUS at 08:33

## 2019-12-19 RX ADMIN — POTASSIUM CHLORIDE 10 MEQ: 7.46 INJECTION, SOLUTION INTRAVENOUS at 11:13

## 2019-12-19 RX ADMIN — POTASSIUM CHLORIDE 10 MEQ: 7.46 INJECTION, SOLUTION INTRAVENOUS at 13:58

## 2019-12-19 RX ADMIN — MIDAZOLAM 2 MG: 1 INJECTION INTRAMUSCULAR; INTRAVENOUS at 16:04

## 2019-12-19 RX ADMIN — LIDOCAINE HYDROCHLORIDE 100 MG: 20 INJECTION, SOLUTION EPIDURAL; INFILTRATION; INTRACAUDAL; PERINEURAL at 16:08

## 2019-12-19 RX ADMIN — FAMOTIDINE 20 MG: 10 INJECTION, SOLUTION INTRAVENOUS at 22:07

## 2019-12-19 RX ADMIN — POTASSIUM CHLORIDE 10 MEQ: 7.46 INJECTION, SOLUTION INTRAVENOUS at 13:00

## 2019-12-19 RX ADMIN — SODIUM CHLORIDE: 9 INJECTION, SOLUTION INTRAVENOUS at 16:01

## 2019-12-19 RX ADMIN — CALCIUM GLUCONATE: 94 INJECTION, SOLUTION INTRAVENOUS at 18:19

## 2019-12-19 RX ADMIN — Medication 25 MCG: at 16:15

## 2019-12-19 RX ADMIN — I.V. FAT EMULSION 100 ML: 20 EMULSION INTRAVENOUS at 18:19

## 2019-12-19 RX ADMIN — Medication 100 MCG: at 16:55

## 2019-12-19 RX ADMIN — Medication 50 MCG: at 16:10

## 2019-12-19 ASSESSMENT — PULMONARY FUNCTION TESTS
PIF_VALUE: 0
PIF_VALUE: 1
PIF_VALUE: 0
PIF_VALUE: 1
PIF_VALUE: 0
PIF_VALUE: 1
PIF_VALUE: 1
PIF_VALUE: 0

## 2019-12-19 ASSESSMENT — PAIN SCALES - GENERAL
PAINLEVEL_OUTOF10: 0

## 2019-12-19 ASSESSMENT — ENCOUNTER SYMPTOMS
NAUSEA: 1
RESPIRATORY NEGATIVE: 1
ABDOMINAL PAIN: 1

## 2019-12-19 NOTE — PROGRESS NOTES
GLUCOSE 137 12/19/2019    GLUCOSE 94 12/06/2011       Radiology Review:      No new images         ASSESSMENT     80 y/o female with pelvic abscess s/p anastomotic leak following colostomy reversal   S/p IR guided drain 12/17/2019    Plan  1. Scope with GI and OR for washout of abscess, possible resection, possible ostomy  2. TPN and IVF. Total  mL from all sources   3. Antibiotics per ID  4. pepcid BID  5. lovenox BID   6. Type and screen       Electronically signed by Geni Bledsoe DO  on 12/19/2019 at 7:26 AM      I Dr. Amada Libman saw and examined the patient. I have edited the above and agree with the above. Geoffrey Santacruz  Colorectal Surgery

## 2019-12-19 NOTE — CARE COORDINATION
BPCI Drop - Non-qualifying DRG/DRG change during admission:  Patient is no longer included within the BPCI-A Medical Bundle due to a non-qualifying DRG (853) for the admitting location.        Will follow again as PHP patient

## 2019-12-19 NOTE — PROGRESS NOTES
Psychiatric/Behavioral: Negative. Physical Examination :     Physical Exam  Constitutional:       Appearance: She is well-developed. HENT:      Head: Normocephalic and atraumatic. Neck:      Musculoskeletal: Normal range of motion and neck supple. Cardiovascular:      Rate and Rhythm: Regular rhythm. Heart sounds: Normal heart sounds. Pulmonary:      Effort: Pulmonary effort is normal.      Breath sounds: Normal breath sounds. Abdominal:      General: Bowel sounds are normal.      Palpations: Abdomen is soft. Comments: Percutaneous drain in place   Skin:     General: Skin is warm and dry. Neurological:      Mental Status: She is alert and oriented to person, place, and time. Laboratory data:   I have independently reviewed the followinglabs:  CBC with Differential:   Recent Labs     12/18/19  0547 12/19/19  0548   WBC 8.3 8.5   HGB 8.0* 7.9*   HCT 26.8* 26.2*    323     BMP:   Recent Labs     12/17/19  1251 12/18/19  0547 12/19/19  0548   NA  --  136 137   K  --  4.2 3.6*   CL  --  105 103   CO2  --  26 23   BUN  --  11 12   CREATININE  --  0.77 0.59   MG 2.1 2.2  --      Hepatic Function Panel:   No results for input(s): PROT, LABALBU, BILIDIR, IBILI, BILITOT, ALKPHOS, ALT, AST in the last 72 hours. No results for input(s): VANCOTROUGH in the last 72 hours. No results found for: CRP  No results found for: SEDRATE    No results for input(s): PROCAL in the last 72 hours. Imaging Studies:   CT OF THE ABDOMEN AND PELVIS WITH CONTRAST 12/16/2019 7:24 pm  Impression   There is a large interloop mid pelvic abscess measuring up to 11.6 cm   increasing in size from 12/04/2019.  Contents of the abscess represent a   mixture of stool-like material with mixed soft tissue and gas.  There is no   large air-fluid level suggesting percutaneous drainage may be problematic.        Cultures:     Cult,Blood #1 [[id number]] Collected: 12/15/19 3200   Order Status: Completed Specimen: Blood Updated: 12/19/19 0026    Specimen Description . BLOOD    Special Requests 3ML RT AC    Culture NO GROWTH 3 DAYS   Cult,Blood #2 [[id number]] Collected: 12/15/19 2304   Order Status: Completed Specimen: Blood Updated: 12/19/19 0026    Specimen Description . BLOOD    Special Requests 10ML LT WRIST    Culture NO GROWTH 3 DAYS   Cult,Urine,CC [[id number]] Collected: 12/17/19 1017   Order Status: Completed Specimen: Urine Updated: 12/19/19 0020    Specimen Description . URINE    Special Requests NOT REPORTED    Culture NO GROWTH   Anaerobic and Aerobic Culture [[id number]] (Abnormal) Collected: 12/17/19 1456   Order Status: Completed Specimen: Aspirate Updated: 12/18/19 2336    Specimen Description . ASPIRATE ABDOMINAL ABSCESS    Special Requests NOT REPORTED    Direct Exam NO NEUTROPHILS SEEN     MIXED BACTERIAL MORPHOTYPES SEEN ON GRAM STAIN. Abnormal     Culture LACTOSE FERMENTING GRAM NEGATIVE RODS HEAVY GROWTHAbnormal      NORMAL MARTHA       Medications:      famotidine (PEPCID) injection  20 mg Intravenous BID    insulin lispro  0-6 Units Subcutaneous 4 times per day    fluconazole  400 mg Intravenous Q24H    sodium chloride flush  10 mL Intravenous 2 times per day    sodium chloride flush  10 mL Intravenous 2 times per day    enoxaparin  30 mg Subcutaneous BID    Vitamin D  2,000 Units Oral Daily    metoprolol succinate  25 mg Oral Daily    piperacillin-tazobactam  3.375 g Intravenous Q8H           [facility]  Trae Ryan MD  [employer] messaging  OFFICE: [phone]      Electronically signed by Trae Ryan MD on 12/19/2019 at 6:10 PM  Thank you for allowing us to participate in the care of this patient. Please call with questions.     This note iscreated with the assistance of a speech recognition program.  While intending to generate a document that actually reflects the content of the visit, the document can still have some errors including those of syntax andsound a like substitutions which may escape proof reading. In such instances, actual meaning can be extrapolated by contextual diversion.

## 2019-12-19 NOTE — PLAN OF CARE
Problem: Falls - Risk of:  Goal: Will remain free from falls  Description  Will remain free from falls  Outcome: Ongoing  Note:   Room free of clutter  Hourly rounding   Non-skid socks worn  Side rails up x2  Bed low and locked  Call light in reach  Instructed to call out before getting out of bed  Anticipatory needs met  Bed alarm on  Falling star at the door and on wristband       Problem: Pain:  Goal: Pain level will decrease  Description  Pain level will decrease  Outcome: Ongoing  Note:   Pain level assessed and rated on a 0-10 scale  Assess characteristics of pain  PRN pain medication given per pt request  Non-pharmacological interventions implemented  Report ineffective pain management to physician  Update pt and family of any changes  Pt instructed to call out with new onset of pain  Continue to monitor

## 2019-12-19 NOTE — OP NOTE
[facility]     PROCEDURE DATE: 12/19/19    REFERRING PHYSICIAN: No ref. provider found     PRIMARY CARE PROVIDER: Jarocho Billings MD    ATTENDING PHYSICIAN: Klaudia Whelan MD     HISTORY: Ms. Nathaly Lam is a 81 y.o. female who presents to the [place] [address] Endoscopy unit for colonoscopy. The patient's clinical history is remarkable for anastomotic leak after recent left-sided colectomy. She was referred for attempt at endoscopic closure to avoid surgery as she had very difficult surgery due to inflammation and adhesions. She is currently medically stable and appropriate for the planned procedure. PREOPERATIVE DIAGNOSIS: Anastomotic leak. PROCEDURES:   Transanal Colonoscopy with fluoroscopy and placement of fully covered metal stents. POSTPROCEDURE DIAGNOSIS:  Anastomotic perforation with large contained pelvic fluid, stool, and debris    MEDICATIONS:     MAC per anesthesia    EBL: minimal    INSTRUMENT: Olympus GIF H 190     PREPARATION: The nature and character of the procedure as well as risks, benefits, and alternatives were discussed with the patient and informed consent was obtained. Complications were said to include, but were not limited to: medication allergy, medication reaction, cardiovascular and respiratory problems, bleeding, perforation, infection, and/or missed diagnosis. Following arrival in the endoscopy room, the patient was placed in the left lateral decubitus position and final time-out accomplished in the presence of the nursing staff. Baseline vital signs were obtained and reviewed, and IV sedation was subsequently initiated. FINDINGS: Rectal examination demonstrated no significant visible external abnormality and digital palpation was unremarkable. Following adequate conscious sedation the colonoscope was introduced and advanced under direct visualization to the splenic flexure. The bowel preparation was felt to be fair.  This included

## 2019-12-19 NOTE — PROGRESS NOTES
of poor intake    Objective Information:  · Nutrition-Focused Physical Findings: Edema, RLE/LLE, +2 pitting  · Wound Type: Surgical Wound  · Current Nutrition Therapies:  · Oral Diet Orders: NPO   · Parenteral Nutrition Orders:  · Type and Formula: 2-in-1 Standard   · Lipids: None  · Rate/Volume: 70 ml/hr / 1,680 ml  · Duration: Continuous  · Current PN Order Provides: @ 70 ml/hr:  1,678 kcal, 84 g protein  · Goal PN Orders Provides: @ 83.3 ml/hr:  1,960 kcal, 100 g protein  · Additional Calories: None  · Anthropometric Measures:  · Ht: 5' 7\" (170.2 cm)   · Current Body Wt: 225 lb 1.4 oz (102.1 kg)  · Admission Body Wt: 223 lb 8.7 oz (101.4 kg)  · Usual Body Wt: 235 lb (106.6 kg)  · % Weight Change:  4% (10#) in one month  · Ideal Body Wt: 135 lb (61.2 kg), % Ideal Body 167%  · BMI Classification: BMI 35.0 - 39.9 Obese Class II    Nutrition Interventions:   Continue NPO, Modify current Parenteral Nutrition  Continued Inpatient Monitoring, Coordination of Care    Nutrition Evaluation:   · Evaluation: Progressing toward goals   · Goals: PN to meet >90% of energy and protein needs   · Monitoring: PN Intake, PN Tolerance, Skin Integrity, Wound Healing, I&O, Weight, Pertinent Labs, Nausea or Vomiting, Monitor Bowel Function      Electronically signed by Lucila Clinton RDN, LD on 12/19/19 at 6:27 PM    Contact Number: [phone]

## 2019-12-19 NOTE — CARE COORDINATION
Pt had PICC inserted 12-17 and TPN started. Daxa from [place] here and informed of referral for potential TPN at discharge. Scheduled for colonoscopy today and OR for washout of mid pelvic abscess, possible resection and ostomy. Continues on IV Zosyn and Diflucan. Continue to follow post op.

## 2019-12-20 ENCOUNTER — APPOINTMENT (OUTPATIENT)
Dept: CT IMAGING | Age: 69
DRG: 856 | End: 2019-12-20
Payer: MEDICARE

## 2019-12-20 LAB
ANION GAP SERPL CALCULATED.3IONS-SCNC: 10 MMOL/L (ref 9–17)
BUN BLDV-MCNC: 10 MG/DL (ref 8–23)
BUN/CREAT BLD: 18 (ref 9–20)
CALCIUM SERPL-MCNC: 8.9 MG/DL (ref 8.6–10.4)
CHLORIDE BLD-SCNC: 100 MMOL/L (ref 98–107)
CO2: 26 MMOL/L (ref 20–31)
CREAT SERPL-MCNC: 0.55 MG/DL (ref 0.5–0.9)
CULTURE: ABNORMAL
DIRECT EXAM: ABNORMAL
DIRECT EXAM: ABNORMAL
GFR AFRICAN AMERICAN: >60 ML/MIN
GFR NON-AFRICAN AMERICAN: >60 ML/MIN
GFR SERPL CREATININE-BSD FRML MDRD: ABNORMAL ML/MIN/{1.73_M2}
GFR SERPL CREATININE-BSD FRML MDRD: ABNORMAL ML/MIN/{1.73_M2}
GLUCOSE BLD-MCNC: 127 MG/DL (ref 65–105)
GLUCOSE BLD-MCNC: 128 MG/DL (ref 65–105)
GLUCOSE BLD-MCNC: 133 MG/DL (ref 65–105)
GLUCOSE BLD-MCNC: 142 MG/DL (ref 65–105)
GLUCOSE BLD-MCNC: 154 MG/DL (ref 70–99)
HCT VFR BLD CALC: 29.1 % (ref 36.3–47.1)
HEMOGLOBIN: 8.8 G/DL (ref 11.9–15.1)
Lab: ABNORMAL
MAGNESIUM: 2 MG/DL (ref 1.6–2.6)
MCH RBC QN AUTO: 27.7 PG (ref 25.2–33.5)
MCHC RBC AUTO-ENTMCNC: 30.2 G/DL (ref 28.4–34.8)
MCV RBC AUTO: 91.5 FL (ref 82.6–102.9)
NRBC AUTOMATED: 0 PER 100 WBC
PDW BLD-RTO: 16.2 % (ref 11.8–14.4)
PHOSPHORUS: 2.4 MG/DL (ref 2.6–4.5)
PLATELET # BLD: 386 K/UL (ref 138–453)
PMV BLD AUTO: 8.5 FL (ref 8.1–13.5)
POTASSIUM SERPL-SCNC: 4.1 MMOL/L (ref 3.7–5.3)
RBC # BLD: 3.18 M/UL (ref 3.95–5.11)
SODIUM BLD-SCNC: 136 MMOL/L (ref 135–144)
SPECIMEN DESCRIPTION: ABNORMAL
WBC # BLD: 9.2 K/UL (ref 3.5–11.3)

## 2019-12-20 PROCEDURE — 6370000000 HC RX 637 (ALT 250 FOR IP): Performed by: INTERNAL MEDICINE

## 2019-12-20 PROCEDURE — 2580000003 HC RX 258: Performed by: INTERNAL MEDICINE

## 2019-12-20 PROCEDURE — 99232 SBSQ HOSP IP/OBS MODERATE 35: CPT | Performed by: INTERNAL MEDICINE

## 2019-12-20 PROCEDURE — 82947 ASSAY GLUCOSE BLOOD QUANT: CPT

## 2019-12-20 PROCEDURE — 2500000003 HC RX 250 WO HCPCS: Performed by: INTERNAL MEDICINE

## 2019-12-20 PROCEDURE — 85027 COMPLETE CBC AUTOMATED: CPT

## 2019-12-20 PROCEDURE — 36415 COLL VENOUS BLD VENIPUNCTURE: CPT

## 2019-12-20 PROCEDURE — 6360000002 HC RX W HCPCS: Performed by: INTERNAL MEDICINE

## 2019-12-20 PROCEDURE — 2500000003 HC RX 250 WO HCPCS: Performed by: STUDENT IN AN ORGANIZED HEALTH CARE EDUCATION/TRAINING PROGRAM

## 2019-12-20 PROCEDURE — 74176 CT ABD & PELVIS W/O CONTRAST: CPT

## 2019-12-20 PROCEDURE — 80048 BASIC METABOLIC PNL TOTAL CA: CPT

## 2019-12-20 PROCEDURE — 1200000000 HC SEMI PRIVATE

## 2019-12-20 PROCEDURE — 84100 ASSAY OF PHOSPHORUS: CPT

## 2019-12-20 PROCEDURE — 83735 ASSAY OF MAGNESIUM: CPT

## 2019-12-20 RX ORDER — FUROSEMIDE 20 MG/1
10 TABLET ORAL EVERY OTHER DAY
Status: DISCONTINUED | OUTPATIENT
Start: 2019-12-20 | End: 2019-12-29

## 2019-12-20 RX ADMIN — CEFTRIAXONE 2 G: 2 INJECTION, POWDER, FOR SOLUTION INTRAMUSCULAR; INTRAVENOUS at 12:39

## 2019-12-20 RX ADMIN — CALCIUM GLUCONATE: 94 INJECTION, SOLUTION INTRAVENOUS at 18:11

## 2019-12-20 RX ADMIN — METOPROLOL SUCCINATE 25 MG: 25 TABLET, FILM COATED, EXTENDED RELEASE ORAL at 08:55

## 2019-12-20 RX ADMIN — METRONIDAZOLE 500 MG: 500 INJECTION, SOLUTION INTRAVENOUS at 13:46

## 2019-12-20 RX ADMIN — Medication 10 ML: at 08:56

## 2019-12-20 RX ADMIN — FAMOTIDINE 20 MG: 10 INJECTION, SOLUTION INTRAVENOUS at 08:54

## 2019-12-20 RX ADMIN — I.V. FAT EMULSION 250 ML: 20 EMULSION INTRAVENOUS at 18:11

## 2019-12-20 RX ADMIN — VITAMIN D, TAB 1000IU (100/BT) 2000 UNITS: 25 TAB at 08:55

## 2019-12-20 RX ADMIN — FAMOTIDINE 20 MG: 10 INJECTION, SOLUTION INTRAVENOUS at 20:29

## 2019-12-20 RX ADMIN — FUROSEMIDE 10 MG: 20 TABLET ORAL at 12:39

## 2019-12-20 RX ADMIN — METRONIDAZOLE 500 MG: 500 INJECTION, SOLUTION INTRAVENOUS at 20:29

## 2019-12-20 RX ADMIN — OXYCODONE AND ACETAMINOPHEN 1 TABLET: 5; 325 TABLET ORAL at 14:01

## 2019-12-20 RX ADMIN — OXYCODONE AND ACETAMINOPHEN 1 TABLET: 5; 325 TABLET ORAL at 05:56

## 2019-12-20 RX ADMIN — ACETAMINOPHEN 650 MG: 325 TABLET ORAL at 11:06

## 2019-12-20 RX ADMIN — Medication 10 ML: at 20:29

## 2019-12-20 RX ADMIN — SODIUM CHLORIDE: 9 INJECTION, SOLUTION INTRAVENOUS at 02:44

## 2019-12-20 RX ADMIN — ACETAMINOPHEN 650 MG: 325 TABLET ORAL at 18:30

## 2019-12-20 RX ADMIN — PIPERACILLIN AND TAZOBACTAM 3.38 G: 3; .375 INJECTION, POWDER, LYOPHILIZED, FOR SOLUTION INTRAVENOUS at 05:56

## 2019-12-20 ASSESSMENT — PAIN DESCRIPTION - PAIN TYPE
TYPE: ACUTE PAIN

## 2019-12-20 ASSESSMENT — PAIN DESCRIPTION - ORIENTATION
ORIENTATION: LOWER;MID
ORIENTATION: MID;LOWER

## 2019-12-20 ASSESSMENT — PAIN DESCRIPTION - ONSET
ONSET: ON-GOING

## 2019-12-20 ASSESSMENT — ENCOUNTER SYMPTOMS
NAUSEA: 1
RESPIRATORY NEGATIVE: 1
ABDOMINAL PAIN: 1

## 2019-12-20 ASSESSMENT — PAIN SCALES - GENERAL
PAINLEVEL_OUTOF10: 1
PAINLEVEL_OUTOF10: 5
PAINLEVEL_OUTOF10: 7
PAINLEVEL_OUTOF10: 4
PAINLEVEL_OUTOF10: 3
PAINLEVEL_OUTOF10: 6
PAINLEVEL_OUTOF10: 1
PAINLEVEL_OUTOF10: 4
PAINLEVEL_OUTOF10: 7

## 2019-12-20 ASSESSMENT — PAIN DESCRIPTION - FREQUENCY
FREQUENCY: INTERMITTENT
FREQUENCY: CONTINUOUS
FREQUENCY: INTERMITTENT
FREQUENCY: INTERMITTENT

## 2019-12-20 ASSESSMENT — PAIN DESCRIPTION - DESCRIPTORS
DESCRIPTORS: CRAMPING

## 2019-12-20 ASSESSMENT — PAIN DESCRIPTION - LOCATION
LOCATION: ABDOMEN

## 2019-12-20 ASSESSMENT — PAIN DESCRIPTION - PROGRESSION
CLINICAL_PROGRESSION: GRADUALLY IMPROVING

## 2019-12-20 NOTE — PROGRESS NOTES
Nutrition Assessment (Parenteral Nutrition)    Type and Reason for Visit: Reassess    Nutrition Recommendations:   1. Continue NPO for bowel rest. Ice chips OK. 2. Increase PN rate to 83.3 mL/hr (goal rate) and include the following additives: 70 mEq NaAcetate, 70 mEq NaCl, 10 mmol NaPO4, 40 mEq K Acetate, 20 mmol KPO4, 30 mEq KCl, 10 mEq Ca+G, 8 mEq MgSO4, and MVI, trace elements  3. Add lipid piggyback. 4. Monitor labs, PN tolerance, weight, bowel function    Nutrition Assessment: Pt. remains NPO, TPN is infusing at 70 mL/hr. Will most likely be going home on TPN for bowel rest, per SW note, Amos is informed of possible outpatient TPN. Will increase TPN rate to 83.3 mL/hr (goal rate) and include the following additives: 70 mEq NaAcetate, 70 mEq NaCl, 10 mmol NaPO4, 40 mEq K Acetate, 20 mmol KPO4, 30 mEq KCl, 10 mEq Ca+G, 8 mEq MgSO4, and MVI, trace elements and lipids. Malnutrition Assessment:  · Malnutrition Status: Mild Malnutrition  · Context: Acute illness or injury  · Findings of the 6 clinical characteristics of malnutrition (Minimum of 2 out of 6 clinical characteristics is required to make the diagnosis of moderate or severe Protein Calorie Malnutrition based on AND/ASPEN Guidelines):  1. Energy Intake-Less than or equal to 75% of estimated energy requirement, Greater than or equal to 7 days    2. Weight Loss-2% loss or greater(Not considered to be significant), in 1 month  3. Fat Loss-No significant subcutaneous fat loss,    4. Muscle Loss-No significant muscle mass loss,    5. Fluid Accumulation-Mild fluid accumulation, Extremities  6.   Strength-Not measured    Nutrition Risk Level: High    Nutrient Needs:  · Estimated Daily Total Kcal: 5643-8672 kcal (15-18 kcal/kg admission weight)  · Estimated Daily Protein (g): 104-122 g (1.7-2 g/kg IBW)  · Estimated Daily Total Fluid (ml/day): 1 mL/kcal    Nutrition Diagnosis:   · Problem: Inadequate oral intake  · Etiology: related to Alteration in

## 2019-12-20 NOTE — PROGRESS NOTES
Progress Note    12/20/2019   12:31 PM    Name:  Jarrod Avila  MRN:    [id number]     [place]:     [de-identified]   Room:  [facility] Day: 4     Admit Date: 12/15/2019 10:10 PM  PCP: Gisella Rivero MD    Subjective:     C/C:   Chief Complaint   Patient presents with   Hien Fawn Dehydration     seen this am for same    Nausea    Other     unable to urinate       Interval History: Status: improved and patient states she is feeling better after stent placement even though she is having some heaviness and leg swelling which is bothering her    ROS:  Constitutional: negative for chills, fevers, sweats  Respiratory: negative for cough, dyspnea on exertion, hemoptysis, shortness of breath, wheezing  Cardiovascular: negative for chest pain, chest pressure/discomfort, dyspnea, palpitations but positive for leg swelling   Gastrointestinal: negative for abdominal pain, constipation, diarrhea, nausea, vomiting  Neurological: negative for dizziness and headaches    Medications:      Allergies: No Known Allergies    Current Meds: PN-Adult 2-in-1 Central Line (Custom), Continuous TPN  fat emulsion 20 % infusion 250 mL, Daily  furosemide (LASIX) tablet 10 mg, Every Other Day  cefTRIAXone (ROCEPHIN) 2 g IVPB in D5W 50ml minibag, Q24H  metronidazole (FLAGYL) 500 mg in NaCl 100 mL IVPB premix, Q8H  PN-Adult 2-in-1 Central Line (Custom), Continuous TPN  famotidine (PEPCID) injection 20 mg, BID  insulin lispro (HUMALOG) injection vial 0-6 Units, 4 times per day  glucose (GLUTOSE) 40 % oral gel 15 g, PRN  dextrose 50 % IV solution, PRN  glucagon (rDNA) injection 1 mg, PRN  dextrose 5 % solution, PRN  oxyCODONE-acetaminophen (PERCOCET) 5-325 MG per tablet 1 tablet, Q4H PRN  sodium chloride flush 0.9 % injection 10 mL, 2 times per day  sodium chloride flush 0.9 % injection 10 mL, PRN  sodium chloride flush 0.9 % injection 10 mL, 2 times per day  sodium chloride flush 0.9 % injection 10 mL, PRN  magnesium hydroxide (MILK OF MAGNESIA) Obesity     Osteoarthritis     Systolic murmur     benign systolic murmur (history of). Pt does not follow-up with a cardiologist (Written 09/25/2019).  Thyroid disease     goiter    Wears glasses         Plan:        1. All notes and reports reviewed and patient's colonoscopy findings reviewed and stents were placed by GI and no surgical intervention was planned at this time because of the high risk  2. Patient to continue Zosyn IV antibiotics at least for 4 more weeks and also on TPN for now and plan to repeat CT scan in 1 month and depending on the CT scan findings will decide about the diet and further interventions  3. Patient because of the TPN has increased swelling in her legs and also feeling heavy and so I did advise patient to take Lasix 10 mg every other day during her TPN if okay with Dr. Mehrdad Johnson colorectal surgery and discussed with staff  4. Supportive care and IV fluids are discontinued because of the TPN is going  5. Discharge planning  6.  All medications labs and history reviewed      Electronically signed by Scarlett Cosme MD on 12/20/2019 at 12:31 PM

## 2019-12-20 NOTE — PLAN OF CARE
Nutrition Problem: Inadequate oral intake  Intervention: Food and/or Nutrient Delivery: Continue NPO, Modify current ONS  Nutritional Goals: PN to meet >90% of energy and protein needs

## 2019-12-20 NOTE — PROGRESS NOTES
[facility]  Progress Note    Kiko Barnes  MRN: [id number]  Date: 12/20/2019    Reason for F/U :   Abdominal abscess    Impression :   1. Recent colostomy reversal, extensive lysis of adhesions and ventral hernia repair and complicated by an anastomotic leak  2. Large interloop mid pelvic abscess measuring 11.6 cm-increased in size from 12/4/2019  · Status post stenting of large anastomotic perforation by GI 12/19/2019  · Surgical intervention is felt to be high risk and would likely need to permanent colostomy  3. Mild pyuria but no significant clinical symptoms to suggest a urinary tract infection  4. Intermittent diarrhea    Recommendations:   · The aspirate culture with E. coli and Staphylococcus aureus isolated. · Is likely that this is MRSA or MSSA at this time. · The plan is for her to be discharged on TPN and IV antimicrobial therapy. · I will narrow the antimicrobial therapy to Rocephin, metronidazole and will need to determine whether vancomycin will need to be added to the regimen as well  · The plan is for follow-up CT imaging in a month    Infection Control Recommendations:   Universal precautions    Discharge Planning:   Estimated Length of IV antimicrobials: 14 days  Patient will need Midline Catheter Insertion/ PICC line Insertion: No  Patient will need: Home IV , [place],  SNF,  LTAC: Undetermined  Patient willneed outpatient wound care: No    MedicalDecision making / Summary of Stay:   Kiko Barnes is a 81y.o.-year-old female who was initially admitted on 12/15/2019. Paramjit Huffman has a history of diverticulitis and underwent a diverting colostomy in May and in early November was admitted and underwent a colostomy reversal.  The patient's hospital course was complicated by fluid overload for which she received some diuretic therapy.   She also reports having a lot of diarrhea during that admission which was felt secondary to the Toradol and post discharge her been started on TPN. The plan is for discharge and follow-up CT imaging in a month    Review of Systems   Constitutional: Negative. Respiratory: Negative. Cardiovascular: Negative. Gastrointestinal: Positive for abdominal pain and nausea. Genitourinary: Negative. Musculoskeletal: Negative. Skin: Negative. Neurological: Negative. Psychiatric/Behavioral: Negative. Physical Examination :     Physical Exam  Constitutional:       Appearance: She is well-developed. HENT:      Head: Normocephalic and atraumatic. Neck:      Musculoskeletal: Normal range of motion and neck supple. Cardiovascular:      Rate and Rhythm: Regular rhythm. Heart sounds: Normal heart sounds. Pulmonary:      Effort: Pulmonary effort is normal.      Breath sounds: Normal breath sounds. Abdominal:      General: Bowel sounds are normal.      Palpations: Abdomen is soft. Comments: Percutaneous drain in place   Skin:     General: Skin is warm and dry. Neurological:      Mental Status: She is alert and oriented to person, place, and time. Laboratory data:   I have independently reviewed the followinglabs:  CBC with Differential:   Recent Labs     12/19/19  0548 12/20/19  0515   WBC 8.5 9.2   HGB 7.9* 8.8*   HCT 26.2* 29.1*    386     BMP:   Recent Labs     12/18/19  0547 12/19/19  0548 12/20/19  0515    137 136   K 4.2 3.6* 4.1    103 100   CO2 26 23 26   BUN 11 12 10   CREATININE 0.77 0.59 0.55   MG 2.2  --  2.0     Hepatic Function Panel:   No results for input(s): PROT, LABALBU, BILIDIR, IBILI, BILITOT, ALKPHOS, ALT, AST in the last 72 hours. No results for input(s): VANCOTROUGH in the last 72 hours. No results found for: CRP  No results found for: SEDRATE    No results for input(s): PROCAL in the last 72 hours.     Imaging Studies:   CT OF THE ABDOMEN AND PELVIS WITH CONTRAST 12/16/2019 7:24 pm  Impression   There is a large interloop mid pelvic abscess measuring up to 11.6 cm   increasing in size from 12/04/2019.  Contents of the abscess represent a   mixture of stool-like material with mixed soft tissue and gas.  There is no   large air-fluid level suggesting percutaneous drainage may be problematic. Cultures:     Cult,Blood #1 [[id number]] Collected: 12/15/19 2255   Order Status: Completed Specimen: Blood Updated: 12/20/19 1130    Specimen Description . BLOOD    Special Requests 3ML RT AC    Culture NO GROWTH 4 DAYS   Cult,Blood #2 [[id number]] Collected: 12/15/19 2304   Order Status: Completed Specimen: Blood Updated: 12/20/19 1130    Specimen Description . BLOOD    Special Requests 10ML LT WRIST    Culture NO GROWTH 4 DAYS     Anaerobic and Aerobic Culture [[id number]] (Abnormal)  Collected: 12/17/19 1456   Order Status: Completed Specimen: Aspirate Updated: 12/19/19 1945    Specimen Description . ASPIRATE ABDOMINAL ABSCESS    Special Requests NOT REPORTED    Direct Exam NO NEUTROPHILS SEEN     MIXED BACTERIAL MORPHOTYPES SEEN ON GRAM STAIN. Abnormal     Culture ESCHERICHIA COLI HEAVY GROWTHAbnormal      STAPHYLOCOCCUS AUREUS LIGHT GROWTHAbnormal      NORMAL MARTHA   Escherichia coli (1)     Antibiotic Interpretation JANNETH Status    amikacin   Preliminary     NOT REPORTED   ampicillin Sensitive  Preliminary     8  SUSCEPTIBLE   ampicillin-sulbactam   Preliminary     NOT REPORTED   aztreonam Sensitive  Preliminary     <=1  SUSCEPTIBLE   ceFAZolin Sensitive  Preliminary     <=4  SUSCEPTIBLE   cefepime   Preliminary     NOT REPORTED   cefTRIAXone Sensitive  Preliminary     <=1  SUSCEPTIBLE   ciprofloxacin Resistant  Preliminary     >=4  RESISTANT   ertapenem   Preliminary     NOT REPORTED   Confirmatory Extended Spectrum Beta-Lactamase Negative NEGATIVE Preliminary    gentamicin Sensitive  Preliminary     <=1  SUSCEPTIBLE   meropenem   Preliminary     NOT REPORTED   nitrofurantoin   Preliminary     NOT REPORTED   tigecycline   Preliminary     NOT REPORTED   tobramycin Sensitive Preliminary     <=1  SUSCEPTIBLE   trimethoprim-sulfamethoxazole Resistant  Preliminary     >=320  RESISTANT   piperacillin-tazobactam Sensitive  Preliminary     <=4  SUSCEPTIBLE       Medications:      furosemide  10 mg Oral Every Other Day    famotidine (PEPCID) injection  20 mg Intravenous BID    insulin lispro  0-6 Units Subcutaneous 4 times per day    fluconazole  400 mg Intravenous Q24H    sodium chloride flush  10 mL Intravenous 2 times per day    sodium chloride flush  10 mL Intravenous 2 times per day    enoxaparin  30 mg Subcutaneous BID    Vitamin D  2,000 Units Oral Daily    metoprolol succinate  25 mg Oral Daily    piperacillin-tazobactam  3.375 g Intravenous Q8H           [facility]  Jessie Best MD  [employer] messaging  OFFICE: [phone]      Electronically signed by Jessie Best MD on 12/20/2019 at 11:44 AM  Thank you for allowing us to participate in the care of this patient. Please call with questions. This note iscreated with the assistance of a speech recognition program.  While intending to generate a document that actually reflects the content of the visit, the document can still have some errors including those of syntax andsound a like substitutions which may escape proof reading. In such instances, actual meaning can be extrapolated by contextual diversion.

## 2019-12-20 NOTE — PROGRESS NOTES
Patient expressed her concern that the drain was coming out once again. RN assessed the drain to find it had slipped out again a few centimeters. RN advanced drain a few centimeters and reinforced the dressing once again. Will continue to monitor.

## 2019-12-21 LAB
ANION GAP SERPL CALCULATED.3IONS-SCNC: 11 MMOL/L (ref 9–17)
BUN BLDV-MCNC: 8 MG/DL (ref 8–23)
BUN/CREAT BLD: 16 (ref 9–20)
CALCIUM SERPL-MCNC: 8.7 MG/DL (ref 8.6–10.4)
CHLORIDE BLD-SCNC: 99 MMOL/L (ref 98–107)
CO2: 26 MMOL/L (ref 20–31)
CREAT SERPL-MCNC: 0.51 MG/DL (ref 0.5–0.9)
GFR AFRICAN AMERICAN: >60 ML/MIN
GFR NON-AFRICAN AMERICAN: >60 ML/MIN
GFR SERPL CREATININE-BSD FRML MDRD: ABNORMAL ML/MIN/{1.73_M2}
GFR SERPL CREATININE-BSD FRML MDRD: ABNORMAL ML/MIN/{1.73_M2}
GLUCOSE BLD-MCNC: 138 MG/DL (ref 70–99)
GLUCOSE BLD-MCNC: 140 MG/DL (ref 65–105)
GLUCOSE BLD-MCNC: 149 MG/DL (ref 65–105)
HCT VFR BLD CALC: 27.9 % (ref 36.3–47.1)
HEMOGLOBIN: 8.4 G/DL (ref 11.9–15.1)
MAGNESIUM: 2.1 MG/DL (ref 1.6–2.6)
MCH RBC QN AUTO: 27.5 PG (ref 25.2–33.5)
MCHC RBC AUTO-ENTMCNC: 30.1 G/DL (ref 28.4–34.8)
MCV RBC AUTO: 91.2 FL (ref 82.6–102.9)
NRBC AUTOMATED: 0 PER 100 WBC
PDW BLD-RTO: 16.1 % (ref 11.8–14.4)
PHOSPHORUS: 2.9 MG/DL (ref 2.6–4.5)
PLATELET # BLD: 365 K/UL (ref 138–453)
PMV BLD AUTO: 8.3 FL (ref 8.1–13.5)
POTASSIUM SERPL-SCNC: 3.9 MMOL/L (ref 3.7–5.3)
RBC # BLD: 3.06 M/UL (ref 3.95–5.11)
SODIUM BLD-SCNC: 136 MMOL/L (ref 135–144)
WBC # BLD: 8.2 K/UL (ref 3.5–11.3)

## 2019-12-21 PROCEDURE — 1200000000 HC SEMI PRIVATE

## 2019-12-21 PROCEDURE — 2500000003 HC RX 250 WO HCPCS: Performed by: INTERNAL MEDICINE

## 2019-12-21 PROCEDURE — 80048 BASIC METABOLIC PNL TOTAL CA: CPT

## 2019-12-21 PROCEDURE — 6360000002 HC RX W HCPCS: Performed by: INTERNAL MEDICINE

## 2019-12-21 PROCEDURE — 2500000003 HC RX 250 WO HCPCS: Performed by: STUDENT IN AN ORGANIZED HEALTH CARE EDUCATION/TRAINING PROGRAM

## 2019-12-21 PROCEDURE — 99232 SBSQ HOSP IP/OBS MODERATE 35: CPT | Performed by: INTERNAL MEDICINE

## 2019-12-21 PROCEDURE — 2580000003 HC RX 258: Performed by: INTERNAL MEDICINE

## 2019-12-21 PROCEDURE — 83735 ASSAY OF MAGNESIUM: CPT

## 2019-12-21 PROCEDURE — 82947 ASSAY GLUCOSE BLOOD QUANT: CPT

## 2019-12-21 PROCEDURE — 6370000000 HC RX 637 (ALT 250 FOR IP): Performed by: INTERNAL MEDICINE

## 2019-12-21 PROCEDURE — 84100 ASSAY OF PHOSPHORUS: CPT

## 2019-12-21 PROCEDURE — 85027 COMPLETE CBC AUTOMATED: CPT

## 2019-12-21 PROCEDURE — 36415 COLL VENOUS BLD VENIPUNCTURE: CPT

## 2019-12-21 RX ORDER — FUROSEMIDE 20 MG/1
10 TABLET ORAL EVERY OTHER DAY
Qty: 30 TABLET | Refills: 0 | Status: SHIPPED | OUTPATIENT
Start: 2019-12-22 | End: 2019-12-31 | Stop reason: ALTCHOICE

## 2019-12-21 RX ORDER — FUROSEMIDE 10 MG/ML
20 INJECTION INTRAMUSCULAR; INTRAVENOUS ONCE
Status: COMPLETED | OUTPATIENT
Start: 2019-12-21 | End: 2019-12-21

## 2019-12-21 RX ADMIN — CALCIUM GLUCONATE: 94 INJECTION, SOLUTION INTRAVENOUS at 17:25

## 2019-12-21 RX ADMIN — Medication 10 ML: at 07:39

## 2019-12-21 RX ADMIN — INSULIN LISPRO 1 UNITS: 100 INJECTION, SOLUTION INTRAVENOUS; SUBCUTANEOUS at 16:52

## 2019-12-21 RX ADMIN — CEFTAROLINE FOSAMIL 600 MG: 600 POWDER, FOR SOLUTION INTRAVENOUS at 15:52

## 2019-12-21 RX ADMIN — OXYCODONE AND ACETAMINOPHEN 1 TABLET: 5; 325 TABLET ORAL at 00:23

## 2019-12-21 RX ADMIN — METRONIDAZOLE 500 MG: 500 INJECTION, SOLUTION INTRAVENOUS at 13:24

## 2019-12-21 RX ADMIN — METRONIDAZOLE 500 MG: 500 INJECTION, SOLUTION INTRAVENOUS at 20:51

## 2019-12-21 RX ADMIN — ACETAMINOPHEN 650 MG: 325 TABLET ORAL at 07:38

## 2019-12-21 RX ADMIN — METOPROLOL SUCCINATE 25 MG: 25 TABLET, FILM COATED, EXTENDED RELEASE ORAL at 07:38

## 2019-12-21 RX ADMIN — METRONIDAZOLE 500 MG: 500 INJECTION, SOLUTION INTRAVENOUS at 05:19

## 2019-12-21 RX ADMIN — VITAMIN D, TAB 1000IU (100/BT) 2000 UNITS: 25 TAB at 07:38

## 2019-12-21 RX ADMIN — CEFTRIAXONE 2 G: 2 INJECTION, POWDER, FOR SOLUTION INTRAMUSCULAR; INTRAVENOUS at 12:42

## 2019-12-21 RX ADMIN — FAMOTIDINE 20 MG: 10 INJECTION, SOLUTION INTRAVENOUS at 20:44

## 2019-12-21 RX ADMIN — Medication 10 ML: at 22:15

## 2019-12-21 RX ADMIN — I.V. FAT EMULSION 250 ML: 20 EMULSION INTRAVENOUS at 17:25

## 2019-12-21 RX ADMIN — SODIUM CHLORIDE, PRESERVATIVE FREE 10 ML: 5 INJECTION INTRAVENOUS at 07:44

## 2019-12-21 RX ADMIN — FAMOTIDINE 20 MG: 10 INJECTION, SOLUTION INTRAVENOUS at 07:38

## 2019-12-21 RX ADMIN — FUROSEMIDE 20 MG: 10 INJECTION, SOLUTION INTRAMUSCULAR; INTRAVENOUS at 12:42

## 2019-12-21 ASSESSMENT — PAIN SCALES - GENERAL
PAINLEVEL_OUTOF10: 7
PAINLEVEL_OUTOF10: 8

## 2019-12-21 ASSESSMENT — PAIN DESCRIPTION - DESCRIPTORS: DESCRIPTORS: CRAMPING

## 2019-12-21 ASSESSMENT — PAIN DESCRIPTION - PROGRESSION: CLINICAL_PROGRESSION: NOT CHANGED

## 2019-12-21 ASSESSMENT — PAIN DESCRIPTION - LOCATION: LOCATION: ABDOMEN

## 2019-12-21 ASSESSMENT — ENCOUNTER SYMPTOMS
ABDOMINAL PAIN: 1
RESPIRATORY NEGATIVE: 1

## 2019-12-21 ASSESSMENT — PAIN DESCRIPTION - ORIENTATION: ORIENTATION: LOWER;MID

## 2019-12-21 ASSESSMENT — PAIN DESCRIPTION - FREQUENCY: FREQUENCY: CONTINUOUS

## 2019-12-21 ASSESSMENT — PAIN DESCRIPTION - ONSET: ONSET: ON-GOING

## 2019-12-21 ASSESSMENT — PAIN DESCRIPTION - PAIN TYPE: TYPE: ACUTE PAIN

## 2019-12-21 NOTE — PROGRESS NOTES
LABALBU 2.9 12/15/2019    LABALBU 4.0 12/06/2011    CREATININE 0.51 12/21/2019    CALCIUM 8.7 12/21/2019    GFRAA >60 12/21/2019    LABGLOM >60 12/21/2019    GLUCOSE 138 12/21/2019    GLUCOSE 94 12/06/2011       Radiology Review:      CT OF THE ABDOMEN AND PELVIS WITHOUT CONTRAST 12/20/2019 5:24 pm       TECHNIQUE:   CT of the abdomen and pelvis was performed without the administration of   intravenous contrast. Multiplanar reformatted images are provided for review. Dose modulation, iterative reconstruction, and/or weight based adjustment of   the mA/kV was utilized to reduce the radiation dose to as low as reasonably   achievable.       COMPARISON:   12/16/2019       HISTORY:   ORDERING SYSTEM PROVIDED HISTORY: anastomotic leak   TECHNOLOGIST PROVIDED HISTORY:   anastomotic leak           FINDINGS:   Lower Chest: The visualized heart and lungs show no acute abnormalities. Cardiomegaly.       Organs: The unenhanced liver, spleen, pancreas, kidneys, adrenal glands show   no significant abnormalities.  Cholecystectomy noted.       GI/Bowel: There is limited evaluation due to absence of oral contrast. Ryan [place]   is some residual contrast in the colon from the previous CT.  Stomach is   grossly normal. Ryan Darryl is no evidence for small bowel obstruction.  Multiple   fluid-filled borderline dilated small bowel loops may represent some degree   of ileus from inflammatory changes in the peritoneal.       Since the last exam, the previously noted distal colonic anastomotic   perforation with large abscess has been endoscopically treated with 2 tandem   metal stents in addition to placement of a percutaneous drainage catheter by   interventional radiology. Theoleah Aceisidra lower end of the lower stent is seen in the   rectum.  The upper stent courses through the residual abscess which has   significantly decreased following percutaneous pigtail catheter placement.    The uppermost end of the upper stent appears to terminate along the

## 2019-12-21 NOTE — PROGRESS NOTES
[facility]  Progress Note    Saint Mood  MRN: [id number]  Date: 12/21/2019    Reason for F/U :   Abdominal abscess    Impression :   1. Recent colostomy reversal, extensive lysis of adhesions and ventral hernia repair and complicated by an anastomotic leak  2. Large interloop mid pelvic abscess measuring 11.6 cm-increased in size from 12/4/2019  · Status post stenting of large anastomotic perforation by GI 12/19/2019  · Surgical intervention is felt to be high risk and would likely need to permanent colostomy  3. Mild pyuria but no significant clinical symptoms to suggest a urinary tract infection  4. Intermittent diarrhea    Recommendations:   · I will switch the antimicrobial therapy to ceftaroline from Rocephin and continue the metronidazole  · The final culture has MRSA and E. coli isolated  · She continues on TPN. · The plan is for potential discharge early next week    Infection Control Recommendations:   Universal precautions    Discharge Planning:   Estimated Length of IV antimicrobials: 14 days  Patient will need Midline Catheter Insertion/ PICC line Insertion: No  Patient will need: Home IV , [place],  SNF,  LTAC: Undetermined  Patient willneed outpatient wound care: No    MedicalDecision making / Summary of Stay:   Saint Mood is a 81y.o.-year-old female who was initially admitted on 12/15/2019. Brandon John has a history of diverticulitis and underwent a diverting colostomy in May and in early November was admitted and underwent a colostomy reversal.  The patient's hospital course was complicated by fluid overload for which she received some diuretic therapy. She also reports having a lot of diarrhea during that admission which was felt secondary to the Toradol and post discharge her diarrhea issues continued. The patient reports not really feeling very well ending up being seen by a nurse practitioner has her PCP [facility] was out of town.   The patient was sent back to the emergency room  and was worked up and sent home. The patient reports that around  she started having rectal bleeding which prompted her to come back into the emergency room and at that point in time she was diagnosed with an anastomotic leak on CT imaging. The patient was treated conservatively with IV antibiotics and subsequently discharged on oral antimicrobial therapy with ciprofloxacin and metronidazole.     The patient reports that her diarrhea had improved but recently resumed. She had been at home and still generally is not feeling very well. She does not report any significant abdominal pain but has had some nausea and chills. She has not been able to tolerate oral intake very well and was having difficulty urinating well as leg swelling. She came back into the emergency room and testing did show question of a urinary tract infection. CT imaging showed a large pelvic abscess. The patient underwent a colonoscopy 2019  Large anastomotic perforation was noted. A large pelvic compartment with debris, stool material, and inflammation was noted. This area was walled off. Pigtail placed by IR was noted in the cavity. The patient had a stent placed    Current evaluation:2019    BP (!) 140/59   Pulse 89   Temp 98.2 °F (36.8 °C) (Oral)   Resp 16   Ht 5' 7\" (1.702 m)   Wt 221 lb (100.2 kg)   LMP  (LMP Unknown)   SpO2 100%   BMI 34.61 kg/m²     Temperature Range: Temp: 98.2 °F (36.8 °C) Temp  Av.3 °F (36.8 °C)  Min: 98 °F (36.7 °C)  Max: 98.6 °F (37 °C)  The patient is seen and evaluated at bedside she is awake and alert in no acute distress. She did have some drainage around the percutaneous drain that appears a little feculent. She does not have any subjective fever or chills. She still does have some abdominal discomfort/distention. Review of Systems   Constitutional: Negative. Respiratory: Negative.     Cardiovascular: problematic. Cultures:     Anaerobic and Aerobic Culture [[id number]] (Abnormal)  Collected: 12/17/19 7413   Order Status: Completed Specimen: Aspirate Updated: 12/20/19 5548    Specimen Description . ASPIRATE ABDOMINAL ABSCESS    Special Requests NOT REPORTED    Direct Exam NO NEUTROPHILS SEEN     MIXED BACTERIAL MORPHOTYPES SEEN ON GRAM STAIN. Abnormal     Culture ESCHERICHIA COLI HEAVY GROWTHAbnormal      METHICILLIN RESISTANT STAPHYLOCOCCUS AUREUS LIGHT GROWTHAbnormal      NORMAL MARTHA     NO ANAEROBIC ORGANISMS ISOLATED AT 3 DAYSAbnormal    Escherichia coli (1)     Antibiotic Interpretation JANNETH Status    amikacin   Preliminary     NOT REPORTED   ampicillin Sensitive  Preliminary     8  SUSCEPTIBLE   ampicillin-sulbactam   Preliminary     NOT REPORTED   aztreonam Sensitive  Preliminary     <=1  SUSCEPTIBLE   ceFAZolin Sensitive  Preliminary     <=4  SUSCEPTIBLE   cefepime   Preliminary     NOT REPORTED   cefTRIAXone Sensitive  Preliminary     <=1  SUSCEPTIBLE   ciprofloxacin Resistant  Preliminary     >=4  RESISTANT   ertapenem   Preliminary     NOT REPORTED   Confirmatory Extended Spectrum Beta-Lactamase Negative NEGATIVE Preliminary    gentamicin Sensitive  Preliminary     <=1  SUSCEPTIBLE   meropenem   Preliminary     NOT REPORTED   nitrofurantoin   Preliminary     NOT REPORTED   tigecycline   Preliminary     NOT REPORTED   tobramycin Sensitive  Preliminary     <=1  SUSCEPTIBLE   trimethoprim-sulfamethoxazole Resistant  Preliminary     >=320  RESISTANT   piperacillin-tazobactam Sensitive  Preliminary     <=4  SUSCEPTIBLE   Methicillin resistant staphylococcus aureus (2)     Antibiotic Interpretation JANNETH Status    penicillin Resistant  Preliminary     >=0.5  RESISTANT   cefoxitin screen  NOT REPORTED Preliminary    ciprofloxacin   Preliminary     NOT REPORTED   clindamycin Resistant  Preliminary     >=8  RESISTANT   erythromycin Resistant  Preliminary     >=8  RESISTANT   gentamicin Sensitive  Preliminary

## 2019-12-21 NOTE — PROGRESS NOTES
Progress Note    12/21/2019   8:22 AM    Name:  Gisell Fraser  MRN:    [id number]     Betty:     [de-identified]   Room:  2109/210901   Day: 5     Admit Date: 12/15/2019 10:10 PM  PCP: Quoc Clark MD    Subjective:     C/C:   Chief Complaint   Patient presents with   [facility] Dehydration     seen this am for same    Nausea    Other     unable to urinate       Interval History: Status: improved and patient resting comfortably    ROS:  Constitutional: negative for chills, fevers, sweats  Respiratory: negative for cough, dyspnea on exertion, hemoptysis, shortness of breath, wheezing  Cardiovascular: negative for chest pain, chest pressure/discomfort, dyspnea, lower extremity edema, palpitations  Gastrointestinal: negative for abdominal pain and abdominal pain is improved after the stent placement, constipation, diarrhea, nausea, vomiting  Neurological: negative for dizziness and headaches    Medications:      Allergies: No Known Allergies    Current Meds: PN-Adult 2-in-1 Central Line (Custom), Continuous TPN  fat emulsion 20 % infusion 250 mL, Daily  furosemide (LASIX) tablet 10 mg, Every Other Day  cefTRIAXone (ROCEPHIN) 2 g IVPB in D5W 50ml minibag, Q24H  metronidazole (FLAGYL) 500 mg in NaCl 100 mL IVPB premix, Q8H  famotidine (PEPCID) injection 20 mg, BID  insulin lispro (HUMALOG) injection vial 0-6 Units, 4 times per day  glucose (GLUTOSE) 40 % oral gel 15 g, PRN  dextrose 50 % IV solution, PRN  glucagon (rDNA) injection 1 mg, PRN  dextrose 5 % solution, PRN  oxyCODONE-acetaminophen (PERCOCET) 5-325 MG per tablet 1 tablet, Q4H PRN  sodium chloride flush 0.9 % injection 10 mL, 2 times per day  sodium chloride flush 0.9 % injection 10 mL, PRN  sodium chloride flush 0.9 % injection 10 mL, 2 times per day  sodium chloride flush 0.9 % injection 10 mL, PRN  magnesium hydroxide (MILK OF MAGNESIA) 400 MG/5ML suspension 30 mL, Daily PRN  enoxaparin (LOVENOX) injection 30 mg, BID  acetaminophen (TYLENOL) tablet 650 mg, Q4H PRN  Vitamin D (CHOLECALCIFEROL) tablet 2,000 Units, Daily  metoprolol succinate (TOPROL XL) extended release tablet 25 mg, Daily  ondansetron (ZOFRAN-ODT) disintegrating tablet 4 mg, Q6H PRN    Or  ondansetron (ZOFRAN) injection 4 mg, Q6H PRN  polyethylene glycol (GLYCOLAX) packet 17 g, Daily PRN  morphine (PF) injection 2 mg, Q3H PRN    Or  morphine sulfate (PF) injection 4 mg, Q3H PRN        Data:     Code Status:  Full Code    Family History   Problem Relation Age of Onset    Heart Disease Mother     COPD Father     Cancer Brother         thyroid, prostate, lung       Social History     Socioeconomic History    Marital status: Single     Spouse name: Not on file    Number of children: Not on file    Years of education: Not on file    Highest education level: Not on file   Occupational History    Not on file   Social Needs    Financial resource strain: Not on file    Food insecurity:     Worry: Not on file     Inability: Not on file    Transportation needs:     Medical: Not on file     Non-medical: Not on file   Tobacco Use    Smoking status: Former Smoker     Packs/day: 1.00     Years: 3.00     Pack years: 3.00     Last attempt to quit: 1975     Years since quittin.0    Smokeless tobacco: Never Used    Tobacco comment: quit smoking age 23   Substance and Sexual Activity    Alcohol use: Yes     Comment: very rare    Drug use: No    Sexual activity: Not on file   Lifestyle    Physical activity:     Days per week: Not on file     Minutes per session: Not on file    Stress: Not on file   Relationships    Social connections:     Talks on phone: Not on file     Gets together: Not on file     Attends [employer] service: Not on file     Active member of club or organization: Not on file     Attends meetings of clubs or organizations: Not on file     Relationship status: Not on file    Intimate partner violence:     Fear of current or ex partner: Not on file     Emotionally abused: Not on file     Physically abused: Not on file     Forced sexual activity: Not on file   Other Topics Concern    Not on file   Social History Narrative    Not on file       Vitals:  BP (!) 140/59   Pulse 89   Temp 98.2 °F (36.8 °C) (Oral)   Resp 16   Ht 5' 7\" (1.702 m)   Wt 221 lb (100.2 kg)   LMP  (LMP Unknown)   SpO2 100%   BMI 34.61 kg/m²   Temp (24hrs), Av.2 °F (36.8 °C), Min:98 °F (36.7 °C), Max:98.6 °F (37 °C)    Invalid input(s): CBCBMPCMP    I/O (24Hr):     Intake/Output Summary (Last 24 hours) at 2019 1164  Last data filed at 2019 7585  Gross per 24 hour   Intake 2587.1 ml   Output 5 ml   Net 2582.1 ml       Labs:  [unfilled]    Physical Examination:        General appearance: alert, cooperative and no distress  Mental Status: oriented to person, place and time and normal affect  Lungs: clear to auscultation bilaterally, normal effort  Heart: regular rate and rhythm, no murmur  Abdomen: soft, nontender, nondistended, bowel sounds present all four quadrants, no masses, hepatomegaly or splenomegaly  Extremities: no edema, redness or tenderness in the calves  Skin: no gross lesions, rashes, or induration    Assessment:        Primary Problem  <principal problem not specified>     Active Hospital Problems    Diagnosis Date Noted    Mild malnutrition ([facility] [address] [E44.1] 2019    Sepsis ([facility] [address] [A41.9] 2019     Past Medical History:   Diagnosis Date    Allergic rhinitis     Bladder prolapse     Constipation     2019 resolved    Fundic gland polyposis of stomach 2017    per EGD    GERD (gastroesophageal reflux disease)     on no medications    Hiatal hernia     History of cardiac cath 2002    pt denies blockage    History of diverticulitis     Hyperlipidemia     borderline, on no medication    Hypertension     MRSA (methicillin resistant staph aureus) culture positive 2016    nasal    MRSA carrier     follows with ID    Obesity     Osteoarthritis     Systolic murmur     benign systolic murmur (history of). Pt does not follow-up with a cardiologist (Written 09/25/2019).  Thyroid disease     goiter    Wears glasses         Plan:        1. CT scan of the abdomen pelvis shows stents are in place and patient currently on antimicrobial therapy and TPN and plan to repeat CT scan again in 1 month  2. Patient is on Percocet as needed for pain control and not taking on a regular basis and supplementing with Tylenol and believe a prescription for Percocet for home use  3. Supportive care  4. And activity as tolerated  5. Discharge home today if okay with all other services  6.  All medications labs and history reviewed      Addendum  Discussed with Dr. Hay Vaughan colorectal surgeon and prefers to hold the discharge for 2 more days as patient has mild erythema on the skin at the stent insertion site    Electronically signed by Dyan Muhammad MD on 12/21/2019 at 8:22 AM

## 2019-12-21 NOTE — PLAN OF CARE
Problem: Falls - Risk of:  Goal: Will remain free from falls  Description  Will remain free from falls  Outcome: Ongoing  Note:   Pt fall risk, fall band present, falling star, safety alarm activated and in use as needed. Hourly rounding performed. Pt encouraged to use call light. See Serena Godinez fall risk assessment. Goal: Absence of physical injury  Description  Absence of physical injury  Outcome: Ongoing  Note:   Non-skid socks in place, up with assistance, bed in lowest position, bed exit & alarm as needed, provide toileting every 2 hours an d as needed. Problem: Skin Integrity:  Goal: Will show no infection signs and symptoms  Description  Will show no infection signs and symptoms  Outcome: Ongoing  Note:   Monitor for signs & symptoms of infection. Utilize standard precautions & proper handwashing. Communicate referral to infection control. Goal: Absence of new skin breakdown  Description  Absence of new skin breakdown  Outcome: Ongoing  Note:   Continuing to monitor for skin integrity risks. Patient independent with turning/repositioning. Turning/repositioning encouraged at least once every 2 hrs, and prn basis. Hygiene care being completed independently per patient; assistance provided when deemed necessary. Problem: Fluid Volume - Imbalance:  Goal: Absence of imbalanced fluid volume signs and symptoms  Description  Absence of imbalanced fluid volume signs and symptoms  Outcome: Ongoing  Note:   Assessed for signs & symptoms of fluid imbalance and/or fluid loss. Assessed for signs of dehydration. Promoted fluid intake per protocol. IVF administered as ordered. Problem: Sensory:  Goal: General experience of comfort will improve  Description  General experience of comfort will improve  Outcome: Ongoing  Note:   Monitor WBC's, temperature, and signs & symptoms of being febrile, including mental status. Assess for Sims catheter placement and/or removal per D.O.       Problem: Urinary Ongoing  12/20/2019 1339 by Lucio Panda RN  Outcome: Ongoing  Note:   TPN and lipids as ordered  Q6h blood glucose checks  Ice chips PRN, sips of water with meds

## 2019-12-21 NOTE — PROGRESS NOTES
intake    Objective Information:  · Nutrition-Focused Physical Findings: Edema, +2 pitting in RLE/LLE  · Wound Type: Multiple, Surgical Wound  · Current Nutrition Therapies:  · Oral Diet Orders: NPO   · Parenteral Nutrition Orders:  · Type and Formula: 2-in-1 Custom   · Lipids: 100ml  · Rate/Volume: 83.3 ml/hr / 2,000 ml  · Duration: Continuous  · Current PN Order Provides: See below.   · Goal PN Orders Provides: @ 83.3 ml/hr:  2,260 kcal, 100 g protein  · Additional Calories: None  · Anthropometric Measures:  · Ht: 5' 7\" (170.2 cm)   · Current Body Wt: 220 lb 14.4 oz (100.2 kg)  · Admission Body Wt: 223 lb 8.7 oz (101.4 kg)  · Usual Body Wt: 235 lb (106.6 kg)  · % Weight Change:  4% (10#) in one month  · Ideal Body Wt: 135 lb (61.2 kg), % Ideal Body 167%  · BMI Classification: BMI 35.0 - 39.9 Obese Class II    Nutrition Interventions:   Continue NPO, Modify current Parenteral Nutrition  Continued Inpatient Monitoring, Discharge Planning, Coordination of Care    Nutrition Evaluation:   · Evaluation: Goal achieved   · Goals: PN to meet >90% of energy and protein needs   · Monitoring: PN Intake, PN Tolerance, Skin Integrity, Wound Healing, I&O, Weight, Pertinent Labs, Nausea or Vomiting, Monitor Bowel Function      Electronically signed by Spike Mora RDN, LD on 12/21/19 at 6:29 PM    Contact Number: [phone]

## 2019-12-22 LAB
ANION GAP SERPL CALCULATED.3IONS-SCNC: 8 MMOL/L (ref 9–17)
BUN BLDV-MCNC: 10 MG/DL (ref 8–23)
BUN/CREAT BLD: 19 (ref 9–20)
CALCIUM SERPL-MCNC: 9.5 MG/DL (ref 8.6–10.4)
CHLORIDE BLD-SCNC: 102 MMOL/L (ref 98–107)
CO2: 27 MMOL/L (ref 20–31)
CREAT SERPL-MCNC: 0.54 MG/DL (ref 0.5–0.9)
CULTURE: NORMAL
CULTURE: NORMAL
GFR AFRICAN AMERICAN: >60 ML/MIN
GFR NON-AFRICAN AMERICAN: >60 ML/MIN
GFR SERPL CREATININE-BSD FRML MDRD: ABNORMAL ML/MIN/{1.73_M2}
GFR SERPL CREATININE-BSD FRML MDRD: ABNORMAL ML/MIN/{1.73_M2}
GLUCOSE BLD-MCNC: 130 MG/DL (ref 65–105)
GLUCOSE BLD-MCNC: 135 MG/DL (ref 70–99)
GLUCOSE BLD-MCNC: 136 MG/DL (ref 65–105)
GLUCOSE BLD-MCNC: 137 MG/DL (ref 65–105)
GLUCOSE BLD-MCNC: 141 MG/DL (ref 65–105)
GLUCOSE BLD-MCNC: 142 MG/DL (ref 65–105)
GLUCOSE BLD-MCNC: 149 MG/DL (ref 65–105)
GLUCOSE BLD-MCNC: 150 MG/DL (ref 65–105)
HCT VFR BLD CALC: 30.3 % (ref 36.3–47.1)
HEMOGLOBIN: 9.1 G/DL (ref 11.9–15.1)
Lab: NORMAL
Lab: NORMAL
MAGNESIUM: 2.2 MG/DL (ref 1.6–2.6)
MCH RBC QN AUTO: 27.4 PG (ref 25.2–33.5)
MCHC RBC AUTO-ENTMCNC: 30 G/DL (ref 28.4–34.8)
MCV RBC AUTO: 91.3 FL (ref 82.6–102.9)
NRBC AUTOMATED: 0 PER 100 WBC
PDW BLD-RTO: 15.9 % (ref 11.8–14.4)
PHOSPHORUS: 3.1 MG/DL (ref 2.6–4.5)
PLATELET # BLD: 470 K/UL (ref 138–453)
PMV BLD AUTO: 8.4 FL (ref 8.1–13.5)
POTASSIUM SERPL-SCNC: 4.1 MMOL/L (ref 3.7–5.3)
RBC # BLD: 3.32 M/UL (ref 3.95–5.11)
SODIUM BLD-SCNC: 137 MMOL/L (ref 135–144)
SPECIMEN DESCRIPTION: NORMAL
SPECIMEN DESCRIPTION: NORMAL
WBC # BLD: 8.8 K/UL (ref 3.5–11.3)

## 2019-12-22 PROCEDURE — 80048 BASIC METABOLIC PNL TOTAL CA: CPT

## 2019-12-22 PROCEDURE — 99232 SBSQ HOSP IP/OBS MODERATE 35: CPT | Performed by: INTERNAL MEDICINE

## 2019-12-22 PROCEDURE — 6360000002 HC RX W HCPCS: Performed by: INTERNAL MEDICINE

## 2019-12-22 PROCEDURE — 6370000000 HC RX 637 (ALT 250 FOR IP): Performed by: INTERNAL MEDICINE

## 2019-12-22 PROCEDURE — 85027 COMPLETE CBC AUTOMATED: CPT

## 2019-12-22 PROCEDURE — 1200000000 HC SEMI PRIVATE

## 2019-12-22 PROCEDURE — 84100 ASSAY OF PHOSPHORUS: CPT

## 2019-12-22 PROCEDURE — 2580000003 HC RX 258: Performed by: INTERNAL MEDICINE

## 2019-12-22 PROCEDURE — 83735 ASSAY OF MAGNESIUM: CPT

## 2019-12-22 PROCEDURE — 6370000000 HC RX 637 (ALT 250 FOR IP): Performed by: COLON & RECTAL SURGERY

## 2019-12-22 PROCEDURE — 2500000003 HC RX 250 WO HCPCS: Performed by: STUDENT IN AN ORGANIZED HEALTH CARE EDUCATION/TRAINING PROGRAM

## 2019-12-22 PROCEDURE — 36415 COLL VENOUS BLD VENIPUNCTURE: CPT

## 2019-12-22 PROCEDURE — 2500000003 HC RX 250 WO HCPCS: Performed by: INTERNAL MEDICINE

## 2019-12-22 RX ORDER — LANOLIN ALCOHOL/MO/W.PET/CERES
6 CREAM (GRAM) TOPICAL NIGHTLY PRN
Status: DISCONTINUED | OUTPATIENT
Start: 2019-12-22 | End: 2019-12-30 | Stop reason: HOSPADM

## 2019-12-22 RX ADMIN — CEFTAROLINE FOSAMIL 600 MG: 600 POWDER, FOR SOLUTION INTRAVENOUS at 16:05

## 2019-12-22 RX ADMIN — Medication 10 ML: at 20:01

## 2019-12-22 RX ADMIN — METOPROLOL SUCCINATE 25 MG: 25 TABLET, FILM COATED, EXTENDED RELEASE ORAL at 11:02

## 2019-12-22 RX ADMIN — METRONIDAZOLE 500 MG: 500 INJECTION, SOLUTION INTRAVENOUS at 20:01

## 2019-12-22 RX ADMIN — FAMOTIDINE 20 MG: 10 INJECTION, SOLUTION INTRAVENOUS at 11:02

## 2019-12-22 RX ADMIN — SODIUM CHLORIDE, PRESERVATIVE FREE 10 ML: 5 INJECTION INTRAVENOUS at 20:14

## 2019-12-22 RX ADMIN — FAMOTIDINE 20 MG: 10 INJECTION, SOLUTION INTRAVENOUS at 20:01

## 2019-12-22 RX ADMIN — CEFTAROLINE FOSAMIL 600 MG: 600 POWDER, FOR SOLUTION INTRAVENOUS at 03:56

## 2019-12-22 RX ADMIN — INSULIN LISPRO 1 UNITS: 100 INJECTION, SOLUTION INTRAVENOUS; SUBCUTANEOUS at 00:02

## 2019-12-22 RX ADMIN — MELATONIN TAB 3 MG 6 MG: 3 TAB at 23:49

## 2019-12-22 RX ADMIN — INSULIN LISPRO 1 UNITS: 100 INJECTION, SOLUTION INTRAVENOUS; SUBCUTANEOUS at 05:48

## 2019-12-22 RX ADMIN — HYDROCORTISONE 2.5%: 25 CREAM TOPICAL at 20:14

## 2019-12-22 RX ADMIN — METRONIDAZOLE 500 MG: 500 INJECTION, SOLUTION INTRAVENOUS at 05:51

## 2019-12-22 RX ADMIN — FUROSEMIDE 10 MG: 20 TABLET ORAL at 11:02

## 2019-12-22 RX ADMIN — ENOXAPARIN SODIUM 30 MG: 30 INJECTION SUBCUTANEOUS at 11:02

## 2019-12-22 RX ADMIN — I.V. FAT EMULSION 250 ML: 20 EMULSION INTRAVENOUS at 17:35

## 2019-12-22 RX ADMIN — OXYCODONE AND ACETAMINOPHEN 1 TABLET: 5; 325 TABLET ORAL at 16:05

## 2019-12-22 RX ADMIN — VITAMIN D, TAB 1000IU (100/BT) 2000 UNITS: 25 TAB at 11:02

## 2019-12-22 RX ADMIN — METRONIDAZOLE 500 MG: 500 INJECTION, SOLUTION INTRAVENOUS at 13:00

## 2019-12-22 RX ADMIN — CALCIUM GLUCONATE: 94 INJECTION, SOLUTION INTRAVENOUS at 17:35

## 2019-12-22 RX ADMIN — HYDROCORTISONE 2.5%: 25 CREAM TOPICAL at 16:05

## 2019-12-22 ASSESSMENT — PAIN SCALES - GENERAL
PAINLEVEL_OUTOF10: 0
PAINLEVEL_OUTOF10: 0
PAINLEVEL_OUTOF10: 7
PAINLEVEL_OUTOF10: 0

## 2019-12-22 ASSESSMENT — ENCOUNTER SYMPTOMS
RESPIRATORY NEGATIVE: 1
ABDOMINAL PAIN: 1

## 2019-12-22 NOTE — PROGRESS NOTES
Surgery Progress Note            PATIENT NAME: Jarrod Avila     TODAY'S DATE: 12/22/2019, 9:48 AM    SUBJECTIVE:    Pt seen and examined. No acute events overnight. Continues to have drainage from around the catheter site. States she is also concerned that she is passing minimal mucus now. Afebrile. Vitals normal and stable. OBJECTIVE:   VITALS:  BP (!) 142/68   Pulse 72   Temp 98.1 °F (36.7 °C) (Oral)   Resp 18   Ht 5' 7\" (1.702 m)   Wt 218 lb 3.2 oz (99 kg)   LMP  (LMP Unknown)   SpO2 98%   BMI 34.17 kg/m²      INTAKE/OUTPUT:      Intake/Output Summary (Last 24 hours) at 12/22/2019 0948  Last data filed at 12/22/2019 0539  Gross per 24 hour   Intake 3598.27 ml   Output 2707 ml   Net 891.27 ml       PHYSICAL EXAM  General Appearance: anxious. Awake and alert. NAD  Skin:  Warm, dry  Head/face:  NCAT, EOMI  Lungs:  Respirations even and unlabored  Heart:  RRR  Abd: obese, soft, ND, mild tenderness to palpation. Lower abd catheter in place with sutures and thick brown fluid expressed from around the catheter; surrounding erythema improving and tenderness to palpation. Incisions healing well.    Extremities: trace LE edema   Neurologic:  No focal deficits , moves all extremities        Data:  CBC with Differential:    Lab Results   Component Value Date    WBC 8.8 12/22/2019    RBC 3.32 12/22/2019    RBC 3.98 12/06/2011    HGB 9.1 12/22/2019    HCT 30.3 12/22/2019     12/22/2019     12/06/2011    MCV 91.3 12/22/2019    MCH 27.4 12/22/2019    MCHC 30.0 12/22/2019    RDW 15.9 12/22/2019    LYMPHOPCT 12 12/15/2019    MONOPCT 9 12/15/2019    BASOPCT 1 12/15/2019    MONOSABS 1.21 12/15/2019    LYMPHSABS 1.61 12/15/2019    EOSABS 0.00 12/15/2019    BASOSABS 0.13 12/15/2019    DIFFTYPE NOT REPORTED 12/15/2019     BMP:    Lab Results   Component Value Date     12/22/2019    K 4.1 12/22/2019     12/22/2019    CO2 27 12/22/2019    BUN 10 12/22/2019    LABALBU 2.9 12/15/2019    LABALBU component of the residual abscess.  Pigtail stent lies   along the right side of the stent.       Pelvis: Urinary bladder grossly normal.       Peritoneum/Retroperitoneum: Small residual air-fluid level around placed   stent where previously there was a larger abscess.  Percutaneous pigtail   drainage catheter has been positioned within this collection.       Bones/Soft Tissues: Subcutaneous gas in the lower right abdomen which has   developed since 12/16/2019 felt to be consistent with the recent pigtail   catheter placement.  Attention on follow-up.  No acute bony abnormality.           Impression   1. Interval placement of 2 tandem metal stents from lower rectum extending   through the residual known pelvic abscess from anastomotic leak.  The   lowermost end of the stent is confirmed within the rectum however the   uppermost end of the stent appears to be within the gas component of the   residual abscess. 2. Decreasing large pelvic abscess following placement of a percutaneous   pigtail drainage catheter. 3. Subcutaneous tissue gas in the anterior right lower abdomen felt to be   secondary to the drainage catheter placement however tension on follow-up.                 ASSESSMENT     82 y/o female with pelvic abscess s/p anastomotic leak following colostomy reversal   S/p IR guided drain 12/17/2019  S/p colonoscopy and placement of stent x2    Plan  1. Continue TPN and NPO except ice chips for 4 weeks then will plan to re evaluate with CT  2. Will ask IR to replace/reposition catheter  3. Antibiotics per ID  4. pepcid BID  5. lovenox BID   6. D/c planning    Electronically signed by Mark Montilla DO  on 12/22/2019 at 9:48 AM   I Dr. Darylene Motley saw and examined the patient. I have edited the above and agree with the above. Geoffrey Santacruz  Colorectal Surgery

## 2019-12-22 NOTE — PLAN OF CARE
Problem: Falls - Risk of:  Goal: Will remain free from falls  Description  Will remain free from falls  Outcome: Ongoing  Note:   Pt up in room , steady gait, call bell in reach, encouraged to call prn     Problem: Skin Integrity:  Goal: Will show no infection signs and symptoms  Description  Will show no infection signs and symptoms  Outcome: Ongoing  Note:   Monitoring       Problem: Fluid Volume - Imbalance:  Goal: Absence of imbalanced fluid volume signs and symptoms  Description  Absence of imbalanced fluid volume signs and symptoms  Outcome: Ongoing  Note:   TPN & Lipids per orders,      Problem: Sensory:  Goal: General experience of comfort will improve  Description  General experience of comfort will improve  Outcome: Ongoing     Problem: Pain:  Goal: Pain level will decrease  Description  Pain level will decrease  Outcome: Ongoing  Note:   Pt denies severe pain tonite

## 2019-12-22 NOTE — PROGRESS NOTES
Nutrition Assessment (Parenteral Nutrition)    Type and Reason for Visit: Reassess    Nutrition Recommendations: 1. Suggest NPO status except sips with medications, ice chips. 2. Consider maintaining PN-Adult 2-in-1 Central Line (Custom) @ 83.3 ml/hr, with IV Lipids @ 21 ml/hr x 12 hr.  Include additives:  70 mEq Na Acetate, 120 mEq NaCl, 10 mEq Na PO4, 40 mEq K Acetate, 20 mmol K PO4, 50 mEq KCl, 8 mEq Ca Gluconate & 8 mEq Mg SO4. Nutrition Assessment: Per Pt:  having no significant pain, denies any N/V, and having +reduced # of BM's. Recommend NPO status, with maintaining on PN @ 83.3 ml/hr, with IV lipids @ 21 ml/hr x 12 hr.  Communicated with Pharmacist about Pt, and PN order suggested adjustments. Per Colorectal Surgery:  plan for repositioning of Pt's catheter by IR (likely tomorrow). Malnutrition Assessment:  · Malnutrition Status: Mild Malnutrition  · Context: Acute illness or injury  · Findings of the 6 clinical characteristics of malnutrition (Minimum of 2 out of 6 clinical characteristics is required to make the diagnosis of moderate or severe Protein Calorie Malnutrition based on AND/ASPEN Guidelines):  1. Energy Intake-Less than or equal to 75% of estimated energy requirement, Greater than or equal to 7 days    2. Weight Loss-2% loss or greater(Not considered to be significant), in 1 month  3. Fat Loss-No significant subcutaneous fat loss  4. Muscle Loss-No significant muscle mass loss  5. Fluid Accumulation-Mild fluid accumulation, Extremities  6.   Strength-Not measured    Nutrition Risk Level: High    Nutrient Needs:  · Estimated Daily Total Kcal: 6455-0835 kcal (15-18 kcal/kg admission weight)  · Estimated Daily Protein (g): 104-122 g (1.7-2 g/kg IBW)  · Estimated Daily Total Fluid (ml/day): 1 mL/kcal    Nutrition Diagnosis:   · Problem: Inadequate oral intake  · Etiology: related to Alteration in GI function     Signs and symptoms:  as evidenced by NPO status due to medical

## 2019-12-22 NOTE — PROGRESS NOTES
Pt. Refusing all lovenox, states \" no one is poking me anymore\". Pt. Requesting something for external hemorrhoid, Dr. Francois henao. Hydrocolloid applied under MAE drain exit as tape, drainage and dressings are irritating to skin. Tan drainage repeatedly cleansed from around site.

## 2019-12-22 NOTE — PROGRESS NOTES
up being seen by a nurse practitioner has her PCP Dr. Rodgers Neither body was out of town. The patient was sent back to the emergency room  and was worked up and sent home. The patient reports that around  she started having rectal bleeding which prompted her to come back into the emergency room and at that point in time she was diagnosed with an anastomotic leak on CT imaging. The patient was treated conservatively with IV antibiotics and subsequently discharged on oral antimicrobial therapy with ciprofloxacin and metronidazole.     The patient reports that her diarrhea had improved but recently resumed. She had been at home and still generally is not feeling very well. She does not report any significant abdominal pain but has had some nausea and chills. She has not been able to tolerate oral intake very well and was having difficulty urinating well as leg swelling. She came back into the emergency room and testing did show question of a urinary tract infection. CT imaging showed a large pelvic abscess. The patient underwent a colonoscopy 2019  Large anastomotic perforation was noted. A large pelvic compartment with debris, stool material, and inflammation was noted. This area was walled off. Pigtail placed by IR was noted in the cavity. The patient had a stent placed    Current evaluation:2019    /61   Pulse 101   Temp 97.7 °F (36.5 °C) (Oral)   Resp 18   Ht 5' 7\" (1.702 m)   Wt 218 lb 3.2 oz (99 kg)   LMP  (LMP Unknown)   SpO2 97%   BMI 34.17 kg/m²     Temperature Range: Temp: 97.7 °F (36.5 °C) Temp  Av °F (36.7 °C)  Min: 97.7 °F (36.5 °C)  Max: 98.1 °F (36.7 °C)  The patient is seen and evaluated at bedside she is awake and alert in no acute distress. She does not report any subjective fever or chills. She does have some mild abdominal discomfort but no sharp pains.   The MAE drain is not putting out much and there is some feculent material leaking at the abdominal site. Review of Systems   Constitutional: Negative. Respiratory: Negative. Cardiovascular: Negative. Gastrointestinal: Positive for abdominal pain. Genitourinary: Negative. Musculoskeletal: Negative. Skin: Negative. Neurological: Negative. Psychiatric/Behavioral: Negative. Physical Examination :     Physical Exam  Constitutional:       Appearance: She is well-developed. HENT:      Head: Normocephalic and atraumatic. Neck:      Musculoskeletal: Normal range of motion and neck supple. Cardiovascular:      Rate and Rhythm: Regular rhythm. Heart sounds: Normal heart sounds. Pulmonary:      Effort: Pulmonary effort is normal.      Breath sounds: Normal breath sounds. Abdominal:      General: Bowel sounds are normal.      Palpations: Abdomen is soft. Comments: Percutaneous drain in place  The dressing around the drain does have some fecalith drainage appreciated   Skin:     General: Skin is warm and dry. Neurological:      Mental Status: She is alert and oriented to person, place, and time. Laboratory data:   I have independently reviewed the followinglabs:  CBC with Differential:   Recent Labs     12/21/19  0620 12/22/19  0750   WBC 8.2 8.8   HGB 8.4* 9.1*   HCT 27.9* 30.3*    470*     BMP:   Recent Labs     12/21/19  0620 12/22/19  0750    137   K 3.9 4.1   CL 99 102   CO2 26 27   BUN 8 10   CREATININE 0.51 0.54   MG 2.1 2.2     Hepatic Function Panel:   No results for input(s): PROT, LABALBU, BILIDIR, IBILI, BILITOT, ALKPHOS, ALT, AST in the last 72 hours. No results for input(s): VANCOTROUGH in the last 72 hours. No results found for: CRP  No results found for: SEDRATE    No results for input(s): PROCAL in the last 72 hours. Imaging Studies:   OF THE ABDOMEN AND PELVIS WITHOUT CONTRAST 12/20/2019 5:24 pm  Impression   1.  Interval placement of 2 tandem metal stents from lower rectum extending   through the residual known pelvic abscess from anastomotic leak.  The   lowermost end of the stent is confirmed within the rectum however the   uppermost end of the stent appears to be within the gas component of the   residual abscess. 2. Decreasing large pelvic abscess following placement of a percutaneous   pigtail drainage catheter. 3. Subcutaneous tissue gas in the anterior right lower abdomen felt to be   secondary to the drainage catheter placement however tension on follow-up. CT OF THE ABDOMEN AND PELVIS WITH CONTRAST 12/16/2019 7:24 pm  Impression   There is a large interloop mid pelvic abscess measuring up to 11.6 cm   increasing in size from 12/04/2019.  Contents of the abscess represent a   mixture of stool-like material with mixed soft tissue and gas.  There is no   large air-fluid level suggesting percutaneous drainage may be problematic. Cultures:     Anaerobic and Aerobic Culture [[id number]] (Abnormal)  Collected: 12/17/19 7890   Order Status: Completed Specimen: Aspirate Updated: 12/20/19 7575    Specimen Description . ASPIRATE ABDOMINAL ABSCESS    Special Requests NOT REPORTED    Direct Exam NO NEUTROPHILS SEEN     MIXED BACTERIAL MORPHOTYPES SEEN ON GRAM STAIN. Abnormal     Culture ESCHERICHIA COLI HEAVY GROWTHAbnormal      METHICILLIN RESISTANT STAPHYLOCOCCUS AUREUS LIGHT GROWTHAbnormal      NORMAL MARTHA     NO ANAEROBIC ORGANISMS ISOLATED AT 3 DAYSAbnormal    Escherichia coli (1)     Antibiotic Interpretation JANNETH Status    amikacin   Preliminary     NOT REPORTED   ampicillin Sensitive  Preliminary     8  SUSCEPTIBLE   ampicillin-sulbactam   Preliminary     NOT REPORTED   aztreonam Sensitive  Preliminary     <=1  SUSCEPTIBLE   ceFAZolin Sensitive  Preliminary     <=4  SUSCEPTIBLE   cefepime   Preliminary     NOT REPORTED   cefTRIAXone Sensitive  Preliminary     <=1  SUSCEPTIBLE   ciprofloxacin Resistant  Preliminary     >=4  RESISTANT   ertapenem   Preliminary     NOT REPORTED   Confirmatory Extended Spectrum Beta-Lactamase Negative NEGATIVE Preliminary    gentamicin Sensitive  Preliminary     <=1  SUSCEPTIBLE   meropenem   Preliminary     NOT REPORTED   nitrofurantoin   Preliminary     NOT REPORTED   tigecycline   Preliminary     NOT REPORTED   tobramycin Sensitive  Preliminary     <=1  SUSCEPTIBLE   trimethoprim-sulfamethoxazole Resistant  Preliminary     >=320  RESISTANT   piperacillin-tazobactam Sensitive  Preliminary     <=4  SUSCEPTIBLE   Methicillin resistant staphylococcus aureus (2)     Antibiotic Interpretation JANNETH Status    penicillin Resistant  Preliminary     >=0.5  RESISTANT   cefoxitin screen  NOT REPORTED Preliminary    ciprofloxacin   Preliminary     NOT REPORTED   clindamycin Resistant  Preliminary     >=8  RESISTANT   erythromycin Resistant  Preliminary     >=8  RESISTANT   gentamicin Sensitive  Preliminary     <=0.5  SUSCEPTIBLE   gentamicin Sensitive Gentamicin is used only in combination with other active agents that test susceptible. Preliminary    Induced Clind Resist  NOT REPORTED Preliminary    levofloxacin Intermediate  Preliminary     4  INTERMEDIATE   linezolid   Preliminary     NOT REPORTED   moxifloxacin   Preliminary     NOT REPORTED   nitrofurantoin   Preliminary     NOT REPORTED   oxacillin Resistant  Preliminary     >=4  RESISTANT   Synercid   Preliminary     NOT REPORTED   rifampin   Preliminary     NOT REPORTED   tetracycline Sensitive  Preliminary     <=1  SUSCEPTIBLE   tigecycline   Preliminary     NOT REPORTED   trimethoprim-sulfamethoxazole Sensitive  Preliminary     <=10  SUSCEPTIBLE   vancomycin Sensitive  Preliminary     1  SUSCEPTIBLE     Cult,Blood #1 [[id number]] Collected: 12/15/19 2255   Order Status: Completed Specimen: Blood Updated: 12/22/19 0007    Specimen Description . BLOOD    Special Requests 3ML RT AC    Culture NO GROWTH 6 DAYS   Cult,Blood #2 [[id number]] Collected: 12/15/19 2304   Order Status: Completed Specimen: Blood Updated: 12/22/19 0007 Specimen Description . BLOOD    Special Requests 10ML LT WRIST    Culture NO GROWTH 6 DAYS     Cult,Urine,CC [[id number]] Collected: 12/17/19 1017   Order Status: Completed Specimen: Urine Updated: 12/19/19 0020    Specimen Description . URINE    Special Requests NOT REPORTED    Culture NO GROWTH       Medications:      ceftaroline fosamil (TEFLARO) IVPB  600 mg Intravenous Q12H    fat emulsion  250 mL Intravenous Daily    furosemide  10 mg Oral Every Other Day    metroNIDAZOLE  500 mg Intravenous Q8H    famotidine (PEPCID) injection  20 mg Intravenous BID    insulin lispro  0-6 Units Subcutaneous 4 times per day    sodium chloride flush  10 mL Intravenous 2 times per day    sodium chloride flush  10 mL Intravenous 2 times per day    enoxaparin  30 mg Subcutaneous BID    Vitamin D  2,000 Units Oral Daily    metoprolol succinate  25 mg Oral Daily           [facility]  Jean Peter MD  Perfect Serve messaging  OFFICE: [phone]      Electronically signed by Jean Peter MD on 12/22/2019 at 3:14 PM  Thank you for allowing us to participate in the care of this patient. Please call with questions. This note iscreated with the assistance of a speech recognition program.  While intending to generate a document that actually reflects the content of the visit, the document can still have some errors including those of syntax andsound a like substitutions which may escape proof reading. In such instances, actual meaning can be extrapolated by contextual diversion.

## 2019-12-22 NOTE — PROGRESS NOTES
Progress Note    12/22/2019   8:42 AM    Name:  Ayah Patel  MRN:    [id number]     [place]:     [de-identified]   [facility]:  [id number] Day: 6     Admit Date: 12/15/2019 10:10 PM  PCP: Getachew Lancaster MD    Subjective:     C/C:   Chief Complaint   Patient presents with   Bedelia Fruits Dehydration     seen this am for same    Nausea    Other     unable to urinate       Interval History: Status: not changed and complaining of drainage and patient feels that the catheter is not in place and needs to be replaced or checked by IR and denies any abdominal pain nausea or vomiting and leg swelling resolved    ROS:  Constitutional: negative for chills, fevers, sweats  Respiratory: negative for cough, dyspnea on exertion, hemoptysis, shortness of breath, wheezing  Cardiovascular: negative for chest pain, chest pressure/discomfort, dyspnea, lower extremity edema, palpitations  Gastrointestinal: negative for abdominal pain, constipation, diarrhea, nausea, vomiting  Neurological: negative for dizziness and headaches    Medications:      Allergies: No Known Allergies    Current Meds: PN-Adult 2-in-1 Central Line (Custom), Continuous TPN  ceftaroline fosamil (TEFLARO) 600 mg in dextrose 5 % 50 mL IVPB, Q12H  fat emulsion 20 % infusion 250 mL, Daily  furosemide (LASIX) tablet 10 mg, Every Other Day  metronidazole (FLAGYL) 500 mg in NaCl 100 mL IVPB premix, Q8H  famotidine (PEPCID) injection 20 mg, BID  insulin lispro (HUMALOG) injection vial 0-6 Units, 4 times per day  glucose (GLUTOSE) 40 % oral gel 15 g, PRN  dextrose 50 % IV solution, PRN  glucagon (rDNA) injection 1 mg, PRN  dextrose 5 % solution, PRN  oxyCODONE-acetaminophen (PERCOCET) 5-325 MG per tablet 1 tablet, Q4H PRN  sodium chloride flush 0.9 % injection 10 mL, 2 times per day  sodium chloride flush 0.9 % injection 10 mL, PRN  sodium chloride flush 0.9 % injection 10 mL, 2 times per day  sodium chloride flush 0.9 % injection 10 mL, PRN  magnesium hydroxide (MILK OF (methicillin resistant staph aureus) culture positive 11/01/2016    nasal    MRSA carrier     follows with ID    Obesity     Osteoarthritis     Systolic murmur     benign systolic murmur (history of). Pt does not follow-up with a cardiologist (Written 09/25/2019).  Thyroid disease     goiter    Wears glasses         Plan:        1. Continue  antibiotics as per infectious diseases  2. Colorectal surgery note reviewed and plan for replacing the on repositioning the catheter by interventional radiology which probably will take place tomorrow  3. Continue TPN and currently patient is n.p.o.  4. No leg swelling today and continue to monitor  5.  Discussed about antianxiety medications and patient decided not to take anything as it would cause drowsiness and I agree with her   6. all medications labs and history reviewed      Electronically signed by Ángela Kingston MD on 12/22/2019 at 8:42 AM

## 2019-12-23 ENCOUNTER — ANESTHESIA (OUTPATIENT)
Dept: OPERATING ROOM | Age: 69
DRG: 856 | End: 2019-12-23
Payer: MEDICARE

## 2019-12-23 ENCOUNTER — ANESTHESIA EVENT (OUTPATIENT)
Dept: OPERATING ROOM | Age: 69
DRG: 856 | End: 2019-12-23
Payer: MEDICARE

## 2019-12-23 ENCOUNTER — APPOINTMENT (OUTPATIENT)
Dept: INTERVENTIONAL RADIOLOGY/VASCULAR | Age: 69
DRG: 856 | End: 2019-12-23
Payer: MEDICARE

## 2019-12-23 ENCOUNTER — APPOINTMENT (OUTPATIENT)
Dept: GENERAL RADIOLOGY | Age: 69
DRG: 856 | End: 2019-12-23
Payer: MEDICARE

## 2019-12-23 VITALS — SYSTOLIC BLOOD PRESSURE: 134 MMHG | DIASTOLIC BLOOD PRESSURE: 67 MMHG | OXYGEN SATURATION: 100 %

## 2019-12-23 LAB
ABO/RH: NORMAL
ANION GAP SERPL CALCULATED.3IONS-SCNC: 8 MMOL/L (ref 9–17)
ANTIBODY SCREEN: NEGATIVE
ARM BAND NUMBER: NORMAL
BLD PROD TYP BPU: NORMAL
BLD PROD TYP BPU: NORMAL
BUN BLDV-MCNC: 13 MG/DL (ref 8–23)
BUN/CREAT BLD: 23 (ref 9–20)
CALCIUM SERPL-MCNC: 9.4 MG/DL (ref 8.6–10.4)
CHLORIDE BLD-SCNC: 104 MMOL/L (ref 98–107)
CO2: 26 MMOL/L (ref 20–31)
CREAT SERPL-MCNC: 0.56 MG/DL (ref 0.5–0.9)
CROSSMATCH RESULT: NORMAL
CROSSMATCH RESULT: NORMAL
DISPENSE STATUS BLOOD BANK: NORMAL
DISPENSE STATUS BLOOD BANK: NORMAL
EXPIRATION DATE: NORMAL
GFR AFRICAN AMERICAN: >60 ML/MIN
GFR NON-AFRICAN AMERICAN: >60 ML/MIN
GFR SERPL CREATININE-BSD FRML MDRD: ABNORMAL ML/MIN/{1.73_M2}
GFR SERPL CREATININE-BSD FRML MDRD: ABNORMAL ML/MIN/{1.73_M2}
GLUCOSE BLD-MCNC: 118 MG/DL (ref 65–105)
GLUCOSE BLD-MCNC: 132 MG/DL (ref 70–99)
GLUCOSE BLD-MCNC: 136 MG/DL (ref 65–105)
GLUCOSE BLD-MCNC: 138 MG/DL (ref 65–105)
GLUCOSE BLD-MCNC: 142 MG/DL (ref 65–105)
GLUCOSE BLD-MCNC: 146 MG/DL (ref 65–105)
GLUCOSE BLD-MCNC: 152 MG/DL (ref 65–105)
GLUCOSE BLD-MCNC: 160 MG/DL (ref 65–105)
HCT VFR BLD CALC: 30.1 % (ref 36.3–47.1)
HEMOGLOBIN: 9.1 G/DL (ref 11.9–15.1)
MAGNESIUM: 2.1 MG/DL (ref 1.6–2.6)
MCH RBC QN AUTO: 27.6 PG (ref 25.2–33.5)
MCHC RBC AUTO-ENTMCNC: 30.2 G/DL (ref 28.4–34.8)
MCV RBC AUTO: 91.2 FL (ref 82.6–102.9)
NRBC AUTOMATED: 0 PER 100 WBC
PDW BLD-RTO: 16 % (ref 11.8–14.4)
PHOSPHORUS: 3.2 MG/DL (ref 2.6–4.5)
PLATELET # BLD: 489 K/UL (ref 138–453)
PMV BLD AUTO: 8.5 FL (ref 8.1–13.5)
POTASSIUM SERPL-SCNC: 4.6 MMOL/L (ref 3.7–5.3)
RBC # BLD: 3.3 M/UL (ref 3.95–5.11)
SODIUM BLD-SCNC: 138 MMOL/L (ref 135–144)
TRANSFUSION STATUS: NORMAL
TRANSFUSION STATUS: NORMAL
UNIT DIVISION: 0
UNIT DIVISION: 0
UNIT NUMBER: NORMAL
UNIT NUMBER: NORMAL
WBC # BLD: 7.9 K/UL (ref 3.5–11.3)

## 2019-12-23 PROCEDURE — 72170 X-RAY EXAM OF PELVIS: CPT

## 2019-12-23 PROCEDURE — 85027 COMPLETE CBC AUTOMATED: CPT

## 2019-12-23 PROCEDURE — 2500000003 HC RX 250 WO HCPCS: Performed by: INTERNAL MEDICINE

## 2019-12-23 PROCEDURE — 6360000002 HC RX W HCPCS: Performed by: ANESTHESIOLOGY

## 2019-12-23 PROCEDURE — 6370000000 HC RX 637 (ALT 250 FOR IP): Performed by: INTERNAL MEDICINE

## 2019-12-23 PROCEDURE — 3700000000 HC ANESTHESIA ATTENDED CARE: Performed by: INTERNAL MEDICINE

## 2019-12-23 PROCEDURE — 84100 ASSAY OF PHOSPHORUS: CPT

## 2019-12-23 PROCEDURE — 6360000002 HC RX W HCPCS: Performed by: INTERNAL MEDICINE

## 2019-12-23 PROCEDURE — 1200000000 HC SEMI PRIVATE

## 2019-12-23 PROCEDURE — 7100000000 HC PACU RECOVERY - FIRST 15 MIN: Performed by: INTERNAL MEDICINE

## 2019-12-23 PROCEDURE — 82947 ASSAY GLUCOSE BLOOD QUANT: CPT

## 2019-12-23 PROCEDURE — 3700000001 HC ADD 15 MINUTES (ANESTHESIA): Performed by: INTERNAL MEDICINE

## 2019-12-23 PROCEDURE — 2580000003 HC RX 258: Performed by: INTERNAL MEDICINE

## 2019-12-23 PROCEDURE — 7100000001 HC PACU RECOVERY - ADDTL 15 MIN: Performed by: INTERNAL MEDICINE

## 2019-12-23 PROCEDURE — 2709999900 HC NON-CHARGEABLE SUPPLY: Performed by: INTERNAL MEDICINE

## 2019-12-23 PROCEDURE — 45330 DIAGNOSTIC SIGMOIDOSCOPY: CPT | Performed by: INTERNAL MEDICINE

## 2019-12-23 PROCEDURE — 3609008400 HC SIGMOIDOSCOPY DIAGNOSTIC: Performed by: INTERNAL MEDICINE

## 2019-12-23 PROCEDURE — 6360000002 HC RX W HCPCS: Performed by: NURSE ANESTHETIST, CERTIFIED REGISTERED

## 2019-12-23 PROCEDURE — 83735 ASSAY OF MAGNESIUM: CPT

## 2019-12-23 PROCEDURE — 2580000003 HC RX 258: Performed by: NURSE ANESTHETIST, CERTIFIED REGISTERED

## 2019-12-23 PROCEDURE — 80048 BASIC METABOLIC PNL TOTAL CA: CPT

## 2019-12-23 PROCEDURE — 36415 COLL VENOUS BLD VENIPUNCTURE: CPT

## 2019-12-23 PROCEDURE — 2500000003 HC RX 250 WO HCPCS: Performed by: NURSE ANESTHETIST, CERTIFIED REGISTERED

## 2019-12-23 PROCEDURE — 0DJD8ZZ INSPECTION OF LOWER INTESTINAL TRACT, VIA NATURAL OR ARTIFICIAL OPENING ENDOSCOPIC: ICD-10-PCS | Performed by: INTERNAL MEDICINE

## 2019-12-23 PROCEDURE — 3209999900 FLUORO FOR SURGICAL PROCEDURES

## 2019-12-23 RX ORDER — HYDROMORPHONE HCL 110MG/55ML
0.25 PATIENT CONTROLLED ANALGESIA SYRINGE INTRAVENOUS EVERY 5 MIN PRN
Status: DISCONTINUED | OUTPATIENT
Start: 2019-12-23 | End: 2019-12-23 | Stop reason: HOSPADM

## 2019-12-23 RX ORDER — HYDROMORPHONE HCL 110MG/55ML
0.5 PATIENT CONTROLLED ANALGESIA SYRINGE INTRAVENOUS EVERY 5 MIN PRN
Status: DISCONTINUED | OUTPATIENT
Start: 2019-12-23 | End: 2019-12-23 | Stop reason: HOSPADM

## 2019-12-23 RX ORDER — FENTANYL CITRATE 50 UG/ML
50 INJECTION, SOLUTION INTRAMUSCULAR; INTRAVENOUS EVERY 5 MIN PRN
Status: DISCONTINUED | OUTPATIENT
Start: 2019-12-23 | End: 2019-12-23 | Stop reason: HOSPADM

## 2019-12-23 RX ORDER — FENTANYL CITRATE 50 UG/ML
25 INJECTION, SOLUTION INTRAMUSCULAR; INTRAVENOUS EVERY 5 MIN PRN
Status: DISCONTINUED | OUTPATIENT
Start: 2019-12-23 | End: 2019-12-23 | Stop reason: HOSPADM

## 2019-12-23 RX ORDER — SODIUM CHLORIDE 9 MG/ML
INJECTION, SOLUTION INTRAVENOUS CONTINUOUS PRN
Status: DISCONTINUED | OUTPATIENT
Start: 2019-12-23 | End: 2019-12-23 | Stop reason: SDUPTHER

## 2019-12-23 RX ORDER — PROPOFOL 10 MG/ML
INJECTION, EMULSION INTRAVENOUS PRN
Status: DISCONTINUED | OUTPATIENT
Start: 2019-12-23 | End: 2019-12-23 | Stop reason: SDUPTHER

## 2019-12-23 RX ORDER — ONDANSETRON 2 MG/ML
4 INJECTION INTRAMUSCULAR; INTRAVENOUS
Status: COMPLETED | OUTPATIENT
Start: 2019-12-23 | End: 2019-12-23

## 2019-12-23 RX ORDER — LIDOCAINE HYDROCHLORIDE 20 MG/ML
INJECTION, SOLUTION EPIDURAL; INFILTRATION; INTRACAUDAL; PERINEURAL PRN
Status: DISCONTINUED | OUTPATIENT
Start: 2019-12-23 | End: 2019-12-23 | Stop reason: SDUPTHER

## 2019-12-23 RX ADMIN — FAMOTIDINE 20 MG: 10 INJECTION, SOLUTION INTRAVENOUS at 22:35

## 2019-12-23 RX ADMIN — METRONIDAZOLE 500 MG: 500 INJECTION, SOLUTION INTRAVENOUS at 23:16

## 2019-12-23 RX ADMIN — CEFTAROLINE FOSAMIL 600 MG: 600 POWDER, FOR SOLUTION INTRAVENOUS at 04:31

## 2019-12-23 RX ADMIN — ACETAMINOPHEN 650 MG: 325 TABLET ORAL at 01:54

## 2019-12-23 RX ADMIN — PROPOFOL 20 MG: 10 INJECTION, EMULSION INTRAVENOUS at 15:14

## 2019-12-23 RX ADMIN — LIDOCAINE HYDROCHLORIDE 100 MG: 20 INJECTION, SOLUTION EPIDURAL; INFILTRATION; INTRACAUDAL; PERINEURAL at 14:36

## 2019-12-23 RX ADMIN — PROPOFOL 30 MG: 10 INJECTION, EMULSION INTRAVENOUS at 14:42

## 2019-12-23 RX ADMIN — MORPHINE SULFATE 4 MG: 4 INJECTION INTRAVENOUS at 16:45

## 2019-12-23 RX ADMIN — PROPOFOL 20 MG: 10 INJECTION, EMULSION INTRAVENOUS at 15:01

## 2019-12-23 RX ADMIN — SODIUM CHLORIDE: 9 INJECTION, SOLUTION INTRAVENOUS at 14:34

## 2019-12-23 RX ADMIN — VITAMIN D, TAB 1000IU (100/BT) 2000 UNITS: 25 TAB at 08:28

## 2019-12-23 RX ADMIN — PROPOFOL 20 MG: 10 INJECTION, EMULSION INTRAVENOUS at 15:23

## 2019-12-23 RX ADMIN — PROPOFOL 20 MG: 10 INJECTION, EMULSION INTRAVENOUS at 14:52

## 2019-12-23 RX ADMIN — PROPOFOL 20 MG: 10 INJECTION, EMULSION INTRAVENOUS at 14:49

## 2019-12-23 RX ADMIN — METRONIDAZOLE 500 MG: 500 INJECTION, SOLUTION INTRAVENOUS at 06:24

## 2019-12-23 RX ADMIN — PROPOFOL 20 MG: 10 INJECTION, EMULSION INTRAVENOUS at 14:56

## 2019-12-23 RX ADMIN — PROPOFOL 20 MG: 10 INJECTION, EMULSION INTRAVENOUS at 15:11

## 2019-12-23 RX ADMIN — PROPOFOL 20 MG: 10 INJECTION, EMULSION INTRAVENOUS at 14:58

## 2019-12-23 RX ADMIN — METRONIDAZOLE 500 MG: 500 INJECTION, SOLUTION INTRAVENOUS at 18:34

## 2019-12-23 RX ADMIN — HYDROCORTISONE 2.5%: 25 CREAM TOPICAL at 22:35

## 2019-12-23 RX ADMIN — PROPOFOL 20 MG: 10 INJECTION, EMULSION INTRAVENOUS at 14:38

## 2019-12-23 RX ADMIN — METOPROLOL SUCCINATE 25 MG: 25 TABLET, FILM COATED, EXTENDED RELEASE ORAL at 08:28

## 2019-12-23 RX ADMIN — PROPOFOL 20 MG: 10 INJECTION, EMULSION INTRAVENOUS at 14:47

## 2019-12-23 RX ADMIN — PROPOFOL 20 MG: 10 INJECTION, EMULSION INTRAVENOUS at 15:04

## 2019-12-23 RX ADMIN — ACETAMINOPHEN 650 MG: 325 TABLET ORAL at 06:23

## 2019-12-23 RX ADMIN — PROPOFOL 20 MG: 10 INJECTION, EMULSION INTRAVENOUS at 14:41

## 2019-12-23 RX ADMIN — HYDROCORTISONE 2.5%: 25 CREAM TOPICAL at 08:28

## 2019-12-23 RX ADMIN — PROPOFOL 20 MG: 10 INJECTION, EMULSION INTRAVENOUS at 15:21

## 2019-12-23 RX ADMIN — PROPOFOL 20 MG: 10 INJECTION, EMULSION INTRAVENOUS at 15:09

## 2019-12-23 RX ADMIN — ONDANSETRON 4 MG: 2 INJECTION INTRAMUSCULAR; INTRAVENOUS at 15:58

## 2019-12-23 RX ADMIN — PROPOFOL 20 MG: 10 INJECTION, EMULSION INTRAVENOUS at 15:07

## 2019-12-23 RX ADMIN — CALCIUM GLUCONATE: 94 INJECTION, SOLUTION INTRAVENOUS at 17:33

## 2019-12-23 RX ADMIN — PROPOFOL 20 MG: 10 INJECTION, EMULSION INTRAVENOUS at 14:54

## 2019-12-23 RX ADMIN — FUROSEMIDE 10 MG: 20 TABLET ORAL at 08:27

## 2019-12-23 RX ADMIN — Medication 10 ML: at 22:36

## 2019-12-23 RX ADMIN — INSULIN LISPRO 1 UNITS: 100 INJECTION, SOLUTION INTRAVENOUS; SUBCUTANEOUS at 06:02

## 2019-12-23 RX ADMIN — I.V. FAT EMULSION 250 ML: 20 EMULSION INTRAVENOUS at 18:34

## 2019-12-23 RX ADMIN — PROPOFOL 20 MG: 10 INJECTION, EMULSION INTRAVENOUS at 15:17

## 2019-12-23 RX ADMIN — FAMOTIDINE 20 MG: 10 INJECTION, SOLUTION INTRAVENOUS at 08:26

## 2019-12-23 RX ADMIN — PROPOFOL 50 MG: 10 INJECTION, EMULSION INTRAVENOUS at 14:36

## 2019-12-23 RX ADMIN — PROPOFOL 20 MG: 10 INJECTION, EMULSION INTRAVENOUS at 14:43

## 2019-12-23 RX ADMIN — PROPOFOL 20 MG: 10 INJECTION, EMULSION INTRAVENOUS at 15:19

## 2019-12-23 RX ADMIN — SODIUM CHLORIDE, PRESERVATIVE FREE 10 ML: 5 INJECTION INTRAVENOUS at 23:17

## 2019-12-23 RX ADMIN — PROPOFOL 50 MG: 10 INJECTION, EMULSION INTRAVENOUS at 14:45

## 2019-12-23 ASSESSMENT — PAIN DESCRIPTION - PAIN TYPE
TYPE: ACUTE PAIN
TYPE: SURGICAL PAIN
TYPE: SURGICAL PAIN
TYPE: ACUTE PAIN
TYPE: SURGICAL PAIN
TYPE: ACUTE PAIN;SURGICAL PAIN

## 2019-12-23 ASSESSMENT — PAIN DESCRIPTION - FREQUENCY
FREQUENCY: CONTINUOUS

## 2019-12-23 ASSESSMENT — PAIN DESCRIPTION - LOCATION
LOCATION: ABDOMEN

## 2019-12-23 ASSESSMENT — PULMONARY FUNCTION TESTS
PIF_VALUE: 1

## 2019-12-23 ASSESSMENT — PAIN DESCRIPTION - ORIENTATION
ORIENTATION: RIGHT;MID;LOWER
ORIENTATION: MID;LOWER
ORIENTATION: RIGHT;MID;LOWER

## 2019-12-23 ASSESSMENT — PAIN - FUNCTIONAL ASSESSMENT
PAIN_FUNCTIONAL_ASSESSMENT: ACTIVITIES ARE NOT PREVENTED

## 2019-12-23 ASSESSMENT — PAIN DESCRIPTION - ONSET
ONSET: ON-GOING
ONSET: GRADUAL
ONSET: ON-GOING

## 2019-12-23 ASSESSMENT — PAIN SCALES - GENERAL
PAINLEVEL_OUTOF10: 10
PAINLEVEL_OUTOF10: 10
PAINLEVEL_OUTOF10: 3
PAINLEVEL_OUTOF10: 10
PAINLEVEL_OUTOF10: 10
PAINLEVEL_OUTOF10: 2
PAINLEVEL_OUTOF10: 9

## 2019-12-23 ASSESSMENT — PAIN DESCRIPTION - PROGRESSION
CLINICAL_PROGRESSION: GRADUALLY IMPROVING
CLINICAL_PROGRESSION: NOT CHANGED
CLINICAL_PROGRESSION: NOT CHANGED
CLINICAL_PROGRESSION: GRADUALLY IMPROVING
CLINICAL_PROGRESSION: GRADUALLY IMPROVING

## 2019-12-23 ASSESSMENT — PAIN DESCRIPTION - DESCRIPTORS
DESCRIPTORS: CRAMPING

## 2019-12-23 NOTE — PLAN OF CARE
Nutrition Problem: Inadequate oral intake  Intervention: Food and/or Nutrient Delivery: Continue NPO, Modify current Parenteral Nutrition  Nutritional Goals: PN to meet >90% of energy and protein needs

## 2019-12-23 NOTE — PROGRESS NOTES
Nutrition Assessment (Parenteral Nutrition)    Type and Reason for Visit: Reassess    Nutrition Recommendations:   1: Continue NPO w/exception of sips with medications, ice chips  2. Maintain PN-Adult 2-in-1 Central Line (Custom) @ 83.3 ml/hr, with IV Lipids @ 21 ml/hr x 12 hr.  Include additives:  95 mEq Na Acetate, 95 mEq NaCl, 10 mEq Na PO4, 40 mEq K Acetate, 20 mmol K PO4, 40 mEq KCl, 8 mEq Ca Gluconate & 8 mEq Mg SO4 + MVI and trace elements  3. Monitor labs, PN tolerance, weight      Nutrition Assessment: Patient stable from a nutrition standpoint. Pt. tolerating PN infusion at goal and labs remain in normal range. Continue PN at 83.3 mL/hr with IV lipids @ 21 mL/hr x 12 hours. Will continue to monitor PN intake/tolerance, labs, weight. Malnutrition Assessment:  · Malnutrition Status: Mild Malnutrition  · Context: Acute illness or injury  · Findings of the 6 clinical characteristics of malnutrition (Minimum of 2 out of 6 clinical characteristics is required to make the diagnosis of moderate or severe Protein Calorie Malnutrition based on AND/ASPEN Guidelines):  1. Energy Intake-Less than or equal to 75% of estimated energy requirement, Greater than or equal to 7 days    2. Weight Loss-2% loss or greater(Not considered to be significant), in 1 month  3. Fat Loss-No significant subcutaneous fat loss,    4. Muscle Loss-No significant muscle mass loss,    5. Fluid Accumulation-Mild fluid accumulation, Extremities  6.   Strength-Not measured    Nutrition Risk Level: High    Nutrient Needs:  · Estimated Daily Total Kcal: 8578-2315 kcal (15-18 kcal/kg admission weight)  · Estimated Daily Protein (g): 104-122 g (1.7-2 g/kg IBW)  · Estimated Daily Total Fluid (ml/day): 1 mL/kcal    Nutrition Diagnosis:   · Problem: Inadequate oral intake  · Etiology: related to Alteration in GI function     Signs and symptoms:  as evidenced by NPO status due to medical condition, Nutrition support - PN, Weight loss, Diet history of poor intake    Objective Information:  · Nutrition-Focused Physical Findings: Edema:  +2 pitting in RLE/LLE, redness, (R) abdomen  · Wound Type: Multiple, Surgical Wound  · Current Nutrition Therapies:  · Oral Diet Orders: NPO   · Oral Diet intake: NPO  · Oral Nutrition Supplement (ONS) Orders: None  · ONS intake: NPO  · Parenteral Nutrition Orders:  · Type and Formula: 2-in-1 Custom   · Lipids: 250ml  · Rate/Volume: 83.3 ml/hr / 2,000 ml  · Duration: Continuous  · Current PN Order Provides: See below.   · Goal PN Orders Provides: @ 83.3 ml/hr:  2,260 kcal, 100 g protein  · Additional Calories: None  · Anthropometric Measures:  · Ht: 5' 7\" (170.2 cm)   · Current Body Wt: 215 lb 9.8 oz (97.8 kg)  · Admission Body Wt: 223 lb 8.7 oz (101.4 kg)  · Usual Body Wt: 235 lb (106.6 kg)  · % Weight Change:  ,  4% (10#) in one month  · Ideal Body Wt: 135 lb (61.2 kg), % Ideal Body 167%  · BMI Classification: BMI 35.0 - 39.9 Obese Class II    Nutrition Interventions:   Continue NPO, Modify current Parenteral Nutrition  Continued Inpatient Monitoring, Discharge Planning, Coordination of Care    Nutrition Evaluation:   · Evaluation: Goal achieved   · Goals: PN to meet >90% of energy and protein needs   · Monitoring: PN Intake, PN Tolerance, Skin Integrity, Weight, Pertinent Labs, Monitor Bowel Function      Chase Weinberg RDN, LDN  RD Office Phone: [phone]

## 2019-12-23 NOTE — PLAN OF CARE
Problem: Falls - Risk of:  Goal: Will remain free from falls  Description  Will remain free from falls  Outcome: Ongoing  Note:   Siderails up x 2  Hourly rounding  Call light in reach  Instructed to call for assist before attempting out of bed. Remains free from falls and accidental injury at this time   Floor free from obstacles  Bed is locked and in lowest position  Adequate lighting provided  Bed alarm on, Red Falling star and Stay with Me signs posted      Goal: Absence of physical injury  Description  Absence of physical injury  Outcome: Ongoing     Problem: Skin Integrity:  Goal: Will show no infection signs and symptoms  Description  Will show no infection signs and symptoms  Outcome: Ongoing  Note:   Checked for incontinence every 2 hours and prn. Pericare as needed. Assisted to reposition off back frequently. On waffle mattress. Heels off bed with pillows.     Goal: Absence of new skin breakdown  Description  Absence of new skin breakdown  Outcome: Ongoing     Problem: Fluid Volume - Imbalance:  Goal: Absence of imbalanced fluid volume signs and symptoms  Description  Absence of imbalanced fluid volume signs and symptoms  Outcome: Ongoing     Problem: Sensory:  Goal: General experience of comfort will improve  Description  General experience of comfort will improve  Outcome: Ongoing     Problem: Urinary Elimination:  Goal: Signs and symptoms of infection will decrease  Description  Signs and symptoms of infection will decrease  Outcome: Ongoing  Goal: Ability to reestablish a normal urinary elimination pattern will improve - after catheter removal  Description  Ability to reestablish a normal urinary elimination pattern will improve  Outcome: Ongoing  Goal: Complications related to the disease process, condition or treatment will be avoided or minimized  Description  Complications related to the disease process, condition or treatment will be avoided or minimized  Outcome: Ongoing     Problem: Pain:  Goal: Pain level will decrease  Description  Pain level will decrease  Outcome: Ongoing  Note:   Pain level assessment complete.    Patient educated on pain scale and control interventions  PRN pain medication given per patient request  Patient instructed to call out with new onset of pain or unrelieved pain    Goal: Control of acute pain  Description  Control of acute pain  Outcome: Ongoing  Goal: Control of chronic pain  Description  Control of chronic pain  Outcome: Ongoing     Problem: Nutrition  Goal: Optimal nutrition therapy  12/23/2019 1813 by Morgan Baxter RN  Outcome: Ongoing  12/23/2019 1149 by Francia Bowman MS, RD, LD  Outcome: Ongoing

## 2019-12-23 NOTE — PLAN OF CARE
Problem: Falls - Risk of:  Goal: Will remain free from falls  Description  Will remain free from falls  Outcome: Ongoing  Note:   Siderails up x 2  Hourly rounding  Call light in reach  Remains free from falls and accidental injury at this time   Floor free from obstacles  Bed is locked and in lowest position  Adequate lighting provided  Patient refused bed alarm to be on. Since patient was given melatonin to help with sleep, patient was instructed to call for assist before attempting out of bed. Patient verbally acknowledged the instructions. Goal: Absence of physical injury  Description  Absence of physical injury  Outcome: Ongoing     Problem: Skin Integrity:  Goal: Will show no infection signs and symptoms  Description  Will show no infection signs and symptoms  Outcome: Ongoing  Note:   Skin around MAE drain site is leaking and causing the skin to be red. Area was cleaned with CHG solution and dressings were changed twice so far. Right lower abdominal area is still red upon assessment. Will continue to assess and change dressings as required.    Goal: Absence of new skin breakdown  Description  Absence of new skin breakdown  Outcome: Ongoing     Problem: Fluid Volume - Imbalance:  Goal: Absence of imbalanced fluid volume signs and symptoms  Description  Absence of imbalanced fluid volume signs and symptoms  Outcome: Ongoing     Problem: Sensory:  Goal: General experience of comfort will improve  Description  General experience of comfort will improve  Outcome: Ongoing     Problem: Urinary Elimination:  Goal: Signs and symptoms of infection will decrease  Description  Signs and symptoms of infection will decrease  Outcome: Ongoing  Goal: Ability to reestablish a normal urinary elimination pattern will improve - after catheter removal  Description  Ability to reestablish a normal urinary elimination pattern will improve  Outcome: Ongoing  Goal: Complications related to the disease process, condition or treatment

## 2019-12-23 NOTE — CARE COORDINATION
Spoke to Pippa from [place] and needs prior auth for TPN. Spoke to Dr. Amparo Kelley and he will follow [facility] [place]. for TPN. Cost for Teflaro is $1026.91 per dispense. Still requiring auth for flagyl. Awaiting ID's final plan. Currently in OR for sigmoidoscopy and possible stent.

## 2019-12-23 NOTE — PROGRESS NOTES
12/15/2019    LABALBU 4.0 12/06/2011    CREATININE 0.54 12/22/2019    CALCIUM 9.5 12/22/2019    GFRAA >60 12/22/2019    LABGLOM >60 12/22/2019    GLUCOSE 135 12/22/2019    GLUCOSE 94 12/06/2011       Radiology Review:      None to review      ASSESSMENT     80 y/o female with pelvic abscess s/p anastomotic leak following colostomy reversal   S/p IR guided drain 12/17/2019  S/p colonoscopy and placement of stent x2    Plan  1. Continue TPN and NPO except ice chips for 4 weeks then will plan to re evaluate with CT  2. Will ask IR to replace/reposition catheter today  3. Antibiotics per ID  4. pepcid BID  5. lovenox BID   6. D/c planning    Electronically signed by Bart Tucker DO  on 12/23/2019 at 6:34 AM   I Dr. Emilie Kwan saw and examined the patient. I have edited the above and agree with the above. She had an attempt to try to stent her again today and it was not successful. Will take her tomorrow for colostomy . She understands and agrees    Geoffrey Santacruz  Colorectal Surgery

## 2019-12-23 NOTE — OP NOTE
[facility]     PROCEDURE DATE: 12/23/19    REFERRING PHYSICIAN: No ref. provider found     PRIMARY CARE PROVIDER: Yamileth Hidalgo MD    ATTENDING PHYSICIAN: Chel Mejia MD     HISTORY: Ms. Elaine Sanchez is a 81 y.o. female who presents to the [place] [address] Endoscopy unit for colonoscopy. The patient's clinical history is remarkable for colonic anastomotic leak s/p stenting. She is currently medically stable and appropriate for the planned procedure. PREOPERATIVE DIAGNOSIS: Colonic anastomotic leak. PROCEDURES:   Transanal sigmoidoscopy--diagnostic. POSTPROCEDURE DIAGNOSIS:  Migrated stent    MEDICATIONS:     MAC per anesthesia    EBL: minimal    INSTRUMENT: Olympus GIF H 190. PREPARATION: The nature and character of the procedure as well as risks, benefits, and alternatives were discussed with the patient and informed consent was obtained. Complications were said to include, but were not limited to: medication allergy, medication reaction, cardiovascular and respiratory problems, bleeding, perforation, infection, and/or missed diagnosis. Following arrival in the endoscopy room, the patient was placed in the left lateral decubitus position and final time-out accomplished in the presence of the nursing staff. Baseline vital signs were obtained and reviewed, and IV sedation was subsequently initiated. FINDINGS: Rectal examination demonstrated no significant visible external abnormality and digital palpation was unremarkable. Following adequate conscious sedation the colonoscope was introduced and advanced under direct visualization to the sigmoid colon. The bowel preparation was felt to be adequate. This included small amounts of thick stool that was able to be adequately irrigated and aspirated. Once maximally inserted, the endoscope was withdrawn and the mucosa was carefully inspected. The mucosal exam showed large anastomotic perforation. Previously placed stents migrated into contained pelvic cavity. Attempt at removal stents were not successful. RECOMMENDATIONS:   1) Transfer back to the floor for further management as per primary care team.   2) Discussed with Dr. Vishnu Land. Patient will need surgery.         Camilo Armendariz

## 2019-12-24 ENCOUNTER — APPOINTMENT (OUTPATIENT)
Dept: GENERAL RADIOLOGY | Age: 69
DRG: 856 | End: 2019-12-24
Payer: MEDICARE

## 2019-12-24 ENCOUNTER — ANESTHESIA EVENT (OUTPATIENT)
Dept: OPERATING ROOM | Age: 69
DRG: 856 | End: 2019-12-24
Payer: MEDICARE

## 2019-12-24 ENCOUNTER — ANESTHESIA (OUTPATIENT)
Dept: OPERATING ROOM | Age: 69
DRG: 856 | End: 2019-12-24
Payer: MEDICARE

## 2019-12-24 VITALS — OXYGEN SATURATION: 99 % | TEMPERATURE: 99.9 F | DIASTOLIC BLOOD PRESSURE: 51 MMHG | SYSTOLIC BLOOD PRESSURE: 103 MMHG

## 2019-12-24 LAB
ANION GAP SERPL CALCULATED.3IONS-SCNC: 6 MMOL/L (ref 9–17)
BUN BLDV-MCNC: 14 MG/DL (ref 8–23)
BUN/CREAT BLD: 23 (ref 9–20)
CALCIUM SERPL-MCNC: 8.9 MG/DL (ref 8.6–10.4)
CHLORIDE BLD-SCNC: 102 MMOL/L (ref 98–107)
CO2: 26 MMOL/L (ref 20–31)
CREAT SERPL-MCNC: 0.6 MG/DL (ref 0.5–0.9)
FIO2: ABNORMAL
GFR AFRICAN AMERICAN: >60 ML/MIN
GFR NON-AFRICAN AMERICAN: >60 ML/MIN
GFR SERPL CREATININE-BSD FRML MDRD: ABNORMAL ML/MIN/{1.73_M2}
GFR SERPL CREATININE-BSD FRML MDRD: ABNORMAL ML/MIN/{1.73_M2}
GLUCOSE BLD-MCNC: 137 MG/DL (ref 65–105)
GLUCOSE BLD-MCNC: 157 MG/DL (ref 70–99)
GLUCOSE BLD-MCNC: 253 MG/DL (ref 65–105)
GLUCOSE BLD-MCNC: 271 MG/DL (ref 65–105)
GLUCOSE BLD-MCNC: 279 MG/DL (ref 65–105)
GLUCOSE BLD-MCNC: 304 MG/DL (ref 74–106)
HCT VFR BLD CALC: 27 % (ref 36.3–47.1)
HEMOGLOBIN: 8.1 G/DL (ref 11.9–15.1)
MAGNESIUM: 2.1 MG/DL (ref 1.6–2.6)
MCH RBC QN AUTO: 27.3 PG (ref 25.2–33.5)
MCHC RBC AUTO-ENTMCNC: 30 G/DL (ref 28.4–34.8)
MCV RBC AUTO: 90.9 FL (ref 82.6–102.9)
NEGATIVE BASE EXCESS, ART: 2 (ref 0–2)
NRBC AUTOMATED: 0 PER 100 WBC
O2 DEVICE/FLOW/%: ABNORMAL
PATIENT TEMP: ABNORMAL
PDW BLD-RTO: 16 % (ref 11.8–14.4)
PHOSPHORUS: 2.7 MG/DL (ref 2.6–4.5)
PLATELET # BLD: 406 K/UL (ref 138–453)
PMV BLD AUTO: 8.4 FL (ref 8.1–13.5)
POC HCO3: 23.9 MMOL/L (ref 22–27)
POC HEMATOCRIT: 29 % (ref 36–46)
POC HEMOGLOBIN: 9.9 G/DL (ref 12–16)
POC IONIZED CALCIUM: 1.25 MMOL/L (ref 1.13–1.33)
POC O2 SATURATION: 100 %
POC PCO2 TEMP: ABNORMAL MM HG
POC PCO2: 46 MM HG (ref 32–45)
POC PH TEMP: ABNORMAL
POC PH: 7.32 (ref 7.35–7.45)
POC PO2 TEMP: ABNORMAL MM HG
POC PO2: 229 MM HG (ref 75–95)
POC POTASSIUM: 4.5 MMOL/L (ref 3.5–5.1)
POC SODIUM: 134 MMOL/L (ref 136–145)
POSITIVE BASE EXCESS, ART: ABNORMAL (ref 0–2)
POTASSIUM SERPL-SCNC: 3.7 MMOL/L (ref 3.7–5.3)
RBC # BLD: 2.97 M/UL (ref 3.95–5.11)
SODIUM BLD-SCNC: 134 MMOL/L (ref 135–144)
TCO2 (CALC), ART: 25 MMOL/L (ref 23–28)
WBC # BLD: 7.7 K/UL (ref 3.5–11.3)

## 2019-12-24 PROCEDURE — 0DN80ZZ RELEASE SMALL INTESTINE, OPEN APPROACH: ICD-10-PCS | Performed by: COLON & RECTAL SURGERY

## 2019-12-24 PROCEDURE — 2580000003 HC RX 258: Performed by: NURSE ANESTHETIST, CERTIFIED REGISTERED

## 2019-12-24 PROCEDURE — 2500000003 HC RX 250 WO HCPCS: Performed by: STUDENT IN AN ORGANIZED HEALTH CARE EDUCATION/TRAINING PROGRAM

## 2019-12-24 PROCEDURE — 6360000002 HC RX W HCPCS: Performed by: STUDENT IN AN ORGANIZED HEALTH CARE EDUCATION/TRAINING PROGRAM

## 2019-12-24 PROCEDURE — 6370000000 HC RX 637 (ALT 250 FOR IP): Performed by: ANESTHESIOLOGY

## 2019-12-24 PROCEDURE — 85014 HEMATOCRIT: CPT

## 2019-12-24 PROCEDURE — 2580000003 HC RX 258: Performed by: COLON & RECTAL SURGERY

## 2019-12-24 PROCEDURE — 0H97XZZ DRAINAGE OF ABDOMEN SKIN, EXTERNAL APPROACH: ICD-10-PCS | Performed by: COLON & RECTAL SURGERY

## 2019-12-24 PROCEDURE — P9041 ALBUMIN (HUMAN),5%, 50ML: HCPCS | Performed by: ANESTHESIOLOGY

## 2019-12-24 PROCEDURE — 0DPD0DZ REMOVAL OF INTRALUMINAL DEVICE FROM LOWER INTESTINAL TRACT, OPEN APPROACH: ICD-10-PCS | Performed by: COLON & RECTAL SURGERY

## 2019-12-24 PROCEDURE — 6360000002 HC RX W HCPCS: Performed by: INTERNAL MEDICINE

## 2019-12-24 PROCEDURE — 2580000003 HC RX 258: Performed by: INTERNAL MEDICINE

## 2019-12-24 PROCEDURE — 86850 RBC ANTIBODY SCREEN: CPT

## 2019-12-24 PROCEDURE — 3600000002 HC SURGERY LEVEL 2 BASE: Performed by: COLON & RECTAL SURGERY

## 2019-12-24 PROCEDURE — 86901 BLOOD TYPING SEROLOGIC RH(D): CPT

## 2019-12-24 PROCEDURE — 6360000002 HC RX W HCPCS: Performed by: NURSE ANESTHETIST, CERTIFIED REGISTERED

## 2019-12-24 PROCEDURE — 84100 ASSAY OF PHOSPHORUS: CPT

## 2019-12-24 PROCEDURE — 7100000001 HC PACU RECOVERY - ADDTL 15 MIN: Performed by: COLON & RECTAL SURGERY

## 2019-12-24 PROCEDURE — 82803 BLOOD GASES ANY COMBINATION: CPT

## 2019-12-24 PROCEDURE — 86920 COMPATIBILITY TEST SPIN: CPT

## 2019-12-24 PROCEDURE — 2500000003 HC RX 250 WO HCPCS: Performed by: INTERNAL MEDICINE

## 2019-12-24 PROCEDURE — 2709999900 HC NON-CHARGEABLE SUPPLY: Performed by: COLON & RECTAL SURGERY

## 2019-12-24 PROCEDURE — 3700000001 HC ADD 15 MINUTES (ANESTHESIA): Performed by: COLON & RECTAL SURGERY

## 2019-12-24 PROCEDURE — 71045 X-RAY EXAM CHEST 1 VIEW: CPT

## 2019-12-24 PROCEDURE — P9016 RBC LEUKOCYTES REDUCED: HCPCS

## 2019-12-24 PROCEDURE — 2500000003 HC RX 250 WO HCPCS: Performed by: NURSE ANESTHETIST, CERTIFIED REGISTERED

## 2019-12-24 PROCEDURE — 80048 BASIC METABOLIC PNL TOTAL CA: CPT

## 2019-12-24 PROCEDURE — 6360000002 HC RX W HCPCS: Performed by: ANESTHESIOLOGY

## 2019-12-24 PROCEDURE — 2580000003 HC RX 258: Performed by: ANESTHESIOLOGY

## 2019-12-24 PROCEDURE — 3700000000 HC ANESTHESIA ATTENDED CARE: Performed by: COLON & RECTAL SURGERY

## 2019-12-24 PROCEDURE — 7100000000 HC PACU RECOVERY - FIRST 15 MIN: Performed by: COLON & RECTAL SURGERY

## 2019-12-24 PROCEDURE — 82330 ASSAY OF CALCIUM: CPT

## 2019-12-24 PROCEDURE — 36415 COLL VENOUS BLD VENIPUNCTURE: CPT

## 2019-12-24 PROCEDURE — 86900 BLOOD TYPING SEROLOGIC ABO: CPT

## 2019-12-24 PROCEDURE — 0D1M0Z4 BYPASS DESCENDING COLON TO CUTANEOUS, OPEN APPROACH: ICD-10-PCS | Performed by: COLON & RECTAL SURGERY

## 2019-12-24 PROCEDURE — 2000000000 HC ICU R&B

## 2019-12-24 PROCEDURE — 83735 ASSAY OF MAGNESIUM: CPT

## 2019-12-24 PROCEDURE — 82947 ASSAY GLUCOSE BLOOD QUANT: CPT

## 2019-12-24 PROCEDURE — 84295 ASSAY OF SERUM SODIUM: CPT

## 2019-12-24 PROCEDURE — 85027 COMPLETE CBC AUTOMATED: CPT

## 2019-12-24 PROCEDURE — 84132 ASSAY OF SERUM POTASSIUM: CPT

## 2019-12-24 PROCEDURE — 2580000003 HC RX 258: Performed by: STUDENT IN AN ORGANIZED HEALTH CARE EDUCATION/TRAINING PROGRAM

## 2019-12-24 PROCEDURE — 2500000003 HC RX 250 WO HCPCS: Performed by: ANESTHESIOLOGY

## 2019-12-24 PROCEDURE — 0DQN0ZZ REPAIR SIGMOID COLON, OPEN APPROACH: ICD-10-PCS | Performed by: COLON & RECTAL SURGERY

## 2019-12-24 PROCEDURE — 3600000012 HC SURGERY LEVEL 2 ADDTL 15MIN: Performed by: COLON & RECTAL SURGERY

## 2019-12-24 RX ORDER — PROMETHAZINE HYDROCHLORIDE 25 MG/ML
6.25 INJECTION, SOLUTION INTRAMUSCULAR; INTRAVENOUS ONCE
Status: COMPLETED | OUTPATIENT
Start: 2019-12-24 | End: 2019-12-24

## 2019-12-24 RX ORDER — CEFAZOLIN SODIUM 1 G/3ML
INJECTION, POWDER, FOR SOLUTION INTRAMUSCULAR; INTRAVENOUS PRN
Status: DISCONTINUED | OUTPATIENT
Start: 2019-12-24 | End: 2019-12-24 | Stop reason: SDUPTHER

## 2019-12-24 RX ORDER — PHENYLEPHRINE HCL IN 0.9% NACL 1 MG/10 ML
SYRINGE (ML) INTRAVENOUS PRN
Status: DISCONTINUED | OUTPATIENT
Start: 2019-12-24 | End: 2019-12-24 | Stop reason: SDUPTHER

## 2019-12-24 RX ORDER — ONDANSETRON 2 MG/ML
4 INJECTION INTRAMUSCULAR; INTRAVENOUS
Status: DISCONTINUED | OUTPATIENT
Start: 2019-12-24 | End: 2019-12-24 | Stop reason: HOSPADM

## 2019-12-24 RX ORDER — DEXAMETHASONE SODIUM PHOSPHATE 10 MG/ML
INJECTION INTRAMUSCULAR; INTRAVENOUS PRN
Status: DISCONTINUED | OUTPATIENT
Start: 2019-12-24 | End: 2019-12-24 | Stop reason: SDUPTHER

## 2019-12-24 RX ORDER — MAGNESIUM HYDROXIDE 1200 MG/15ML
LIQUID ORAL CONTINUOUS PRN
Status: COMPLETED | OUTPATIENT
Start: 2019-12-24 | End: 2019-12-24

## 2019-12-24 RX ORDER — FENTANYL CITRATE 50 UG/ML
INJECTION, SOLUTION INTRAMUSCULAR; INTRAVENOUS PRN
Status: DISCONTINUED | OUTPATIENT
Start: 2019-12-24 | End: 2019-12-24 | Stop reason: SDUPTHER

## 2019-12-24 RX ORDER — LIDOCAINE HYDROCHLORIDE 20 MG/ML
INJECTION, SOLUTION EPIDURAL; INFILTRATION; INTRACAUDAL; PERINEURAL PRN
Status: DISCONTINUED | OUTPATIENT
Start: 2019-12-24 | End: 2019-12-24 | Stop reason: SDUPTHER

## 2019-12-24 RX ORDER — MIDAZOLAM HYDROCHLORIDE 1 MG/ML
INJECTION INTRAMUSCULAR; INTRAVENOUS PRN
Status: DISCONTINUED | OUTPATIENT
Start: 2019-12-24 | End: 2019-12-24 | Stop reason: SDUPTHER

## 2019-12-24 RX ORDER — OXYCODONE HYDROCHLORIDE AND ACETAMINOPHEN 5; 325 MG/1; MG/1
1 TABLET ORAL
Status: DISCONTINUED | OUTPATIENT
Start: 2019-12-24 | End: 2019-12-24 | Stop reason: HOSPADM

## 2019-12-24 RX ORDER — ALBUMIN, HUMAN INJ 5% 5 %
SOLUTION INTRAVENOUS PRN
Status: DISCONTINUED | OUTPATIENT
Start: 2019-12-24 | End: 2019-12-24 | Stop reason: SDUPTHER

## 2019-12-24 RX ORDER — FENTANYL CITRATE 50 UG/ML
25 INJECTION, SOLUTION INTRAMUSCULAR; INTRAVENOUS EVERY 5 MIN PRN
Status: DISCONTINUED | OUTPATIENT
Start: 2019-12-24 | End: 2019-12-24 | Stop reason: HOSPADM

## 2019-12-24 RX ORDER — ROCURONIUM BROMIDE 10 MG/ML
INJECTION, SOLUTION INTRAVENOUS PRN
Status: DISCONTINUED | OUTPATIENT
Start: 2019-12-24 | End: 2019-12-24 | Stop reason: SDUPTHER

## 2019-12-24 RX ORDER — OXYCODONE HYDROCHLORIDE 5 MG/1
5 TABLET ORAL EVERY 6 HOURS PRN
Qty: 20 TABLET | Refills: 0 | Status: SHIPPED | OUTPATIENT
Start: 2019-12-24 | End: 2019-12-29

## 2019-12-24 RX ORDER — FENTANYL CITRATE 50 UG/ML
50 INJECTION, SOLUTION INTRAMUSCULAR; INTRAVENOUS EVERY 5 MIN PRN
Status: DISCONTINUED | OUTPATIENT
Start: 2019-12-24 | End: 2019-12-24 | Stop reason: HOSPADM

## 2019-12-24 RX ORDER — ONDANSETRON 2 MG/ML
INJECTION INTRAMUSCULAR; INTRAVENOUS PRN
Status: DISCONTINUED | OUTPATIENT
Start: 2019-12-24 | End: 2019-12-24 | Stop reason: SDUPTHER

## 2019-12-24 RX ORDER — HYDROMORPHONE HCL 110MG/55ML
PATIENT CONTROLLED ANALGESIA SYRINGE INTRAVENOUS PRN
Status: DISCONTINUED | OUTPATIENT
Start: 2019-12-24 | End: 2019-12-24 | Stop reason: SDUPTHER

## 2019-12-24 RX ORDER — PROPOFOL 10 MG/ML
INJECTION, EMULSION INTRAVENOUS PRN
Status: DISCONTINUED | OUTPATIENT
Start: 2019-12-24 | End: 2019-12-24 | Stop reason: SDUPTHER

## 2019-12-24 RX ORDER — SODIUM CHLORIDE, SODIUM LACTATE, POTASSIUM CHLORIDE, CALCIUM CHLORIDE 600; 310; 30; 20 MG/100ML; MG/100ML; MG/100ML; MG/100ML
INJECTION, SOLUTION INTRAVENOUS CONTINUOUS PRN
Status: DISCONTINUED | OUTPATIENT
Start: 2019-12-24 | End: 2019-12-24 | Stop reason: SDUPTHER

## 2019-12-24 RX ORDER — EPHEDRINE SULFATE/0.9% NACL/PF 50 MG/5 ML
SYRINGE (ML) INTRAVENOUS PRN
Status: DISCONTINUED | OUTPATIENT
Start: 2019-12-24 | End: 2019-12-24 | Stop reason: SDUPTHER

## 2019-12-24 RX ADMIN — ROCURONIUM BROMIDE 20 MG: 10 INJECTION, SOLUTION INTRAVENOUS at 13:44

## 2019-12-24 RX ADMIN — ONDANSETRON 4 MG: 2 INJECTION INTRAMUSCULAR; INTRAVENOUS at 23:22

## 2019-12-24 RX ADMIN — Medication 100 MCG: at 15:42

## 2019-12-24 RX ADMIN — Medication 100 MCG: at 15:09

## 2019-12-24 RX ADMIN — CEFAZOLIN 2000 MG: 1 INJECTION, POWDER, FOR SOLUTION INTRAMUSCULAR; INTRAVENOUS at 11:54

## 2019-12-24 RX ADMIN — MORPHINE SULFATE 4 MG: 4 INJECTION INTRAVENOUS at 00:49

## 2019-12-24 RX ADMIN — SODIUM CHLORIDE 2 UNITS/HR: 9 INJECTION, SOLUTION INTRAVENOUS at 15:21

## 2019-12-24 RX ADMIN — ROCURONIUM BROMIDE 50 MG: 10 INJECTION, SOLUTION INTRAVENOUS at 11:44

## 2019-12-24 RX ADMIN — ROCURONIUM BROMIDE 10 MG: 10 INJECTION, SOLUTION INTRAVENOUS at 15:35

## 2019-12-24 RX ADMIN — PROPOFOL 100 MG: 10 INJECTION, EMULSION INTRAVENOUS at 11:44

## 2019-12-24 RX ADMIN — ROCURONIUM BROMIDE 10 MG: 10 INJECTION, SOLUTION INTRAVENOUS at 12:55

## 2019-12-24 RX ADMIN — ALBUMIN (HUMAN) 500 ML: 12.5 INJECTION, SOLUTION INTRAVENOUS at 16:51

## 2019-12-24 RX ADMIN — FENTANYL CITRATE 150 MCG: 50 INJECTION, SOLUTION INTRAMUSCULAR; INTRAVENOUS at 11:44

## 2019-12-24 RX ADMIN — Medication 200 MCG: at 12:00

## 2019-12-24 RX ADMIN — SODIUM CHLORIDE, POTASSIUM CHLORIDE, SODIUM LACTATE AND CALCIUM CHLORIDE: 600; 310; 30; 20 INJECTION, SOLUTION INTRAVENOUS at 14:53

## 2019-12-24 RX ADMIN — ROCURONIUM BROMIDE 10 MG: 10 INJECTION, SOLUTION INTRAVENOUS at 14:32

## 2019-12-24 RX ADMIN — Medication 10 MG: at 12:14

## 2019-12-24 RX ADMIN — ROCURONIUM BROMIDE 10 MG: 10 INJECTION, SOLUTION INTRAVENOUS at 13:15

## 2019-12-24 RX ADMIN — Medication 10 MG: at 12:25

## 2019-12-24 RX ADMIN — Medication 100 MCG: at 15:25

## 2019-12-24 RX ADMIN — Medication 30 MG: at 15:10

## 2019-12-24 RX ADMIN — LIDOCAINE HYDROCHLORIDE 100 MG: 20 INJECTION, SOLUTION EPIDURAL; INFILTRATION; INTRACAUDAL; PERINEURAL at 11:44

## 2019-12-24 RX ADMIN — CALCIUM GLUCONATE 89 ML/HR: 94 INJECTION, SOLUTION INTRAVENOUS at 11:39

## 2019-12-24 RX ADMIN — MORPHINE SULFATE 4 MG: 4 INJECTION INTRAVENOUS at 18:18

## 2019-12-24 RX ADMIN — Medication 10 ML: at 09:40

## 2019-12-24 RX ADMIN — CEFAZOLIN 2000 MG: 1 INJECTION, POWDER, FOR SOLUTION INTRAMUSCULAR; INTRAVENOUS at 15:43

## 2019-12-24 RX ADMIN — METRONIDAZOLE 500 MG: 500 INJECTION, SOLUTION INTRAVENOUS at 11:57

## 2019-12-24 RX ADMIN — METRONIDAZOLE 500 MG: 500 INJECTION, SOLUTION INTRAVENOUS at 06:18

## 2019-12-24 RX ADMIN — HYDROMORPHONE HYDROCHLORIDE 2 MG: 2 INJECTION INTRAMUSCULAR; INTRAVENOUS; SUBCUTANEOUS at 17:04

## 2019-12-24 RX ADMIN — ONDANSETRON 4 MG: 2 INJECTION, SOLUTION INTRAMUSCULAR; INTRAVENOUS at 12:00

## 2019-12-24 RX ADMIN — FAMOTIDINE 20 MG: 10 INJECTION, SOLUTION INTRAVENOUS at 20:58

## 2019-12-24 RX ADMIN — ONDANSETRON 4 MG: 2 INJECTION INTRAMUSCULAR; INTRAVENOUS at 18:18

## 2019-12-24 RX ADMIN — MIDAZOLAM 2 MG: 1 INJECTION INTRAMUSCULAR; INTRAVENOUS at 11:39

## 2019-12-24 RX ADMIN — CALCIUM GLUCONATE: 94 INJECTION, SOLUTION INTRAVENOUS at 18:43

## 2019-12-24 RX ADMIN — CEFTAROLINE FOSAMIL 600 MG: 600 POWDER, FOR SOLUTION INTRAVENOUS at 04:29

## 2019-12-24 RX ADMIN — SUGAMMADEX 200 MG: 100 INJECTION, SOLUTION INTRAVENOUS at 16:48

## 2019-12-24 RX ADMIN — FAMOTIDINE 20 MG: 10 INJECTION, SOLUTION INTRAVENOUS at 09:40

## 2019-12-24 RX ADMIN — Medication 200 MCG: at 14:02

## 2019-12-24 RX ADMIN — PROMETHAZINE HYDROCHLORIDE 6.25 MG: 25 INJECTION INTRAMUSCULAR; INTRAVENOUS at 17:33

## 2019-12-24 RX ADMIN — FENTANYL CITRATE 50 MCG: 50 INJECTION, SOLUTION INTRAMUSCULAR; INTRAVENOUS at 13:06

## 2019-12-24 RX ADMIN — DEXAMETHASONE SODIUM PHOSPHATE 10 MG: 10 INJECTION INTRAMUSCULAR; INTRAVENOUS at 12:00

## 2019-12-24 RX ADMIN — Medication 100 MCG: at 13:50

## 2019-12-24 RX ADMIN — SODIUM CHLORIDE, POTASSIUM CHLORIDE, SODIUM LACTATE AND CALCIUM CHLORIDE: 600; 310; 30; 20 INJECTION, SOLUTION INTRAVENOUS at 11:22

## 2019-12-24 RX ADMIN — MORPHINE SULFATE 4 MG: 4 INJECTION INTRAVENOUS at 20:57

## 2019-12-24 RX ADMIN — CEFTAROLINE FOSAMIL 600 MG: 600 POWDER, FOR SOLUTION INTRAVENOUS at 21:33

## 2019-12-24 RX ADMIN — Medication 200 MCG: at 14:12

## 2019-12-24 RX ADMIN — Medication 100 MCG: at 14:43

## 2019-12-24 RX ADMIN — METRONIDAZOLE 500 MG: 500 INJECTION, SOLUTION INTRAVENOUS at 23:18

## 2019-12-24 RX ADMIN — Medication 100 MCG: at 12:07

## 2019-12-24 RX ADMIN — FENTANYL CITRATE 50 MCG: 50 INJECTION, SOLUTION INTRAMUSCULAR; INTRAVENOUS at 13:09

## 2019-12-24 RX ADMIN — Medication 10 ML: at 04:30

## 2019-12-24 RX ADMIN — Medication 200 MCG: at 11:53

## 2019-12-24 RX ADMIN — HYDROCORTISONE 2.5%: 25 CREAM TOPICAL at 09:40

## 2019-12-24 RX ADMIN — Medication 10 ML: at 00:50

## 2019-12-24 ASSESSMENT — PULMONARY FUNCTION TESTS
PIF_VALUE: 15
PIF_VALUE: 20
PIF_VALUE: 20
PIF_VALUE: 0
PIF_VALUE: 18
PIF_VALUE: 20
PIF_VALUE: 18
PIF_VALUE: 19
PIF_VALUE: 20
PIF_VALUE: 21
PIF_VALUE: 21
PIF_VALUE: 15
PIF_VALUE: 17
PIF_VALUE: 20
PIF_VALUE: 18
PIF_VALUE: 20
PIF_VALUE: 17
PIF_VALUE: 15
PIF_VALUE: 20
PIF_VALUE: 0
PIF_VALUE: 20
PIF_VALUE: 20
PIF_VALUE: 18
PIF_VALUE: 18
PIF_VALUE: 15
PIF_VALUE: 19
PIF_VALUE: 17
PIF_VALUE: 20
PIF_VALUE: 18
PIF_VALUE: 32
PIF_VALUE: 17
PIF_VALUE: 20
PIF_VALUE: 20
PIF_VALUE: 7
PIF_VALUE: 18
PIF_VALUE: 19
PIF_VALUE: 18
PIF_VALUE: 18
PIF_VALUE: 20
PIF_VALUE: 20
PIF_VALUE: 18
PIF_VALUE: 18
PIF_VALUE: 19
PIF_VALUE: 18
PIF_VALUE: 18
PIF_VALUE: 20
PIF_VALUE: 19
PIF_VALUE: 1
PIF_VALUE: 20
PIF_VALUE: 18
PIF_VALUE: 20
PIF_VALUE: 18
PIF_VALUE: 18
PIF_VALUE: 21
PIF_VALUE: 20
PIF_VALUE: 19
PIF_VALUE: 20
PIF_VALUE: 18
PIF_VALUE: 20
PIF_VALUE: 3
PIF_VALUE: 18
PIF_VALUE: 21
PIF_VALUE: 20
PIF_VALUE: 19
PIF_VALUE: 19
PIF_VALUE: 21
PIF_VALUE: 20
PIF_VALUE: 20
PIF_VALUE: 21
PIF_VALUE: 18
PIF_VALUE: 16
PIF_VALUE: 20
PIF_VALUE: 20
PIF_VALUE: 17
PIF_VALUE: 2
PIF_VALUE: 20
PIF_VALUE: 21
PIF_VALUE: 20
PIF_VALUE: 18
PIF_VALUE: 18
PIF_VALUE: 17
PIF_VALUE: 17
PIF_VALUE: 18
PIF_VALUE: 17
PIF_VALUE: 20
PIF_VALUE: 18
PIF_VALUE: 1
PIF_VALUE: 17
PIF_VALUE: 20
PIF_VALUE: 18
PIF_VALUE: 17
PIF_VALUE: 20
PIF_VALUE: 17
PIF_VALUE: 20
PIF_VALUE: 20
PIF_VALUE: 23
PIF_VALUE: 18
PIF_VALUE: 18
PIF_VALUE: 20
PIF_VALUE: 19
PIF_VALUE: 18
PIF_VALUE: 17
PIF_VALUE: 17
PIF_VALUE: 21
PIF_VALUE: 18
PIF_VALUE: 19
PIF_VALUE: 18
PIF_VALUE: 20
PIF_VALUE: 19
PIF_VALUE: 0
PIF_VALUE: 20
PIF_VALUE: 1
PIF_VALUE: 20
PIF_VALUE: 18
PIF_VALUE: 20
PIF_VALUE: 20
PIF_VALUE: 15
PIF_VALUE: 20
PIF_VALUE: 19
PIF_VALUE: 0
PIF_VALUE: 18
PIF_VALUE: 15
PIF_VALUE: 20
PIF_VALUE: 20
PIF_VALUE: 19
PIF_VALUE: 20
PIF_VALUE: 21
PIF_VALUE: 18
PIF_VALUE: 18
PIF_VALUE: 20
PIF_VALUE: 17
PIF_VALUE: 21
PIF_VALUE: 19
PIF_VALUE: 18
PIF_VALUE: 20
PIF_VALUE: 24
PIF_VALUE: 20
PIF_VALUE: 23
PIF_VALUE: 18
PIF_VALUE: 20
PIF_VALUE: 18
PIF_VALUE: 21
PIF_VALUE: 21
PIF_VALUE: 23
PIF_VALUE: 20
PIF_VALUE: 3
PIF_VALUE: 18
PIF_VALUE: 16
PIF_VALUE: 21
PIF_VALUE: 20
PIF_VALUE: 17
PIF_VALUE: 17
PIF_VALUE: 20
PIF_VALUE: 6
PIF_VALUE: 18
PIF_VALUE: 20
PIF_VALUE: 15
PIF_VALUE: 19
PIF_VALUE: 20
PIF_VALUE: 18
PIF_VALUE: 20
PIF_VALUE: 17
PIF_VALUE: 19
PIF_VALUE: 17
PIF_VALUE: 20
PIF_VALUE: 17
PIF_VALUE: 20
PIF_VALUE: 20
PIF_VALUE: 17
PIF_VALUE: 21
PIF_VALUE: 17
PIF_VALUE: 18
PIF_VALUE: 17
PIF_VALUE: 21
PIF_VALUE: 20
PIF_VALUE: 18
PIF_VALUE: 21
PIF_VALUE: 17
PIF_VALUE: 20
PIF_VALUE: 20
PIF_VALUE: 21
PIF_VALUE: 19
PIF_VALUE: 21
PIF_VALUE: 18
PIF_VALUE: 20
PIF_VALUE: 21
PIF_VALUE: 24
PIF_VALUE: 15
PIF_VALUE: 17
PIF_VALUE: 17
PIF_VALUE: 20
PIF_VALUE: 18
PIF_VALUE: 20
PIF_VALUE: 21
PIF_VALUE: 20
PIF_VALUE: 20
PIF_VALUE: 19
PIF_VALUE: 18
PIF_VALUE: 18
PIF_VALUE: 20
PIF_VALUE: 20
PIF_VALUE: 21
PIF_VALUE: 20
PIF_VALUE: 24
PIF_VALUE: 17
PIF_VALUE: 18
PIF_VALUE: 21
PIF_VALUE: 18
PIF_VALUE: 18
PIF_VALUE: 17
PIF_VALUE: 18
PIF_VALUE: 20
PIF_VALUE: 17
PIF_VALUE: 21
PIF_VALUE: 20
PIF_VALUE: 18
PIF_VALUE: 0
PIF_VALUE: 21
PIF_VALUE: 20
PIF_VALUE: 15
PIF_VALUE: 20
PIF_VALUE: 20
PIF_VALUE: 18
PIF_VALUE: 20
PIF_VALUE: 18
PIF_VALUE: 17
PIF_VALUE: 20
PIF_VALUE: 21
PIF_VALUE: 18
PIF_VALUE: 18
PIF_VALUE: 14
PIF_VALUE: 21
PIF_VALUE: 18
PIF_VALUE: 20
PIF_VALUE: 20
PIF_VALUE: 17
PIF_VALUE: 20
PIF_VALUE: 18
PIF_VALUE: 20
PIF_VALUE: 18
PIF_VALUE: 21
PIF_VALUE: 18
PIF_VALUE: 20
PIF_VALUE: 17
PIF_VALUE: 18
PIF_VALUE: 17
PIF_VALUE: 18
PIF_VALUE: 21
PIF_VALUE: 20
PIF_VALUE: 20
PIF_VALUE: 19
PIF_VALUE: 18
PIF_VALUE: 20
PIF_VALUE: 17
PIF_VALUE: 21
PIF_VALUE: 18
PIF_VALUE: 21
PIF_VALUE: 20
PIF_VALUE: 18
PIF_VALUE: 18
PIF_VALUE: 16
PIF_VALUE: 20
PIF_VALUE: 20
PIF_VALUE: 17
PIF_VALUE: 20
PIF_VALUE: 18
PIF_VALUE: 19
PIF_VALUE: 20
PIF_VALUE: 1
PIF_VALUE: 20
PIF_VALUE: 21
PIF_VALUE: 17
PIF_VALUE: 20
PIF_VALUE: 20
PIF_VALUE: 17
PIF_VALUE: 14
PIF_VALUE: 20
PIF_VALUE: 24
PIF_VALUE: 19
PIF_VALUE: 20
PIF_VALUE: 18
PIF_VALUE: 20
PIF_VALUE: 19
PIF_VALUE: 21
PIF_VALUE: 21
PIF_VALUE: 20
PIF_VALUE: 17
PIF_VALUE: 18
PIF_VALUE: 20
PIF_VALUE: 20
PIF_VALUE: 2
PIF_VALUE: 18
PIF_VALUE: 20
PIF_VALUE: 20
PIF_VALUE: 3
PIF_VALUE: 18
PIF_VALUE: 17
PIF_VALUE: 17

## 2019-12-24 ASSESSMENT — PAIN SCALES - GENERAL
PAINLEVEL_OUTOF10: 8
PAINLEVEL_OUTOF10: 10
PAINLEVEL_OUTOF10: 10
PAINLEVEL_OUTOF10: 0
PAINLEVEL_OUTOF10: 0

## 2019-12-24 ASSESSMENT — ENCOUNTER SYMPTOMS: SHORTNESS OF BREATH: 0

## 2019-12-24 NOTE — BRIEF OP NOTE
Brief Postoperative Note  ______________________________________________________________    Patient: Hank Wilde  YOB: 1950  MRN: [id number]  Date of Procedure: 12/24/2019    Pre-Op Diagnosis: DX ANOSTOMIC LEAK    Post-Op Diagnosis: SAME       Procedure(s):  EXPLORATORY LAPAROTOMY  EXTENSIVE LYSIS OF ADHESIONS  REMOVAL OF SIGMOID STENT  TAKE DOWN OF ANASTOMOSIS  CLOSURE OF RECTAL STUMP  COLOSTOMY CREATION  REPAIRE OF SEROMUSCULAR INJURIES OF SMALL BOWEL AND COLON  ABDOMINAL WASHOUT  DRAINING OF ABDOMINAL WALL CELULITIS  DRAINING OF PELVIC ABSCESS  Anesthesia: Anesthesia type not filed in the log. Surgeon(s):  Sharon Doty MD    Assistant: ALYX PGY2    Estimated Blood Loss (mL): 100; hgb 7.0 intraop, transfused 1 u PRBC    Complications: None    Specimens:   * No specimens in log *    Implants:  * No implants in log *      Drains:   Closed/Suction Drain RLQ Bulb 19 Western Katharine (Active)       Closed/Suction Drain RLQ Bulb 19 Cambodian (Active)       NG/OG/NJ/NE Tube Nasogastric 18 fr Right nostril (Active)       Colostomy LUQ (Active)       Urethral Catheter Non-latex 16 fr (Active)       [REMOVED] Closed/Suction Drain Right Abdomen Bulb 12 Cambodian (Removed)   Site Description Leaking at site; Reddened 12/24/2019  7:00 AM   Dressing Status New drainage; Changed 12/24/2019  7:00 AM   Drainage Appearance Brown;Green 12/24/2019  7:00 AM   Status To bulb suction 12/24/2019  7:00 AM   Output (ml) 15 ml 12/24/2019  4:23 AM       [REMOVED] NG/OG/NJ/NE Tube Nasogastric Left nostril (Removed)   Surrounding Skin Dry; Intact 11/16/2019  4:41 AM   Securement device Yes 11/16/2019  4:41 AM   Status Clamped 11/16/2019  4:41 AM   Placement Verified by Gastric Contents;by External Catheter Length 11/14/2019  4:00 PM   NG/OG/NJ/NE External Measurement (cm) 62 cm 11/14/2019  8:25 PM   Drainage Appearance Bile 11/15/2019 10:15 AM   Output (mL) 50 ml 11/15/2019  6:14 AM       [REMOVED] Colostomy LLQ (Removed)   Stoma Assessment KVNG 11/13/2019  4:00 AM       [REMOVED] Urethral Catheter Non-latex 16 fr (Removed)   Catheter Indications Other (specify) 11/15/2019  2:49 AM   Site Assessment Urethral drainage 11/15/2019  2:49 AM   Urine Color Yellow 11/15/2019 10:15 AM   Urine Appearance Clear 11/15/2019 10:15 AM   Output (mL) 150 mL 11/15/2019  4:18 AM       [REMOVED] Urethral Catheter Straight-tip 16 fr (Removed)   $ Urethral catheter insertion $ Not inserted for procedure 11/21/2019  8:34 PM   Catheter Indications Other (specify) 11/22/2019  8:00 AM   Site Assessment No urethral drainage 11/22/2019  8:00 AM   Urine Color Ana Laura 11/22/2019  8:00 AM   Urine Appearance Clear 11/22/2019  8:00 AM   Output (mL) 650 mL 11/22/2019 11:58 AM       Findings: extensive adhesions; pelvic abscess cavity adjacent to anastomotic breakdown and migrated stent into pelvic cavity; small bowel and large bowel serosal tears; superior MAE drain located in pelvis, inferior MAE drain located within prior abscess cavity    Citlalli Blackman DO  Date: 12/24/2019  Time: 5:29 PM

## 2019-12-24 NOTE — ANESTHESIA POSTPROCEDURE EVALUATION
Department of Anesthesiology  Postprocedure Note    Patient: Aleksandra Man  MRN: [id number]  YOB: 1950  Date of evaluation: 12/24/2019  Time:  5:27 PM     Procedure Summary     Date:  12/24/19 Room / Location:  [address] [place] [address]    Anesthesia Start:  0805 Anesthesia Stop:  5577    Procedure:  EXPLORATORY LAPAROTOMY, EXTENSIVE LYSIS OF ADHESIONS, TAKE DOWN OF ANASTOMOSIS, COLOSTOMY CREATION, CLOSURE OF SEROMUSCULAR INJURIES (N/A ) Diagnosis:  (DX ANOSTOMIC LEAK)    Surgeon:  Carlos Tracey MD Responsible Provider:  Jayro Harrison MD    Anesthesia Type:  general ASA Status:  3          Anesthesia Type: No value filed. Gissell Phase I: Gissell Score: 10    Gissell Phase II:      Last vitals: Reviewed and per EMR flowsheets.        Anesthesia Post Evaluation    Complications: no

## 2019-12-24 NOTE — ANESTHESIA PRE PROCEDURE
borderline, on no medication    Hypertension     MRSA (methicillin resistant staph aureus) culture positive 11/01/2016    nasal    MRSA carrier     follows with ID    Obesity     Osteoarthritis     Systolic murmur     benign systolic murmur (history of). Pt does not follow-up with a cardiologist (Written 09/25/2019).     Thyroid disease     goiter    Wears glasses        Past Surgical History:        Procedure Laterality Date    ABDOMINAL EXPLORATION SURGERY  05/16/2019    CHOLECYSTECTOMY, LAPAROSCOPIC  11/15/2016    CHOLECYSTECTOMY, LAPAROSCOPIC  11/15/2016    COLONOSCOPY  2013    neg    COLONOSCOPY N/A 5/16/2019    COLONOSCOPY DIAGNOSTIC performed by Sharon Doty MD at [facility] COLONOSCOPY N/A 12/19/2019    COLONOSCOPY W/STENT X2 performed by Sharon Doty MD at [address], COLON, DIAGNOSTIC      EGD    FISSURECTOMY ANAL N/A 10/4/2019    FLEXIBLE SIGMOIDOSCOPY & ANAL FISTULECTOMY performed by Sharon Doty MD at [address] [facility] ARTHROSCOPY Left     lateral release    LA EGD TRANSORAL BIOPSY SINGLE/MULTIPLE N/A 8/17/2017    EGD BIOPSY performed by Genaro Daniel MD at [address] OFFICE/OUTPT [address] N/A 5/22/2018    XI ROBOTIC LAPAROSCOPIC UMBILICAL HERNIA REPAIR WITH MESH, POSSIBLE OPEN performed by Amado Morris IV, DO at [facility] [address]  02/19/2019    SIGMOIDOSCOPY N/A 2/19/2019    SIGMOIDOSCOPY BIOPSY FLEXIBLE performed by Andria Mathew DO at [facility] [address] N/A 12/23/2019    SIGMOIDOSCOPY DIAGNOSTIC FLEXIBLE WITH FLUORO performed by Jhonatan Curtis MD at [address] [place] [address] N/A 5/16/2019    Kooli [address] performed by Sharon Doty MD at [address] [place] [address] N/A 11/12/2019    COLOSTOMY  [address] [facility], MOBILIZATION OF THE SPLENIC FLEXURE AND TRANSVERSE COLON, ABDOMINAL EXPLORATION performed by Fátima Duarte MD at [address] Left 2012    knee    TOTAL KNEE ARTHROPLASTY Right 11/10/2014    knee    UMBILICAL HERNIA REPAIR  2018    UPPER GASTROINTESTINAL ENDOSCOPY  2017    Multiple small gastric polyps, no esophagitis noted no Melara's mucosa. No hiatal hernia, pathology benign fundic gland polyps       Social History:    Social History     Tobacco Use    Smoking status: Former Smoker     Packs/day: 1.00     Years: 3.00     Pack years: 3.00     Last attempt to quit: 1975     Years since quittin.0    Smokeless tobacco: Never Used    Tobacco comment: quit smoking age 23   Substance Use Topics    Alcohol use: Yes     Comment: very rare                                Counseling given: Not Answered  Comment: quit smoking age 23      Vital Signs (Current):   Vitals:    19 1645 19 1826 19 0413 19 0741   BP: 123/65 126/63  129/66   Pulse: 70 72  77   Resp:    Temp: 97.3 °F (36.3 °C) 98.2 °F (36.8 °C)  98.2 °F (36.8 °C)   TempSrc: Oral Oral  Oral   SpO2: 98% 96%  96%   Weight:   217 lb (98.4 kg)    Height:                                                  BP Readings from Last 3 Encounters:   19 129/66   19 (!) 103/51   19 134/67       NPO Status: Time of last liquid consumption: 1200                        Time of last solid consumption: 0700                        Date of last liquid consumption: 19                        Date of last solid food consumption: 19    BMI:   Wt Readings from Last 3 Encounters:   19 217 lb (98.4 kg)   12/15/19 220 lb 14.4 oz (100.2 kg)   19 222 lb (100.7 kg)     Body mass index is 33.99 kg/m².     CBC:   Lab Results   Component Value Date    WBC 7.7 2019    RBC 2.97 2019    RBC 3.98 2011    HGB 8.1 2019    HCT 27.0 2019    MCV 90.9 2019    RDW 16.0 2019    PLT 406 12/24/2019     12/06/2011       CMP:   Lab Results   Component Value Date     12/24/2019    K 3.7 12/24/2019     12/24/2019    CO2 26 12/24/2019    BUN 14 12/24/2019    CREATININE 0.60 12/24/2019    GFRAA >60 12/24/2019    LABGLOM >60 12/24/2019    GLUCOSE 157 12/24/2019    GLUCOSE 94 12/06/2011    PROT 6.2 12/15/2019    CALCIUM 8.9 12/24/2019    BILITOT 0.25 12/15/2019    ALKPHOS 81 12/15/2019    AST 9 12/15/2019    ALT 6 12/15/2019       POC Tests:   Recent Labs     12/24/19  0538   POCGLU 137*       Coags:   Lab Results   Component Value Date    PROTIME 11.8 12/17/2019    INR 1.2 12/17/2019    APTT 24.2 12/05/2019       HCG (If Applicable): No results found for: PREGTESTUR, PREGSERUM, HCG, HCGQUANT     ABGs: No results found for: PHART, PO2ART, PPX0HPS, OJJ7NPM, BEART, X8RCIZRY     Type & Screen (If Applicable):  No results found for: LABABO, LABRH    Anesthesia Evaluation    Airway: Mallampati: I  TM distance: >3 FB   Neck ROM: full  Mouth opening: > = 3 FB Dental:          Pulmonary:       (-) shortness of breath                           Cardiovascular:    (+) hypertension:, CHF:,     (-)  angina      Rhythm: regular                      Neuro/Psych:               GI/Hepatic/Renal:             Endo/Other:                     Abdominal:           Vascular:                                        Anesthesia Plan      general     ASA 3             Anesthetic plan and risks discussed with patient.                       Rosamaria Greer MD   12/24/2019

## 2019-12-24 NOTE — PROGRESS NOTES
12/24/2019     12/24/2019    CO2 26 12/24/2019    BUN 14 12/24/2019    LABALBU 2.9 12/15/2019    LABALBU 4.0 12/06/2011    CREATININE 0.60 12/24/2019    CALCIUM 8.9 12/24/2019    GFRAA >60 12/24/2019    LABGLOM >60 12/24/2019    GLUCOSE 157 12/24/2019    GLUCOSE 94 12/06/2011       Radiology Review:      None to review      ASSESSMENT     82 y/o female with pelvic abscess s/p anastomotic leak following colostomy reversal   S/p IR guided drain 12/17/2019  S/p colonoscopy and placement of stent x2 12/20/19  S/p colonoscopy 12/23/19    Plan  1. Will plan to take patient to OR today for colostomy. This procedure, including risks, benefits, alternatives and complications have been fully reviewed with the patient and informed consent was obtained. All questions were answered appropriately. 2. Continue TPN and NPO  3. Antibiotics per ID    Electronically signed by Alexandria Jamison DO  on 12/24/2019 at 10:58 AM   I Dr. Dao Garcia saw and examined the patient. I have edited the above and agree with the above. Geoffrey Santacruz  Colorectal Surgery

## 2019-12-24 NOTE — PLAN OF CARE
should include both nonpharmacologic and pharmacologic interventions. Goal: Pain level will decrease  Description  Pain level will decrease  12/24/2019 0318 by Selwyn Collins RN  Outcome: Ongoing  Note:   Pain level assessment complete. Patient educated on pain scale and control interventions  PRN pain medication given per patient request  Patient instructed to call out with new onset of pain or unrelieved pain    12/23/2019 1813 by Mechelle Mcneal RN  Outcome: Ongoing  Note:   Pain level assessment complete. Patient educated on pain scale and control interventions  PRN pain medication given per patient request  Patient instructed to call out with new onset of pain or unrelieved pain    Goal: Control of acute pain  Description  Control of acute pain  12/24/2019 0318 by Selwyn Collins RN  Outcome: Ongoing  12/23/2019 1813 by Mechelle Mcneal RN  Outcome: Ongoing  Goal: Control of chronic pain  Description  Control of chronic pain  12/24/2019 0318 by Selwyn Collins RN  Outcome: Ongoing  12/23/2019 1813 by Mechelle Mcneal RN  Outcome: Ongoing     Problem: Nutrition  Goal: Optimal nutrition therapy  12/24/2019 0318 by Selwyn Collins RN  Outcome: Ongoing  12/23/2019 1813 by Mechelle Mcneal RN  Outcome: Ongoing     Problem: Fluid Volume - Imbalance:  Goal: Absence of imbalanced fluid volume signs and symptoms  Description  Absence of imbalanced fluid volume signs and symptoms  12/24/2019 0318 by Selwyn Collins RN  Outcome: Ongoing  12/23/2019 1813 by Mechelle Mcneal RN  Outcome: Ongoing     Problem: Skin Integrity:  Goal: Will show no infection signs and symptoms  Description  Will show no infection signs and symptoms  12/24/2019 0318 by Selwyn Collins RN  Outcome: Ongoing  Note:   Dressing around the MAE drain site was frequently changed as it leaking fecal material more than yesterday. Skin around the tube is red and inflamed. Patient is very tender to touch in this area.   12/23/2019 1813 by Elpidio Murdock Jimbo Montalvo RN  Outcome: Ongoing  Note:   Checked for incontinence every 2 hours and prn. Pericare as needed. Assisted to reposition off back frequently. On waffle mattress. Heels off bed with pillows.     Goal: Absence of new skin breakdown  Description  Absence of new skin breakdown  12/24/2019 0318 by Jose M Lane RN  Outcome: Ongoing  12/23/2019 1813 by Buzz Mistry RN  Outcome: Ongoing

## 2019-12-24 NOTE — CARE COORDINATION
Discharge Planning    Phone call to [facility] with Amos and updated her on the TPN and IV Antibiotics as pt is going to surgery today for a colostomy or ileostomy and will need to re assess her needs post op and later in the week.

## 2019-12-25 LAB
-: NORMAL
ABO/RH: NORMAL
ANION GAP SERPL CALCULATED.3IONS-SCNC: 12 MMOL/L (ref 9–17)
ANTIBODY SCREEN: NEGATIVE
ARM BAND NUMBER: NORMAL
BLD PROD TYP BPU: NORMAL
BUN BLDV-MCNC: 34 MG/DL (ref 8–23)
BUN/CREAT BLD: 21 (ref 9–20)
CALCIUM SERPL-MCNC: 8.9 MG/DL (ref 8.6–10.4)
CHLORIDE BLD-SCNC: 100 MMOL/L (ref 98–107)
CO2: 21 MMOL/L (ref 20–31)
CREAT SERPL-MCNC: 1.63 MG/DL (ref 0.5–0.9)
CROSSMATCH RESULT: NORMAL
DISPENSE STATUS BLOOD BANK: NORMAL
EXPIRATION DATE: NORMAL
GFR AFRICAN AMERICAN: 38 ML/MIN
GFR NON-AFRICAN AMERICAN: 31 ML/MIN
GFR SERPL CREATININE-BSD FRML MDRD: ABNORMAL ML/MIN/{1.73_M2}
GFR SERPL CREATININE-BSD FRML MDRD: ABNORMAL ML/MIN/{1.73_M2}
GLUCOSE BLD-MCNC: 172 MG/DL (ref 65–105)
GLUCOSE BLD-MCNC: 213 MG/DL (ref 65–105)
GLUCOSE BLD-MCNC: 262 MG/DL (ref 65–105)
GLUCOSE BLD-MCNC: 375 MG/DL (ref 65–105)
GLUCOSE BLD-MCNC: 376 MG/DL (ref 70–99)
HCT VFR BLD CALC: 38 % (ref 36.3–47.1)
HCT VFR BLD CALC: 38.8 % (ref 36.3–47.1)
HEMOGLOBIN: 11.7 G/DL (ref 11.9–15.1)
HEMOGLOBIN: 11.9 G/DL (ref 11.9–15.1)
MAGNESIUM: 2.1 MG/DL (ref 1.6–2.6)
MCH RBC QN AUTO: 28.3 PG (ref 25.2–33.5)
MCHC RBC AUTO-ENTMCNC: 30.7 G/DL (ref 28.4–34.8)
MCV RBC AUTO: 92.4 FL (ref 82.6–102.9)
NRBC AUTOMATED: ABNORMAL PER 100 WBC
PDW BLD-RTO: 16.2 % (ref 11.8–14.4)
PHOSPHORUS: 3.2 MG/DL (ref 2.6–4.5)
PLATELET # BLD: 520 K/UL (ref 138–453)
PMV BLD AUTO: 9 FL (ref 8.1–13.5)
POTASSIUM SERPL-SCNC: 5.2 MMOL/L (ref 3.7–5.3)
RBC # BLD: 4.2 M/UL (ref 3.95–5.11)
REASON FOR REJECTION: NORMAL
SODIUM BLD-SCNC: 133 MMOL/L (ref 135–144)
TRANSFUSION STATUS: NORMAL
UNIT DIVISION: 0
UNIT NUMBER: NORMAL
WBC # BLD: 25.3 K/UL (ref 3.5–11.3)
ZZ NTE CLEAN UP: ORDERED TEST: NORMAL
ZZ NTE WITH NAME CLEAN UP: SPECIMEN SOURCE: NORMAL

## 2019-12-25 PROCEDURE — 2000000000 HC ICU R&B

## 2019-12-25 PROCEDURE — 2580000003 HC RX 258: Performed by: STUDENT IN AN ORGANIZED HEALTH CARE EDUCATION/TRAINING PROGRAM

## 2019-12-25 PROCEDURE — 6370000000 HC RX 637 (ALT 250 FOR IP): Performed by: INTERNAL MEDICINE

## 2019-12-25 PROCEDURE — 6360000002 HC RX W HCPCS: Performed by: STUDENT IN AN ORGANIZED HEALTH CARE EDUCATION/TRAINING PROGRAM

## 2019-12-25 PROCEDURE — 2500000003 HC RX 250 WO HCPCS: Performed by: STUDENT IN AN ORGANIZED HEALTH CARE EDUCATION/TRAINING PROGRAM

## 2019-12-25 PROCEDURE — 85018 HEMOGLOBIN: CPT

## 2019-12-25 PROCEDURE — 85027 COMPLETE CBC AUTOMATED: CPT

## 2019-12-25 PROCEDURE — 6370000000 HC RX 637 (ALT 250 FOR IP): Performed by: STUDENT IN AN ORGANIZED HEALTH CARE EDUCATION/TRAINING PROGRAM

## 2019-12-25 PROCEDURE — 99232 SBSQ HOSP IP/OBS MODERATE 35: CPT | Performed by: INTERNAL MEDICINE

## 2019-12-25 PROCEDURE — 36415 COLL VENOUS BLD VENIPUNCTURE: CPT

## 2019-12-25 PROCEDURE — 82947 ASSAY GLUCOSE BLOOD QUANT: CPT

## 2019-12-25 PROCEDURE — 85014 HEMATOCRIT: CPT

## 2019-12-25 PROCEDURE — 2580000003 HC RX 258: Performed by: INTERNAL MEDICINE

## 2019-12-25 PROCEDURE — 80048 BASIC METABOLIC PNL TOTAL CA: CPT

## 2019-12-25 PROCEDURE — 83735 ASSAY OF MAGNESIUM: CPT

## 2019-12-25 PROCEDURE — 84100 ASSAY OF PHOSPHORUS: CPT

## 2019-12-25 RX ORDER — HEPARIN SODIUM 5000 [USP'U]/ML
5000 INJECTION, SOLUTION INTRAVENOUS; SUBCUTANEOUS EVERY 8 HOURS SCHEDULED
Status: DISCONTINUED | OUTPATIENT
Start: 2019-12-25 | End: 2019-12-30 | Stop reason: HOSPADM

## 2019-12-25 RX ORDER — ACETAMINOPHEN 10 MG/ML
1000 INJECTION, SOLUTION INTRAVENOUS EVERY 8 HOURS SCHEDULED
Status: DISCONTINUED | OUTPATIENT
Start: 2019-12-25 | End: 2019-12-25

## 2019-12-25 RX ORDER — ACETAMINOPHEN 160 MG/5ML
1000 SOLUTION ORAL EVERY 8 HOURS SCHEDULED
Status: DISCONTINUED | OUTPATIENT
Start: 2019-12-25 | End: 2019-12-27

## 2019-12-25 RX ORDER — SODIUM CHLORIDE 9 MG/ML
INJECTION, SOLUTION INTRAVENOUS CONTINUOUS
Status: DISCONTINUED | OUTPATIENT
Start: 2019-12-25 | End: 2019-12-30

## 2019-12-25 RX ORDER — 0.9 % SODIUM CHLORIDE 0.9 %
500 INTRAVENOUS SOLUTION INTRAVENOUS ONCE
Status: COMPLETED | OUTPATIENT
Start: 2019-12-25 | End: 2019-12-25

## 2019-12-25 RX ORDER — 0.9 % SODIUM CHLORIDE 0.9 %
500 INTRAVENOUS SOLUTION INTRAVENOUS ONCE
Status: COMPLETED | OUTPATIENT
Start: 2019-12-26 | End: 2019-12-26

## 2019-12-25 RX ADMIN — ACETAMINOPHEN 1000 MG: 325 SOLUTION ORAL at 22:21

## 2019-12-25 RX ADMIN — FAMOTIDINE 20 MG: 10 INJECTION, SOLUTION INTRAVENOUS at 08:31

## 2019-12-25 RX ADMIN — MORPHINE SULFATE 4 MG: 4 INJECTION INTRAVENOUS at 22:20

## 2019-12-25 RX ADMIN — INSULIN LISPRO 6 UNITS: 100 INJECTION, SOLUTION INTRAVENOUS; SUBCUTANEOUS at 17:43

## 2019-12-25 RX ADMIN — I.V. FAT EMULSION 250 ML: 20 EMULSION INTRAVENOUS at 17:44

## 2019-12-25 RX ADMIN — SODIUM CHLORIDE 500 ML: 9 INJECTION, SOLUTION INTRAVENOUS at 21:00

## 2019-12-25 RX ADMIN — INSULIN LISPRO 9 UNITS: 100 INJECTION, SOLUTION INTRAVENOUS; SUBCUTANEOUS at 13:42

## 2019-12-25 RX ADMIN — FAMOTIDINE 20 MG: 10 INJECTION, SOLUTION INTRAVENOUS at 22:21

## 2019-12-25 RX ADMIN — ACETAMINOPHEN 1000 MG: 325 SOLUTION ORAL at 14:00

## 2019-12-25 RX ADMIN — CEFTAROLINE FOSAMIL 600 MG: 600 POWDER, FOR SOLUTION INTRAVENOUS at 09:11

## 2019-12-25 RX ADMIN — ACETAMINOPHEN 1000 MG: 325 SOLUTION ORAL at 08:31

## 2019-12-25 RX ADMIN — INSULIN LISPRO 5 UNITS: 100 INJECTION, SOLUTION INTRAVENOUS; SUBCUTANEOUS at 06:24

## 2019-12-25 RX ADMIN — CALCIUM GLUCONATE: 94 INJECTION, SOLUTION INTRAVENOUS at 17:44

## 2019-12-25 RX ADMIN — METRONIDAZOLE 500 MG: 500 INJECTION, SOLUTION INTRAVENOUS at 06:24

## 2019-12-25 RX ADMIN — ONDANSETRON 4 MG: 2 INJECTION INTRAMUSCULAR; INTRAVENOUS at 13:41

## 2019-12-25 RX ADMIN — SODIUM CHLORIDE: 9 INJECTION, SOLUTION INTRAVENOUS at 15:00

## 2019-12-25 RX ADMIN — INSULIN LISPRO 2 UNITS: 100 INJECTION, SOLUTION INTRAVENOUS; SUBCUTANEOUS at 22:20

## 2019-12-25 RX ADMIN — HEPARIN SODIUM 5000 UNITS: 5000 INJECTION INTRAVENOUS; SUBCUTANEOUS at 08:31

## 2019-12-25 RX ADMIN — ONDANSETRON 4 MG: 2 INJECTION INTRAMUSCULAR; INTRAVENOUS at 06:24

## 2019-12-25 RX ADMIN — Medication 10 ML: at 08:32

## 2019-12-25 RX ADMIN — CEFTAROLINE FOSAMIL 600 MG: 600 POWDER, FOR SOLUTION INTRAVENOUS at 23:04

## 2019-12-25 RX ADMIN — MORPHINE SULFATE 4 MG: 4 INJECTION INTRAVENOUS at 04:34

## 2019-12-25 RX ADMIN — METRONIDAZOLE 500 MG: 500 INJECTION, SOLUTION INTRAVENOUS at 15:55

## 2019-12-25 RX ADMIN — INSULIN LISPRO 5 UNITS: 100 INJECTION, SOLUTION INTRAVENOUS; SUBCUTANEOUS at 00:15

## 2019-12-25 RX ADMIN — MORPHINE SULFATE 2 MG: 2 INJECTION, SOLUTION INTRAMUSCULAR; INTRAVENOUS at 17:43

## 2019-12-25 ASSESSMENT — PAIN SCALES - GENERAL
PAINLEVEL_OUTOF10: 10
PAINLEVEL_OUTOF10: 7
PAINLEVEL_OUTOF10: 8

## 2019-12-25 ASSESSMENT — ENCOUNTER SYMPTOMS
ABDOMINAL PAIN: 1
RESPIRATORY NEGATIVE: 1

## 2019-12-25 NOTE — PROGRESS NOTES
[facility]  Progress Note    Mona Starr  MRN: [id number]  Date: 12/25/2019    Reason for F/U :   Abdominal abscess    Impression :   1. Recent colostomy reversal, extensive lysis of adhesions and ventral hernia repair. 2. Anastomotic leak  3. Large interloop mid pelvic abscess (measuring 11.6 cm) increased in size from 12/4/2019  · Status post stenting of large anastomotic perforation by GI 12/19/2019  · Status post takedown of anastomosis, closure of rectal stump, colostomy creation, drainage of abdominal abscess 12/24/2019  · Cultures with MRSA, E. coli    Recommendations:   · Continue ceftaroline and metronidazole  · The patient continues on TPN  · The plan is for discharge on the current antimicrobial regimen versus Eravacycline  · We will continue to follow her clinical progress  · The leukocytosis is secondary to the recent surgery and will follow the WBC count trend    Infection Control Recommendations:   Universal precautions    Discharge Planning:   Estimated Length of IV antimicrobials: 14 days  Patient will need Midline Catheter Insertion/ PICC line Insertion: No  Patient will need: Home IV , [place],  SNF,  LTAC: Undetermined  Patient willneed outpatient wound care: No    MedicalDecision making / Summary of Stay:   Mona Starr is a 81y.o.-year-old female who was initially admitted on 12/15/2019. Maddie has a history of diverticulitis and underwent a diverting colostomy in May and in early November was admitted and underwent a colostomy reversal.  The patient's hospital course was complicated by fluid overload for which she received some diuretic therapy. She also reports having a lot of diarrhea during that admission which was felt secondary to the Toradol and post discharge her diarrhea issues continued. The patient reports not really feeling very well ending up being seen by a nurse practitioner has her PCP Dr. Libia Muse body was out of town.   The patient was room 2019 and had an exploratory laparotomy, extensive lysis of adhesions, removal of sigmoid stent and takedown of anastomosis with closure of rectal stump and colostomy creation. The patient did have repair of several seromuscular injuries of the small bowel and colon. The patient did have an abdominal washout and draining of pelvic abscess done    Current evaluation:2019    BP 80/69   Pulse 99   Temp 97.8 °F (36.6 °C) (Temporal)   Resp 16   Ht 5' 7\" (1.702 m)   Wt 217 lb (98.4 kg)   LMP  (LMP Unknown)   SpO2 95%   BMI 33.99 kg/m²     Temperature Range: Temp: 97.8 °F (36.6 °C) Temp  Av.4 °F (37.4 °C)  Min: 97.3 °F (36.3 °C)  Max: 99.9 °F (37.7 °C)  The patient is seen and evaluated at bedside she is awake and alert in no acute distress. She continues to have abdominal pain. He has an NG tube in place which is currently clamped. Review of Systems   Constitutional: Negative. Respiratory: Negative. Cardiovascular: Negative. Gastrointestinal: Positive for abdominal pain. Genitourinary: Negative. Musculoskeletal: Negative. Skin: Negative. Neurological: Negative. Psychiatric/Behavioral: Negative. Physical Examination :     Physical Exam  Constitutional:       Appearance: She is well-developed. HENT:      Head: Normocephalic and atraumatic. Neck:      Musculoskeletal: Normal range of motion and neck supple. Cardiovascular:      Rate and Rhythm: Regular rhythm. Heart sounds: Normal heart sounds. Pulmonary:      Effort: Pulmonary effort is normal.      Breath sounds: Normal breath sounds. Abdominal:      General: Bowel sounds are normal.      Palpations: Abdomen is soft. Comments: The abdominal wall dressing was not removed   Skin:     General: Skin is warm and dry. Neurological:      Mental Status: She is alert and oriented to person, place, and time.          Laboratory data:   I have independently reviewed the followinglabs:  CBC with Differential:   Recent Labs     12/24/19  0554 12/25/19  0000 12/25/19  0408   WBC 7.7  --  25.3*   HGB 8.1* 11.7* 11.9   HCT 27.0* 38.0 38.8     --  520*     BMP:   Recent Labs     12/24/19  0554 12/25/19  0545   * 133*   K 3.7 5.2    100   CO2 26 21   BUN 14 34*   CREATININE 0.60 1.63*   MG 2.1 2.1     Hepatic Function Panel:   No results for input(s): PROT, LABALBU, BILIDIR, IBILI, BILITOT, ALKPHOS, ALT, AST in the last 72 hours. No results for input(s): VANCOTROUGH in the last 72 hours. No results found for: CRP  No results found for: SEDRATE    No results for input(s): PROCAL in the last 72 hours. Imaging Studies:   OF THE ABDOMEN AND PELVIS WITHOUT CONTRAST 12/20/2019 5:24 pm  Impression   1. Interval placement of 2 tandem metal stents from lower rectum extending   through the residual known pelvic abscess from anastomotic leak.  The   lowermost end of the stent is confirmed within the rectum however the   uppermost end of the stent appears to be within the gas component of the   residual abscess. 2. Decreasing large pelvic abscess following placement of a percutaneous   pigtail drainage catheter. 3. Subcutaneous tissue gas in the anterior right lower abdomen felt to be   secondary to the drainage catheter placement however tension on follow-up. CT OF THE ABDOMEN AND PELVIS WITH CONTRAST 12/16/2019 7:24 pm  Impression   There is a large interloop mid pelvic abscess measuring up to 11.6 cm   increasing in size from 12/04/2019.  Contents of the abscess represent a   mixture of stool-like material with mixed soft tissue and gas.  There is no   large air-fluid level suggesting percutaneous drainage may be problematic. Cultures:     Anaerobic and Aerobic Culture [[id number]] (Abnormal)  Collected: 12/17/19 [id number]   Order Status: Completed Specimen: Aspirate Updated: 12/20/19 0634    Specimen Description . ASPIRATE ABDOMINAL ABSCESS    Special Requests NOT REPORTED    Direct Exam [id number]      Electronically signed by Ayaan Zaragoza MD on 12/25/2019 at 3:56 PM  Thank you for allowing us to participate in the care of this patient. Please call with questions. This note iscreated with the assistance of a speech recognition program.  While intending to generate a document that actually reflects the content of the visit, the document can still have some errors including those of syntax andsound a like substitutions which may escape proof reading. In such instances, actual meaning can be extrapolated by contextual diversion.

## 2019-12-25 NOTE — PROGRESS NOTES
Nutrition Assessment (Parenteral Nutrition)    Type and Reason for Visit: Reassess    Nutrition Recommendations: 1. Suggest NPO status. 2. Consider maintaining PN-Adult 2-in-1 Central Line (Custom) @ 83.3 ml/hr, with IV Lipids @ 21 ml/hr x 12 hr.  Include additives:  95 mEq Na Acetate, 95 mEq NaCl, 20 mmol Na PO4, 30 mEq K Acetate, 30 mEq KCl, 8 mEq Ca Gluconate, 8 mEq Mg SO4, MVI/Trace Elements & 30 Units INS. Nutrition Assessment: Pt's nutritional status changed post-op, with increasing BS's in upper 300's. Discussed PN order additive changes with Alberto Polanco Pharmacist including adding 30 Units INS. Communicated with Shoshana Pizarro RN about adjustments, with maintaining PN @ 83.3 ml/hr with IV lipdis @ 21 ml/hr x 12 hr. .    Malnutrition Assessment:  · Malnutrition Status: Mild Malnutrition  · Context: Acute illness or injury  · Findings of the 6 clinical characteristics of malnutrition (Minimum of 2 out of 6 clinical characteristics is required to make the diagnosis of moderate or severe Protein Calorie Malnutrition based on AND/ASPEN Guidelines):  1. Energy Intake-Less than or equal to 75% of estimated energy requirement, Greater than or equal to 7 days    2. Weight Loss-2% loss or greater(Not considered to be significant), in 1 month  3. Fat Loss-No significant subcutaneous fat loss  4. Muscle Loss-No significant muscle mass loss  5. Fluid Accumulation-Mild fluid accumulation, Extremities  6.   Strength-Not measured    Nutrition Risk Level: High    Nutrient Needs:  · Estimated Daily Total Kcal: 1,950-2,100 kcal (20-21 kcal/kg current wt)  · Estimated Daily Protein (g): 110-120 g (1.8-2 g/kg IBW)  · Estimated Daily Total Fluid (ml/day): 1 mL/kcal    Nutrition Diagnosis:   · Problem: Inadequate oral intake  · Etiology: related to Alteration in GI function     Signs and symptoms:  as evidenced by NPO status due to medical condition, Nutrition support - PN, Weight loss, Diet history of poor intake    Objective Information:  · Nutrition-Focused Physical Findings: Edema:  trace, non-pitting, RLE/LLE. GI:  surgical site, RLQ. :  colostomy, RUQ (no output). · Wound Type: Multiple, Surgical Wound  · Current Nutrition Therapies:  · Oral Diet Orders: NPO   · Parenteral Nutrition Orders:  · Type and Formula: 2-in-1 Custom   · Lipids: 250ml  · Rate/Volume: 83.3 ml/hr / 2,000 ml  · Duration: Continuous  · Current PN Order Provides: See below.   · Goal PN Orders Provides: @ 83.3 ml/hr:  2,260 kcal, 100 g protein  · Additional Calories: None  · Anthropometric Measures:  · Ht: 5' 7\" (170.2 cm)   · Current Body Wt: 216 lb 14.9 oz (98.4 kg)  · Admission Body Wt: 223 lb 8.7 oz (101.4 kg)  · Usual Body Wt: 235 lb (106.6 kg)  · % Weight Change:  4% (10#) in one month  · Ideal Body Wt: 135 lb (61.2 kg), % Ideal Body 167%  · BMI Classification: BMI 35.0 - 39.9 Obese Class II    Nutrition Interventions:   Continue NPO, Modify current Parenteral Nutrition  Continued Inpatient Monitoring, Discharge Planning, Coordination of Care    Nutrition Evaluation:   · Evaluation: Goal achieved   · Goals: PN to meet >90% of energy and protein needs   · Monitoring: Skin Integrity, Wound Healing, I&O, Weight, Pertinent Labs, Nausea or Vomiting, Monitor Bowel Function      Electronically signed by Jose Wood RDN, LD on 12/25/19 at 6:02 PM    Contact Number: [phone]

## 2019-12-25 NOTE — PROGRESS NOTES
Surgery Progress Note            PATIENT NAME: Shant Nichols     TODAY'S DATE: 12/25/2019, 6:51 AM    SUBJECTIVE:    Pt seen and examined. No acute events overnight. Patient complained of pain throughout the night. Additionally states that she was nauseous for which she received Zofran. Patient is very anxious that nothing has come on at the ostomy. Has not been out of bed yet. Urine output adequate. MAE drain 315 cc serosanguineous fluid. Afebrile. OBJECTIVE:   VITALS:  BP 98/67   Pulse 108   Temp 97.7 °F (36.5 °C) (Temporal)   Resp 17   Ht 5' 7\" (1.702 m)   Wt 217 lb (98.4 kg)   LMP  (LMP Unknown)   SpO2 97%   BMI 33.99 kg/m²      INTAKE/OUTPUT:      Intake/Output Summary (Last 24 hours) at 12/25/2019 0651  Last data filed at 12/25/2019 0600  Gross per 24 hour   Intake 2605.99 ml   Output 1065 ml   Net 1540.99 ml       PHYSICAL EXAM  General Appearance: anxious. Awake and alert. NAD  Skin:  Warm, dry  HEENT: NG tube in place  Lungs: Normal effort with symmetric rise and fall of chest wall  Heart: Tachycardic with regular rhythm  Abd: obese, soft, appropriately tender to palpation. Dressing intact without strikethrough. Left lower quadrant ostomy appears healthy and pink without output.   Right lower quadrant MAE drains with serosanguineous fluid  Extremities: Peripheral edema  Neurologic:  No focal deficits , moves all extremities        Data:  CBC with Differential:    Lab Results   Component Value Date    WBC 25.3 12/25/2019    RBC 4.20 12/25/2019    RBC 3.98 12/06/2011    HGB 11.9 12/25/2019    HCT 38.8 12/25/2019     12/25/2019     12/06/2011    MCV 92.4 12/25/2019    MCH 28.3 12/25/2019    MCHC 30.7 12/25/2019    RDW 16.2 12/25/2019    LYMPHOPCT 12 12/15/2019    MONOPCT 9 12/15/2019    BASOPCT 1 12/15/2019    MONOSABS 1.21 12/15/2019    LYMPHSABS 1.61 12/15/2019    EOSABS 0.00 12/15/2019    BASOSABS 0.13 12/15/2019    DIFFTYPE NOT REPORTED 12/15/2019     BMP:    Lab

## 2019-12-25 NOTE — CONSULTS
congestive heart failure (HCC)    Surgical wound dehiscence    Constipation    Systolic murmur    Thyroid disease    Mild pulmonary hypertension (HCC)    Perforated sigmoid colon (HCC)    Anastomotic leak of intestine    Normocytic anemia    Rectal bleeding    Sepsis (HCC)    Mild malnutrition (HCC)    Acute cystitis without hematuria       MARYLU Man is 81 y.o.,  female, admitted because of pelvic abscess status post anastomotic leak following colostomy reversal. She had IR guided drain placed on Follow-up/17/19. She had colonoscopy and placement of stents x2 on 12/20/2019 as well as colonoscopy on 12/23/2019. Yesterday she had ex lap with extensive lysis of adhesions, removal of sigmoid stent, takedown of anastomosis with closure of rectal stump and colostomy creation. She is currently n.p.o. with NG tube to low intermittent wall suction, she is on TPN, her blood sugars were elevated in OR and she was on insulin drip. She is currently off of insulin drip at this time. She denies any chest pain, shortness of breath or cough. She has no smoking history. PMHx   Past Medical History      Diagnosis Date    Allergic rhinitis     Bladder prolapse     Constipation     5/2019 resolved    Fundic gland polyposis of stomach 08/17/2017    per EGD    GERD (gastroesophageal reflux disease)     on no medications    Hiatal hernia     History of cardiac cath 12/2002    pt denies blockage    History of diverticulitis     Hyperlipidemia     borderline, on no medication    Hypertension     MRSA (methicillin resistant staph aureus) culture positive 11/01/2016    nasal    MRSA carrier     follows with ID    Obesity     Osteoarthritis     Systolic murmur     benign systolic murmur (history of). Pt does not follow-up with a cardiologist (Written 09/25/2019).     Thyroid disease     goiter    Wears glasses       Past Surgical History        Procedure Laterality Date    ABDOMINAL EXPLORATION SURGERY  05/16/2019    CHOLECYSTECTOMY, LAPAROSCOPIC  11/15/2016    CHOLECYSTECTOMY, LAPAROSCOPIC  11/15/2016    COLONOSCOPY  2013    neg    COLONOSCOPY N/A 5/16/2019    COLONOSCOPY DIAGNOSTIC performed by Valerie Goetz MD at [address] [facility] COLONOSCOPY N/A 12/19/2019    COLONOSCOPY W/STENT X2 performed by Valerie Goetz MD at [address], COLON, DIAGNOSTIC      EGD    FISSURECTOMY ANAL N/A 10/4/2019    FLEXIBLE SIGMOIDOSCOPY & ANAL FISTULECTOMY performed by Valerie Goetz MD at [address] [facility] ARTHROSCOPY Left     lateral release    SD EGD TRANSORAL BIOPSY SINGLE/MULTIPLE N/A 8/17/2017    EGD BIOPSY performed by Chris Lang MD at [address] [facility] OFFICE/OUTPT [address] N/A 5/22/2018    XI ROBOTIC LAPAROSCOPIC UMBILICAL HERNIA REPAIR WITH MESH, POSSIBLE OPEN performed by Aquilino Morris IV, DO at [facility] [address]  02/19/2019    SIGMOIDOSCOPY N/A 2/19/2019    SIGMOIDOSCOPY BIOPSY FLEXIBLE performed by Marino Quintana DO at [facility] [address] N/A 12/23/2019    SIGMOIDOSCOPY DIAGNOSTIC FLEXIBLE WITH FLUORO performed by Camilo Oliveira MD at [facility] [address] N/A 5/16/2019    [address] performed by Valerie Goetz MD at [facility] [address] N/A 11/12/2019    COLOSTOMY  [address] [facility], REPAIR OF MULTIPLE VENTRAL HERNIAS, MOBILIZATION OF THE SPLENIC FLEXURE AND TRANSVERSE COLON, ABDOMINAL EXPLORATION performed by Valerie Goetz MD at [address] [facility] Left 9/24/2012    knee    TOTAL KNEE ARTHROPLASTY Right 11/10/2014    knee    UMBILICAL HERNIA REPAIR  05/22/2018    UPPER GASTROINTESTINAL ENDOSCOPY  08/17/2017    Multiple small gastric polyps, no esophagitis noted no Melara's mucosa.   No hiatal hernia, pathology benign fundic gland polyps       Meds    Current Medications    heparin (porcine)  5,000 Units Subcutaneous 3 times per day    acetaminophen  1,000 mg Oral 3 times per day    insulin lispro  0-18 Units Subcutaneous TID WC    insulin lispro  0-9 Units Subcutaneous Nightly    fat emulsion  250 mL Intravenous Daily    hydrocortisone   Rectal BID    ceftaroline fosamil (TEFLARO) IVPB  600 mg Intravenous Q12H    furosemide  10 mg Oral Every Other Day    metroNIDAZOLE  500 mg Intravenous Q8H    famotidine (PEPCID) injection  20 mg Intravenous BID    sodium chloride flush  10 mL Intravenous 2 times per day    sodium chloride flush  10 mL Intravenous 2 times per day    Vitamin D  2,000 Units Oral Daily    metoprolol succinate  25 mg Oral Daily     melatonin, glucose, dextrose, glucagon (rDNA), dextrose, oxyCODONE-acetaminophen, sodium chloride flush, sodium chloride flush, magnesium hydroxide, ondansetron **OR** ondansetron, morphine **OR** morphine  IV Drips/Infusions   PN-Adult 2-in-1 Central Line (Standard) 89.18 mL/hr at 19 1843    dextrose       Home Medications  Medications Prior to Admission: [] metroNIDAZOLE (FLAGYL) 500 MG tablet, Take 1 tablet by mouth 3 times daily for 10 days  metoprolol succinate (TOPROL XL) 25 MG extended release tablet, Take 1 tablet by mouth daily  ondansetron (ZOFRAN) 4 MG tablet, Take 1 tablet by mouth every 12 hours as needed for Nausea or Vomiting  Cholecalciferol (VITAMIN D) 2000 units CAPS capsule, Take 1 capsule by mouth daily  [DISCONTINUED] ciprofloxacin (CIPRO) 500 MG tablet, Take 1 tablet by mouth 2 times daily for 10 days  [DISCONTINUED] furosemide (LASIX) 20 MG tablet, 20 mg daily for 5 days then as needed to maintain dry weight  clotrimazole-betamethasone (LOTRISONE) 1-0.05 % cream, Apply topically daily as needed (to spot on arm)    Allergies    Patient has no known allergies.   Social History     Social History     Tobacco Use    Smoking status: Former Smoker     Packs/day: 1.00     Years: 3.00     Pack years: 3.00 Heart sounds are normal.  Regular rhythm normal rate without murmur, gallop or rub. Abdomen - Soft, nontender, nondistended, no masses or organomegaly  Neurologic - CN II-XII are grossly intact. There are no focal motor or sensory deficits  Skin - No bruising or bleeding  Extremities - No cyanosis, clubbing or edema    Labs  - Old records and notes have been reviewed in [facility]   CBC     Lab Results   Component Value Date    WBC 25.3 12/25/2019    RBC 4.20 12/25/2019    RBC 3.98 12/06/2011    HGB 11.9 12/25/2019    HCT 38.8 12/25/2019     12/25/2019     12/06/2011    MCV 92.4 12/25/2019    MCH 28.3 12/25/2019    MCHC 30.7 12/25/2019    RDW 16.2 12/25/2019    LYMPHOPCT 12 12/15/2019    MONOPCT 9 12/15/2019    BASOPCT 1 12/15/2019    MONOSABS 1.21 12/15/2019    LYMPHSABS 1.61 12/15/2019    EOSABS 0.00 12/15/2019    BASOSABS 0.13 12/15/2019    DIFFTYPE NOT REPORTED 12/15/2019     BMP   Lab Results   Component Value Date     12/25/2019    K 5.2 12/25/2019     12/25/2019    CO2 21 12/25/2019    BUN 34 12/25/2019    CREATININE 1.63 12/25/2019    GLUCOSE 376 12/25/2019    GLUCOSE 94 12/06/2011    CALCIUM 8.9 12/25/2019    MG 2.1 12/25/2019     LFTS  Lab Results   Component Value Date    ALKPHOS 81 12/15/2019    ALT 6 12/15/2019    AST 9 12/15/2019    PROT 6.2 12/15/2019    BILITOT 0.25 12/15/2019    BILIDIR 0.09 03/25/2018    IBILI 0.19 03/25/2018    LABALBU 2.9 12/15/2019    LABALBU 4.0 12/06/2011     ABG   Lab Results   Component Value Date    RND3VCL 25 12/24/2019     PTT  Lab Results   Component Value Date    APTT 24.2 12/05/2019     INR   Lab Results   Component Value Date    INR 1.2 12/17/2019    INR 1.0 12/06/2019    INR 1.0 12/05/2019    PROTIME 11.8 (H) 12/17/2019    PROTIME 10.6 12/06/2019    PROTIME 10.5 12/05/2019       Radiology    CXR         (See actual reports for details)    \"Thank you for asking us to see this patient\"    Case discussed with nurse and patient.   Questions and concerns addressed. Electronically signed by     JASON Allen  Pulmonary Critical Care and Sleep Medicine,  Patient seen under the supervision of Presley Rodriguez MD, [facility]     Patient seen and evaluated by me. 81years old white female with history of pelvic abscess s/p post anastomotic leak following colostomy reversal.  Patient had IR guided drain placed on 12/17/19. Gurdeep Tariq she underwent extensive lysis of adhesions, removal of sigmoid stent, takedown of anastomosis with closure of rectal stump and colostomy creation. She denies any chest pain, cough, shortness of breath. She has no history of smoking. Lung exam reveals moderate air exchange no wheezing. She has positive bowel sounds. She has no significant lower extremity edema. Plan is IV antibiotics. Patient encouraged for incentive spirometry. Monitor blood sugars. Repeat labs in the morning. Sleep studies as an outpatient. Above was reviewed and discussed with Rashad Donohue CNP. And I agree with assessment and plan of care.   Electronically signed by     Duarte Martinez MD on 12/25/2019 at 12:04 PM  Pulmonary Critical Care and Sleep Medicine,  [facility]  Cell: [phone]  Office: [phone]

## 2019-12-26 ENCOUNTER — APPOINTMENT (OUTPATIENT)
Dept: ULTRASOUND IMAGING | Age: 69
DRG: 856 | End: 2019-12-26
Payer: MEDICARE

## 2019-12-26 ENCOUNTER — APPOINTMENT (OUTPATIENT)
Dept: GENERAL RADIOLOGY | Age: 69
DRG: 856 | End: 2019-12-26
Payer: MEDICARE

## 2019-12-26 LAB
ANION GAP SERPL CALCULATED.3IONS-SCNC: 13 MMOL/L (ref 9–17)
BUN BLDV-MCNC: 56 MG/DL (ref 8–23)
BUN/CREAT BLD: 19 (ref 9–20)
CALCIUM SERPL-MCNC: 9.1 MG/DL (ref 8.6–10.4)
CHLORIDE BLD-SCNC: 105 MMOL/L (ref 98–107)
CO2: 22 MMOL/L (ref 20–31)
CREAT SERPL-MCNC: 3.01 MG/DL (ref 0.5–0.9)
CREATININE URINE: 156.1 MG/DL (ref 28–217)
EOSINOPHIL,URINE: NORMAL
GFR AFRICAN AMERICAN: 19 ML/MIN
GFR NON-AFRICAN AMERICAN: 16 ML/MIN
GFR SERPL CREATININE-BSD FRML MDRD: ABNORMAL ML/MIN/{1.73_M2}
GFR SERPL CREATININE-BSD FRML MDRD: ABNORMAL ML/MIN/{1.73_M2}
GLUCOSE BLD-MCNC: 129 MG/DL (ref 65–105)
GLUCOSE BLD-MCNC: 136 MG/DL (ref 65–105)
GLUCOSE BLD-MCNC: 152 MG/DL (ref 65–105)
GLUCOSE BLD-MCNC: 167 MG/DL (ref 65–105)
GLUCOSE BLD-MCNC: 170 MG/DL (ref 70–99)
HCT VFR BLD CALC: 34.2 % (ref 36.3–47.1)
HEMOGLOBIN: 10.2 G/DL (ref 11.9–15.1)
MAGNESIUM: 2.1 MG/DL (ref 1.6–2.6)
MCH RBC QN AUTO: 28 PG (ref 25.2–33.5)
MCHC RBC AUTO-ENTMCNC: 29.8 G/DL (ref 28.4–34.8)
MCV RBC AUTO: 94 FL (ref 82.6–102.9)
NRBC AUTOMATED: 0 PER 100 WBC
PDW BLD-RTO: 16.7 % (ref 11.8–14.4)
PHOSPHORUS: 4.7 MG/DL (ref 2.6–4.5)
PLATELET # BLD: 433 K/UL (ref 138–453)
PMV BLD AUTO: 9.4 FL (ref 8.1–13.5)
POTASSIUM SERPL-SCNC: 5.1 MMOL/L (ref 3.7–5.3)
RBC # BLD: 3.64 M/UL (ref 3.95–5.11)
SODIUM BLD-SCNC: 140 MMOL/L (ref 135–144)
SODIUM,UR: <20 MMOL/L
WBC # BLD: 33.6 K/UL (ref 3.5–11.3)

## 2019-12-26 PROCEDURE — 6360000002 HC RX W HCPCS: Performed by: INTERNAL MEDICINE

## 2019-12-26 PROCEDURE — 82947 ASSAY GLUCOSE BLOOD QUANT: CPT

## 2019-12-26 PROCEDURE — 97162 PT EVAL MOD COMPLEX 30 MIN: CPT

## 2019-12-26 PROCEDURE — 2500000003 HC RX 250 WO HCPCS: Performed by: STUDENT IN AN ORGANIZED HEALTH CARE EDUCATION/TRAINING PROGRAM

## 2019-12-26 PROCEDURE — 2580000003 HC RX 258: Performed by: INTERNAL MEDICINE

## 2019-12-26 PROCEDURE — 76770 US EXAM ABDO BACK WALL COMP: CPT

## 2019-12-26 PROCEDURE — 6370000000 HC RX 637 (ALT 250 FOR IP): Performed by: INTERNAL MEDICINE

## 2019-12-26 PROCEDURE — 82570 ASSAY OF URINE CREATININE: CPT

## 2019-12-26 PROCEDURE — 6370000000 HC RX 637 (ALT 250 FOR IP): Performed by: STUDENT IN AN ORGANIZED HEALTH CARE EDUCATION/TRAINING PROGRAM

## 2019-12-26 PROCEDURE — 6360000002 HC RX W HCPCS: Performed by: STUDENT IN AN ORGANIZED HEALTH CARE EDUCATION/TRAINING PROGRAM

## 2019-12-26 PROCEDURE — 99232 SBSQ HOSP IP/OBS MODERATE 35: CPT | Performed by: INTERNAL MEDICINE

## 2019-12-26 PROCEDURE — 36415 COLL VENOUS BLD VENIPUNCTURE: CPT

## 2019-12-26 PROCEDURE — 2580000003 HC RX 258: Performed by: STUDENT IN AN ORGANIZED HEALTH CARE EDUCATION/TRAINING PROGRAM

## 2019-12-26 PROCEDURE — 80048 BASIC METABOLIC PNL TOTAL CA: CPT

## 2019-12-26 PROCEDURE — 97110 THERAPEUTIC EXERCISES: CPT

## 2019-12-26 PROCEDURE — 84100 ASSAY OF PHOSPHORUS: CPT

## 2019-12-26 PROCEDURE — 97116 GAIT TRAINING THERAPY: CPT

## 2019-12-26 PROCEDURE — P9041 ALBUMIN (HUMAN),5%, 50ML: HCPCS | Performed by: INTERNAL MEDICINE

## 2019-12-26 PROCEDURE — 2000000000 HC ICU R&B

## 2019-12-26 PROCEDURE — 2500000003 HC RX 250 WO HCPCS: Performed by: INTERNAL MEDICINE

## 2019-12-26 PROCEDURE — 2580000003 HC RX 258: Performed by: COLON & RECTAL SURGERY

## 2019-12-26 PROCEDURE — 71045 X-RAY EXAM CHEST 1 VIEW: CPT

## 2019-12-26 PROCEDURE — 83735 ASSAY OF MAGNESIUM: CPT

## 2019-12-26 PROCEDURE — 97530 THERAPEUTIC ACTIVITIES: CPT

## 2019-12-26 PROCEDURE — 87205 SMEAR GRAM STAIN: CPT

## 2019-12-26 PROCEDURE — 85027 COMPLETE CBC AUTOMATED: CPT

## 2019-12-26 PROCEDURE — 84300 ASSAY OF URINE SODIUM: CPT

## 2019-12-26 RX ORDER — SODIUM CHLORIDE, SODIUM LACTATE, POTASSIUM CHLORIDE, AND CALCIUM CHLORIDE .6; .31; .03; .02 G/100ML; G/100ML; G/100ML; G/100ML
1000 INJECTION, SOLUTION INTRAVENOUS ONCE
Status: COMPLETED | OUTPATIENT
Start: 2019-12-26 | End: 2019-12-26

## 2019-12-26 RX ORDER — OXYCODONE HYDROCHLORIDE 5 MG/1
5 TABLET ORAL EVERY 4 HOURS PRN
Status: DISCONTINUED | OUTPATIENT
Start: 2019-12-26 | End: 2019-12-30 | Stop reason: HOSPADM

## 2019-12-26 RX ORDER — ALBUMIN, HUMAN INJ 5% 5 %
50 SOLUTION INTRAVENOUS ONCE
Status: COMPLETED | OUTPATIENT
Start: 2019-12-26 | End: 2019-12-26

## 2019-12-26 RX ORDER — OXYCODONE HYDROCHLORIDE 5 MG/1
10 TABLET ORAL EVERY 4 HOURS PRN
Status: DISCONTINUED | OUTPATIENT
Start: 2019-12-26 | End: 2019-12-30 | Stop reason: HOSPADM

## 2019-12-26 RX ORDER — SODIUM CHLORIDE, SODIUM LACTATE, POTASSIUM CHLORIDE, AND CALCIUM CHLORIDE .6; .31; .03; .02 G/100ML; G/100ML; G/100ML; G/100ML
500 INJECTION, SOLUTION INTRAVENOUS ONCE
Status: COMPLETED | OUTPATIENT
Start: 2019-12-26 | End: 2019-12-26

## 2019-12-26 RX ADMIN — HEPARIN SODIUM 5000 UNITS: 5000 INJECTION INTRAVENOUS; SUBCUTANEOUS at 21:56

## 2019-12-26 RX ADMIN — SODIUM CHLORIDE, POTASSIUM CHLORIDE, SODIUM LACTATE AND CALCIUM CHLORIDE 500 ML: 600; 310; 30; 20 INJECTION, SOLUTION INTRAVENOUS at 12:08

## 2019-12-26 RX ADMIN — METRONIDAZOLE 500 MG: 500 INJECTION, SOLUTION INTRAVENOUS at 23:21

## 2019-12-26 RX ADMIN — ACETAMINOPHEN 1000 MG: 325 SOLUTION ORAL at 06:05

## 2019-12-26 RX ADMIN — METRONIDAZOLE 500 MG: 500 INJECTION, SOLUTION INTRAVENOUS at 00:11

## 2019-12-26 RX ADMIN — INSULIN LISPRO 3 UNITS: 100 INJECTION, SOLUTION INTRAVENOUS; SUBCUTANEOUS at 17:45

## 2019-12-26 RX ADMIN — CALCIUM GLUCONATE: 94 INJECTION, SOLUTION INTRAVENOUS at 18:20

## 2019-12-26 RX ADMIN — OXYCODONE HYDROCHLORIDE 5 MG: 5 TABLET ORAL at 12:12

## 2019-12-26 RX ADMIN — ALBUMIN (HUMAN) 50 G: 12.5 INJECTION, SOLUTION INTRAVENOUS at 18:25

## 2019-12-26 RX ADMIN — ACETAMINOPHEN 1000 MG: 325 SOLUTION ORAL at 21:56

## 2019-12-26 RX ADMIN — FUROSEMIDE 10 MG: 20 TABLET ORAL at 10:26

## 2019-12-26 RX ADMIN — OXYCODONE HYDROCHLORIDE 5 MG: 5 TABLET ORAL at 18:34

## 2019-12-26 RX ADMIN — I.V. FAT EMULSION 250 ML: 20 EMULSION INTRAVENOUS at 18:20

## 2019-12-26 RX ADMIN — CEFTAROLINE FOSAMIL 300 MG: 600 POWDER, FOR SOLUTION INTRAVENOUS at 21:56

## 2019-12-26 RX ADMIN — METRONIDAZOLE 500 MG: 500 INJECTION, SOLUTION INTRAVENOUS at 06:09

## 2019-12-26 RX ADMIN — CEFTAROLINE FOSAMIL 300 MG: 600 POWDER, FOR SOLUTION INTRAVENOUS at 10:18

## 2019-12-26 RX ADMIN — HYDROCORTISONE 2.5%: 25 CREAM TOPICAL at 10:25

## 2019-12-26 RX ADMIN — METRONIDAZOLE 500 MG: 500 INJECTION, SOLUTION INTRAVENOUS at 14:33

## 2019-12-26 RX ADMIN — SODIUM CHLORIDE, POTASSIUM CHLORIDE, SODIUM LACTATE AND CALCIUM CHLORIDE 1000 ML: 600; 310; 30; 20 INJECTION, SOLUTION INTRAVENOUS at 05:00

## 2019-12-26 RX ADMIN — NOREPINEPHRINE BITARTRATE 2 MCG/MIN: 1 INJECTION, SOLUTION, CONCENTRATE INTRAVENOUS at 01:53

## 2019-12-26 RX ADMIN — FAMOTIDINE 20 MG: 10 INJECTION, SOLUTION INTRAVENOUS at 08:09

## 2019-12-26 RX ADMIN — INSULIN LISPRO 3 UNITS: 100 INJECTION, SOLUTION INTRAVENOUS; SUBCUTANEOUS at 08:09

## 2019-12-26 RX ADMIN — HEPARIN SODIUM 5000 UNITS: 5000 INJECTION INTRAVENOUS; SUBCUTANEOUS at 14:33

## 2019-12-26 RX ADMIN — SODIUM CHLORIDE 500 ML: 0.9 INJECTION, SOLUTION INTRAVENOUS at 00:15

## 2019-12-26 ASSESSMENT — PAIN SCALES - GENERAL
PAINLEVEL_OUTOF10: 5
PAINLEVEL_OUTOF10: 6
PAINLEVEL_OUTOF10: 7
PAINLEVEL_OUTOF10: 6
PAINLEVEL_OUTOF10: 4

## 2019-12-26 ASSESSMENT — ENCOUNTER SYMPTOMS
ABDOMINAL PAIN: 1
RESPIRATORY NEGATIVE: 1

## 2019-12-26 NOTE — PROGRESS NOTES
Component Value Date     12/26/2019    K 5.1 12/26/2019     12/26/2019    CO2 22 12/26/2019    BUN 56 12/26/2019    LABALBU 2.9 12/15/2019    LABALBU 4.0 12/06/2011    CREATININE 3.01 12/26/2019    CALCIUM 9.1 12/26/2019    GFRAA 19 12/26/2019    LABGLOM 16 12/26/2019    GLUCOSE 170 12/26/2019    GLUCOSE 94 12/06/2011       Radiology Review:      None to review      ASSESSMENT     80 y/o female with pelvic abscess s/p anastomotic leak following colostomy reversal   S/p IR guided drain 12/17/2019  S/p colonoscopy and placement of stent x2 12/20/19  S/p colonoscopy 12/23/19  S/p exploratory laparotomy, extensive SANCHEZ, removal of sigmoid stent, takedown of anastomosis with closure of rectal stump and colostomy creation on 12/24/19    Plan  1. Continue medical management supportive care per primary team  2. Pain control - IV tylenol; john and morphine PRN - start with john first and use morphine for breakthrough pain only  3. Keep n.p.o. with NG tube to low intermittent suction  1. Will consider clamp trial today  4. Continue TPN  5. Continue antibiotics per ID  1. Leukocytosis increasing  6. Continue IVF  1. LR bolus  7. Strict I/Os - monitor urine output  8. Follow-up a.m. labs - replace electrolytes PRN  9. Glucose control - keep <180  10. OOB and ambulate at least TID  1. PT/OT  11. Aggressive IS use - at least 10 times per hour while awake; keep within reach of patient at all times    Electronically signed by Nida Ronquillo DO  on 12/26/2019 at 5:34 AM I Dr. Jackson Quinonez saw and examined the patient. I have edited the above and agree with the above. Geoffrey Santacruz  Colorectal Surgery

## 2019-12-26 NOTE — FLOWSHEET NOTE
Writer ike. Patient resting in bed. There are no visitors. Patient engages very briefly, stating, \"I can't talk ight now. \" She appears uncomfortable. Wrtier offers brief support and presence. Spiritual Care will follow as  Needed.        12/26/19 0902   Encounter Summary   Services provided to: Patient   Referral/Consult From: Ike   Continue Visiting   (12/26/19)   Complexity of Encounter Low   Length of Encounter 15 minutes   Spiritual Assessment Completed Yes   Routine   Type Follow up   Assessment Anxious   Intervention Active listening   Outcome Refused/declined

## 2019-12-26 NOTE — PLAN OF CARE
Problem: Falls - Risk of:  Goal: Will remain free from falls  Description  Will remain free from falls  Outcome: Ongoing     Problem: Skin Integrity:  Goal: Will show no infection signs and symptoms  Description  Will show no infection signs and symptoms  Outcome: Ongoing     Problem: Fluid Volume - Imbalance:  Goal: Absence of imbalanced fluid volume signs and symptoms  Description  Absence of imbalanced fluid volume signs and symptoms  Outcome: Ongoing     Problem: Urinary Elimination:  Goal: Ability to reestablish a normal urinary elimination pattern will improve - after catheter removal  Description  Ability to reestablish a normal urinary elimination pattern will improve  Outcome: Ongoing

## 2019-12-26 NOTE — PROGRESS NOTES
[facility]  Progress Note    Saint Mood  MRN: [id number]  Date: 12/26/2019    Reason for F/U :   Abdominal abscess    Impression :   1. Recent colostomy reversal, extensive lysis of adhesions and ventral hernia repair. 2. Anastomotic leak -complication of above surgery  3. Large interloop mid pelvic abscess (measuring 11.6 cm) increased in size from 12/4/2019  · Status post stenting of large anastomotic perforation by GI 12/19/2019  · Status post takedown of anastomosis, closure of rectal stump, colostomy creation, drainage of abdominal abscess 12/24/2019  · Cultures with MRSA, E. coli  4. Leukocytosis  5. Acute kidney injury    Recommendations:   · Continue ceftaroline and metronidazole  · The ceftaroline dose has been adjusted for the renal function  · The patient continues on TPN  · The leukocytosis is worse today but this is likely secondary to the recent surgery and will follow the WBC count trend  · The dressing change was done earlier today by the surgical team and it was not taken down. Infection Control Recommendations:   Universal precautions    Discharge Planning:   Estimated Length of IV antimicrobials: 14 days  Patient will need Midline Catheter Insertion/ PICC line Insertion: No  Patient will need: Home IV , [place],  SNF,  LTAC: Undetermined  Patient willneed outpatient wound care: No    MedicalDecision making / Summary of Stay:   Saint Mood is a 81y.o.-year-old female who was initially admitted on 12/15/2019. Brandon John has a history of diverticulitis and underwent a diverting colostomy in May and in early November was admitted and underwent a colostomy reversal.  The patient's hospital course was complicated by fluid overload for which she received some diuretic therapy. She also reports having a lot of diarrhea during that admission which was felt secondary to the Toradol and post discharge her diarrhea issues continued.   The patient reports not really contained pelvic cavity and attempt at removal of the stents were not successful    The patient was taken back to the operating room 2019 and had an exploratory laparotomy, extensive lysis of adhesions, removal of sigmoid stent and takedown of anastomosis with closure of rectal stump and colostomy creation. The patient did have repair of several seromuscular injuries of the small bowel and colon. The patient did have an abdominal washout and draining of pelvic abscess done    Current evaluation:2019    BP (!) 116/57   Pulse 106   Temp 97.5 °F (36.4 °C)   Resp 15   Ht 5' 7\" (1.702 m)   Wt 233 lb 9.6 oz (106 kg)   LMP  (LMP Unknown)   SpO2 95%   BMI 36.59 kg/m²     Temperature Range: Temp: 97.5 °F (36.4 °C) Temp  Av.8 °F (36.6 °C)  Min: 97.5 °F (36.4 °C)  Max: 98 °F (36.7 °C)  The patient is seen and evaluated at bedside she is awake and alert in no acute distress. She remains worried about everything that going. She does not report any subjective fevers or chills. She does still have some abdominal pain. There is not much stool output from the stoma as of yet but there is some serous liquid output. Review of Systems   Constitutional: Negative. Respiratory: Negative. Cardiovascular: Negative. Gastrointestinal: Positive for abdominal pain. Genitourinary: Negative. Musculoskeletal: Negative. Skin: Negative. Neurological: Negative. Psychiatric/Behavioral: Negative. Physical Examination :     Physical Exam  Constitutional:       Appearance: She is well-developed. HENT:      Head: Normocephalic and atraumatic. Neck:      Musculoskeletal: Normal range of motion and neck supple. Cardiovascular:      Rate and Rhythm: Regular rhythm. Heart sounds: Normal heart sounds. Pulmonary:      Effort: Pulmonary effort is normal.      Breath sounds: Normal breath sounds. Abdominal:      General: Bowel sounds are normal.      Palpations: Abdomen is soft. Comments: The abdominal wall dressing was not removed  Left-sided ostomy in place with good pink healthy mucosa and some serous fluid in the bag  There are 2 right lower quadrant MAE drains in place   Skin:     General: Skin is warm and dry. Neurological:      Mental Status: She is alert and oriented to person, place, and time. Laboratory data:   I have independently reviewed the followinglabs:  CBC with Differential:   Recent Labs     12/25/19  0408 12/26/19  0442   WBC 25.3* 33.6*   HGB 11.9 10.2*   HCT 38.8 34.2*   * 433     BMP:   Recent Labs     12/25/19  0545 12/26/19  0442   * 140   K 5.2 5.1    105   CO2 21 22   BUN 34* 56*   CREATININE 1.63* 3.01*   MG 2.1 2.1     Hepatic Function Panel:   No results for input(s): PROT, LABALBU, BILIDIR, IBILI, BILITOT, ALKPHOS, ALT, AST in the last 72 hours. No results for input(s): VANCOTROUGH in the last 72 hours. No results found for: CRP  No results found for: SEDRATE    No results for input(s): PROCAL in the last 72 hours. Imaging Studies:   OF THE ABDOMEN AND PELVIS WITHOUT CONTRAST 12/20/2019 5:24 pm  Impression   1. Interval placement of 2 tandem metal stents from lower rectum extending   through the residual known pelvic abscess from anastomotic leak.  The   lowermost end of the stent is confirmed within the rectum however the   uppermost end of the stent appears to be within the gas component of the   residual abscess. 2. Decreasing large pelvic abscess following placement of a percutaneous   pigtail drainage catheter. 3. Subcutaneous tissue gas in the anterior right lower abdomen felt to be   secondary to the drainage catheter placement however tension on follow-up.        CT OF THE ABDOMEN AND PELVIS WITH CONTRAST 12/16/2019 7:24 pm  Impression   There is a large interloop mid pelvic abscess measuring up to 11.6 cm   increasing in size from 12/04/2019.  Contents of the abscess represent a   mixture of stool-like material with mixed soft tissue and gas.  There is no   large air-fluid level suggesting percutaneous drainage may be problematic. Cultures:     Anaerobic and Aerobic Culture [[id number]] (Abnormal)  Collected: 12/17/19 1057   Order Status: Completed Specimen: Aspirate Updated: 12/20/19 6103    Specimen Description . ASPIRATE ABDOMINAL ABSCESS    Special Requests NOT REPORTED    Direct Exam NO NEUTROPHILS SEEN     MIXED BACTERIAL MORPHOTYPES SEEN ON GRAM STAIN. Abnormal     Culture ESCHERICHIA COLI HEAVY GROWTHAbnormal      METHICILLIN RESISTANT STAPHYLOCOCCUS AUREUS LIGHT GROWTHAbnormal      NORMAL MARTHA     NO ANAEROBIC ORGANISMS ISOLATED AT 3 DAYSAbnormal    Escherichia coli (1)     Antibiotic Interpretation JANNETH Status    amikacin   Preliminary     NOT REPORTED   ampicillin Sensitive  Preliminary     8  SUSCEPTIBLE   ampicillin-sulbactam   Preliminary     NOT REPORTED   aztreonam Sensitive  Preliminary     <=1  SUSCEPTIBLE   ceFAZolin Sensitive  Preliminary     <=4  SUSCEPTIBLE   cefepime   Preliminary     NOT REPORTED   cefTRIAXone Sensitive  Preliminary     <=1  SUSCEPTIBLE   ciprofloxacin Resistant  Preliminary     >=4  RESISTANT   ertapenem   Preliminary     NOT REPORTED   Confirmatory Extended Spectrum Beta-Lactamase Negative NEGATIVE Preliminary    gentamicin Sensitive  Preliminary     <=1  SUSCEPTIBLE   meropenem   Preliminary     NOT REPORTED   nitrofurantoin   Preliminary     NOT REPORTED   tigecycline   Preliminary     NOT REPORTED   tobramycin Sensitive  Preliminary     <=1  SUSCEPTIBLE   trimethoprim-sulfamethoxazole Resistant  Preliminary     >=320  RESISTANT   piperacillin-tazobactam Sensitive  Preliminary     <=4  SUSCEPTIBLE   Methicillin resistant staphylococcus aureus (2)     Antibiotic Interpretation JANNETH Status    penicillin Resistant  Preliminary     >=0.5  RESISTANT   cefoxitin screen  NOT REPORTED Preliminary    ciprofloxacin   Preliminary     NOT REPORTED   clindamycin Resistant  Preliminary >=8  RESISTANT   erythromycin Resistant  Preliminary     >=8  RESISTANT   gentamicin Sensitive  Preliminary     <=0.5  SUSCEPTIBLE   gentamicin Sensitive Gentamicin is used only in combination with other active agents that test susceptible. Preliminary    Induced Clind Resist  NOT REPORTED Preliminary    levofloxacin Intermediate  Preliminary     4  INTERMEDIATE   linezolid   Preliminary     NOT REPORTED   moxifloxacin   Preliminary     NOT REPORTED   nitrofurantoin   Preliminary     NOT REPORTED   oxacillin Resistant  Preliminary     >=4  RESISTANT   Synercid   Preliminary     NOT REPORTED   rifampin   Preliminary     NOT REPORTED   tetracycline Sensitive  Preliminary     <=1  SUSCEPTIBLE   tigecycline   Preliminary     NOT REPORTED   trimethoprim-sulfamethoxazole Sensitive  Preliminary     <=10  SUSCEPTIBLE   vancomycin Sensitive  Preliminary     1  SUSCEPTIBLE     Cult,Blood #1 [[id number]] Collected: 12/15/19 2255   Order Status: Completed Specimen: Blood Updated: 12/22/19 0007    Specimen Description . BLOOD    Special Requests 3ML RT AC    Culture NO GROWTH 6 DAYS   Cult,Blood #2 [[id number]] Collected: 12/15/19 2304   Order Status: Completed Specimen: Blood Updated: 12/22/19 0007    Specimen Description . BLOOD    Special Requests 10ML LT WRIST    Culture NO GROWTH 6 DAYS     Cult,Urine,CC [[id number]] Collected: 12/17/19 1017   Order Status: Completed Specimen: Urine Updated: 12/19/19 0020    Specimen Description . URINE    Special Requests NOT REPORTED    Culture NO GROWTH       Medications:      [START ON 12/27/2019] famotidine (PEPCID) injection  20 mg Intravenous Daily    ceftaroline fosamil (TEFLARO) IVPB  300 mg Intravenous Q12H    heparin (porcine)  5,000 Units Subcutaneous 3 times per day    acetaminophen  1,000 mg Oral 3 times per day    insulin lispro  0-18 Units Subcutaneous TID WC    insulin lispro  0-9 Units Subcutaneous Nightly    fat emulsion  250 mL Intravenous Daily    hydrocortisone

## 2019-12-26 NOTE — PROGRESS NOTES
[facility] Wound Ostomy Continence Nurse  Follow Up      NAME:  Kiko Barnes  MEDICAL RECORD NUMBER:  [id number]  AGE: 81 y.o. GENDER: female  : 1950  TODAY'S DATE:  2019        Met with the patient today for new ostomy consult. Discussion with patient reveals she had a colostomy since May of this year with a recent take down and had to go back to OR due to anastomotic leak to have colostomy recreated. She is comfortable with mgmt of the ostomy, states she may have some general questions here and there, but feels confident regarding general care and believes she will be going to rehab at discharge to build strength.  Will visit tomorrow to assist with colostomy bag change and stoma eval.    Ezra VIEIRAN, RN, [address] [place] [address], Ostomy, and Continence Care  [phone]      Electronically signed by Ezra Lieberman RN on 2019 at 1:03 PM

## 2019-12-26 NOTE — CONSULTS
cardiologist (Written 09/25/2019).     Thyroid disease     goiter    Wears glasses        Past Surgical History:   Procedure Laterality Date    ABDOMINAL EXPLORATION SURGERY  05/16/2019    CHOLECYSTECTOMY, LAPAROSCOPIC  11/15/2016    CHOLECYSTECTOMY, LAPAROSCOPIC  11/15/2016    COLONOSCOPY  2013    neg    COLONOSCOPY N/A 5/16/2019    COLONOSCOPY DIAGNOSTIC performed by Lata Banks MD at [address] [facility] COLONOSCOPY N/A 12/19/2019    COLONOSCOPY W/STENT X2 performed by Lata Banks MD at [address] N/A 12/24/2019    EXPLORATORY LAPAROTOMY, EXTENSIVE LYSIS OF ADHESIONS, TAKE DOWN OF ANASTOMOSIS, COLOSTOMY CREATION, CLOSURE OF SEROMUSCULAR INJURIES performed by Lata Banks MD at [address], COLON, DIAGNOSTIC      EGD    FISSURECTOMY ANAL N/A 10/4/2019    FLEXIBLE SIGMOIDOSCOPY & ANAL FISTULECTOMY performed by Lata Banks MD at [address] [facility] ARTHROSCOPY Left     lateral release    OK EGD TRANSORAL BIOPSY SINGLE/MULTIPLE N/A 8/17/2017    EGD BIOPSY performed by Jae Molina MD at [address] OFFICE/OUTPT [address] [place] [address] N/A 5/22/2018    XI ROBOTIC LAPAROSCOPIC UMBILICAL HERNIA REPAIR WITH MESH, POSSIBLE OPEN performed by Jason Morris IV, DO at [facility]  02/19/2019    SIGMOIDOSCOPY N/A 2/19/2019    SIGMOIDOSCOPY BIOPSY FLEXIBLE performed by Agueda Vargas DO at [facility] N/A 12/23/2019    SIGMOIDOSCOPY DIAGNOSTIC FLEXIBLE WITH FLUORO [facility] N/A 5/16/2019    [address] performed [facility] N/A 11/12/2019    COLOSTOMY  CLOSURE EXTENSIVE LYSIS OF ADHESIONS REPAIR OF SMALL BOWEL TEAR, REPAIR OF MULTIPLE VENTRAL HERNIAS, MOBILIZATION OF THE SPLENIC FLEXURE AND TRANSVERSE COLON, ABDOMINAL EXPLORATION performed by Lata Banks MD at [address]  2,000 Units Oral Daily    metoprolol succinate  25 mg Oral Daily     Continuous Infusions:   PN-Adult 2-in-1 Central Line (Standard)      PN-Adult 2-in-1 Central Line (Standard) 89.24 mL/hr at 19 1744    sodium chloride 75 mL/hr at 19 2220    norepinephrine Stopped (19 0500)    dextrose       PRN Meds:oxyCODONE **OR** oxyCODONE, norepinephrine, melatonin, glucose, dextrose, glucagon (rDNA), dextrose, sodium chloride flush, sodium chloride flush, magnesium hydroxide, ondansetron **OR** ondansetron, morphine **OR** morphine    No Known Allergies    Social History     Socioeconomic History    Marital status: Single     Spouse name: Not on file    Number of children: Not on file    Years of education: Not on file    Highest education level: Not on file   Occupational History    Not on file   Social Needs    Financial resource strain: Not on file    Food insecurity:     Worry: Not on file     Inability: Not on file    Transportation needs:     Medical: Not on file     Non-medical: Not on file   Tobacco Use    Smoking status: Former Smoker     Packs/day: 1.00     Years: 3.00     Pack years: 3.00     Last attempt to quit: 1975     Years since quittin.0    Smokeless tobacco: Never Used    Tobacco comment: quit smoking age 23   Substance and Sexual Activity    Alcohol use: Yes     Comment: very rare    Drug use: No    Sexual activity: Not on file   Lifestyle    Physical activity:     Days per week: Not on file     Minutes per session: Not on file    Stress: Not on file   Relationships    Social connections:     Talks on phone: Not on file     Gets together: Not on file     Attends Mosque service: Not on file     Active member of club or organization: Not on file     Attends meetings of clubs or organizations: Not on file     Relationship status: Not on file    Intimate partner violence:     Fear of current or ex partner: Not on file     Emotionally abused: Not on file Physically abused: Not on file     Forced sexual activity: Not on file   Other Topics Concern    Not on file   Social History Narrative    Not on file       Family History   Problem Relation Age of Onset    Heart Disease Mother     COPD Father     Cancer Brother         thyroid, prostate, lung         Physical Exam:  Vitals:    12/26/19 1500 12/26/19 1530 12/26/19 1600 12/26/19 1630   BP: (!) 110/58 87/72 (!) 92/59 (!) 102/56   Pulse: 102 106 104 96   Resp: 18 18 18 12   Temp:   97.5 °F (36.4 °C)    TempSrc:   Oral    SpO2: 94% 95% 95% 99%   Weight:       Height:         I/O last 3 completed shifts: In: 5915.1 [P.O.:120; I.V.:3434.7]  Out: 830 [Urine:240; Emesis/NG output:525; Drains:65]    General:  Awake, alert, not in distress. Appears to be stated age. HEENT: Atraumatic, normocephalic. Anicteric sclera. Pink and moist oral mucosa. Neck supple. No JVD. Chest: Bilateral air entry, clear to auscultation, no wheezing, rhonchi or rales. Cardiovascular: RRR, S1S2, no murmur, rub or gallop. No lower extremity edema. Abdomen: Soft, non tender to palpation. Musculoskeletal: No cyanosis or clubbing. Integumentary: Pink, warm and dry. Free from rash or lesions. CNS: Oriented to person, place and time. Speech clear. Face symmetrical. No tremor. Psych:  Answering questions appropriately, cooperative, appropriate mood and affect    Data:  CBC:   Lab Results   Component Value Date    WBC 33.6 (HH) 12/26/2019    HGB 10.2 (L) 12/26/2019    HCT 34.2 (L) 12/26/2019    MCV 94.0 12/26/2019     12/26/2019     BMP:    Lab Results   Component Value Date     12/26/2019     (L) 12/25/2019     (L) 12/24/2019    K 5.1 12/26/2019    K 5.2 12/25/2019    K 3.7 12/24/2019     12/26/2019     12/25/2019     12/24/2019    CO2 22 12/26/2019    CO2 21 12/25/2019    CO2 26 12/24/2019    BUN 56 (H) 12/26/2019    BUN 34 (H) 12/25/2019    BUN 14 12/24/2019    CREATININE 3.01 (H) 12/26/2019    CREATININE 1.63 (H) 12/25/2019    CREATININE 0.60 12/24/2019    GLUCOSE 170 (H) 12/26/2019    GLUCOSE 376 (H) 12/25/2019    GLUCOSE 157 (H) 12/24/2019     CMP:   Lab Results   Component Value Date     12/26/2019    K 5.1 12/26/2019     12/26/2019    CO2 22 12/26/2019    BUN 56 12/26/2019    CREATININE 3.01 12/26/2019    GLUCOSE 170 12/26/2019    GLUCOSE 94 12/06/2011    CALCIUM 9.1 12/26/2019    PROT 6.2 12/15/2019    LABALBU 2.9 12/15/2019    LABALBU 4.0 12/06/2011    BILITOT 0.25 12/15/2019    ALKPHOS 81 12/15/2019    AST 9 12/15/2019    ALT 6 12/15/2019      Hepatic:   Lab Results   Component Value Date    AST 9 12/15/2019    AST 26 12/01/2019    AST 68 (H) 11/30/2019    ALT 6 12/15/2019    ALT 41 (H) 12/01/2019    ALT 63 (H) 11/30/2019    BILITOT 0.25 (L) 12/15/2019    BILITOT 0.27 (L) 12/01/2019    BILITOT 0.31 11/30/2019    ALKPHOS 81 12/15/2019    ALKPHOS 108 (H) 12/01/2019    ALKPHOS 133 (H) 11/30/2019     BNP: No results found for: BNP  Lipids:   Lab Results   Component Value Date    CHOL 135 11/22/2019    HDL 30 (L) 11/22/2019     INR:   Lab Results   Component Value Date    INR 1.2 12/17/2019    INR 1.0 12/06/2019    INR 1.0 12/05/2019     PTH: No results found for: PTH  Phosphorus:    Lab Results   Component Value Date    PHOS 4.7 12/26/2019     Ionized Calcium: No results found for: IONCA  Magnesium:   Lab Results   Component Value Date    MG 2.1 12/26/2019     Albumin:   Lab Results   Component Value Date    LABALBU 2.9 12/15/2019    LABALBU 4.0 12/06/2011     Last 3 CK, CKMB, Troponin: @LABRCNT(CKTOTAL:3,CKMB:3,TROPONINI:3)       URINE:)No results found for: [place]     Radiology:   Unremarkable ultrasound of the kidneys.       Images could not be performed of the urinary bladder as the technologist   reported bandages overlying the pelvis.       Incidental note is made of a small amount of subhepatic ascites.           Assessment:  1.  Acute kidney injury,Multifactorial,

## 2019-12-26 NOTE — PROGRESS NOTES
Pulmonary Critical Care Progress Note  ROMERO Tee Mai-KAROL/Shyla Dick MD     Patient seen for the follow up of critical care management, mild pulmonary hypertension, suspected obstructive sleep apnea    Subjective:  She is sitting up in chair, just finished washing up. Denies any shortness of breath, chest pain she has an occasional nonproductive cough. He has been working with her incentive spirometer. Urine output was poor the last 24 hours. Examination:  Vitals: BP (!) 116/57   Pulse 106   Temp 97.5 °F (36.4 °C)   Resp 15   Ht 5' 7\" (1.702 m)   Wt 233 lb 9.6 oz (106 kg)   LMP  (LMP Unknown)   SpO2 95%   BMI 36.59 kg/m²   General appearance:  alert and cooperative with exam, up in chair  Neck: No JVD  Lungs: Moderate air exchange, no wheeze, rales or rhonchi  Heart: regular rate and rhythm, S1, S2 normal, no gallop  Abdomen: Soft, non tender, + BS  Extremities: no cyanosis or clubbing.  No significant edema    LABs:  CBC:   Recent Labs     12/25/19  0408 12/26/19  0442   WBC 25.3* 33.6*   HGB 11.9 10.2*   HCT 38.8 34.2*   * 433     BMP:   Recent Labs     12/25/19  0545 12/26/19  0442   * 140   K 5.2 5.1   CO2 21 22   BUN 34* 56*   CREATININE 1.63* 3.01*   LABGLOM 31* 16*   GLUCOSE 376* 170*     ABG:  Lab Results   Component Value Date    YJO6ACB 25 12/24/2019    FIO2 NOT REPORTED 12/24/2019       Lab Results   Component Value Date    POCPH 7.32 12/24/2019    POCPCO2 46 12/24/2019    POCPO2 229 12/24/2019    POCHCO3 23.9 12/24/2019    NBEA 2 12/24/2019    PBEA NOT REPORTED 12/24/2019    FDW5ZHR 25 12/24/2019    ZNCA9CVP 100 12/24/2019    FIO2 NOT REPORTED 12/24/2019     Radiology:  12/26/19      Impression:  · Mild pulmonary hypertension, RVSP 40 mmHg  · Pelvic abscess status post anastomotic leak after colostomy reversal  · S/p ex lap with extensive lysis of adhesions removal of sigmoid stent and takedown of anastomosis with closure of rectal stump and colostomy creation  · Obesity with suspected obstructive sleep apnea  · Leukocytosis  · DESTINI    Recommendations:  · Continue IV antibiotics, ceftaroline/Flagyl, ID following  · Encouraged to increase use of incentive spirometry every hour while awake  · Monitor blood sugars off of insulin drip  · Continue TPN  · N.p.o./NG to low intermittent wall suction per surgery  · Nephrology on consult  · Labs: CBC and BMP in am, monitor renal function  · 2 liters/min via nasal cannula if necessary  · PT/OT, Encourage OOB/activity increase  · Sleep studies as outpatient  · DVT prophylaxis with low molecular weight heparin  · Will follow with you      Jannet Smiley APRN-CNP   Pulmonary Critical Care and Sleep Medicine,  Patient seen under the supervision of Torrie Eldridge MD, [facility]    Patient seen and evaluated by me. She is sitting up in the bedside bed. Her urine output is slightly improving. Last night I gave her extra 2 fluid boluses. Her IV fluid intake was also increased. She denies any chest pain. She does have occasional dry cough. Lung exam reveals moderate exchange with no wheezing. Plan is to continue IV hydration with 150 mL's per hour. Continue TPN. Patient encouraged for incentive spirometry every hour while awake. Nephrology has been consulted. Above was reviewed and discussed with Jannet Smiley CNP. And I agree with assessment and plan of care.   Electronically signed by     Betty Trevino MD on 12/26/2019 at 12:30 PM  Pulmonary Critical Care and Sleep Medicine,  [facility]  Cell: [phone]  Office: [phone]

## 2019-12-26 NOTE — PROGRESS NOTES
Physical Therapy    Facility/Department: [facility] ICU  Initial Assessment    NAME: Ayah Patel  : 1950  MRN: [id number]    Date of Service: 2019    Discharge Recommendations:  Home with Home health PT, Home with assist PRN     Pt presented to ED on 19 for trouble urinating and leg swelling. Pt recently had a colostomy reversal done and had anastomotic leak and the  patient was seen multiple times in the emergency room. The patient was found to have mild UTI and started on Cipro and the patient was sent home. The patient called  again mentioning that she has difficulty urinating and having severe abdominal pain, so the patient was advised to go back to the emergency room. The patient was evaluated and admitted for UTI  with IV antibiotics. The patient's white count was slightly elevated. The surgical wounds on her abdomen are healing well. On 19, pt underwent:  Exploratory laparotomy for extensive lysis of adhesions, removal of sigmoid stent, takedown of anastomosis, closure of rectal stump, colostomy creation, repair of seromuscular injuries of small bowel & colon abd washout, draining of abd wall cellulitis & drain of pelvic abscess    RN reports patient is medically stable for therapy treatment this date. Chart reviewed prior to treatment and patient is agreeable for therapy. Assessment   Body structures, Functions, Activity limitations: Decreased functional mobility ; Decreased safe awareness;Decreased endurance;Decreased balance  Assessment:  Pt tolerated session well with deficits noted in bed mobility, transfers, ambulation, balance, and endurance this session.  Pt requires continued IP PT & D/C Home with assist & HH PT to maximize independence with functional mobility, balance, safety awareness & activity tolerance  Prognosis: Good  Decision Making: Medium Complexity  Exam: ROM, MMT, functional mobility, activity tolerance, Balance, & [employer] AM-PAC 6 Clicks

## 2019-12-26 NOTE — PROGRESS NOTES
poor intake    Objective Information:  · Nutrition-Focused Physical Findings: Edema:  trace, non-pitting, RLE/LLE. Skin:  surgical site, RLQ. GI:  colostomy, LUQ (watery output). · Wound Type: Multiple, Surgical Wound  · Current Nutrition Therapies:  · Oral Diet Orders: NPO   · Parenteral Nutrition Orders:  · Type and Formula: 2-in-1 Custom   · Lipids: 250ml  · Rate/Volume: 83.3 ml/hr / 2,000 ml  · Duration: Continuous  · Current PN Order Provides: See below.   · Goal PN Orders Provides: @ 83.3 ml/hr:  2,260 kcal, 100 g protein  · Additional Calories: None  · Anthropometric Measures:  · Ht: 5' 7\" (170.2 cm)   · Current Body Wt: 233 lb 11 oz (106 kg)  · Admission Body Wt: 223 lb 8.7 oz (101.4 kg)  · Usual Body Wt: 235 lb (106.6 kg)  · % Weight Change:  4% (10#) in one month  · Ideal Body Wt: 135 lb (61.2 kg), % Ideal Body 167%  · BMI Classification: BMI 35.0 - 39.9 Obese Class II    Nutrition Interventions:   Continue NPO, Modify current Parenteral Nutrition  Continued Inpatient Monitoring, Discharge Planning, Coordination of Care    Nutrition Evaluation:   · Evaluation: Goal achieved   · Goals: PN to meet >90% of energy and protein needs   · Monitoring: PN Intake, PN Tolerance, Skin Integrity, Wound Healing, I&O, Weight, Pertinent Labs, Nausea or Vomiting, Constipation, Monitor Bowel Function      Electronically signed by Lorrie Noyola RDN, LD on 12/26/19 at 5:34 PM    Contact Number: [phone]

## 2019-12-26 NOTE — PROGRESS NOTES
Progress Note    12/26/2019   2:24 PM    Name:  Tima Coleman  MRN:    [id number]     Betty:     [de-identified]   Room:  [address] [place] [address] Day: 8     Admit Date: 12/15/2019 10:10 PM  PCP: Nicko Sadler MD    Subjective:     C/C:   Chief Complaint   Patient presents with   [place] Dehydration     seen this am for same    Nausea    Other     unable to urinate       Interval History: Status: not changed and patient awake and states she is holding herself and not in any acute distress    ROS:  Constitutional: negative for chills, fevers, sweats  Respiratory: negative for cough, dyspnea on exertion, hemoptysis, shortness of breath, wheezing  Cardiovascular: negative for chest pain, chest pressure/discomfort, dyspnea, lower extremity edema, palpitations  Gastrointestinal: Positive for abdominal pain, constipation, diarrhea, nausea, vomiting  Neurological: negative for dizziness and headaches    Medications:      Allergies: No Known Allergies    Current Meds: oxyCODONE (ROXICODONE) immediate release tablet 5 mg, Q4H PRN    Or  oxyCODONE (ROXICODONE) immediate release tablet 10 mg, Q4H PRN  [START ON 12/27/2019] famotidine (PEPCID) injection 20 mg, Daily  ceftaroline fosamil (TEFLARO) 300 mg in dextrose 5 % 50 mL IVPB, Q12H  PN-Adult 2-in-1 Central Line (Custom), Continuous TPN  fat emulsion 20 % infusion 250 mL, Daily  heparin (porcine) injection 5,000 Units, 3 times per day  acetaminophen (TYLENOL) 160 MG/5ML solution 1,000 mg, 3 times per day  insulin lispro (HUMALOG) injection vial 0-18 Units, TID WC  insulin lispro (HUMALOG) injection vial 0-9 Units, Nightly  PN-Adult 2-in-1 Central Line (Standard), Continuous TPN  0.9 % sodium chloride infusion, Continuous  norepinephrine (LEVOPHED) 16 mg in dextrose 5 % 250 mL infusion, PRN  hydrocortisone (ANUSOL-HC) 2.5 % rectal cream, BID  melatonin tablet 6 mg, Nightly PRN  furosemide (LASIX) tablet 10 mg, Every Other Day  metronidazole (FLAGYL) 500 mg in NaCl 100 mL IVPB

## 2019-12-27 LAB
ANION GAP SERPL CALCULATED.3IONS-SCNC: 12 MMOL/L (ref 9–17)
BUN BLDV-MCNC: 69 MG/DL (ref 8–23)
BUN/CREAT BLD: 27 (ref 9–20)
CALCIUM SERPL-MCNC: 8.9 MG/DL (ref 8.6–10.4)
CHLORIDE BLD-SCNC: 104 MMOL/L (ref 98–107)
CO2: 22 MMOL/L (ref 20–31)
COMPLEMENT C3: 65 MG/DL (ref 90–180)
COMPLEMENT C4: 12 MG/DL (ref 10–40)
CREAT SERPL-MCNC: 2.58 MG/DL (ref 0.5–0.9)
GFR AFRICAN AMERICAN: 22 ML/MIN
GFR NON-AFRICAN AMERICAN: 19 ML/MIN
GFR SERPL CREATININE-BSD FRML MDRD: ABNORMAL ML/MIN/{1.73_M2}
GFR SERPL CREATININE-BSD FRML MDRD: ABNORMAL ML/MIN/{1.73_M2}
GLUCOSE BLD-MCNC: 104 MG/DL (ref 65–105)
GLUCOSE BLD-MCNC: 132 MG/DL (ref 65–105)
GLUCOSE BLD-MCNC: 134 MG/DL (ref 70–99)
GLUCOSE BLD-MCNC: 142 MG/DL (ref 65–105)
GLUCOSE BLD-MCNC: 91 MG/DL (ref 65–105)
HCT VFR BLD CALC: 22.8 % (ref 36.3–47.1)
HCT VFR BLD CALC: 23.9 % (ref 36.3–47.1)
HEMOGLOBIN: 7 G/DL (ref 11.9–15.1)
HEMOGLOBIN: 7.3 G/DL (ref 11.9–15.1)
MAGNESIUM: 2 MG/DL (ref 1.6–2.6)
MCH RBC QN AUTO: 28.9 PG (ref 25.2–33.5)
MCHC RBC AUTO-ENTMCNC: 30.5 G/DL (ref 28.4–34.8)
MCV RBC AUTO: 94.5 FL (ref 82.6–102.9)
NRBC AUTOMATED: ABNORMAL PER 100 WBC
PDW BLD-RTO: 17.1 % (ref 11.8–14.4)
PHOSPHORUS: 3.9 MG/DL (ref 2.6–4.5)
PLATELET # BLD: 295 K/UL (ref 138–453)
PMV BLD AUTO: 9.7 FL (ref 8.1–13.5)
POTASSIUM SERPL-SCNC: 4.2 MMOL/L (ref 3.7–5.3)
RBC # BLD: 2.53 M/UL (ref 3.95–5.11)
SODIUM BLD-SCNC: 138 MMOL/L (ref 135–144)
WBC # BLD: 19.7 K/UL (ref 3.5–11.3)

## 2019-12-27 PROCEDURE — 83735 ASSAY OF MAGNESIUM: CPT

## 2019-12-27 PROCEDURE — 6360000002 HC RX W HCPCS: Performed by: STUDENT IN AN ORGANIZED HEALTH CARE EDUCATION/TRAINING PROGRAM

## 2019-12-27 PROCEDURE — 86160 COMPLEMENT ANTIGEN: CPT

## 2019-12-27 PROCEDURE — 2500000003 HC RX 250 WO HCPCS: Performed by: STUDENT IN AN ORGANIZED HEALTH CARE EDUCATION/TRAINING PROGRAM

## 2019-12-27 PROCEDURE — 2580000003 HC RX 258: Performed by: STUDENT IN AN ORGANIZED HEALTH CARE EDUCATION/TRAINING PROGRAM

## 2019-12-27 PROCEDURE — 85018 HEMOGLOBIN: CPT

## 2019-12-27 PROCEDURE — 85014 HEMATOCRIT: CPT

## 2019-12-27 PROCEDURE — 2580000003 HC RX 258: Performed by: INTERNAL MEDICINE

## 2019-12-27 PROCEDURE — 85027 COMPLETE CBC AUTOMATED: CPT

## 2019-12-27 PROCEDURE — 6370000000 HC RX 637 (ALT 250 FOR IP): Performed by: STUDENT IN AN ORGANIZED HEALTH CARE EDUCATION/TRAINING PROGRAM

## 2019-12-27 PROCEDURE — 2500000003 HC RX 250 WO HCPCS: Performed by: INTERNAL MEDICINE

## 2019-12-27 PROCEDURE — 97530 THERAPEUTIC ACTIVITIES: CPT

## 2019-12-27 PROCEDURE — 2000000000 HC ICU R&B

## 2019-12-27 PROCEDURE — 6360000002 HC RX W HCPCS: Performed by: INTERNAL MEDICINE

## 2019-12-27 PROCEDURE — 97110 THERAPEUTIC EXERCISES: CPT

## 2019-12-27 PROCEDURE — 84100 ASSAY OF PHOSPHORUS: CPT

## 2019-12-27 PROCEDURE — 99232 SBSQ HOSP IP/OBS MODERATE 35: CPT | Performed by: INTERNAL MEDICINE

## 2019-12-27 PROCEDURE — 82947 ASSAY GLUCOSE BLOOD QUANT: CPT

## 2019-12-27 PROCEDURE — 97116 GAIT TRAINING THERAPY: CPT

## 2019-12-27 PROCEDURE — 6370000000 HC RX 637 (ALT 250 FOR IP): Performed by: INTERNAL MEDICINE

## 2019-12-27 PROCEDURE — 36415 COLL VENOUS BLD VENIPUNCTURE: CPT

## 2019-12-27 PROCEDURE — 80048 BASIC METABOLIC PNL TOTAL CA: CPT

## 2019-12-27 RX ORDER — ACETAMINOPHEN 500 MG
1000 TABLET ORAL EVERY 8 HOURS SCHEDULED
Status: DISCONTINUED | OUTPATIENT
Start: 2019-12-27 | End: 2019-12-30 | Stop reason: HOSPADM

## 2019-12-27 RX ADMIN — I.V. FAT EMULSION 250 ML: 20 EMULSION INTRAVENOUS at 17:07

## 2019-12-27 RX ADMIN — OXYCODONE HYDROCHLORIDE 10 MG: 5 TABLET ORAL at 13:05

## 2019-12-27 RX ADMIN — POTASSIUM CHLORIDE: 2 INJECTION, SOLUTION, CONCENTRATE INTRAVENOUS at 17:08

## 2019-12-27 RX ADMIN — ACETAMINOPHEN 1000 MG: 500 TABLET, COATED ORAL at 23:36

## 2019-12-27 RX ADMIN — SODIUM CHLORIDE: 9 INJECTION, SOLUTION INTRAVENOUS at 23:58

## 2019-12-27 RX ADMIN — CEFTAROLINE FOSAMIL 300 MG: 600 POWDER, FOR SOLUTION INTRAVENOUS at 10:46

## 2019-12-27 RX ADMIN — OXYCODONE HYDROCHLORIDE 5 MG: 5 TABLET ORAL at 05:29

## 2019-12-27 RX ADMIN — HEPARIN SODIUM 5000 UNITS: 5000 INJECTION INTRAVENOUS; SUBCUTANEOUS at 05:29

## 2019-12-27 RX ADMIN — IRON SUCROSE 200 MG: 20 INJECTION, SOLUTION INTRAVENOUS at 16:29

## 2019-12-27 RX ADMIN — SODIUM CHLORIDE: 9 INJECTION, SOLUTION INTRAVENOUS at 03:03

## 2019-12-27 RX ADMIN — METRONIDAZOLE 500 MG: 500 INJECTION, SOLUTION INTRAVENOUS at 23:27

## 2019-12-27 RX ADMIN — HEPARIN SODIUM 5000 UNITS: 5000 INJECTION INTRAVENOUS; SUBCUTANEOUS at 14:18

## 2019-12-27 RX ADMIN — FAMOTIDINE 20 MG: 10 INJECTION, SOLUTION INTRAVENOUS at 09:20

## 2019-12-27 RX ADMIN — ACETAMINOPHEN 1000 MG: 325 SOLUTION ORAL at 05:29

## 2019-12-27 RX ADMIN — CEFTAROLINE FOSAMIL 300 MG: 600 POWDER, FOR SOLUTION INTRAVENOUS at 22:22

## 2019-12-27 RX ADMIN — METRONIDAZOLE 500 MG: 500 INJECTION, SOLUTION INTRAVENOUS at 06:29

## 2019-12-27 RX ADMIN — HYDROCORTISONE 2.5%: 25 CREAM TOPICAL at 11:30

## 2019-12-27 RX ADMIN — Medication 10 ML: at 21:03

## 2019-12-27 RX ADMIN — VITAMIN D, TAB 1000IU (100/BT) 2000 UNITS: 25 TAB at 09:21

## 2019-12-27 RX ADMIN — HEPARIN SODIUM 5000 UNITS: 5000 INJECTION INTRAVENOUS; SUBCUTANEOUS at 21:02

## 2019-12-27 RX ADMIN — METRONIDAZOLE 500 MG: 500 INJECTION, SOLUTION INTRAVENOUS at 14:00

## 2019-12-27 RX ADMIN — INSULIN LISPRO 3 UNITS: 100 INJECTION, SOLUTION INTRAVENOUS; SUBCUTANEOUS at 09:20

## 2019-12-27 RX ADMIN — ONDANSETRON 4 MG: 2 INJECTION INTRAMUSCULAR; INTRAVENOUS at 10:12

## 2019-12-27 ASSESSMENT — PAIN SCALES - GENERAL
PAINLEVEL_OUTOF10: 8
PAINLEVEL_OUTOF10: 5

## 2019-12-27 ASSESSMENT — ENCOUNTER SYMPTOMS
ABDOMINAL PAIN: 1
RESPIRATORY NEGATIVE: 1

## 2019-12-27 NOTE — PROGRESS NOTES
Nutrition Assessment (Parenteral Nutrition)    Type and Reason for Visit: Reassess    Nutrition Recommendations:   1. Continue clear liquid diet as tolerated  2. Maintain PN-Adult 2-in-1 Central Line (Custom) @ 83.3 ml/hr, with IV Lipids @ 21 ml/hr x 12 hr.    1. Include additives:  115 mEq Na Acetate, 100 mEq NaCl, 8 mEq K Acetate, 5 mmol K PO4, 8 mEq KCl  8 mEq Ca Gluconate, 8 mEq Mg SO4, MVI/TE & 30 Units INS. 3. Monitor Diet tolerance, PN, weight, labs      Nutrition Assessment: Pt. stable from a nutrition standpoint. PN infusing at goal. Pt. diet advanced to clear liquids this afternoon. GFR remains low, and BUN and Creatinine remain elevated, however labs show improvement. Will maintain PN rate and continue IV lipids, PN order discussed with RN and Pharmacy. Will continue to monitor nutrition progression and make adjustments prn. Malnutrition Assessment:  · Malnutrition Status: Mild Malnutrition  · Context: Acute illness or injury  · Findings of the 6 clinical characteristics of malnutrition (Minimum of 2 out of 6 clinical characteristics is required to make the diagnosis of moderate or severe Protein Calorie Malnutrition based on AND/ASPEN Guidelines):  1. Energy Intake-Less than or equal to 75% of estimated energy requirement, Greater than or equal to 7 days    2. Weight Loss-2% loss or greater(Not considered to be significant), in 1 month  3. Fat Loss-No significant subcutaneous fat loss,    4. Muscle Loss-No significant muscle mass loss,    5. Fluid Accumulation-Mild fluid accumulation, Extremities  6.   Strength-Not measured    Nutrition Risk Level: High    Nutrient Needs:  · Estimated Daily Total Kcal: 1,950-2,100 kcal (20-21 kcal/kg current wt)  · Estimated Daily Protein (g): 110-120 g (1.8-2 g/kg IBW)  · Estimated Daily Total Fluid (ml/day): 1 mL/kcal    Nutrition Diagnosis:   · Problem: Inadequate oral intake  · Etiology: related to Alteration in GI function     Signs and symptoms:  as evidenced by NPO status due to medical condition, Nutrition support - PN, Weight loss, Diet history of poor intake    Objective Information:  · Nutrition-Focused Physical Findings: E: +1 in RLE/LLE. GI: nausea  · Wound Type: Multiple, Surgical Wound  · Current Nutrition Therapies:  · Oral Diet Orders: Clear Liquid   · Oral Diet intake: Unable to assess  · Oral Nutrition Supplement (ONS) Orders: None  · ONS intake: NPO  · Parenteral Nutrition Orders:  · Type and Formula: 2-in-1 Custom   · Lipids: 250ml  · Rate/Volume: 83.3 ml/hr / 2,000 ml  · Duration: Continuous  · Current PN Order Provides: See below.   · Goal PN Orders Provides: @ 83.3 ml/hr:  2,260 kcal, 100 g protein  · Additional Calories: None  · Anthropometric Measures:  · Ht: 5' 7\" (170.2 cm)   · Current Body Wt: 233 lb 11 oz (106 kg)  · Admission Body Wt: 223 lb 8.7 oz (101.4 kg)  · Usual Body Wt: 235 lb (106.6 kg)  · % Weight Change:  ,  4% (10#) in one month  · Ideal Body Wt: 135 lb (61.2 kg), % Ideal Body 167%  · Adjusted Body Wt:  , body weight adjusted for  BMI Classification: BMI 35.0 - 39.9 Obese Class II    Nutrition Interventions:   Continue current diet, Modify current Parenteral Nutrition  Continued Inpatient Monitoring, Coordination of Care, Discharge Planning    Nutrition Evaluation:   · Evaluation: Goal achieved   · Goals: PN to meet >90% of energy and protein needs   · Monitoring: Meal Intake, PN Tolerance, Weight, Pertinent Labs, Monitor Bowel Function      Jayme Santos RDN, ISELA  RD Office Phone: [phone]

## 2019-12-27 NOTE — PLAN OF CARE
Nutrition Problem: Inadequate oral intake  Intervention: Food and/or Nutrient Delivery: Continue current diet, Modify current Parenteral Nutrition  Nutritional Goals: PN to meet >90% of energy and protein needs

## 2019-12-27 NOTE — PLAN OF CARE
Problem: Falls - Risk of:  Goal: Will remain free from falls  Description  Will remain free from falls  Outcome: Ongoing  Fall sign posted and non-skid socks on. Pt uses call light appropriately when needing assistance. Assistive devices within reach. Bed in lowest locked position, call light and items within reach, side rails up X4 per patient preference. RN will continue to monitor.   Goal: Absence of physical injury  Description  Absence of physical injury  Outcome: Ongoing     Problem: Skin Integrity:  Goal: Will show no infection signs and symptoms  Description  Will show no infection signs and symptoms  Outcome: Ongoing  Goal: Absence of new skin breakdown  Description  Absence of new skin breakdown  Outcome: Ongoing     Problem: Fluid Volume - Imbalance:  Goal: Absence of imbalanced fluid volume signs and symptoms  Description  Absence of imbalanced fluid volume signs and symptoms  Outcome: Ongoing     Problem: Sensory:  Goal: General experience of comfort will improve  Description  General experience of comfort will improve  Outcome: Ongoing     Problem: Urinary Elimination:  Goal: Signs and symptoms of infection will decrease  Description  Signs and symptoms of infection will decrease  Outcome: Ongoing  Goal: Ability to reestablish a normal urinary elimination pattern will improve - after catheter removal  Description  Ability to reestablish a normal urinary elimination pattern will improve  Outcome: Ongoing  Goal: Complications related to the disease process, condition or treatment will be avoided or minimized  Description  Complications related to the disease process, condition or treatment will be avoided or minimized  Outcome: Ongoing     Problem: Pain:  Goal: Pain level will decrease  Description  Pain level will decrease  Outcome: Ongoing  Goal: Control of acute pain  Description  Control of acute pain  Outcome: Ongoing  Goal: Control of chronic pain  Description  Control of chronic pain  Outcome: Ongoing     Problem: Nutrition  Goal: Optimal nutrition therapy  Outcome: Ongoing     Problem: Risk for Impaired Skin Integrity  Goal: Tissue integrity - skin and mucous membranes  Description  Structural intactness and normal physiological function of skin and  mucous membranes.   Outcome: Ongoing

## 2019-12-27 NOTE — PROGRESS NOTES
Progress Note    12/27/2019   1:29 PM    Name:  Kaity Esposito  MRN:    [id number]     [place]:     [de-identified]   Room:  [address] [place] [address] Day: 6     Admit Date: 12/15/2019 10:10 PM  PCP: Jacinda Reed MD    Subjective:     C/C:   Chief Complaint   Patient presents with   [place] Dehydration     seen this am for same    Nausea    Other     unable to urinate       Interval History: Status: improved and patient denies any new complaints    ROS:  Constitutional: negative for chills, fevers, sweats  Respiratory: negative for cough, dyspnea on exertion, hemoptysis, shortness of breath, wheezing  Cardiovascular: negative for chest pain, chest pressure/discomfort, dyspnea, lower extremity edema, palpitations  Gastrointestinal: Positive for abdominal discomfort  Medications:      Allergies: No Known Allergies    Current Meds: PN-Adult 2-in-1 Central Line (Custom), Continuous TPN  fat emulsion 20 % infusion 250 mL, Daily  oxyCODONE (ROXICODONE) immediate release tablet 5 mg, Q4H PRN    Or  oxyCODONE (ROXICODONE) immediate release tablet 10 mg, Q4H PRN  famotidine (PEPCID) injection 20 mg, Daily  ceftaroline fosamil (TEFLARO) 300 mg in dextrose 5 % 50 mL IVPB, Q12H  PN-Adult 2-in-1 Central Line (Custom), Continuous TPN  heparin (porcine) injection 5,000 Units, 3 times per day  acetaminophen (TYLENOL) 160 MG/5ML solution 1,000 mg, 3 times per day  insulin lispro (HUMALOG) injection vial 0-18 Units, TID WC  insulin lispro (HUMALOG) injection vial 0-9 Units, Nightly  0.9 % sodium chloride infusion, Continuous  norepinephrine (LEVOPHED) 16 mg in dextrose 5 % 250 mL infusion, PRN  hydrocortisone (ANUSOL-HC) 2.5 % rectal cream, BID  melatonin tablet 6 mg, Nightly PRN  furosemide (LASIX) tablet 10 mg, Every Other Day  metronidazole (FLAGYL) 500 mg in NaCl 100 mL IVPB premix, Q8H  glucose (GLUTOSE) 40 % oral gel 15 g, PRN  dextrose 50 % IV solution, PRN  glucagon (rDNA) injection 1 mg, PRN  dextrose 5 % solution, PRN  sodium Relationship status: Not on file    Intimate partner violence:     Fear of current or ex partner: Not on file     Emotionally abused: Not on file     Physically abused: Not on file     Forced sexual activity: Not on file   Other Topics Concern    Not on file   Social History Narrative    Not on file       Vitals:  BP (!) 120/58   Pulse 96   Temp 97.8 °F (36.6 °C) (Oral)   Resp 20   Ht 5' 7\" (1.702 m)   Wt 233 lb 9.6 oz (106 kg)   LMP  (LMP Unknown)   SpO2 94%   BMI 36.59 kg/m²   Temp (24hrs), Av.2 °F (36.8 °C), Min:97.5 °F (36.4 °C), Max:98.7 °F (37.1 °C)    Invalid input(s): CBCBMPCMP    I/O (24Hr):     Intake/Output Summary (Last 24 hours) at 2019 1329  Last data filed at 2019 5124  Gross per 24 hour   Intake 4580.6 ml   Output 1180 ml   Net 3400.6 ml       Labs:  [unfilled]    Physical Examination:        General appearance: alert, cooperative and no distress  Mental Status: oriented to person, place and time and normal affect  Lungs: clear to auscultation bilaterally, normal effort  Heart: regular rate and rhythm, no murmur  Abdomen: Appropriately tender  Extremities: no edema, redness or tenderness in the calves  Skin: no gross lesions, rashes, or induration    Assessment:        Primary Problem  <principal problem not specified>     Active Hospital Problems    Diagnosis Date Noted    Acute cystitis without hematuria [N30.00]     Mild malnutrition ([address] [E44.1] 2019    Sepsis ([address] [A41.9] 2019     Past Medical History:   Diagnosis Date    Allergic rhinitis     Bladder prolapse     Constipation     2019 resolved    Fundic gland polyposis of stomach 2017    per EGD    GERD (gastroesophageal reflux disease)     on no medications    Hiatal hernia     History of cardiac cath 2002    pt denies blockage    History of diverticulitis     Hyperlipidemia     borderline, on no medication    Hypertension     MRSA (methicillin resistant staph aureus) culture positive 11/01/2016    nasal    MRSA carrier     follows with ID    Obesity     Osteoarthritis     Systolic murmur     benign systolic murmur (history of). Pt does not follow-up with a cardiologist (Written 09/25/2019).  Thyroid disease     goiter    Wears glasses         Plan:        1. Creatinine improving and blood pressure is stable and patient taking sips of water without any discomfort  2. Continue current antibiotics  3. Supportive care  4. Transfer to [facility] if okay with other services  5.  All medications labs and history reviewed      Electronically signed by Quoc Clark MD on 12/27/2019 at 1:29 PM

## 2019-12-27 NOTE — PROGRESS NOTES
Pulmonary Critical Care Progress Note  Cuco Collier MD     Patient seen for the follow up of critical care management, mild pulmonary hypertension, suspected obstructive sleep apnea    Subjective:  He is lying comfortably in the bed. Urine output is improving. She has occasional dry cough. She denies any chest pain or shortness of breath. She has been using incentive spirometry. Examination:  Vitals: BP (!) 109/49   Pulse 92   Temp 98.7 °F (37.1 °C) (Temporal)   Resp 15   Ht 5' 7\" (1.702 m)   Wt 233 lb 9.6 oz (106 kg)   LMP  (LMP Unknown)   SpO2 95%   BMI 36.59 kg/m²   General appearance:  alert and cooperative with exam, up in chair  Neck: No JVD  Lungs: Moderate air exchange, no wheeze, rales or rhonchi  Heart: regular rate and rhythm, S1, S2 normal, no gallop  Abdomen: Soft, non tender, + BS  Extremities: no cyanosis or clubbing.  No significant edema    LABs:  CBC:   Recent Labs     12/26/19  0442 12/27/19  0443   WBC 33.6* 19.7*   HGB 10.2* 7.3*   HCT 34.2* 23.9*    295     BMP:   Recent Labs     12/26/19  0442 12/27/19  0443    138   K 5.1 4.2   CO2 22 22   BUN 56* 69*   CREATININE 3.01* 2.58*   LABGLOM 16* 19*   GLUCOSE 170* 134*     ABG:  Lab Results   Component Value Date    WEQ7SXB 25 12/24/2019    FIO2 NOT REPORTED 12/24/2019       Lab Results   Component Value Date    POCPH 7.32 12/24/2019    POCPCO2 46 12/24/2019    POCPO2 229 12/24/2019    POCHCO3 23.9 12/24/2019    NBEA 2 12/24/2019    PBEA NOT REPORTED 12/24/2019    THX6SQQ 25 12/24/2019    VGGE1PTR 100 12/24/2019    FIO2 NOT REPORTED 12/24/2019     Radiology:  12/26/19      Impression:  · Mild pulmonary hypertension, RVSP 40 mmHg  · Pelvic abscess status post anastomotic leak after colostomy reversal  · S/p ex lap with extensive lysis of adhesions removal of sigmoid stent and takedown of anastomosis with closure of rectal stump and colostomy creation  · Obesity with suspected obstructive sleep apnea  · Leukocytosis  · DESTINI    Recommendations:  · Continue IV antibiotics, ceftaroline/Flagyl, ID following  · Encouraged to increase use of incentive spirometry every hour while awake  · Monitor blood sugars off of insulin drip  · Continue TPN  · Decrease IV rate from 75 mL's to 50 mL's per hour  · X-ray of the chest in the morning  · N.p.o./NG to low intermittent wall suction per surgery  · Nephrology on consult  · Labs: CBC and BMP in am, monitor renal function  · 2 liters/min via nasal cannula if necessary  · PT/OT, Encourage OOB/activity increase  · Sleep studies as outpatient  · DVT prophylaxis with low molecular weight heparin  · OK to move to step down unit from pulmonary stand point  · Discussed with RN   · Will follow with you    Electronically signed by     Jalyn Krishnan MD on 12/27/2019 at 8:30 AM  Pulmonary Critical Care and Sleep Medicine,  [facility]  Cell: [phone]  Office: [phone]

## 2019-12-27 NOTE — PROGRESS NOTES
Reason for Consult:  Acute kidney injury. Requesting Physician:  Carlos Lomeli MD    Interval History:  Cr improved   UOP improved   Better BP  Hg dropped to 7.3    HISTORY OF PRESENT ILLNESS:    The patient is a 81 y.o. lady with Hx of diverticulitis, required multiple surgeries surgeries, admitted on 12/15/2019 , found to have pelvic abscess, required drainage and ABx. Currently on TPN. She has normal baseline renal function with baseline Cr of 0.6, her Cr showed an increase over the last two days, Cr was up to 3.01 this AM, she has significant decrease in UOP, Had a drop in BP yesterday early AM with SBP in the 80s, received aggressive hydration, about 6 L in yesterday. No  recent use of NSAIDs or iv contrast.       Prior to Admission medications    Medication Sig Start Date End Date Taking? Authorizing Provider   oxyCODONE (ROXICODONE) 5 MG immediate release tablet Take 1 tablet by mouth every 6 hours as needed for Pain for up to 5 days. Intended supply: 5 days.  Take lowest dose possible to manage pain 12/24/19 12/29/19 Yes Walker Murillo, DO   furosemide (LASIX) 20 MG tablet Take 0.5 tablets by mouth every other day 12/22/19  Yes Carlos Lomeli MD   metoprolol succinate (TOPROL XL) 25 MG extended release tablet Take 1 tablet by mouth daily 11/22/19  Yes Marbella Aguilar MD   ondansetron (ZOFRAN) 4 MG tablet Take 1 tablet by mouth every 12 hours as needed for Nausea or Vomiting 11/14/19  Yes Mireya Maldonado, DO   Cholecalciferol (VITAMIN D) 2000 units CAPS capsule Take 1 capsule by mouth daily   Yes Historical Provider, MD   clotrimazole-betamethasone (LOTRISONE) 1-0.05 % cream Apply topically daily as needed (to spot on arm)    Historical Provider, MD       Scheduled Meds:   fat emulsion  250 mL Intravenous Daily    famotidine (PEPCID) injection  20 mg Intravenous Daily    ceftaroline fosamil (TEFLARO) IVPB  300 mg Intravenous Q12H    heparin (porcine)  5,000 Units Subcutaneous 3 times per day    acetaminophen  1,000 mg Oral 3 times per day    insulin lispro  0-18 Units Subcutaneous TID WC    insulin lispro  0-9 Units Subcutaneous Nightly    hydrocortisone   Rectal BID    furosemide  10 mg Oral Every Other Day    metroNIDAZOLE  500 mg Intravenous Q8H    sodium chloride flush  10 mL Intravenous 2 times per day    sodium chloride flush  10 mL Intravenous 2 times per day    Vitamin D  2,000 Units Oral Daily    metoprolol succinate  25 mg Oral Daily     Continuous Infusions:   PN-Adult 2-in-1 Central Line (Standard)      PN-Adult 2-in-1 Central Line (Standard) 83.3 mL/hr at 12/26/19 1820    sodium chloride 50 mL/hr at 12/27/19 0835    norepinephrine Stopped (12/26/19 0500)    dextrose       PRN Meds:oxyCODONE **OR** oxyCODONE, norepinephrine, melatonin, glucose, dextrose, glucagon (rDNA), dextrose, sodium chloride flush, sodium chloride flush, magnesium hydroxide, ondansetron **OR** ondansetron     Physical Exam:  Vitals:    12/27/19 1000 12/27/19 1015 12/27/19 1030 12/27/19 1145   BP: (!) 118/55  (!) 120/58    Pulse: 91 99 96    Resp: 15 22 20    Temp:    97.8 °F (36.6 °C)   TempSrc:    Oral   SpO2: 92% 94% 94%    Weight:       Height:         I/O last 3 completed shifts: In: 4580.6 [P.O.:200; I.V.:1682; IV Piggyback:500]  Out: 6947 [Urine:1150; Emesis/NG output:275; Drains:80; Stool:100]    General:  Awake, alert, not in distress. Appears to be stated age. HEENT: Atraumatic, normocephalic. Anicteric sclera. Pink and moist oral mucosa. Neck supple. No JVD. Chest: Bilateral air entry, clear to auscultation, no wheezing, rhonchi or rales. Cardiovascular: RRR, S1S2, no murmur, rub or gallop. No lower extremity edema. Abdomen: Soft, non tender to palpation. Musculoskeletal: No cyanosis or clubbing. Integumentary: Pink, warm and dry. Free from rash or lesions. CNS: Oriented to person, place and time. Speech clear. Face symmetrical. No tremor. Psych:  Answering questions appropriately, cooperative, appropriate mood and affect    Data:  CBC:   Lab Results   Component Value Date    WBC 19.7 (H) 12/27/2019    HGB 7.3 (L) 12/27/2019    HCT 23.9 (L) 12/27/2019    MCV 94.5 12/27/2019     12/27/2019     BMP:    Lab Results   Component Value Date     12/27/2019     12/26/2019     (L) 12/25/2019    K 4.2 12/27/2019    K 5.1 12/26/2019    K 5.2 12/25/2019     12/27/2019     12/26/2019     12/25/2019    CO2 22 12/27/2019    CO2 22 12/26/2019    CO2 21 12/25/2019    BUN 69 (H) 12/27/2019    BUN 56 (H) 12/26/2019    BUN 34 (H) 12/25/2019    CREATININE 2.58 (H) 12/27/2019    CREATININE 3.01 (H) 12/26/2019    CREATININE 1.63 (H) 12/25/2019    GLUCOSE 134 (H) 12/27/2019    GLUCOSE 170 (H) 12/26/2019    GLUCOSE 376 (H) 12/25/2019     CMP:   Lab Results   Component Value Date     12/27/2019    K 4.2 12/27/2019     12/27/2019    CO2 22 12/27/2019    BUN 69 12/27/2019    CREATININE 2.58 12/27/2019    GLUCOSE 134 12/27/2019    GLUCOSE 94 12/06/2011    CALCIUM 8.9 12/27/2019    PROT 6.2 12/15/2019    LABALBU 2.9 12/15/2019    LABALBU 4.0 12/06/2011    BILITOT 0.25 12/15/2019    ALKPHOS 81 12/15/2019    AST 9 12/15/2019    ALT 6 12/15/2019      Hepatic:   Lab Results   Component Value Date    AST 9 12/15/2019    AST 26 12/01/2019    AST 68 (H) 11/30/2019    ALT 6 12/15/2019    ALT 41 (H) 12/01/2019    ALT 63 (H) 11/30/2019    BILITOT 0.25 (L) 12/15/2019    BILITOT 0.27 (L) 12/01/2019    BILITOT 0.31 11/30/2019    ALKPHOS 81 12/15/2019    ALKPHOS 108 (H) 12/01/2019    ALKPHOS 133 (H) 11/30/2019     BNP: No results found for: BNP  Lipids:   Lab Results   Component Value Date    CHOL 135 11/22/2019    HDL 30 (L) 11/22/2019     INR:   Lab Results   Component Value Date    INR 1.2 12/17/2019    INR 1.0 12/06/2019    INR 1.0 12/05/2019     PTH: No results found for: PTH  Phosphorus:    Lab Results   Component Value Date    PHOS 3.9 12/27/2019     Ionized Calcium: No results found for: IONCA  Magnesium:   Lab Results   Component Value Date    MG 2.0 12/27/2019     Albumin:   Lab Results   Component Value Date    LABALBU 2.9 12/15/2019    LABALBU 4.0 12/06/2011     Last 3 CK, CKMB, Troponin: @LABRCNT(CKTOTAL:3,CKMB:3,TROPONINI:3)       URINE:)No results found for: [facility]     Radiology:   Unremarkable ultrasound of the kidneys.       Images could not be performed of the urinary bladder as the technologist   reported bandages overlying the pelvis.       Incidental note is made of a small amount of subhepatic ascites.           Assessment:  1. Acute kidney injury,Multifactorial, urine studies are remarkable for very low urine sodium consistent with significant volume depletion  2. Hyponatremia  3. Anemia  4. Pelvic abscess s/p anastomotic leak following colostomy reversal     Plan:  Renal function improving   Continue IV hydration, receiving about 120 ml/Hr between TPN and NS  Maintain positive fluids balance  Negative urine eosinophils  Has slight decrease in C3 most likely secondary to infection   Hg drop seems to be secondary to Aggressive hydration, baseline Hg around 9, Hg was around 11 secondary to hemoconcentration   Recheck Hg  Will start venofer replacement course  Avoid nephrotoxic drugs and IV contrast exposure. Follow up chemistries ordered for AM.  Please do not hesitate to contact us for any further questions/concerns. We will continue to follow along with you.        Electronically signed by Xochitl Bland MD  on 12/27/2019 at 3:14 PM

## 2019-12-27 NOTE — PROGRESS NOTES
feeling very well ending up being seen by a nurse practitioner has her PCP Dr. Jones Harper body was out of town. The patient was sent back to the emergency room November 21 and was worked up and sent home. The patient reports that around Thanksgiving she started having rectal bleeding which prompted her to come back into the emergency room and at that point in time she was diagnosed with an anastomotic leak on CT imaging. The patient was treated conservatively with IV antibiotics and subsequently discharged on oral antimicrobial therapy with ciprofloxacin and metronidazole.     The patient reports that her diarrhea had improved but recently resumed. She had been at home and still generally is not feeling very well. She does not report any significant abdominal pain but has had some nausea and chills. She has not been able to tolerate oral intake very well and was having difficulty urinating well as leg swelling. She came back into the emergency room and testing did show question of a urinary tract infection. CT imaging showed a large pelvic abscess. The patient underwent a colonoscopy 12/19/2019  Large anastomotic perforation was noted. A large pelvic compartment with debris, stool material, and inflammation was noted. This area was walled off. Pigtail placed by IR was noted in the cavity. The patient had a stent placed    Follow-up CT scan 12/20/2019 did show that the proximal part of the stent was within the cavity of the anastomotic leak    On 12/23/2019 the patient was taken to IR due to feculent drainage around the pigtail catheter which was flushed and the collection has thick stool the smallbore catheter was felt to be of limited use.   Was also concerned that long-term use can cause high risk of rectocutaneous fistula formation    The patient had a colonoscopy done 12/23/2019 that confirmed the mucosal exam showed a large anastomotic perforation and previously placed stent migrated into the contained pelvic cavity and attempt at removal of the stents were not successful    The patient was taken back to the operating room 2019 and had an exploratory laparotomy, extensive lysis of adhesions, removal of sigmoid stent and takedown of anastomosis with closure of rectal stump and colostomy creation. The patient did have repair of several seromuscular injuries of the small bowel and colon. The patient did have an abdominal washout and draining of pelvic abscess done    Current evaluation:2019    /71   Pulse 100   Temp 98.4 °F (36.9 °C) (Oral)   Resp 20   Ht 5' 7\" (1.702 m)   Wt 233 lb 9.6 oz (106 kg)   LMP  (LMP Unknown)   SpO2 94%   BMI 36.59 kg/m²     Temperature Range: Temp: 98.4 °F (36.9 °C) Temp  Av.2 °F (36.8 °C)  Min: 97.5 °F (36.4 °C)  Max: 98.7 °F (37.1 °C)  The patient is seen and evaluated at bedside she is awake and alert in no acute distress. She is sitting up in the chair and had the NG tube removed earlier today. She is taking some ice chips and tolerating them so far. She does have some liquid output from the stoma  She also has lower extremity edema    Review of Systems   Constitutional: Negative. Respiratory: Negative. Cardiovascular: Positive for leg swelling. Gastrointestinal: Positive for abdominal pain. Genitourinary: Negative. Musculoskeletal: Negative. Skin: Negative. Neurological: Negative. Psychiatric/Behavioral: Negative. Physical Examination :     Physical Exam  Constitutional:       Appearance: She is well-developed. HENT:      Head: Normocephalic and atraumatic. Neck:      Musculoskeletal: Normal range of motion and neck supple. Cardiovascular:      Rate and Rhythm: Regular rhythm. Heart sounds: Normal heart sounds. Pulmonary:      Effort: Pulmonary effort is normal.      Breath sounds: Normal breath sounds. Abdominal:      General: Bowel sounds are normal.      Palpations: Abdomen is soft. Comments: The abdominal wall dressing was not removed  Left-sided ostomy in place with good pink healthy mucosa and some serous fluid in the bag  There are 2 right lower quadrant MAE drains in place   Skin:     General: Skin is warm and dry. Neurological:      Mental Status: She is alert and oriented to person, place, and time. Laboratory data:   I have independently reviewed the followinglabs:  CBC with Differential:   Recent Labs     12/26/19  0442 12/27/19  0443   WBC 33.6* 19.7*   HGB 10.2* 7.3*   HCT 34.2* 23.9*    295     BMP:   Recent Labs     12/26/19  0442 12/27/19  0443    138   K 5.1 4.2    104   CO2 22 22   BUN 56* 69*   CREATININE 3.01* 2.58*   MG 2.1 2.0     Hepatic Function Panel:   No results for input(s): PROT, LABALBU, BILIDIR, IBILI, BILITOT, ALKPHOS, ALT, AST in the last 72 hours. No results for input(s): VANCOTROUGH in the last 72 hours. No results found for: CRP  No results found for: SEDRATE    No results for input(s): PROCAL in the last 72 hours. Imaging Studies:   RETROPERITONEAL ULTRASOUND OF THE KIDNEYS 12/26/2019  Impression   Unremarkable ultrasound of the kidneys.       Images could not be performed of the urinary bladder as the technologist   reported bandages overlying the pelvis.       Incidental note is made of a small amount of subhepatic ascites.           CT OF THE ABDOMEN AND PELVIS WITHOUT CONTRAST 12/20/2019 5:24 pm  Impression   1. Interval placement of 2 tandem metal stents from lower rectum extending   through the residual known pelvic abscess from anastomotic leak.  The   lowermost end of the stent is confirmed within the rectum however the   uppermost end of the stent appears to be within the gas component of the   residual abscess. 2. Decreasing large pelvic abscess following placement of a percutaneous   pigtail drainage catheter.    3. Subcutaneous tissue gas in the anterior right lower abdomen felt to be   secondary to the drainage catheter placement however tension on follow-up. CT OF THE ABDOMEN AND PELVIS WITH CONTRAST 12/16/2019 7:24 pm  Impression   There is a large interloop mid pelvic abscess measuring up to 11.6 cm   increasing in size from 12/04/2019.  Contents of the abscess represent a   mixture of stool-like material with mixed soft tissue and gas.  There is no   large air-fluid level suggesting percutaneous drainage may be problematic. Cultures:     Anaerobic and Aerobic Culture [[id number]] (Abnormal)  Collected: 12/17/19 7371   Order Status: Completed Specimen: Aspirate Updated: 12/20/19 6780    Specimen Description . ASPIRATE ABDOMINAL ABSCESS    Special Requests NOT REPORTED    Direct Exam NO NEUTROPHILS SEEN     MIXED BACTERIAL MORPHOTYPES SEEN ON GRAM STAIN. Abnormal     Culture ESCHERICHIA COLI HEAVY GROWTHAbnormal      METHICILLIN RESISTANT STAPHYLOCOCCUS AUREUS LIGHT GROWTHAbnormal      NORMAL MARTHA     NO ANAEROBIC ORGANISMS ISOLATED AT 3 DAYSAbnormal    Escherichia coli (1)     Antibiotic Interpretation JANNETH Status    amikacin   Preliminary     NOT REPORTED   ampicillin Sensitive  Preliminary     8  SUSCEPTIBLE   ampicillin-sulbactam   Preliminary     NOT REPORTED   aztreonam Sensitive  Preliminary     <=1  SUSCEPTIBLE   ceFAZolin Sensitive  Preliminary     <=4  SUSCEPTIBLE   cefepime   Preliminary     NOT REPORTED   cefTRIAXone Sensitive  Preliminary     <=1  SUSCEPTIBLE   ciprofloxacin Resistant  Preliminary     >=4  RESISTANT   ertapenem   Preliminary     NOT REPORTED   Confirmatory Extended Spectrum Beta-Lactamase Negative NEGATIVE Preliminary    gentamicin Sensitive  Preliminary     <=1  SUSCEPTIBLE   meropenem   Preliminary     NOT REPORTED   nitrofurantoin   Preliminary     NOT REPORTED   tigecycline   Preliminary     NOT REPORTED   tobramycin Sensitive  Preliminary     <=1  SUSCEPTIBLE   trimethoprim-sulfamethoxazole Resistant  Preliminary     >=320  RESISTANT   piperacillin-tazobactam Sensitive Preliminary     <=4  SUSCEPTIBLE   Methicillin resistant staphylococcus aureus (2)     Antibiotic Interpretation JANNETH Status    penicillin Resistant  Preliminary     >=0.5  RESISTANT   cefoxitin screen  NOT REPORTED Preliminary    ciprofloxacin   Preliminary     NOT REPORTED   clindamycin Resistant  Preliminary     >=8  RESISTANT   erythromycin Resistant  Preliminary     >=8  RESISTANT   gentamicin Sensitive  Preliminary     <=0.5  SUSCEPTIBLE   gentamicin Sensitive Gentamicin is used only in combination with other active agents that test susceptible. Preliminary    Induced Clind Resist  NOT REPORTED Preliminary    levofloxacin Intermediate  Preliminary     4  INTERMEDIATE   linezolid   Preliminary     NOT REPORTED   moxifloxacin   Preliminary     NOT REPORTED   nitrofurantoin   Preliminary     NOT REPORTED   oxacillin Resistant  Preliminary     >=4  RESISTANT   Synercid   Preliminary     NOT REPORTED   rifampin   Preliminary     NOT REPORTED   tetracycline Sensitive  Preliminary     <=1  SUSCEPTIBLE   tigecycline   Preliminary     NOT REPORTED   trimethoprim-sulfamethoxazole Sensitive  Preliminary     <=10  SUSCEPTIBLE   vancomycin Sensitive  Preliminary     1  SUSCEPTIBLE     Cult,Blood #1 [[id number]] Collected: 12/15/19 2255   Order Status: Completed Specimen: Blood Updated: 12/22/19 0007    Specimen Description . BLOOD    Special Requests 3ML RT AC    Culture NO GROWTH 6 DAYS   Cult,Blood #2 [[id number]] Collected: 12/15/19 2304   Order Status: Completed Specimen: Blood Updated: 12/22/19 0007    Specimen Description . BLOOD    Special Requests 10ML LT WRIST    Culture NO GROWTH 6 DAYS     Cult,Urine,CC [[id number]] Collected: 12/17/19 1017   Order Status: Completed Specimen: Urine Updated: 12/19/19 0020    Specimen Description . URINE    Special Requests NOT REPORTED    Culture NO GROWTH       Medications:      famotidine (PEPCID) injection  20 mg Intravenous Daily    ceftaroline fosamil (TEFLARO) IVPB  300 mg Intravenous Q12H    fat emulsion  250 mL Intravenous Daily    heparin (porcine)  5,000 Units Subcutaneous 3 times per day    acetaminophen  1,000 mg Oral 3 times per day    insulin lispro  0-18 Units Subcutaneous TID WC    insulin lispro  0-9 Units Subcutaneous Nightly    hydrocortisone   Rectal BID    furosemide  10 mg Oral Every Other Day    metroNIDAZOLE  500 mg Intravenous Q8H    sodium chloride flush  10 mL Intravenous 2 times per day    sodium chloride flush  10 mL Intravenous 2 times per day    Vitamin D  2,000 Units Oral Daily    metoprolol succinate  25 mg Oral Daily           [facility]  Mahsa Tabares MD  [employer] messaging  OFFICE: [phone]      Electronically signed by Mahsa Tabares MD on 12/27/2019 at 9:26 AM  Thank you for allowing us to participate in the care of this patient. Please call with questions. This note iscreated with the assistance of a speech recognition program.  While intending to generate a document that actually reflects the content of the visit, the document can still have some errors including those of syntax andsound a like substitutions which may escape proof reading. In such instances, actual meaning can be extrapolated by contextual diversion.

## 2019-12-28 ENCOUNTER — APPOINTMENT (OUTPATIENT)
Dept: GENERAL RADIOLOGY | Age: 69
DRG: 856 | End: 2019-12-28
Payer: MEDICARE

## 2019-12-28 LAB
ABSOLUTE EOS #: 0.27 K/UL (ref 0–0.44)
ABSOLUTE IMMATURE GRANULOCYTE: 0.34 K/UL (ref 0–0.3)
ABSOLUTE LYMPH #: 1.24 K/UL (ref 1.1–3.7)
ABSOLUTE MONO #: 1.1 K/UL (ref 0.1–1.2)
ANION GAP SERPL CALCULATED.3IONS-SCNC: 9 MMOL/L (ref 9–17)
BASOPHILS # BLD: 0 % (ref 0–2)
BASOPHILS ABSOLUTE: 0.03 K/UL (ref 0–0.2)
BUN BLDV-MCNC: 60 MG/DL (ref 8–23)
BUN/CREAT BLD: 31 (ref 9–20)
CALCIUM SERPL-MCNC: 9.2 MG/DL (ref 8.6–10.4)
CHLORIDE BLD-SCNC: 110 MMOL/L (ref 98–107)
CO2: 23 MMOL/L (ref 20–31)
CREAT SERPL-MCNC: 1.93 MG/DL (ref 0.5–0.9)
DIFFERENTIAL TYPE: ABNORMAL
EOSINOPHILS RELATIVE PERCENT: 2 % (ref 1–4)
GFR AFRICAN AMERICAN: 31 ML/MIN
GFR NON-AFRICAN AMERICAN: 26 ML/MIN
GFR SERPL CREATININE-BSD FRML MDRD: ABNORMAL ML/MIN/{1.73_M2}
GFR SERPL CREATININE-BSD FRML MDRD: ABNORMAL ML/MIN/{1.73_M2}
GLUCOSE BLD-MCNC: 130 MG/DL (ref 65–105)
GLUCOSE BLD-MCNC: 138 MG/DL (ref 65–105)
GLUCOSE BLD-MCNC: 145 MG/DL (ref 65–105)
GLUCOSE BLD-MCNC: 145 MG/DL (ref 70–99)
HCT VFR BLD CALC: 23.8 % (ref 36.3–47.1)
HEMOGLOBIN: 7.1 G/DL (ref 11.9–15.1)
IMMATURE GRANULOCYTES: 2 %
LYMPHOCYTES # BLD: 8 % (ref 24–43)
MAGNESIUM: 2.1 MG/DL (ref 1.6–2.6)
MCH RBC QN AUTO: 28.3 PG (ref 25.2–33.5)
MCHC RBC AUTO-ENTMCNC: 29.8 G/DL (ref 28.4–34.8)
MCV RBC AUTO: 94.8 FL (ref 82.6–102.9)
MONOCYTES # BLD: 7 % (ref 3–12)
NRBC AUTOMATED: 0 PER 100 WBC
PDW BLD-RTO: 17.3 % (ref 11.8–14.4)
PHOSPHORUS: 2.5 MG/DL (ref 2.6–4.5)
PLATELET # BLD: 262 K/UL (ref 138–453)
PLATELET ESTIMATE: ABNORMAL
PMV BLD AUTO: 9.7 FL (ref 8.1–13.5)
POTASSIUM SERPL-SCNC: 3.9 MMOL/L (ref 3.7–5.3)
RBC # BLD: 2.51 M/UL (ref 3.95–5.11)
RBC # BLD: ABNORMAL 10*6/UL
SEG NEUTROPHILS: 80 % (ref 36–65)
SEGMENTED NEUTROPHILS ABSOLUTE COUNT: 12.05 K/UL (ref 1.5–8.1)
SODIUM BLD-SCNC: 142 MMOL/L (ref 135–144)
WBC # BLD: 15 K/UL (ref 3.5–11.3)
WBC # BLD: ABNORMAL 10*3/UL

## 2019-12-28 PROCEDURE — 80048 BASIC METABOLIC PNL TOTAL CA: CPT

## 2019-12-28 PROCEDURE — 36415 COLL VENOUS BLD VENIPUNCTURE: CPT

## 2019-12-28 PROCEDURE — 6360000002 HC RX W HCPCS: Performed by: INTERNAL MEDICINE

## 2019-12-28 PROCEDURE — 82947 ASSAY GLUCOSE BLOOD QUANT: CPT

## 2019-12-28 PROCEDURE — 2580000003 HC RX 258: Performed by: INTERNAL MEDICINE

## 2019-12-28 PROCEDURE — 97116 GAIT TRAINING THERAPY: CPT

## 2019-12-28 PROCEDURE — 97530 THERAPEUTIC ACTIVITIES: CPT

## 2019-12-28 PROCEDURE — 6360000002 HC RX W HCPCS: Performed by: STUDENT IN AN ORGANIZED HEALTH CARE EDUCATION/TRAINING PROGRAM

## 2019-12-28 PROCEDURE — 99232 SBSQ HOSP IP/OBS MODERATE 35: CPT | Performed by: INTERNAL MEDICINE

## 2019-12-28 PROCEDURE — 2060000000 HC ICU INTERMEDIATE R&B

## 2019-12-28 PROCEDURE — 2580000003 HC RX 258: Performed by: STUDENT IN AN ORGANIZED HEALTH CARE EDUCATION/TRAINING PROGRAM

## 2019-12-28 PROCEDURE — 2500000003 HC RX 250 WO HCPCS: Performed by: INTERNAL MEDICINE

## 2019-12-28 PROCEDURE — 6370000000 HC RX 637 (ALT 250 FOR IP): Performed by: STUDENT IN AN ORGANIZED HEALTH CARE EDUCATION/TRAINING PROGRAM

## 2019-12-28 PROCEDURE — 2500000003 HC RX 250 WO HCPCS: Performed by: STUDENT IN AN ORGANIZED HEALTH CARE EDUCATION/TRAINING PROGRAM

## 2019-12-28 PROCEDURE — 71045 X-RAY EXAM CHEST 1 VIEW: CPT

## 2019-12-28 PROCEDURE — 6370000000 HC RX 637 (ALT 250 FOR IP): Performed by: INTERNAL MEDICINE

## 2019-12-28 PROCEDURE — 83735 ASSAY OF MAGNESIUM: CPT

## 2019-12-28 PROCEDURE — 84100 ASSAY OF PHOSPHORUS: CPT

## 2019-12-28 PROCEDURE — 85025 COMPLETE CBC W/AUTO DIFF WBC: CPT

## 2019-12-28 RX ADMIN — ACETAMINOPHEN 1000 MG: 500 TABLET, COATED ORAL at 06:25

## 2019-12-28 RX ADMIN — CEFTAROLINE FOSAMIL 300 MG: 600 POWDER, FOR SOLUTION INTRAVENOUS at 21:32

## 2019-12-28 RX ADMIN — METOPROLOL SUCCINATE 25 MG: 25 TABLET, FILM COATED, EXTENDED RELEASE ORAL at 08:43

## 2019-12-28 RX ADMIN — INSULIN LISPRO 3 UNITS: 100 INJECTION, SOLUTION INTRAVENOUS; SUBCUTANEOUS at 08:43

## 2019-12-28 RX ADMIN — Medication 10 ML: at 08:44

## 2019-12-28 RX ADMIN — ACETAMINOPHEN 1000 MG: 500 TABLET, COATED ORAL at 15:08

## 2019-12-28 RX ADMIN — HYDROCORTISONE 2.5%: 25 CREAM TOPICAL at 08:44

## 2019-12-28 RX ADMIN — METRONIDAZOLE 500 MG: 500 INJECTION, SOLUTION INTRAVENOUS at 14:55

## 2019-12-28 RX ADMIN — POTASSIUM CHLORIDE: 2 INJECTION, SOLUTION, CONCENTRATE INTRAVENOUS at 17:24

## 2019-12-28 RX ADMIN — SODIUM CHLORIDE, PRESERVATIVE FREE 10 ML: 5 INJECTION INTRAVENOUS at 08:44

## 2019-12-28 RX ADMIN — HEPARIN SODIUM 5000 UNITS: 5000 INJECTION INTRAVENOUS; SUBCUTANEOUS at 06:25

## 2019-12-28 RX ADMIN — HEPARIN SODIUM 5000 UNITS: 5000 INJECTION INTRAVENOUS; SUBCUTANEOUS at 21:32

## 2019-12-28 RX ADMIN — CEFTAROLINE FOSAMIL 300 MG: 600 POWDER, FOR SOLUTION INTRAVENOUS at 09:43

## 2019-12-28 RX ADMIN — METRONIDAZOLE 500 MG: 500 INJECTION, SOLUTION INTRAVENOUS at 22:38

## 2019-12-28 RX ADMIN — HEPARIN SODIUM 5000 UNITS: 5000 INJECTION INTRAVENOUS; SUBCUTANEOUS at 14:55

## 2019-12-28 RX ADMIN — ACETAMINOPHEN 1000 MG: 500 TABLET, COATED ORAL at 23:32

## 2019-12-28 RX ADMIN — FUROSEMIDE 10 MG: 20 TABLET ORAL at 08:43

## 2019-12-28 RX ADMIN — METRONIDAZOLE 500 MG: 500 INJECTION, SOLUTION INTRAVENOUS at 06:30

## 2019-12-28 RX ADMIN — FAMOTIDINE 20 MG: 10 INJECTION, SOLUTION INTRAVENOUS at 08:43

## 2019-12-28 RX ADMIN — I.V. FAT EMULSION 250 ML: 20 EMULSION INTRAVENOUS at 17:24

## 2019-12-28 RX ADMIN — VITAMIN D, TAB 1000IU (100/BT) 2000 UNITS: 25 TAB at 08:43

## 2019-12-28 RX ADMIN — HYDROCORTISONE 2.5%: 25 CREAM TOPICAL at 21:32

## 2019-12-28 RX ADMIN — ONDANSETRON 4 MG: 2 INJECTION INTRAMUSCULAR; INTRAVENOUS at 12:21

## 2019-12-28 ASSESSMENT — PAIN SCALES - GENERAL
PAINLEVEL_OUTOF10: 8
PAINLEVEL_OUTOF10: 8
PAINLEVEL_OUTOF10: 0

## 2019-12-28 ASSESSMENT — ENCOUNTER SYMPTOMS
GASTROINTESTINAL NEGATIVE: 1
RESPIRATORY NEGATIVE: 1

## 2019-12-28 NOTE — PROGRESS NOTES
Surgery Progress Note            PATIENT NAME: Lottie Ratliff     TODAY'S DATE: 12/28/2019, 7:56 AM    SUBJECTIVE:    Pt seen and examined. UO has improved. Vitals normal and stable. Was OOB and onto chair yesterday has ambulated to bathroom. Minimal abdominal pain. States she tolerated ice chips and clears but does report to early satiety. MAE drain 80ml SS  Tmax  37.2     OBJECTIVE:   VITALS:  /66   Pulse 93   Temp 98.9 °F (37.2 °C) (Tympanic)   Resp 19   Ht 5' 7\" (1.702 m)   Wt 233 lb 9.6 oz (106 kg)   LMP  (LMP Unknown)   SpO2 93%   BMI 36.59 kg/m²      INTAKE/OUTPUT:      Intake/Output Summary (Last 24 hours) at 12/28/2019 0756  Last data filed at 12/28/2019 0513  Gross per 24 hour   Intake 3993.02 ml   Output 1780 ml   Net 2213.02 ml       PHYSICAL EXAM  General Appearance: anxious. Awake and alert. NAD  Skin:  Warm, dry  HEENT: NG tube in place  Lungs: Normal effort with symmetric rise and fall of chest wall  Heart: regular rate and rhythm  Abd: obese, soft, appropriately tender to palpation. Dressing intact without strikethrough. Left lower quadrant ostomy appears healthy and pink with very small stool, air present.   Right lower quadrant MAE drains with dark serosanguineous fluid  Extremities: Peripheral edema  Neurologic:  No focal deficits , moves all extremities        Data:  CBC with Differential:    Lab Results   Component Value Date    WBC 15.0 12/28/2019    RBC 2.51 12/28/2019    RBC 3.98 12/06/2011    HGB 7.1 12/28/2019    HCT 23.8 12/28/2019     12/28/2019     12/06/2011    MCV 94.8 12/28/2019    MCH 28.3 12/28/2019    MCHC 29.8 12/28/2019    RDW 17.3 12/28/2019    LYMPHOPCT 8 12/28/2019    MONOPCT 7 12/28/2019    BASOPCT 0 12/28/2019    MONOSABS 1.10 12/28/2019    LYMPHSABS 1.24 12/28/2019    EOSABS 0.27 12/28/2019    BASOSABS 0.03 12/28/2019    DIFFTYPE NOT REPORTED 12/28/2019     BMP:    Lab Results   Component Value Date     12/28/2019    K 3.9 12/28/2019     12/28/2019    CO2 23 12/28/2019    BUN 60 12/28/2019    LABALBU 2.9 12/15/2019    LABALBU 4.0 12/06/2011    CREATININE 1.93 12/28/2019    CALCIUM 9.2 12/28/2019    GFRAA 31 12/28/2019    LABGLOM 26 12/28/2019    GLUCOSE 145 12/28/2019    GLUCOSE 94 12/06/2011     Radiology Review:      ASSESSMENT     82 y/o female with pelvic abscess s/p anastomotic leak following colostomy reversal   S/p IR guided drain 12/17/2019  S/p colonoscopy and placement of stent x2 12/20/19  S/p colonoscopy 12/23/19  S/p exploratory laparotomy, extensive SANCHEZ, removal of sigmoid stent, takedown of anastomosis with closure of rectal stump and colostomy creation on 12/24/19    Plan  1. Continue medical management supportive care per primary team  2. Pain control - IV tylenol; john PRN  3. GI- ok for CLD as tolerated, Continue TPN until tolerating diet/ adequate PO intake, ok to shower, Wound care - BID packing and dressing changes per RN and PRN if pt showers, brianne stoma output    4. Continue antibiotics per ID  1. Leukocytosis increasing  5. Fluids per CC and nephr, Strict I/Os - monitor urine output, Follow-up a.m. labs - replace electrolytes PRN, Glucose control - keep <180  6. OOB and ambulate, PT/OT, give 5mg roxicodone prior to ambulation if patient complaining of pain, Aggressive IS use - at least 10 times per hour while awake; keep within reach of patient at all times  7. Cont supportive care    Thank you,    Electronically signed by Armando Tapia DO  on 12/28/2019 at 7:56 AM    I Dr. Luisa Estrada saw and examined the patient. I have edited the above and agree with the above. Geoffrey Santacruz  Colorectal Surgery

## 2019-12-28 NOTE — PROGRESS NOTES
Physical Therapy  Facility/Department: [facility] ICU  Daily Treatment Note  NAME: Saint Mood  : 1950  MRN: [id number]    Date of Service: 2019    Discharge Recommendations:  Home with Home health PT, Home with assist PRN        Assessment   Body structures, Functions, Activity limitations: Decreased functional mobility ; Decreased safe awareness;Decreased endurance;Decreased balance  Assessment:  Pt tolerated session with improved activity tolerance & stood for an extended amount of time for BR activity so less gait distance due to more fatigues by the time this was completed. Pt requires continued IP PT & D/C Home with assist & HH PT to maximize independence with functional mobility, balance, safety awareness & activity tolerance  Prognosis: Good  REQUIRES PT FOLLOW UP: Yes  Activity Tolerance  Activity Tolerance: Patient limited by pain; Patient limited by endurance     Patient Diagnosis(es): The primary encounter diagnosis was Septicemia ([facility] [address] Diagnoses of Urinary tract infection without hematuria, site unspecified and Acute post-operative pain were also pertinent to this visit. has a past medical history of Allergic rhinitis, Bladder prolapse, Constipation, Fundic gland polyposis of stomach, GERD (gastroesophageal reflux disease), Hiatal hernia, History of cardiac cath, History of diverticulitis, Hyperlipidemia, Hypertension, MRSA (methicillin resistant staph aureus) culture positive, MRSA carrier, Obesity, Osteoarthritis, Systolic murmur, Thyroid disease, and Wears glasses. has a past surgical history that includes Endoscopy, colon, diagnostic; Knee arthroscopy (Left); Total knee arthroplasty (Left, 2012); Total knee arthroplasty (Right, 11/10/2014); Cholecystectomy, laparoscopic (11/15/2016); Cholecystectomy, laparoscopic (11/15/2016); Colonoscopy (); Upper gastrointestinal endoscopy (2017); pr egd transoral biopsy single/multiple (N/A, 2017);  Umbilical hernia repair (05/22/2018); pr office/outpt visit,procedure only (N/A, 5/22/2018); Sigmoidoscopy (02/19/2019); sigmoidoscopy (N/A, 2/19/2019); Abdominal exploration surgery (05/16/2019); Small intestine surgery (N/A, 5/16/2019); Colonoscopy (N/A, 5/16/2019); joint replacement; FISSURECTOMY ANAL (N/A, 10/4/2019); Small intestine surgery (N/A, 11/12/2019); Colonoscopy (N/A, 12/19/2019); sigmoidoscopy (N/A, 12/23/2019); and colostomy (N/A, 12/24/2019). Restrictions  Restrictions/Precautions  Restrictions/Precautions: General Precautions, Fall Risk, Surgical Protocols  Position Activity Restriction  Other position/activity restrictions: ambulate, up with assist, colostomy, RUE IV  Subjective   General  Chart Reviewed: Yes  Additional Pertinent Hx: colostomy & reversal, MRSA nasal swab  Response To Previous Treatment: Patient with no complaints from previous session.   Subjective  Subjective: Pt agreeable to PT  General Comment  Comments: RNJess PT  Pain Screening  Patient Currently in Pain: No  Vital Signs  BP Location: Left upper arm  Level of Consciousness: Alert  Patient Currently in Pain: No  Oxygen Therapy  O2 Device: None (Room air)       Orientation  Orientation  Overall Orientation Status: Within Normal Limits  Cognition      Objective   Bed mobility  Rolling to Left: Contact guard assistance  Rolling to Right: Contact guard assistance  Supine to Sit: Minimal assistance  Sit to Supine: Minimal assistance  Scooting: Contact guard assistance  Comment: Ed for technique & for repositioing in correct body alignment + assist for line management  Transfers  Sit to Stand: Contact guard assistance  Stand to sit: Contact guard assistance  Bed to Chair: Minimal assistance  Stand Pivot Transfers: Stand by assistance  Lateral Transfers: Stand by assistance  Comment: Ed on use of upper body hand placement for safe sit/stand(extra assist for multiple line management)  Ambulation  Ambulation?: Yes  Ambulation 1  Surface: level tile  Device: Rolling Walker  Assistance: Minimal assistance  Quality of Gait: step to pattern  Gait Deviations: Slow [place];Decreased step length;Decreased step height  Distance: 30ft x 3  Comments: extra assist for line management     Balance  Sitting - Static: Good  Sitting - Dynamic: Good  Standing - Static: Fair;+  Standing - Dynamic: Fair;+  Exercises  Comments: Ed functional mobility, safety awareness, prevention of sedentary complications & breathing techniques     Other Activities: amb to BR for toileting, stood for extended time to complete colostomy care, gown change & pericare due to leakage of colostomy, + amb to sink & stood for 4 minutes to complete hand hygiene              G-Code     OutComes Score                                                     AM-PAC Score  AM-PAC Inpatient Mobility Raw Score : 17 (12/28/19 1542)  AM-PAC Inpatient T-Scale Score : 42.13 (12/28/19 1542)  Mobility Inpatient CMS 0-100% Score: 50.57 (12/28/19 1542)  Mobility Inpatient CMS G-Code Modifier : CK (12/28/19 1542)          Goals  Short term goals  Time Frame for Short term goals: 12 visits  Short term goal 1: Inc bed-mobility & transfers to independent to enable pt to safely get in/OOB  Short term goal 2: Inc gait to amb 500ft or > indep to enable pt to return to previous level of independence  Short term goal 3: Pt able to go up/down 12 steps with one rail indep; Short term goal 4: Pt able to tolerate 30-40 min of activity to include 15-20 reps of ex & functional mobility including 5 minutes of standing to facilitate activity tolerance to [facility]; Short term goal 5: Inc standing balance to good with device to facilitate pt independence for performance of ADL's & functional mobility, & reduce fall risk;   Short term goal 6: Ed pt on home ex's, safety & energy principles & issue written home program;  Patient Goals   Patient goals : regain independence    Plan    Plan  Times per week: 1-2x/D,6-7d/week  Current Treatment

## 2019-12-28 NOTE — PROGRESS NOTES
Progress Note    12/28/2019   10:18 AM    Name:  Usha Tee  MRN:    [id number]     [place]:     [de-identified]   Room:  [address] [place] [address] Day: 15     Admit Date: 12/15/2019 10:10 PM  PCP: Ángela Kingston MD    Subjective:     C/C:   Chief Complaint   Patient presents with   Sharran Moon Dehydration     seen this am for same    Nausea    Other     unable to urinate       Interval History: Status: improved and patient is up in chair and resting    ROS:  Constitutional: negative for chills, fevers, sweats  Respiratory: negative for cough, dyspnea on exertion, hemoptysis, shortness of breath, wheezing  Cardiovascular: negative for chest pain, chest pressure/discomfort, dyspnea, lower extremity edema, palpitations  Gastrointestinal: Positive for abdominal pain, but no constipation, diarrhea, nausea, vomiting  Neurological: negative for dizziness and headaches    Medications:      Allergies: No Known Allergies    Current Meds: PN-Adult 2-in-1 Central Line (Custom), Continuous TPN  fat emulsion 20 % infusion 250 mL, Daily  iron sucrose (VENOFER) 200 mg in sodium chloride 0.9 % 100 mL IVPB, Every Other Day  acetaminophen (TYLENOL) tablet 1,000 mg, 3 times per day  oxyCODONE (ROXICODONE) immediate release tablet 5 mg, Q4H PRN    Or  oxyCODONE (ROXICODONE) immediate release tablet 10 mg, Q4H PRN  famotidine (PEPCID) injection 20 mg, Daily  ceftaroline fosamil (TEFLARO) 300 mg in dextrose 5 % 50 mL IVPB, Q12H  heparin (porcine) injection 5,000 Units, 3 times per day  insulin lispro (HUMALOG) injection vial 0-18 Units, TID WC  insulin lispro (HUMALOG) injection vial 0-9 Units, Nightly  0.9 % sodium chloride infusion, Continuous  norepinephrine (LEVOPHED) 16 mg in dextrose 5 % 250 mL infusion, PRN  hydrocortisone (ANUSOL-HC) 2.5 % rectal cream, BID  melatonin tablet 6 mg, Nightly PRN  furosemide (LASIX) tablet 10 mg, Every Other Day  metronidazole (FLAGYL) 500 mg in NaCl 100 mL IVPB premix, Q8H  glucose (GLUTOSE) 40 % oral gel 15 g, PRN  dextrose 50 % IV solution, PRN  glucagon (rDNA) injection 1 mg, PRN  dextrose 5 % solution, PRN  sodium chloride flush 0.9 % injection 10 mL, 2 times per day  sodium chloride flush 0.9 % injection 10 mL, PRN  sodium chloride flush 0.9 % injection 10 mL, 2 times per day  sodium chloride flush 0.9 % injection 10 mL, PRN  magnesium hydroxide (MILK OF MAGNESIA) 400 MG/5ML suspension 30 mL, Daily PRN  Vitamin D (CHOLECALCIFEROL) tablet 2,000 Units, Daily  metoprolol succinate (TOPROL XL) extended release tablet 25 mg, Daily  ondansetron (ZOFRAN-ODT) disintegrating tablet 4 mg, Q6H PRN    Or  ondansetron (ZOFRAN) injection 4 mg, Q6H PRN        Data:     Code Status:  Full Code    Family History   Problem Relation Age of Onset    Heart Disease Mother     COPD Father     Cancer Brother         thyroid, prostate, lung       Social History     Socioeconomic History    Marital status: Single     Spouse name: Not on file    Number of children: Not on file    Years of education: Not on file    Highest education level: Not on file   Occupational History    Not on file   Social Needs    Financial resource strain: Not on file    Food insecurity:     Worry: Not on file     Inability: Not on file    Transportation needs:     Medical: Not on file     Non-medical: Not on file   Tobacco Use    Smoking status: Former Smoker     Packs/day: 1.00     Years: 3.00     Pack years: 3.00     Last attempt to quit: 1975     Years since quittin.0    Smokeless tobacco: Never Used    Tobacco comment: quit smoking age 23   Substance and Sexual Activity    Alcohol use: Yes     Comment: very rare    Drug use: No    Sexual activity: Not on file   Lifestyle    Physical activity:     Days per week: Not on file     Minutes per session: Not on file    Stress: Not on file   Relationships    Social connections:     Talks on phone: Not on file     Gets together: Not on file     Attends Shinto service: Not on file     Active member of club or organization: Not on file     Attends meetings of clubs or organizations: Not on file     Relationship status: Not on file    Intimate partner violence:     Fear of current or ex partner: Not on file     Emotionally abused: Not on file     Physically abused: Not on file     Forced sexual activity: Not on file   Other Topics Concern    Not on file   Social History Narrative    Not on file       Vitals:  BP (!) 125/56   Pulse 89   Temp 97.9 °F (36.6 °C)   Resp 19   Ht 5' 7\" (1.702 m)   Wt 233 lb 9.6 oz (106 kg)   LMP  (LMP Unknown)   SpO2 96%   BMI 36.59 kg/m²   Temp (24hrs), Av.2 °F (36.8 °C), Min:97.8 °F (36.6 °C), Max:98.9 °F (37.2 °C)    Invalid input(s): CBCBMPCMP    I/O (24Hr):     Intake/Output Summary (Last 24 hours) at 2019 1018  Last data filed at 2019 6021  Gross per 24 hour   Intake 3993.02 ml   Output 1655 ml   Net 2338.02 ml       Labs:  [unfilled]    Physical Examination:        General appearance: alert, cooperative and no distress  Mental Status: oriented to person, place and time and normal affect  Lungs: clear to auscultation bilaterally, normal effort  Heart: regular rate and rhythm, no murmur  Abdomen: soft, appropriately tender, nondistended, bowel sounds present all four quadrants, no masses, hepatomegaly or splenomegaly  Extremities: no edema, redness or tenderness in the calves  Skin: no gross lesions, rashes, or induration    Assessment:        Primary Problem  <principal problem not specified>     Active Hospital Problems    Diagnosis Date Noted    Acute cystitis without hematuria [N30.00]     Mild malnutrition ([address] [E44.1] 2019    Sepsis ([address] [A41.9] 2019     Past Medical History:   Diagnosis Date    Allergic rhinitis     Bladder prolapse     Constipation     2019 resolved    Fundic gland polyposis of stomach 2017    per EGD    GERD (gastroesophageal reflux disease)     on no medications    Hiatal hernia     History of cardiac cath 12/2002    pt denies blockage    History of diverticulitis     Hyperlipidemia     borderline, on no medication    Hypertension     MRSA (methicillin resistant staph aureus) culture positive 11/01/2016    nasal    MRSA carrier     follows with ID    Obesity     Osteoarthritis     Systolic murmur     benign systolic murmur (history of). Pt does not follow-up with a cardiologist (Written 09/25/2019).  Thyroid disease     goiter    Wears glasses         Plan:        1. Creatinine and WBC are improving and patient is on TPN and tolerating sips of water  2. Supportive care and continue IV antibiotics  3. Can be transferred to [facility] if okay with other services  4. Activity as tolerated  5.  All medications labs and history reviewed      Electronically signed by Quoc Clark MD on 12/28/2019 at 10:18 AM

## 2019-12-28 NOTE — OP NOTE
without any difficulty. At this time, the abdominal cavity was irrigated with 4 L of warm saline  and all areas of the surgery were examined again. During the  dissection, there were multiple seromuscular injuries to the small bowel  and to the colon, which were closed using an interrupted 4-0 silk  suture. After that, the abdominal cavity was irrigated with 1 L of  Bactisure and top of that was irrigated with saline and everything was  suctioned out completely. Two drains were placed into the pelvic area. One drain was placed in  the abscess cavity and the other drain was placed only in the pelvis and  they were both brought out on the right lower quadrant area of the  abdomen. Then, the abdominal incision was closed using 2 PDS loops. The subcutaneous tissue was irrigated with saline again. The skin was  closed with staples. The staples were placed sparingly. The colostomy now was finished. The distal edge of the colostomy was  excised with a Bovie and the colostomy was matured and was sewn to the  subcuticular area using interrupted 4-0 Vicryl sutures. The colostomy  was viable and had excellent blood supply. The surgery was extensive it took 6 hrs. The incision was packed with iodoform gauze. Colostomy bag was applied. All counts were correct. EBL was about 100 mL. The patient tolerated  the procedure very well. She was transferred to the recovery room in a  good condition. She will be also admitted to ICU.         Gerald Sewickley    D: 12/28/2019 10:42:24       T: 12/28/2019 11:51:24     [email or web address]  Job#: [id number]     Doc#: [id number]    CC:

## 2019-12-28 NOTE — PROGRESS NOTES
PULMONARY PROGRESS NOTE:    Interval History: critical care management, mild pulmonary hypertension, suspected obstructive sleep apnea    Shortness of Breath: better  Cough:   Sputum:   Hemoptysis:   Chest Pain:   Fever:   Swelling Feet:   Headache:   Nausea, Emesis, Abdominal Pain:   Diarrhea:   Constipation:   Still c/o generalized weakness when up moving around    Events since last visit:     PAST MEDICAL HISTORY:    Smoking:     PHYSICAL EXAMINATION:  afebrile  General : Awake, alert, oriented to time, place, and person  Neck - supple, no lymphadenopathy, JVD not raised  Heart - regular rhythm, S1 and S2 normal; no additional sounds heard  Lungs - Air Entry- fair bilaterally; breath sounds : vesicular, 96% on RA  Abdomen - soft, no tenderness  Upper Extremities  - no cyanosis, mottling; edema : absent  Lower Extremities: no cyanosis, mottling; edema : absent    Current Laboratory, Radiologic, Microbiologic, and Diagnostic studies reviewed    ASSESSMENT / PLAN:    Impression:  · Mild pulmonary hypertension, RVSP 40 mmHg  · Pelvic abscess status post anastomotic leak after colostomy reversal  · S/p ex lap with extensive lysis of adhesions removal of sigmoid stent and takedown of anastomosis with closure of rectal stump and colostomy creation  · Obesity with suspected obstructive sleep apnea  · Leukocytosis  · DESTINI     Recommendations:  · Continue IV antibiotics, ceftaroline/Flagyl, ID following  · Encouraged to increase use of incentive spirometry every hour while awake, reaching about 500  · Monitor blood sugars off of insulin drip  · Continue TPN  · Nephrology on consult  · Labs: CBC and BMP in am, monitor renal function  · 2 liters/min via nasal cannula if necessary  · PT/OT, Encourage OOB/activity increase  · Sleep studies as outpatient  · DVT prophylaxis with low molecular weight heparin  · OK to move to step down unit from pulmonary stand point    Plan of care discussed with Dr Rhea Weaver, APRN -

## 2019-12-28 NOTE — PROGRESS NOTES
Nutrition Assessment (Parenteral Nutrition)    Type and Reason for Visit: Reassess    Nutrition Recommendations: 1. Suggest continuing on clear liquid diet. 2. Consider maintaining PN-Adult 2-in-1 Central Line (Custom) @ 83.3 ml/hr, with IV Lipids @ 21 ml/hr x 12 hr.  Include additives:  115 mEq Na Acetate, 50 mEq NaCl, 20 mEq K Acetate, 15 mmol K PO4, 8 mEq KCl, 8 mEq Ca Gluconate, 8 mEq Mg SO4 & 30 Units INS. Nutrition Assessment: Pt improving from a nutrition standpoint, with starting on clear liquids, and PN rate @ goal.  Tolerating sips of clear liquids per Pt. Discussed Pt with ADRIANNA Cordon. Communicated with Pharmacist, about suggested additive changes, and maintaining same PN rate with IV lipids. Malnutrition Assessment:  · Malnutrition Status: Mild Malnutrition  · Context: Acute illness or injury  · Findings of the 6 clinical characteristics of malnutrition (Minimum of 2 out of 6 clinical characteristics is required to make the diagnosis of moderate or severe Protein Calorie Malnutrition based on AND/ASPEN Guidelines):  1. Energy Intake-Less than or equal to 75% of estimated energy requirement, Greater than or equal to 7 days    2. Weight Loss-2% loss or greater(Not considered to be significant), in 1 month  3. Fat Loss-No significant subcutaneous fat loss  4. Muscle Loss-No significant muscle mass loss  5. Fluid Accumulation-Mild fluid accumulation, Extremities  6.   Strength-Not measured    Nutrition Risk Level: High    Nutrient Needs:  · Estimated Daily Total Kcal: 1,950-2,100 kcal (20-21 kcal/kg current wt)  · Estimated Daily Protein (g): 110-120 g (1.8-2 g/kg IBW)  · Estimated Daily Total Fluid (ml/day): 1 mL/kcal    Nutrition Diagnosis:   · Problem: Inadequate oral intake  · Etiology: related to Alteration in GI function     Signs and symptoms:  as evidenced by Nutrition support - PN, Diet history of poor intake, Weight loss    Objective Information:  · Nutrition-Focused Physical

## 2019-12-28 NOTE — CARE COORDINATION
Discharge planning    Met with patient to follow up on POC and spoke with attending. Patient is worried about going home. Discussed PT at this time is recommending home but she would be potentially eligible for LTAC and  She is agreeable     The Plan for Transition of Care is related to the following treatment goals: LTAC for frequent lab draws, need for close MD monitoring, IV atb and IV TPN    The Patient and/or patient representative  was provided with a choice of provider and agrees with the discharge plan. [x] Yes [] No    Freedom of choice list was provided with basic dialogue that supports the patient's individualized plan of care/goals, treatment preferences and shares the quality data associated with the providers. [x] Yes [] No    [facility] and [facility]    Faxed over face sheet with SS # and notified [facility]  They will review on Monday. Dr Jose Whelan is agreeable and could potentially dc once accepted and has a bed available.

## 2019-12-28 NOTE — PROGRESS NOTES
 ceftaroline fosamil (TEFLARO) IVPB  300 mg Intravenous Q12H    heparin (porcine)  5,000 Units Subcutaneous 3 times per day    insulin lispro  0-18 Units Subcutaneous TID WC    insulin lispro  0-9 Units Subcutaneous Nightly    hydrocortisone   Rectal BID    furosemide  10 mg Oral Every Other Day    metroNIDAZOLE  500 mg Intravenous Q8H    sodium chloride flush  10 mL Intravenous 2 times per day    sodium chloride flush  10 mL Intravenous 2 times per day    Vitamin D  2,000 Units Oral Daily    metoprolol succinate  25 mg Oral Daily     Continuous Infusions:   PN-Adult 2-in-1 Central Line (Standard)      PN-Adult 2-in-1 Central Line (Standard) 83.3 mL/hr at 12/27/19 1708    sodium chloride 50 mL/hr at 12/27/19 2358    norepinephrine Stopped (12/26/19 0500)    dextrose       PRN Meds:oxyCODONE **OR** oxyCODONE, norepinephrine, melatonin, glucose, dextrose, glucagon (rDNA), dextrose, sodium chloride flush, sodium chloride flush, magnesium hydroxide, ondansetron **OR** ondansetron     Physical Exam:  Vitals:    12/28/19 0900 12/28/19 1000 12/28/19 1100 12/28/19 1200   BP: 131/64 (!) 125/56 130/86 133/61   Pulse: 83 89 92 84   Resp: 17 19 20 21   Temp:    98 °F (36.7 °C)   TempSrc:    Oral   SpO2: 94% 96% 94% 95%   Weight:       Height:         I/O last 3 completed shifts: In: 2463 [P.O.:240; I.V.:1385.8; IV Piggyback:150]  Out: 4095 [Urine:1600; Drains:80; Stool:100]    General:  Awake, alert, not in distress. Appears to be stated age. HEENT: Atraumatic, normocephalic. Anicteric sclera. Pink and moist oral mucosa. Neck supple. No JVD. Chest: Bilateral air entry, clear to auscultation, no wheezing, rhonchi or rales. Cardiovascular: RRR, S1S2, no murmur, rub or gallop. No lower extremity edema. Abdomen: Soft, non tender to palpation. Musculoskeletal: No cyanosis or clubbing. Integumentary: Pink, warm and dry. Free from rash or lesions. CNS: Oriented to person, place and time. Speech clear. Face symmetrical. No tremor. Psych:  Answering questions appropriately, cooperative, appropriate mood and affect    Data:  CBC:   Lab Results   Component Value Date    WBC 15.0 (H) 12/28/2019    HGB 7.1 (L) 12/28/2019    HCT 23.8 (L) 12/28/2019    MCV 94.8 12/28/2019     12/28/2019     BMP:    Lab Results   Component Value Date     12/28/2019     12/27/2019     12/26/2019    K 3.9 12/28/2019    K 4.2 12/27/2019    K 5.1 12/26/2019     (H) 12/28/2019     12/27/2019     12/26/2019    CO2 23 12/28/2019    CO2 22 12/27/2019    CO2 22 12/26/2019    BUN 60 (H) 12/28/2019    BUN 69 (H) 12/27/2019    BUN 56 (H) 12/26/2019    CREATININE 1.93 (H) 12/28/2019    CREATININE 2.58 (H) 12/27/2019    CREATININE 3.01 (H) 12/26/2019    GLUCOSE 145 (H) 12/28/2019    GLUCOSE 134 (H) 12/27/2019    GLUCOSE 170 (H) 12/26/2019     CMP:   Lab Results   Component Value Date     12/28/2019    K 3.9 12/28/2019     12/28/2019    CO2 23 12/28/2019    BUN 60 12/28/2019    CREATININE 1.93 12/28/2019    GLUCOSE 145 12/28/2019    GLUCOSE 94 12/06/2011    CALCIUM 9.2 12/28/2019    PROT 6.2 12/15/2019    LABALBU 2.9 12/15/2019    LABALBU 4.0 12/06/2011    BILITOT 0.25 12/15/2019    ALKPHOS 81 12/15/2019    AST 9 12/15/2019    ALT 6 12/15/2019      Hepatic:   Lab Results   Component Value Date    AST 9 12/15/2019    AST 26 12/01/2019    AST 68 (H) 11/30/2019    ALT 6 12/15/2019    ALT 41 (H) 12/01/2019    ALT 63 (H) 11/30/2019    BILITOT 0.25 (L) 12/15/2019    BILITOT 0.27 (L) 12/01/2019    BILITOT 0.31 11/30/2019    ALKPHOS 81 12/15/2019    ALKPHOS 108 (H) 12/01/2019    ALKPHOS 133 (H) 11/30/2019     BNP: No results found for: BNP  Lipids:   Lab Results   Component Value Date    CHOL 135 11/22/2019    HDL 30 (L) 11/22/2019     INR:   Lab Results   Component Value Date    INR 1.2 12/17/2019    INR 1.0 12/06/2019    INR 1.0 12/05/2019     PTH: No results found for: PTH  Phosphorus:    Lab Results

## 2019-12-28 NOTE — PROGRESS NOTES
at removal of the stents were not successful    The patient was taken back to the operating room 2019 and had an exploratory laparotomy, extensive lysis of adhesions, removal of sigmoid stent and takedown of anastomosis with closure of rectal stump and colostomy creation. The patient did have repair of several seromuscular injuries of the small bowel and colon. The patient did have an abdominal washout and draining of pelvic abscess done    Current evaluation:2019    /86   Pulse 92   Temp 97.9 °F (36.6 °C)   Resp 20   Ht 5' 7\" (1.702 m)   Wt 233 lb 9.6 oz (106 kg)   LMP  (LMP Unknown)   SpO2 94%   BMI 36.59 kg/m²     Temperature Range: Temp: 97.9 °F (36.6 °C) Temp  Av.2 °F (36.8 °C)  Min: 97.8 °F (36.6 °C)  Max: 98.9 °F (37.2 °C)  The patient is seen and evaluated at bedside she is awake and alert in no acute distress. She is currently on a clear liquid diet and is leery about advancing. She does still have some mild abdominal discomfort but there is now good stool output from the stoma. No subjective fever or chills    Review of Systems   Constitutional: Negative. Respiratory: Negative. Cardiovascular: Positive for leg swelling. Gastrointestinal: Negative. Genitourinary: Negative. Musculoskeletal: Negative. Skin: Negative. Neurological: Negative. Psychiatric/Behavioral: Negative. Physical Examination :     Physical Exam  Constitutional:       Appearance: She is well-developed. HENT:      Head: Normocephalic and atraumatic. Neck:      Musculoskeletal: Normal range of motion and neck supple. Cardiovascular:      Rate and Rhythm: Regular rhythm. Heart sounds: Normal heart sounds. Pulmonary:      Effort: Pulmonary effort is normal.      Breath sounds: Normal breath sounds. Abdominal:      General: Bowel sounds are normal.      Palpations: Abdomen is soft. Comments:  The left lower quadrant ostomy is functioning with some liquid stool drainage catheter placement however tension on follow-up. CT OF THE ABDOMEN AND PELVIS WITH CONTRAST 12/16/2019 7:24 pm  Impression   There is a large interloop mid pelvic abscess measuring up to 11.6 cm   increasing in size from 12/04/2019.  Contents of the abscess represent a   mixture of stool-like material with mixed soft tissue and gas.  There is no   large air-fluid level suggesting percutaneous drainage may be problematic. Cultures:     Anaerobic and Aerobic Culture [[id number]] (Abnormal)  Collected: 12/17/19 4682   Order Status: Completed Specimen: Aspirate Updated: 12/20/19 9110    Specimen Description . ASPIRATE ABDOMINAL ABSCESS    Special Requests NOT REPORTED    Direct Exam NO NEUTROPHILS SEEN     MIXED BACTERIAL MORPHOTYPES SEEN ON GRAM STAIN. Abnormal     Culture ESCHERICHIA COLI HEAVY GROWTHAbnormal      METHICILLIN RESISTANT STAPHYLOCOCCUS AUREUS LIGHT GROWTHAbnormal      NORMAL MARTHA     NO ANAEROBIC ORGANISMS ISOLATED AT 3 DAYSAbnormal    Escherichia coli (1)     Antibiotic Interpretation JANNETH Status    amikacin   Preliminary     NOT REPORTED   ampicillin Sensitive  Preliminary     8  SUSCEPTIBLE   ampicillin-sulbactam   Preliminary     NOT REPORTED   aztreonam Sensitive  Preliminary     <=1  SUSCEPTIBLE   ceFAZolin Sensitive  Preliminary     <=4  SUSCEPTIBLE   cefepime   Preliminary     NOT REPORTED   cefTRIAXone Sensitive  Preliminary     <=1  SUSCEPTIBLE   ciprofloxacin Resistant  Preliminary     >=4  RESISTANT   ertapenem   Preliminary     NOT REPORTED   Confirmatory Extended Spectrum Beta-Lactamase Negative NEGATIVE Preliminary    gentamicin Sensitive  Preliminary     <=1  SUSCEPTIBLE   meropenem   Preliminary     NOT REPORTED   nitrofurantoin   Preliminary     NOT REPORTED   tigecycline   Preliminary     NOT REPORTED   tobramycin Sensitive  Preliminary     <=1  SUSCEPTIBLE   trimethoprim-sulfamethoxazole Resistant  Preliminary     >=320  RESISTANT   piperacillin-tazobactam Sensitive Preliminary     <=4  SUSCEPTIBLE   Methicillin resistant staphylococcus aureus (2)     Antibiotic Interpretation JANNETH Status    penicillin Resistant  Preliminary     >=0.5  RESISTANT   cefoxitin screen  NOT REPORTED Preliminary    ciprofloxacin   Preliminary     NOT REPORTED   clindamycin Resistant  Preliminary     >=8  RESISTANT   erythromycin Resistant  Preliminary     >=8  RESISTANT   gentamicin Sensitive  Preliminary     <=0.5  SUSCEPTIBLE   gentamicin Sensitive Gentamicin is used only in combination with other active agents that test susceptible. Preliminary    Induced Clind Resist  NOT REPORTED Preliminary    levofloxacin Intermediate  Preliminary     4  INTERMEDIATE   linezolid   Preliminary     NOT REPORTED   moxifloxacin   Preliminary     NOT REPORTED   nitrofurantoin   Preliminary     NOT REPORTED   oxacillin Resistant  Preliminary     >=4  RESISTANT   Synercid   Preliminary     NOT REPORTED   rifampin   Preliminary     NOT REPORTED   tetracycline Sensitive  Preliminary     <=1  SUSCEPTIBLE   tigecycline   Preliminary     NOT REPORTED   trimethoprim-sulfamethoxazole Sensitive  Preliminary     <=10  SUSCEPTIBLE   vancomycin Sensitive  Preliminary     1  SUSCEPTIBLE     Cult,Blood #1 [[id number]] Collected: 12/15/19 2255   Order Status: Completed Specimen: Blood Updated: 12/22/19 0007    Specimen Description . BLOOD    Special Requests 3ML RT AC    Culture NO GROWTH 6 DAYS   Cult,Blood #2 [[id number]] Collected: 12/15/19 2304   Order Status: Completed Specimen: Blood Updated: 12/22/19 0007    Specimen Description . BLOOD    Special Requests 10ML LT WRIST    Culture NO GROWTH 6 DAYS     Cult,Urine,CC [[id number]] Collected: 12/17/19 1017   Order Status: Completed Specimen: Urine Updated: 12/19/19 0020    Specimen Description . URINE    Special Requests NOT REPORTED    Culture NO GROWTH       Medications:      fat emulsion  250 mL Intravenous Daily    iron sucrose  200 mg Intravenous Every Other Day    acetaminophen  1,000 mg Oral 3 times per day    famotidine (PEPCID) injection  20 mg Intravenous Daily    ceftaroline fosamil (TEFLARO) IVPB  300 mg Intravenous Q12H    heparin (porcine)  5,000 Units Subcutaneous 3 times per day    insulin lispro  0-18 Units Subcutaneous TID WC    insulin lispro  0-9 Units Subcutaneous Nightly    hydrocortisone   Rectal BID    furosemide  10 mg Oral Every Other Day    metroNIDAZOLE  500 mg Intravenous Q8H    sodium chloride flush  10 mL Intravenous 2 times per day    sodium chloride flush  10 mL Intravenous 2 times per day    Vitamin D  2,000 Units Oral Daily    metoprolol succinate  25 mg Oral Daily           [facility]  Linda Charles MD  [employer] messaging  OFFICE: [phone]      Electronically signed by Linda Charles MD on 12/28/2019 at 11:33 AM  Thank you for allowing us to participate in the care of this patient. Please call with questions. This note iscreated with the assistance of a speech recognition program.  While intending to generate a document that actually reflects the content of the visit, the document can still have some errors including those of syntax andsound a like substitutions which may escape proof reading. In such instances, actual meaning can be extrapolated by contextual diversion.

## 2019-12-29 LAB
ANION GAP SERPL CALCULATED.3IONS-SCNC: 10 MMOL/L (ref 9–17)
BUN BLDV-MCNC: 46 MG/DL (ref 8–23)
BUN/CREAT BLD: 34 (ref 9–20)
CALCIUM SERPL-MCNC: 9.1 MG/DL (ref 8.6–10.4)
CHLORIDE BLD-SCNC: 107 MMOL/L (ref 98–107)
CO2: 23 MMOL/L (ref 20–31)
CREAT SERPL-MCNC: 1.37 MG/DL (ref 0.5–0.9)
GFR AFRICAN AMERICAN: 47 ML/MIN
GFR NON-AFRICAN AMERICAN: 38 ML/MIN
GFR SERPL CREATININE-BSD FRML MDRD: ABNORMAL ML/MIN/{1.73_M2}
GFR SERPL CREATININE-BSD FRML MDRD: ABNORMAL ML/MIN/{1.73_M2}
GLUCOSE BLD-MCNC: 124 MG/DL (ref 65–105)
GLUCOSE BLD-MCNC: 127 MG/DL (ref 65–105)
GLUCOSE BLD-MCNC: 130 MG/DL (ref 65–105)
GLUCOSE BLD-MCNC: 140 MG/DL (ref 70–99)
HCT VFR BLD CALC: 23.8 % (ref 36.3–47.1)
HEMOGLOBIN: 7.3 G/DL (ref 11.9–15.1)
MAGNESIUM: 2.2 MG/DL (ref 1.6–2.6)
MCH RBC QN AUTO: 28.7 PG (ref 25.2–33.5)
MCHC RBC AUTO-ENTMCNC: 30.7 G/DL (ref 28.4–34.8)
MCV RBC AUTO: 93.7 FL (ref 82.6–102.9)
NRBC AUTOMATED: 0 PER 100 WBC
PDW BLD-RTO: 17.6 % (ref 11.8–14.4)
PHOSPHORUS: 2.3 MG/DL (ref 2.6–4.5)
PLATELET # BLD: 262 K/UL (ref 138–453)
PMV BLD AUTO: 9.7 FL (ref 8.1–13.5)
POTASSIUM SERPL-SCNC: 3.4 MMOL/L (ref 3.7–5.3)
RBC # BLD: 2.54 M/UL (ref 3.95–5.11)
SODIUM BLD-SCNC: 140 MMOL/L (ref 135–144)
WBC # BLD: 11.9 K/UL (ref 3.5–11.3)

## 2019-12-29 PROCEDURE — 82947 ASSAY GLUCOSE BLOOD QUANT: CPT

## 2019-12-29 PROCEDURE — 85027 COMPLETE CBC AUTOMATED: CPT

## 2019-12-29 PROCEDURE — 2500000003 HC RX 250 WO HCPCS: Performed by: INTERNAL MEDICINE

## 2019-12-29 PROCEDURE — 84100 ASSAY OF PHOSPHORUS: CPT

## 2019-12-29 PROCEDURE — 2500000003 HC RX 250 WO HCPCS: Performed by: STUDENT IN AN ORGANIZED HEALTH CARE EDUCATION/TRAINING PROGRAM

## 2019-12-29 PROCEDURE — 2580000003 HC RX 258: Performed by: STUDENT IN AN ORGANIZED HEALTH CARE EDUCATION/TRAINING PROGRAM

## 2019-12-29 PROCEDURE — 2580000003 HC RX 258: Performed by: INTERNAL MEDICINE

## 2019-12-29 PROCEDURE — 80048 BASIC METABOLIC PNL TOTAL CA: CPT

## 2019-12-29 PROCEDURE — 6360000002 HC RX W HCPCS: Performed by: STUDENT IN AN ORGANIZED HEALTH CARE EDUCATION/TRAINING PROGRAM

## 2019-12-29 PROCEDURE — 6360000002 HC RX W HCPCS: Performed by: INTERNAL MEDICINE

## 2019-12-29 PROCEDURE — 6370000000 HC RX 637 (ALT 250 FOR IP): Performed by: INTERNAL MEDICINE

## 2019-12-29 PROCEDURE — 6370000000 HC RX 637 (ALT 250 FOR IP): Performed by: STUDENT IN AN ORGANIZED HEALTH CARE EDUCATION/TRAINING PROGRAM

## 2019-12-29 PROCEDURE — 99232 SBSQ HOSP IP/OBS MODERATE 35: CPT | Performed by: INTERNAL MEDICINE

## 2019-12-29 PROCEDURE — 2060000000 HC ICU INTERMEDIATE R&B

## 2019-12-29 PROCEDURE — 97116 GAIT TRAINING THERAPY: CPT

## 2019-12-29 PROCEDURE — 36415 COLL VENOUS BLD VENIPUNCTURE: CPT

## 2019-12-29 PROCEDURE — 83735 ASSAY OF MAGNESIUM: CPT

## 2019-12-29 RX ORDER — FUROSEMIDE 20 MG/1
20 TABLET ORAL 2 TIMES DAILY
Status: DISCONTINUED | OUTPATIENT
Start: 2019-12-29 | End: 2019-12-30 | Stop reason: HOSPADM

## 2019-12-29 RX ORDER — POTASSIUM CHLORIDE 20 MEQ/1
20 TABLET, EXTENDED RELEASE ORAL 2 TIMES DAILY WITH MEALS
Status: DISCONTINUED | OUTPATIENT
Start: 2019-12-29 | End: 2019-12-30 | Stop reason: HOSPADM

## 2019-12-29 RX ADMIN — Medication 10 ML: at 08:54

## 2019-12-29 RX ADMIN — SODIUM CHLORIDE: 9 INJECTION, SOLUTION INTRAVENOUS at 15:29

## 2019-12-29 RX ADMIN — HYDROCORTISONE 2.5%: 25 CREAM TOPICAL at 08:48

## 2019-12-29 RX ADMIN — HEPARIN SODIUM 5000 UNITS: 5000 INJECTION INTRAVENOUS; SUBCUTANEOUS at 05:48

## 2019-12-29 RX ADMIN — METRONIDAZOLE 500 MG: 500 INJECTION, SOLUTION INTRAVENOUS at 07:14

## 2019-12-29 RX ADMIN — IRON SUCROSE 200 MG: 20 INJECTION, SOLUTION INTRAVENOUS at 17:31

## 2019-12-29 RX ADMIN — VITAMIN D, TAB 1000IU (100/BT) 2000 UNITS: 25 TAB at 08:48

## 2019-12-29 RX ADMIN — METOPROLOL SUCCINATE 25 MG: 25 TABLET, FILM COATED, EXTENDED RELEASE ORAL at 08:48

## 2019-12-29 RX ADMIN — POTASSIUM CHLORIDE 20 MEQ: 20 TABLET, EXTENDED RELEASE ORAL at 17:31

## 2019-12-29 RX ADMIN — HEPARIN SODIUM 5000 UNITS: 5000 INJECTION INTRAVENOUS; SUBCUTANEOUS at 21:34

## 2019-12-29 RX ADMIN — OXYCODONE HYDROCHLORIDE 5 MG: 5 TABLET ORAL at 02:27

## 2019-12-29 RX ADMIN — METRONIDAZOLE 500 MG: 500 INJECTION, SOLUTION INTRAVENOUS at 23:01

## 2019-12-29 RX ADMIN — ACETAMINOPHEN 1000 MG: 500 TABLET, COATED ORAL at 05:48

## 2019-12-29 RX ADMIN — FAMOTIDINE 20 MG: 10 INJECTION, SOLUTION INTRAVENOUS at 08:48

## 2019-12-29 RX ADMIN — FUROSEMIDE 20 MG: 20 TABLET ORAL at 17:31

## 2019-12-29 RX ADMIN — HEPARIN SODIUM 5000 UNITS: 5000 INJECTION INTRAVENOUS; SUBCUTANEOUS at 15:30

## 2019-12-29 RX ADMIN — METRONIDAZOLE 500 MG: 500 INJECTION, SOLUTION INTRAVENOUS at 15:28

## 2019-12-29 RX ADMIN — HYDROCORTISONE 2.5%: 25 CREAM TOPICAL at 21:43

## 2019-12-29 RX ADMIN — I.V. FAT EMULSION 250 ML: 20 EMULSION INTRAVENOUS at 17:29

## 2019-12-29 RX ADMIN — POTASSIUM CHLORIDE: 2 INJECTION, SOLUTION, CONCENTRATE INTRAVENOUS at 17:29

## 2019-12-29 RX ADMIN — CEFTAROLINE FOSAMIL 300 MG: 600 POWDER, FOR SOLUTION INTRAVENOUS at 10:41

## 2019-12-29 RX ADMIN — CEFTAROLINE FOSAMIL 400 MG: 600 POWDER, FOR SOLUTION INTRAVENOUS at 21:35

## 2019-12-29 ASSESSMENT — PAIN DESCRIPTION - PROGRESSION
CLINICAL_PROGRESSION: GRADUALLY IMPROVING
CLINICAL_PROGRESSION: NOT CHANGED

## 2019-12-29 ASSESSMENT — PAIN DESCRIPTION - PAIN TYPE
TYPE: ACUTE PAIN

## 2019-12-29 ASSESSMENT — PAIN DESCRIPTION - DESCRIPTORS
DESCRIPTORS: HEADACHE

## 2019-12-29 ASSESSMENT — PAIN DESCRIPTION - FREQUENCY
FREQUENCY: INTERMITTENT

## 2019-12-29 ASSESSMENT — ENCOUNTER SYMPTOMS
GASTROINTESTINAL NEGATIVE: 1
RESPIRATORY NEGATIVE: 1

## 2019-12-29 ASSESSMENT — PAIN DESCRIPTION - ONSET
ONSET: GRADUAL
ONSET: PROGRESSIVE

## 2019-12-29 ASSESSMENT — PAIN SCALES - GENERAL
PAINLEVEL_OUTOF10: 0
PAINLEVEL_OUTOF10: 3
PAINLEVEL_OUTOF10: 6
PAINLEVEL_OUTOF10: 3
PAINLEVEL_OUTOF10: 9
PAINLEVEL_OUTOF10: 3
PAINLEVEL_OUTOF10: 7

## 2019-12-29 ASSESSMENT — PAIN - FUNCTIONAL ASSESSMENT
PAIN_FUNCTIONAL_ASSESSMENT: PREVENTS OR INTERFERES SOME ACTIVE ACTIVITIES AND ADLS
PAIN_FUNCTIONAL_ASSESSMENT: ACTIVITIES ARE NOT PREVENTED

## 2019-12-29 ASSESSMENT — PAIN DESCRIPTION - LOCATION
LOCATION: HEAD

## 2019-12-29 ASSESSMENT — PAIN SCALES - WONG BAKER: WONGBAKER_NUMERICALRESPONSE: 0

## 2019-12-29 ASSESSMENT — PAIN DESCRIPTION - ORIENTATION: ORIENTATION: MID;LOWER

## 2019-12-29 NOTE — PROGRESS NOTES
Nutrition Assessment (Parenteral Nutrition)    Type and Reason for Visit: Reassess    Nutrition Recommendations: 1. Suggest maintaining on full liquid diet. 2. Consider continuing PN-Adult 2-in-1 Central Line (Custom) @ 83.3 ml/hr, with IV Lipids @ 21.0 ml/hr x 12 hr.  Include additives:  115 mEq Na Acetate, 40 mEq NaCl, 60 mEq K Acetate, 25 mmol K PO4, 8 mEq KCl, 8 mEq Ca Gluconate, 8 mEq MgSO4 & 25 Units INS. Nutrition Assessment: Pt is improving from a nutritional standpoint, with being advanced to full liquids, and continuing @ PN goal rate. Communicated with Dl Kelsey, Pharmacist about recommended additive changes, and continuing on same PN rate with IV lipids. Malnutrition Assessment:  · Malnutrition Status: Mild Malnutrition  · Context: Acute illness or injury  · Findings of the 6 clinical characteristics of malnutrition (Minimum of 2 out of 6 clinical characteristics is required to make the diagnosis of moderate or severe Protein Calorie Malnutrition based on AND/ASPEN Guidelines):  1. Energy Intake-Less than or equal to 75% of estimated energy requirement, Greater than or equal to 7 days    2. Weight Loss-2% loss or greater(Not considered to be significant), in 1 month  3. Fat Loss-No significant subcutaneous fat loss  4. Muscle Loss-No significant muscle mass loss  5. Fluid Accumulation-Mild fluid accumulation, Extremities  6.  Strength-Not measured    Nutrition Risk Level: High    Nutrient Needs:  · Estimated Daily Total Kcal: 1,950-2,100 kcal (20-21 kcal/kg current wt)  · Estimated Daily Protein (g): 110-120 g (1.8-2 g/kg IBW)  · Estimated Daily Total Fluid (ml/day): 1 mL/kcal    Nutrition Diagnosis:   · Problem: Inadequate oral intake  · Etiology: related to Alteration in GI function     Signs and symptoms:  as evidenced by Nutrition support - PN, Diet history of poor intake, Weight loss    Objective Information:  · Nutrition-Focused Physical Findings: Edema:  +2 non-pitting, RLE/LLE.   GI: nausea, colostomy LUQ (350 mL output). Skin:  incision 12/24 abd lower medial.  · Wound Type: Multiple, Surgical Wound  · Current Nutrition Therapies:  · Oral Diet Orders: Full Liquid   · Oral Diet intake: 1-25%  · Parenteral Nutrition Orders:  · Type and Formula: 2-in-1 Custom   · Lipids: 250ml  · Rate/Volume: 83.3 ml/hr / 2,000 ml  · Duration: Continuous  · Current PN Order Provides: See below.   · Goal PN Orders Provides: @ 83.3 ml/hr:  2,260 kcal, 100 g protein  · Additional Calories: Full liquids  · Anthropometric Measures:  · Ht: 5' 7\" (170.2 cm)   · Current Body Wt: 233 lb 11 oz (106 kg)  · Admission Body Wt: 223 lb 8.7 oz (101.4 kg)  · Usual Body Wt: 235 lb (106.6 kg)  · % Weight Change:  4% (10#) in one month  · Ideal Body Wt: 135 lb (61.2 kg), % Ideal Body 167%  · BMI Classification: BMI 35.0 - 39.9 Obese Class II    Nutrition Interventions:   Continue current diet, Modify current Parenteral Nutrition  Continued Inpatient Monitoring, Discharge Planning, Coordination of Care    Nutrition Evaluation:   · Evaluation: Goal achieved   · Goals: PN to meet >90% of energy and protein needs   · Monitoring: Nutrition Progression, Meal Intake, PN Intake, Diet Tolerance, PN Tolerance, Skin Integrity, Wound Healing, I&O, Weight, Pertinent Labs, Nausea or Vomiting, Monitor Bowel Function      Electronically signed by Bere Gee RDN, LD on 12/29/19 at 3:38 PM    Contact Number: [phone]

## 2019-12-29 NOTE — PROGRESS NOTES
Reason for Consult:  Acute kidney injury. Requesting Physician:  Beryle Connor, MD    Interval History:  Cr improving  UOP improved   Stable BP  Tolerating some po intake  C/O edema issues    HISTORY OF PRESENT ILLNESS:    The patient is a 81 y.o. lady with Hx of diverticulitis, required multiple surgeries surgeries, admitted on 12/15/2019 , found to have pelvic abscess, required drainage and ABx. Currently on TPN. She has normal baseline renal function with baseline Cr of 0.6, her Cr showed an increase over the last two days, Cr was up to 3.01 this AM, she has significant decrease in UOP, Had a drop in BP yesterday early AM with SBP in the 80s, received aggressive hydration, about 6 L in yesterday. No  recent use of NSAIDs or iv contrast.       Prior to Admission medications    Medication Sig Start Date End Date Taking? Authorizing Provider   oxyCODONE (ROXICODONE) 5 MG immediate release tablet Take 1 tablet by mouth every 6 hours as needed for Pain for up to 5 days. Intended supply: 5 days.  Take lowest dose possible to manage pain 12/24/19 12/29/19 Yes Zane Melgar, DO   furosemide (LASIX) 20 MG tablet Take 0.5 tablets by mouth every other day 12/22/19  Yes Beryle Connor, MD   metoprolol succinate (TOPROL XL) 25 MG extended release tablet Take 1 tablet by mouth daily 11/22/19  Yes Chuy Melton MD   ondansetron (ZOFRAN) 4 MG tablet Take 1 tablet by mouth every 12 hours as needed for Nausea or Vomiting 11/14/19  Yes Jesus Rivero, DO   Cholecalciferol (VITAMIN D) 2000 units CAPS capsule Take 1 capsule by mouth daily   Yes Historical Provider, MD   clotrimazole-betamethasone (LOTRISONE) 1-0.05 % cream Apply topically daily as needed (to spot on arm)    Historical Provider, MD       Scheduled Meds:   furosemide  20 mg Oral BID    potassium chloride  20 mEq Oral BID WC    fat emulsion  250 mL Intravenous Daily    iron sucrose  200 mg Intravenous Every Other Day    acetaminophen  1,000 mg Oral 3 times per day    famotidine (PEPCID) injection  20 mg Intravenous Daily    ceftaroline fosamil (TEFLARO) IVPB  300 mg Intravenous Q12H    heparin (porcine)  5,000 Units Subcutaneous 3 times per day    insulin lispro  0-18 Units Subcutaneous TID WC    insulin lispro  0-9 Units Subcutaneous Nightly    hydrocortisone   Rectal BID    metroNIDAZOLE  500 mg Intravenous Q8H    sodium chloride flush  10 mL Intravenous 2 times per day    sodium chloride flush  10 mL Intravenous 2 times per day    Vitamin D  2,000 Units Oral Daily    metoprolol succinate  25 mg Oral Daily     Continuous Infusions:   PN-Adult 2-in-1 Central Line (Standard)      PN-Adult 2-in-1 Central Line (Standard) 83.3 mL/hr at 12/28/19 1724    sodium chloride 50 mL/hr at 12/27/19 2358    norepinephrine Stopped (12/26/19 0500)    dextrose       PRN Meds:oxyCODONE **OR** oxyCODONE, norepinephrine, melatonin, glucose, dextrose, glucagon (rDNA), dextrose, sodium chloride flush, sodium chloride flush, magnesium hydroxide, ondansetron **OR** ondansetron     Physical Exam:  Vitals:    12/28/19 2133 12/29/19 0231 12/29/19 0806 12/29/19 1111   BP: (!) 144/68 (!) 144/67 (!) 135/53 (!) 144/61   Pulse: 88 92 88 92   Resp: 19 20 18 18   Temp:  97.9 °F (36.6 °C) 98.2 °F (36.8 °C) 97.2 °F (36.2 °C)   TempSrc:  Oral Oral Oral   SpO2: 95% 92% 91% 95%   Weight:       Height:         I/O last 3 completed shifts: In: 5863.1 [I.V.:2289]  Out: 1978 [Urine:2300; Drains:75; Stool:350]    General:  Awake, alert, not in distress. Appears to be stated age. HEENT: Atraumatic, normocephalic. Anicteric sclera. Pink and moist oral mucosa. Neck supple. No JVD. Chest: Bilateral air entry, clear to auscultation, no wheezing, rhonchi or rales. Cardiovascular: RRR, S1S2, no murmur, rub or gallop. No lower extremity edema. Abdomen: Soft, non tender to palpation. Musculoskeletal: No cyanosis or clubbing. Integumentary: Pink, warm and dry. Free from rash or lesions.   CNS:

## 2019-12-29 NOTE — PROGRESS NOTES
Progress Note    12/29/2019   11:22 AM    Name:  Jakob Anguiano  MRN:    [id number]     Isiah Willingham:     [de-identified]   Room:  [address] [place] [address] Day: 15     Admit Date: 12/15/2019 10:10 PM  PCP: Deandra Love MD    Subjective:     C/C:   Chief Complaint   Patient presents with   Sherrian Actis Dehydration     seen this am for same    Nausea    Other     unable to urinate       Interval History: Status: not changed and no new complaints and pain improved    ROS:  Constitutional: negative for chills, fevers, sweats  Respiratory: negative for cough, dyspnea on exertion, hemoptysis, shortness of breath, wheezing  Cardiovascular: negative for chest pain, chest pressure/discomfort, dyspnea, lower extremity edema, palpitations  Gastrointestinal: negative for abdominal pain, constipation, diarrhea, nausea, vomiting  Neurological: negative for dizziness and headaches    Medications:      Allergies: No Known Allergies    Current Meds: PN-Adult 2-in-1 Central Line (Custom), Continuous TPN  fat emulsion 20 % infusion 250 mL, Daily  iron sucrose (VENOFER) 200 mg in sodium chloride 0.9 % 100 mL IVPB, Every Other Day  acetaminophen (TYLENOL) tablet 1,000 mg, 3 times per day  oxyCODONE (ROXICODONE) immediate release tablet 5 mg, Q4H PRN    Or  oxyCODONE (ROXICODONE) immediate release tablet 10 mg, Q4H PRN  famotidine (PEPCID) injection 20 mg, Daily  ceftaroline fosamil (TEFLARO) 300 mg in dextrose 5 % 50 mL IVPB, Q12H  heparin (porcine) injection 5,000 Units, 3 times per day  insulin lispro (HUMALOG) injection vial 0-18 Units, TID WC  insulin lispro (HUMALOG) injection vial 0-9 Units, Nightly  0.9 % sodium chloride infusion, Continuous  norepinephrine (LEVOPHED) 16 mg in dextrose 5 % 250 mL infusion, PRN  hydrocortisone (ANUSOL-HC) 2.5 % rectal cream, BID  melatonin tablet 6 mg, Nightly PRN  furosemide (LASIX) tablet 10 mg, Every Other Day  metronidazole (FLAGYL) 500 mg in NaCl 100 mL IVPB premix, Q8H  glucose (GLUTOSE) 40 % oral gel 15 g, PRN  dextrose 50 % IV solution, PRN  glucagon (rDNA) injection 1 mg, PRN  dextrose 5 % solution, PRN  sodium chloride flush 0.9 % injection 10 mL, 2 times per day  sodium chloride flush 0.9 % injection 10 mL, PRN  sodium chloride flush 0.9 % injection 10 mL, 2 times per day  sodium chloride flush 0.9 % injection 10 mL, PRN  magnesium hydroxide (MILK OF MAGNESIA) 400 MG/5ML suspension 30 mL, Daily PRN  Vitamin D (CHOLECALCIFEROL) tablet 2,000 Units, Daily  metoprolol succinate (TOPROL XL) extended release tablet 25 mg, Daily  ondansetron (ZOFRAN-ODT) disintegrating tablet 4 mg, Q6H PRN    Or  ondansetron (ZOFRAN) injection 4 mg, Q6H PRN        Data:     Code Status:  Full Code    Family History   Problem Relation Age of Onset    Heart Disease Mother     COPD Father     Cancer Brother         thyroid, prostate, lung       Social History     Socioeconomic History    Marital status: Single     Spouse name: Not on file    Number of children: Not on file    Years of education: Not on file    Highest education level: Not on file   Occupational History    Not on file   Social Needs    Financial resource strain: Not on file    Food insecurity:     Worry: Not on file     Inability: Not on file    Transportation needs:     Medical: Not on file     Non-medical: Not on file   Tobacco Use    Smoking status: Former Smoker     Packs/day: 1.00     Years: 3.00     Pack years: 3.00     Last attempt to quit: 1975     Years since quittin.0    Smokeless tobacco: Never Used    Tobacco comment: quit smoking age 23   Substance and Sexual Activity    Alcohol use: Yes     Comment: very rare    Drug use: No    Sexual activity: Not on file   Lifestyle    Physical activity:     Days per week: Not on file     Minutes per session: Not on file    Stress: Not on file   Relationships    Social connections:     Talks on phone: Not on file     Gets together: Not on file     Attends Jewish service: Not on file     Active member of club or organization: Not on file     Attends meetings of clubs or organizations: Not on file     Relationship status: Not on file    Intimate partner violence:     Fear of current or ex partner: Not on file     Emotionally abused: Not on file     Physically abused: Not on file     Forced sexual activity: Not on file   Other Topics Concern    Not on file   Social History Narrative    Not on file       Vitals:  BP (!) 144/61   Pulse 92   Temp 97.2 °F (36.2 °C) (Oral)   Resp 18   Ht 5' 7\" (1.702 m)   Wt 233 lb 9.6 oz (106 kg)   LMP  (LMP Unknown)   SpO2 95%   BMI 36.59 kg/m²   Temp (24hrs), Av.2 °F (36.8 °C), Min:97.2 °F (36.2 °C), Max:99.9 °F (37.7 °C)    Invalid input(s): CBCBMPCMP    I/O (24Hr):     Intake/Output Summary (Last 24 hours) at 2019 1122  Last data filed at 2019 1010  Gross per 24 hour   Intake 5863.09 ml   Output 3175 ml   Net 2688.09 ml       Labs:  [unfilled]    Physical Examination:        General appearance: alert, cooperative and no distress  Mental Status: oriented to person, place and time and normal affect  Lungs: clear to auscultation bilaterally, normal effort  Heart: regular rate and rhythm, no murmur  Abdomen: soft, appropriately tender, nondistended, bowel sounds present all four quadrants, no masses, hepatomegaly or splenomegaly  Extremities: 1+ edema, but no redness or tenderness in the calves  Skin: no gross lesions, rashes, or induration    Assessment:        Primary Problem  <principal problem not specified>     Active Hospital Problems    Diagnosis Date Noted    Acute cystitis without hematuria [N30.00]     Mild malnutrition ([address] [E44.1] 2019    Sepsis ([address] [A41.9] 2019     Past Medical History:   Diagnosis Date    Allergic rhinitis     Bladder prolapse     Constipation     2019 resolved    Fundic gland polyposis of stomach 2017    per EGD    GERD (gastroesophageal reflux disease)     on no medications    Hiatal hernia     History of cardiac cath 12/2002    pt denies blockage    History of diverticulitis     Hyperlipidemia     borderline, on no medication    Hypertension     MRSA (methicillin resistant staph aureus) culture positive 11/01/2016    nasal    MRSA carrier     follows with ID    Obesity     Osteoarthritis     Systolic murmur     benign systolic murmur (history of). Pt does not follow-up with a cardiologist (Written 09/25/2019).  Thyroid disease     goiter    Wears glasses         Plan:        1. Patient is on Lasix and would leave it to nephrology regarding increasing the dosage as patient has swelling in both legs and few crackles at the bases of the lungs and patient's creatinine is improved to 1.37 and currently on TPN and patient still on liquid diet and patient's diet is advanced to full liquid but patient has nausea when she takes the food and advised her to eat frequent small meals  2. Continue IV antibiotics as per infectious diseases  3. Hemoglobin remained stable at 7.3 continue to monitor  4. Supportive care  5. Discharge planning  6.  All medications labs and history reviewed      Electronically signed by Jarocho Billings MD on 12/29/2019 at 11:22 AM

## 2019-12-29 NOTE — PROGRESS NOTES
PULMONARY PROGRESS NOTE:    Interval History: critical care management, mild pulmonary hypertension, suspected obstructive sleep apnea    Shortness of Breath: better  Cough: no  Sputum: no  Hemoptysis: no  Chest Pain: no  Fever: no  Swelling Feet: yes  Headache:   Nausea: yes  Emesis, Abdominal Pain: no  Diarrhea: no  Constipation: no  Still c/o generalized weakness when up moving around    Events since last visit: none    PAST MEDICAL HISTORY:    Smoking:     PHYSICAL EXAMINATION:  afebrile  General : Awake, alert, oriented to time, place, and person  Neck - supple, no lymphadenopathy, JVD not raised  Heart - regular rhythm, S1 and S2 normal; no additional sounds heard  Lungs - Air Entry- fair bilaterally; breath sounds : vesicular, 96% on RA  Abdomen - soft, no tenderness  Upper Extremities  - no cyanosis, mottling; edema : absent  Lower Extremities: no cyanosis, mottling; edema : absent    Current Laboratory, Radiologic, Microbiologic, and Diagnostic studies reviewed    ASSESSMENT / PLAN:    Impression:  · Mild pulmonary hypertension, RVSP 40 mmHg  · Pelvic abscess status post anastomotic leak after colostomy reversal  · S/p ex lap with extensive lysis of adhesions removal of sigmoid stent and takedown of anastomosis with closure of rectal stump and colostomy creation  · Obesity with suspected obstructive sleep apnea  · Leukocytosis  · DESTINI     Recommendations:  · Continue IV antibiotics, ceftaroline/Flagyl, ID following  · Encouraged to increase use of incentive spirometry every hour while awake, reaching about 500  · Monitor blood sugars off of insulin drip  · Continue TPN  · Nephrology on consult  · Labs: CBC and BMP in am, monitor renal function  · 2 liters/min via nasal cannula if necessary  · PT/OT, Encourage OOB/activity increase  · Sleep studies as outpatient  · DVT prophylaxis with low molecular weight heparin    Plan of care discussed with Dr Randee Kohler  Electronically signed by Nikolai Prieto on 12/29/19 at 4:12 PM.    REASON FOR VISIT: atelectasis  I examined the patient myself  The assessment and Plan in the note per my discussion with NP.     Electronically signed by Joan Waller on 12/29/19 at 9:42 PM.

## 2019-12-29 NOTE — PROGRESS NOTES
visit,procedure only (N/A, 5/22/2018); Sigmoidoscopy (02/19/2019); sigmoidoscopy (N/A, 2/19/2019); Abdominal exploration surgery (05/16/2019); Small intestine surgery (N/A, 5/16/2019); Colonoscopy (N/A, 5/16/2019); joint replacement; FISSURECTOMY ANAL (N/A, 10/4/2019); Small intestine surgery (N/A, 11/12/2019); Colonoscopy (N/A, 12/19/2019); sigmoidoscopy (N/A, 12/23/2019); and colostomy (N/A, 12/24/2019). Restrictions  Restrictions/Precautions  Restrictions/Precautions: General Precautions, Fall Risk, Surgical Protocols  Position Activity Restriction  Other position/activity restrictions: ambulate, up with assist, colostomy, RUE IV  Subjective   General  Chart Reviewed:  Yes  Additional Pertinent Hx: colostomy & reversal, MRSA nasal swab  Subjective  Subjective: Pt agreeable to PT  General Comment  Comments: RN, Oracio Power PT  Pain Screening  Patient Currently in Pain: Yes  Pain Assessment  Pain Assessment: 0-10  Pain Level: 3  Patient's Stated Pain Goal: No pain  Pain Type: Acute pain  Pain Location: Head  Pain Orientation: Mid;Lower  Pain Descriptors: Headache  Pain Frequency: Intermittent  Vital Signs  Patient Currently in Pain: Yes       Orientation     Cognition      Objective   Bed mobility  Scooting: Minimal assistance  Transfers  Sit to Stand: Contact guard assistance  Stand to sit: Contact guard assistance  Stand Pivot Transfers: Contact guard assistance  Ambulation  Ambulation?: Yes  Ambulation 1  Surface: level tile  Device: Rolling Walker  Assistance: Contact guard assistance  Quality of Gait: step to pattern  Gait Deviations: Slow [place];Decreased step length;Decreased step height  Distance: 30 ft x 2  Comments: extra assist for line management     Balance  Sitting - Static: Good  Sitting - Dynamic: Good  Standing - Static: Fair;+  Standing - Dynamic: Fair;+  Exercises  Comments: Ed functional mobility, safety awareness, prevention of sedentary complications & breathing techniques         Comment:

## 2019-12-29 NOTE — PLAN OF CARE
Problem: Falls - Risk of:  Goal: Will remain free from falls  Description  Will remain free from falls  Outcome: Ongoing  Note:   Siderails up x 2  Hourly rounding  Call light in reach  Instructed to call for assist before attempting out of bed. Remains free from falls and accidental injury at this time   Floor free from obstacles  Bed is locked and in lowest position  Adequate lighting provided  Bed alarm on, [place]alling star and Stay with Me signs posted  Goal: Absence of physical injury  Description  Absence of physical injury  Outcome: Ongoing     Problem: Skin Integrity:  Goal: Will show no infection signs and symptoms  Description  Will show no infection signs and symptoms  Outcome: Ongoing  Note:   Assisted to reposition off back frequently. On waffle mattress. Heels off bed with pillows.   Pt has sutherland and colostomy  Goal: Absence of new skin breakdown  Description  Absence of new skin breakdown  Outcome: Ongoing     Problem: Fluid Volume - Imbalance:  Goal: Absence of imbalanced fluid volume signs and symptoms  Description  Absence of imbalanced fluid volume signs and symptoms  Outcome: Ongoing     Problem: Sensory:  Goal: General experience of comfort will improve  Description  General experience of comfort will improve  Outcome: Ongoing     Problem: Urinary Elimination:  Goal: Signs and symptoms of infection will decrease  Description  Signs and symptoms of infection will decrease  Outcome: Ongoing  Goal: Ability to reestablish a normal urinary elimination pattern will improve - after catheter removal  Description  Ability to reestablish a normal urinary elimination pattern will improve  Outcome: Ongoing  Goal: Complications related to the disease process, condition or treatment will be avoided or minimized  Description  Complications related to the disease process, condition or treatment will be avoided or minimized  Outcome: Ongoing     Problem: Pain:  Goal: Pain level will

## 2019-12-29 NOTE — PLAN OF CARE
Problem: Skin Integrity:  Goal: Will show no infection signs and symptoms  Description  Will show no infection signs and symptoms  12/29/2019 1635 by Yecenia Hodges RN  Outcome: Ongoing  12/29/2019 0349 by Torey Zepeda RN  Outcome: Ongoing  Note:      Problem: Skin Integrity:  Goal: Absence of new skin breakdown  Description  Absence of new skin breakdown  12/29/2019 1635 by Yecenia Hodges RN  Outcome: Ongoing  12/29/2019 0349 by Torey Zepeda RN  Outcome: Ongoing     Pt repositions self in bed, waffle mattress on, heels off bed with pillows. Sims and colostomy care per protocol.

## 2019-12-29 NOTE — PROGRESS NOTES
seen by a nurse practitioner has her PCP Dr. Chaz Bynum body was out of town. The patient was sent back to the emergency room November 21 and was worked up and sent home. The patient reports that around Thanksgiving she started having rectal bleeding which prompted her to come back into the emergency room and at that point in time she was diagnosed with an anastomotic leak on CT imaging. The patient was treated conservatively with IV antibiotics and subsequently discharged on oral antimicrobial therapy with ciprofloxacin and metronidazole.     The patient reports that her diarrhea had improved but recently resumed. She had been at home and still generally is not feeling very well. She does not report any significant abdominal pain but has had some nausea and chills. She has not been able to tolerate oral intake very well and was having difficulty urinating well as leg swelling. She came back into the emergency room and testing did show question of a urinary tract infection. CT imaging showed a large pelvic abscess. The patient underwent a colonoscopy 12/19/2019  Large anastomotic perforation was noted. A large pelvic compartment with debris, stool material, and inflammation was noted. This area was walled off. Pigtail placed by IR was noted in the cavity. The patient had a stent placed    Follow-up CT scan 12/20/2019 did show that the proximal part of the stent was within the cavity of the anastomotic leak    On 12/23/2019 the patient was taken to IR due to feculent drainage around the pigtail catheter which was flushed and the collection has thick stool the smallbore catheter was felt to be of limited use.   Was also concerned that long-term use can cause high risk of rectocutaneous fistula formation    The patient had a colonoscopy done 12/23/2019 that confirmed the mucosal exam showed a large anastomotic perforation and previously placed stent migrated into the contained pelvic cavity and attempt at removal of the stents were not successful    The patient was taken back to the operating room 2019 and had an exploratory laparotomy, extensive lysis of adhesions, removal of sigmoid stent and takedown of anastomosis with closure of rectal stump and colostomy creation. The patient did have repair of several seromuscular injuries of the small bowel and colon. The patient did have an abdominal washout and draining of pelvic abscess done    Current evaluation:2019    BP (!) 144/61   Pulse 92   Temp 97.2 °F (36.2 °C) (Oral)   Resp 18   Ht 5' 7\" (1.702 m)   Wt 233 lb 9.6 oz (106 kg)   LMP  (LMP Unknown)   SpO2 95%   BMI 36.59 kg/m²     Temperature Range: Temp: 97.2 °F (36.2 °C) Temp  Av.2 °F (36.8 °C)  Min: 97.2 °F (36.2 °C)  Max: 99.9 °F (37.7 °C)  The patient is seen and evaluated at bedside she is awake and alert in no acute distress. She is currently on a clear liquid diet and is leery about advancing. She does still have some mild abdominal discomfort but there is now good stool output from the stoma. No subjective fever or chills    Review of Systems   Constitutional: Negative. Respiratory: Negative. Cardiovascular: Positive for leg swelling. Gastrointestinal: Negative. Genitourinary: Negative. Musculoskeletal: Negative. Skin: Negative. Neurological: Negative. Psychiatric/Behavioral: Negative. Physical Examination :     Physical Exam  Constitutional:       Appearance: She is well-developed. HENT:      Head: Normocephalic and atraumatic. Neck:      Musculoskeletal: Normal range of motion and neck supple. Cardiovascular:      Rate and Rhythm: Regular rhythm. Heart sounds: Normal heart sounds. Pulmonary:      Effort: Pulmonary effort is normal.      Breath sounds: Normal breath sounds. Abdominal:      General: Bowel sounds are normal.      Palpations: Abdomen is soft. Comments:  The left lower quadrant ostomy is functioning with some tension on follow-up. CT OF THE ABDOMEN AND PELVIS WITH CONTRAST 12/16/2019 7:24 pm  Impression   There is a large interloop mid pelvic abscess measuring up to 11.6 cm   increasing in size from 12/04/2019.  Contents of the abscess represent a   mixture of stool-like material with mixed soft tissue and gas.  There is no   large air-fluid level suggesting percutaneous drainage may be problematic. Cultures:     Anaerobic and Aerobic Culture [[id number]] (Abnormal)  Collected: 12/17/19 5953   Order Status: Completed Specimen: Aspirate Updated: 12/20/19 1981    Specimen Description . ASPIRATE ABDOMINAL ABSCESS    Special Requests NOT REPORTED    Direct Exam NO NEUTROPHILS SEEN     MIXED BACTERIAL MORPHOTYPES SEEN ON GRAM STAIN. Abnormal     Culture ESCHERICHIA COLI HEAVY GROWTHAbnormal      METHICILLIN RESISTANT STAPHYLOCOCCUS AUREUS LIGHT GROWTHAbnormal      NORMAL MARTHA     NO ANAEROBIC ORGANISMS ISOLATED AT 3 DAYSAbnormal    Escherichia coli (1)     Antibiotic Interpretation JANNETH Status    amikacin   Preliminary     NOT REPORTED   ampicillin Sensitive  Preliminary     8  SUSCEPTIBLE   ampicillin-sulbactam   Preliminary     NOT REPORTED   aztreonam Sensitive  Preliminary     <=1  SUSCEPTIBLE   ceFAZolin Sensitive  Preliminary     <=4  SUSCEPTIBLE   cefepime   Preliminary     NOT REPORTED   cefTRIAXone Sensitive  Preliminary     <=1  SUSCEPTIBLE   ciprofloxacin Resistant  Preliminary     >=4  RESISTANT   ertapenem   Preliminary     NOT REPORTED   Confirmatory Extended Spectrum Beta-Lactamase Negative NEGATIVE Preliminary    gentamicin Sensitive  Preliminary     <=1  SUSCEPTIBLE   meropenem   Preliminary     NOT REPORTED   nitrofurantoin   Preliminary     NOT REPORTED   tigecycline   Preliminary     NOT REPORTED   tobramycin Sensitive  Preliminary     <=1  SUSCEPTIBLE   trimethoprim-sulfamethoxazole Resistant  Preliminary     >=320  RESISTANT   piperacillin-tazobactam Sensitive  Preliminary     <=4  SUSCEPTIBLE fat emulsion  250 mL Intravenous Daily    iron sucrose  200 mg Intravenous Every Other Day    acetaminophen  1,000 mg Oral 3 times per day    famotidine (PEPCID) injection  20 mg Intravenous Daily    heparin (porcine)  5,000 Units Subcutaneous 3 times per day    insulin lispro  0-18 Units Subcutaneous TID WC    insulin lispro  0-9 Units Subcutaneous Nightly    hydrocortisone   Rectal BID    metroNIDAZOLE  500 mg Intravenous Q8H    sodium chloride flush  10 mL Intravenous 2 times per day    sodium chloride flush  10 mL Intravenous 2 times per day    Vitamin D  2,000 Units Oral Daily    metoprolol succinate  25 mg Oral Daily           [facility]  Richmond Nuñez MD  [employer] messaging  OFFICE: [phone]      Electronically signed by Richmond Nuñez MD on 12/29/2019 at 3:51 PM  Thank you for allowing us to participate in the care of this patient. Please call with questions. This note iscreated with the assistance of a speech recognition program.  While intending to generate a document that actually reflects the content of the visit, the document can still have some errors including those of syntax andsound a like substitutions which may escape proof reading. In such instances, actual meaning can be extrapolated by contextual diversion.

## 2019-12-30 ENCOUNTER — HOSPITAL ENCOUNTER (OUTPATIENT)
Dept: MEDSURG UNIT | Age: 69
Discharge: HOME HEALTH | End: 2020-01-20
Attending: INTERNAL MEDICINE | Admitting: INTERNAL MEDICINE

## 2019-12-30 VITALS
SYSTOLIC BLOOD PRESSURE: 149 MMHG | HEART RATE: 89 BPM | OXYGEN SATURATION: 100 % | RESPIRATION RATE: 18 BRPM | HEIGHT: 67 IN | DIASTOLIC BLOOD PRESSURE: 70 MMHG | WEIGHT: 249.5 LBS | TEMPERATURE: 97.8 F | BODY MASS INDEX: 39.16 KG/M2

## 2019-12-30 LAB
ANION GAP SERPL CALCULATED.3IONS-SCNC: 6 MMOL/L (ref 9–17)
BUN BLDV-MCNC: 33 MG/DL (ref 8–23)
BUN/CREAT BLD: 31 (ref 9–20)
CALCIUM SERPL-MCNC: 8.9 MG/DL (ref 8.6–10.4)
CHLORIDE BLD-SCNC: 104 MMOL/L (ref 98–107)
CO2: 26 MMOL/L (ref 20–31)
CREAT SERPL-MCNC: 1.08 MG/DL (ref 0.5–0.9)
EOSINOPHIL,URINE: NORMAL
GFR AFRICAN AMERICAN: >60 ML/MIN
GFR NON-AFRICAN AMERICAN: 51 ML/MIN
GFR SERPL CREATININE-BSD FRML MDRD: ABNORMAL ML/MIN/{1.73_M2}
GFR SERPL CREATININE-BSD FRML MDRD: ABNORMAL ML/MIN/{1.73_M2}
GLUCOSE BLD-MCNC: 120 MG/DL (ref 65–105)
GLUCOSE BLD-MCNC: 122 MG/DL (ref 65–105)
GLUCOSE BLD-MCNC: 126 MG/DL (ref 65–105)
GLUCOSE BLD-MCNC: 133 MG/DL (ref 65–105)
GLUCOSE BLD-MCNC: 143 MG/DL (ref 70–99)
HCT VFR BLD CALC: 23.2 % (ref 36.3–47.1)
HCT VFR BLD CALC: 27.8 % (ref 36.3–47.1)
HEMOGLOBIN: 7 G/DL (ref 11.9–15.1)
HEMOGLOBIN: 8.7 G/DL (ref 11.9–15.1)
MAGNESIUM: 1.7 MG/DL (ref 1.6–2.6)
MCH RBC QN AUTO: 28.1 PG (ref 25.2–33.5)
MCHC RBC AUTO-ENTMCNC: 30.2 G/DL (ref 28.4–34.8)
MCV RBC AUTO: 93.2 FL (ref 82.6–102.9)
NRBC AUTOMATED: 0 PER 100 WBC
PDW BLD-RTO: 17.7 % (ref 11.8–14.4)
PHOSPHORUS: 3 MG/DL (ref 2.6–4.5)
PLATELET # BLD: 238 K/UL (ref 138–453)
PMV BLD AUTO: 9.6 FL (ref 8.1–13.5)
POTASSIUM SERPL-SCNC: 3.6 MMOL/L (ref 3.7–5.3)
RBC # BLD: 2.49 M/UL (ref 3.95–5.11)
SODIUM BLD-SCNC: 136 MMOL/L (ref 135–144)
WBC # BLD: 10.7 K/UL (ref 3.5–11.3)

## 2019-12-30 PROCEDURE — 86901 BLOOD TYPING SEROLOGIC RH(D): CPT

## 2019-12-30 PROCEDURE — 85014 HEMATOCRIT: CPT

## 2019-12-30 PROCEDURE — 84100 ASSAY OF PHOSPHORUS: CPT

## 2019-12-30 PROCEDURE — 36430 TRANSFUSION BLD/BLD COMPNT: CPT

## 2019-12-30 PROCEDURE — 2500000003 HC RX 250 WO HCPCS: Performed by: STUDENT IN AN ORGANIZED HEALTH CARE EDUCATION/TRAINING PROGRAM

## 2019-12-30 PROCEDURE — 86920 COMPATIBILITY TEST SPIN: CPT

## 2019-12-30 PROCEDURE — 51798 US URINE CAPACITY MEASURE: CPT

## 2019-12-30 PROCEDURE — 97116 GAIT TRAINING THERAPY: CPT

## 2019-12-30 PROCEDURE — 6360000002 HC RX W HCPCS: Performed by: STUDENT IN AN ORGANIZED HEALTH CARE EDUCATION/TRAINING PROGRAM

## 2019-12-30 PROCEDURE — 80048 BASIC METABOLIC PNL TOTAL CA: CPT

## 2019-12-30 PROCEDURE — 2580000003 HC RX 258: Performed by: STUDENT IN AN ORGANIZED HEALTH CARE EDUCATION/TRAINING PROGRAM

## 2019-12-30 PROCEDURE — 97530 THERAPEUTIC ACTIVITIES: CPT

## 2019-12-30 PROCEDURE — 6360000002 HC RX W HCPCS: Performed by: INTERNAL MEDICINE

## 2019-12-30 PROCEDURE — 83735 ASSAY OF MAGNESIUM: CPT

## 2019-12-30 PROCEDURE — 85018 HEMOGLOBIN: CPT

## 2019-12-30 PROCEDURE — 2580000003 HC RX 258: Performed by: INTERNAL MEDICINE

## 2019-12-30 PROCEDURE — 86900 BLOOD TYPING SEROLOGIC ABO: CPT

## 2019-12-30 PROCEDURE — P9016 RBC LEUKOCYTES REDUCED: HCPCS

## 2019-12-30 PROCEDURE — 2580000003 HC RX 258: Performed by: COLON & RECTAL SURGERY

## 2019-12-30 PROCEDURE — 86850 RBC ANTIBODY SCREEN: CPT

## 2019-12-30 PROCEDURE — 99238 HOSP IP/OBS DSCHRG MGMT 30/<: CPT | Performed by: INTERNAL MEDICINE

## 2019-12-30 PROCEDURE — 99233 SBSQ HOSP IP/OBS HIGH 50: CPT | Performed by: INTERNAL MEDICINE

## 2019-12-30 PROCEDURE — 6370000000 HC RX 637 (ALT 250 FOR IP): Performed by: STUDENT IN AN ORGANIZED HEALTH CARE EDUCATION/TRAINING PROGRAM

## 2019-12-30 PROCEDURE — 6370000000 HC RX 637 (ALT 250 FOR IP): Performed by: INTERNAL MEDICINE

## 2019-12-30 PROCEDURE — 2500000003 HC RX 250 WO HCPCS: Performed by: INTERNAL MEDICINE

## 2019-12-30 PROCEDURE — 82947 ASSAY GLUCOSE BLOOD QUANT: CPT

## 2019-12-30 PROCEDURE — 36415 COLL VENOUS BLD VENIPUNCTURE: CPT

## 2019-12-30 PROCEDURE — 85027 COMPLETE CBC AUTOMATED: CPT

## 2019-12-30 RX ORDER — LANOLIN ALCOHOL/MO/W.PET/CERES
6 CREAM (GRAM) TOPICAL NIGHTLY PRN
Qty: 60 TABLET | Refills: 0
Start: 2019-12-30 | End: 2020-04-16

## 2019-12-30 RX ORDER — POTASSIUM CHLORIDE 20 MEQ/1
20 TABLET, EXTENDED RELEASE ORAL 2 TIMES DAILY WITH MEALS
Qty: 60 TABLET | Refills: 3 | Status: SHIPPED | OUTPATIENT
Start: 2019-12-30 | End: 2020-07-30 | Stop reason: ALTCHOICE

## 2019-12-30 RX ORDER — 0.9 % SODIUM CHLORIDE 0.9 %
250 INTRAVENOUS SOLUTION INTRAVENOUS ONCE
Status: COMPLETED | OUTPATIENT
Start: 2019-12-30 | End: 2019-12-30

## 2019-12-30 RX ORDER — FUROSEMIDE 20 MG/1
20 TABLET ORAL 2 TIMES DAILY
Qty: 60 TABLET | Refills: 3 | Status: SHIPPED | OUTPATIENT
Start: 2019-12-30 | End: 2020-04-16

## 2019-12-30 RX ADMIN — SODIUM CHLORIDE, PRESERVATIVE FREE 10 ML: 5 INJECTION INTRAVENOUS at 09:08

## 2019-12-30 RX ADMIN — POTASSIUM CHLORIDE 20 MEQ: 20 TABLET, EXTENDED RELEASE ORAL at 17:15

## 2019-12-30 RX ADMIN — METRONIDAZOLE 500 MG: 500 INJECTION, SOLUTION INTRAVENOUS at 15:54

## 2019-12-30 RX ADMIN — FAMOTIDINE 20 MG: 10 INJECTION, SOLUTION INTRAVENOUS at 09:04

## 2019-12-30 RX ADMIN — POTASSIUM CHLORIDE 20 MEQ: 20 TABLET, EXTENDED RELEASE ORAL at 09:03

## 2019-12-30 RX ADMIN — HEPARIN SODIUM 5000 UNITS: 5000 INJECTION INTRAVENOUS; SUBCUTANEOUS at 05:35

## 2019-12-30 RX ADMIN — HYDROCORTISONE 2.5%: 25 CREAM TOPICAL at 09:32

## 2019-12-30 RX ADMIN — DEXTROSE MONOHYDRATE 100 ML/HR: 50 INJECTION, SOLUTION INTRAVENOUS at 17:19

## 2019-12-30 RX ADMIN — ACETAMINOPHEN 1000 MG: 500 TABLET, COATED ORAL at 05:38

## 2019-12-30 RX ADMIN — Medication 10 ML: at 09:05

## 2019-12-30 RX ADMIN — VITAMIN D, TAB 1000IU (100/BT) 2000 UNITS: 25 TAB at 09:04

## 2019-12-30 RX ADMIN — FUROSEMIDE 20 MG: 20 TABLET ORAL at 09:04

## 2019-12-30 RX ADMIN — CEFTAROLINE FOSAMIL 400 MG: 600 POWDER, FOR SOLUTION INTRAVENOUS at 09:03

## 2019-12-30 RX ADMIN — ACETAMINOPHEN 1000 MG: 500 TABLET, COATED ORAL at 15:50

## 2019-12-30 RX ADMIN — METOPROLOL SUCCINATE 25 MG: 25 TABLET, FILM COATED, EXTENDED RELEASE ORAL at 09:04

## 2019-12-30 RX ADMIN — FUROSEMIDE 20 MG: 20 TABLET ORAL at 17:15

## 2019-12-30 RX ADMIN — METRONIDAZOLE 500 MG: 500 INJECTION, SOLUTION INTRAVENOUS at 05:35

## 2019-12-30 RX ADMIN — SODIUM CHLORIDE 250 ML: 0.9 INJECTION, SOLUTION INTRAVENOUS at 15:40

## 2019-12-30 RX ADMIN — HEPARIN SODIUM 5000 UNITS: 5000 INJECTION INTRAVENOUS; SUBCUTANEOUS at 15:51

## 2019-12-30 ASSESSMENT — PAIN SCALES - GENERAL
PAINLEVEL_OUTOF10: 0
PAINLEVEL_OUTOF10: 3
PAINLEVEL_OUTOF10: 3
PAINLEVEL_OUTOF10: 0

## 2019-12-30 NOTE — PROGRESS NOTES
patient/family folder  Teaching provided:  [x] Reviewed GI and A&P        [x] Supplies  [x] Pouch emptying      [] Manipulate closure  [x] Routine Care         [] Comment  [x] Pouch maintenance      Discussed with patient components of stoma care including:  Frequency of emptying appliance, frequency of changing appliance, change in size of stoma over 6-8 weeks, frequency of measuring stoma with sizing guide, assessment and care of peristomal skin, consistency of stomal tissue, how to obtain supplies, diet, and consistency of stool. Patient observed technique of emptying appliance and observed an appliance change. Printed material given and reviewed with patient and family. Ostomy CRF completed and placed in chart. Supplies for home provided to patient. Questions/concerns discussed with patient. Contact information given to patient should any additional needs arise after discharge.     Level of patient/caregiver understanding able to:  [x] Indicates understanding       [] Needs reinforcement  [] Unsuccessful      [x] Verbal Understanding  [] Demonstrated understanding       [] No evidence of learning  [] Refused teaching         [] N/      Electronically signed by Clarence Jones RN on  12/30/2019 at 12:01 PM

## 2019-12-30 NOTE — PROGRESS NOTES
All consults are OK with patient DC to [facility] this afternoon. Writer spoke with Dr. Kristian Griggs, Dr. Joe Dowd, Dr. Tanvir Degroot, Dr. Jim Yoo and then Dr. Balaji Joaquin. DC orders to be placed. Case mgmt notified that patient can leave this evening. [facility] to be updated per Betty, discharge planner.

## 2019-12-30 NOTE — PROGRESS NOTES
Pulmonary Critical Care Progress Note  Robina Parham MD     Patient seen for the follow up of critical care management, mild pulmonary hypertension, suspected obstructive sleep apnea    Subjective:   She has occasional dry cough. She denies any chest pain or shortness of breath. She has been using incentive spirometry. She has been started on diet and tolerating so far. She feels weak and is not ambulating     Examination:  Vitals: BP (!) 148/75   Pulse 89   Temp 97.6 °F (36.4 °C) (Oral)   Resp 20   Ht 5' 7\" (1.702 m)   Wt 249 lb 8 oz (113.2 kg)   LMP  (LMP Unknown)   SpO2 95%   BMI 39.08 kg/m²   General appearance:  alert and cooperative with exam, up in chair  Neck: No JVD  Lungs: diminished breath sounds bilaterally no wheezing   Heart: regular rate and rhythm, S1, S2 normal, no gallop  Abdomen: Soft, non tender, + BS colostomy   Extremities: no cyanosis or clubbing.  No significant edema    LABs:  CBC:   Recent Labs     12/29/19  0530 12/30/19  0654   WBC 11.9* 10.7   HGB 7.3* 7.0*   HCT 23.8* 23.2*    238     BMP:   Recent Labs     12/29/19  0530 12/30/19  0654    136   K 3.4* 3.6*   CO2 23 26   BUN 46* 33*   CREATININE 1.37* 1.08*   LABGLOM 38* 51*   GLUCOSE 140* 143*     ABG:  Lab Results   Component Value Date    RUN5YRO 25 12/24/2019    FIO2 NOT REPORTED 12/24/2019       Lab Results   Component Value Date    POCPH 7.32 12/24/2019    POCPCO2 46 12/24/2019    POCPO2 229 12/24/2019    POCHCO3 23.9 12/24/2019    NBEA 2 12/24/2019    PBEA NOT REPORTED 12/24/2019    NDL0TPE 25 12/24/2019    JKKG8YTW 100 12/24/2019    FIO2 NOT REPORTED 12/24/2019     Radiology:  12/26/19      Impression:  · Mild pulmonary hypertension, RVSP 40 mmHg  · Atelectasis pot op  · Pelvic abscess status post anastomotic leak after colostomy reversal S/p IR guided drain 12/17/2019  · S/p exploratory laparotomy, extensive SANCHEZ, removal of sigmoid stent, takedown of anastomosis with closure of rectal stump and colostomy

## 2019-12-30 NOTE — PROGRESS NOTES
10.7 12/30/2019    HGB 7.0 (LL) 12/30/2019    HCT 23.2 (L) 12/30/2019    MCV 93.2 12/30/2019     12/30/2019     BMP:    Lab Results   Component Value Date     12/30/2019     12/29/2019     12/28/2019    K 3.6 (L) 12/30/2019    K 3.4 (L) 12/29/2019    K 3.9 12/28/2019     12/30/2019     12/29/2019     (H) 12/28/2019    CO2 26 12/30/2019    CO2 23 12/29/2019    CO2 23 12/28/2019    BUN 33 (H) 12/30/2019    BUN 46 (H) 12/29/2019    BUN 60 (H) 12/28/2019    CREATININE 1.08 (H) 12/30/2019    CREATININE 1.37 (H) 12/29/2019    CREATININE 1.93 (H) 12/28/2019    GLUCOSE 143 (H) 12/30/2019    GLUCOSE 140 (H) 12/29/2019    GLUCOSE 145 (H) 12/28/2019     CMP:   Lab Results   Component Value Date     12/30/2019    K 3.6 12/30/2019     12/30/2019    CO2 26 12/30/2019    BUN 33 12/30/2019    CREATININE 1.08 12/30/2019    GLUCOSE 143 12/30/2019    GLUCOSE 94 12/06/2011    CALCIUM 8.9 12/30/2019    PROT 6.2 12/15/2019    LABALBU 2.9 12/15/2019    LABALBU 4.0 12/06/2011    BILITOT 0.25 12/15/2019    ALKPHOS 81 12/15/2019    AST 9 12/15/2019    ALT 6 12/15/2019      Hepatic:   Lab Results   Component Value Date    AST 9 12/15/2019    AST 26 12/01/2019    AST 68 (H) 11/30/2019    ALT 6 12/15/2019    ALT 41 (H) 12/01/2019    ALT 63 (H) 11/30/2019    BILITOT 0.25 (L) 12/15/2019    BILITOT 0.27 (L) 12/01/2019    BILITOT 0.31 11/30/2019    ALKPHOS 81 12/15/2019    ALKPHOS 108 (H) 12/01/2019    ALKPHOS 133 (H) 11/30/2019     BNP: No results found for: BNP  Lipids:   Lab Results   Component Value Date    CHOL 135 11/22/2019    HDL 30 (L) 11/22/2019     INR:   Lab Results   Component Value Date    INR 1.2 12/17/2019    INR 1.0 12/06/2019    INR 1.0 12/05/2019     PTH: No results found for: PTH  Phosphorus:    Lab Results   Component Value Date    PHOS 3.0 12/30/2019     Ionized Calcium: No results found for: IONCA  Magnesium:   Lab Results   Component Value Date    MG 1.7 12/30/2019

## 2019-12-30 NOTE — PROGRESS NOTES
single/multiple (N/A, 8/17/2017); Umbilical hernia repair (05/22/2018); pr office/outpt visit,procedure only (N/A, 5/22/2018); Sigmoidoscopy (02/19/2019); sigmoidoscopy (N/A, 2/19/2019); Abdominal exploration surgery (05/16/2019); Small intestine surgery (N/A, 5/16/2019); Colonoscopy (N/A, 5/16/2019); joint replacement; FISSURECTOMY ANAL (N/A, 10/4/2019); Small intestine surgery (N/A, 11/12/2019); Colonoscopy (N/A, 12/19/2019); sigmoidoscopy (N/A, 12/23/2019); and colostomy (N/A, 12/24/2019). Restrictions  Restrictions/Precautions  Restrictions/Precautions: General Precautions, Fall Risk, Surgical Protocols  Position Activity Restriction  Other position/activity restrictions: ambulate, up with assist, colostomy, RUE IV  Subjective   General  Chart Reviewed: Yes  Additional Pertinent Hx: colostomy & reversal, MRSA nasal swab  Response To Previous Treatment: Patient with no complaints from previous session.   Subjective  Subjective: Pt agreeable to PT  General Comment  Comments: RNLaura PT  Pain Screening  Patient Currently in Pain: No  Vital Signs  BP Location: Left upper arm  Level of Consciousness: Alert  Patient Currently in Pain: No  Oxygen Therapy  O2 Device: None (Room air)       Orientation  Orientation  Overall Orientation Status: Within Normal Limits  Cognition      Objective   Bed mobility  Rolling to Left: Contact guard assistance  Rolling to Right: Contact guard assistance  Supine to Sit: Minimal assistance  Sit to Supine: Minimal assistance  Scooting: Minimal assistance  Comment: assist for log roll & reposition to [facility], line management  Transfers  Sit to Stand: Contact guard assistance  Stand to sit: Contact guard assistance  Stand Pivot Transfers: Stand by assistance  Lateral Transfers: Stand by assistance  Comment: Ed on use of upper body hand placement for safe sit/stand(extra assist for multiple line management)  Ambulation  Ambulation?: Yes  Ambulation 1  Surface: level tile  Device:

## 2019-12-30 NOTE — PROGRESS NOTES
Call to [facility] to give report prior to change of shift. Night nurse is not there yet and current staff unable to receive report. They will have their nightshift RN call our unit to get report around 1900.

## 2019-12-30 NOTE — PROGRESS NOTES
Information:  · Nutrition-Focused Physical Findings: Edema:  ++2 non-pitting, RLE/LLE. GI:  colostomy LUQ (80 ml output).   Skin:  incision 12/24 abd lower medial.  · Wound Type: Multiple, Surgical Wound  · Current Nutrition Therapies:  · Oral Diet Orders: Low Fiber   · Oral Diet intake: 1-25%  · Parenteral Nutrition Orders:  · Type and Formula: 2-in-1 Custom   · Lipids: 250ml  · Rate/Volume: 65 ml/hr / 1,560 ml  · Duration: Continuous  · Current PN Order Provides: 1,573 kcal, 78 g protein  · Goal PN Orders Provides: @ 41.7 ml/hr:  1,380 kcal, 50 g protein  · Additional Calories: PO diet  · Anthropometric Measures:  · Ht: 5' 7\" (170.2 cm)   · Current Body Wt: 249 lb 9 oz (113.2 kg)  · Admission Body Wt: 223 lb 8.7 oz (101.4 kg)  · Usual Body Wt: 235 lb (106.6 kg)  · % Weight Change:   · . 4% (10#) in one month  · Ideal Body Wt: 135 lb (61.2 kg), % Ideal Body 167%  · BMI Classification: BMI 35.0 - 39.9 Obese Class II    Nutrition Interventions:   Continue NPO, Start ONS, Modify current Parenteral Nutrition  Continued Inpatient Monitoring, Discharge Planning, Coordination of Care    Nutrition Evaluation:   · Evaluation: Goals set   · Goals: PN intake to meet >75% of estimated energy and protein needs, with transition to PO intake as tolerated   · Monitoring: Meal Intake, Skin Integrity, Wound Healing, I&O, Pertinent Labs, Nausea or Vomiting, Monitor Bowel Function, Supplement Intake, PN Intake, Diet Tolerance, PN Tolerance      Electronically signed by Ashli Osullivan RDN, LD on 12/30/19 at 4:46 PM    Contact Number: [phone]

## 2019-12-30 NOTE — PROGRESS NOTES
Progress Note    12/30/2019   1:41 PM    Name:  Juju Pacheco  MRN:    [id number]     [place]:     [de-identified]   Room:  [address] [place] [address] Day: 15     Admit Date: 12/15/2019 10:10 PM  PCP: Zoie Oakley MD    Subjective:     C/C:   Chief Complaint   Patient presents with   [facility] Dehydration     seen this am for same    Nausea    Other     unable to urinate       Interval History: Status: significantly improved and denies any new complaints    ROS:  Constitutional: negative for chills, fevers, sweats  Respiratory: negative for cough, dyspnea on exertion, hemoptysis, shortness of breath, wheezing  Cardiovascular: negative for chest pain, chest pressure/discomfort, dyspnea, lower extremity edema, palpitations  Gastrointestinal: negative for abdominal pain, constipation, diarrhea, nausea, vomiting  Neurological: negative for dizziness and headaches    Medications:      Allergies: No Known Allergies    Current Meds: 0.9 % sodium chloride bolus, Once  PN-Adult 2-in-1 Central Line (Custom), Continuous TPN  PN-Adult 2-in-1 Central Line (Custom), Continuous TPN  furosemide (LASIX) tablet 20 mg, BID  potassium chloride (KLOR-CON M) extended release tablet 20 mEq, BID WC  ceftaroline fosamil (TEFLARO) 400 mg in dextrose 5 % 50 mL IVPB, Q12H  fat emulsion 20 % infusion 250 mL, Daily  iron sucrose (VENOFER) 200 mg in sodium chloride 0.9 % 100 mL IVPB, Every Other Day  acetaminophen (TYLENOL) tablet 1,000 mg, 3 times per day  oxyCODONE (ROXICODONE) immediate release tablet 5 mg, Q4H PRN    Or  oxyCODONE (ROXICODONE) immediate release tablet 10 mg, Q4H PRN  famotidine (PEPCID) injection 20 mg, Daily  heparin (porcine) injection 5,000 Units, 3 times per day  insulin lispro (HUMALOG) injection vial 0-18 Units, TID WC  insulin lispro (HUMALOG) injection vial 0-9 Units, Nightly  norepinephrine (LEVOPHED) 16 mg in dextrose 5 % 250 mL infusion, PRN  hydrocortisone (ANUSOL-HC) 2.5 % rectal cream, BID  melatonin tablet 6 mg, Nightly PRN  metronidazole (FLAGYL) 500 mg in NaCl 100 mL IVPB premix, Q8H  glucose (GLUTOSE) 40 % oral gel 15 g, PRN  dextrose 50 % IV solution, PRN  glucagon (rDNA) injection 1 mg, PRN  dextrose 5 % solution, PRN  sodium chloride flush 0.9 % injection 10 mL, 2 times per day  sodium chloride flush 0.9 % injection 10 mL, PRN  sodium chloride flush 0.9 % injection 10 mL, 2 times per day  sodium chloride flush 0.9 % injection 10 mL, PRN  magnesium hydroxide (MILK OF MAGNESIA) 400 MG/5ML suspension 30 mL, Daily PRN  Vitamin D (CHOLECALCIFEROL) tablet 2,000 Units, Daily  metoprolol succinate (TOPROL XL) extended release tablet 25 mg, Daily  ondansetron (ZOFRAN-ODT) disintegrating tablet 4 mg, Q6H PRN    Or  ondansetron (ZOFRAN) injection 4 mg, Q6H PRN        Data:     Code Status:  Full Code    Family History   Problem Relation Age of Onset    Heart Disease Mother     COPD Father     Cancer Brother         thyroid, prostate, lung       Social History     Socioeconomic History    Marital status: Single     Spouse name: Not on file    Number of children: Not on file    Years of education: Not on file    Highest education level: Not on file   Occupational History    Not on file   Social Needs    Financial resource strain: Not on file    Food insecurity:     Worry: Not on file     Inability: Not on file    Transportation needs:     Medical: Not on file     Non-medical: Not on file   Tobacco Use    Smoking status: Former Smoker     Packs/day: 1.00     Years: 3.00     Pack years: 3.00     Last attempt to quit: 1975     Years since quittin.0    Smokeless tobacco: Never Used    Tobacco comment: quit smoking age 23   Substance and Sexual Activity    Alcohol use: Yes     Comment: very rare    Drug use: No    Sexual activity: Not on file   Lifestyle    Physical activity:     Days per week: Not on file     Minutes per session: Not on file    Stress: Not on file   Relationships    Social connections:     Talks EGD    GERD (gastroesophageal reflux disease)     on no medications    Hiatal hernia     History of cardiac cath 12/2002    pt denies blockage    History of diverticulitis     Hyperlipidemia     borderline, on no medication    Hypertension     MRSA (methicillin resistant staph aureus) culture positive 11/01/2016    nasal    MRSA carrier     follows with ID    Obesity     Osteoarthritis     Systolic murmur     benign systolic murmur (history of). Pt does not follow-up with a cardiologist (Written 09/25/2019).  Thyroid disease     goiter    Wears glasses         Plan:        1. Patient's hemoglobin was 7.0 today and also the creatinine improved to 1.3 and patient is given transfusion of PRBC by nephrology  2. Patient tolerating liquid diet and started soft diet and patient still on TPN at this time  3. If patient tolerates food and doing well and hemoglobin remained stable patient may be discharged tomorrow if okay with colorectal surgery and other services  4. Activity as tolerated and antibiotics are continued until 6 January as per ID  5.  All medications labs and history reviewed      Electronically signed by Ida Valenzuela MD on 12/30/2019 at 1:41 PM

## 2019-12-30 NOTE — PROGRESS NOTES
Writer attended pt. for first 15 mins of blood transfusion. No suspected reaction witnessed.  Will continue to monitor

## 2019-12-30 NOTE — PROGRESS NOTES
[facility] -   Infectious diseases evaluation  admission date 12/15/2019      Impression :   Current:  · Pelvic abscess status post CT guided drainage 12/17  E. coli and MRSA growth on culture   · Status post stenting of large anastomotic perforation by GI 12/19/2019       S/P takedown of anastomosis, closure of rectal stump, removal         of sigmoid stent ,colostomy creation on 12/24/2019         S/P colostomy  reversal, extensive lysis of adhesions and ventral       hernia repair 11/12/19        History of diverticulitis s/p diverting colostomy in May   · ARF improving   · Leukocytosis resolved       Recommendations   · Continue ceftaroline and metronidazole as planned through January 6, 2020  · The patient is receiving blood transfusion today. · On TPN and soft diet   · Follow CBC and renal function    Infection Control Recommendations   · [place] Precautions  · Contact Isolation       Antimicrobial Stewardship Recommendations   · Simplification of therapy  · Targeted therapy      Coordination ofOutpatient Care:   · Estimated Length of IV antimicrobials: June 6 of 2020  · Patient will need Midline / picc Catheter Insertion:   · Patient will need SNF:  · Patient will need outpatient wound care:     History of Present Illness:   Initial history: Claritza Nails is a 81y.o.-year-old female was admitted on 12/15/2019 CT imaging showed a large pelvic abscess. status post CT guided drainage 12/17  E. coli and MRSA growth on culture . Status post stenting of large anastomotic perforation by GI                  12/19/2019  The patient had a colonoscopy done 2319 that confirmed  a large anastomotic perforation and previously placed stent migrated into the contained pelvic cavity , attempt to remove the stents were not successful.     S/P takedown of anastomosis, closure of rectal stump, removal         of sigmoid stent ,colostomy creation on SEROMUSCULAR INJURIES performed by Geo Hawthorne MD at [address], COLON, DIAGNOSTIC      EGD    FISSURECTOMY ANAL N/A 10/4/2019    FLEXIBLE SIGMOIDOSCOPY & ANAL FISTULECTOMY performed by Geo Hawthorne MD at [address] [facility] ARTHROSCOPY Left     lateral release    [place] EGD TRANSORAL BIOPSY SINGLE/MULTIPLE N/A 8/17/2017    EGD BIOPSY performed by Gabe Gilman MD at [address] OFFICE/OUTPT [address] N/A 5/22/2018    XI ROBOTIC LAPAROSCOPIC UMBILICAL HERNIA REPAIR WITH MESH, POSSIBLE OPEN performed by Medardo Morris IV, DO at [facility] [address]  02/19/2019    SIGMOIDOSCOPY N/A 2/19/2019    SIGMOIDOSCOPY BIOPSY FLEXIBLE performed by Sharmila Montejo DO at [facility] [address] N/A 12/23/2019    SIGMOIDOSCOPY DIAGNOSTIC FLEXIBLE WITH FLUORO performed by Chel Mejia MD at [address] N/A 5/16/2019    [address] performed by Geo Hawthorne MD at [address] N/A 11/12/2019    [address], REPAIR OF MULTIPLE VENTRAL HERNIAS, MOBILIZATION OF THE SPLENIC FLEXURE AND TRANSVERSE COLON, ABDOMINAL EXPLORATION performed by Geo Hawthorne MD at [address] [facility] [address] Left 9/24/2012    knee    TOTAL KNEE ARTHROPLASTY Right 11/10/2014    knee    UMBILICAL HERNIA REPAIR  05/22/2018    UPPER GASTROINTESTINAL ENDOSCOPY  08/17/2017    Multiple small gastric polyps, no esophagitis noted no Melara's mucosa.   No hiatal hernia, pathology benign fundic gland polyps       Medications:      sodium chloride  250 mL Intravenous Once    furosemide  20 mg Oral BID    potassium chloride  20 mEq Oral BID WC    ceftaroline fosamil (TEFLARO) IVPB  400 mg Intravenous Q12H    fat emulsion  250 mL Intravenous Daily    iron sucrose  200 mg Intravenous Every Other Day    acetaminophen  1,000 mg Oral 3 times per day    famotidine (PEPCID) injection  20 mg Intravenous Daily    heparin (porcine)  5,000 Units Subcutaneous 3 times per day    insulin lispro  0-18 Units Subcutaneous TID WC    insulin lispro  0-9 Units Subcutaneous Nightly    hydrocortisone   Rectal BID    metroNIDAZOLE  500 mg Intravenous Q8H    sodium chloride flush  10 mL Intravenous 2 times per day    sodium chloride flush  10 mL Intravenous 2 times per day    Vitamin D  2,000 Units Oral Daily    metoprolol succinate  25 mg Oral Daily       Social History:     Social History     Socioeconomic History    Marital status: Single     Spouse name: Not on file    Number of children: Not on file    Years of education: Not on file    Highest education level: Not on file   Occupational History    Not on file   Social Needs    Financial resource strain: Not on file    Food insecurity:     Worry: Not on file     Inability: Not on file    Transportation needs:     Medical: Not on file     Non-medical: Not on file   Tobacco Use    Smoking status: Former Smoker     Packs/day: 1.00     Years: 3.00     Pack years: 3.00     Last attempt to quit: 1975     Years since quittin.0    Smokeless tobacco: Never Used    Tobacco comment: quit smoking age 23   Substance and Sexual Activity    Alcohol use: Yes     Comment: very rare    Drug use: No    Sexual activity: Not on file   Lifestyle    Physical activity:     Days per week: Not on file     Minutes per session: Not on file    Stress: Not on file   Relationships    Social connections:     Talks on phone: Not on file     Gets together: Not on file     Attends Baptism service: Not on file     Active member of club or organization: Not on file     Attends meetings of clubs or organizations: Not on file     Relationship status: Not on file    Intimate partner violence:     Fear of current or ex partner: Not on file     Emotionally abused: Not on file     Physically abused: Not on file     Forced sexual activity: Not on file   Other Topics Concern    Not on file   Social History Narrative    Not on file       Family History:     Family History   Problem Relation Age of Onset    Heart Disease Mother     COPD Father     Cancer Brother         thyroid, prostate, lung        Allergies:   Patient has no known allergies. Review of Systems:   History present illness other than above 14 system reviewed were negative    Physical Examination :     Patient Vitals for the past 8 hrs:   BP Temp Temp src Pulse Resp SpO2 Weight   12/30/19 1130 124/60 97.9 °F (36.6 °C) Oral 88 18 96 % --   12/30/19 0839 -- -- -- 72 -- -- --   12/30/19 0836 (!) 151/68 98.2 °F (36.8 °C) Oral 96 18 95 % --   12/30/19 0538 -- -- -- -- -- -- 249 lb 8 oz (113.2 kg)       Physical Exam  Constitutional:       Appearance: Normal appearance. Eyes:      General:         Right eye: No discharge. Left eye: No discharge. Conjunctiva/sclera: Conjunctivae normal.      Pupils: Pupils are equal, round, and reactive to light. Neck:      Musculoskeletal: Neck supple. No neck rigidity. Cardiovascular:      Rate and Rhythm: Normal rate and regular rhythm. Heart sounds: No murmur. Pulmonary:      Effort: Pulmonary effort is normal.      Breath sounds: Normal breath sounds. No wheezing. Abdominal:      Palpations: Abdomen is soft. Comments: Abdominal wound with packing, no surrounding erythema or purulent drainage. Right to MAE drains in place  Colostomy with stool   Musculoskeletal:      Right lower leg: Edema present. Left lower leg: Edema present. Skin:     Coloration: Skin is not jaundiced. Findings: No erythema. Neurological:      General: No focal deficit present. Mental Status: She is alert and oriented to person, place, and time.    Psychiatric:         Mood and Affect: Mood normal.           Medical Decision Making:   I have independently reviewed/ordered the following

## 2019-12-30 NOTE — PLAN OF CARE
Problem: Falls - Risk of:  Goal: Will remain free from falls  Description  Will remain free from falls. Fall risk assessment completed. Patient instructed to use call light. Bed locked and in lowest position, side rails up 2/4, call light and bedside table within reach, clutter removed, and non-skid footwear on when pt out of bed. Hourly rounds will continue. Outcome: Ongoing     Problem: Skin Integrity:  Goal: Will show no infection signs and symptoms  Description  Will show no infection signs and symptoms. No s/s of infection at this time. BID dressing changes and daily HCG bath continues. IV ATB continued. Outcome: Ongoing     Problem: Fluid Volume - Imbalance:  Goal: Absence of imbalanced fluid volume signs and symptoms  Description  Absence of imbalanced fluid volume signs and symptoms. BLE edema continues. Lasix ordered. IVF no more than 110 ml/hr. Will continue to monitor. Outcome: Ongoing     Problem: Urinary Elimination:  Goal: Ability to reestablish a normal urinary elimination pattern will improve - after catheter removal  Description  Ability to reestablish a normal urinary elimination pattern will improve. Awaiting nephrology input for [place] catheter removal and potential void trail. Outcome: Ongoing     Problem: Nutrition  Goal: Optimal nutrition therapy. TPN/lipids continued and patient diet advanced to low fiber. Tolerated first meal well. Will continue to monitor.    Outcome: Ongoing

## 2019-12-30 NOTE — PLAN OF CARE
Nutrition Problem: Inadequate oral intake  Intervention: Food and/or Nutrient Delivery: Continue NPO, Start ONS, Modify current Parenteral Nutrition  Nutritional Goals: PN intake to meet >75% of estimated energy and protein needs, with transition to PO intake as tolerated

## 2019-12-31 LAB
ABO/RH: NORMAL
ANTIBODY SCREEN: NEGATIVE
ARM BAND NUMBER: NORMAL
BLD PROD TYP BPU: NORMAL
CROSSMATCH RESULT: NORMAL
DISPENSE STATUS BLOOD BANK: NORMAL
ESTIMATED AVERAGE GLUCOSE: 123 MG/DL
EXPIRATION DATE: NORMAL
HBA1C MFR BLD: 5.9 % (ref 4–6)
TRANSFUSION STATUS: NORMAL
TSH SERPL DL<=0.05 MIU/L-ACNC: 9.72 MIU/L (ref 0.3–5)
UNIT DIVISION: 0
UNIT NUMBER: NORMAL
VITAMIN D 25-HYDROXY: 15.2 NG/ML (ref 30–100)

## 2019-12-31 PROCEDURE — 83036 HEMOGLOBIN GLYCOSYLATED A1C: CPT

## 2019-12-31 PROCEDURE — 82306 VITAMIN D 25 HYDROXY: CPT

## 2019-12-31 PROCEDURE — 84443 ASSAY THYROID STIM HORMONE: CPT

## 2019-12-31 NOTE — PROGRESS NOTES
Pt picked up by Alejandra Salas to transfer to [facility] AT [facility]. Pt has all belongings and is in no distress.

## 2020-01-01 NOTE — DISCHARGE SUMMARY
20 mg two times a day. 4.  Metoprolol 25 mg daily. 5.  Vitamin D 2000 units daily. 6.  Antibiotics as per Infectious Diseases. DISCHARGE INSTRUCTIONS:  The patient to follow with all the doctors  after discharge from the rehab.         Dillon Coppola    D: 12/31/2019 15:02:57       T: 01/01/2020 1:08:40     [email or web address]  Job#: [id number]     Doc#: [id number]    CC:

## 2020-01-06 LAB
ALBUMIN SERPL-MCNC: 3.1 G/DL (ref 3.5–5.2)
ALBUMIN/GLOBULIN RATIO: ABNORMAL (ref 1–2.5)
ALP BLD-CCNC: 130 U/L (ref 35–104)
ALT SERPL-CCNC: 13 U/L (ref 5–33)
ANION GAP SERPL CALCULATED.3IONS-SCNC: 12 MMOL/L (ref 9–17)
AST SERPL-CCNC: 18 U/L
BILIRUB SERPL-MCNC: 0.26 MG/DL (ref 0.3–1.2)
BUN BLDV-MCNC: 30 MG/DL (ref 8–23)
BUN/CREAT BLD: 30 (ref 9–20)
CALCIUM SERPL-MCNC: 9.6 MG/DL (ref 8.6–10.4)
CHLORIDE BLD-SCNC: 101 MMOL/L (ref 98–107)
CO2: 22 MMOL/L (ref 20–31)
CREAT SERPL-MCNC: 0.99 MG/DL (ref 0.5–0.9)
GFR AFRICAN AMERICAN: >60 ML/MIN
GFR NON-AFRICAN AMERICAN: 56 ML/MIN
GFR SERPL CREATININE-BSD FRML MDRD: ABNORMAL ML/MIN/{1.73_M2}
GFR SERPL CREATININE-BSD FRML MDRD: ABNORMAL ML/MIN/{1.73_M2}
GLUCOSE BLD-MCNC: 94 MG/DL (ref 70–99)
MAGNESIUM: 2.1 MG/DL (ref 1.6–2.6)
PHOSPHORUS: 3.7 MG/DL (ref 2.6–4.5)
POTASSIUM SERPL-SCNC: 4.7 MMOL/L (ref 3.7–5.3)
SODIUM BLD-SCNC: 135 MMOL/L (ref 135–144)
TOTAL PROTEIN: 6.4 G/DL (ref 6.4–8.3)
TRIGL SERPL-MCNC: 154 MG/DL

## 2020-01-06 PROCEDURE — 80053 COMPREHEN METABOLIC PANEL: CPT

## 2020-01-06 PROCEDURE — 83735 ASSAY OF MAGNESIUM: CPT

## 2020-01-06 PROCEDURE — 84478 ASSAY OF TRIGLYCERIDES: CPT

## 2020-01-06 PROCEDURE — 84100 ASSAY OF PHOSPHORUS: CPT

## 2020-01-08 LAB
ANION GAP SERPL CALCULATED.3IONS-SCNC: 7 MMOL/L (ref 9–17)
BUN BLDV-MCNC: 27 MG/DL (ref 8–23)
BUN/CREAT BLD: 29 (ref 9–20)
CALCIUM SERPL-MCNC: 9.2 MG/DL (ref 8.6–10.4)
CHLORIDE BLD-SCNC: 102 MMOL/L (ref 98–107)
CO2: 26 MMOL/L (ref 20–31)
CREAT SERPL-MCNC: 0.94 MG/DL (ref 0.5–0.9)
GFR AFRICAN AMERICAN: >60 ML/MIN
GFR NON-AFRICAN AMERICAN: 59 ML/MIN
GFR SERPL CREATININE-BSD FRML MDRD: ABNORMAL ML/MIN/{1.73_M2}
GFR SERPL CREATININE-BSD FRML MDRD: ABNORMAL ML/MIN/{1.73_M2}
GLUCOSE BLD-MCNC: 96 MG/DL (ref 70–99)
POTASSIUM SERPL-SCNC: 4.4 MMOL/L (ref 3.7–5.3)
SODIUM BLD-SCNC: 135 MMOL/L (ref 135–144)

## 2020-01-08 PROCEDURE — 80048 BASIC METABOLIC PNL TOTAL CA: CPT

## 2020-01-10 LAB
-: NORMAL
ANION GAP SERPL CALCULATED.3IONS-SCNC: 8 MMOL/L (ref 9–17)
BUN BLDV-MCNC: 20 MG/DL (ref 8–23)
BUN/CREAT BLD: 25 (ref 9–20)
CALCIUM SERPL-MCNC: 9.3 MG/DL (ref 8.6–10.4)
CHLORIDE BLD-SCNC: 100 MMOL/L (ref 98–107)
CO2: 26 MMOL/L (ref 20–31)
CREAT SERPL-MCNC: 0.81 MG/DL (ref 0.5–0.9)
GFR AFRICAN AMERICAN: >60 ML/MIN
GFR NON-AFRICAN AMERICAN: >60 ML/MIN
GFR SERPL CREATININE-BSD FRML MDRD: ABNORMAL ML/MIN/{1.73_M2}
GFR SERPL CREATININE-BSD FRML MDRD: ABNORMAL ML/MIN/{1.73_M2}
GLUCOSE BLD-MCNC: 118 MG/DL (ref 70–99)
POTASSIUM SERPL-SCNC: 4.1 MMOL/L (ref 3.7–5.3)
REASON FOR REJECTION: NORMAL
SODIUM BLD-SCNC: 134 MMOL/L (ref 135–144)
ZZ NTE CLEAN UP: ORDERED TEST: NORMAL
ZZ NTE WITH NAME CLEAN UP: SPECIMEN SOURCE: NORMAL

## 2020-01-10 PROCEDURE — 80048 BASIC METABOLIC PNL TOTAL CA: CPT

## 2020-01-11 ASSESSMENT — ENCOUNTER SYMPTOMS: NAUSEA: 1

## 2020-01-13 ENCOUNTER — CARE COORDINATION (OUTPATIENT)
Dept: CASE MANAGEMENT | Age: 70
End: 2020-01-13

## 2020-01-21 ENCOUNTER — TELEPHONE (OUTPATIENT)
Dept: INTERNAL MEDICINE CLINIC | Age: 70
End: 2020-01-21

## 2020-01-22 ENCOUNTER — TELEPHONE (OUTPATIENT)
Dept: INTERNAL MEDICINE CLINIC | Age: 70
End: 2020-01-22

## 2020-01-22 NOTE — TELEPHONE ENCOUNTER
Unless she has any swelling or increased shortness of breath she does not need to take the Lasix and will discuss on 27th  Get CBC and BMP before visit

## 2020-01-22 NOTE — TELEPHONE ENCOUNTER
Pt called to report she was not taking any lasix while at [facility] (pt declined the med, thought she didn't need it)  On 1/20/20 pt felt she was having trouble voiding so she took a lasix 20 mg once which helped. Pt is asking if and how she should be taking lasix now? Pt states she has no edema. Has appt to discuss on 1/27/2020.

## 2020-01-23 ENCOUNTER — TELEPHONE (OUTPATIENT)
Dept: INTERNAL MEDICINE CLINIC | Age: 70
End: 2020-01-23

## 2020-01-23 ENCOUNTER — HOSPITAL ENCOUNTER (OUTPATIENT)
Age: 70
Discharge: HOME | End: 2020-01-23
Payer: MEDICARE

## 2020-01-23 LAB
ANION GAP SERPL CALCULATED.3IONS-SCNC: 11 MMOL/L (ref 9–17)
BUN BLDV-MCNC: 19 MG/DL (ref 8–23)
BUN/CREAT BLD: ABNORMAL (ref 9–20)
CALCIUM SERPL-MCNC: 9.6 MG/DL (ref 8.6–10.4)
CHLORIDE BLD-SCNC: 104 MMOL/L (ref 98–107)
CO2: 24 MMOL/L (ref 20–31)
CREAT SERPL-MCNC: 0.76 MG/DL (ref 0.5–0.9)
GFR AFRICAN AMERICAN: >60 ML/MIN
GFR NON-AFRICAN AMERICAN: >60 ML/MIN
GFR SERPL CREATININE-BSD FRML MDRD: ABNORMAL ML/MIN/{1.73_M2}
GFR SERPL CREATININE-BSD FRML MDRD: ABNORMAL ML/MIN/{1.73_M2}
GLUCOSE BLD-MCNC: 130 MG/DL (ref 70–99)
HCT VFR BLD CALC: 30.5 % (ref 36.3–47.1)
HEMOGLOBIN: 9.3 G/DL (ref 11.9–15.1)
MCH RBC QN AUTO: 30 PG (ref 25.2–33.5)
MCHC RBC AUTO-ENTMCNC: 30.5 G/DL (ref 28.4–34.8)
MCV RBC AUTO: 98.4 FL (ref 82.6–102.9)
NRBC AUTOMATED: 0 PER 100 WBC
PDW BLD-RTO: 17.2 % (ref 11.8–14.4)
PLATELET # BLD: 326 K/UL (ref 138–453)
PMV BLD AUTO: 10.4 FL (ref 8.1–13.5)
POTASSIUM SERPL-SCNC: 4 MMOL/L (ref 3.7–5.3)
RBC # BLD: 3.1 M/UL (ref 3.95–5.11)
SODIUM BLD-SCNC: 139 MMOL/L (ref 135–144)
WBC # BLD: 6.8 K/UL (ref 3.5–11.3)

## 2020-01-23 PROCEDURE — 80048 BASIC METABOLIC PNL TOTAL CA: CPT

## 2020-01-23 PROCEDURE — 36415 COLL VENOUS BLD VENIPUNCTURE: CPT

## 2020-01-23 PROCEDURE — 85027 COMPLETE CBC AUTOMATED: CPT

## 2020-01-30 ENCOUNTER — OFFICE VISIT (OUTPATIENT)
Dept: INTERNAL MEDICINE CLINIC | Age: 70
End: 2020-01-30
Payer: MEDICARE

## 2020-01-30 VITALS
BODY MASS INDEX: 33.49 KG/M2 | SYSTOLIC BLOOD PRESSURE: 118 MMHG | TEMPERATURE: 97.7 F | OXYGEN SATURATION: 94 % | HEART RATE: 81 BPM | HEIGHT: 67 IN | WEIGHT: 213.4 LBS | DIASTOLIC BLOOD PRESSURE: 78 MMHG

## 2020-01-30 PROCEDURE — 1111F DSCHRG MED/CURRENT MED MERGE: CPT | Performed by: INTERNAL MEDICINE

## 2020-01-30 PROCEDURE — 99495 TRANSJ CARE MGMT MOD F2F 14D: CPT | Performed by: INTERNAL MEDICINE

## 2020-01-30 NOTE — PROGRESS NOTES
metoprolol succinate (TOPROL XL) 25 MG extended release tablet Take 1 tablet by mouth daily 30 tablet 3    clotrimazole-betamethasone (LOTRISONE) 1-0.05 % cream Apply topically daily as needed (to spot on arm)      Cholecalciferol (VITAMIN D) 2000 units CAPS capsule Take 2 capsules by mouth daily       famotidine (PEPCID) 20 MG/2ML injection Infuse 2 mLs intravenously 2 times daily (Patient not taking: Reported on 1/30/2020) 120 mL 0    furosemide (LASIX) 20 MG tablet Take 1 tablet by mouth 2 times daily (Patient not taking: Reported on 1/30/2020) 60 tablet 3    potassium chloride (KLOR-CON M) 20 MEQ extended release tablet Take 1 tablet by mouth 2 times daily (with meals) (Patient not taking: Reported on 1/30/2020) 60 tablet 3    ondansetron (ZOFRAN) 4 MG tablet Take 1 tablet by mouth every 12 hours as needed for Nausea or Vomiting (Patient not taking: Reported on 1/30/2020) 10 tablet 0     No current facility-administered medications for this visit. No Known Allergies    Past Medical History:   Diagnosis Date    Allergic rhinitis     Bladder prolapse     Constipation     5/2019 resolved    Fundic gland polyposis of stomach 08/17/2017    per EGD    GERD (gastroesophageal reflux disease)     on no medications    Hiatal hernia     History of cardiac cath 12/2002    pt denies blockage    History of diverticulitis     Hyperlipidemia     borderline, on no medication    Hypertension     MRSA (methicillin resistant staph aureus) culture positive 11/01/2016    nasal    MRSA carrier     follows with ID    Obesity     Osteoarthritis     Pelvic abscess in female     Systolic murmur     benign systolic murmur (history of). Pt does not follow-up with a cardiologist (Written 09/25/2019).     Thyroid disease     goiter    Wears glasses        Past Surgical History:   Procedure Laterality Date    ABDOMINAL EXPLORATION SURGERY  05/16/2019    CHOLECYSTECTOMY, LAPAROSCOPIC  11/15/2016    CHOLECYSTECTOMY, LAPAROSCOPIC  11/15/2016    COLONOSCOPY  2013    neg    COLONOSCOPY N/A 5/16/2019    COLONOSCOPY DIAGNOSTIC performed by Vishnu Ely MD at [address] COLONOSCOPY N/A 12/19/2019    COLONOSCOPY W/STENT X2 performed by Vishnu Ely MD at [address] [facility] N/A 12/24/2019    EXPLORATORY LAPAROTOMY, EXTENSIVE LYSIS OF ADHESIONS, TAKE DOWN OF ANASTOMOSIS, COLOSTOMY CREATION, CLOSURE OF SEROMUSCULAR INJURIES performed by Vishnu Ely MD at [address] [facility] [address], COLON, DIAGNOSTIC      EGD    FISSURECTOMY ANAL N/A 10/4/2019    FLEXIBLE SIGMOIDOSCOPY & ANAL FISTULECTOMY performed by Vishnu Ely MD at [address] [facility] ARTHROSCOPY Left     lateral release    [place] EGD TRANSORAL BIOPSY SINGLE/MULTIPLE N/A 8/17/2017    EGD BIOPSY performed by Nazia Manriquez MD at [address] OFFICE/OUTPT [address] N/A 5/22/2018    XI ROBOTIC LAPAROSCOPIC UMBILICAL HERNIA REPAIR WITH MESH, POSSIBLE OPEN performed by Domenico Morris IV, DO at [facility] [address]  02/19/2019    SIGMOIDOSCOPY N/A 2/19/2019    SIGMOIDOSCOPY BIOPSY FLEXIBLE performed by Donold Halsted, DO at [facility] [address] N/A 12/23/2019    SIGMOIDOSCOPY DIAGNOSTIC FLEXIBLE WITH FLUORO performed by Spencer Cabrera MD at [facility] N/A 5/16/2019    [address] performed by [facility] MD [facility] N/A 11/12/2019    COLOSTOMY  [address] [facility], REPAIR OF MULTIPLE VENTRAL HERNIAS, MOBILIZATION OF THE SPLENIC FLEXURE AND TRANSVERSE COLON, ABDOMINAL EXPLORATION performed by Vishnu Ely MD at [address] [facility] Left 9/24/2012    knee    TOTAL KNEE ARTHROPLASTY Right 11/10/2014    knee    UMBILICAL HERNIA REPAIR  05/22/2018    UPPER GASTROINTESTINAL ENDOSCOPY  08/17/2017    Multiple small gastric polyps, no esophagitis noted no heart sounds and intact distal pulses  Pulmonary:  effort normal and breath sounds normal bilaterally,no wheezes or rales, no respiratory distress  Abdominal:  Soft, non-tender; normal bowel sounds, no masses except abdominal wound is dressed no active drainage and colostomy bag in place  Musculoskeletal:  Normal range of motion and no edema or tenderness bilaterally  No lymphadenopathy  Neurological:  alert, oriented, and normal reflexes bilaterally  Skin: warm and dry  Psychiatric:  normal mood and effect; behavior normal.    Labs:   Lab Results   Component Value Date    LABA1C 5.9 12/31/2019     Lab Results   Component Value Date    CHOL 135 11/22/2019     Lab Results   Component Value Date    HDL 30 (L) 11/22/2019     No results found for: [address]  Lab Results   Component Value Date    TRIG 154 (H) 01/06/2020     No components found for: [place], [place]  Lab Results   Component Value Date    WBC 6.8 01/23/2020    HGB 9.3 (L) 01/23/2020    HCT 30.5 (L) 01/23/2020    MCV 98.4 01/23/2020     01/23/2020     Lab Results   Component Value Date    INR 1.2 12/17/2019    PROTIME 11.8 (H) 12/17/2019     Lab Results   Component Value Date    GLUCOSE 130 (H) 01/23/2020    CREATININE 0.76 01/23/2020    BUN 19 01/23/2020     01/23/2020    K 4.0 01/23/2020     01/23/2020    CO2 24 01/23/2020     Lab Results   Component Value Date    ALT 13 01/06/2020    AST 18 01/06/2020    ALKPHOS 130 (H) 01/06/2020    BILITOT 0.26 (L) 01/06/2020     Lab Results   Component Value Date    LABPROT 6.4 08/14/2012    LABALBU 3.1 (L) 01/06/2020     Lab Results   Component Value Date    TSH 9.72 (H) 12/31/2019     Assessment:  1. Colostomy present ([address]    2. Perforated sigmoid colon ([facility] [address]    3.  Essential hypertension        Plan:  Patient's visit to the hospital reports and subsequent transfer to [facility] and patient now feeling better and off TPN and tolerating diet and patient had a colostomy bag which is functioning properly and patient is following with Dr. Xi Guevara colorectal and will be seeing him again in 2 weeks and patient's wound is dressed and no evidence of any drainage  Patient's hemoglobin improved to 9.3 and patient's white count is normal and creatinine normal and advised patient to continue current treatment and patient not doing any physical therapy as she is qualified and graduated and doing well at home and living by herself  Blood pressure is stable  Review as scheduled           1. Jennifer received counseling on the following healthy behaviors: nutrition and exercise    2. Prior labs and health maintenance reviewed. 3.  Discussed use, benefit, and side effects of prescribed medications. Barriers to medication compliance addressed. All her questions were answered. Pt voiced understanding. Brandi Vasques will continue current medications, diet and exercise. No orders of the defined types were placed in this encounter. Completed Refills               Requested Prescriptions      No prescriptions requested or ordered in this encounter     4. Patient given educational materials - see patient instructions    5. Was a self-tracking handout given in paper form or via [employer]? NO    No orders of the defined types were placed in this encounter. No follow-ups on file. Patient voiced understanding and agreed to treatment plan. Electronically signed by Deandra Love MD on 1/30/2020 at 4:27 PM    This note is created with a voice recognition program and while intend to generate a document that accurately reflects the content of the visit, no guarantee can be provided that every mistake has been identified and corrected by editing.

## 2020-02-11 ENCOUNTER — HOSPITAL ENCOUNTER (OUTPATIENT)
Dept: WOUND CARE | Age: 70
Discharge: HOME | End: 2020-02-11
Payer: MEDICARE

## 2020-02-11 VITALS
DIASTOLIC BLOOD PRESSURE: 81 MMHG | SYSTOLIC BLOOD PRESSURE: 141 MMHG | HEART RATE: 84 BPM | RESPIRATION RATE: 18 BRPM | TEMPERATURE: 98.4 F

## 2020-02-11 PROCEDURE — 99212 OFFICE O/P EST SF 10 MIN: CPT

## 2020-02-11 NOTE — PROGRESS NOTES
[facility] Nurse  Follow Up      NAME:  Meghan Lubin  MEDICAL RECORD NUMBER:  [id number]  AGE: 81 y.o. GENDER: female  : 1950  TODAY'S DATE:  2020    The patient presents to ostomy clinic today for further education as she has several questions since this is a new colostomy and it is now permanent. The stoma appears healthy, and protrudes from the skin. The patient states that the stoma has retracted somewhat since her surgery and it was explained that this is fairly normal and the amount of remaining protrusion should easily allow the stoma to drain properly into an appliance. Upon removal of the appliance, there was stool noted around under the ring on the appliance. The bag had been changed two days prior. The peristomal skin is intact and healthy. The peristomal tissue is very soft and may require a bit more securement/support by the appliance. A soft convex pouch was suggested and placed on the patient with her performing most of the tasks to accomplish this. She is quite confident with this. Rationale for the convexity was discussed and many questions were answered regarding long term mgmt of the colostomy. The patient has an open midline incision that was briefly assessed as the dressing was taken down to accommodate mgmt of the ostomy. There is three open draining wounds with depth present and what appears to be a thick layer of slough tissue present in at least one wound. The patient may benefit from wound care center referral if deemed appropriate by colorectal surgery. The patient states that she will discuss this with her surgeon. No follow up visit in ostomy clinic recommended at this time. The patient will call if further needs or concerns arise.     .  Colostomy LUQ (Active)   Stomal Appliance 1 piece 2020  4:14 PM   Flange Size (inches) 2.5 Inches 2020  4:14 PM   Stoma  Assessment Moist;Red;Protrudes 2020  4:14 PM   Mucocutaneous Junction Intact 2/11/2020  4:14 PM   Peristomal Assessment Clean; Intact 2/11/2020  4:14 PM   Treatment Bag change 2/11/2020  4:14 PM   Stool Appearance Loose 2/11/2020  4:14 PM   Stool Color True Ba 2/11/2020  4:14 PM   Number of days: 52     Ashley VIEIRAN, RN, [address] [facility], Ostomy, and Continence Care  [phone]

## 2020-02-20 ENCOUNTER — HOSPITAL ENCOUNTER (OUTPATIENT)
Dept: GENERAL RADIOLOGY | Age: 70
Discharge: HOME | End: 2020-02-22
Payer: MEDICARE

## 2020-02-20 ENCOUNTER — HOSPITAL ENCOUNTER (OUTPATIENT)
Age: 70
Setting detail: SPECIMEN
Discharge: HOME | End: 2020-02-20
Payer: MEDICARE

## 2020-02-20 ENCOUNTER — HOSPITAL ENCOUNTER (OUTPATIENT)
Age: 70
Discharge: HOME | End: 2020-02-22
Payer: MEDICARE

## 2020-02-20 ENCOUNTER — OFFICE VISIT (OUTPATIENT)
Dept: INTERNAL MEDICINE CLINIC | Age: 70
End: 2020-02-20
Payer: MEDICARE

## 2020-02-20 VITALS
HEART RATE: 87 BPM | OXYGEN SATURATION: 95 % | BODY MASS INDEX: 34.21 KG/M2 | TEMPERATURE: 97.1 F | WEIGHT: 218 LBS | DIASTOLIC BLOOD PRESSURE: 68 MMHG | SYSTOLIC BLOOD PRESSURE: 128 MMHG | HEIGHT: 67 IN

## 2020-02-20 LAB
ANION GAP SERPL CALCULATED.3IONS-SCNC: 13 MMOL/L (ref 9–17)
BUN BLDV-MCNC: 18 MG/DL (ref 8–23)
BUN/CREAT BLD: ABNORMAL (ref 9–20)
CALCIUM SERPL-MCNC: 9.9 MG/DL (ref 8.6–10.4)
CHLORIDE BLD-SCNC: 103 MMOL/L (ref 98–107)
CO2: 26 MMOL/L (ref 20–31)
CREAT SERPL-MCNC: 0.75 MG/DL (ref 0.5–0.9)
GFR AFRICAN AMERICAN: >60 ML/MIN
GFR NON-AFRICAN AMERICAN: >60 ML/MIN
GFR SERPL CREATININE-BSD FRML MDRD: ABNORMAL ML/MIN/{1.73_M2}
GFR SERPL CREATININE-BSD FRML MDRD: ABNORMAL ML/MIN/{1.73_M2}
GLUCOSE BLD-MCNC: 104 MG/DL (ref 70–99)
HCT VFR BLD CALC: 37.6 % (ref 36.3–47.1)
HEMOGLOBIN: 11.4 G/DL (ref 11.9–15.1)
MCH RBC QN AUTO: 29.5 PG (ref 25.2–33.5)
MCHC RBC AUTO-ENTMCNC: 30.3 G/DL (ref 28.4–34.8)
MCV RBC AUTO: 97.2 FL (ref 82.6–102.9)
NRBC AUTOMATED: 0 PER 100 WBC
PDW BLD-RTO: 14.6 % (ref 11.8–14.4)
PLATELET # BLD: 328 K/UL (ref 138–453)
PMV BLD AUTO: 10.3 FL (ref 8.1–13.5)
POTASSIUM SERPL-SCNC: 4.1 MMOL/L (ref 3.7–5.3)
RBC # BLD: 3.87 M/UL (ref 3.95–5.11)
SODIUM BLD-SCNC: 142 MMOL/L (ref 135–144)
WBC # BLD: 6.9 K/UL (ref 3.5–11.3)

## 2020-02-20 PROCEDURE — 3017F COLORECTAL CA SCREEN DOC REV: CPT | Performed by: INTERNAL MEDICINE

## 2020-02-20 PROCEDURE — 99214 OFFICE O/P EST MOD 30 MIN: CPT | Performed by: INTERNAL MEDICINE

## 2020-02-20 PROCEDURE — G8427 DOCREV CUR MEDS BY ELIG CLIN: HCPCS | Performed by: INTERNAL MEDICINE

## 2020-02-20 PROCEDURE — 73030 X-RAY EXAM OF SHOULDER: CPT

## 2020-02-20 PROCEDURE — G8484 FLU IMMUNIZE NO ADMIN: HCPCS | Performed by: INTERNAL MEDICINE

## 2020-02-20 PROCEDURE — G8417 CALC BMI ABV UP PARAM F/U: HCPCS | Performed by: INTERNAL MEDICINE

## 2020-02-20 PROCEDURE — 1090F PRES/ABSN URINE INCON ASSESS: CPT | Performed by: INTERNAL MEDICINE

## 2020-02-20 PROCEDURE — 4040F PNEUMOC VAC/ADMIN/RCVD: CPT | Performed by: INTERNAL MEDICINE

## 2020-02-20 PROCEDURE — G8400 PT W/DXA NO RESULTS DOC: HCPCS | Performed by: INTERNAL MEDICINE

## 2020-02-20 PROCEDURE — 1036F TOBACCO NON-USER: CPT | Performed by: INTERNAL MEDICINE

## 2020-02-20 PROCEDURE — 1123F ACP DISCUSS/DSCN MKR DOCD: CPT | Performed by: INTERNAL MEDICINE

## 2020-02-20 RX ORDER — OLOPATADINE HYDROCHLORIDE 1 MG/ML
1 SOLUTION/ DROPS OPHTHALMIC 2 TIMES DAILY
Qty: 1 BOTTLE | Refills: 0 | Status: SHIPPED | OUTPATIENT
Start: 2020-02-20 | End: 2020-02-26 | Stop reason: SDUPTHER

## 2020-02-20 RX ORDER — FLUTICASONE PROPIONATE 50 MCG
2 SPRAY, SUSPENSION (ML) NASAL DAILY
Qty: 1 BOTTLE | Refills: 0 | Status: SHIPPED | OUTPATIENT
Start: 2020-02-20 | End: 2020-07-30 | Stop reason: ALTCHOICE

## 2020-02-20 RX ORDER — MECLIZINE HCL 12.5 MG/1
12.5 TABLET ORAL 3 TIMES DAILY PRN
Qty: 30 TABLET | Refills: 0 | Status: SHIPPED | OUTPATIENT
Start: 2020-02-20 | End: 2020-03-01

## 2020-02-20 RX ORDER — PREDNISONE 10 MG/1
10 TABLET ORAL
Qty: 15 TABLET | Refills: 0 | Status: SHIPPED | OUTPATIENT
Start: 2020-02-20 | End: 2020-02-25

## 2020-02-20 NOTE — PROGRESS NOTES
murmur (history of). Pt does not follow-up with a cardiologist (Written 09/25/2019).     Thyroid disease     goiter    Wears glasses        Past Surgical History:   Procedure Laterality Date    ABDOMINAL EXPLORATION SURGERY  05/16/2019    CHOLECYSTECTOMY, LAPAROSCOPIC  11/15/2016    CHOLECYSTECTOMY, LAPAROSCOPIC  11/15/2016    COLONOSCOPY  2013    neg    COLONOSCOPY N/A 5/16/2019    COLONOSCOPY DIAGNOSTIC performed by Gabriel Hawkins MD at [address] [facility] COLONOSCOPY N/A 12/19/2019    COLONOSCOPY W/STENT X2 performed by Gabriel Hawkins MD at [address] 12/24/2019    EXPLORATORY LAPAROTOMY, EXTENSIVE LYSIS OF ADHESIONS, TAKE DOWN OF ANASTOMOSIS, COLOSTOMY CREATION, CLOSURE OF SEROMUSCULAR INJURIES performed by Gabriel Hawkins MD at [address], COLON, DIAGNOSTIC      EGD    FISSURECTOMY ANAL N/A 10/4/2019    FLEXIBLE SIGMOIDOSCOPY & ANAL FISTULECTOMY performed by Gabriel Hawkins MD at [address] [facility] ARTHROSCOPY Left     lateral release    NV EGD TRANSORAL BIOPSY SINGLE/MULTIPLE N/A 8/17/2017    EGD BIOPSY performed by Antonietta Cardenas MD at [address] OFFICE/OUTPT [address] N/A 5/22/2018    XI ROBOTIC LAPAROSCOPIC [address] [facility], POSSIBLE OPEN performed by Ewell Spatz Canos IV, DO at [facility]  02/19/2019    SIGMOIDOSCOPY N/A 2/19/2019    SIGMOIDOSCOPY BIOPSY FLEXIBLE performed by Lord Ketty DO at [facility] [address] N/A 12/23/2019    SIGMOIDOSCOPY DIAGNOSTIC FLEXIBLE WITH FLUORO performed by Aquilino Vega MD at [address] [facility] 5/16/2019    [address] performed by Gabriel Hawkins MD at [address] N/A 11/12/2019    COLOSTOMY  CLOSURE EXTENSIVE LYSIS OF ADHESIONS REPAIR OF SMALL BOWEL TEAR, REPAIR OF MULTIPLE VENTRAL HERNIAS, MOBILIZATION OF THE SPLENIC FLEXURE AND TRANSVERSE COLON, ABDOMINAL EXPLORATION performed by Elizabeth Sarkar MD at [address] Left 9/24/2012    knee    TOTAL KNEE ARTHROPLASTY Right 11/10/2014    knee    UMBILICAL HERNIA REPAIR  05/22/2018    UPPER GASTROINTESTINAL ENDOSCOPY  08/17/2017    Multiple small gastric polyps, no esophagitis noted no Melara's mucosa.   No hiatal hernia, pathology benign fundic gland polyps       Family History   Problem Relation Age of Onset    Heart Disease Mother     COPD Father     Cancer Brother         thyroid, prostate, lung     ROS   Constitutional:  Negative for fatigue, loss of appetite and unexpected weight change   HEENT            : Negative for neck stiffness and pain, no congestion or sinus pressure   Eyes                : No visual disturbance or pain   Cardiovascular: No chest pain or palpitations or leg swelling   Respiratory      : Negative for cough, shortness of breath or wheezing   Gastrointestinal: Negative for abdominal pain, constipation or diarrhea and bloating No nausea or vomiting   Genitourinary:     No urgency or frequency, no burning or hematuria   Musculoskeletal: No arthralgias, back pain or myalgias   Skin                  : Negative for rash or erythema   Neurological    : Negative for dizziness, weakness, tremors ,light headedness or syncope   Psychiatric       : Negative for dysphoric mood, sleep disturbances, nervous or anxious, or decreased concentration   All other review of systems was negative    Objective  Physical Examination:    Nursing note reviewed    /68 (Site: Right Upper Arm, Position: Sitting, Cuff Size: Large Adult)   Pulse 87   Temp 97.1 °F (36.2 °C)   Ht 5' 7.01\" (1.702 m)   Wt 218 lb (98.9 kg)   LMP  (LMP Unknown)   SpO2 95%   BMI 34.14 kg/m²   BP Readings from Last 3 Encounters:   02/20/20 128/68   02/11/20 (!) 141/81   01/30/20 118/78         Constitutional:  [address] is oriented to place, person and time ,appears well-developed and well-nourished  HEENT: Atraumatic and normocephalic, external ears normal bilaterally, nose normal no oropharyngeal exudate and is clear and moist  Eyes:  EOCM normal; conjunctivae normal; PERRLA bilaterally  Neck:  Normal range of motion, neck supple, no JVD and no thyromegaly  Cardiovascular:  RRR, normal heart sounds and intact distal pulses  Pulmonary:  effort normal and breath sounds normal bilaterally,no wheezes or rales, no respiratory distress  Abdominal:  Soft, non-tender; normal bowel sounds, no masses  Musculoskeletal:  Normal range of motion and no edema or tenderness bilaterally  No lymphadenopathy  Neurological:  alert, oriented, and normal reflexes bilaterally  Skin: warm and dry  Psychiatric:  normal mood and effect; behavior normal.    Labs:   Lab Results   Component Value Date    LABA1C 5.9 12/31/2019     Lab Results   Component Value Date    CHOL 135 11/22/2019     Lab Results   Component Value Date    HDL 30 (L) 11/22/2019     No results found for: [address] [employer]  Lab Results   Component Value Date    TRIG 154 (H) 01/06/2020     No components found for: [place], [place]  Lab Results   Component Value Date    WBC 6.8 01/23/2020    HGB 9.3 (L) 01/23/2020    HCT 30.5 (L) 01/23/2020    MCV 98.4 01/23/2020     01/23/2020     Lab Results   Component Value Date    INR 1.2 12/17/2019    PROTIME 11.8 (H) 12/17/2019     Lab Results   Component Value Date    GLUCOSE 130 (H) 01/23/2020    CREATININE 0.76 01/23/2020    BUN 19 01/23/2020     01/23/2020    K 4.0 01/23/2020     01/23/2020    CO2 24 01/23/2020     Lab Results   Component Value Date    ALT 13 01/06/2020    AST 18 01/06/2020    ALKPHOS 130 (H) 01/06/2020    BILITOT 0.26 (L) 01/06/2020     Lab Results   Component Value Date    LABPROT 6.4 08/14/2012    LABALBU 3.1 (L) 01/06/2020     Lab Results   Component Value Date    TSH 9.72 (H) 12/31/2019     Assessment:  1. Pain of right upper arm    2. Vertigo    3.  Environmental allergies        Plan:  As patient had PICC line placed a few months back in the right upper extremity will do a DVT screen but patient does not think there is a DVT issue  Advised patient to start on Antivert for vertigo symptoms and also Flonase nasal spray and patient is following with ENT  Advised patient to try medications for allergies and she states that she takes  honey made from [facility] which helps with her allergies and she is starting today  CBC BMP ordered  Patient advised to start on prednisone 10 mg twice daily for right shoulder pain and also advised to follow-up with orthopedics but patient wants to wait  Start on Patanol eyedrops for dry eyes and itching and allergies   Review as scheduled           1. Jennifer received counseling on the following healthy behaviors: nutrition and exercise    2. Prior labs and health maintenance reviewed. 3.  Discussed use, benefit, and side effects of prescribed medications. Barriers to medication compliance addressed. All her questions were answered. Pt voiced understanding. Elliott Calles will continue current medications, diet and exercise. No orders of the defined types were placed in this encounter. Completed Refills               Requested Prescriptions      No prescriptions requested or ordered in this encounter     4. Patient given educational materials - see patient instructions    5. Was a self-tracking handout given in paper form or via [employer]? NO    No orders of the defined types were placed in this encounter. No follow-ups on file. Patient voiced understanding and agreed to treatment plan. Electronically signed by Jarocho Billings MD on 2/20/2020 at 2:51 PM    This note is created with a voice recognition program and while intend to generate a document that accurately reflects the content of the visit, no guarantee can be provided that every mistake has been identified and corrected by editing.

## 2020-02-24 PROBLEM — A49.8 E COLI INFECTION: Status: ACTIVE | Noted: 2020-02-24

## 2020-02-24 NOTE — PROGRESS NOTES
[facility] - Office Progress Note  Today's Date and Time: 2/25/2020, 12:34 PM    Diagnostic Impression :     1. Postoperative intra-abdominal abscess    2. MRSA (methicillin resistant staph aureus) culture positive    3. E coli infection    4. Abdominal adhesions    5. Essential hypertension        Recommendations   Completed ceftaroline and metronidazole January 6, 2020  Monitor off antibiotics  Continue wound care normal saline dressings daily  Follow up with Dr Francy Lewis as scheduled  Return to office in 4 weeks    Chief complaint/reason for consultation:     Chief Complaint   Patient presents with   [address] from Hospital     MRSA, E coli pelvic abscess       History of Present Illness: Jarrod Avila is a 81y.o.-year-old  female who was evaluated on 2/25/2020. Patient seen at the request of Dr. Terrie Lakahni:  Petrona Wood has a history of diverticulitis and underwent a diverting colostomy in May and in early November was admitted and underwent a colostomy reversal.  The patient's hospital course was complicated by fluid overload for which she received some diuretic therapy. She also reports having a lot of diarrhea during that admission which was felt secondary to the Toradol and post discharge her diarrhea issues continued. The patient reports not feeling very well and was seen by a nurse practitioner as her PCP was out of town. The patient was sent to the emergency room November 21, and was worked up and sent home. She reports that around Thanksgiving she started having rectal bleeding which prompted her to come back into the emergency room and at that point in time she was diagnosed with an anastomotic leak on CT imaging. She was treated conservatively with IV antibiotics and subsequently discharged on oral antimicrobial therapy with ciprofloxacin and metronidazole.     The patient reports that her diarrhea had improved but recently resumed. by Nephrology. Pulmonary was consulted because of pulmonary hypertension with an RVSP of 40. Pt was placed on TPN and her diet slowly advanced, but she does not have much of an appetite. Wound care with iodoform packing BID. Pt stabilized and transferred to [facility] on 12-30-19  · 1-2 Pt with nausea and emesis, abd xray wnl  · 1-6 Nausea and emesis resolved. Pt reports continued poor appetite  · 1-8 Diet advanced to regular. Pt tolerating without N/V. Reports that she gets full quickly  · 1-10 1/2 MAE's dislodged, removed  · 1-10 CT abd showed drain along anterior margin of Rt sided pelvic fluid collection. Per IM note, plan to compare previous CT to see if MAE needs adjustment  · 1-13 2/2 MAE removed, CT reviewed and compared with previous from 12-20  · 1-13 Pt eating nearly 100% of meals, remains on TPN  · 1-14 Pt complaining of pain at MAE site. On exam there is a small area of firmness, no erythema or drainage. Will monitor closely  · 1-16 MAE sites clean, decreased induration, no drainage. Wound is clean  She was [facility] from [facility] on [facility]20[facility]20    CURRENT EXAMINATION: 2/25/2020     Pt reports that she is feeling well  No chills, fevers, malaise or night sweats. Her appetite and energy levels have returned    The abdominal wounds are improving. She is now having daily dressing changes with normal saline. Dr Amparo Kelley is managing the wounds, she has an appointment with him next week. She reports that this morning there was some blood from the upper open wound. No tenderness or pain. On exam the upper wound has a small area of tunneling approximately 2 cm caudally. There is a retained suture that was removed in the office. Above the upper wound is a pinprick open area with a small amount of bloody drainage. It was opened. There was a scant amount of bloody drainage, no tunneling, no odor, no purulence. Pts lower open wound was noted to have Lt lateral tunneling at 2-3 oclock of 3 cm.  A small amount of bloody drainage was noted from the wound. The wound area was nontender, no erythema or induration. Ostomy intact. HRR, murmur  Lungs clear    Pertinent review of systems include: For complete review of symptoms see ROS section below. DISCUSSION:  80 yo woman with history of Diverticulitis  · Underwent a diverting colostomy in May 2019, with colostomy reversal in November. · Developed diarrhea, and generally did not feel well. Was seen in the emergency room November 21st and discharged home. · Around Thanksgiving developed rectal bleeding and was found to have anastomotic leak on CT imaging. Treated with IV antibiotics and discharged on oral therapy, ciprofloxacin and Flagyl. · Had continued diarrhea, and still did not feel well. Developed some nausea and chills as well as difficulty urinating and leg swelling. Presented back to the ER and found to have a large pelvic abscess. · Admitted and underwent a complex hospital course. · Initially on 12/19/2019 a pigtail catheter was placed by interventional Radiology. Because of the fecal ent drainage it was of limited use. · 12/23 a colonoscopy showed a large anastomotic perforation with the previously placed stent migrated into the pelvic cavity. The stent was unsuccessfully attempted to be removed. · 12/24/2019 to the OR for exploratory laparotomy, lysis of adhesions, removal of sigmoid stent, and takedown of anastomosis with closure of the rectal stump and new colostomy creation. Patient also had repair of several sero muscular injuries of the small bowel and colon as well as an abdominal washout and draining of the pelvic abscess. MAE drains x2 were placed. · Abscess cultures showed MRSA and E coli. · Postoperatively suffered from an acute kidney injury, seen by Nephrology. Also seen by Pulmonary because of mild pulmonary hypertension with an RVSP of 40. · Wound Care with iodoform packing b.i.d. and p.r.n.   · Plan for ceftaroline and Flagyl through January 6, 2020. · Patient stabilized and transferred to [facility] on December 30, 2019  · 1-2 Pt with nausea and emesis, abd xray wnl  · 1-6 Nausea and emesis resolved. Pt reports continued poor appetite  · 1-8 Diet advanced to regular. Pt tolerating without N/V. Reports that she gets full quickly  · 1-10 1/2 MAE's dislodged, removed  · 1-10 CT abd showed drain along anterior margin of Rt sided pelvic fluid collection. Per IM note, plan to compare previous CT to see if MAE needs adjustment  · 1-13 2/2 MAE removed, CT reviewed and compared with previous from 12-20  · 1-13 Pt eating nearly 100% of meals, remains on TPN  · 1-14 Pt complaining of pain at MAE site. On exam there is a small area of firmness, no erythema or drainage. Will monitor closely  · 1-16 MAE sites clean, decreased induration, no drainage. Wound is clean  · 2-25 Wounds improved significantly. 2 open areas remain at the top, 1 pinpoint, 1 quarter sized with tunneling of 2 cm caudally. There was a retained suture that was removed in the office. · The lower wound had an area of tunneling 3 cm Lt laterally at 2-3 oclock. Small amounts of bleeding noted, no purulence, induration or erythema. PLAN:  · Completed ceftaroline and metronidazole January 6, 2020  · Monitor off antibiotics  Continue wound care normal saline dressings daily  · Follow up with Dr Darylene Motley as scheduled  · Return to office in 4 weeks    Discussed with patient. I have personally reviewed the past medical history, past surgical history, medications, social history, and family history, and I have updated the database accordingly.   Past Medical History:     Past Medical History:   Diagnosis Date    Allergic rhinitis     Bladder prolapse     Constipation     5/2019 resolved    Fundic gland polyposis of stomach 08/17/2017    per EGD    GERD (gastroesophageal reflux disease)     on no medications    Hiatal hernia     History of cardiac cath 12/2002    pt denies blockage    History of diverticulitis     Hyperlipidemia     borderline, on no medication    Hypertension     MRSA (methicillin resistant staph aureus) culture positive 11/01/2016    nasal    MRSA carrier     follows with ID    Obesity     Osteoarthritis     Pelvic abscess in female     Systolic murmur     benign systolic murmur (history of). Pt does not follow-up with a cardiologist (Written 09/25/2019).     Thyroid disease     goiter    Wears glasses        Past Surgical  History:     Past Surgical History:   Procedure Laterality Date    ABDOMINAL EXPLORATION SURGERY  05/16/2019    CHOLECYSTECTOMY, LAPAROSCOPIC  11/15/2016    CHOLECYSTECTOMY, LAPAROSCOPIC  11/15/2016    COLONOSCOPY  2013    neg    COLONOSCOPY N/A 5/16/2019    COLONOSCOPY DIAGNOSTIC performed by Noemi Melgoza MD at [facility] COLONOSCOPY N/A 12/19/2019    COLONOSCOPY W/STENT X2 performed by Noemi Melgoza MD at [address] 12/24/2019    EXPLORATORY LAPAROTOMY, EXTENSIVE LYSIS OF ADHESIONS, TAKE DOWN OF ANASTOMOSIS, COLOSTOMY CREATION, CLOSURE OF SEROMUSCULAR INJURIES performed by Noemi Melgoza MD at [address], COLON, DIAGNOSTIC      EGD    FISSURECTOMY ANAL N/A 10/4/2019    FLEXIBLE SIGMOIDOSCOPY & ANAL FISTULECTOMY performed by Noemi Melgoza MD at [address] [facility] ARTHROSCOPY Left     lateral release    [place] EGD TRANSORAL BIOPSY SINGLE/MULTIPLE N/A 8/17/2017    EGD BIOPSY performed by Abel Ballesteros MD at [address] OFFICE/OUTPT [address] N/A 5/22/2018    XI ROBOTIC LAPAROSCOPIC UMBILICAL HERNIA REPAIR WITH MESH, POSSIBLE OPEN performed by Arin Morris IV, DO at [facility] [address]  02/19/2019    SIGMOIDOSCOPY N/A 2/19/2019    SIGMOIDOSCOPY BIOPSY FLEXIBLE performed by Micah Parry DO at [address] 12/23/2019    SIGMOIDOSCOPY DIAGNOSTIC FLEXIBLE WITH FLUORO performed by Marlin Tejada MD at [address] [facility] 5/16/2019    ABDOMINAL EXPLORATION ANDBOWEL RESECTION LOW ANTERIOR WITH COLOSTOMY performed by Sharon Doty MD at [facility] [address] N/A 11/12/2019    COLOSTOMY  CLOSURE EXTENSIVE LYSIS OF ADHESIONS REPAIR OF SMALL BOWEL TEAR, REPAIR OF MULTIPLE VENTRAL HERNIAS, MOBILIZATION OF THE SPLENIC FLEXURE AND TRANSVERSE COLON, ABDOMINAL EXPLORATION performed by Sharon Doty MD at [address] [facility] [address] [facility] [address] Left 9/24/2012    knee    TOTAL KNEE ARTHROPLASTY Right 11/10/2014    knee    UMBILICAL HERNIA REPAIR  05/22/2018    UPPER GASTROINTESTINAL ENDOSCOPY  08/17/2017    Multiple small gastric polyps, no esophagitis noted no Melara's mucosa. No hiatal hernia, pathology benign fundic gland polyps       Medications:     Current Outpatient Medications:     predniSONE (DELTASONE) 10 MG tablet, Take 1 tablet by mouth 3 times daily (with meals) for 5 days, Disp: 15 tablet, Rfl: 0    fluticasone (FLONASE) 50 MCG/ACT nasal spray, 2 sprays by Nasal route daily, Disp: 1 Bottle, Rfl: 0    meclizine (ANTIVERT) 12.5 MG tablet, Take 1 tablet by mouth 3 times daily as needed for Dizziness, Disp: 30 tablet, Rfl: 0    olopatadine (PATANOL) 0.1 % ophthalmic solution, Place 1 drop into both eyes 2 times daily, Disp: 1 Bottle, Rfl: 0    famotidine (PEPCID) 20 MG/2ML injection, Infuse 2 mLs intravenously 2 times daily (Patient not taking: Reported on 1/30/2020), Disp: 120 mL, Rfl: 0    furosemide (LASIX) 20 MG tablet, Take 1 tablet by mouth 2 times daily (Patient not taking: Reported on 1/30/2020), Disp: 60 tablet, Rfl: 3    potassium chloride (KLOR-CON M) 20 MEQ extended release tablet, Take 1 tablet by mouth 2 times daily (with meals) (Patient not taking: Reported on 1/30/2020), Disp: 60 tablet, Rfl: 3    hydrocortisone (ANUSOL-HC) 2.5 % rectal cream, Place rectally 2 times daily.  (Patient not taking: Reported on 2/20/2020), Disp: 1 Tube, Rfl: 0    melatonin 3 MG TABS tablet, Take 2 tablets by mouth nightly as needed (sleep), Disp: 60 tablet, Rfl: 0    metoprolol succinate (TOPROL XL) 25 MG extended release tablet, Take 1 tablet by mouth daily, Disp: 30 tablet, Rfl: 3    ondansetron (ZOFRAN) 4 MG tablet, Take 1 tablet by mouth every 12 hours as needed for Nausea or Vomiting, Disp: 10 tablet, Rfl: 0    clotrimazole-betamethasone (LOTRISONE) 1-0.05 % cream, Apply topically daily as needed (to spot on arm), Disp: , Rfl:     Cholecalciferol (VITAMIN D) 2000 units CAPS capsule, Take 2 capsules by mouth daily , Disp: , Rfl:      Social History:     Social History     Socioeconomic History    Marital status: Single     Spouse name: Not on file    Number of children: Not on file    Years of education: Not on file    Highest education level: Not on file   Occupational History    Not on file   Social Needs    Financial resource strain: Not on file    Food insecurity:     Worry: Not on file     Inability: Not on file    Transportation needs:     Medical: Not on file     Non-medical: Not on file   Tobacco Use    Smoking status: Former Smoker     Packs/day: 1.00     Years: 3.00     Pack years: 3.00     Last attempt to quit: 1975     Years since quittin.1    Smokeless tobacco: Never Used    Tobacco comment: quit smoking age 23   Substance and Sexual Activity    Alcohol use: Yes     Comment: very rare    Drug use: No    Sexual activity: Not on file   Lifestyle    Physical activity:     Days per week: Not on file     Minutes per session: Not on file    Stress: Not on file   Relationships    Social connections:     Talks on phone: Not on file     Gets together: Not on file     Attends [employer] service: Not on file     Active member of club or organization: Not on file     Attends meetings of clubs or organizations: Not on file     Relationship status: Not on file    Intimate partner violence:     Fear of current or ex partner: Not on file     Emotionally abused: Not on file     Physically abused: Not on effusions. Neurologic: No gross sensory or motor deficits. Skin: Warm and dry with good turgor. No signs of peripheral arterial or venous insufficiency.     Medical Decision Making:   I have independently reviewed/ordered the following labs:    CBC with Differential:   Lab Results   Component Value Date    WBC 6.9 02/20/2020    WBC 6.8 01/23/2020    HGB 11.4 02/20/2020    HGB 9.3 01/23/2020    HCT 37.6 02/20/2020    HCT 30.5 01/23/2020     02/20/2020     01/23/2020     12/06/2011    LYMPHOPCT 8 12/28/2019    LYMPHOPCT 12 12/15/2019    MONOPCT 7 12/28/2019    MONOPCT 9 12/15/2019     BMP:   Lab Results   Component Value Date     02/20/2020     01/23/2020    K 4.1 02/20/2020    K 4.0 01/23/2020     02/20/2020     01/23/2020    CO2 26 02/20/2020    CO2 24 01/23/2020    BUN 18 02/20/2020    BUN 19 01/23/2020    CREATININE 0.75 02/20/2020    CREATININE 0.76 01/23/2020    MG 2.1 01/06/2020    MG 1.7 12/30/2019     Hepatic Function Panel:   Lab Results   Component Value Date    PROT 6.4 01/06/2020    PROT 6.2 12/15/2019    LABALBU 3.1 01/06/2020    LABALBU 2.9 12/15/2019    LABALBU 4.0 12/06/2011    LABALBU 4.3 11/03/2011    BILIDIR 0.09 03/25/2018    BILIDIR 0.10 01/13/2018    IBILI 0.19 03/25/2018    IBILI 0.16 01/13/2018    BILITOT 0.26 01/06/2020    BILITOT 0.25 12/15/2019    ALKPHOS 130 01/06/2020    ALKPHOS 81 12/15/2019    ALT 13 01/06/2020    ALT 6 12/15/2019    AST 18 01/06/2020    AST 9 12/15/2019     No results found for: RPR  No results found for: HIV  No results found for: [facility]  Lab Results   Component Value Date    MUCUS NOT REPORTED 12/17/2019    RBC 3.87 02/20/2020    RBC 3.98 12/06/2011    TRICHOMONAS NOT REPORTED 12/17/2019    WBC 6.9 02/20/2020    YEAST NOT REPORTED 12/17/2019    TURBIDITY CLEAR 12/17/2019     Lab Results   Component Value Date    CREATININE 0.75 02/20/2020    GLUCOSE 104 02/20/2020    GLUCOSE 94 12/06/2011       Thank you for allowing us to

## 2020-02-25 ENCOUNTER — OFFICE VISIT (OUTPATIENT)
Dept: INFECTIOUS DISEASES | Age: 70
End: 2020-02-25
Payer: MEDICARE

## 2020-02-25 VITALS
HEART RATE: 89 BPM | HEIGHT: 67 IN | WEIGHT: 220 LBS | DIASTOLIC BLOOD PRESSURE: 76 MMHG | TEMPERATURE: 97.3 F | BODY MASS INDEX: 34.53 KG/M2 | OXYGEN SATURATION: 96 % | RESPIRATION RATE: 18 BRPM | SYSTOLIC BLOOD PRESSURE: 128 MMHG

## 2020-02-25 PROCEDURE — 1090F PRES/ABSN URINE INCON ASSESS: CPT | Performed by: INTERNAL MEDICINE

## 2020-02-25 PROCEDURE — 4040F PNEUMOC VAC/ADMIN/RCVD: CPT | Performed by: INTERNAL MEDICINE

## 2020-02-25 PROCEDURE — G8484 FLU IMMUNIZE NO ADMIN: HCPCS | Performed by: INTERNAL MEDICINE

## 2020-02-25 PROCEDURE — 1036F TOBACCO NON-USER: CPT | Performed by: INTERNAL MEDICINE

## 2020-02-25 PROCEDURE — 3017F COLORECTAL CA SCREEN DOC REV: CPT | Performed by: INTERNAL MEDICINE

## 2020-02-25 PROCEDURE — 1123F ACP DISCUSS/DSCN MKR DOCD: CPT | Performed by: INTERNAL MEDICINE

## 2020-02-25 PROCEDURE — G8417 CALC BMI ABV UP PARAM F/U: HCPCS | Performed by: INTERNAL MEDICINE

## 2020-02-25 PROCEDURE — 99214 OFFICE O/P EST MOD 30 MIN: CPT | Performed by: INTERNAL MEDICINE

## 2020-02-25 PROCEDURE — G8427 DOCREV CUR MEDS BY ELIG CLIN: HCPCS | Performed by: INTERNAL MEDICINE

## 2020-02-25 PROCEDURE — G8400 PT W/DXA NO RESULTS DOC: HCPCS | Performed by: INTERNAL MEDICINE

## 2020-02-25 NOTE — LETTER
felt secondary to the Toradol and post discharge her diarrhea issues continued. The patient reports not feeling very well and was seen by a nurse practitioner as her PCP was out of town. The patient was sent to the emergency room November 21, and was worked up and sent home. She reports that around Thanksgiving she started having rectal bleeding which prompted her to come back into the emergency room and at that point in time she was diagnosed with an anastomotic leak on CT imaging. She was treated conservatively with IV antibiotics and subsequently discharged on oral antimicrobial therapy with ciprofloxacin and metronidazole.     The patient reports that her diarrhea had improved but recently resumed. She had been at home and was still not feeling very well. She did not report any significant abdominal pain but had some nausea and chills. She was not able to tolerate oral intake very well and was having difficulty urinating well as swelling of her legs. She came back into the emergency room and testing showed a urinary tract infection. CT imaging showed a large pelvic abscess. Pt was admitted    The patient underwent a colonoscopy 12/19/2019  Large anastomotic perforation was noted. A large pelvic compartment with debris, stool material, and inflammation was noted. This area was walled off. Pigtail placed by IR was noted in the cavity. The patient had a stent placed    Follow-up CT scan 12/20/2019 did show that the proximal part of the stent was within the cavity of the anastomotic leak. She was placed on Zosyn and Flagyl    On 12/23/2019 the patient was taken to IR due to feculent drainage around the pigtail catheter which was flushed and the collection has thick stool the smallbore catheter was felt to be of limited use.  Was also concerned that long-term use can cause high risk of rectocutaneous fistula formation    The patient had a colonoscopy done 12/23/2019 that confirmed the mucosal exam showed a large anastomotic perforation and previously placed stent migrated into the contained pelvic cavity and attempt at removal of the stents were not successful    The patient was taken back to the operating room 12/24/2019 and had an exploratory laparotomy, extensive lysis of adhesions, removal of sigmoid stent and takedown of anastomosis with closure of rectal stump and colostomy creation. The patient did have repair of several seromuscular injuries of the small bowel and colon. She also had an abdominal washout and draining of the pelvic abscess. MAE x 2 placed. Antibiotics adjusted to Ceftaroline and Flagyl as the final cultures showed MRSA and E coli    She suffered from an DESTINI post operatively and was seen by Nephrology. Pulmonary was consulted because of pulmonary hypertension with an RVSP of 40. Pt was placed on TPN and her diet slowly advanced, but she does not have much of an appetite. Wound care with iodoform packing BID. Pt stabilized and transferred to [facility] on 12-30-19  · 1-2 Pt with nausea and emesis, abd xray wnl  · 1-6 Nausea and emesis resolved. Pt reports continued poor appetite  · 1-8 Diet advanced to regular. Pt tolerating without N/V. Reports that she gets full quickly  · 1-10 1/2 MAE's dislodged, removed  · 1-10 CT abd showed drain along anterior margin of Rt sided pelvic fluid collection. Per IM note, plan to compare previous CT to see if MAE needs adjustment  · 1-13 2/2 MAE removed, CT reviewed and compared with previous from 12-20  · 1-13 Pt eating nearly 100% of meals, remains on TPN  · 1-14 Pt complaining of pain at MAE site. On exam there is a small area of firmness, no erythema or drainage. Will monitor closely  · 1-16 MAE sites clean, decreased induration, no drainage.  Wound is clean  She was [facility] from [facility] on [facility]    CURRENT EXAMINATION: 2/25/2020     Pt reports that she is feeling well horaciolock. Small amounts of bleeding noted, no purulence, induration or erythema. PLAN:  · Completed ceftaroline and metronidazole January 6, 2020  · Monitor off antibiotics  Continue wound care normal saline dressings daily  · Follow up with Dr Aby Prajapati as scheduled  · Return to office in 4 weeks    Discussed with patient. I have personally reviewed the past medical history, past surgical history, medications, social history, and family history, and I have updated the database accordingly. Past Medical History:     Past Medical History:   Diagnosis Date    Allergic rhinitis     Bladder prolapse     Constipation     5/2019 resolved    Fundic gland polyposis of stomach 08/17/2017    per EGD    GERD (gastroesophageal reflux disease)     on no medications    Hiatal hernia     History of cardiac cath 12/2002    pt denies blockage    History of diverticulitis     Hyperlipidemia     borderline, on no medication    Hypertension     MRSA (methicillin resistant staph aureus) culture positive 11/01/2016    nasal    MRSA carrier     follows with ID    Obesity     Osteoarthritis     Pelvic abscess in female     Systolic murmur     benign systolic murmur (history of). Pt does not follow-up with a cardiologist (Written 09/25/2019).     Thyroid disease     goiter    Wears glasses        Past Surgical  History:     Past Surgical History:   Procedure Laterality Date    ABDOMINAL EXPLORATION SURGERY  05/16/2019    CHOLECYSTECTOMY, LAPAROSCOPIC  11/15/2016    CHOLECYSTECTOMY, LAPAROSCOPIC  11/15/2016    COLONOSCOPY  2013    neg    COLONOSCOPY N/A 5/16/2019    COLONOSCOPY DIAGNOSTIC performed by Susan Regalado MD at [address] [employer] COLONOSCOPY N/A 12/19/2019    COLONOSCOPY W/STENT X2 performed by Susan Regalado MD at [facility] [address] 12/24/2019    EXPLORATORY LAPAROTOMY, EXTENSIVE LYSIS OF ADHESIONS, TAKE DOWN OF ANASTOMOSIS, COLOSTOMY CREATION, CLOSURE OF SEROMUSCULAR INJURIES performed by [facility], COLON, DIAGNOSTIC      EGD    FISSURECTOMY ANAL N/A 10/4/2019    FLEXIBLE SIGMOIDOSCOPY & ANAL FISTULECTOMY performed by Vishnu Ely MD at [address] [facility] ARTHROSCOPY Left     lateral release    GA EGD TRANSORAL BIOPSY SINGLE/MULTIPLE N/A 8/17/2017    EGD BIOPSY performed by Nazia Manriquez MD at [address] [facility] OFFICE/OUTPT [facility] N/A 5/22/2018    XI ROBOTIC LAPAROSCOPIC UMBILICAL HERNIA REPAIR WITH MESH, POSSIBLE OPEN performed by Domenico Morris IV, DO at [facility] [address]  02/19/2019    SIGMOIDOSCOPY N/A 2/19/2019    SIGMOIDOSCOPY BIOPSY FLEXIBLE performed by Donold Halsted, DO at [facility] [address] N/A 12/23/2019    SIGMOIDOSCOPY DIAGNOSTIC FLEXIBLE WITH FLUORO performed by Spencer Cabrera MD at [address] N/A 5/16/2019    [address] performed by Vishnu Ely MD at [address] N/A 11/12/2019    [address], REPAIR OF MULTIPLE VENTRAL HERNIAS, MOBILIZATION OF THE SPLENIC FLEXURE AND TRANSVERSE COLON, ABDOMINAL EXPLORATION performed by Vishnu Ely MD at [address] [facility] [address] Left 9/24/2012    knee    TOTAL KNEE ARTHROPLASTY Right 11/10/2014    knee    UMBILICAL HERNIA REPAIR  05/22/2018    UPPER GASTROINTESTINAL ENDOSCOPY  08/17/2017    Multiple small gastric polyps, no esophagitis noted no Melara's mucosa.   No hiatal hernia, pathology benign fundic gland polyps       Medications:     Current Outpatient Medications:     predniSONE (DELTASONE) 10 MG tablet, Take 1 tablet by mouth 3 times daily (with meals) for 5 days, Disp: 15 tablet, Rfl: 0    fluticasone (FLONASE) 50 MCG/ACT nasal spray, 2 sprays by Nasal route daily, Disp: 1 Bottle, Rfl: 0   meclizine (ANTIVERT) 12.5 MG tablet, Take 1 tablet by mouth 3 times daily as needed for Dizziness, Disp: 30 tablet, Rfl: 0    olopatadine (PATANOL) 0.1 % ophthalmic solution, Place 1 drop into both eyes 2 times daily, Disp: 1 Bottle, Rfl: 0    famotidine (PEPCID) 20 MG/2ML injection, Infuse 2 mLs intravenously 2 times daily (Patient not taking: Reported on 1/30/2020), Disp: 120 mL, Rfl: 0    furosemide (LASIX) 20 MG tablet, Take 1 tablet by mouth 2 times daily (Patient not taking: Reported on 1/30/2020), Disp: 60 tablet, Rfl: 3    potassium chloride (KLOR-CON M) 20 MEQ extended release tablet, Take 1 tablet by mouth 2 times daily (with meals) (Patient not taking: Reported on 1/30/2020), Disp: 60 tablet, Rfl: 3    hydrocortisone (ANUSOL-HC) 2.5 % rectal cream, Place rectally 2 times daily.  (Patient not taking: Reported on 2/20/2020), Disp: 1 Tube, Rfl: 0    melatonin 3 MG TABS tablet, Take 2 tablets by mouth nightly as needed (sleep), Disp: 60 tablet, Rfl: 0    metoprolol succinate (TOPROL XL) 25 MG extended release tablet, Take 1 tablet by mouth daily, Disp: 30 tablet, Rfl: 3    ondansetron (ZOFRAN) 4 MG tablet, Take 1 tablet by mouth every 12 hours as needed for Nausea or Vomiting, Disp: 10 tablet, Rfl: 0    clotrimazole-betamethasone (LOTRISONE) 1-0.05 % cream, Apply topically daily as needed (to spot on arm), Disp: , Rfl:     Cholecalciferol (VITAMIN D) 2000 units CAPS capsule, Take 2 capsules by mouth daily , Disp: , Rfl:      Social History:     Social History     Socioeconomic History    Marital status: Single     Spouse name: Not on file    Number of children: Not on file    Years of education: Not on file    Highest education level: Not on file   Occupational History    Not on file   Social Needs    Financial resource strain: Not on file    Food insecurity:     Worry: Not on file     Inability: Not on file    Transportation needs:     Medical: Not on file     Non-medical: Not on file Tobacco Use    Smoking status: Former Smoker     Packs/day: 1.00     Years: 3.00     Pack years: 3.00     Last attempt to quit: 1975     Years since quittin.1    Smokeless tobacco: Never Used    Tobacco comment: quit smoking age 23   Substance and Sexual Activity    Alcohol use: Yes     Comment: very rare    Drug use: No    Sexual activity: Not on file   Lifestyle    Physical activity:     Days per week: Not on file     Minutes per session: Not on file    Stress: Not on file   Relationships    Social connections:     Talks on phone: Not on file     Gets together: Not on file     Attends [employer] service: Not on file     Active member of club or organization: Not on file     Attends meetings of clubs or organizations: Not on file     Relationship status: Not on file    Intimate partner violence:     Fear of current or ex partner: Not on file     Emotionally abused: Not on file     Physically abused: Not on file     Forced sexual activity: Not on file   Other Topics Concern    Not on file   Social History Narrative    Not on file       Family History:     Family History   Problem Relation Age of Onset    Heart Disease Mother     COPD Father     Cancer Brother         thyroid, prostate, lung        Allergies:   Patient has no known allergies. Review of Systems:   Constitutional: No fevers or chills. No systemic complaints  Head: No headaches  Eyes: No double vision or blurry vision. ENT: No sore throat or runny nose. . No hearing loss, tinnitus or vertigo. Cardiovascular: No chest pain or palpitations. No shortness of breath. No MONTES  Lung: No shortness of breath or cough. No sputum production  Abdomen: No nausea, vomiting, diarrhea, or abdominal pain. .  Genitourinary: No increased urinary frequency, or dysuria. No hematuria. No suprapubic or CVA pain  Musculoskeletal: No muscle aches or pains. No joint effusions, swelling or deformities  Hematologic: No bleeding or bruising.

## 2020-02-26 RX ORDER — OLOPATADINE HYDROCHLORIDE 1 MG/ML
1 SOLUTION/ DROPS OPHTHALMIC 2 TIMES DAILY
Qty: 1 BOTTLE | Refills: 0 | Status: SHIPPED | OUTPATIENT
Start: 2020-02-26 | End: 2020-03-27

## 2020-02-26 NOTE — TELEPHONE ENCOUNTER
Pt called to report she lost bottle of Patanol eye drops 3 days ago, filled 2/20/2020. Asking to have rx re-sent.   Pt states she will pay out of pocket for eye drops

## 2020-03-04 ENCOUNTER — HOSPITAL ENCOUNTER (OUTPATIENT)
Dept: SLEEP CENTER | Age: 70
Discharge: HOME | End: 2020-03-06
Payer: MEDICARE

## 2020-03-04 PROCEDURE — 95811 POLYSOM 6/>YRS CPAP 4/> PARM: CPT

## 2020-03-05 VITALS
WEIGHT: 220 LBS | RESPIRATION RATE: 14 BRPM | HEIGHT: 67 IN | BODY MASS INDEX: 34.53 KG/M2 | HEART RATE: 55 BPM | OXYGEN SATURATION: 97 %

## 2020-03-20 LAB — STATUS: NORMAL

## 2020-04-05 NOTE — TELEPHONE ENCOUNTER
Cipro 500 mg twice a day for 7 days
LMTCB to set appt for next week
Okay
Order urinalysis culture and sensitivity
Pt has appt for 4/16/19
Pt notified- she works 7A-7P wed, Thurs and Friday this week. Do you want to see her next Monday?     UA orders sent    atbx sent
Pt reports she has a UTI since 1 week. Pt c/o pain right abdomen intermittent since 1 week, gets little better with increasing fluids. Pt also reports urinary frequency. Pt denies: dysuria/hematuria. Asking for order for Urine culture to check. URINARY    Patient calling with symptoms of possible urinary tract infection  Symptoms include frequency  Symptoms have persisted for 1 week  Patient is also complaining of pain right abdomen occ. When was their last UTI ? unsure    *This condition is eligible for an eVisit. If not already active, sign patient up for MyChart to improve access and communication with the provider. *
Recent Vitals  T(C): 38.6 (04-05-20 @ 21:17), Max: 38.6 (04-05-20 @ 21:17)  HR: 114 (04-05-20 @ 21:17) (114 - 121)  BP: 155/92 (04-05-20 @ 21:17) (151/95 - 155/92)  RR: 28 (04-05-20 @ 21:17) (28 - 34)  SpO2: 99% (04-05-20 @ 21:17) (82% - 99%)                        14.9   8.78  )-----------( 212      ( 05 Apr 2020 22:02 )             45.7     04-05    138  |  104  |  8   ----------------------------<  134<H>  4.1   |  26  |  0.91    Ca    8.1<L>      05 Apr 2020 22:02    TPro  7.5  /  Alb  3.1<L>  /  TBili  0.6  /  DBili  x   /  AST  31  /  ALT  26  /  AlkPhos  89  04-05    PT/INR - ( 05 Apr 2020 22:02 )   PT: 13.1 sec;   INR: 1.16 ratio         PTT - ( 05 Apr 2020 22:02 )  PTT:26.1 sec  LIVER FUNCTIONS - ( 05 Apr 2020 22:02 )  Alb: 3.1 g/dL / Pro: 7.5 gm/dL / ALK PHOS: 89 U/L / ALT: 26 U/L / AST: 31 U/L / GGT: x               Home Medications:

## 2020-04-16 ENCOUNTER — HOSPITAL ENCOUNTER (OUTPATIENT)
Dept: WOUND CARE | Age: 70
Discharge: HOME | End: 2020-04-16
Payer: MEDICARE

## 2020-04-16 VITALS
DIASTOLIC BLOOD PRESSURE: 85 MMHG | WEIGHT: 216 LBS | RESPIRATION RATE: 18 BRPM | TEMPERATURE: 97.6 F | SYSTOLIC BLOOD PRESSURE: 138 MMHG | HEART RATE: 98 BPM | HEIGHT: 67 IN | BODY MASS INDEX: 33.9 KG/M2

## 2020-04-16 PROCEDURE — 99212 OFFICE O/P EST SF 10 MIN: CPT

## 2020-04-16 ASSESSMENT — PAIN SCALES - GENERAL: PAINLEVEL_OUTOF10: 0

## 2020-04-16 NOTE — PROGRESS NOTES
increasing intake of fiber and increasing water intake. Patient Teaching:  -Mgmt of appliance change taught step by step instructions.  -How to order supplies and set up through [facility] [address] initiated as homecare will be discharging soon. Discussed with patient components of stoma care including:  Questions/concerns discussed with patient. Contact information given to patient should any additional needs prior to follow up appt.     Level of patient/caregiver understanding able to:  [x] Indicates understanding       [] Needs reinforcement  [] Unsuccessful      [x] Verbal Understanding  [] Demonstrated understanding       [] No evidence of learning  [] Refused teaching         [] N/      Electronically signed by Janis Davis RN on  4/16/2020 at 11:02 AM

## 2020-04-29 ENCOUNTER — FOLLOWUP TELEPHONE ENCOUNTER (OUTPATIENT)
Dept: WOUND CARE | Age: 70
End: 2020-04-29

## 2020-04-30 ENCOUNTER — HOSPITAL ENCOUNTER (OUTPATIENT)
Dept: WOUND CARE | Age: 70
Discharge: HOME | End: 2020-04-30
Payer: MEDICARE

## 2020-04-30 VITALS
HEART RATE: 73 BPM | RESPIRATION RATE: 18 BRPM | DIASTOLIC BLOOD PRESSURE: 97 MMHG | SYSTOLIC BLOOD PRESSURE: 157 MMHG | TEMPERATURE: 97.6 F

## 2020-04-30 PROCEDURE — 99211 OFF/OP EST MAY X REQ PHY/QHP: CPT

## 2020-04-30 ASSESSMENT — PAIN SCALES - GENERAL: PAINLEVEL_OUTOF10: 0

## 2020-04-30 NOTE — PROGRESS NOTES
N/A 12/19/2019    COLONOSCOPY W/STENT X2 performed by Constance Malagon MD at [address] [facility] N/A 12/24/2019    EXPLORATORY LAPAROTOMY, EXTENSIVE LYSIS OF ADHESIONS, TAKE DOWN OF ANASTOMOSIS, COLOSTOMY CREATION, CLOSURE OF SEROMUSCULAR INJURIES performed by Constance Malagon MD at [address], COLON, DIAGNOSTIC      EGD    FISSURECTOMY ANAL N/A 10/4/2019    FLEXIBLE SIGMOIDOSCOPY & ANAL FISTULECTOMY performed by Constance Malagon MD at [address] [facility] ARTHROSCOPY Left     lateral release    DE EGD TRANSORAL BIOPSY SINGLE/MULTIPLE N/A 8/17/2017    EGD BIOPSY performed by Osmany Arellano MD at [address] [facility] OFFICE/OUTPT [facility] N/A 5/22/2018    XI ROBOTIC LAPAROSCOPIC UMBILICAL HERNIA REPAIR WITH MESH, POSSIBLE OPEN performed by Dayday Morris IV, DO at [facility] [address]  02/19/2019    SIGMOIDOSCOPY N/A 2/19/2019    SIGMOIDOSCOPY BIOPSY FLEXIBLE performed by Matthias Mac DO at [facility] [address] N/A 12/23/2019    SIGMOIDOSCOPY DIAGNOSTIC FLEXIBLE WITH FLUORO performed [facility] N/A 5/16/2019    [address] performed by Constance [facility] N/A 11/12/2019    [address], REPAIR OF MULTIPLE VENTRAL HERNIAS, MOBILIZATION OF THE SPLENIC FLEXURE AND TRANSVERSE COLON, ABDOMINAL EXPLORATION performed by Constance Malagon MD at [facility] Left 9/24/2012    knee    TOTAL KNEE ARTHROPLASTY Right 11/10/2014    knee    UMBILICAL HERNIA REPAIR  05/22/2018    UPPER GASTROINTESTINAL ENDOSCOPY  08/17/2017    Multiple small gastric polyps, no esophagitis noted no Melara's mucosa.   No hiatal hernia, pathology benign fundic gland polyps       FAMILY HISTORY    Family History   Problem Relation Age of Onset    Heart Disease Mother     COPD and water, patted dry, and a dry sterile gauze taped into place. The patient was encouraged to call Dr. Adalberto De Los Santos office today to notify and to request a visit from home care nursing for further assessment/mgmt. The patient mentioned that the wound has been closed for just over one week. No problems noted with the stoma this visit. Plan:     -Follow up with Dr. Adalberto De Los Santos and home care nursing regarding reopening of midline abdominal wound with drainage.    -Continue current ostomy mgmt.    -No follow up visit with [address] [place] [address] Ave  nursing necessary at this time, call as needed if further needs arise.    -Thank you for allowing me to participate in the care of this patient. [address] [facility] and [facility]     Visit Discharge Instructions    DATE- 04/30/2020      HOME CARE AGENCY- [address] care      SUPPLIES NEEDED- none at this time      OSTOMY INSTRUCTIONS- continue with current regimen    ADDITIONAL INSTRUCTIONS- follow up with Dr. Adalberto De Los Santos today regarding open wound to midline abdomen      FOLLOW UP [address] [place] [address] [phone]      If you need medical attention outside of the business hours of the [address] [place] [address] please contact your PCP or go to the nearest emergency room.           Patient Signature:__________________________________________________ DATE__________          Electronically signed by Mirian Leiva RN on 4/30/2020 at 10:19 AM             Electronically signed by Mirian Leiva RN on  4/30/2020 at 10:30 AM

## 2020-06-05 ENCOUNTER — OFFICE VISIT (OUTPATIENT)
Dept: INTERNAL MEDICINE CLINIC | Age: 70
End: 2020-06-05
Payer: MEDICARE

## 2020-06-05 VITALS
SYSTOLIC BLOOD PRESSURE: 116 MMHG | TEMPERATURE: 97.2 F | OXYGEN SATURATION: 96 % | HEART RATE: 80 BPM | HEIGHT: 67 IN | WEIGHT: 231.6 LBS | DIASTOLIC BLOOD PRESSURE: 64 MMHG | BODY MASS INDEX: 36.35 KG/M2

## 2020-06-05 PROCEDURE — 1090F PRES/ABSN URINE INCON ASSESS: CPT | Performed by: INTERNAL MEDICINE

## 2020-06-05 PROCEDURE — G8417 CALC BMI ABV UP PARAM F/U: HCPCS | Performed by: INTERNAL MEDICINE

## 2020-06-05 PROCEDURE — G8400 PT W/DXA NO RESULTS DOC: HCPCS | Performed by: INTERNAL MEDICINE

## 2020-06-05 PROCEDURE — 4040F PNEUMOC VAC/ADMIN/RCVD: CPT | Performed by: INTERNAL MEDICINE

## 2020-06-05 PROCEDURE — G8427 DOCREV CUR MEDS BY ELIG CLIN: HCPCS | Performed by: INTERNAL MEDICINE

## 2020-06-05 PROCEDURE — 3017F COLORECTAL CA SCREEN DOC REV: CPT | Performed by: INTERNAL MEDICINE

## 2020-06-05 PROCEDURE — 99214 OFFICE O/P EST MOD 30 MIN: CPT | Performed by: INTERNAL MEDICINE

## 2020-06-05 PROCEDURE — 1036F TOBACCO NON-USER: CPT | Performed by: INTERNAL MEDICINE

## 2020-06-05 PROCEDURE — 1123F ACP DISCUSS/DSCN MKR DOCD: CPT | Performed by: INTERNAL MEDICINE

## 2020-06-05 RX ORDER — PREDNISONE 10 MG/1
10 TABLET ORAL
Qty: 15 TABLET | Refills: 0 | Status: SHIPPED | OUTPATIENT
Start: 2020-06-05 | End: 2020-06-10

## 2020-06-05 NOTE — PROGRESS NOTES
Jamarcus Franco is a 78 y.o. female who presents for   Chief Complaint   Patient presents with    Follow-up     pt here 4 mo f/u.  Knee Pain     Seen orth surgeon Dr Jyothi Carrington and has started PT.     Arm Pain     Having pain again- Dr Jyothi Carrington evaluated and doesnt feel is a rotator cuff tear    Cough     Having plegm come up. Takling robitussin and mucinex no relief    Sleep Apnea     Started Cpap therapy and is doing well    Health Maintenance     never had pneumo vaccine and doesnt want it    and follow up of chronic medical problems.   Patient Active Problem List   Diagnosis    Dyslipidemia    Osteoarthritis    GERD (gastroesophageal reflux disease)    Essential hypertension    Obesity    Allergic rhinitis    Osteoporosis    Arthritis of knee    Biliary dyskinesia    Right upper quadrant abdominal pain    Fundic gland polyposis of stomach    SBO (small bowel obstruction) (HCC)    Incarcerated incisional hernia    Sigmoid diverticulitis    Incisional hernia without obstruction or gangrene    Chalazion    S/P colectomy    History of colostomy reversal    Abdominal adhesions    Hypokalemia    Atelectasis of left lung    Postoperative anemia due to acute blood loss    Acute congestive heart failure (HCC)    Surgical wound dehiscence    Constipation    Systolic murmur    Thyroid disease    Mild pulmonary hypertension (HCC)    Perforated sigmoid colon (HCC)    Large intestine anastomotic leak    Normocytic anemia    Rectal bleeding    Sepsis (HCC)    Mild malnutrition (HCC)    Acute cystitis without hematuria    Leukocytosis    Nausea    Postoperative intra-abdominal abscess    MRSA (methicillin resistant staph aureus) culture positive    E coli infection     HPI  Here for follow-up on blood pressure and complains of phlegm in the throat and also complaining of right shoulder pain    Current Outpatient Medications   Medication Sig Dispense Refill    predniSONE (DELTASONE) Savanah Brennan MD at [address] [facility] COLONOSCOPY N/A 12/19/2019    COLONOSCOPY W/STENT X2 performed by Marleny Aguirre MD at [address] [facility] N/A 12/24/2019    EXPLORATORY LAPAROTOMY, EXTENSIVE LYSIS OF ADHESIONS, TAKE DOWN OF ANASTOMOSIS, COLOSTOMY CREATION, CLOSURE OF SEROMUSCULAR INJURIES performed by Marleny Aguirre MD at [address], COLON, DIAGNOSTIC      EGD    FISSURECTOMY ANAL N/A 10/4/2019    FLEXIBLE SIGMOIDOSCOPY & ANAL FISTULECTOMY performed by Marleny Aguirre MD at [address] [facility] ARTHROSCOPY Left     lateral release    NV EGD TRANSORAL BIOPSY SINGLE/MULTIPLE N/A 8/17/2017    EGD BIOPSY performed by Javon Miramontes MD at [address] [facility] OFFICE/OUTPT [facility] N/A 5/22/2018    XI ROBOTIC LAPAROSCOPIC UMBILICAL HERNIA REPAIR WITH MESH, POSSIBLE OPEN performed by Garth Morris IV, DO at [facility]. 20  02/19/2019    SIGMOIDOSCOPY N/A 2/19/2019    SIGMOIDOSCOPY BIOPSY FLEXIBLE performed by Zuly Kelsey DO at [facility] [address] N/A 12/23/2019    SIGMOIDOSCOPY DIAGNOSTIC FLEXIBLE WITH FLUORO performed by Mena Snowden MD at [address] [facility] N/A 5/16/2019    [address] performed by Marleny Aguirre MD at [address] [place] [address] N/A 11/12/2019    COLOSTOMY  [address] [facility], REPAIR OF MULTIPLE VENTRAL HERNIAS, MOBILIZATION OF THE SPLENIC FLEXURE AND TRANSVERSE COLON, ABDOMINAL EXPLORATION performed by Marleny Aguirre MD at [address] Left 9/24/2012    knee    TOTAL KNEE ARTHROPLASTY Right 11/10/2014    knee    UMBILICAL HERNIA REPAIR  05/22/2018    UPPER GASTROINTESTINAL ENDOSCOPY  08/17/2017    Multiple small gastric polyps, no esophagitis noted no Melara's mucosa.   No hiatal hernia, pathology benign fundic gland polyps       Family History   Problem Relation Age of Onset    Heart Disease

## 2020-07-30 ENCOUNTER — OFFICE VISIT (OUTPATIENT)
Dept: INTERNAL MEDICINE CLINIC | Age: 70
End: 2020-07-30
Payer: MEDICARE

## 2020-07-30 VITALS
DIASTOLIC BLOOD PRESSURE: 78 MMHG | WEIGHT: 239.2 LBS | TEMPERATURE: 97.2 F | BODY MASS INDEX: 37.54 KG/M2 | HEIGHT: 67 IN | SYSTOLIC BLOOD PRESSURE: 138 MMHG | RESPIRATION RATE: 16 BRPM | HEART RATE: 60 BPM

## 2020-07-30 PROBLEM — E44.1 MILD MALNUTRITION (HCC): Status: RESOLVED | Noted: 2019-12-17 | Resolved: 2020-07-30

## 2020-07-30 PROBLEM — I50.9 ACUTE CONGESTIVE HEART FAILURE (HCC): Status: RESOLVED | Noted: 2019-11-21 | Resolved: 2020-07-30

## 2020-07-30 PROCEDURE — 1090F PRES/ABSN URINE INCON ASSESS: CPT | Performed by: INTERNAL MEDICINE

## 2020-07-30 PROCEDURE — 3017F COLORECTAL CA SCREEN DOC REV: CPT | Performed by: INTERNAL MEDICINE

## 2020-07-30 PROCEDURE — 1123F ACP DISCUSS/DSCN MKR DOCD: CPT | Performed by: INTERNAL MEDICINE

## 2020-07-30 PROCEDURE — G8400 PT W/DXA NO RESULTS DOC: HCPCS | Performed by: INTERNAL MEDICINE

## 2020-07-30 PROCEDURE — 99214 OFFICE O/P EST MOD 30 MIN: CPT | Performed by: INTERNAL MEDICINE

## 2020-07-30 PROCEDURE — 4040F PNEUMOC VAC/ADMIN/RCVD: CPT | Performed by: INTERNAL MEDICINE

## 2020-07-30 PROCEDURE — 1036F TOBACCO NON-USER: CPT | Performed by: INTERNAL MEDICINE

## 2020-07-30 PROCEDURE — G8417 CALC BMI ABV UP PARAM F/U: HCPCS | Performed by: INTERNAL MEDICINE

## 2020-07-30 PROCEDURE — G8427 DOCREV CUR MEDS BY ELIG CLIN: HCPCS | Performed by: INTERNAL MEDICINE

## 2020-07-30 RX ORDER — PREDNISONE 10 MG/1
10 TABLET ORAL
Qty: 15 TABLET | Refills: 0 | Status: SHIPPED | OUTPATIENT
Start: 2020-07-30 | End: 2020-08-04

## 2020-07-30 NOTE — PROGRESS NOTES
Linus Lowe is a 78 y.o. female who presents for   Chief Complaint   Patient presents with    Arm Pain     bilateral upper arm pain, severe , takes 2 ES Tylenol daily to reliev pain. Has been going to Physical therapy per Dr Saad Almaguer    and follow up of chronic medical problems. Patient Active Problem List   Diagnosis    Dyslipidemia    Osteoarthritis    GERD (gastroesophageal reflux disease)    Essential hypertension    Obesity    Allergic rhinitis    Osteoporosis    Arthritis of knee    Biliary dyskinesia    Right upper quadrant abdominal pain    Fundic gland polyposis of stomach    SBO (small bowel obstruction) (HCC)    Incarcerated incisional hernia    Sigmoid diverticulitis    Incisional hernia without obstruction or gangrene    Chalazion    S/P colectomy    History of colostomy reversal    Abdominal adhesions    Hypokalemia    Atelectasis of left lung    Postoperative anemia due to acute blood loss    Surgical wound dehiscence    Constipation    Systolic murmur    Thyroid disease    Perforated sigmoid colon (HCC)    Large intestine anastomotic leak    Normocytic anemia    Rectal bleeding    Sepsis (HCC)    Acute cystitis without hematuria    Leukocytosis    Nausea    Postoperative intra-abdominal abscess    MRSA (methicillin resistant staph aureus) culture positive    E coli infection     HPI  Here for follow-up on bilateral shoulder pain and complaints pain is getting worse but today is a better day    Current Outpatient Medications   Medication Sig Dispense Refill    predniSONE (DELTASONE) 10 MG tablet Take 1 tablet by mouth 3 times daily (with meals) for 5 days 15 tablet 0    metoprolol succinate (TOPROL XL) 25 MG extended release tablet Take 1 tablet by mouth daily 30 tablet 3    Cholecalciferol (VITAMIN D) 2000 units CAPS capsule Take 2 capsules by mouth daily        No current facility-administered medications for this visit.         No Known Allergies    Past Medical History:   Diagnosis Date    Allergic rhinitis     Bladder prolapse     Constipation     5/2019 resolved    Fundic gland polyposis of stomach 08/17/2017    per EGD    GERD (gastroesophageal reflux disease)     on no medications    Hiatal hernia     History of cardiac cath 12/2002    pt denies blockage    History of diverticulitis     Hyperlipidemia     borderline, on no medication    Hypertension     MRSA (methicillin resistant staph aureus) culture positive 11/01/2016    nasal    MRSA carrier     follows with ID    Obesity     Osteoarthritis     Pelvic abscess in female     Systolic murmur     benign systolic murmur (history of). Pt does not follow-up with a cardiologist (Written 09/25/2019).     Thyroid disease     goiter    Wears glasses        Past Surgical History:   Procedure Laterality Date    ABDOMINAL EXPLORATION SURGERY  05/16/2019    CHOLECYSTECTOMY, LAPAROSCOPIC  11/15/2016    CHOLECYSTECTOMY, LAPAROSCOPIC  11/15/2016    COLONOSCOPY  2013    neg    COLONOSCOPY N/A 5/16/2019    COLONOSCOPY DIAGNOSTIC performed by Moriah Chavez MD at [address] [facility] COLONOSCOPY N/A 12/19/2019    COLONOSCOPY W/STENT X2 performed by Moriah Chavez MD at [address] [facility] N/A 12/24/2019    EXPLORATORY LAPAROTOMY, EXTENSIVE LYSIS OF ADHESIONS, TAKE DOWN OF ANASTOMOSIS, COLOSTOMY CREATION, CLOSURE OF SEROMUSCULAR INJURIES performed by Moriah Chavez MD at [address] [facility], COLON, DIAGNOSTIC      EGD    FISSURECTOMY ANAL N/A 10/4/2019    FLEXIBLE SIGMOIDOSCOPY & ANAL FISTULECTOMY performed by Moriah Chavez MD at [address] [facility] ARTHROSCOPY Left     lateral release    [place] EGD TRANSORAL BIOPSY SINGLE/MULTIPLE N/A 8/17/2017    EGD BIOPSY performed by Retia Oppenheim, MD at [address] [facility] OFFICE/OUTPT [facility] N/A 5/22/2018    XI ROBOTIC LAPAROSCOPIC UMBILICAL HERNIA REPAIR WITH MESH, POSSIBLE OPEN performed by SHERICE Morris IV, DO at [place] [address] syncope   Psychiatric       : Negative for dysphoric mood, sleep disturbances, nervous or anxious, or decreased concentration   All other review of systems was negative    Objective  Physical Examination:    Nursing note reviewed    /78 (Site: Right Upper Arm, Position: Sitting, Cuff Size: Large Adult)   Pulse 60   Temp 97.2 °F (36.2 °C) (Infrared)   Resp 16   Ht 5' 7\" (1.702 m)   Wt 239 lb 3.2 oz (108.5 kg)   LMP  (LMP Unknown)   BMI 37.46 kg/m²   BP Readings from Last 3 Encounters:   07/30/20 138/78   06/05/20 116/64   04/30/20 (!) 157/97         Constitutional:  [facility] is oriented to place, person and time ,appears well-developed and well-nourished  HEENT:  Atraumatic and normocephalic, external ears normal bilaterally, nose normal no oropharyngeal exudate and is clear and moist  Eyes:  EOCM normal; conjunctivae normal; PERRLA bilaterally  Neck:  Normal range of motion, neck supple, no JVD and no thyromegaly  Cardiovascular:  RRR, normal heart sounds and intact distal pulses  Pulmonary:  effort normal and breath sounds normal bilaterally,no wheezes or rales, no respiratory distress  Abdominal:  Soft, non-tender; normal bowel sounds, no masses  Musculoskeletal:  Normal range of motion and no edema or tenderness bilaterally  No lymphadenopathy  Neurological:  alert, oriented, and normal reflexes bilaterally  Skin: warm and dry  Psychiatric:  normal mood and effect; behavior normal.    Labs:   Lab Results   Component Value Date    LABA1C 5.9 12/31/2019     Lab Results   Component Value Date    CHOL 135 11/22/2019     Lab Results   Component Value Date    HDL 30 (L) 11/22/2019     No results found for: [address]  Lab Results   Component Value Date    TRIG 154 (H) 01/06/2020     No components found for: [place], [place]  Lab Results   Component Value Date    WBC 6.9 02/20/2020    HGB 11.4 (L) 02/20/2020    HCT 37.6 02/20/2020    MCV 97.2 02/20/2020     02/20/2020     Lab Results   Component Value Date    INR 1.2 12/17/2019    PROTIME 11.8 (H) 12/17/2019     Lab Results   Component Value Date    GLUCOSE 104 (H) 02/20/2020    CREATININE 0.75 02/20/2020    BUN 18 02/20/2020     02/20/2020    K 4.1 02/20/2020     02/20/2020    CO2 26 02/20/2020     Lab Results   Component Value Date    ALT 13 01/06/2020    AST 18 01/06/2020    ALKPHOS 130 (H) 01/06/2020    BILITOT 0.26 (L) 01/06/2020     Lab Results   Component Value Date    LABPROT 6.4 08/14/2012    LABALBU 3.1 (L) 01/06/2020     Lab Results   Component Value Date    TSH 9.72 (H) 12/31/2019     Assessment:  1. Arthralgia, unspecified joint    2. Chronic pain of both shoulders    3. Essential hypertension        Plan:  Patient complaining of bilateral shoulder pain and taking Tylenol still not helping she has decent strength in both arms and patient seen the orthopedic specialist who advised that it is not related to problem and so patient is back here today and in view of her symptoms I did advise patient to get lab work to rule out polymyalgia rheumatica and sed rate CHAYA rheumatoid factor CK and uric acid levels were ordered and patient also told to start on prednisone once she do the lab work and call me back on Monday  Blood pressure is stable on current medications  Review as scheduled           1. Jennifer received counseling on the following healthy behaviors: nutrition and exercise    2. Prior labs and health maintenance reviewed. 3.  Discussed use, benefit, and side effects of prescribed medications. Barriers to medication compliance addressed. All her questions were answered. Pt voiced understanding. Júnior Cobian will continue current medications, diet and exercise.               Orders Placed This Encounter   Medications    predniSONE (DELTASONE) 10 MG tablet     Sig: Take 1 tablet by mouth 3 times daily (with meals) for 5 days     Dispense:  15 tablet     Refill:  0          Completed Refills               Requested Prescriptions     Signed Prescriptions Disp Refills    predniSONE (DELTASONE) 10 MG tablet 15 tablet 0     Sig: Take 1 tablet by mouth 3 times daily (with meals) for 5 days     4. Patient given educational materials - see patient instructions    5. Was a self-tracking handout given in paper form or via [employer]? NO    Orders Placed This Encounter   Procedures    CHAYA     Standing Status:   Future     Standing Expiration Date:   7/30/2021    Rheumatoid Factor     Standing Status:   Future     Standing Expiration Date:   7/30/2021    Uric Acid     Standing Status:   Future     Standing Expiration Date:   7/30/2021    CK     Standing Status:   Future     Standing Expiration Date:   7/30/2021    Sedimentation rate, manual     Standing Status:   Future     Standing Expiration Date:   7/30/2021     No follow-ups on file. Patient voiced understanding and agreed to treatment plan. Electronically signed by Lidia Toribio MD on 7/30/2020 at 3:10 PM    This note is created with a voice recognition program and while intend to generate a document that accurately reflects the content of the visit, no guarantee can be provided that every mistake has been identified and corrected by editing.

## 2020-08-01 ENCOUNTER — HOSPITAL ENCOUNTER (OUTPATIENT)
Age: 70
Discharge: HOME | End: 2020-08-01
Payer: MEDICARE

## 2020-08-01 LAB
RHEUMATOID FACTOR: <10 IU/ML
SEDIMENTATION RATE, ERYTHROCYTE: 19 MM (ref 0–30)
TOTAL CK: 87 U/L (ref 26–192)
URIC ACID: 3.9 MG/DL (ref 2.4–5.7)

## 2020-08-01 PROCEDURE — 84550 ASSAY OF BLOOD/URIC ACID: CPT

## 2020-08-01 PROCEDURE — 86431 RHEUMATOID FACTOR QUANT: CPT

## 2020-08-01 PROCEDURE — 85652 RBC SED RATE AUTOMATED: CPT

## 2020-08-01 PROCEDURE — 82550 ASSAY OF CK (CPK): CPT

## 2020-08-01 PROCEDURE — 36415 COLL VENOUS BLD VENIPUNCTURE: CPT

## 2020-08-01 PROCEDURE — 86038 ANTINUCLEAR ANTIBODIES: CPT

## 2020-08-03 LAB — ANTI-NUCLEAR ANTIBODY (ANA): NEGATIVE

## 2020-08-05 ENCOUNTER — TELEPHONE (OUTPATIENT)
Dept: INTERNAL MEDICINE CLINIC | Age: 70
End: 2020-08-05

## 2020-08-29 ENCOUNTER — HOSPITAL ENCOUNTER (OUTPATIENT)
Dept: MAMMOGRAPHY | Age: 70
Discharge: HOME | End: 2020-08-31
Payer: MEDICARE

## 2020-08-29 PROCEDURE — 77063 BREAST TOMOSYNTHESIS BI: CPT

## 2020-09-10 ENCOUNTER — TELEPHONE (OUTPATIENT)
Dept: INTERNAL MEDICINE CLINIC | Age: 70
End: 2020-09-10

## 2020-09-10 RX ORDER — TOBRAMYCIN 3 MG/ML
1 SOLUTION/ DROPS OPHTHALMIC EVERY 4 HOURS
Qty: 1 BOTTLE | Refills: 0 | Status: SHIPPED | OUTPATIENT
Start: 2020-09-10 | End: 2020-09-20

## 2020-09-10 NOTE — TELEPHONE ENCOUNTER
Pt called to report left eye red and matted since yesterday. painful+ asking for rx for pink eye. please advise.

## 2020-09-14 ENCOUNTER — HOSPITAL ENCOUNTER (OUTPATIENT)
Dept: WOUND CARE | Age: 70
Discharge: HOME | End: 2020-09-14
Payer: MEDICARE

## 2020-09-14 PROCEDURE — 99212 OFFICE O/P EST SF 10 MIN: CPT

## 2020-09-14 NOTE — PROGRESS NOTES
[address] Continence Nurse  Ostomy Clinic Follow Up Note       NAME:  [facility] RECORD NUMBER:  [id number]  AGE: 78 y.o. GENDER: female  : 1950  TODAY'S DATE:  2020    Subjective:     Reason for Ostomy referral for eval/treatment:  Ostomy pouch leakage      Geronimo Perez is a 78 y.o. female referred by:   Dr. Seymour Orellana history:   New colostomy creation 2019. Previous history of end colostomy 2019, takedown of that colostomy 2019, anastomotic leak 2019 with new colostomy creation. Patient Goal of Care: find pouch that fits correctly      The patient is here today because she began using a different pouch a couple of month ago because she wanted to have a pouch that had a beige covered exterior instead of clear, but that new pouch does not seem to be working. She states that she often has leaks and the skin around her stoma is broken down from leakage.      Objective:      LMP  (LMP Unknown)       LABS    CBC:   Lab Results   Component Value Date    WBC 6.9 2020    RBC 3.87 2020    RBC 3.98 2011    HGB 11.4 2020     CMP:  Albumin:    Lab Results   Component Value Date    LABALBU 3.1 2020    LABALBU 4.0 2011     PT/INR:    Lab Results   Component Value Date    PROTIME 11.8 2019    INR 1.2 2019     HgBA1c:    Lab Results   Component Value Date    LABA1C 5.9 2019     PTT: No components found for: LABPTT      Assessment:       Patient Active Problem List   Diagnosis Code    Dyslipidemia E78.5    Osteoarthritis M19.90    GERD (gastroesophageal reflux disease) K21.9    Essential hypertension I10    Obesity E66.9    Allergic rhinitis J30.9    Osteoporosis M81.0    Arthritis of knee M17.10    Biliary dyskinesia K82.8    Right upper quadrant abdominal pain R10.11    Fundic gland polyposis of stomach K31.7    SBO (small bowel obstruction) ([facility] [address] K56.609    Incarcerated incisional hernia K43.0    Sigmoid diverticulitis K57.32    Incisional hernia without obstruction or gangrene K43.2    Chalazion H00.19    S/P colectomy Z90.49    History of colostomy reversal Z98.890    Abdominal adhesions K66.0    Hypokalemia E87.6    Atelectasis of left lung J98.11    Postoperative anemia due to acute blood loss D62    Surgical wound dehiscence T81.31XA    Constipation A79.37    Systolic murmur T43.5    Thyroid disease E07.9    Perforated sigmoid colon (HCC) K63.1    Large intestine anastomotic leak K91.89    Normocytic anemia D64.9    Rectal bleeding K62.5    Sepsis (HCC) A41.9    Acute cystitis without hematuria N30.00    Leukocytosis D72.829    Nausea R11.0    Postoperative intra-abdominal abscess T81.49XA    MRSA (methicillin resistant staph aureus) culture positive Z22.322    E coli infection A49.8       Patient Active Problem List   Diagnosis    Dyslipidemia    Osteoarthritis    GERD (gastroesophageal reflux disease)    Essential hypertension    Obesity    Allergic rhinitis    Osteoporosis    Arthritis of knee    Biliary dyskinesia    Right upper quadrant abdominal pain    Fundic gland polyposis of stomach    SBO (small bowel obstruction) (HCC)    Incarcerated incisional hernia    Sigmoid diverticulitis    Incisional hernia without obstruction or gangrene    Chalazion    S/P colectomy    History of colostomy reversal    Abdominal adhesions    Hypokalemia    Atelectasis of left lung    Postoperative anemia due to acute blood loss    Surgical wound dehiscence    Constipation    Systolic murmur    Thyroid disease    Perforated sigmoid colon (HCC)    Large intestine anastomotic leak    Normocytic anemia    Rectal bleeding    Sepsis (HCC)    Acute cystitis without hematuria    Leukocytosis    Nausea    Postoperative intra-abdominal abscess    MRSA (methicillin resistant staph aureus) culture positive    E coli infection         Ostomy and Peristomal skin: ostomy is somewhat retracted and oval shaped. Appears healthy and viable. The immediate peristomal skin in denuded with a few pseudoverrucous lesions noted. The skin in the peristomal area is soft and the loose adiposity is protuberant. The patient would benefit from a convex pouch. She states that she prefers a tape border and a two piece system makes it easier for her to see to place the pouch. Plan:     Plan of Care:     -Convex extended wear two piece pouch by Oscar enciso. The patient was provided specific information for ordering to be sent to [address]    -No ostomy clinic follow up appt necessary at this time. It is assumed that the peristomal denudation will heal rapidly with the convex pouching system. She is to call for an appt if problems persist.    See Discharge Instructions below    Patient/Caregiver Teaching:  Level of patientunderstanding able to:     [x] Indicates understanding       [] Needs reinforcement  [] Unsuccessful      [x] Verbal Understanding  [] Demonstrated understanding       [] No evidence of learning  [] Refused teaching         [] N/A    ___________________________________________    Discharge Instructions            [address] [facility] and [facility]     Visit Discharge Instructions    DATE- 09/14/2020      HOME CARE [address] [place] [address]-   -Oscar New Image Extended wear convex ref# [id number]  -Oscar New Image Lock N Roll closure drainable pouch ref# [id number]      OSTOMY INSTRUCTIONS-  Routine ostomy care:  -Cut barrier to fit stoma with no more than 1/8\" of exposed skin.  -Adapt paste in a thin bead to the cut edge of the pouching system vs Apply an Adapt Barrier Ring to the cut edge of the pouching system.   -Empty the pouch when 1/3 to 1/2 full.  -Change the pouching system twice weekly and prn.  -Our goal is to keep the skin around the stoma intact and to have a dependable pouching system without leaks.  -The skin around the stoma should be intact and appear just like the skin on the opposite side of the abdomen. ADDITIONAL INSTRUCTIONS-      FOLLOW UP APPOINTMENT FOR OSTOMY CARE- CALL IF NEEDED [phone]      If you need medical attention outside of the business hours of the [address] [place] [address] Road please contact your PCP or go to the nearest emergency room.           Patient Signature:__________________________________________________ DATE__________  Electronically signed by Kwadwo Green RN on 9/14/2020 at 2:09 PM              ____________________________________________       Electronically signed by Kwadwo Green RN on  9/14/2020 at 2:17 PM

## 2020-09-25 RX ORDER — METOPROLOL SUCCINATE 25 MG/1
25 TABLET, EXTENDED RELEASE ORAL DAILY
Qty: 90 TABLET | Refills: 0 | Status: SHIPPED | OUTPATIENT
Start: 2020-09-25 | End: 2020-12-10 | Stop reason: SDUPTHER

## 2020-09-25 NOTE — TELEPHONE ENCOUNTER
Vi Dow is calling to request a refill on the following medication(s):    Medication Request:    Last filled 11/22/19 #30 with 3 RF    Requested Prescriptions     Pending Prescriptions Disp Refills    metoprolol succinate (TOPROL XL) 25 MG extended release tablet 30 tablet 3     Sig: Take 1 tablet by mouth daily       Last Visit Date (If Applicable):  7/07/8708    Next Visit Date:    12/4/2020

## 2020-11-17 ENCOUNTER — TELEPHONE (OUTPATIENT)
Dept: INTERNAL MEDICINE CLINIC | Age: 70
End: 2020-11-17

## 2020-11-17 RX ORDER — CEPHALEXIN 500 MG/1
500 CAPSULE ORAL 3 TIMES DAILY
Qty: 15 CAPSULE | Refills: 0 | Status: SHIPPED | OUTPATIENT
Start: 2020-11-17 | End: 2020-11-22

## 2020-11-17 NOTE — TELEPHONE ENCOUNTER
Patient calling with symptoms of a bladder infection  States she has pain and burning asking for medication to be sent to [facility]

## 2020-12-10 ENCOUNTER — OFFICE VISIT (OUTPATIENT)
Dept: INTERNAL MEDICINE CLINIC | Age: 70
End: 2020-12-10
Payer: MEDICARE

## 2020-12-10 VITALS
HEIGHT: 67 IN | DIASTOLIC BLOOD PRESSURE: 70 MMHG | BODY MASS INDEX: 40.18 KG/M2 | TEMPERATURE: 97.5 F | SYSTOLIC BLOOD PRESSURE: 118 MMHG | RESPIRATION RATE: 16 BRPM | HEART RATE: 72 BPM | WEIGHT: 256 LBS

## 2020-12-10 PROCEDURE — 99214 OFFICE O/P EST MOD 30 MIN: CPT | Performed by: INTERNAL MEDICINE

## 2020-12-10 RX ORDER — METOPROLOL SUCCINATE 25 MG/1
25 TABLET, EXTENDED RELEASE ORAL DAILY
Qty: 90 TABLET | Refills: 1 | Status: SHIPPED | OUTPATIENT
Start: 2020-12-10 | End: 2021-11-18 | Stop reason: SDUPTHER

## 2020-12-10 NOTE — PROGRESS NOTES
Julio C Estrada is a 78 y.o. female who presents for   Chief Complaint   Patient presents with   [place]     has a bump on her head- unsure if she did it when she hit head on dryer. has noticed since 12/5/20    Other     did change shampoo as she had alot of flakey skin by ears    and follow up of chronic medical problems. Patient Active Problem List   Diagnosis    Dyslipidemia    Osteoarthritis    GERD (gastroesophageal reflux disease)    Essential hypertension    Morbidly obese (HCC)    Allergic rhinitis    Osteoporosis    Arthritis of knee    Biliary dyskinesia    Right upper quadrant abdominal pain    Fundic gland polyposis of stomach    SBO (small bowel obstruction) ([address]    Incarcerated incisional hernia    Sigmoid diverticulitis    Incisional hernia without obstruction or gangrene    Chalazion    S/P colectomy    History of colostomy reversal    Abdominal adhesions    Hypokalemia    Atelectasis of left lung    Postoperative anemia due to acute blood loss    Surgical wound dehiscence    Constipation    Systolic murmur    Thyroid disease    Perforated sigmoid colon (HCC)    Large intestine anastomotic leak    Normocytic anemia    Rectal bleeding    Sepsis (HCC)    Acute cystitis without hematuria    Leukocytosis    Nausea    Postoperative intra-abdominal abscess    MRSA (methicillin resistant staph aureus) culture positive    E coli infection     HPI  Here for evaluation of injury to the scalp which is painful and also patient states she is retiring on January 6, 2021    Current Outpatient Medications   Medication Sig Dispense Refill    metoprolol succinate (TOPROL XL) 25 MG extended release tablet Take 1 tablet by mouth daily 90 tablet 0    Cholecalciferol (VITAMIN D) 2000 units CAPS capsule Take 2 capsules by mouth daily        No current facility-administered medications for this visit.         No Known Allergies    Past Medical History:   Diagnosis Date    Allergic rhinitis     Bladder prolapse     Constipation     5/2019 resolved    Fundic gland polyposis of stomach 08/17/2017    per EGD    GERD (gastroesophageal reflux disease)     on no medications    Hiatal hernia     History of cardiac cath 12/2002    pt denies blockage    History of diverticulitis     Hyperlipidemia     borderline, on no medication    Hypertension     MRSA (methicillin resistant staph aureus) culture positive 11/01/2016    nasal    MRSA carrier     follows with ID    Obesity     Osteoarthritis     Pelvic abscess in female     Systolic murmur     benign systolic murmur (history of). Pt does not follow-up with a cardiologist (Written 09/25/2019).     Thyroid disease     goiter    Wears glasses        Past Surgical History:   Procedure Laterality Date    ABDOMINAL EXPLORATION SURGERY  05/16/2019    CHOLECYSTECTOMY, LAPAROSCOPIC  11/15/2016    CHOLECYSTECTOMY, LAPAROSCOPIC  11/15/2016    COLONOSCOPY  2013    neg    COLONOSCOPY N/A 5/16/2019    COLONOSCOPY DIAGNOSTIC performed by Felicity Rodgers MD at [address] [place] [address] COLONOSCOPY N/A 12/19/2019    COLONOSCOPY W/STENT X2 performed by Felicity Rodgers MD at [address] 12/24/2019    EXPLORATORY LAPAROTOMY, EXTENSIVE LYSIS OF ADHESIONS, TAKE DOWN OF ANASTOMOSIS, COLOSTOMY CREATION, CLOSURE OF SEROMUSCULAR INJURIES performed by Felicity Rodgers MD at [address], COLON, DIAGNOSTIC      EGD    FISSURECTOMY ANAL N/A 10/4/2019    FLEXIBLE SIGMOIDOSCOPY & ANAL FISTULECTOMY performed by Felicity Rodgers MD at [address] [facility] ARTHROSCOPY Left     lateral release    [place] EGD TRANSORAL BIOPSY SINGLE/MULTIPLE N/A 8/17/2017    EGD BIOPSY performed by Jacquelyn Kothari MD at [address] OFFICE/OUTPT [facility] N/A 5/22/2018    XI ROBOTIC LAPAROSCOPIC UMBILICAL HERNIA REPAIR WITH MESH, POSSIBLE OPEN performed by Manuela Morris IV, DO at [facility] [address]  02/19/2019    SIGMOIDOSCOPY N/A 2/19/2019    SIGMOIDOSCOPY BIOPSY FLEXIBLE performed by Adonay Grayson DO at [facility] [address] N/A 12/23/2019    SIGMOIDOSCOPY DIAGNOSTIC FLEXIBLE WITH FLUORO performed by Marcie Yu MD at [facility] N/A 5/16/2019    ABDOMINAL EXPLORATION ANDBOWEL RESECTION LOW ANTERIOR WITH COLOSTOMY performed by Monik Tovar MD at [facility] N/A 11/12/2019    COLOSTOMY  CLOSURE EXTENSIVE LYSIS OF ADHESIONS REPAIR OF SMALL BOWEL TEAR, REPAIR OF MULTIPLE VENTRAL HERNIAS, MOBILIZATION OF THE SPLENIC FLEXURE AND TRANSVERSE COLON, ABDOMINAL EXPLORATION performed by Monik Tovar MD at [address] [place] [address] Left 9/24/2012    knee    TOTAL KNEE ARTHROPLASTY Right 11/10/2014    knee    UMBILICAL HERNIA REPAIR  05/22/2018    UPPER GASTROINTESTINAL ENDOSCOPY  08/17/2017    Multiple small gastric polyps, no esophagitis noted no Melara's mucosa.   No hiatal hernia, pathology benign fundic gland polyps       Family History   Problem Relation Age of Onset    Heart Disease Mother     COPD Father     Cancer Brother         thyroid, prostate, lung     ROS   Constitutional:  Negative for fatigue, loss of appetite and unexpected weight change   HEENT            : Negative for neck stiffness and pain, no congestion or sinus pressure   Eyes                : No visual disturbance or pain   Cardiovascular: No chest pain or palpitations or leg swelling   Respiratory      : Negative for cough, shortness of breath or wheezing   Gastrointestinal: Negative for abdominal pain, constipation or diarrhea and bloating No nausea or vomiting   Genitourinary:     No urgency or frequency, no burning or hematuria   Musculoskeletal: No arthralgias, back pain or myalgias   Skin                  : Negative for rash or erythema   Neurological    : Negative for dizziness, weakness, tremors ,light headedness or syncope   Psychiatric       : Negative for dysphoric mood, sleep disturbances, nervous or anxious, or decreased concentration   All other review of systems was negative    Objective  Physical Examination:    Nursing note reviewed    /70 (Site: Right Upper Arm, Position: Sitting, Cuff Size: Large Adult)   Pulse 72   Temp 97.5 °F (36.4 °C) (Infrared)   Resp 16   Ht 5' 7\" (1.702 m)   Wt 256 lb (116.1 kg)   LMP  (LMP Unknown)   BMI 40.10 kg/m²   BP Readings from Last 3 Encounters:   12/10/20 118/70   07/30/20 138/78   06/05/20 116/64         Constitutional:  Brandy Edwards is oriented to place, person and time ,appears well-developed and well-nourished  HEENT:  Atraumatic and normocephalic, external ears normal bilaterally, nose normal no oropharyngeal exudate and is clear and moist and there is a small erythematous scabbed area on the scalp which is very tender on palpation  Eyes:  EOCM normal; conjunctivae normal; PERRLA bilaterally  Neck:  Normal range of motion, neck supple, no JVD and no thyromegaly  Cardiovascular:  RRR, normal heart sounds and intact distal pulses  Pulmonary:  effort normal and breath sounds normal bilaterally,no wheezes or rales, no respiratory distress  Abdominal:  Soft, non-tender; normal bowel sounds, no masses  Musculoskeletal:  Normal range of motion and no edema or tenderness bilaterally  No lymphadenopathy  Neurological:  alert, oriented, and normal reflexes bilaterally  Skin: warm and dry  Psychiatric:  normal mood and effect; behavior normal.    Labs:   Lab Results   Component Value Date    LABA1C 5.9 12/31/2019     Lab Results   Component Value Date    CHOL 135 11/22/2019     Lab Results   Component Value Date    HDL 30 (L) 11/22/2019     No results found for: [facility]  Lab Results   Component Value Date    TRIG 154 (H) 01/06/2020     No components found for: [place], [place]  Lab Results   Component Value Date    WBC 6.9 02/20/2020    HGB 11.4 (L) 02/20/2020    HCT 37.6 02/20/2020    MCV 97.2 02/20/2020     02/20/2020     Lab Results   Component Value Date    INR 1.2 12/17/2019    PROTIME 11.8 (H) 12/17/2019     Lab Results   Component Value Date    GLUCOSE 104 (H) 02/20/2020    CREATININE 0.75 02/20/2020    BUN 18 02/20/2020     02/20/2020    K 4.1 02/20/2020     02/20/2020    CO2 26 02/20/2020     Lab Results   Component Value Date    ALT 13 01/06/2020    AST 18 01/06/2020    ALKPHOS 130 (H) 01/06/2020    BILITOT 0.26 (L) 01/06/2020     Lab Results   Component Value Date    LABPROT 6.4 08/14/2012    LABALBU 3.1 (L) 01/06/2020     Lab Results   Component Value Date    TSH 9.72 (H) 12/31/2019     Assessment:  1. Superficial injury of scalp, initial encounter    2. Morbidly obese ([address]    3. Arthralgia, unspecified joint    4. Chronic pain of both shoulders    5. Essential hypertension        Plan:  Patient has a small injury on the scalp which is superficial and scabbed off and advised patient to use Neosporin ointment and apply warm compresses and call me back in week to 10 days if no improvement  Blood pressure is stable on current medications  Patient states that she did physical therapy for bilateral shoulder pain which did not improve and she could not take the physical therapy anymore so quit the therapy and patient states that she used to go to the [address] [place] [address] near [place] and she went there and she was sitting in the chair and try to get up and all her pain is gone and she felt better and was like a miracle and since then she is doing better  Review as scheduled in January 1. Jennifer received counseling on the following healthy behaviors: nutrition and exercise    2. Prior labs and health maintenance reviewed. 3.  Discussed use, benefit, and side effects of prescribed medications. Barriers to medication compliance addressed. All her questions were answered. Pt voiced understanding. Seth Cedeño will continue current medications, diet and exercise. No orders of the defined types were placed in this encounter. Completed Refills               Requested Prescriptions     Pending Prescriptions Disp Refills    metoprolol succinate (TOPROL XL) 25 MG extended release tablet 90 tablet 1     Sig: Take 1 tablet by mouth daily     4. Patient given educational materials - see patient instructions    5. Was a self-tracking handout given in paper form or via [employer]? NO    No orders of the defined types were placed in this encounter. No follow-ups on file. Patient voiced understanding and agreed to treatment plan. Electronically signed by Norina Lesch, MD on 12/10/2020 at 2:37 PM    This note is created with a voice recognition program and while intend to generate a document that accurately reflects the content of the visit, no guarantee can be provided that every mistake has been identified and corrected by editing.

## 2021-01-07 ENCOUNTER — OFFICE VISIT (OUTPATIENT)
Dept: INTERNAL MEDICINE CLINIC | Age: 71
End: 2021-01-07
Payer: MEDICARE

## 2021-01-07 VITALS
BODY MASS INDEX: 39.87 KG/M2 | WEIGHT: 254 LBS | RESPIRATION RATE: 16 BRPM | TEMPERATURE: 96.9 F | HEIGHT: 67 IN | HEART RATE: 68 BPM | DIASTOLIC BLOOD PRESSURE: 78 MMHG | SYSTOLIC BLOOD PRESSURE: 138 MMHG

## 2021-01-07 DIAGNOSIS — E66.01 MORBIDLY OBESE (HCC): ICD-10-CM

## 2021-01-07 DIAGNOSIS — I10 ESSENTIAL HYPERTENSION: Primary | ICD-10-CM

## 2021-01-07 DIAGNOSIS — M25.50 ARTHRALGIA, UNSPECIFIED JOINT: ICD-10-CM

## 2021-01-07 DIAGNOSIS — E55.9 VITAMIN D DEFICIENCY: ICD-10-CM

## 2021-01-07 DIAGNOSIS — E16.2 HYPOGLYCEMIA: ICD-10-CM

## 2021-01-07 DIAGNOSIS — K21.9 GASTROESOPHAGEAL REFLUX DISEASE WITHOUT ESOPHAGITIS: ICD-10-CM

## 2021-01-07 DIAGNOSIS — R14.0 ABDOMINAL BLOATING: ICD-10-CM

## 2021-01-07 PROCEDURE — 3017F COLORECTAL CA SCREEN DOC REV: CPT | Performed by: INTERNAL MEDICINE

## 2021-01-07 PROCEDURE — 4040F PNEUMOC VAC/ADMIN/RCVD: CPT | Performed by: INTERNAL MEDICINE

## 2021-01-07 PROCEDURE — G8417 CALC BMI ABV UP PARAM F/U: HCPCS | Performed by: INTERNAL MEDICINE

## 2021-01-07 PROCEDURE — G8484 FLU IMMUNIZE NO ADMIN: HCPCS | Performed by: INTERNAL MEDICINE

## 2021-01-07 PROCEDURE — 1090F PRES/ABSN URINE INCON ASSESS: CPT | Performed by: INTERNAL MEDICINE

## 2021-01-07 PROCEDURE — G8427 DOCREV CUR MEDS BY ELIG CLIN: HCPCS | Performed by: INTERNAL MEDICINE

## 2021-01-07 PROCEDURE — 1123F ACP DISCUSS/DSCN MKR DOCD: CPT | Performed by: INTERNAL MEDICINE

## 2021-01-07 PROCEDURE — 1036F TOBACCO NON-USER: CPT | Performed by: INTERNAL MEDICINE

## 2021-01-07 PROCEDURE — 99214 OFFICE O/P EST MOD 30 MIN: CPT | Performed by: INTERNAL MEDICINE

## 2021-01-07 PROCEDURE — G8400 PT W/DXA NO RESULTS DOC: HCPCS | Performed by: INTERNAL MEDICINE

## 2021-01-07 ASSESSMENT — PATIENT HEALTH QUESTIONNAIRE - PHQ9
SUM OF ALL RESPONSES TO PHQ QUESTIONS 1-9: 0
SUM OF ALL RESPONSES TO PHQ QUESTIONS 1-9: 0
2. FEELING DOWN, DEPRESSED OR HOPELESS: 0
1. LITTLE INTEREST OR PLEASURE IN DOING THINGS: 0

## 2021-01-07 NOTE — PROGRESS NOTES
5/2019 resolved    Fundic gland polyposis of stomach 08/17/2017    per EGD    GERD (gastroesophageal reflux disease)     on no medications    Hiatal hernia     History of cardiac cath 12/2002    pt denies blockage    History of diverticulitis     Hyperlipidemia     borderline, on no medication    Hypertension     MRSA (methicillin resistant staph aureus) culture positive 11/01/2016    nasal    MRSA carrier     follows with ID    Obesity     Osteoarthritis     Pelvic abscess in female     Systolic murmur     benign systolic murmur (history of). Pt does not follow-up with a cardiologist (Written 09/25/2019).     Thyroid disease     goiter    Wears glasses        Past Surgical History:   Procedure Laterality Date    ABDOMINAL EXPLORATION SURGERY  05/16/2019    CHOLECYSTECTOMY, LAPAROSCOPIC  11/15/2016    CHOLECYSTECTOMY, LAPAROSCOPIC  11/15/2016    COLONOSCOPY  2013    neg    COLONOSCOPY N/A 5/16/2019    COLONOSCOPY DIAGNOSTIC performed by Lesley Trinh MD at [address] COLONOSCOPY N/A 12/19/2019    COLONOSCOPY W/STENT X2 performed by Lesley Trinh MD at [address] 12/24/2019    EXPLORATORY LAPAROTOMY, EXTENSIVE LYSIS OF ADHESIONS, TAKE DOWN OF ANASTOMOSIS, COLOSTOMY CREATION, CLOSURE OF SEROMUSCULAR INJURIES performed by Lesley Trinh MD at [address] [facility], COLON, DIAGNOSTIC      EGD    FISSURECTOMY ANAL N/A 10/4/2019    FLEXIBLE SIGMOIDOSCOPY & ANAL FISTULECTOMY performed by Lesley Trinh MD at [address] ARTHROSCOPY Left     lateral release    [place] EGD TRANSORAL BIOPSY SINGLE/MULTIPLE N/A 8/17/2017    EGD BIOPSY performed by Nicolas Rodgers MD at [address] [facility] OFFICE/OUTPT [address] [facility] [address] N/A 5/22/2018    XI ROBOTIC LAPAROSCOPIC UMBILICAL HERNIA REPAIR WITH MESH, POSSIBLE OPEN performed by Beni Morris IV, DO at [facility] [address]  02/19/2019    SIGMOIDOSCOPY N/A 2/19/2019 SIGMOIDOSCOPY BIOPSY FLEXIBLE performed by Selwyn Garnica DO at [facility] [address] N/A 12/23/2019    SIGMOIDOSCOPY DIAGNOSTIC FLEXIBLE WITH FLUORO performed by Rebecca Hsu MD at [facility] N/A 5/16/2019    ABDOMINAL EXPLORATION ANDBOWEL RESECTION LOW ANTERIOR WITH COLOSTOMY performed by Josh Nguyen MD at [facility] N/A 11/12/2019    COLOSTOMY  CLOSURE EXTENSIVE LYSIS OF ADHESIONS REPAIR OF SMALL BOWEL TEAR, REPAIR OF MULTIPLE VENTRAL HERNIAS, MOBILIZATION OF THE SPLENIC FLEXURE AND TRANSVERSE COLON, ABDOMINAL EXPLORATION performed by Josh Nguyen MD at [place] [address] Left 9/24/2012    knee    TOTAL KNEE ARTHROPLASTY Right 11/10/2014    knee    UMBILICAL HERNIA REPAIR  05/22/2018    UPPER GASTROINTESTINAL ENDOSCOPY  08/17/2017    Multiple small gastric polyps, no esophagitis noted no Melara's mucosa. No hiatal hernia, pathology benign fundic gland polyps       Family History   Problem Relation Age of Onset    Heart Disease Mother     COPD Father     Cancer Brother         thyroid, prostate, lung     ROS  ? Constitutional:  Negative for fatigue, loss of appetite and unexpected weight change  ? HEENT            : Negative for neck stiffness and pain, no congestion or sinus pressure  ? Eyes                : No visual disturbance or pain  ? Cardiovascular: No chest pain or palpitations or leg swelling  ? Respiratory      : Negative for cough, shortness of breath or wheezing  ? Gastrointestinal: Negative for abdominal pain, constipation or diarrhea and bloating No nausea or vomiting  ? Genitourinary:     No urgency or frequency, no burning or hematuria  ? Musculoskeletal: No arthralgias, back pain or myalgias  ? Skin                  : Negative for rash or erythema  ?  Neurological    : Negative for dizziness, weakness, tremors ,light headedness or syncope ? Psychiatric       : Negative for dysphoric mood, sleep disturbances, nervous or anxious, or decreased concentration  ?  All other review of systems was negative    Objective  Physical Examination:    Nursing note reviewed    /78 (Site: Right Upper Arm, Position: Sitting, Cuff Size: Large Adult)   Pulse 68   Temp 96.9 °F (36.1 °C) (Infrared)   Resp 16   Ht 5' 7\" (1.702 m)   Wt 254 lb (115.2 kg)   LMP  (LMP Unknown)   BMI 39.78 kg/m²   BP Readings from Last 3 Encounters:   01/07/21 138/78   12/10/20 118/70   07/30/20 138/78         Constitutional:  [facility] is oriented to place, person and time ,appears well-developed and well-nourished  HEENT:  Atraumatic and normocephalic, external ears normal bilaterally, nose normal no oropharyngeal exudate and is clear and moist  Eyes:  EOCM normal; conjunctivae normal; PERRLA bilaterally  Neck:  Normal range of motion, neck supple, no JVD and no thyromegaly  Cardiovascular:  RRR, normal heart sounds and intact distal pulses  Pulmonary:  effort normal and breath sounds normal bilaterally,no wheezes or rales, no respiratory distress  Abdominal:  Soft, non-tender; normal bowel sounds, no masses  Musculoskeletal:  Normal range of motion and no edema or tenderness bilaterally  No lymphadenopathy  Neurological:  alert, oriented, and normal reflexes bilaterally  Skin: warm and dry  Psychiatric:  normal mood and effect; behavior normal.    Labs:   Lab Results   Component Value Date    LABA1C 5.9 12/31/2019     Lab Results   Component Value Date    CHOL 135 11/22/2019     Lab Results   Component Value Date    HDL 30 (L) 11/22/2019     No results found for: [address]  Lab Results   Component Value Date    TRIG 154 (H) 01/06/2020     No components found for: [place], [place]  Lab Results   Component Value Date    WBC 6.9 02/20/2020    HGB 11.4 (L) 02/20/2020    HCT 37.6 02/20/2020    MCV 97.2 02/20/2020     02/20/2020     Lab Results   Component Value Date    INR 1.2 12/17/2019 PROTIME 11.8 (H) 12/17/2019     Lab Results   Component Value Date    GLUCOSE 104 (H) 02/20/2020    CREATININE 0.75 02/20/2020    BUN 18 02/20/2020     02/20/2020    K 4.1 02/20/2020     02/20/2020    CO2 26 02/20/2020     Lab Results   Component Value Date    ALT 13 01/06/2020    AST 18 01/06/2020    ALKPHOS 130 (H) 01/06/2020    BILITOT 0.26 (L) 01/06/2020     Lab Results   Component Value Date    LABPROT 6.4 08/14/2012    LABALBU 3.1 (L) 01/06/2020     Lab Results   Component Value Date    TSH 9.72 (H) 12/31/2019     Assessment:  1. Essential hypertension    2. Arthralgia, unspecified joint    3. Morbidly obese (HCC)    4. Abdominal bloating    5. Vitamin D deficiency    6. Gastroesophageal reflux disease without esophagitis    7. Hypoglycemia        Plan:  Blood pressure is stable on current medications and continue same  Patient states her arthralgias improved after going to the medical [facility] and doing well  Strongly counseled about diet and exercise  Patient still complaining abdominal bloating after the colon surgery and wants to see gastroenterology and referral was made accordingly  Patient is on vitamin D supplements and labs ordered to check for vitamin D  We will do a hemoglobin A1c to evaluate for any occult diabetes  Review in 4 months           1. Jennifer received counseling on the following healthy behaviors: nutrition and exercise    2. Prior labs and health maintenance reviewed. 3.  Discussed use, benefit, and side effects of prescribed medications. Barriers to medication compliance addressed. All her questions were answered. Pt voiced understanding. Corrie العلي will continue current medications, diet and exercise. No orders of the defined types were placed in this encounter. Completed Refills               Requested Prescriptions      No prescriptions requested or ordered in this encounter     4.  Patient given educational materials - see patient instructions 11. Was a self-tracking handout given in paper form or via [employer]? NO    Orders Placed This Encounter   Procedures    Hemoglobin A1C     Standing Status:   Future     Standing Expiration Date:   1/7/2022    Comprehensive Metabolic Panel     Standing Status:   Future     Standing Expiration Date:   1/7/2022    CBC     Standing Status:   Future     Standing Expiration Date:   1/7/2022    Vitamin B12     Standing Status:   Future     Standing Expiration Date:   1/7/2022    TSH without Reflex     Standing Status:   Future     Standing Expiration Date:   1/7/2022    Lipid Panel     Standing Status:   Future     Standing Expiration Date:   1/7/2022     Order Specific Question:   Is Patient Fasting?/# of Hours     Answer:   12    CK     Standing Status:   Future     Standing Expiration Date:   1/7/2022    Amylase     Standing Status:   Future     Standing Expiration Date:   1/7/2022    Lipase     Standing Status:   Future     Standing Expiration Date:   1/7/2022    Magnesium     Standing Status:   Future     Standing Expiration Date:   1/7/2022    Vitamin D 25 Hydroxy     Standing Status:   Future     Standing Expiration Date:   1/7/2022   Corrine Jaramillo MD, Gastroenterology, [address]     Referral Priority:   Routine     Referral Type:   Eval and Treat     Referral Reason:   Specialty Services Required     Referred to Provider:   Modesta Oliver MD     Requested Specialty:   Gastroenterology     Number of Visits Requested:   1     Return in about 4 months (around 5/7/2021). Patient voiced understanding and agreed to treatment plan. Electronically signed by Logan Faust MD on 1/7/2021 at 4:21 PM    This note is created with a voice recognition program and while intend to generate a document that accurately reflects the content of the visit, no guarantee can be provided that every mistake has been identified and corrected by editing.

## 2021-01-08 ENCOUNTER — HOSPITAL ENCOUNTER (OUTPATIENT)
Age: 71
Discharge: HOME | End: 2021-01-08
Payer: MEDICARE

## 2021-01-08 ENCOUNTER — TELEPHONE (OUTPATIENT)
Dept: GASTROENTEROLOGY | Age: 71
End: 2021-01-08

## 2021-01-08 DIAGNOSIS — M25.50 ARTHRALGIA, UNSPECIFIED JOINT: ICD-10-CM

## 2021-01-08 DIAGNOSIS — E66.01 MORBIDLY OBESE (HCC): ICD-10-CM

## 2021-01-08 DIAGNOSIS — E55.9 VITAMIN D DEFICIENCY: ICD-10-CM

## 2021-01-08 DIAGNOSIS — I10 ESSENTIAL HYPERTENSION: ICD-10-CM

## 2021-01-08 DIAGNOSIS — E16.2 HYPOGLYCEMIA: ICD-10-CM

## 2021-01-08 DIAGNOSIS — R14.0 ABDOMINAL BLOATING: ICD-10-CM

## 2021-01-08 DIAGNOSIS — K21.9 GASTROESOPHAGEAL REFLUX DISEASE WITHOUT ESOPHAGITIS: ICD-10-CM

## 2021-01-08 LAB
ALBUMIN SERPL-MCNC: 4.1 G/DL (ref 3.5–5.2)
ALBUMIN/GLOBULIN RATIO: 1.6 (ref 1–2.5)
ALP BLD-CCNC: 97 U/L (ref 35–104)
ALT SERPL-CCNC: 16 U/L (ref 5–33)
AMYLASE: 27 U/L (ref 28–100)
ANION GAP SERPL CALCULATED.3IONS-SCNC: 12 MMOL/L (ref 9–17)
AST SERPL-CCNC: 15 U/L
BILIRUB SERPL-MCNC: 0.39 MG/DL (ref 0.3–1.2)
BUN BLDV-MCNC: 20 MG/DL (ref 8–23)
BUN/CREAT BLD: ABNORMAL (ref 9–20)
CALCIUM SERPL-MCNC: 9.6 MG/DL (ref 8.6–10.4)
CHLORIDE BLD-SCNC: 107 MMOL/L (ref 98–107)
CHOLESTEROL/HDL RATIO: 4.2
CHOLESTEROL: 183 MG/DL
CO2: 23 MMOL/L (ref 20–31)
CREAT SERPL-MCNC: 0.8 MG/DL (ref 0.5–0.9)
ESTIMATED AVERAGE GLUCOSE: 120 MG/DL
GFR AFRICAN AMERICAN: >60 ML/MIN
GFR NON-AFRICAN AMERICAN: >60 ML/MIN
GFR SERPL CREATININE-BSD FRML MDRD: ABNORMAL ML/MIN/{1.73_M2}
GFR SERPL CREATININE-BSD FRML MDRD: ABNORMAL ML/MIN/{1.73_M2}
GLUCOSE BLD-MCNC: 102 MG/DL (ref 70–99)
HBA1C MFR BLD: 5.8 % (ref 4–6)
HCT VFR BLD CALC: 41.8 % (ref 36.3–47.1)
HDLC SERPL-MCNC: 44 MG/DL
HEMOGLOBIN: 12.8 G/DL (ref 11.9–15.1)
LDL CHOLESTEROL: 112 MG/DL (ref 0–130)
LIPASE: 35 U/L (ref 13–60)
MAGNESIUM: 2.1 MG/DL (ref 1.6–2.6)
MCH RBC QN AUTO: 28.9 PG (ref 25.2–33.5)
MCHC RBC AUTO-ENTMCNC: 30.6 G/DL (ref 28.4–34.8)
MCV RBC AUTO: 94.4 FL (ref 82.6–102.9)
NRBC AUTOMATED: 0 PER 100 WBC
PDW BLD-RTO: 13.3 % (ref 11.8–14.4)
PLATELET # BLD: 268 K/UL (ref 138–453)
PMV BLD AUTO: 10.6 FL (ref 8.1–13.5)
POTASSIUM SERPL-SCNC: 4.2 MMOL/L (ref 3.7–5.3)
RBC # BLD: 4.43 M/UL (ref 3.95–5.11)
SODIUM BLD-SCNC: 142 MMOL/L (ref 135–144)
TOTAL CK: 52 U/L (ref 26–192)
TOTAL PROTEIN: 6.6 G/DL (ref 6.4–8.3)
TRIGL SERPL-MCNC: 135 MG/DL
TSH SERPL DL<=0.05 MIU/L-ACNC: 1.63 MIU/L (ref 0.3–5)
VITAMIN B-12: 484 PG/ML (ref 232–1245)
VITAMIN D 25-HYDROXY: 26 NG/ML (ref 30–100)
VLDLC SERPL CALC-MCNC: NORMAL MG/DL (ref 1–30)
WBC # BLD: 8.1 K/UL (ref 3.5–11.3)

## 2021-01-08 PROCEDURE — 82550 ASSAY OF CK (CPK): CPT

## 2021-01-08 PROCEDURE — 85027 COMPLETE CBC AUTOMATED: CPT

## 2021-01-08 PROCEDURE — 83690 ASSAY OF LIPASE: CPT

## 2021-01-08 PROCEDURE — 82306 VITAMIN D 25 HYDROXY: CPT

## 2021-01-08 PROCEDURE — 82150 ASSAY OF AMYLASE: CPT

## 2021-01-08 PROCEDURE — 36415 COLL VENOUS BLD VENIPUNCTURE: CPT

## 2021-01-08 PROCEDURE — 82607 VITAMIN B-12: CPT

## 2021-01-08 PROCEDURE — 83735 ASSAY OF MAGNESIUM: CPT

## 2021-01-08 PROCEDURE — 84443 ASSAY THYROID STIM HORMONE: CPT

## 2021-01-08 PROCEDURE — 80053 COMPREHEN METABOLIC PANEL: CPT

## 2021-01-08 PROCEDURE — 80061 LIPID PANEL: CPT

## 2021-01-08 PROCEDURE — 83036 HEMOGLOBIN GLYCOSYLATED A1C: CPT

## 2021-01-08 NOTE — TELEPHONE ENCOUNTER
Left message for patient to call the office to schedule an appointment with Dr. Don Cesar from new referral from PCP.     Est patient of Dr. Radha Orantes 8/23/17

## 2021-01-13 ENCOUNTER — OFFICE VISIT (OUTPATIENT)
Dept: GASTROENTEROLOGY | Age: 71
End: 2021-01-13
Payer: MEDICARE

## 2021-01-13 VITALS
BODY MASS INDEX: 40.25 KG/M2 | DIASTOLIC BLOOD PRESSURE: 95 MMHG | TEMPERATURE: 97.6 F | SYSTOLIC BLOOD PRESSURE: 177 MMHG | WEIGHT: 257 LBS

## 2021-01-13 DIAGNOSIS — K43.9 VENTRAL HERNIA WITHOUT OBSTRUCTION OR GANGRENE: ICD-10-CM

## 2021-01-13 DIAGNOSIS — R10.13 ABDOMINAL PAIN, EPIGASTRIC: Primary | ICD-10-CM

## 2021-01-13 DIAGNOSIS — K55.1 MESENTERIC ANGINA (HCC): ICD-10-CM

## 2021-01-13 PROCEDURE — G8427 DOCREV CUR MEDS BY ELIG CLIN: HCPCS | Performed by: INTERNAL MEDICINE

## 2021-01-13 PROCEDURE — 1123F ACP DISCUSS/DSCN MKR DOCD: CPT | Performed by: INTERNAL MEDICINE

## 2021-01-13 PROCEDURE — G8484 FLU IMMUNIZE NO ADMIN: HCPCS | Performed by: INTERNAL MEDICINE

## 2021-01-13 PROCEDURE — G8417 CALC BMI ABV UP PARAM F/U: HCPCS | Performed by: INTERNAL MEDICINE

## 2021-01-13 PROCEDURE — 1036F TOBACCO NON-USER: CPT | Performed by: INTERNAL MEDICINE

## 2021-01-13 PROCEDURE — 4040F PNEUMOC VAC/ADMIN/RCVD: CPT | Performed by: INTERNAL MEDICINE

## 2021-01-13 PROCEDURE — 3017F COLORECTAL CA SCREEN DOC REV: CPT | Performed by: INTERNAL MEDICINE

## 2021-01-13 PROCEDURE — 1090F PRES/ABSN URINE INCON ASSESS: CPT | Performed by: INTERNAL MEDICINE

## 2021-01-13 PROCEDURE — G8400 PT W/DXA NO RESULTS DOC: HCPCS | Performed by: INTERNAL MEDICINE

## 2021-01-13 PROCEDURE — 99204 OFFICE O/P NEW MOD 45 MIN: CPT | Performed by: INTERNAL MEDICINE

## 2021-01-13 ASSESSMENT — ENCOUNTER SYMPTOMS
ABDOMINAL DISTENTION: 1
VOMITING: 0
ANAL BLEEDING: 0
BLOOD IN STOOL: 1
TROUBLE SWALLOWING: 0
NAUSEA: 0
CHOKING: 0
BACK PAIN: 0
SINUS PRESSURE: 0
VOICE CHANGE: 0
WHEEZING: 0
DIARRHEA: 0
CONSTIPATION: 0
SORE THROAT: 0
ABDOMINAL PAIN: 1
RECTAL PAIN: 0
COUGH: 0

## 2021-01-13 NOTE — PROGRESS NOTES
To manage anastomotic leak patient had a Wallstent placed in the sigmoid colon, and again this stent perforated through the anastomosis into the peritoneal cavity. Following this patient had laparotomy and colostomy performed. At present patient denies urinary symptoms. No pelvic discharge. However she is not able to take oral feedings satisfactory because of the postprandial abdominal distention, bloating, and early satiety. Patient also known to have anxiety in the past.    Our records reviewed. Past Medical,Family, and Social History reviewed and does contribute to the patient presentingcondition. Patient's PMH/PSH,SH,PSYCH Hx, MEDs, ALLERGIES, and ROS were all reviewed and updated in the appropriate sections. PAST MEDICAL HISTORY:  Past Medical History:   Diagnosis Date    Allergic rhinitis     Bladder prolapse     Constipation     5/2019 resolved    Fundic gland polyposis of stomach 08/17/2017    per EGD    GERD (gastroesophageal reflux disease)     on no medications    Hiatal hernia     History of cardiac cath 12/2002    pt denies blockage    History of diverticulitis     Hyperlipidemia     borderline, on no medication    Hypertension     MRSA (methicillin resistant staph aureus) culture positive 11/01/2016    nasal    MRSA carrier     follows with ID    Obesity     Osteoarthritis     Pelvic abscess in female     Systolic murmur     benign systolic murmur (history of). Pt does not follow-up with a cardiologist (Written 09/25/2019).     Thyroid disease     goiter    Wears glasses        Past Surgical History:   Procedure Laterality Date    ABDOMINAL EXPLORATION SURGERY  05/16/2019    CHOLECYSTECTOMY, LAPAROSCOPIC  11/15/2016    CHOLECYSTECTOMY, LAPAROSCOPIC  11/15/2016    COLONOSCOPY  2013    neg    COLONOSCOPY N/A 5/16/2019    COLONOSCOPY DIAGNOSTIC performed by Charles Shipman MD at [facility] 12/19/2019 COLONOSCOPY W/STENT X2 performed by Shamir Walls MD at [address] [facility] N/A 12/24/2019    EXPLORATORY LAPAROTOMY, EXTENSIVE LYSIS OF ADHESIONS, TAKE DOWN OF ANASTOMOSIS, COLOSTOMY CREATION, CLOSURE OF SEROMUSCULAR INJURIES performed by Shamir Walls MD at [address] [facility], COLON, DIAGNOSTIC      EGD    FISSURECTOMY ANAL N/A 10/4/2019    FLEXIBLE SIGMOIDOSCOPY & ANAL FISTULECTOMY performed by Shamir Walls MD at [address] [facility] ARTHROSCOPY Left     lateral release    MA EGD TRANSORAL BIOPSY SINGLE/MULTIPLE N/A 8/17/2017    EGD BIOPSY performed by Sejal Fermin MD at [address] [facility] OFFICE/OUTPT [facility] N/A 5/22/2018    XI ROBOTIC LAPAROSCOPIC UMBILICAL HERNIA REPAIR WITH MESH, POSSIBLE OPEN performed by Jorge Morris IV, DO at [facility] [address]  02/19/2019    SIGMOIDOSCOPY N/A 2/19/2019    SIGMOIDOSCOPY BIOPSY FLEXIBLE performed by Eliana Patrick DO at [facility] [address] N/A 12/23/2019    SIGMOIDOSCOPY DIAGNOSTIC FLEXIBLE WITH FLUORO performed by Jesu Siu MD at [address] N/A 5/16/2019    [address] performed by Shamir Wlals MD at [address] N/A 11/12/2019    [address], REPAIR OF MULTIPLE VENTRAL HERNIAS, MOBILIZATION OF THE SPLENIC FLEXURE AND TRANSVERSE COLON, ABDOMINAL EXPLORATION performed by Shamir Walls MD at [address] [address] Left 9/24/2012    knee    TOTAL KNEE ARTHROPLASTY Right 11/10/2014    knee    UMBILICAL HERNIA REPAIR  05/22/2018    UPPER GASTROINTESTINAL ENDOSCOPY  08/17/2017    Multiple small gastric polyps, no esophagitis noted no Melara's mucosa.   No hiatal hernia, pathology benign fundic gland polyps       CURRENT MEDICATIONS:    Current Outpatient Medications:   metoprolol succinate (TOPROL XL) 25 MG extended release tablet, Take 1 tablet by mouth daily, Disp: 90 tablet, Rfl: 1    Cholecalciferol (VITAMIN D) 2000 units CAPS capsule, Take 2 capsules by mouth daily , Disp: , Rfl:     ALLERGIES:   No Known Allergies    FAMILY HISTORY:       Problem Relation Age of Onset    Heart Disease Mother     COPD Father     Cancer Brother         thyroid, prostate, lung         SOCIAL HISTORY:   Social History     Socioeconomic History    Marital status: Single     Spouse name: Not on file    Number of children: Not on file    Years of education: Not on file    Highest education level: Not on file   Occupational History    Not on file   Social Needs    Financial resource strain: Not on file    Food insecurity     Worry: Not on file     Inability: Not on file    Transportation needs     Medical: Not on file     Non-medical: Not on file   Tobacco Use    Smoking status: Former Smoker     Packs/day: 1.00     Years: 3.00     Pack years: 3.00     Quit date: 1975     Years since quittin.0    Smokeless tobacco: Never Used    Tobacco comment: quit smoking age 23   Substance and Sexual Activity    Alcohol use: Yes     Comment: very rare    Drug use: No    Sexual activity: Not on file   Lifestyle    Physical activity     Days per week: Not on file     Minutes per session: Not on file    Stress: Not on file   Relationships    Social connections     Talks on phone: Not on file     Gets together: Not on file     Attends Moravian service: Not on file     Active member of club or organization: Not on file     Attends meetings of clubs or organizations: Not on file     Relationship status: Not on file    Intimate partner violence     Fear of current or ex partner: Not on file     Emotionally abused: Not on file     Physically abused: Not on file     Forced sexual activity: Not on file   Other Topics Concern    Not on file   Social History Narrative    Not on file Pulmonary:      Effort: Pulmonary effort is normal. No respiratory distress. Breath sounds: Normal breath sounds. No wheezing or rales. Abdominal:      General: Bowel sounds are normal. There is no distension. Palpations: Abdomen is soft. There is no hepatomegaly or mass. Tenderness: There is no abdominal tenderness. There is no rebound. Hernia: No hernia is present. Musculoskeletal:         General: No tenderness. Comments: No joint swelling   Lymphadenopathy:      Cervical: No cervical adenopathy. Skin:     General: Skin is warm. Findings: No bruising, ecchymosis, erythema or rash. Neurological:      Mental Status: She is alert and oriented to person, place, and time. Cranial Nerves: No cranial nerve deficit. Psychiatric:         Thought Content: Thought content normal.               LABORATORY DATA: Reviewed  Lab Results   Component Value Date    WBC 8.1 01/08/2021    HGB 12.8 01/08/2021    HCT 41.8 01/08/2021    MCV 94.4 01/08/2021     01/08/2021     01/08/2021    K 4.2 01/08/2021     01/08/2021    CO2 23 01/08/2021    BUN 20 01/08/2021    CREATININE 0.80 01/08/2021    LABPROT 6.4 08/14/2012    LABALBU 4.1 01/08/2021    BILITOT 0.39 01/08/2021    ALKPHOS 97 01/08/2021    AST 15 01/08/2021    ALT 16 01/08/2021    INR 1.2 12/17/2019         Lab Results   Component Value Date    RBC 4.43 01/08/2021    HGB 12.8 01/08/2021    MCV 94.4 01/08/2021    MCH 28.9 01/08/2021    MCHC 30.6 01/08/2021    RDW 13.3 01/08/2021    MPV 10.6 01/08/2021    BASOPCT 0 12/28/2019    LYMPHSABS 1.24 12/28/2019    MONOSABS 1.10 12/28/2019    NEUTROABS 12.05 (H) 12/28/2019    EOSABS 0.27 12/28/2019    BASOSABS 0.03 12/28/2019         DIAGNOSTIC TESTING:     No results found. IMPRESSION: Ms. Carla Shannon is a 78 y.o. female with     Assessment:  1. Abdominal pain, epigastric    2. Ventral hernia without obstruction or gangrene    3.  Mesenteric angina ([address] Plan:  At present abdominal examination benign except that she has obese abdomen with huge ventral hernia. Has a postprandial symptoms. Some of the patient's symptoms suggestive of intestinal angina. Also need to evaluate possibility of IBS. Basing on the EGD in 2017 patient has small gastric polyps this needs to be evaluated down the line. Her labs which are done recently reviewed and satisfactory. Patient is explained regarding various possibilities of her abdominal pain and reassured. Patient understood and agreed for close follow-up and to have work-up done. Spent 30 minutes providing patient education and counseling. Thank you for allowing me to participate in the care of Ms. Fontenot. For any further questions please do not hesitate to contact me. Note is dictated utilizing voice recognition software. Unfortunately this leads to occasional typographical errors. Please contact our office if you have any questions. I have reviewed and agree with the MA/JOSE LUISN ROS.      Sadia Hunt MD, [facility]  Board Certified in Gastroenterology and [facility]  Office #: [phone]

## 2021-01-19 ENCOUNTER — HOSPITAL ENCOUNTER (OUTPATIENT)
Dept: CT IMAGING | Age: 71
Discharge: HOME | End: 2021-01-21
Payer: MEDICARE

## 2021-01-19 DIAGNOSIS — R10.13 ABDOMINAL PAIN, EPIGASTRIC: ICD-10-CM

## 2021-01-19 PROCEDURE — 74177 CT ABD & PELVIS W/CONTRAST: CPT

## 2021-01-19 PROCEDURE — 2580000003 HC RX 258: Performed by: INTERNAL MEDICINE

## 2021-01-19 PROCEDURE — 6360000004 HC RX CONTRAST MEDICATION: Performed by: INTERNAL MEDICINE

## 2021-01-19 PROCEDURE — 2500000003 HC RX 250 WO HCPCS: Performed by: INTERNAL MEDICINE

## 2021-01-19 RX ORDER — 0.9 % SODIUM CHLORIDE 0.9 %
80 INTRAVENOUS SOLUTION INTRAVENOUS ONCE
Status: COMPLETED | OUTPATIENT
Start: 2021-01-19 | End: 2021-01-19

## 2021-01-19 RX ORDER — SODIUM CHLORIDE 0.9 % (FLUSH) 0.9 %
10 SYRINGE (ML) INJECTION PRN
Status: DISCONTINUED | OUTPATIENT
Start: 2021-01-19 | End: 2021-01-22 | Stop reason: HOSPADM

## 2021-01-19 RX ADMIN — Medication 10 ML: at 13:55

## 2021-01-19 RX ADMIN — BARIUM SULFATE 1350 ML: 1 SUSPENSION ORAL at 13:55

## 2021-01-19 RX ADMIN — SODIUM CHLORIDE 80 ML: 9 INJECTION, SOLUTION INTRAVENOUS at 13:55

## 2021-01-19 RX ADMIN — IOPAMIDOL 100 ML: 755 INJECTION, SOLUTION INTRAVENOUS at 13:55

## 2021-01-22 ENCOUNTER — TELEPHONE (OUTPATIENT)
Dept: GASTROENTEROLOGY | Age: 71
End: 2021-01-22

## 2021-02-04 ENCOUNTER — OFFICE VISIT (OUTPATIENT)
Dept: GASTROENTEROLOGY | Age: 71
End: 2021-02-04
Payer: MEDICARE

## 2021-02-04 VITALS
BODY MASS INDEX: 40.53 KG/M2 | HEIGHT: 67 IN | OXYGEN SATURATION: 96 % | TEMPERATURE: 98 F | HEART RATE: 86 BPM | WEIGHT: 258.2 LBS

## 2021-02-04 DIAGNOSIS — K62.5 HEMORRHAGE OF RECTUM AND ANUS: Primary | ICD-10-CM

## 2021-02-04 PROCEDURE — 4040F PNEUMOC VAC/ADMIN/RCVD: CPT | Performed by: INTERNAL MEDICINE

## 2021-02-04 PROCEDURE — G8484 FLU IMMUNIZE NO ADMIN: HCPCS | Performed by: INTERNAL MEDICINE

## 2021-02-04 PROCEDURE — 1090F PRES/ABSN URINE INCON ASSESS: CPT | Performed by: INTERNAL MEDICINE

## 2021-02-04 PROCEDURE — 3017F COLORECTAL CA SCREEN DOC REV: CPT | Performed by: INTERNAL MEDICINE

## 2021-02-04 PROCEDURE — 1123F ACP DISCUSS/DSCN MKR DOCD: CPT | Performed by: INTERNAL MEDICINE

## 2021-02-04 PROCEDURE — 1036F TOBACCO NON-USER: CPT | Performed by: INTERNAL MEDICINE

## 2021-02-04 PROCEDURE — G8417 CALC BMI ABV UP PARAM F/U: HCPCS | Performed by: INTERNAL MEDICINE

## 2021-02-04 PROCEDURE — G8427 DOCREV CUR MEDS BY ELIG CLIN: HCPCS | Performed by: INTERNAL MEDICINE

## 2021-02-04 PROCEDURE — G8400 PT W/DXA NO RESULTS DOC: HCPCS | Performed by: INTERNAL MEDICINE

## 2021-02-04 PROCEDURE — 99214 OFFICE O/P EST MOD 30 MIN: CPT | Performed by: INTERNAL MEDICINE

## 2021-02-04 RX ORDER — CITALOPRAM 10 MG/1
10 TABLET ORAL DAILY
Qty: 30 TABLET | Refills: 3 | Status: SHIPPED | OUTPATIENT
Start: 2021-02-04 | End: 2021-05-18 | Stop reason: ALTCHOICE

## 2021-02-04 ASSESSMENT — ENCOUNTER SYMPTOMS
SINUS PRESSURE: 0
BLOOD IN STOOL: 1
NAUSEA: 0
CONSTIPATION: 0
CHOKING: 0
SORE THROAT: 0
VOICE CHANGE: 0
BACK PAIN: 0
DIARRHEA: 0
ABDOMINAL DISTENTION: 1
ABDOMINAL PAIN: 1
COUGH: 0
TROUBLE SWALLOWING: 0
VOMITING: 0
WHEEZING: 0
ANAL BLEEDING: 0
RECTAL PAIN: 0

## 2021-02-04 NOTE — PROGRESS NOTES
GI OFFICE FOLLOW UP    INTERVAL HISTORY:   No referring provider defined for this encounter. Chief Complaint   Patient presents with    Follow-up     Patient is here today to f/u on CT        1. Hemorrhage of rectum and anus              HISTORY OF PRESENT ILLNESS: Ms.Mary Son Valdes is a 78 y.o. female with a past history remarkable for ,   Patient seen for follow-up of abdominal pain. Patient had vague abdominal pain especially in the postprandial state. Also has abdominal bloating, cramps, nausea. She has a colostomy. She has extensive surgical history as detailed in my previous office notes. Recently patient had CT enterography done which revealed questionable bandlike soft tissue density/fistula sinus tract near the peritoneal reflection in the pelvic area. No bowel dilatation noted. Recently patient is having rectal discharge, blood. She has a colostomy. I think that she is developing proctitis/diversion colitis. She does not have fever or chills. No urinary symptoms. Tolerating diet. Past Medical,Family, and Social History reviewed and does contribute to the patient presenting condition. Patient's PMH/PSH,SH,PSYCH Hx, MEDs, ALLERGIES, and ROS were all reviewed and updated in the appropriate sections.  Yes      PAST MEDICAL HISTORY:  Past Medical History:   Diagnosis Date    Allergic rhinitis     Bladder prolapse     Constipation     5/2019 resolved    Fundic gland polyposis of stomach 08/17/2017    per EGD    GERD (gastroesophageal reflux disease)     on no medications    Hiatal hernia     History of cardiac cath 12/2002    pt denies blockage    History of diverticulitis     Hyperlipidemia     borderline, on no medication    Hypertension     MRSA (methicillin resistant staph aureus) culture positive 11/01/2016    nasal    MRSA carrier     follows with MULTIPLE VENTRAL HERNIAS, MOBILIZATION OF THE SPLENIC FLEXURE AND TRANSVERSE COLON, ABDOMINAL EXPLORATION performed by Clarence Webb MD at [address] Left 2012    knee    TOTAL KNEE ARTHROPLASTY Right 11/10/2014    knee    UMBILICAL HERNIA REPAIR  2018    UPPER GASTROINTESTINAL ENDOSCOPY  2017    Multiple small gastric polyps, no esophagitis noted no Melara's mucosa.   No hiatal hernia, pathology benign fundic gland polyps       CURRENT MEDICATIONS:    Current Outpatient Medications:     citalopram (CELEXA) 10 MG tablet, Take 1 tablet by mouth daily, Disp: 30 tablet, Rfl: 3    metoprolol succinate (TOPROL XL) 25 MG extended release tablet, Take 1 tablet by mouth daily, Disp: 90 tablet, Rfl: 1    Cholecalciferol (VITAMIN D) 2000 units CAPS capsule, Take 2 capsules by mouth daily , Disp: , Rfl:     ALLERGIES:   No Known Allergies    FAMILY HISTORY:       Problem Relation Age of Onset    Heart Disease Mother     COPD Father     Cancer Brother         thyroid, prostate, lung         SOCIAL HISTORY:   Social History     Socioeconomic History    Marital status: Single     Spouse name: Not on file    Number of children: Not on file    Years of education: Not on file    Highest education level: Not on file   Occupational History    Not on file   Social Needs    Financial resource strain: Not on file    Food insecurity     Worry: Not on file     Inability: Not on file    Transportation needs     Medical: Not on file     Non-medical: Not on file   Tobacco Use    Smoking status: Former Smoker     Packs/day: 1.00     Years: 3.00     Pack years: 3.00     Quit date: 1975     Years since quittin.1    Smokeless tobacco: Never Used    Tobacco comment: quit smoking age 23   Substance and Sexual Activity    Alcohol use: Yes     Comment: very rare    Drug use: No    Sexual activity: Not on file   Lifestyle    Physical activity     Days per week: Not on file Minutes per session: Not on file    Stress: Not on file   Relationships    Social connections     Talks on phone: Not on file     Gets together: Not on file     Attends Holiness service: Not on file     Active member of club or organization: Not on file     Attends meetings of clubs or organizations: Not on file     Relationship status: Not on file    Intimate partner violence     Fear of current or ex partner: Not on file     Emotionally abused: Not on file     Physically abused: Not on file     Forced sexual activity: Not on file   Other Topics Concern    Not on file   Social History Narrative    Not on file         REVIEW OF SYSTEMS:         Review of Systems   Constitutional: Negative for appetite change, fatigue and unexpected weight change. HENT: Negative for dental problem, postnasal drip, sinus pressure, sore throat, trouble swallowing and voice change. Eyes: Positive for visual disturbance. Respiratory: Negative for cough, choking and wheezing. Cardiovascular: Negative for chest pain, palpitations and leg swelling. Gastrointestinal: Positive for abdominal distention (\"full easily\"), abdominal pain and blood in stool (bloody muscous in bag). Negative for anal bleeding, constipation, diarrhea, nausea, rectal pain and vomiting. Genitourinary: Negative for difficulty urinating. Musculoskeletal: Negative for arthralgias, back pain, gait problem and myalgias. Allergic/Immunologic: Negative for environmental allergies and food allergies. Neurological: Negative for dizziness, weakness, light-headedness, numbness and headaches. Hematological: Does not bruise/bleed easily. Psychiatric/Behavioral: Negative for sleep disturbance. The patient is not nervous/anxious. PHYSICAL EXAMINATION:     Vital signs reviewed per the nursing documentation.      Pulse 86   Temp 98 °F (36.7 °C)   Ht 5' 7\" (1.702 m)   Wt 258 lb 3.2 oz (117.1 kg)   LMP  (LMP Unknown)   SpO2 96%   BMI 40.44 kg/m² Body mass index is 40.44 kg/m². Physical Exam  Vitals signs and nursing note reviewed. Constitutional:       Appearance: She is well-developed. HENT:      Head: Normocephalic and atraumatic. Eyes:      General: No scleral icterus. Conjunctiva/sclera: Conjunctivae normal.      Pupils: Pupils are equal, round, and reactive to light. Neck:      Musculoskeletal: Normal range of motion and neck supple. Thyroid: No thyromegaly. Vascular: No hepatojugular reflux or JVD. Trachea: No tracheal deviation. Cardiovascular:      Rate and Rhythm: Normal rate and regular rhythm. Heart sounds: Normal heart sounds. Pulmonary:      Effort: Pulmonary effort is normal. No respiratory distress. Breath sounds: Normal breath sounds. No wheezing or rales. Abdominal:      General: Bowel sounds are normal. There is no distension. Palpations: Abdomen is soft. There is no hepatomegaly or mass. Tenderness: There is no abdominal tenderness. There is no rebound. Hernia: A hernia is present. Comments: Moderately overweight patient, has obese abdomen. Multiple ventral hernias. Colostomy bag in the right upper quadrant. Musculoskeletal:         General: No tenderness. Comments: No joint swelling   Lymphadenopathy:      Cervical: No cervical adenopathy. Skin:     General: Skin is warm. Findings: No bruising, ecchymosis, erythema or rash. Neurological:      Mental Status: She is alert and oriented to person, place, and time. Cranial Nerves: No cranial nerve deficit. Psychiatric:         Thought Content:  Thought content normal.           LABORATORY DATA: Reviewed  Lab Results   Component Value Date    WBC 8.1 01/08/2021    HGB 12.8 01/08/2021    HCT 41.8 01/08/2021    MCV 94.4 01/08/2021     01/08/2021     01/08/2021    K 4.2 01/08/2021     01/08/2021    CO2 23 01/08/2021    BUN 20 01/08/2021    CREATININE 0.80 01/08/2021    LABPROT 6.4 08/14/2012    LABALBU 4.1 01/08/2021    BILITOT 0.39 01/08/2021    ALKPHOS 97 01/08/2021    AST 15 01/08/2021    ALT 16 01/08/2021    INR 1.2 12/17/2019         Lab Results   Component Value Date    RBC 4.43 01/08/2021    HGB 12.8 01/08/2021    MCV 94.4 01/08/2021    MCH 28.9 01/08/2021    MCHC 30.6 01/08/2021    RDW 13.3 01/08/2021    MPV 10.6 01/08/2021    BASOPCT 0 12/28/2019    LYMPHSABS 1.24 12/28/2019    MONOSABS 1.10 12/28/2019    NEUTROABS 12.05 (H) 12/28/2019    EOSABS 0.27 12/28/2019    BASOSABS 0.03 12/28/2019         DIAGNOSTIC TESTING:     Ct Enterography W Contrast    Result Date: 1/19/2021  EXAMINATION: CT ENTEROGRAPHY 1/19/2021 1:28 pm TECHNIQUE: CT of the abdomen and pelvis was performed after the administration of intravenous contrast and negative oral contrast. Dose modulation, iterative reconstruction, and/or weight based adjustment of the mA/kV was utilized to reduce the radiation dose to as low as reasonably achievable. COMPARISON: 12/16/2019 HISTORY: ORDERING SYSTEM PROVIDED HISTORY: Abdominal pain, epigastric TECHNOLOGIST PROVIDED HISTORY: Reason for Exam: Abdominal discomfort, nausea x several weeks, hx colostomy bag due to perforation, colonoscopy Acuity: Unknown Type of Exam: Unknown FINDINGS: Lower Chest: Unremarkable. Organs: Liver is normal in contour and enhancement. Gallbladder is surgically absent. No biliary ductal dilatation. Pancreas is unremarkable. Adrenals are unremarkable. Similar interval appearance of multiple hypoattenuating splenic lesions. Some are slightly increased in size since 2018. No renal calculi or hydronephrosis. Mild calcific atherosclerosis. GI/Bowel: Moderate wall thickening and submucosal edema of the rectum with fat stranding in the mesorectal fat. Left lower quadrant colostomy. Small bowel containing parastomal hernia without evidence of obstruction or strangulation. Appendix and terminal ileum are normal. Pelvis: Unremarkable. Peritoneum/Retroperitoneum:No free fluid, free air, organized fluid collection or lymphadenopathy. Residual bandlike soft tissue density along the right pelvic peritoneal reflection at the site of the previously demonstrated abscess. Bones: Diffuse osteopenia with multilevel degenerative disc disease. Stomach, small and large bowel containing ventral hernia without evidence of obstruction or strangulation. 1. Moderate infectious or inflammatory proctitis. 2. Residual bandlike soft tissue density along the right pelvic peritoneal reflection at the site of previous abscess, nonspecific. Possibility of a remaining fistula or sinus tract is considered. 3. Hernias as above. 4. Similar appearance of multiple hypoattenuating splenic lesions. Some are slightly increased in size since 2018. In the absence of known malignancy, constitutional symptoms or immunocompromise, these are almost certainly benign. More complete characterization could be made with abdominal MRI or PET-CT. Assessment  1. Hemorrhage of rectum and anus        Plan  Looks like patient has proctitis/diverticular colitis. She may need a sigmoidoscopy to further evaluate this issue. Discussed with the patient regarding CT enterography findings and reassured the patient. On further discussion it appears patient may have mild anxiety. Discussed with her regarding this and advised antianxiety medication possibly citalopram 10 mg at bedtime. Explained regarding benefits. If she develops any side effects to contact me. Patient understood and agreed plan    Thank you for allowing me to participate in the care of Ms. Fontenot. For any further questions please do not hesitate to contact me. I have reviewed and agree with the ROS entered by the MA/LPN. Note is dictated utilizing voice recognition software. Unfortunately this leads to occasional typographical errors.  Please contact our office if you have any questions Dano Villalobos MD,FACP, [facility]  Board Certified in Gastroenterology and [address] [place] [address] Gastroenterology  Office #: [phone]

## 2021-02-11 ENCOUNTER — TELEPHONE (OUTPATIENT)
Dept: GASTROENTEROLOGY | Age: 71
End: 2021-02-11

## 2021-02-11 NOTE — TELEPHONE ENCOUNTER
Bryce Acevedo called to reschedule flex sig on 02/16/21 due to forecasted snow storm. She is now rescheduled [facility] Tuesday 03/02/21 @ 10:45 am flex sig Dr Kaity Webb. Per Dr Kaity Webb no enemas just NPO after midnight the night before procedure. New instructions scanned and mailed.   Covid STA 02/26/21 @ 10:20 am.

## 2021-02-25 NOTE — TELEPHONE ENCOUNTER
Called pt to remind her of covid test tomorrow and to confirm procedure on 3/2/21 at [facility]. Pt confirms both.

## 2021-02-26 ENCOUNTER — HOSPITAL ENCOUNTER (OUTPATIENT)
Dept: LAB | Age: 71
Setting detail: SPECIMEN
Discharge: HOME | End: 2021-02-26
Payer: MEDICARE

## 2021-02-26 DIAGNOSIS — Z01.818 PREOP TESTING: Primary | ICD-10-CM

## 2021-02-26 PROCEDURE — U0003 INFECTIOUS AGENT DETECTION BY NUCLEIC ACID (DNA OR RNA); SEVERE ACUTE RESPIRATORY SYNDROME CORONAVIRUS 2 (SARS-COV-2) (CORONAVIRUS DISEASE [COVID-19]), AMPLIFIED PROBE TECHNIQUE, MAKING USE OF HIGH THROUGHPUT TECHNOLOGIES AS DESCRIBED BY CMS-2020-01-R: HCPCS

## 2021-02-26 PROCEDURE — U0005 INFEC AGEN DETEC AMPLI PROBE: HCPCS

## 2021-02-27 LAB
SARS-COV-2: NORMAL
SARS-COV-2: NOT DETECTED
SOURCE: NORMAL

## 2021-03-01 ENCOUNTER — ANESTHESIA EVENT (OUTPATIENT)
Dept: ENDOSCOPY | Age: 71
End: 2021-03-01
Payer: MEDICARE

## 2021-03-02 ENCOUNTER — ANESTHESIA (OUTPATIENT)
Dept: ENDOSCOPY | Age: 71
End: 2021-03-02
Payer: MEDICARE

## 2021-03-02 ENCOUNTER — HOSPITAL ENCOUNTER (OUTPATIENT)
Age: 71
Setting detail: OUTPATIENT SURGERY
Discharge: HOME | End: 2021-03-02
Attending: INTERNAL MEDICINE | Admitting: INTERNAL MEDICINE
Payer: MEDICARE

## 2021-03-02 VITALS — SYSTOLIC BLOOD PRESSURE: 102 MMHG | OXYGEN SATURATION: 99 % | DIASTOLIC BLOOD PRESSURE: 51 MMHG

## 2021-03-02 VITALS
SYSTOLIC BLOOD PRESSURE: 179 MMHG | HEIGHT: 67 IN | BODY MASS INDEX: 38.3 KG/M2 | RESPIRATION RATE: 18 BRPM | DIASTOLIC BLOOD PRESSURE: 73 MMHG | OXYGEN SATURATION: 96 % | HEART RATE: 61 BPM | WEIGHT: 244 LBS | TEMPERATURE: 97.5 F

## 2021-03-02 PROCEDURE — 3700000000 HC ANESTHESIA ATTENDED CARE: Performed by: INTERNAL MEDICINE

## 2021-03-02 PROCEDURE — 7100000000 HC PACU RECOVERY - FIRST 15 MIN: Performed by: INTERNAL MEDICINE

## 2021-03-02 PROCEDURE — 3700000001 HC ADD 15 MINUTES (ANESTHESIA): Performed by: INTERNAL MEDICINE

## 2021-03-02 PROCEDURE — 2580000003 HC RX 258: Performed by: ANESTHESIOLOGY

## 2021-03-02 PROCEDURE — 7100000001 HC PACU RECOVERY - ADDTL 15 MIN: Performed by: INTERNAL MEDICINE

## 2021-03-02 PROCEDURE — 45330 DIAGNOSTIC SIGMOIDOSCOPY: CPT | Performed by: INTERNAL MEDICINE

## 2021-03-02 PROCEDURE — 2709999900 HC NON-CHARGEABLE SUPPLY: Performed by: INTERNAL MEDICINE

## 2021-03-02 PROCEDURE — 3609008400 HC SIGMOIDOSCOPY DIAGNOSTIC: Performed by: INTERNAL MEDICINE

## 2021-03-02 PROCEDURE — 6360000002 HC RX W HCPCS: Performed by: NURSE ANESTHETIST, CERTIFIED REGISTERED

## 2021-03-02 RX ORDER — OXYCODONE HYDROCHLORIDE AND ACETAMINOPHEN 5; 325 MG/1; MG/1
2 TABLET ORAL PRN
Status: DISCONTINUED | OUTPATIENT
Start: 2021-03-02 | End: 2021-03-02 | Stop reason: HOSPADM

## 2021-03-02 RX ORDER — LABETALOL HYDROCHLORIDE 5 MG/ML
5 INJECTION, SOLUTION INTRAVENOUS EVERY 10 MIN PRN
Status: DISCONTINUED | OUTPATIENT
Start: 2021-03-02 | End: 2021-03-02 | Stop reason: HOSPADM

## 2021-03-02 RX ORDER — SODIUM CHLORIDE 0.9 % (FLUSH) 0.9 %
10 SYRINGE (ML) INJECTION EVERY 12 HOURS SCHEDULED
Status: DISCONTINUED | OUTPATIENT
Start: 2021-03-02 | End: 2021-03-02 | Stop reason: HOSPADM

## 2021-03-02 RX ORDER — OXYCODONE HYDROCHLORIDE AND ACETAMINOPHEN 5; 325 MG/1; MG/1
1 TABLET ORAL PRN
Status: DISCONTINUED | OUTPATIENT
Start: 2021-03-02 | End: 2021-03-02 | Stop reason: HOSPADM

## 2021-03-02 RX ORDER — ONDANSETRON 2 MG/ML
4 INJECTION INTRAMUSCULAR; INTRAVENOUS
Status: DISCONTINUED | OUTPATIENT
Start: 2021-03-02 | End: 2021-03-02 | Stop reason: HOSPADM

## 2021-03-02 RX ORDER — PRAMOXINE HYDROCHLORIDE HYDROCORTISONE ACETATE 100; 100 MG/10G; MG/10G
AEROSOL, FOAM TOPICAL
Qty: 1 CAN | Refills: 1 | Status: SHIPPED | OUTPATIENT
Start: 2021-03-02 | End: 2021-03-04 | Stop reason: SDUPTHER

## 2021-03-02 RX ORDER — PROPOFOL 10 MG/ML
INJECTION, EMULSION INTRAVENOUS PRN
Status: DISCONTINUED | OUTPATIENT
Start: 2021-03-02 | End: 2021-03-02 | Stop reason: SDUPTHER

## 2021-03-02 RX ORDER — LIDOCAINE HYDROCHLORIDE 10 MG/ML
1 INJECTION, SOLUTION EPIDURAL; INFILTRATION; INTRACAUDAL; PERINEURAL
Status: DISCONTINUED | OUTPATIENT
Start: 2021-03-02 | End: 2021-03-02 | Stop reason: HOSPADM

## 2021-03-02 RX ORDER — DIPHENHYDRAMINE HYDROCHLORIDE 50 MG/ML
12.5 INJECTION INTRAMUSCULAR; INTRAVENOUS
Status: DISCONTINUED | OUTPATIENT
Start: 2021-03-02 | End: 2021-03-02 | Stop reason: HOSPADM

## 2021-03-02 RX ORDER — SODIUM CHLORIDE 0.9 % (FLUSH) 0.9 %
10 SYRINGE (ML) INJECTION PRN
Status: DISCONTINUED | OUTPATIENT
Start: 2021-03-02 | End: 2021-03-02 | Stop reason: HOSPADM

## 2021-03-02 RX ORDER — SODIUM CHLORIDE, SODIUM LACTATE, POTASSIUM CHLORIDE, CALCIUM CHLORIDE 600; 310; 30; 20 MG/100ML; MG/100ML; MG/100ML; MG/100ML
INJECTION, SOLUTION INTRAVENOUS CONTINUOUS
Status: DISCONTINUED | OUTPATIENT
Start: 2021-03-02 | End: 2021-03-02 | Stop reason: HOSPADM

## 2021-03-02 RX ADMIN — PROPOFOL 100 MG: 10 INJECTION, EMULSION INTRAVENOUS at 09:51

## 2021-03-02 RX ADMIN — SODIUM CHLORIDE, POTASSIUM CHLORIDE, SODIUM LACTATE AND CALCIUM CHLORIDE: 600; 310; 30; 20 INJECTION, SOLUTION INTRAVENOUS at 09:33

## 2021-03-02 ASSESSMENT — PULMONARY FUNCTION TESTS
PIF_VALUE: 1

## 2021-03-02 ASSESSMENT — PAIN - FUNCTIONAL ASSESSMENT: PAIN_FUNCTIONAL_ASSESSMENT: 0-10

## 2021-03-02 NOTE — H&P
HISTORY and Tre[facility] ELIJ[facility] [id number]       NAME:  Uche Lawler  MRN: [id number]   YOB: 1950   Date: 3/2/2021   Age: 78 y.o. Gender: female       COMPLAINT AND PRESENT HISTORY:   Uche Lawler is 78 y.o.,   female, having a [address] Pre diagnosis : HEMORRHAGE OF ANUS AND RECTUM    HPI:   Pt c/o bloating in abdominal. Pt has had abdominal pain and discomfort, she has had nausea, and she was afraid to eat, but she is feeling better today and tolerating diet. Pt has colostomy bag since 2017 with Dr María Marcum. she said her bowel movements is soft and it is brown color, no bleeding, or experiencing red/black stools in the bag. Pt states that  recently she  is having rectal discharge blood. She had CT enterography done   which revealed  soft tissue density/fistula sinus tract near the peritoneal reflection in the pelvic area. No bowel dilatation noted. Patient denies any FH of Colon Cancer. Patient denies any Dysphagia. Pt has hx of GERD but she is not on any medication . Pt denies fever/chills, chest pain or SOB, no palpitation or  headache. Pt Npo since the past midnight, pt took her BP medication today  Pt denies any problem with anesthesia,   No anticoagulant medication     According to Dr Carlos Borges notes   [address]:  She had a cholecystectomy done in 2016.   This patient had epigastric pain in the past.  She was seen by me in 2017, at that time EGD revealed small gastric polyps.   This patient had umbilical hernia repair done in May 2018. Subsequently in 2019 first quarter, general surgery Dr. Cisco Layne attempted to do colonoscopy and was incomplete. Patient had barium edema done which was abnormal suggestive of possible colonic mass.    Patient had sigmoid colectomy done in May 2019 and had colostomy performed colostomy done. Unfortunately.   In November 2019 patient had reversal of colostomy performed.   Unfortunately patient went through significant complications including anastomotic leak, intra-abdominal abscess, sepsis. Patient was managed with antibiotic therapy. To manage anastomotic leak patient had a Wallstent placed in the sigmoid colon, and again this stent perforated through the anastomosis into the peritoneal cavity. Following this patient had laparotomy and colostomy performed.   At present patient denies urinary symptoms. No pelvic discharge. However she is not able to take oral feedings satisfactory because of the postprandial abdominal distention, bloating, and early satiety. Patient also known to have anxiety in the past.      PMH:    GERD not on any medication   HTN: Toprol XL   History of cardiac cath pt denies blockage  Thyroid disease pt is not on any medication     DIAGNOSTIC TESTING:      Ct Enterography W Contrast     Result Date: 1/19/2021  EXAMINATION: CT ENTEROGRAPHY 1/19/2021 1:28 pm TECHNIQUE: CT of the abdomen and pelvis was performed after the administration of intravenous contrast and negative oral contrast. Dose modulation, iterative reconstruction, and/or weight based adjustment of the mA/kV was utilized to reduce the radiation dose to as low as reasonably achievable. COMPARISON: 12/16/2019 HISTORY: ORDERING SYSTEM PROVIDED HISTORY: Abdominal pain, epigastric TECHNOLOGIST PROVIDED HISTORY: Reason for Exam: Abdominal discomfort, nausea x several weeks, hx colostomy bag due to perforation, colonoscopy Acuity: Unknown Type of Exam: Unknown FINDINGS: Lower Chest: Unremarkable. Organs: Liver is normal in contour and enhancement. Gallbladder is surgically absent. No biliary ductal dilatation. Pancreas is unremarkable. Adrenals are unremarkable. Similar interval appearance of multiple hypoattenuating splenic lesions. Some are slightly increased in size since 2018. No renal calculi or hydronephrosis. Mild calcific atherosclerosis. GI/Bowel:  Moderate wall thickening and submucosal edema of the rectum with fat stranding in the mesorectal fat. Left lower quadrant colostomy. Small bowel containing parastomal hernia without evidence of obstruction or strangulation. Appendix and terminal ileum are normal. Pelvis: Unremarkable. Peritoneum/Retroperitoneum:No free fluid, free air, organized fluid collection or lymphadenopathy. Residual bandlike soft tissue density along the right pelvic peritoneal reflection at the site of the previously demonstrated abscess. Bones: Diffuse osteopenia with multilevel degenerative disc disease. Stomach, small and large bowel containing ventral hernia without evidence of obstruction or strangulation.      1. Moderate infectious or inflammatory proctitis. 2. Residual bandlike soft tissue density along the right pelvic peritoneal reflection at the site of previous abscess, nonspecific. Possibility of a remaining fistula or sinus tract is considered. 3. Hernias as above. 4. Similar appearance of multiple hypoattenuating splenic lesions. Some are slightly increased in size since 2018. In the absence of known malignancy, constitutional symptoms or immunocompromise, these are almost certainly benign.   More complete characterization could be made with abdominal MRI or PET-CT.       BP Readings from Last 3 Encounters:  01/13/21 (!) 177/95  01/07/21 138/78  12/10/20 118/70  Lab Results  Component Value Date   WBC 8.1 01/08/2021   HGB 12.8 01/08/2021   HCT 41.8 01/08/2021   MCV 94.4 01/08/2021    01/08/2021  Lab Results  Component Value Date    01/08/2021   K 4.2 01/08/2021    01/08/2021   CO2 23 01/08/2021   BUN 20 01/08/2021   CREATININE 0.80 01/08/2021   GLUCOSE 102 01/08/2021   GLUCOSE 94 12/06/2011   CALCIUM 9.6 01/08/2021       PAST MEDICAL HISTORY     Past Medical History:   Diagnosis Date    Allergic rhinitis     Bladder prolapse     Constipation     5/2019 resolved    Fundic gland polyposis of stomach 08/17/2017    per EGD    GERD (gastroesophageal 2019    SIGMOIDOSCOPY DIAGNOSTIC FLEXIBLE WITH FLUORO performed by Gregory Saleh MD at [facility] N/A 2019    ABDOMINAL EXPLORATION ANDBOWEL RESECTION LOW ANTERIOR WITH COLOSTOMY performed by Mary Conley MD at [facility] N/A 2019    COLOSTOMY  CLOSURE EXTENSIVE LYSIS OF ADHESIONS REPAIR OF SMALL BOWEL TEAR, REPAIR OF MULTIPLE VENTRAL HERNIAS, MOBILIZATION OF THE SPLENIC FLEXURE AND TRANSVERSE COLON, ABDOMINAL EXPLORATION performed [facility] [address] [facility] [address] Left 2012    knee    TOTAL KNEE ARTHROPLASTY Right 11/10/2014    knee    UMBILICAL HERNIA REPAIR  2018    UPPER GASTROINTESTINAL ENDOSCOPY  2017    Multiple small gastric polyps, no esophagitis noted no Melara's mucosa.   No hiatal hernia, pathology benign fundic gland polyps       FAMILY HISTORY       Family History   Problem Relation Age of Onset    Heart Disease Mother     COPD Father     Cancer Brother         thyroid, prostate, lung       SOCIAL HISTORY       Social History     Socioeconomic History    Marital status: Single     Spouse name: Not on file    Number of children: Not on file    Years of education: Not on file    Highest education level: Not on file   Occupational History    Not on file   Social Needs    Financial resource strain: Not on file    Food insecurity     Worry: Not on file     Inability: Not on file    Transportation needs     Medical: Not on file     Non-medical: Not on file   Tobacco Use    Smoking status: Former Smoker     Packs/day: 1.00     Years: 3.00     Pack years: 3.00     Quit date: 1975     Years since quittin.1    Smokeless tobacco: Never Used    Tobacco comment: quit smoking age 23   Substance and Sexual Activity    Alcohol use: Yes     Comment: very rare    Drug use: No    Sexual activity: Not on file   Lifestyle    Physical activity     Days per week: Not on file     Minutes per session: Not on file    Stress: Not on file   Relationships    Social connections     Talks on phone: Not on file     Gets together: Not on file     Attends Sabianism service: Not on file     Active member of club or organization: Not on file     Attends meetings of clubs or organizations: Not on file     Relationship status: Not on file    Intimate partner violence     Fear of current or ex partner: Not on file     Emotionally abused: Not on file     Physically abused: Not on file     Forced sexual activity: Not on file   Other Topics Concern    Not on file   Social History Narrative    Not on file           REVIEW OF SYSTEMS      No Known Allergies    No current facility-administered medications on file prior to encounter. Current Outpatient Medications on File Prior to Encounter   Medication Sig Dispense Refill    citalopram (CELEXA) 10 MG tablet Take 1 tablet by mouth daily 30 tablet 3    metoprolol succinate (TOPROL XL) 25 MG extended release tablet Take 1 tablet by mouth daily 90 tablet 1    Cholecalciferol (VITAMIN D) 2000 units CAPS capsule Take 2 capsules by mouth daily          Negative except for what is mentioned in the HPI. GENERAL PHYSICAL EXAM     Vitals: see nursing flow sheet for vital signs     GENERAL APPEARANCE:   Yuko Garcia is 78 y.o.,  female, , nourished, conscious, alert. Does not appear to be distress or pain at this time. SKIN:  Warm, dry, no cyanosis or jaundice. HEAD:  Normocephalic, atraumatic, no swelling or tenderness. EYES:  Pupils equal, reactive to light. EARS:  No discharge, no marked hearing loss. NOSE:  No rhinorrhea, epistaxis or septal deformity. THROAT:  Not congested. No ulceration bleeding or discharge.                   NECK:  No stiffness, trachea central.  No palpable masses or L.N.                 CHEST:  Symmetrical and equal on expansion. HEART:  RRR S1 > S2. No audible murmurs or gallops. LUNGS:  Equal on expansion, normal breath sounds. No adventitious sounds. ABDOMEN: Soft on palpation. , No localized tenderness. No guarding or rigidity. No palpable hepatosplenomegaly. There is no distension A hernia is present. Colostomy bag in the right upper quadrant              LYMPHATICS:  No palpable cervical lymphadenopathy. LOCOMOTOR, BACK AND SPINE:  No tenderness or deformities. EXTREMITIES:  Symmetrical, no pretibial edema. No calf tenderness, No discoloration or ulcerations. NEUROLOGIC:  The patient is conscious, alert, oriented, No apparent focal sensory or motor deficits.              PROVISIONAL DIAGNOSES / SURGERY:    HEMORRHAGE OF ANUS AND RECTUM  SIGMOIDOSCOPY DIAGNOSTIC FLEXIBLE  Patient Active Problem List    Diagnosis Date Noted    E coli infection 02/24/2020    Leukocytosis     Nausea     Postoperative intra-abdominal abscess     Acute cystitis without hematuria     Sepsis ([address] 12/16/2019    Perforated sigmoid colon ([address] 11/30/2019    Large intestine anastomotic leak 11/30/2019    Normocytic anemia 11/30/2019    Rectal bleeding     Surgical wound dehiscence 11/21/2019    Constipation     Systolic murmur     Thyroid disease     Postoperative anemia due to acute blood loss 11/15/2019    Atelectasis of left lung 11/14/2019    Abdominal adhesions 11/13/2019    Hypokalemia 11/13/2019    History of colostomy reversal 11/12/2019    S/P colectomy 05/16/2019    Incisional hernia without obstruction or gangrene 05/22/2018    SBO (small bowel obstruction) ([address] 03/25/2018    Incarcerated incisional hernia 03/25/2018    Sigmoid diverticulitis 03/25/2018    Fundic gland polyposis of stomach 08/17/2017    Right upper quadrant abdominal pain 07/25/2017    Biliary dyskinesia 11/15/2016    MRSA (methicillin resistant staph aureus) culture positive 11/01/2016    Arthritis of knee 11/10/2014    GERD (gastroesophageal reflux disease)     Essential hypertension     Morbidly obese (HCC)     Allergic rhinitis     Osteoporosis     Chalazion 03/20/2013    Osteoarthritis 10/20/2011    Dyslipidemia            ROMERO Gold - CNP on 3/2/2021 at 8:52 AM

## 2021-03-02 NOTE — ANESTHESIA PRE PROCEDURE
Department of Anesthesiology  Preprocedure Note       Name:  Kelsie Wills   Age:  78 y.o.  :  1950                                          MRN:  [id number]         Date:  3/2/2021      Surgeon: Colby Brower):  Tenzin Temple MD    Procedure: SIGMOIDOSCOPY DIAGNOSTIC FLEXIBLE (N/A )    Medications prior to admission:   Prior to Admission medications    Medication Sig Start Date End Date Taking? Authorizing Provider   citalopram (CELEXA) 10 MG tablet Take 1 tablet by mouth daily 21   Tenzin Temple MD   metoprolol succinate (TOPROL XL) 25 MG extended release tablet Take 1 tablet by mouth daily 12/10/20   Madeleine Astorga MD   Cholecalciferol (VITAMIN D) 2000 units CAPS capsule Take 2 capsules by mouth daily     Historical Provider, MD       Current medications:    No current facility-administered medications for this visit. No current outpatient medications on file.      Facility-Administered Medications Ordered in Other Visits   Medication Dose Route Frequency Provider Last Rate Last Admin    lactated ringers infusion   Intravenous Continuous Dorothy Ann MD        sodium chloride flush 0.9 % injection 10 mL  10 mL Intravenous 2 times per day Dorothy Ann MD        sodium chloride flush 0.9 % injection 10 mL  10 mL Intravenous PRN Dorothy Ann MD        lidocaine PF 1 % injection 1 mL  1 mL Intradermal Once PRN Dorothy Ann MD           Allergies:  No Known Allergies    Problem List:    Patient Active Problem List   Diagnosis Code    Dyslipidemia E78.5    Osteoarthritis M19.90    GERD (gastroesophageal reflux disease) K21.9    Essential hypertension I10    Morbidly obese ([address] E66.01    Allergic rhinitis J30.9    Osteoporosis M81.0    Arthritis of knee M17.10    Biliary dyskinesia K82.8    Right upper quadrant abdominal pain R10.11    Fundic gland polyposis of stomach K31.7    SBO (small bowel obstruction) ([address] P11.125    Incarcerated incisional hernia K43.0    Sigmoid diverticulitis K57.32    Incisional hernia without obstruction or gangrene K43.2    Chalazion H00.19    S/P colectomy Z90.49    History of colostomy reversal Z98.890    Abdominal adhesions K66.0    Hypokalemia E87.6    Atelectasis of left lung J98.11    Postoperative anemia due to acute blood loss D62    Surgical wound dehiscence T81.31XA    Constipation B09.19    Systolic murmur S45.0    Thyroid disease E07.9    Perforated sigmoid colon (HCC) K63.1    Large intestine anastomotic leak K91.89    Normocytic anemia D64.9    Rectal bleeding K62.5    Sepsis (HCC) A41.9    Acute cystitis without hematuria N30.00    Leukocytosis D72.829    Nausea R11.0    Postoperative intra-abdominal abscess T81.43XA    MRSA (methicillin resistant staph aureus) culture positive Z22.322    E coli infection A49.8       Past Medical History:        Diagnosis Date    Allergic rhinitis     Bladder prolapse     Constipation     5/2019 resolved    Fundic gland polyposis of stomach 08/17/2017    per EGD    GERD (gastroesophageal reflux disease)     on no medications    Hiatal hernia     History of cardiac cath 12/2002    pt denies blockage    History of diverticulitis     Hyperlipidemia     borderline, on no medication    Hypertension     MRSA (methicillin resistant staph aureus) culture positive 11/01/2016    nasal    MRSA carrier     follows with ID    Obesity     Osteoarthritis     Pelvic abscess in female     Sleep apnea     CPAP nightly    Systolic murmur     benign systolic murmur (history of). Pt does not follow-up with a cardiologist (Written 09/25/2019).     Thyroid disease     goiter    Wears glasses        Past Surgical History:        Procedure Laterality Date    ABDOMINAL EXPLORATION SURGERY  05/16/2019    CHOLECYSTECTOMY, LAPAROSCOPIC  11/15/2016    CHOLECYSTECTOMY, LAPAROSCOPIC  11/15/2016    COLONOSCOPY  2013    neg    COLONOSCOPY N/A 5/16/2019    COLONOSCOPY Tobacco Use    Smoking status: Former Smoker     Packs/day: 1.00     Years: 3.00     Pack years: 3.00     Quit date: 1975     Years since quittin.1    Smokeless tobacco: Never Used    Tobacco comment: quit smoking age 23   Substance Use Topics    Alcohol use: Yes     Comment: very rare                                Counseling given: Not Answered  Comment: quit smoking age 23      Vital Signs (Current): There were no vitals filed for this visit. BP Readings from Last 3 Encounters:   21 (!) 139/59   21 (!) 177/95   21 138/78       NPO Status:                                                                                 BMI:   Wt Readings from Last 3 Encounters:   21 244 lb (110.7 kg)   21 253 lb (114.8 kg)   21 258 lb 3.2 oz (117.1 kg)     There is no height or weight on file to calculate BMI.    CBC:   Lab Results   Component Value Date    WBC 8.1 2021    RBC 4.43 2021    RBC 3.98 2011    HGB 12.8 2021    HCT 41.8 2021    MCV 94.4 2021    RDW 13.3 2021     2021     2011       CMP:   Lab Results   Component Value Date     2021    K 4.2 2021     2021    CO2 23 2021    BUN 20 2021    CREATININE 0.80 2021    GFRAA >60 2021    LABGLOM >60 2021    GLUCOSE 102 2021    GLUCOSE 94 2011    PROT 6.6 2021    CALCIUM 9.6 2021    BILITOT 0.39 2021    ALKPHOS 97 2021    AST 15 2021    ALT 16 2021       POC Tests:   No results for input(s): POCGLU, POCNA, POCK, POCCL, POCBUN, POCHEMO, POCHCT in the last 72 hours.     Coags:   Lab Results   Component Value Date    PROTIME 11.8 2019    INR 1.2 2019    APTT 24.2 2019       HCG (If Applicable): No results found for: PREGTESTUR, PREGSERUM, HCG, HCGQUANT     ABGs: No results found for: PHART, PO2ART, LRN6SAO, STR5TNZ, BEART, V8GSJOKU     Type & Screen (If Applicable):  No results found for: LABABO, [address]    Anesthesia Evaluation  Patient summary reviewed and Nursing notes reviewed no history of anesthetic complications:   Airway: Mallampati: III  TM distance: >3 FB   Neck ROM: full  Mouth opening: > = 3 FB Dental: normal exam         Pulmonary:normal exam  breath sounds clear to auscultation  (+) sleep apnea:                             Cardiovascular:    (+) hypertension:, valvular problems/murmurs (Mild to moderate tricuspid regurgitation):, CHF:,         Rhythm: regular  Rate: normal           Beta Blocker:  Dose within 24 Hrs         Neuro/Psych:   Negative Neuro/Psych ROS              GI/Hepatic/Renal:   (+) hiatal hernia, GERD:, renal disease (Bladder prolapse):, morbid obesity         ROS comment: Colostomy +. Endo/Other:    (+) : arthritis: OA., .                 Abdominal:           Vascular: negative vascular ROS. Anesthesia Plan      MAC     ASA 3       Induction: intravenous. MIPS: Prophylactic antiemetics administered. Anesthetic plan and risks discussed with patient. Plan discussed with CRNA.                   Lance Toribio MD   3/2/2021

## 2021-03-02 NOTE — OP NOTE
FLEXIBLE SIGMOIDOSCOPY      Patient: Nora Magallon    :    1950    Facility:   [facility]   Referring/PCP: Liseth Fan MD  Procedure:   Flexible Sigmoidoscopy --diagnostic  Date:     3/2/2021  Endoscopist:  Marlene Barroso MD     Preoperative Diagnosis: Rectal bleeding    Postoperative Diagnosis: Probable diverticulum colitis, hemorrhoids    Anesthesia: MAC    Complications:  None    Description of Procedure:  Informed consent was obtained from the patient after explanation of the procedure including indications, description of the procedure,  benefits and possible risks and complications of the procedure, and alternatives. Questions were answered. The patient's history was reviewed and a directed physical examination was performed prior to the procedure. Patient was monitored throughout the procedure with pulse oximetry and periodic assessment of vital signs. With the patient initially in the left lateral decubitus position, a digital rectal examination was performed and revealed negative without mass, lesions or tenderness. The Olympus video colonoscope was placed in the patient's rectum and advanced without difficulty  to  a distance of 35 cm. The prep was fair. Examination of the mucosa was performed during both introduction and withdrawal of the colonoscope. Retroflexed view of the rectum was performed. During the procedure, the patient's blood pressure, pulse and oxygen saturation remained stable and documented. No unusual events occurred during the procedure. Patient was transferred to recovery room and will be discharged when criteria is met. Findings: This patient had sigmoid colectomy in the past, following this surgery patient had anastomotic leak for which she had stent placed. Unfortunately, this stent eroded and had perforation. Following that patient had surgery and during that time she had a colostomy created. Recently patient is having rectal bleeding and a significant discharge. Patient had I believe Yoder's procedure. Findings during this procedure:    Patient has large hemorrhoids. Appears to have mild inflammation of the mucosa in the rectum and lower sigmoid colon. At about 30 to 35 cm from the anus patient has diverticuli or probable fistulous tracts noted. Mucosae is edematous and has erythema. At the end of the procedure scope was withdrawn from the patient. Recommendations: Intermittent sitz bath's  To try Canasa rectal suppositories  Reevaluate in the next 4 weeks    The patient has tolerated the procedure and conscious sedation without unusual events.      Electronically signed by Zully Dent MD on 3/2/2021 at 10:03 AM

## 2021-03-04 DIAGNOSIS — K62.5 HEMORRHAGE OF RECTUM AND ANUS: Primary | ICD-10-CM

## 2021-03-04 RX ORDER — PRAMOXINE HYDROCHLORIDE HYDROCORTISONE ACETATE 100; 100 MG/10G; MG/10G
AEROSOL, FOAM TOPICAL
Qty: 1 CAN | Refills: 1 | Status: SHIPPED | OUTPATIENT
Start: 2021-03-04 | End: 2021-05-18 | Stop reason: ALTCHOICE

## 2021-03-04 NOTE — TELEPHONE ENCOUNTER
Pt calling and said her script for proctofoam was resent over to [facility] but that is too expensive so she would rater have it sent it over to [facility] again and pick it up there

## 2021-03-17 ENCOUNTER — OFFICE VISIT (OUTPATIENT)
Dept: GASTROENTEROLOGY | Age: 71
End: 2021-03-17
Payer: MEDICARE

## 2021-03-17 VITALS
DIASTOLIC BLOOD PRESSURE: 66 MMHG | BODY MASS INDEX: 38.39 KG/M2 | SYSTOLIC BLOOD PRESSURE: 127 MMHG | WEIGHT: 244.6 LBS | HEART RATE: 60 BPM | TEMPERATURE: 97.7 F | HEIGHT: 67 IN | OXYGEN SATURATION: 96 %

## 2021-03-17 DIAGNOSIS — K43.9 VENTRAL HERNIA WITHOUT OBSTRUCTION OR GANGRENE: ICD-10-CM

## 2021-03-17 DIAGNOSIS — R10.13 ABDOMINAL PAIN, EPIGASTRIC: Primary | ICD-10-CM

## 2021-03-17 DIAGNOSIS — K62.5 HEMORRHAGE OF RECTUM AND ANUS: ICD-10-CM

## 2021-03-17 PROCEDURE — G8417 CALC BMI ABV UP PARAM F/U: HCPCS | Performed by: INTERNAL MEDICINE

## 2021-03-17 PROCEDURE — 1036F TOBACCO NON-USER: CPT | Performed by: INTERNAL MEDICINE

## 2021-03-17 PROCEDURE — 1090F PRES/ABSN URINE INCON ASSESS: CPT | Performed by: INTERNAL MEDICINE

## 2021-03-17 PROCEDURE — 3017F COLORECTAL CA SCREEN DOC REV: CPT | Performed by: INTERNAL MEDICINE

## 2021-03-17 PROCEDURE — G8484 FLU IMMUNIZE NO ADMIN: HCPCS | Performed by: INTERNAL MEDICINE

## 2021-03-17 PROCEDURE — 4040F PNEUMOC VAC/ADMIN/RCVD: CPT | Performed by: INTERNAL MEDICINE

## 2021-03-17 PROCEDURE — G8400 PT W/DXA NO RESULTS DOC: HCPCS | Performed by: INTERNAL MEDICINE

## 2021-03-17 PROCEDURE — 99213 OFFICE O/P EST LOW 20 MIN: CPT | Performed by: INTERNAL MEDICINE

## 2021-03-17 PROCEDURE — 1123F ACP DISCUSS/DSCN MKR DOCD: CPT | Performed by: INTERNAL MEDICINE

## 2021-03-17 PROCEDURE — G8427 DOCREV CUR MEDS BY ELIG CLIN: HCPCS | Performed by: INTERNAL MEDICINE

## 2021-03-17 ASSESSMENT — ENCOUNTER SYMPTOMS
WHEEZING: 0
VOICE CHANGE: 0
ANAL BLEEDING: 0
NAUSEA: 0
RECTAL PAIN: 0
ABDOMINAL DISTENTION: 0
CONSTIPATION: 0
DIARRHEA: 0
BLOOD IN STOOL: 0
SORE THROAT: 0
TROUBLE SWALLOWING: 0
VOMITING: 0
BACK PAIN: 0
SINUS PRESSURE: 0
ABDOMINAL PAIN: 0
CHOKING: 0
COUGH: 0

## 2021-03-17 NOTE — PROGRESS NOTES
GI OFFICE FOLLOW UP    INTERVAL HISTORY:   No referring provider defined for this encounter. Chief Complaint   Patient presents with    Follow-up     Patient is here today to f/u on Flex Sig       1. Abdominal pain, epigastric    2. Hemorrhage of rectum and anus    3. Ventral hernia without obstruction or gangrene              HISTORY OF PRESENT ILLNESS: Ms.Mary MATTHEW Patel is a 78 y.o. female with a past history remarkable for ,   Patient seen for follow-up of tenesmus and rectal discharge/bleeding. Recently patient had sigmoidoscopy done and was found to have hemorrhoids, mild inflammation in the lower sigmoid colon and diverticulosis. I think that she may have mild diverticular colitis. Patient is being treated with Proctofoam.  She is feeling better. Tenesmus resolved. Rectal discharge almost resolved. Does not have bleeding. Also abdominal discomfort better. No nausea vomiting. No fever chills. Patient has a colostomy. Had a complicated sigmoid colectomy done in the past leading to complications including anastomotic leak, that led to stenting of the anastomosis which progressed her to perforation needing emergency surgery and colostomy. Basing on the imaging studies there appears to be fistulous tracts. Patient tolerating diet well. Colostomy functioning well. No nausea vomiting. No fever chills. Patient does have mild anxiety. Also appears to have mild IBS-like symptoms. She is being treated with citalopram 10 mg at bedtime and she is tolerating that well and looks like this medication helping her IBS symptoms. Past Medical,Family, and Social History reviewed and does contribute to the patient presenting condition. Patient's PMH/PSH,SH,PSYCH Hx, MEDs, ALLERGIES, and ROS were all reviewed and updated in the appropriate sections.  Yes      PAST MEDICAL HISTORY:  Past Medical History:   Diagnosis Date    Allergic rhinitis     Bladder prolapse     Constipation     5/2019 resolved    Fundic gland polyposis of stomach 08/17/2017    per EGD    GERD (gastroesophageal reflux disease)     on no medications    Hiatal hernia     History of cardiac cath 12/2002    pt denies blockage    History of diverticulitis     Hyperlipidemia     borderline, on no medication    Hypertension     MRSA (methicillin resistant staph aureus) culture positive 11/01/2016    nasal    MRSA carrier     follows with ID    Obesity     Osteoarthritis     Pelvic abscess in female     Sleep apnea     CPAP nightly    Systolic murmur     benign systolic murmur (history of). Pt does not follow-up with a cardiologist (Written 09/25/2019).     Thyroid disease     goiter    Wears glasses        Past Surgical History:   Procedure Laterality Date    ABDOMINAL EXPLORATION SURGERY  05/16/2019    CHOLECYSTECTOMY, LAPAROSCOPIC  11/15/2016    CHOLECYSTECTOMY, LAPAROSCOPIC  11/15/2016    COLONOSCOPY  2013    neg    COLONOSCOPY N/A 5/16/2019    COLONOSCOPY DIAGNOSTIC performed by Misbah Weber MD at [address] [facility] COLONOSCOPY N/A 12/19/2019    COLONOSCOPY W/STENT X2 performed by Misbah Weber MD at [address] 12/24/2019    EXPLORATORY LAPAROTOMY, EXTENSIVE LYSIS OF ADHESIONS, TAKE DOWN OF ANASTOMOSIS, COLOSTOMY CREATION, CLOSURE OF SEROMUSCULAR INJURIES performed by Misbah Weber MD at [address] [facility] [address], COLON, DIAGNOSTIC      EGD    FISSURECTOMY ANAL N/A 10/4/2019    FLEXIBLE SIGMOIDOSCOPY & ANAL FISTULECTOMY performed by Misbah Weber MD at [address] [facility] ARTHROSCOPY Left     lateral release    CT EGD TRANSORAL BIOPSY SINGLE/MULTIPLE N/A 8/17/2017    EGD BIOPSY performed by Jenn Noriega MD at [address] [facility] OFFICE/OUTPT [address] [facility] N/A 5/22/2018    XI ROBOTIC LAPAROSCOPIC UMBILICAL HERNIA REPAIR WITH MESH, POSSIBLE OPEN performed by Praveen Cruz Pato Morris IV, DO at [facility] [address]  02/19/2019    SIGMOIDOSCOPY N/A 2/19/2019    SIGMOIDOSCOPY BIOPSY FLEXIBLE performed by Jasmine Villarreal DO at [facility] [address] N/A 12/23/2019    SIGMOIDOSCOPY DIAGNOSTIC FLEXIBLE WITH FLUORO performed by Mat Portillo MD at [facility] [address] N/A 3/2/2021    [address] performed by Selwyn Church MD at [address] [place] N/A 5/16/2019    ABDOMINAL EXPLORATION ANDBOWEL RESECTION LOW ANTERIOR WITH COLOSTOMY performed by Ora Owens MD at [address] [facility] N/A 11/12/2019    COLOSTOMY  CLOSURE EXTENSIVE LYSIS OF ADHESIONS REPAIR OF SMALL BOWEL TEAR, REPAIR OF MULTIPLE VENTRAL HERNIAS, MOBILIZATION OF THE SPLENIC FLEXURE AND TRANSVERSE COLON, ABDOMINAL EXPLORATION performed by Ora Owens MD at [address] [facility] Left 9/24/2012    knee    TOTAL KNEE ARTHROPLASTY Right 11/10/2014    knee    UMBILICAL HERNIA REPAIR  05/22/2018    UPPER GASTROINTESTINAL ENDOSCOPY  08/17/2017    Multiple small gastric polyps, no esophagitis noted no Melara's mucosa.   No hiatal hernia, pathology benign fundic gland polyps       CURRENT MEDICATIONS:    Current Outpatient Medications:     Hydrocort-Pramoxine, Perianal, (PROCTOFOAM HC) 1-1 % rectal foam, To apply to rectum twice a day, Disp: 1 Can, Rfl: 1    citalopram (CELEXA) 10 MG tablet, Take 1 tablet by mouth daily, Disp: 30 tablet, Rfl: 3    metoprolol succinate (TOPROL XL) 25 MG extended release tablet, Take 1 tablet by mouth daily, Disp: 90 tablet, Rfl: 1    Cholecalciferol (VITAMIN D) 2000 units CAPS capsule, Take 2 capsules by mouth daily , Disp: , Rfl:     ALLERGIES:   No Known Allergies    FAMILY HISTORY:       Problem Relation Age of Onset    Heart Disease Mother     COPD Father     Cancer Brother         thyroid, prostate, lung         SOCIAL HISTORY:   Social History     Socioeconomic History    Marital status: Single     Spouse name: Not on file    Number of children: Not on file    Years of education: Not on file    Highest education level: Not on file   Occupational History    Not on file   Social Needs    Financial resource strain: Not on file    Food insecurity     Worry: Not on file     Inability: Not on file    Transportation needs     Medical: Not on file     Non-medical: Not on file   Tobacco Use    Smoking status: Former Smoker     Packs/day: 1.00     Years: 3.00     Pack years: 3.00     Quit date: 1975     Years since quittin.2    Smokeless tobacco: Never Used    Tobacco comment: quit smoking age 23   Substance and Sexual Activity    Alcohol use: Yes     Comment: very rare    Drug use: No    Sexual activity: Not on file   Lifestyle    Physical activity     Days per week: Not on file     Minutes per session: Not on file    Stress: Not on file   Relationships    Social connections     Talks on phone: Not on file     Gets together: Not on file     Attends Mu-ism service: Not on file     Active member of club or organization: Not on file     Attends meetings of clubs or organizations: Not on file     Relationship status: Not on file    Intimate partner violence     Fear of current or ex partner: Not on file     Emotionally abused: Not on file     Physically abused: Not on file     Forced sexual activity: Not on file   Other Topics Concern    Not on file   Social History Narrative    Not on file         REVIEW OF SYSTEMS:         Review of Systems   Constitutional: Negative for appetite change, fatigue and unexpected weight change. HENT: Negative for dental problem, postnasal drip, sinus pressure, sore throat, trouble swallowing and voice change. Eyes: Positive for visual disturbance. Respiratory: Negative for cough, choking and wheezing. Cardiovascular: Negative for chest pain, palpitations and leg swelling.    Gastrointestinal: Negative for abdominal distention (\"full easily\"), abdominal pain, anal bleeding, blood in stool (bloody muscous in bag), constipation, diarrhea, nausea, rectal pain and vomiting. Genitourinary: Negative for difficulty urinating. Musculoskeletal: Negative for arthralgias, back pain, gait problem and myalgias. Allergic/Immunologic: Negative for environmental allergies and food allergies. Neurological: Negative for dizziness, weakness, light-headedness, numbness and headaches. Hematological: Does not bruise/bleed easily. Psychiatric/Behavioral: Negative for sleep disturbance. The patient is not nervous/anxious. PHYSICAL EXAMINATION:     Vital signs reviewed per the nursing documentation. /66   Pulse 60   Temp 97.7 °F (36.5 °C)   Ht 5' 7\" (1.702 m)   Wt 244 lb 9.6 oz (110.9 kg)   LMP  (LMP Unknown)   SpO2 96%   BMI 38.31 kg/m²   Body mass index is 38.31 kg/m². Physical Exam  Vitals signs and nursing note reviewed. Constitutional:       Appearance: She is well-developed. HENT:      Head: Normocephalic and atraumatic. Eyes:      General: No scleral icterus. Conjunctiva/sclera: Conjunctivae normal.      Pupils: Pupils are equal, round, and reactive to light. Neck:      Musculoskeletal: Normal range of motion and neck supple. Thyroid: No thyromegaly. Vascular: No hepatojugular reflux or JVD. Trachea: No tracheal deviation. Cardiovascular:      Rate and Rhythm: Normal rate and regular rhythm. Heart sounds: Normal heart sounds. Pulmonary:      Effort: Pulmonary effort is normal. No respiratory distress. Breath sounds: Normal breath sounds. No wheezing or rales. Abdominal:      General: Bowel sounds are normal. There is no distension. Palpations: Abdomen is soft. There is no hepatomegaly or mass. Tenderness: There is no abdominal tenderness. There is no rebound. Hernia: No hernia is present. Musculoskeletal:         General: No tenderness.       Comments: No joint swelling   Lymphadenopathy:      Cervical: No cervical adenopathy. Skin:     General: Skin is warm. Findings: No bruising, ecchymosis, erythema or rash. Neurological:      Mental Status: She is alert and oriented to person, place, and time. Cranial Nerves: No cranial nerve deficit. Psychiatric:         Thought Content: Thought content normal.           LABORATORY DATA: Reviewed  Lab Results   Component Value Date    WBC 8.1 01/08/2021    HGB 12.8 01/08/2021    HCT 41.8 01/08/2021    MCV 94.4 01/08/2021     01/08/2021     01/08/2021    K 4.2 01/08/2021     01/08/2021    CO2 23 01/08/2021    BUN 20 01/08/2021    CREATININE 0.80 01/08/2021    LABPROT 6.4 08/14/2012    LABALBU 4.1 01/08/2021    BILITOT 0.39 01/08/2021    ALKPHOS 97 01/08/2021    AST 15 01/08/2021    ALT 16 01/08/2021    INR 1.2 12/17/2019         Lab Results   Component Value Date    RBC 4.43 01/08/2021    HGB 12.8 01/08/2021    MCV 94.4 01/08/2021    MCH 28.9 01/08/2021    MCHC 30.6 01/08/2021    RDW 13.3 01/08/2021    MPV 10.6 01/08/2021    BASOPCT 0 12/28/2019    LYMPHSABS 1.24 12/28/2019    MONOSABS 1.10 12/28/2019    NEUTROABS 12.05 (H) 12/28/2019    EOSABS 0.27 12/28/2019    BASOSABS 0.03 12/28/2019         DIAGNOSTIC TESTING:     No results found. Assessment  1. Abdominal pain, epigastric    2. Hemorrhage of rectum and anus    3. Ventral hernia without obstruction or gangrene        Plan  Patient is explained regarding sigmoidoscopy findings. After discussion patient indicated satisfaction regarding improvement of her symptoms. Patient is advised to continue sitz bath's. Advised high-fiber diet. We will see her in the next 6 to 9 months. Sooner if she has any issues. Thank you for allowing me to participate in the care of Ms. Fontenot. For any further questions please do not hesitate to contact me. I have reviewed and agree with the ROS entered by the MA/LPN.      Note is dictated utilizing voice recognition software. Unfortunately this leads to occasional typographical errors.  Please contact our office if you have any questions        Flory Ramires MD,FACP, [facility]  Board Certified in Gastroenterology and [address] [place] [address] Gastroenterology  Office #: [phone]

## 2021-05-18 ENCOUNTER — OFFICE VISIT (OUTPATIENT)
Dept: INTERNAL MEDICINE CLINIC | Age: 71
End: 2021-05-18
Payer: MEDICARE

## 2021-05-18 VITALS
WEIGHT: 237 LBS | SYSTOLIC BLOOD PRESSURE: 138 MMHG | TEMPERATURE: 96.6 F | BODY MASS INDEX: 37.2 KG/M2 | OXYGEN SATURATION: 98 % | RESPIRATION RATE: 15 BRPM | HEIGHT: 67 IN | HEART RATE: 52 BPM | DIASTOLIC BLOOD PRESSURE: 74 MMHG

## 2021-05-18 DIAGNOSIS — M81.0 AGE-RELATED OSTEOPOROSIS WITHOUT CURRENT PATHOLOGICAL FRACTURE: ICD-10-CM

## 2021-05-18 DIAGNOSIS — E55.9 VITAMIN D DEFICIENCY: ICD-10-CM

## 2021-05-18 DIAGNOSIS — I10 ESSENTIAL HYPERTENSION: Primary | ICD-10-CM

## 2021-05-18 PROCEDURE — 1123F ACP DISCUSS/DSCN MKR DOCD: CPT | Performed by: INTERNAL MEDICINE

## 2021-05-18 PROCEDURE — 1090F PRES/ABSN URINE INCON ASSESS: CPT | Performed by: INTERNAL MEDICINE

## 2021-05-18 PROCEDURE — G8427 DOCREV CUR MEDS BY ELIG CLIN: HCPCS | Performed by: INTERNAL MEDICINE

## 2021-05-18 PROCEDURE — G8400 PT W/DXA NO RESULTS DOC: HCPCS | Performed by: INTERNAL MEDICINE

## 2021-05-18 PROCEDURE — 1036F TOBACCO NON-USER: CPT | Performed by: INTERNAL MEDICINE

## 2021-05-18 PROCEDURE — 4040F PNEUMOC VAC/ADMIN/RCVD: CPT | Performed by: INTERNAL MEDICINE

## 2021-05-18 PROCEDURE — 99214 OFFICE O/P EST MOD 30 MIN: CPT | Performed by: INTERNAL MEDICINE

## 2021-05-18 PROCEDURE — 3017F COLORECTAL CA SCREEN DOC REV: CPT | Performed by: INTERNAL MEDICINE

## 2021-05-18 PROCEDURE — G8417 CALC BMI ABV UP PARAM F/U: HCPCS | Performed by: INTERNAL MEDICINE

## 2021-05-18 RX ORDER — ACETAMINOPHEN 500 MG
1000 TABLET ORAL NIGHTLY
COMMUNITY

## 2021-05-18 SDOH — ECONOMIC STABILITY: FOOD INSECURITY: WITHIN THE PAST 12 MONTHS, YOU WORRIED THAT YOUR FOOD WOULD RUN OUT BEFORE YOU GOT MONEY TO BUY MORE.: NEVER TRUE

## 2021-05-18 NOTE — PROGRESS NOTES
Rinku Pina is a 73 y.o. female who presents for   Chief Complaint   Patient presents with    Follow-up     Essential hypertension    Health Maintenance     hep c, tdap, shingles, dexa, pneumo, awv    and follow up of chronic medical problems. Patient Active Problem List   Diagnosis    Dyslipidemia    Osteoarthritis    GERD (gastroesophageal reflux disease)    Essential hypertension    Morbidly obese (HCC)    Allergic rhinitis    Osteoporosis    Arthritis of knee    Biliary dyskinesia    Right upper quadrant abdominal pain    Fundic gland polyposis of stomach    SBO (small bowel obstruction) ([address]    Incarcerated incisional hernia    Sigmoid diverticulitis    Incisional hernia without obstruction or gangrene    Chalazion    S/P colectomy    History of colostomy reversal    Abdominal adhesions    Hypokalemia    Atelectasis of left lung    Postoperative anemia due to acute blood loss    Surgical wound dehiscence    Constipation    Systolic murmur    Thyroid disease    Perforated sigmoid colon (HCC)    Large intestine anastomotic leak    Normocytic anemia    Rectal bleeding    Sepsis (HCC)    Acute cystitis without hematuria    Leukocytosis    Nausea    Postoperative intra-abdominal abscess    MRSA (methicillin resistant staph aureus) culture positive    E coli infection     HPI  Here for follow-up on blood pressure denies any new complaints and feeling much better now    Current Outpatient Medications   Medication Sig Dispense Refill    acetaminophen (TYLENOL) 500 MG tablet Take 500 mg by mouth every 6 hours as needed for Pain      metoprolol succinate (TOPROL XL) 25 MG extended release tablet Take 1 tablet by mouth daily 90 tablet 1    Cholecalciferol (VITAMIN D) 2000 units CAPS capsule Take 2 capsules by mouth daily        No current facility-administered medications for this visit.        No Known Allergies    Past Medical History:   Diagnosis Date    Allergic rhinitis     Bladder prolapse     Constipation     5/2019 resolved    Fundic gland polyposis of stomach 08/17/2017    per EGD    GERD (gastroesophageal reflux disease)     on no medications    Hiatal hernia     History of cardiac cath 12/2002    pt denies blockage    History of diverticulitis     Hyperlipidemia     borderline, on no medication    Hypertension     MRSA (methicillin resistant staph aureus) culture positive 11/01/2016    nasal    MRSA carrier     follows with ID    Obesity     Osteoarthritis     Pelvic abscess in female     Sleep apnea     CPAP nightly    Systolic murmur     benign systolic murmur (history of). Pt does not follow-up with a cardiologist (Written 09/25/2019).     Thyroid disease     goiter    Wears glasses        Past Surgical History:   Procedure Laterality Date    ABDOMINAL EXPLORATION SURGERY  05/16/2019    CHOLECYSTECTOMY, LAPAROSCOPIC  11/15/2016    CHOLECYSTECTOMY, LAPAROSCOPIC  11/15/2016    COLONOSCOPY  2013    neg    COLONOSCOPY N/A 5/16/2019    COLONOSCOPY DIAGNOSTIC performed by Breana Cook MD at [address] [place] [address] COLONOSCOPY N/A 12/19/2019    COLONOSCOPY W/STENT X2 performed by Breana Cook MD at [facility] [address] 12/24/2019    EXPLORATORY LAPAROTOMY, EXTENSIVE LYSIS OF ADHESIONS, TAKE DOWN OF ANASTOMOSIS, COLOSTOMY CREATION, CLOSURE OF SEROMUSCULAR INJURIES performed by Breana Cook MD at [facility], COLON, DIAGNOSTIC      EGD    FISSURECTOMY ANAL N/A 10/4/2019    FLEXIBLE SIGMOIDOSCOPY & ANAL FISTULECTOMY performed by Breana Cook MD at [address] [facility] ARTHROSCOPY Left     lateral release    MS EGD TRANSORAL BIOPSY SINGLE/MULTIPLE N/A 8/17/2017    EGD BIOPSY performed by Batsheva Spaulding MD at [address] OFFICE/OUTPT [address] [facility] N/A 5/22/2018    XI ROBOTIC LAPAROSCOPIC UMBILICAL HERNIA REPAIR WITH MESH, POSSIBLE OPEN performed by Nyasia Connelly DO at [facility] [address]  02/19/2019  SIGMOIDOSCOPY N/A 2/19/2019    SIGMOIDOSCOPY BIOPSY FLEXIBLE performed by Charles Lacy DO at [facility] [address] N/A 12/23/2019    SIGMOIDOSCOPY DIAGNOSTIC FLEXIBLE WITH FLUORO performed by Dagoberto Diaz MD at [facility] [address] N/A 3/2/2021    [address] performed by Aman Moscoso MD at [address] [place] N/A 5/16/2019    ABDOMINAL EXPLORATION ANDBOWEL RESECTION LOW ANTERIOR WITH COLOSTOMY performed by Rafal Macias MD at [address] N/A 11/12/2019    COLOSTOMY  CLOSURE EXTENSIVE LYSIS OF ADHESIONS REPAIR OF SMALL BOWEL TEAR, REPAIR OF MULTIPLE VENTRAL HERNIAS, MOBILIZATION OF THE SPLENIC FLEXURE AND TRANSVERSE COLON, ABDOMINAL EXPLORATION performed by Rafal Macias MD at [address] [facility] [address] Left 9/24/2012    knee    TOTAL KNEE ARTHROPLASTY Right 11/10/2014    knee    UMBILICAL HERNIA REPAIR  05/22/2018    UPPER GASTROINTESTINAL ENDOSCOPY  08/17/2017    Multiple small gastric polyps, no esophagitis noted no Melara's mucosa.   No hiatal hernia, pathology benign fundic gland polyps       Family History   Problem Relation Age of Onset    Heart Disease Mother     COPD Father     Cancer Brother         thyroid, prostate, lung     ROS   Constitutional:  Negative for fatigue, loss of appetite and unexpected weight change   HEENT            : Negative for neck stiffness and pain, no congestion or sinus pressure   Eyes                : No visual disturbance or pain   Cardiovascular: No chest pain or palpitations or leg swelling   Respiratory      : Negative for cough, shortness of breath or wheezing   Gastrointestinal: Negative for abdominal pain, constipation or diarrhea and bloating No nausea or vomiting   Genitourinary:     No urgency or frequency, no burning or hematuria   Musculoskeletal: No arthralgias, back pain or myalgias   Skin                  : Negative for rash or erythema   Neurological : Negative for dizziness, weakness, tremors ,light headedness or syncope   Psychiatric       : Negative for dysphoric mood, sleep disturbances, nervous or anxious, or decreased concentration   All other review of systems was negative    Objective  Physical Examination:    Nursing note reviewed    /74 (Site: Right Upper Arm, Position: Sitting, Cuff Size: Large Adult)   Pulse 52   Temp 96.6 °F (35.9 °C) (Temporal)   Resp 15   Ht 5' 7.01\" (1.702 m)   Wt 237 lb (107.5 kg)   LMP  (LMP Unknown)   SpO2 98%   Breastfeeding No   BMI 37.11 kg/m²   BP Readings from Last 3 Encounters:   05/18/21 138/74   03/17/21 127/66   03/02/21 (!) 102/51         Constitutional:  Clinton Forman is oriented to place, person and time ,appears well-developed and well-nourished  HEENT:  Atraumatic and normocephalic, external ears normal bilaterally, nose normal no oropharyngeal exudate and is clear and moist  Eyes:  EOCM normal; conjunctivae normal; PERRLA bilaterally  Neck:  Normal range of motion, neck supple, no JVD and no thyromegaly  Cardiovascular:  RRR, normal heart sounds and intact distal pulses  Pulmonary:  effort normal and breath sounds normal bilaterally,no wheezes or rales, no respiratory distress  Abdominal:  Soft, non-tender; normal bowel sounds, no masses  Musculoskeletal:  Normal range of motion and no edema or tenderness bilaterally  No lymphadenopathy  Neurological:  alert, oriented, and normal reflexes bilaterally  Skin: warm and dry  Psychiatric:  normal mood and effect; behavior normal.    Labs:   Lab Results   Component Value Date    LABA1C 5.8 01/08/2021     Lab Results   Component Value Date    CHOL 183 01/08/2021     Lab Results   Component Value Date    HDL 44 01/08/2021     No results found for: [address]  Lab Results   Component Value Date    TRIG 135 01/08/2021     No components found for: CHOLHDL  Lab Results   Component Value Date    WBC 8.1 01/08/2021    HGB 12.8 01/08/2021    HCT 41.8 01/08/2021    MCV 94.4 01/08/2021     01/08/2021     Lab Results   Component Value Date    INR 1.2 12/17/2019    PROTIME 11.8 (H) 12/17/2019     Lab Results   Component Value Date    GLUCOSE 102 (H) 01/08/2021    CREATININE 0.80 01/08/2021    BUN 20 01/08/2021     01/08/2021    K 4.2 01/08/2021     01/08/2021    CO2 23 01/08/2021     Lab Results   Component Value Date    ALT 16 01/08/2021    AST 15 01/08/2021    ALKPHOS 97 01/08/2021    BILITOT 0.39 01/08/2021     Lab Results   Component Value Date    LABPROT 6.4 08/14/2012    LABALBU 4.1 01/08/2021     Lab Results   Component Value Date    TSH 1.63 01/08/2021     Assessment:  1. Essential hypertension    2. Age-related osteoporosis without current pathological fracture    3. Vitamin D deficiency        Plan:  Patient blood pressure is stable on current medications and continue metoprolol  Patient had a CT of the abdomen done by GI and showed osteopenia and will do a DEXA scan as patient has no work-up done in the past  Patient is taking vitamin D supplements and last vitamin D levels were 26.6  Patient has lost about 17 pounds with diet and exercise and encouraged to continue to do so  Patient's visit to the gastroenterologist and office notes reviewed  Patient has seen the dermatologist for lesions on the scalp and will get a copy of the report and currently doing better and free of symptoms  Review in 6 months           1. Jennifer received counseling on the following healthy behaviors: nutrition and exercise    2. Prior labs and health maintenance reviewed. 3.  Discussed use, benefit, and side effects of prescribed medications. Barriers to medication compliance addressed. All her questions were answered. Pt voiced understanding. Lizzy Mccall will continue current medications, diet and exercise. No orders of the defined types were placed in this encounter.          Completed Refills               Requested Prescriptions      No prescriptions requested or ordered in this encounter     4. Patient given educational materials - see patient instructions    5. Was a self-tracking handout given in paper form or via [employer]? NO    Orders Placed This Encounter   Procedures    DEXA BONE DENSITY 2 SITES     Standing Status:   Future     Standing Expiration Date:   5/18/2022     Return in about 6 months (around 11/18/2021). Patient voiced understanding and agreed to treatment plan. Electronically signed by Thomas Badillo MD on 5/18/2021 at 3:17 PM    This note is created with a voice recognition program and while intend to generate a document that accurately reflects the content of the visit, no guarantee can be provided that every mistake has been identified and corrected by editing.

## 2021-06-03 ENCOUNTER — TELEPHONE (OUTPATIENT)
Dept: INTERNAL MEDICINE CLINIC | Age: 71
End: 2021-06-03

## 2021-09-16 ENCOUNTER — HOSPITAL ENCOUNTER (OUTPATIENT)
Dept: MAMMOGRAPHY | Age: 71
Discharge: HOME | End: 2021-09-18
Payer: MEDICARE

## 2021-09-16 DIAGNOSIS — M81.0 AGE-RELATED OSTEOPOROSIS WITHOUT CURRENT PATHOLOGICAL FRACTURE: ICD-10-CM

## 2021-09-16 PROCEDURE — 77080 DXA BONE DENSITY AXIAL: CPT

## 2021-11-01 ENCOUNTER — TELEPHONE (OUTPATIENT)
Dept: INTERNAL MEDICINE CLINIC | Age: 71
End: 2021-11-01

## 2021-11-01 DIAGNOSIS — R09.89 CHEST CONGESTION: Primary | ICD-10-CM

## 2021-11-01 NOTE — TELEPHONE ENCOUNTER
Pt called in not well since weekend. Reports cough, dry since Saturday, runny nose.  head congestion. asking for a covid test.  Not asking for treatment.

## 2021-11-04 ENCOUNTER — TELEPHONE (OUTPATIENT)
Dept: INTERNAL MEDICINE CLINIC | Age: 71
End: 2021-11-04

## 2021-11-18 ENCOUNTER — OFFICE VISIT (OUTPATIENT)
Dept: INTERNAL MEDICINE CLINIC | Age: 71
End: 2021-11-18
Payer: MEDICARE

## 2021-11-18 VITALS
BODY MASS INDEX: 37.67 KG/M2 | DIASTOLIC BLOOD PRESSURE: 80 MMHG | SYSTOLIC BLOOD PRESSURE: 122 MMHG | TEMPERATURE: 97.3 F | HEART RATE: 60 BPM | WEIGHT: 240 LBS | HEIGHT: 67 IN | RESPIRATION RATE: 16 BRPM

## 2021-11-18 DIAGNOSIS — E16.2 HYPOGLYCEMIA: ICD-10-CM

## 2021-11-18 DIAGNOSIS — R53.83 OTHER FATIGUE: ICD-10-CM

## 2021-11-18 DIAGNOSIS — K21.9 GASTROESOPHAGEAL REFLUX DISEASE WITHOUT ESOPHAGITIS: ICD-10-CM

## 2021-11-18 DIAGNOSIS — I10 ESSENTIAL HYPERTENSION: Primary | ICD-10-CM

## 2021-11-18 DIAGNOSIS — G47.30 SLEEP APNEA, UNSPECIFIED TYPE: ICD-10-CM

## 2021-11-18 DIAGNOSIS — Z71.3 DIETARY COUNSELING: ICD-10-CM

## 2021-11-18 DIAGNOSIS — E55.9 VITAMIN D DEFICIENCY: ICD-10-CM

## 2021-11-18 PROCEDURE — G8417 CALC BMI ABV UP PARAM F/U: HCPCS | Performed by: INTERNAL MEDICINE

## 2021-11-18 PROCEDURE — G0447 BEHAVIOR COUNSEL OBESITY 15M: HCPCS | Performed by: INTERNAL MEDICINE

## 2021-11-18 PROCEDURE — 1036F TOBACCO NON-USER: CPT | Performed by: INTERNAL MEDICINE

## 2021-11-18 PROCEDURE — G0444 DEPRESSION SCREEN ANNUAL: HCPCS | Performed by: INTERNAL MEDICINE

## 2021-11-18 PROCEDURE — G8484 FLU IMMUNIZE NO ADMIN: HCPCS | Performed by: INTERNAL MEDICINE

## 2021-11-18 PROCEDURE — 90694 VACC AIIV4 NO PRSRV 0.5ML IM: CPT | Performed by: INTERNAL MEDICINE

## 2021-11-18 PROCEDURE — G0008 ADMIN INFLUENZA VIRUS VAC: HCPCS | Performed by: INTERNAL MEDICINE

## 2021-11-18 PROCEDURE — 1090F PRES/ABSN URINE INCON ASSESS: CPT | Performed by: INTERNAL MEDICINE

## 2021-11-18 PROCEDURE — 99214 OFFICE O/P EST MOD 30 MIN: CPT | Performed by: INTERNAL MEDICINE

## 2021-11-18 PROCEDURE — G8427 DOCREV CUR MEDS BY ELIG CLIN: HCPCS | Performed by: INTERNAL MEDICINE

## 2021-11-18 PROCEDURE — 1123F ACP DISCUSS/DSCN MKR DOCD: CPT | Performed by: INTERNAL MEDICINE

## 2021-11-18 PROCEDURE — 3017F COLORECTAL CA SCREEN DOC REV: CPT | Performed by: INTERNAL MEDICINE

## 2021-11-18 PROCEDURE — G8399 PT W/DXA RESULTS DOCUMENT: HCPCS | Performed by: INTERNAL MEDICINE

## 2021-11-18 PROCEDURE — 4040F PNEUMOC VAC/ADMIN/RCVD: CPT | Performed by: INTERNAL MEDICINE

## 2021-11-18 RX ORDER — METOPROLOL SUCCINATE 25 MG/1
25 TABLET, EXTENDED RELEASE ORAL DAILY
Qty: 90 TABLET | Refills: 1 | Status: SHIPPED | OUTPATIENT
Start: 2021-11-18 | End: 2022-07-21 | Stop reason: SDUPTHER

## 2021-11-18 NOTE — PATIENT INSTRUCTIONS
foods in reasonable amounts and becoming more active, even a little bit every day. Making small changes over time can add up to a lot. Make a plan for change. Many people have found that naming their reasons for change and staying focused on their plan can make a big difference. Work with your doctor to create a plan that is right for you. · Ask yourself: Shai Cruz are my personal, most powerful reasons for wanting this change? What will my life look like when I've made the change? \"  · Set your long-term goal. Make it specific, such as \"I will lose x pounds. \"  · Break your long-term goal into smaller, short-term goals. Make these small steps specific and within your reach, things you know you can do. These steps are what keep you going from day to day. Talk with your doctor about other weight-loss options. If you have a BMI in a certain range and have not been able to lose weight with diet and exercise, medicine or surgery may be an option for you. Before your doctor will prescribe medicines or surgery, he or she will probably want you to be more active and follow your healthy eating plan for a period of time. These habits are key lifelong changes for managing your weight, with or without other medical treatment. And these changes can help you avoid weight-related health problems. How can you stay on your plan for change? Be ready. Choose to start during a time when there are few events like holidays, social events, and high-stress periods. These events might trigger slip-ups. Decide on your first few steps. Most people have more success when they make small changes, one step at a time. For example, you might switch a daily candy bar to a piece of fruit, walk 10 minutes more, or add more vegetables to a meal.  Line up your support people. Make sure you're not going to be alone as you make this change. Connect with people who understand how important it is to you.  Ask family members and friends for help in keeping with your plan. And think about who could make it harder for you, and how to handle them. Try tracking. People who keep track of what they eat, feel, and do are better at losing weight. Try writing down things like:  · What and how much you eat. · How you feel before and after each meal.  · Details about each meal (like eating out or at home, eating alone, or with friends or family). · What you do to be active. Look and plan. As you track, look for patterns that you may want to change. Take note of:  · When you eat and whether you skip meals. · How often you eat out. · How many fruits and vegetables you eat. · When you eat beyond feeling full. · When and why you eat for reasons other than being hungry. When you stray from your plan, don't get upset. Figure out what made you slip up and how you can fix it. Can you take medicines or have surgery to lose weight? If you have a BMI in a certain range and have not been able to lose weight with diet and exercise, medicine or surgery may be an option for you. If you have a BMI of at least 30.0 (or a BMI of at least 27.0 and another health problem related to your weight), ask your doctor about weight-loss medicines. They work by making you feel less hungry, making you feel full more quickly, or changing how you digest fat. Medicines are used along with diet changes and more physical activity to help you make lasting changes. If you have a BMI of 40.0 or more (or a BMI of 35.0 or more and another health problem related to your weight), your doctor may talk with you about surgery. Weight-loss surgery has risks, and you will need to work with your doctor to compare the risk of having obesity with the risks of surgery. With any option you choose, you will still need to eat a healthy diet and get regular exercise. Follow-up care is a key part of your treatment and safety. Be sure to make and go to all appointments, and call your doctor if you are having problems. It's also a good idea to know your test results and keep a list of the medicines you take. Where can you learn more? Go to [email or web address] org and sign in to your [employer] account. Enter N111 in the [facility] box to learn more about \"Learning About Obesity. \"     If you do not have an account, please click on the \"Sign Up Now\" link. Current as of: March 17, 2021               Content Version: 13.0  © 4503-8874 Healthwise, Incorporated. Care instructions adapted under license by [facility] ([facility]). If you have questions about a medical condition or this instruction, always ask your healthcare professional. [address] any warranty or liability for your use of this information.

## 2021-11-18 NOTE — PROGRESS NOTES
Leena Rowe is a 73 y.o. female who presents for   Chief Complaint   Patient presents with    Hypertension     6 mo check up, no recent labs    Fatigue     just feels wore out every morning, wears Cpap, will discuss with Dr Annette Barnhart     did get Covid vaccines , wants flu shot today    and follow up of chronic medical problems. Patient Active Problem List   Diagnosis    Dyslipidemia    Osteoarthritis    GERD (gastroesophageal reflux disease)    Essential hypertension    Morbidly obese (HCC)    Allergic rhinitis    Osteoporosis    Arthritis of knee    Biliary dyskinesia    Right upper quadrant abdominal pain    Fundic gland polyposis of stomach    SBO (small bowel obstruction) ([address]    Incarcerated incisional hernia    Sigmoid diverticulitis    Incisional hernia without obstruction or gangrene    Chalazion    S/P colectomy    History of colostomy reversal    Abdominal adhesions    Hypokalemia    Atelectasis of left lung    Postoperative anemia due to acute blood loss    Surgical wound dehiscence    Constipation    Systolic murmur    Thyroid disease    Perforated sigmoid colon (HCC)    Large intestine anastomotic leak    Normocytic anemia    Rectal bleeding    Sepsis (HCC)    Acute cystitis without hematuria    Leukocytosis    Nausea    Postoperative intra-abdominal abscess    MRSA (methicillin resistant staph aureus) culture positive    E coli infection     HPI  Here for follow-up on blood pressure denies any new complaints    Current Outpatient Medications   Medication Sig Dispense Refill    metoprolol succinate (TOPROL XL) 25 MG extended release tablet Take 1 tablet by mouth daily 90 tablet 1    acetaminophen (TYLENOL) 500 MG tablet Take 500 mg by mouth every 6 hours as needed for Pain      Cholecalciferol (VITAMIN D) 2000 units CAPS capsule Take 2 capsules by mouth daily        No current facility-administered medications for this visit.        No Known Allergies    Past Medical History:   Diagnosis Date    Allergic rhinitis     Bladder prolapse     Constipation     5/2019 resolved    Fundic gland polyposis of stomach 08/17/2017    per EGD    GERD (gastroesophageal reflux disease)     on no medications    Hiatal hernia     History of cardiac cath 12/2002    pt denies blockage    History of diverticulitis     Hyperlipidemia     borderline, on no medication    Hypertension     MRSA (methicillin resistant staph aureus) culture positive 11/01/2016    nasal    MRSA carrier     follows with ID    Obesity     Osteoarthritis     Pelvic abscess in female     Sleep apnea     CPAP nightly    Systolic murmur     benign systolic murmur (history of). Pt does not follow-up with a cardiologist (Written 09/25/2019).     Thyroid disease     goiter    Wears glasses        Past Surgical History:   Procedure Laterality Date    ABDOMINAL EXPLORATION SURGERY  05/16/2019    CHOLECYSTECTOMY, LAPAROSCOPIC  11/15/2016    CHOLECYSTECTOMY, LAPAROSCOPIC  11/15/2016    COLONOSCOPY  2013    neg    COLONOSCOPY N/A 5/16/2019    COLONOSCOPY DIAGNOSTIC performed by Lady Cuong MD at [address] [facility] COLONOSCOPY N/A 12/19/2019    COLONOSCOPY W/STENT X2 performed by Lady Cuong MD at [address] 12/24/2019    EXPLORATORY LAPAROTOMY, EXTENSIVE LYSIS OF ADHESIONS, TAKE DOWN OF ANASTOMOSIS, COLOSTOMY CREATION, CLOSURE OF SEROMUSCULAR INJURIES performed by Lady Cuong MD at [address], COLON, DIAGNOSTIC      EGD    FISSURECTOMY ANAL N/A 10/4/2019    FLEXIBLE SIGMOIDOSCOPY & ANAL FISTULECTOMY performed by Lady Cuong MD at [address] [facility] ARTHROSCOPY Left     lateral release    [facility] TRANSORAL BIOPSY SINGLE/MULTIPLE N/A 8/17/2017    EGD BIOPSY performed by Rick Robertson MD at [address] OFFICE/OUTPT [facility] N/A 5/22/2018    XI ROBOTIC LAPAROSCOPIC UMBILICAL HERNIA REPAIR WITH MESH, POSSIBLE OPEN performed by Rae Morris IV, DO at [facility] [address]  02/19/2019    SIGMOIDOSCOPY N/A 2/19/2019    SIGMOIDOSCOPY BIOPSY FLEXIBLE performed by Corey Aldana DO at [facility] [address] N/A 12/23/2019    SIGMOIDOSCOPY DIAGNOSTIC FLEXIBLE WITH FLUORO performed by Elsie Snellen, MD at [facility] [address] N/A 3/2/2021    [address] performed by Lydia John MD at [address] N/A 5/16/2019    [address] performed by Hayley Weber MD at [facility] N/A 11/12/2019    COLOSTOMY  CLOSURE EXTENSIVE LYSIS OF ADHESIONS REPAIR OF SMALL BOWEL TEAR, REPAIR OF MULTIPLE VENTRAL HERNIAS, MOBILIZATION OF THE SPLENIC FLEXURE AND TRANSVERSE COLON, ABDOMINAL EXPLORATION performed by Hayley Weber MD at [address] [facility] [address] Left 9/24/2012    knee    TOTAL KNEE ARTHROPLASTY Right 11/10/2014    knee    UMBILICAL HERNIA REPAIR  05/22/2018    UPPER GASTROINTESTINAL ENDOSCOPY  08/17/2017    Multiple small gastric polyps, no esophagitis noted no Melara's mucosa.   No hiatal hernia, pathology benign fundic gland polyps       Family History   Problem Relation Age of Onset    Heart Disease Mother     COPD Father     Cancer Brother         thyroid, prostate, lung     ROS   Constitutional:  Negative for fatigue, loss of appetite and unexpected weight change   HEENT            : Negative for neck stiffness and pain, no congestion or sinus pressure   Eyes                : No visual disturbance or pain   Cardiovascular: No chest pain or palpitations or leg swelling   Respiratory      : Negative for cough, shortness of breath or wheezing   Gastrointestinal: Negative for abdominal pain, constipation or diarrhea and bloating No nausea or vomiting   Genitourinary:     No urgency or frequency, no burning or hematuria   Musculoskeletal: No arthralgias, back pain or 12.8 01/08/2021    HCT 41.8 01/08/2021    MCV 94.4 01/08/2021     01/08/2021     Lab Results   Component Value Date    INR 1.2 12/17/2019    PROTIME 11.8 (H) 12/17/2019     Lab Results   Component Value Date    GLUCOSE 102 (H) 01/08/2021    CREATININE 0.80 01/08/2021    BUN 20 01/08/2021     01/08/2021    K 4.2 01/08/2021     01/08/2021    CO2 23 01/08/2021     Lab Results   Component Value Date    ALT 16 01/08/2021    AST 15 01/08/2021    ALKPHOS 97 01/08/2021    BILITOT 0.39 01/08/2021     Lab Results   Component Value Date    LABPROT 6.4 08/14/2012    LABALBU 4.1 01/08/2021     Lab Results   Component Value Date    TSH 1.63 01/08/2021     Assessment:  1. Essential hypertension    2. Vitamin D deficiency    3. Gastroesophageal reflux disease without esophagitis    4. Hypoglycemia    5. Sleep apnea, unspecified type    6. Other fatigue        Plan:  Patient blood pressure is stable and continue metoprolol and refills given  Patient had colostomy secondary to previous surgery and patient had a second opinion done in [facility] clinic and reports reviewed and patient not pursuing any further interventions  Patient still complaining of occasional fatigue and following with the pulmonary and advised her to discuss with them about the settings on the CPAP machine and also been ordered TSH and other labs include magnesium and vitamin D B12 and CBC CMP and hemoglobin A1c  Patient had flu vaccine done today  Strongly counseled about diet exercise and weight loss  Labs ordered to check for vitamin D deficiency as patient is taking 2000 units daily  Patient had some dry cough which is resolved and does not want any treatment at this time  Review in 6 months           1. Jennifer received counseling on the following healthy behaviors: nutrition and exercise    2. Prior labs and health maintenance reviewed. 3.  Discussed use, benefit, and side effects of prescribed medications.   Barriers to medication compliance addressed. All her questions were answered. Pt voiced understanding. Cali Hanks will continue current medications, diet and exercise. Orders Placed This Encounter   Medications    metoprolol succinate (TOPROL XL) 25 MG extended release tablet     Sig: Take 1 tablet by mouth daily     Dispense:  90 tablet     Refill:  1          Completed Refills               Requested Prescriptions     Signed Prescriptions Disp Refills    metoprolol succinate (TOPROL XL) 25 MG extended release tablet 90 tablet 1     Sig: Take 1 tablet by mouth daily     4. Patient given educational materials - see patient instructions    5. Was a self-tracking handout given in paper form or via [employer]? NO    Orders Placed This Encounter   Procedures    INFLUENZA, QUADV, ADJUVANTED, 72 YRS =, IM, PF, PREFILL SYR, 0.5ML (FLUAD)     2021 HD flu    Hemoglobin A1C     Standing Status:   Future     Standing Expiration Date:   11/18/2022    Comprehensive Metabolic Panel     Standing Status:   Future     Standing Expiration Date:   11/18/2022    Lipid Panel     Standing Status:   Future     Standing Expiration Date:   11/18/2022     Order Specific Question:   Is Patient Fasting?/# of Hours     Answer:   12    TSH without Reflex     Standing Status:   Future     Standing Expiration Date:   11/18/2022    Vitamin B12     Standing Status:   Future     Standing Expiration Date:   11/18/2022    CBC     Standing Status:   Future     Standing Expiration Date:   11/18/2022    Magnesium     Standing Status:   Future     Standing Expiration Date:   11/18/2022    Vitamin D 25 Hydroxy     Standing Status:   Future     Standing Expiration Date:   11/18/2022     Return in about 6 months (around 5/18/2022). Patient voiced understanding and agreed to treatment plan.      Electronically signed by Audra Nuñez MD on 11/18/2021 at 2:47 PM    This note is created with a voice recognition program and while intend to generate a document that accurately reflects the content of the visit, no guarantee can be provided that every mistake has been identified and corrected by editing. BMI was elevated today, and weight loss plan recommended is : daily exercise regimen.

## 2022-01-12 ENCOUNTER — HOSPITAL ENCOUNTER (INPATIENT)
Age: 72
LOS: 3 days | Discharge: HOME | DRG: 389 | End: 2022-01-16
Attending: INTERNAL MEDICINE | Admitting: FAMILY MEDICINE
Payer: MEDICARE

## 2022-01-12 ENCOUNTER — APPOINTMENT (OUTPATIENT)
Dept: CT IMAGING | Age: 72
DRG: 389 | End: 2022-01-12
Payer: MEDICARE

## 2022-01-12 DIAGNOSIS — K56.609 SMALL BOWEL OBSTRUCTION (HCC): Primary | ICD-10-CM

## 2022-01-12 LAB
-: ABNORMAL
ABSOLUTE EOS #: 0.14 K/UL (ref 0–0.44)
ABSOLUTE IMMATURE GRANULOCYTE: 0.04 K/UL (ref 0–0.3)
ABSOLUTE LYMPH #: 1.09 K/UL (ref 1.1–3.7)
ABSOLUTE MONO #: 0.57 K/UL (ref 0.1–1.2)
ALBUMIN SERPL-MCNC: 4.6 G/DL (ref 3.5–5.2)
ALBUMIN/GLOBULIN RATIO: ABNORMAL (ref 1–2.5)
ALP BLD-CCNC: 125 U/L (ref 35–104)
ALT SERPL-CCNC: 18 U/L (ref 5–33)
AMORPHOUS: ABNORMAL
ANION GAP SERPL CALCULATED.3IONS-SCNC: 12 MMOL/L (ref 9–17)
AST SERPL-CCNC: 16 U/L
BACTERIA: ABNORMAL
BASOPHILS # BLD: 1 % (ref 0–2)
BASOPHILS ABSOLUTE: 0.07 K/UL (ref 0–0.2)
BILIRUB SERPL-MCNC: 0.3 MG/DL (ref 0.3–1.2)
BILIRUBIN URINE: NEGATIVE
BUN BLDV-MCNC: 14 MG/DL (ref 8–23)
BUN/CREAT BLD: 20 (ref 9–20)
CALCIUM SERPL-MCNC: 10.6 MG/DL (ref 8.6–10.4)
CASTS UA: ABNORMAL /LPF
CHLORIDE BLD-SCNC: 103 MMOL/L (ref 98–107)
CO2: 26 MMOL/L (ref 20–31)
COLOR: YELLOW
COMMENT UA: ABNORMAL
CREAT SERPL-MCNC: 0.71 MG/DL (ref 0.5–0.9)
CRYSTALS, UA: ABNORMAL /HPF
DIFFERENTIAL TYPE: ABNORMAL
EOSINOPHILS RELATIVE PERCENT: 1 % (ref 1–4)
EPITHELIAL CELLS UA: ABNORMAL /HPF (ref 0–5)
GFR AFRICAN AMERICAN: >60 ML/MIN
GFR NON-AFRICAN AMERICAN: >60 ML/MIN
GFR SERPL CREATININE-BSD FRML MDRD: ABNORMAL ML/MIN/{1.73_M2}
GFR SERPL CREATININE-BSD FRML MDRD: ABNORMAL ML/MIN/{1.73_M2}
GLUCOSE BLD-MCNC: 140 MG/DL (ref 70–99)
GLUCOSE URINE: NEGATIVE
HCT VFR BLD CALC: 45.7 % (ref 36.3–47.1)
HEMOGLOBIN: 14.6 G/DL (ref 11.9–15.1)
IMMATURE GRANULOCYTES: 0 %
KETONES, URINE: NEGATIVE
LEUKOCYTE ESTERASE, URINE: ABNORMAL
LIPASE: 41 U/L (ref 13–60)
LYMPHOCYTES # BLD: 8 % (ref 24–43)
MCH RBC QN AUTO: 29.4 PG (ref 25.2–33.5)
MCHC RBC AUTO-ENTMCNC: 31.9 G/DL (ref 28.4–34.8)
MCV RBC AUTO: 92.1 FL (ref 82.6–102.9)
MONOCYTES # BLD: 4 % (ref 3–12)
MUCUS: ABNORMAL
NITRITE, URINE: POSITIVE
NRBC AUTOMATED: 0 PER 100 WBC
OTHER OBSERVATIONS UA: ABNORMAL
PDW BLD-RTO: 14.2 % (ref 11.8–14.4)
PH UA: 6 (ref 5–8)
PLATELET # BLD: 212 K/UL (ref 138–453)
PLATELET ESTIMATE: ABNORMAL
PMV BLD AUTO: 10.1 FL (ref 8.1–13.5)
POTASSIUM SERPL-SCNC: 3.9 MMOL/L (ref 3.7–5.3)
PROTEIN UA: NEGATIVE
RBC # BLD: 4.96 M/UL (ref 3.95–5.11)
RBC # BLD: ABNORMAL 10*6/UL
RBC UA: ABNORMAL /HPF (ref 0–2)
RENAL EPITHELIAL, UA: ABNORMAL /HPF
SEG NEUTROPHILS: 86 % (ref 36–65)
SEGMENTED NEUTROPHILS ABSOLUTE COUNT: 11.39 K/UL (ref 1.5–8.1)
SODIUM BLD-SCNC: 141 MMOL/L (ref 135–144)
SPECIFIC GRAVITY UA: 1.02 (ref 1–1.03)
TOTAL PROTEIN: 7.6 G/DL (ref 6.4–8.3)
TRICHOMONAS: ABNORMAL
TURBIDITY: ABNORMAL
URINE HGB: ABNORMAL
UROBILINOGEN, URINE: NORMAL
WBC # BLD: 13.3 K/UL (ref 3.5–11.3)
WBC # BLD: ABNORMAL 10*3/UL
WBC UA: ABNORMAL /HPF (ref 0–5)
YEAST: ABNORMAL

## 2022-01-12 PROCEDURE — 84443 ASSAY THYROID STIM HORMONE: CPT

## 2022-01-12 PROCEDURE — 87077 CULTURE AEROBIC IDENTIFY: CPT

## 2022-01-12 PROCEDURE — 85025 COMPLETE CBC W/AUTO DIFF WBC: CPT

## 2022-01-12 PROCEDURE — 96374 THER/PROPH/DIAG INJ IV PUSH: CPT

## 2022-01-12 PROCEDURE — 2500000003 HC RX 250 WO HCPCS: Performed by: PHYSICIAN ASSISTANT

## 2022-01-12 PROCEDURE — 99284 EMERGENCY DEPT VISIT MOD MDM: CPT

## 2022-01-12 PROCEDURE — 87186 SC STD MICRODIL/AGAR DIL: CPT

## 2022-01-12 PROCEDURE — 6360000004 HC RX CONTRAST MEDICATION: Performed by: PHYSICIAN ASSISTANT

## 2022-01-12 PROCEDURE — 81001 URINALYSIS AUTO W/SCOPE: CPT

## 2022-01-12 PROCEDURE — 6360000002 HC RX W HCPCS: Performed by: PHYSICIAN ASSISTANT

## 2022-01-12 PROCEDURE — 80053 COMPREHEN METABOLIC PANEL: CPT

## 2022-01-12 PROCEDURE — 83690 ASSAY OF LIPASE: CPT

## 2022-01-12 PROCEDURE — 74177 CT ABD & PELVIS W/CONTRAST: CPT

## 2022-01-12 PROCEDURE — 87086 URINE CULTURE/COLONY COUNT: CPT

## 2022-01-12 PROCEDURE — 2580000003 HC RX 258: Performed by: PHYSICIAN ASSISTANT

## 2022-01-12 PROCEDURE — 96375 TX/PRO/DX INJ NEW DRUG ADDON: CPT

## 2022-01-12 RX ORDER — 0.9 % SODIUM CHLORIDE 0.9 %
500 INTRAVENOUS SOLUTION INTRAVENOUS ONCE
Status: COMPLETED | OUTPATIENT
Start: 2022-01-12 | End: 2022-01-12

## 2022-01-12 RX ORDER — MORPHINE SULFATE 2 MG/ML
2 INJECTION, SOLUTION INTRAMUSCULAR; INTRAVENOUS ONCE
Status: DISCONTINUED | OUTPATIENT
Start: 2022-01-12 | End: 2022-01-13

## 2022-01-12 RX ORDER — ONDANSETRON 2 MG/ML
4 INJECTION INTRAMUSCULAR; INTRAVENOUS ONCE
Status: COMPLETED | OUTPATIENT
Start: 2022-01-12 | End: 2022-01-12

## 2022-01-12 RX ORDER — 0.9 % SODIUM CHLORIDE 0.9 %
80 INTRAVENOUS SOLUTION INTRAVENOUS ONCE
Status: COMPLETED | OUTPATIENT
Start: 2022-01-12 | End: 2022-01-13

## 2022-01-12 RX ORDER — SODIUM CHLORIDE 0.9 % (FLUSH) 0.9 %
10 SYRINGE (ML) INJECTION PRN
Status: DISCONTINUED | OUTPATIENT
Start: 2022-01-12 | End: 2022-01-16 | Stop reason: HOSPADM

## 2022-01-12 RX ADMIN — SODIUM CHLORIDE 80 ML: 9 INJECTION, SOLUTION INTRAVENOUS at 23:25

## 2022-01-12 RX ADMIN — IOPAMIDOL 75 ML: 755 INJECTION, SOLUTION INTRAVENOUS at 23:25

## 2022-01-12 RX ADMIN — FAMOTIDINE 20 MG: 10 INJECTION, SOLUTION INTRAVENOUS at 22:51

## 2022-01-12 RX ADMIN — SODIUM CHLORIDE, PRESERVATIVE FREE 10 ML: 5 INJECTION INTRAVENOUS at 23:25

## 2022-01-12 RX ADMIN — ONDANSETRON 4 MG: 2 INJECTION INTRAMUSCULAR; INTRAVENOUS at 23:04

## 2022-01-12 RX ADMIN — SODIUM CHLORIDE 500 ML: 9 INJECTION, SOLUTION INTRAVENOUS at 23:18

## 2022-01-12 ASSESSMENT — ENCOUNTER SYMPTOMS
ABDOMINAL PAIN: 1
HEMATOCHEZIA: 0
VOMITING: 0
NAUSEA: 1
HEMATEMESIS: 0
BELCHING: 1

## 2022-01-12 ASSESSMENT — PAIN SCALES - GENERAL: PAINLEVEL_OUTOF10: 10

## 2022-01-13 ENCOUNTER — APPOINTMENT (OUTPATIENT)
Dept: GENERAL RADIOLOGY | Age: 72
DRG: 389 | End: 2022-01-13
Payer: MEDICARE

## 2022-01-13 LAB
LACTIC ACID: 1.3 MMOL/L (ref 0.5–2.2)
TSH SERPL DL<=0.05 MIU/L-ACNC: 4.08 MIU/L (ref 0.3–5)

## 2022-01-13 PROCEDURE — 83605 ASSAY OF LACTIC ACID: CPT

## 2022-01-13 PROCEDURE — 6360000002 HC RX W HCPCS: Performed by: NURSE PRACTITIONER

## 2022-01-13 PROCEDURE — APPSS45 APP SPLIT SHARED TIME 31-45 MINUTES: Performed by: NURSE PRACTITIONER

## 2022-01-13 PROCEDURE — 1200000000 HC SEMI PRIVATE

## 2022-01-13 PROCEDURE — 2500000003 HC RX 250 WO HCPCS: Performed by: NURSE PRACTITIONER

## 2022-01-13 PROCEDURE — 99222 1ST HOSP IP/OBS MODERATE 55: CPT | Performed by: FAMILY MEDICINE

## 2022-01-13 PROCEDURE — 2580000003 HC RX 258: Performed by: NURSE PRACTITIONER

## 2022-01-13 PROCEDURE — 74018 RADEX ABDOMEN 1 VIEW: CPT

## 2022-01-13 RX ORDER — SODIUM CHLORIDE 9 MG/ML
25 INJECTION, SOLUTION INTRAVENOUS PRN
Status: DISCONTINUED | OUTPATIENT
Start: 2022-01-13 | End: 2022-01-16 | Stop reason: HOSPADM

## 2022-01-13 RX ORDER — ONDANSETRON 2 MG/ML
4 INJECTION INTRAMUSCULAR; INTRAVENOUS EVERY 6 HOURS PRN
Status: DISCONTINUED | OUTPATIENT
Start: 2022-01-13 | End: 2022-01-16 | Stop reason: HOSPADM

## 2022-01-13 RX ORDER — ACETAMINOPHEN 650 MG/1
650 SUPPOSITORY RECTAL EVERY 6 HOURS PRN
Status: DISCONTINUED | OUTPATIENT
Start: 2022-01-13 | End: 2022-01-16 | Stop reason: HOSPADM

## 2022-01-13 RX ORDER — SODIUM CHLORIDE 0.9 % (FLUSH) 0.9 %
5-40 SYRINGE (ML) INJECTION EVERY 12 HOURS SCHEDULED
Status: DISCONTINUED | OUTPATIENT
Start: 2022-01-13 | End: 2022-01-16 | Stop reason: HOSPADM

## 2022-01-13 RX ORDER — POTASSIUM CHLORIDE 20 MEQ/1
40 TABLET, EXTENDED RELEASE ORAL PRN
Status: DISCONTINUED | OUTPATIENT
Start: 2022-01-13 | End: 2022-01-16 | Stop reason: HOSPADM

## 2022-01-13 RX ORDER — POLYETHYLENE GLYCOL 3350 17 G/17G
17 POWDER, FOR SOLUTION ORAL DAILY PRN
Status: DISCONTINUED | OUTPATIENT
Start: 2022-01-13 | End: 2022-01-16 | Stop reason: HOSPADM

## 2022-01-13 RX ORDER — DEXTROSE, SODIUM CHLORIDE, AND POTASSIUM CHLORIDE 5; .45; .15 G/100ML; G/100ML; G/100ML
INJECTION INTRAVENOUS CONTINUOUS
Status: DISCONTINUED | OUTPATIENT
Start: 2022-01-13 | End: 2022-01-13

## 2022-01-13 RX ORDER — SODIUM CHLORIDE 9 MG/ML
INJECTION, SOLUTION INTRAVENOUS CONTINUOUS
Status: DISCONTINUED | OUTPATIENT
Start: 2022-01-13 | End: 2022-01-16

## 2022-01-13 RX ORDER — HYDRALAZINE HYDROCHLORIDE 20 MG/ML
10 INJECTION INTRAMUSCULAR; INTRAVENOUS EVERY 6 HOURS PRN
Status: DISCONTINUED | OUTPATIENT
Start: 2022-01-13 | End: 2022-01-16 | Stop reason: HOSPADM

## 2022-01-13 RX ORDER — ONDANSETRON 4 MG/1
4 TABLET, ORALLY DISINTEGRATING ORAL EVERY 8 HOURS PRN
Status: DISCONTINUED | OUTPATIENT
Start: 2022-01-13 | End: 2022-01-16 | Stop reason: HOSPADM

## 2022-01-13 RX ORDER — POTASSIUM CHLORIDE 7.45 MG/ML
10 INJECTION INTRAVENOUS PRN
Status: DISCONTINUED | OUTPATIENT
Start: 2022-01-13 | End: 2022-01-16 | Stop reason: HOSPADM

## 2022-01-13 RX ORDER — LORAZEPAM 2 MG/ML
0.5 INJECTION INTRAMUSCULAR ONCE
Status: COMPLETED | OUTPATIENT
Start: 2022-01-13 | End: 2022-01-13

## 2022-01-13 RX ORDER — KETOROLAC TROMETHAMINE 30 MG/ML
15 INJECTION, SOLUTION INTRAMUSCULAR; INTRAVENOUS EVERY 6 HOURS PRN
Status: DISPENSED | OUTPATIENT
Start: 2022-01-13 | End: 2022-01-15

## 2022-01-13 RX ORDER — METOPROLOL SUCCINATE 25 MG/1
25 TABLET, EXTENDED RELEASE ORAL DAILY
Status: DISCONTINUED | OUTPATIENT
Start: 2022-01-13 | End: 2022-01-16 | Stop reason: HOSPADM

## 2022-01-13 RX ORDER — ACETAMINOPHEN 325 MG/1
650 TABLET ORAL EVERY 6 HOURS PRN
Status: DISCONTINUED | OUTPATIENT
Start: 2022-01-13 | End: 2022-01-16 | Stop reason: HOSPADM

## 2022-01-13 RX ORDER — VITAMIN B COMPLEX
4000 TABLET ORAL DAILY
Status: DISCONTINUED | OUTPATIENT
Start: 2022-01-13 | End: 2022-01-16 | Stop reason: HOSPADM

## 2022-01-13 RX ORDER — LORAZEPAM 0.5 MG/1
0.5 TABLET ORAL ONCE
Status: DISCONTINUED | OUTPATIENT
Start: 2022-01-13 | End: 2022-01-13

## 2022-01-13 RX ORDER — SODIUM CHLORIDE 0.9 % (FLUSH) 0.9 %
10 SYRINGE (ML) INJECTION PRN
Status: DISCONTINUED | OUTPATIENT
Start: 2022-01-13 | End: 2022-01-16 | Stop reason: HOSPADM

## 2022-01-13 RX ORDER — MAGNESIUM SULFATE 1 G/100ML
1000 INJECTION INTRAVENOUS PRN
Status: DISCONTINUED | OUTPATIENT
Start: 2022-01-13 | End: 2022-01-16 | Stop reason: HOSPADM

## 2022-01-13 RX ADMIN — SODIUM CHLORIDE: 9 INJECTION, SOLUTION INTRAVENOUS at 02:58

## 2022-01-13 RX ADMIN — SODIUM CHLORIDE: 9 INJECTION, SOLUTION INTRAVENOUS at 12:27

## 2022-01-13 RX ADMIN — HYDRALAZINE HYDROCHLORIDE 10 MG: 20 INJECTION INTRAMUSCULAR; INTRAVENOUS at 20:45

## 2022-01-13 RX ADMIN — CEFTRIAXONE SODIUM 1000 MG: 1 INJECTION, POWDER, FOR SOLUTION INTRAMUSCULAR; INTRAVENOUS at 03:00

## 2022-01-13 RX ADMIN — SODIUM CHLORIDE, PRESERVATIVE FREE 10 ML: 5 INJECTION INTRAVENOUS at 08:37

## 2022-01-13 RX ADMIN — FAMOTIDINE 20 MG: 10 INJECTION, SOLUTION INTRAVENOUS at 20:36

## 2022-01-13 RX ADMIN — SODIUM CHLORIDE: 9 INJECTION, SOLUTION INTRAVENOUS at 20:35

## 2022-01-13 RX ADMIN — HYDRALAZINE HYDROCHLORIDE 10 MG: 20 INJECTION INTRAMUSCULAR; INTRAVENOUS at 09:17

## 2022-01-13 RX ADMIN — KETOROLAC TROMETHAMINE 15 MG: 30 INJECTION, SOLUTION INTRAMUSCULAR; INTRAVENOUS at 14:20

## 2022-01-13 RX ADMIN — KETOROLAC TROMETHAMINE 15 MG: 30 INJECTION, SOLUTION INTRAMUSCULAR; INTRAVENOUS at 03:33

## 2022-01-13 RX ADMIN — LORAZEPAM 0.5 MG: 2 INJECTION INTRAMUSCULAR; INTRAVENOUS at 22:51

## 2022-01-13 RX ADMIN — KETOROLAC TROMETHAMINE 15 MG: 30 INJECTION, SOLUTION INTRAMUSCULAR; INTRAVENOUS at 20:44

## 2022-01-13 RX ADMIN — FAMOTIDINE 20 MG: 10 INJECTION, SOLUTION INTRAVENOUS at 08:36

## 2022-01-13 ASSESSMENT — PAIN SCALES - GENERAL
PAINLEVEL_OUTOF10: 5
PAINLEVEL_OUTOF10: 3
PAINLEVEL_OUTOF10: 5
PAINLEVEL_OUTOF10: 5

## 2022-01-13 ASSESSMENT — ENCOUNTER SYMPTOMS
ABDOMINAL PAIN: 1
DIARRHEA: 0
CONSTIPATION: 0
NAUSEA: 1
EYES NEGATIVE: 1
RESPIRATORY NEGATIVE: 1
VOMITING: 1

## 2022-01-13 NOTE — ED PROVIDER NOTES
eMERGENCY dEPARTMENT eNCOUnter   Independent Attestation     Pt Name: Presley Macias  MRN: [id number]  [place] 1950  Date of evaluation: 1/13/22     Presley Macias is a 73 y.o. female with CC: Abdominal Pain and Nausea      Based on the medical record the care appears appropriate. I was personally available for consultation in the Emergency Department.   The care is provided during an unprecedented national emergency due to the novel coronavirus, Gerilyn Shone, MD  Attending Emergency Physician                    Iman Cardenas MD  01/13/22 [id number]

## 2022-01-13 NOTE — H&P
[facility]  Office: [address], DO, Dane Wilder, DO, Miguel Hasnicole, DO, Misty Crossett Blood, DO, Ben Jeong MD, Dee Brady MD, Cata Li MD, Jose Weston MD, Natalie Interiano MD, Rosie Artis MD, Destinee Farfan MD, Luna Simmons, DO, Winsome Walker DO, Yony Colon MD,  Miles Serna DO, Anirudh Clark MD, Eduardo Irby MD, Latoya Mark MD, Eber Villalobos MD, Nick Sandoval MD, Claudia Alicea MD, Modesta Kilpatrick MD, Naseem Whatley, [facility], [facility], CNP, Jas Cardenas, CNP, Elie Walker, CNS, Ariadna Courts, CNP, Josefina Villegas, CNP, Esther Beauchamp, CNP, Randy Ocean, CNP, Idris Johnson, CNP, Marilynn Urbina PA-C, Alberto Hooks, DNP, Elmer Carlos, DNP, Micah Klein, CNP, Shiv Irby, CNP, Heather Flood, CNP, Victor Hugo Walter, CNP, Ramos Perez, CNP, Pedro Luis Louise, [address]    HISTORY AND PHYSICAL EXAMINATION            Date:   1/13/2022  Patient name: Hadley Mckenzie  Date of admission:  1/12/2022  9:59 PM  MRN:   [id number]  Account:  [de-identified]  YOB: 1950  PCP:    Charlene Castrejon MD  Room:   2022/2022-01  Code Status:    Full Code    Chief Complaint:     Chief Complaint   Patient presents with    Abdominal Pain    Nausea       History Obtained From:     patient    History of Present Illness: Hadley Mckenzie is a 73 y.o. Non- / non  female who presents with Abdominal Pain and Nausea   and is admitted to the hospital for the management of SBO (small bowel obstruction) ([facility] [address] Patient says she had chicken and rice soup and toast yesterday afternoon and started having abdominal pain that was intermittent and came in [place]". She took Tums and Pepcid and had no relief. She also had nausea. When the pain became more severe, she called EMS and was brought in to the ED for evaluation.  She has a history of colon mass that was found on colonoscopy in 2019 and she underwent colectomy with colostomy. 6 months after that surgery, she went back to surgery for colostomy reversal and had a bowel perforation. She currently has a colostomy. She denies any decreased output in her colostomy. She reports 1 bout of emesis while in the ED. UA revealed UTI, and IV Rocephin was started. CT of the abdomen and pelvis revealed small bowel obstruction with point of obstruction suspected in the mid pelvis, large midline ventral hernia containing multiple loops of large and small bowel, parastomal hernia containing bowel. Her abdominal pain has resolved, NG remains in place. Colorectal surgery was consulted. She was admitted for further observation and management of SBO and UTI. Past Medical History:     Past Medical History:   Diagnosis Date    Allergic rhinitis     Bladder prolapse     Constipation     5/2019 resolved    Fundic gland polyposis of stomach 08/17/2017    per EGD    GERD (gastroesophageal reflux disease)     on no medications    Hiatal hernia     History of cardiac cath 12/2002    pt denies blockage    History of diverticulitis     Hyperlipidemia     borderline, on no medication    Hypertension     MRSA (methicillin resistant staph aureus) culture positive 11/01/2016    nasal    MRSA carrier     follows with ID    Obesity     Osteoarthritis     Pelvic abscess in female     Sleep apnea     CPAP nightly    Systolic murmur     benign systolic murmur (history of). Pt does not follow-up with a cardiologist (Written 09/25/2019).     Thyroid disease     goiter    Wears glasses         Past Surgical History:     Past Surgical History:   Procedure Laterality Date    ABDOMINAL EXPLORATION SURGERY  05/16/2019    CHOLECYSTECTOMY, LAPAROSCOPIC  11/15/2016    CHOLECYSTECTOMY, LAPAROSCOPIC  11/15/2016    COLONOSCOPY  2013    neg    COLONOSCOPY N/A 5/16/2019    COLONOSCOPY DIAGNOSTIC performed by Marco Stauffer MD at [facility] 12/19/2019 COLONOSCOPY W/STENT X2 performed by Roxy Terry MD at [address] 12/24/2019    EXPLORATORY LAPAROTOMY, EXTENSIVE LYSIS OF ADHESIONS, TAKE DOWN OF ANASTOMOSIS, COLOSTOMY CREATION, CLOSURE OF SEROMUSCULAR INJURIES performed by Roxy Terry MD at [address], COLON, DIAGNOSTIC      EGD    FISSURECTOMY ANAL N/A 10/4/2019    FLEXIBLE SIGMOIDOSCOPY & ANAL FISTULECTOMY performed by Roxy Terry MD at [address] [facility] ARTHROSCOPY Left     lateral release    FL EGD TRANSORAL BIOPSY SINGLE/MULTIPLE N/A 8/17/2017    EGD BIOPSY performed by Balaji Remy MD at [address] [facility] OFFICE/OUTPT [address] [facility] N/A 5/22/2018    XI ROBOTIC LAPAROSCOPIC UMBILICAL HERNIA REPAIR WITH MESH, POSSIBLE OPEN performed by Jerri Morris IV, DO at [facility] [address]  02/19/2019    SIGMOIDOSCOPY N/A 2/19/2019    SIGMOIDOSCOPY BIOPSY FLEXIBLE performed by Sharon العلي DO at [facility] [address] N/A 12/23/2019    SIGMOIDOSCOPY DIAGNOSTIC FLEXIBLE WITH FLUORO performed by Renato Rodriguez MD at [address] [place] [address] Road 3/2/2021    [address] performed by Balaji Remy MD at [address] [place] N/A 5/16/2019    [address] performed by Roxy Terry MD at [facility] [address] N/A 11/12/2019    [address], REPAIR OF MULTIPLE VENTRAL HERNIAS, MOBILIZATION OF THE SPLENIC FLEXURE AND TRANSVERSE COLON, ABDOMINAL EXPLORATION performed by Roxy Terry MD at [facility] Left 9/24/2012    knee    TOTAL KNEE ARTHROPLASTY Right 11/10/2014    knee    UMBILICAL HERNIA REPAIR  05/22/2018    UPPER GASTROINTESTINAL ENDOSCOPY  08/17/2017    Multiple small gastric polyps, no esophagitis noted no Melara's mucosa.   No hiatal hernia, pathology benign fundic gland polyps Medications Prior to Admission:     Prior to Admission medications    Medication Sig Start Date End Date Taking? Authorizing Provider   metoprolol succinate (TOPROL XL) 25 MG extended release tablet Take 1 tablet by mouth daily 21  Yes Ade Chang MD   acetaminophen (TYLENOL) 500 MG tablet Take 500 mg by mouth every 6 hours as needed for Pain   Yes Historical Provider, MD   Cholecalciferol (VITAMIN D) 2000 units CAPS capsule Take 2 capsules by mouth daily    Yes Historical Provider, MD        Allergies:     Patient has no known allergies. Social History:     Tobacco:    reports that she quit smoking about 47 years ago. She has a 3.00 pack-year smoking history. She has never used smokeless tobacco.  Alcohol:      reports current alcohol use. Drug Use:  reports no history of drug use. Family History:     Family History   Problem Relation Age of Onset    Heart Disease Mother     COPD Father     Cancer Brother         thyroid, prostate, lung       Review of Systems:     Positive and Negative as described in HPI. Review of Systems   Constitutional: Negative. HENT: Negative. Eyes: Negative. Respiratory: Negative. Cardiovascular: Negative. Gastrointestinal: Positive for abdominal pain, nausea and vomiting. Negative for constipation and diarrhea.        1 bout of emesis while in the ED   Genitourinary: Negative. Musculoskeletal: Negative. Skin: Negative. Neurological: Positive for headaches. Negative for dizziness, weakness and light-headedness. Psychiatric/Behavioral: Negative. Physical Exam:   BP (!) 171/82   Pulse 66   Temp 98.1 °F (36.7 °C) (Oral)   Resp 18   Ht 5' 7\" (1.702 m)   Wt 243 lb (110.2 kg)   LMP  (LMP Unknown)   SpO2 95%   BMI 38.06 kg/m²   Temp (24hrs), Av °F (36.7 °C), Min:97.9 °F (36.6 °C), Max:98.1 °F (36.7 °C)    No results for input(s): POCGLU in the last 72 hours.   No intake or output data in the 24 hours ending 22 [id number]    Physical Exam  Vitals and nursing note reviewed. Constitutional:       General: She is not in acute distress. Appearance: She is not ill-appearing, toxic-appearing or diaphoretic. HENT:      Head: Normocephalic and atraumatic. Right Ear: External ear normal.      Left Ear: External ear normal.      Nose: Nose normal. No rhinorrhea. Mouth/Throat:      Mouth: Mucous membranes are moist.   Eyes:      General: No scleral icterus. Right eye: No discharge. Left eye: No discharge. Extraocular Movements: Extraocular movements intact. Conjunctiva/sclera: Conjunctivae normal.      Pupils: Pupils are equal, round, and reactive to light. Neck:      Comments: No JVD  Cardiovascular:      Rate and Rhythm: Normal rate and regular rhythm. Pulses: Normal pulses. Heart sounds: Normal heart sounds. No murmur heard. No friction rub. No gallop. Pulmonary:      Effort: Pulmonary effort is normal. No respiratory distress. Breath sounds: Normal breath sounds. No wheezing, rhonchi or rales. Abdominal:      General: There is no distension. Palpations: Abdomen is soft. Tenderness: There is no abdominal tenderness. There is guarding. Hernia: No hernia is present. Comments: Bowel sounds hypoactive    Colostomy   Musculoskeletal:         General: Normal range of motion. Cervical back: Normal range of motion and neck supple. Right lower leg: No edema. Skin:     General: Skin is warm and dry. Coloration: Skin is not jaundiced. Findings: No bruising, erythema or lesion. Neurological:      General: No focal deficit present. Mental Status: She is alert and oriented to person, place, and time. Sensory: Sensory deficit present. Comments: [facility]   Psychiatric:         Mood and Affect: Mood normal.         Behavior: Behavior normal.         Thought Content:  Thought content normal.         Judgment: Judgment normal. Investigations:      Laboratory Testing:  Recent Results (from the past 24 hour(s))   CBC Auto Differential    Collection Time: 01/12/22 10:38 PM   Result Value Ref Range    WBC 13.3 (H) 3.5 - 11.3 k/uL    RBC 4.96 3.95 - 5.11 m/uL    Hemoglobin 14.6 11.9 - 15.1 g/dL    Hematocrit 45.7 36.3 - 47.1 %    MCV 92.1 82.6 - 102.9 fL    MCH 29.4 25.2 - 33.5 pg    MCHC 31.9 28.4 - 34.8 g/dL    RDW 14.2 11.8 - 14.4 %    Platelets 571 498 - 620 k/uL    MPV 10.1 8.1 - 13.5 fL    NRBC Automated 0.0 0.0 per 100 WBC    Differential Type NOT REPORTED     Seg Neutrophils 86 (H) 36 - 65 %    Lymphocytes 8 (L) 24 - 43 %    Monocytes 4 3 - 12 %    Eosinophils % 1 1 - 4 %    Basophils 1 0 - 2 %    Immature Granulocytes 0 0 %    Segs Absolute 11.39 (H) 1.50 - 8.10 k/uL    Absolute Lymph # 1.09 (L) 1.10 - 3.70 k/uL    Absolute Mono # 0.57 0.10 - 1.20 k/uL    Absolute Eos # 0.14 0.00 - 0.44 k/uL    Basophils Absolute 0.07 0.00 - 0.20 k/uL    Absolute Immature Granulocyte 0.04 0.00 - 0.30 k/uL    WBC Morphology NOT REPORTED     RBC Morphology NOT REPORTED     Platelet Estimate NOT REPORTED    Comprehensive Metabolic Panel w/ Reflex to MG    Collection Time: 01/12/22 10:38 PM   Result Value Ref Range    Glucose 140 (H) 70 - 99 mg/dL    BUN 14 8 - 23 mg/dL    CREATININE 0.71 0.50 - 0.90 mg/dL    Bun/Cre Ratio 20 9 - 20    Calcium 10.6 (H) 8.6 - 10.4 mg/dL    Sodium 141 135 - 144 mmol/L    Potassium 3.9 3.7 - 5.3 mmol/L    Chloride 103 98 - 107 mmol/L    CO2 26 20 - 31 mmol/L    Anion Gap 12 9 - 17 mmol/L    Alkaline Phosphatase 125 (H) 35 - 104 U/L    ALT 18 5 - 33 U/L    AST 16 <32 U/L    Total Bilirubin 0.30 0.3 - 1.2 mg/dL    Total Protein 7.6 6.4 - 8.3 g/dL    Albumin 4.6 3.5 - 5.2 g/dL    Albumin/Globulin Ratio NOT REPORTED 1.0 - 2.5    GFR Non-African American >60 >60 mL/min    GFR African American >60 >60 mL/min    GFR Comment          GFR Staging NOT REPORTED    Lipase    Collection Time: 01/12/22 10:38 PM   Result Value Ref Range    Lipase 41 13 - 60 U/L   TSH with Reflex    Collection Time: 01/12/22 10:38 PM   Result Value Ref Range    TSH 4.08 0.30 - 5.00 mIU/L   Urinalysis Reflex to Culture    Collection Time: 01/12/22 11:10 PM    Specimen: Urine, clean catch   Result Value Ref Range    Color, UA Yellow Yellow    Turbidity UA SLIGHTLY CLOUDY (A) Clear    Glucose, Ur NEGATIVE NEGATIVE    Bilirubin Urine NEGATIVE NEGATIVE    Ketones, Urine NEGATIVE NEGATIVE    Specific Gravity, UA 1.022 1.005 - 1.030    Urine Hgb TRACE (A) NEGATIVE    pH, UA 6.0 5.0 - 8.0    Protein, UA NEGATIVE NEGATIVE    Urobilinogen, Urine Normal Normal    Nitrite, Urine POSITIVE (A) NEGATIVE    Leukocyte Esterase, Urine SMALL (A) NEGATIVE    Urinalysis Comments NOT REPORTED    Microscopic Urinalysis    Collection Time: 01/12/22 11:10 PM   Result Value Ref Range    -          WBC, UA 10 TO 20 0 - 5 /HPF    RBC, UA 0 TO 2 0 - 2 /HPF    Casts UA NOT REPORTED /LPF    Crystals, UA NOT REPORTED None /HPF    Epithelial Cells UA 0 TO 2 0 - 5 /HPF    Renal Epithelial, UA NOT REPORTED 0 /HPF    Bacteria, UA MANY (A) None    Mucus, UA NOT REPORTED None    Trichomonas, UA NOT REPORTED None    Amorphous, UA NOT REPORTED None    Other Observations UA NOT REPORTED NOT REQ. Yeast, UA NOT REPORTED None   Lactic Acid    Collection Time: 01/13/22 12:20 AM   Result Value Ref Range    Lactic Acid 1.3 0.5 - 2.2 mmol/L       Imaging/Diagnostics:  CT ABDOMEN PELVIS W IV CONTRAST Additional Contrast? None    Result Date: 1/13/2022  Small bowel obstruction with point of obstruction suspected in the mid pelvis. Left lower quadrant colostomy with Simone's pouch. There is a large wide-mouthed midline ventral hernia containing multiple loops of large and small bowel. Parastomal hernia containing bowel. No findings to suggest incarceration.        Assessment :      Hospital Problems           Last Modified POA    * (Principal) SBO (small bowel obstruction) ([facility] [address] 1/13/2022 Yes    GERD (gastroesophageal reflux disease) 1/13/2022 Yes    Essential hypertension 1/13/2022 Yes    Thyroid disease 1/13/2022 Yes    Overview Signed 11/21/2019 12:45 PM by ROMERO Jason NP     goiter         Acute cystitis without hematuria 1/13/2022 Yes          Plan:     Patient status inpatient in the  Med/Surge    1. SBO: Consult colorectal surgery. IV hydration NS @ 125 mL/hr. NPO. Toradol prn pain. Hold Lovenox until surgery evaluates. Frequent ambulation. Monitor colostomy output. 2. GERD: Pepcid IV BID  3. Hypertension: Resume home BP meds when able to take PO. Hydralazine q 6 hours prn for SBP > 150.  4. Thyroid disease: check TSH. 5. EPC cuffs for DVT proph  6. UTI: IV Rocephin daily. Follow urine culture. Trend WBC. Consultations:   IP CONSULT TO GENERAL SURGERY  IP CONSULT TO COLORECTAL SURGERY     Patient is admitted as inpatient status because of co-morbidities listed above, severity of signs and symptoms as outlined, requirement for current medical therapies and most importantly because of direct risk to patient if care not provided in a hospital setting. Expected length of stay > 48 hours.     Marianne Martínez, ROMERO Rowley NP  1/13/2022  3:49 AM    Copy sent to Dr. Chrissy Medina MD

## 2022-01-13 NOTE — ACP (ADVANCE CARE PLANNING)
Advance Care Planning     Advance Care Planning Activator (Inpatient)  Conversation Note      Date of ACP Conversation: 1/13/2022     Conversation Conducted with: Patient with Decision Making Capacity    ACP Activator: Karishma Saxena RN    {When Decision Maker makes decisions on behalf of the incapacitated patient: Decision Maker is asked to consider and make decisions based on patient values, known preferences, or best interests. Health Care Decision Maker:     Current Designated Health Care Decision Maker:   Carrillo Barba (friend)  Phone: [phone]    Click here to complete Healthcare Decision Makers including section of the Healthcare Decision Maker Relationship (ie \"Primary\")      Care Preferences    Ventilation: \"If you were in your present state of health and suddenly became very ill and were unable to breathe on your own, what would your preference be about the use of a ventilator (breathing machine) if it were available to you? \"      Would the patient desire the use of ventilator (breathing machine)?: yes    \"If your health worsens and it becomes clear that your chance of recovery is unlikely, what would your preference be about the use of a ventilator (breathing machine) if it were available to you? \"     Would the patient desire the use of ventilator (breathing machine)?: No      Resuscitation  \"CPR works best to restart the heart when there is a sudden event, like a heart attack, in someone who is otherwise healthy. Unfortunately, CPR does not typically restart the heart for people who have serious health conditions or who are very sick. \"    \"In the event your heart stopped as a result of an underlying serious health condition, would you want attempts to be made to restart your heart (answer \"yes\" for attempt to resuscitate) or would you prefer a natural death (answer \"no\" for do not attempt to resuscitate)? \" yes       [] Yes   [x] No   Educated Patient / Decision Maker regarding differences between Advance Directives and portable DNR orders.     Length of ACP Conversation in minutes:  5    Conversation Outcomes:  [x] ACP discussion completed  [] Existing advance directive reviewed with patient; no changes to patient's previously recorded wishes  [] New Advance Directive completed  [] Portable Do Not Rescitate prepared for Provider review and signature  [] POLST/POST/MOLST/MOST prepared for Provider review and signature      Follow-up plan:    [] Schedule follow-up conversation to continue planning  [] Referred individual to Provider for additional questions/concerns   [] Advised patient/agent/surrogate to review completed ACP document and update if needed with changes in condition, patient preferences or care setting    [] This note routed to one or more involved healthcare providers

## 2022-01-13 NOTE — CARE COORDINATION
Case Management Initial Discharge Plan  Ayala Rizzo,             Met with:patient to discuss discharge plans. Information verified: address, contacts, phone number, , insurance Yes  PCP: Fátima Cross MD  Date of last visit: 2021    Insurance Provider: Medicare, [facility] [place]    Discharge Planning    Living Arrangements:  Alone   Support Systems:  Family Members,Friends/Neighbors    Home has 2 stories  2 stairs to climb to get into front door, 10 stairs to climb to reach second floor  Location of bedroom/bathroom in home  - second floor    Patient able to perform ADL's:Independent    Current Services (outpatient & in home) none  DME equipment: none  DME provider: N/A    Pharmacy: [facility]     Potential Assistance Needed:  N/A    Patient agreeable to home care: No  [place] of choice provided:  n/a    Prior SNF/Rehab Placement and Facility: [facility]  Agreeable to SNF/Rehab: No  [place] of choice provided: n/a   Evaluation: n/a    Expected Discharge date:  22  Patient expects to be discharged to:   home  Follow Up Appointment: Best Day/ Time: Monday AM    Transportation provider: family/friends  Transportation arrangements needed for discharge: No    Readmission Risk              Risk of Unplanned Readmission:  7             Does patient have a readmission risk score greater than 14?: No  If yes, follow-up appointment must be made within 7 days of discharge. Goal of Care:       Discharge Plan: Met with patient at bedside. Lives alone, independent and drives. 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 Admitted for SBO. NG in place. Started having nausea, abd pain and dry heaves last night. Has history of colostomy. Dr. Johan Cloud consulted. No home needs anticipated and awaiting surgeries recommendation.           Electronically signed by Meir Fletcher RN on 22 at 1:24 PM EST

## 2022-01-13 NOTE — ED PROVIDER NOTES
[address] [place] [address] ED  eMERGENCY dEPARTMENT eNCOUnter      Pt Name: Andrew Slater  MRN: [id number]  [place] 1950  Date of evaluation: 1/12/22      CHIEF COMPLAINT       Chief Complaint   Patient presents with    Abdominal Pain    Nausea         HISTORY OF PRESENT ILLNESS    Andrew Slater is a 73 y.o. female who presents complaining of abd pain nausea. The history is provided by the patient. Abdominal Pain  Pain location:  LLQ and RLQ  Pain quality: aching and burning    Pain radiates to:  Does not radiate  Pain severity:  Moderate  Onset quality:  Sudden  Duration:  1 day  Timing:  Intermittent  Progression:  Waxing and waning  Chronicity:  New  Context: previous surgery    Relieved by:  Nothing  Worsened by:  Nothing  Ineffective treatments:  None tried  Associated symptoms: belching and nausea    Associated symptoms: no chills, no dysuria, no fever, no hematemesis, no hematochezia, no hematuria, no melena and no vomiting    Risk factors: multiple surgeries        REVIEW OF SYSTEMS       Review of Systems   Constitutional: Negative for chills and fever. Gastrointestinal: Positive for abdominal pain and nausea. Negative for hematemesis, hematochezia, melena and vomiting. Genitourinary: Negative for dysuria and hematuria. All other systems reviewed and are negative.       PAST MEDICAL HISTORY     Past Medical History:   Diagnosis Date    Allergic rhinitis     Bladder prolapse     Constipation     5/2019 resolved    Fundic gland polyposis of stomach 08/17/2017    per EGD    GERD (gastroesophageal reflux disease)     on no medications    Hiatal hernia     History of cardiac cath 12/2002    pt denies blockage    History of diverticulitis     Hyperlipidemia     borderline, on no medication    Hypertension     MRSA (methicillin resistant staph aureus) culture positive 11/01/2016    nasal    MRSA carrier     follows with ID    Obesity     Osteoarthritis     Pelvic abscess in female    24 [facility] [place] [facility]"" Sleep apnea     CPAP nightly    Systolic murmur     benign systolic murmur (history of). Pt does not follow-up with a cardiologist (Written 09/25/2019).     Thyroid disease     goiter    Wears glasses        SURGICAL HISTORY       Past Surgical History:   Procedure Laterality Date    ABDOMINAL EXPLORATION SURGERY  05/16/2019    CHOLECYSTECTOMY, LAPAROSCOPIC  11/15/2016    CHOLECYSTECTOMY, LAPAROSCOPIC  11/15/2016    COLONOSCOPY  2013    neg    COLONOSCOPY N/A 5/16/2019    COLONOSCOPY DIAGNOSTIC performed by Shamika Ma MD at [facility] COLONOSCOPY N/A 12/19/2019    COLONOSCOPY W/STENT X2 performed by Shamika Ma MD at [address] 12/24/2019    EXPLORATORY LAPAROTOMY, EXTENSIVE LYSIS OF ADHESIONS, TAKE DOWN OF ANASTOMOSIS, COLOSTOMY CREATION, CLOSURE OF SEROMUSCULAR INJURIES performed by Shamika Ma MD at [address], COLON, DIAGNOSTIC      EGD    FISSURECTOMY ANAL N/A 10/4/2019    FLEXIBLE SIGMOIDOSCOPY & ANAL FISTULECTOMY performed by Shamika Ma MD at [address] [facility] ARTHROSCOPY Left     lateral release    ME EGD TRANSORAL BIOPSY SINGLE/MULTIPLE N/A 8/17/2017    EGD BIOPSY performed by Jenny Bazzi MD at [address] OFFICE/OUTPT [address] N/A 5/22/2018    XI ROBOTIC LAPAROSCOPIC UMBILICAL HERNIA REPAIR WITH MESH, POSSIBLE OPEN performed by Meka Palma DO at [facility] [email or web address]  02/19/2019    SIGMOIDOSCOPY N/A 2/19/2019    SIGMOIDOSCOPY BIOPSY FLEXIBLE performed by Susanna Worthy DO at [address] [email or web address] N/A 12/23/2019    SIGMOIDOSCOPY DIAGNOSTIC FLEXIBLE WITH FLUORO performed by Becca Kirby MD at [address] [email or web address] N/A 3/2/2021    [address] [facility] performed by Jenny Bazzi MD at [address] N/A 5/16/2019    [address] performed by Shamika Ma MD at [place] [address] N/A 2019    COLOSTOMY  CLOSURE EXTENSIVE LYSIS OF ADHESIONS REPAIR OF SMALL BOWEL TEAR, REPAIR OF MULTIPLE VENTRAL HERNIAS, MOBILIZATION OF THE SPLENIC FLEXURE AND TRANSVERSE COLON, ABDOMINAL EXPLORATION performed by Rosam[facility] Pelayo [facility] [address] 2012    knee    TOTAL KNEE ARTHROPLASTY Right 11/10/2014    knee    UMBILICAL HERNIA REPAIR  2018    UPPER GASTROINTESTINAL ENDOSCOPY  2017    Multiple small gastric polyps, no esophagitis noted no Melara's mucosa. No hiatal hernia, pathology benign fundic gland polyps       CURRENT MEDICATIONS       Previous Medications    ACETAMINOPHEN (TYLENOL) 500 MG TABLET    Take 500 mg by mouth every 6 hours as needed for Pain    CHOLECALCIFEROL (VITAMIN D) 2000 UNITS CAPS CAPSULE    Take 2 capsules by mouth daily     METOPROLOL SUCCINATE (TOPROL XL) 25 MG EXTENDED RELEASE TABLET    Take 1 tablet by mouth daily       ALLERGIES     has No Known Allergies. FAMILY HISTORY     She indicated that her mother is . She indicated that her father is . She indicated that her brother is alive. SOCIAL HISTORY      reports that she quit smoking about 47 years ago. She has a 3.00 pack-year smoking history. She has never used smokeless tobacco. She reports current alcohol use. She reports that she does not use drugs. PHYSICAL EXAM     INITIAL VITALS: BP (!) 180/85   Pulse 65   Temp 97.9 °F (36.6 °C) (Oral)   Resp 18   Ht 5' 7\" (1.702 m)   Wt 243 lb (110.2 kg)   LMP  (LMP Unknown)   SpO2 96%   BMI 38.06 kg/m²      Physical Exam  Vitals and nursing note reviewed. Constitutional:       Appearance: She is well-developed. HENT:      Head: Normocephalic and atraumatic. Eyes:      Pupils: Pupils are equal, round, and reactive to light. Cardiovascular:      Rate and Rhythm: Normal rate and regular rhythm. Heart sounds: Normal heart sounds. No murmur heard.       Pulmonary:      Effort: Pulmonary effort is normal.      Breath sounds: Normal breath sounds. Abdominal:      General: Abdomen is protuberant. A surgical scar is present. The ostomy site is clean. Bowel sounds are normal. There is distension. Palpations: Abdomen is soft. Tenderness: There is abdominal tenderness in the right lower quadrant and left lower quadrant. Hernia: A hernia is present. Hernia is present in the ventral area. Comments: She has tenderness to the right lower and suprapubic area there is a colostomy noted to left side of the abdomen. There is a small amount of stool in the colostomy there is some protuberance and distention more noticeably on the left lower quadrant. Bowel sounds are active   Musculoskeletal:         General: Normal range of motion. Cervical back: Normal range of motion. Skin:     General: Skin is warm and dry. Capillary Refill: Capillary refill takes less than 2 seconds. Neurological:      Mental Status: She is alert and oriented to person, place, and time. MEDICAL DECISION MAKING:     Small bowel obstruction. I spoke with Dr. James Ing he would like NG tube. He will see the patient as surgery consult. I will speak with the [facility] hospitalist service for admission. Patient was updated on CT and lab findings. She states she is feeling much better at this time. Will place NG per surgeons orders. I did speak with the hospitalist service they agreed to admit the patient. Patient was updated. She is feeling better. Awaiting bed number at this time. DIAGNOSTIC RESULTS     EKG: All EKG's are interpreted by the Emergency Department Physician who either signs or Co-signs this chart in the absence of acardiologist.        RADIOLOGY:Allplain film, CT, MRI, and formal ultrasound images (except ED bedside ultrasound) are read by the radiologist and the images and interpretations are directly viewed by the emergency physician.            LABS:All lab results were reviewed by myself, and all abnormals are listed below. Labs Reviewed   CBC WITH AUTO DIFFERENTIAL - Abnormal; Notable for the following components:       Result Value    WBC 13.3 (*)     Seg Neutrophils 86 (*)     Lymphocytes 8 (*)     Segs Absolute 11.39 (*)     Absolute Lymph # 1.09 (*)     All other components within normal limits   COMPREHENSIVE METABOLIC PANEL W/ REFLEX TO MG FOR LOW K - Abnormal; Notable for the following components:    Glucose 140 (*)     Calcium 10.6 (*)     Alkaline Phosphatase 125 (*)     All other components within normal limits   URINE RT REFLEX TO CULTURE - Abnormal; Notable for the following components:    Turbidity UA SLIGHTLY CLOUDY (*)     Urine Hgb TRACE (*)     Nitrite, Urine POSITIVE (*)     Leukocyte Esterase, Urine SMALL (*)     All other components within normal limits   MICROSCOPIC URINALYSIS - Abnormal; Notable for the following components:    Bacteria, UA MANY (*)     All other components within normal limits   CULTURE, URINE   LIPASE   LACTIC ACID         EMERGENCY DEPARTMENT COURSE:   Vitals:    Vitals:    01/12/22 2154 01/12/22 2200   BP:  (!) 180/85   Pulse: 65    Resp: 18    Temp: 97.9 °F (36.6 °C)    TempSrc: Oral    SpO2: 96%    Weight: 243 lb (110.2 kg)    Height: 5' 7\" (1.702 m)        The patient was given the following medications while in the emergency department:  Orders Placed This Encounter   Medications    famotidine (PEPCID) injection 20 mg    ondansetron (ZOFRAN) injection 4 mg    morphine (PF) injection 2 mg    0.9 % sodium chloride bolus    iopamidol (ISOVUE-370) 76 % injection 75 mL    sodium chloride flush 0.9 % injection 10 mL    0.9 % sodium chloride bolus       -------------------------      CRITICAL CARE:     CONSULTS:  IP CONSULT TO GENERAL SURGERY  IP CONSULT TO COLORECTAL SURGERY    PROCEDURES:  Procedures    FINAL IMPRESSION      1.  Small bowel obstruction [facility])          DISPOSITION/PLAN   DISPOSITION Admitted 01/13/2022 12:49:43 AM      PATIENT REFERREDTO:  No follow-up provider specified.     DISCHARGEMEDICATIONS:  New Prescriptions    No medications on file       (Please note that portions of this note were completed with a voice recognition program.  Efforts were made to edit thedictations but occasionally words are mis-transcribed.)    TERRA Jackson PA-C  01/13/22 0050

## 2022-01-13 NOTE — CONSULTS
Colorectal Surgery:  Consult Note    PATIENT NAME: Surendra Vanessa     YOB: 1950    ADMISSION DATE: 1/12/2022  9:59 PM     Admitting Provider: Dr Mariana Larose Physician: Dr Tracy Han: 1/13/2022    Chief Complaint:  Abdominal pain    Consult Regarding:  SBO    HISTORY OF PRESENT ILLNESS:  The patient is a 73 y.o. female with history of diverticulosis status post exploratory laparotomy with Yoder's procedure and attempted reversal with subsequent anastomotic leak, left lower quadrant colostomy, umbilical hernia repair who presented to the emergency department last evening with complaints of 1 day history of abdominal pain. She states initially she thought she was having gastritis and took several over-the-counter antacids without relief. She states that the pain is primarily in the lower abdomen and is intermittent. She states that she is having ostomy function and flatus. She denies significant emesis. She states that she did have 1 episode of small-volume emesis in the emergency department yesterday. She denies ever having similar complaints. She denies fever, chills, chest pain, shortness of breath. Denies sick contacts. Past Medical History:        Diagnosis Date    Allergic rhinitis     Bladder prolapse     Constipation     5/2019 resolved    Fundic gland polyposis of stomach 08/17/2017    per EGD    GERD (gastroesophageal reflux disease)     on no medications    Hiatal hernia     History of cardiac cath 12/2002    pt denies blockage    History of diverticulitis     Hyperlipidemia     borderline, on no medication    Hypertension     MRSA (methicillin resistant staph aureus) culture positive 11/01/2016    nasal    MRSA carrier     follows with ID    Obesity     Osteoarthritis     Pelvic abscess in female     Sleep apnea     CPAP nightly    Systolic murmur     benign systolic murmur (history of). Pt does not follow-up with a cardiologist (Written 09/25/2019). Thyroid disease     goiter    Wears glasses        Past Surgical History:        Procedure Laterality Date    ABDOMINAL EXPLORATION SURGERY  05/16/2019    CHOLECYSTECTOMY, LAPAROSCOPIC  11/15/2016    CHOLECYSTECTOMY, LAPAROSCOPIC  11/15/2016    COLONOSCOPY  2013    neg    COLONOSCOPY N/A 5/16/2019    COLONOSCOPY DIAGNOSTIC performed by Ben Herbert MD at [facility] N/A 12/19/2019    COLONOSCOPY W/STENT X2 performed by Ben Herbert MD at [address] N/A 12/24/2019    EXPLORATORY LAPAROTOMY, EXTENSIVE LYSIS OF ADHESIONS, TAKE DOWN OF ANASTOMOSIS, COLOSTOMY CREATION, CLOSURE OF SEROMUSCULAR INJURIES performed by Ben Herbert MD at [facility], COLON, DIAGNOSTIC      EGD    FISSURECTOMY ANAL N/A 10/4/2019    FLEXIBLE SIGMOIDOSCOPY & ANAL FISTULECTOMY performed by Ben Herbert MD at [address] ARTHROSCOPY Left     lateral release    MS EGD TRANSORAL BIOPSY SINGLE/MULTIPLE N/A 8/17/2017    EGD BIOPSY performed by Rd Campuzano MD at [address] [facility] OFFICE/OUTPT VISIT,PROCEDURE ONLY N/A 5/22/2018    XI ROBOTIC LAPAROSCOPIC [facility], POSSIBLE OPEN performed by Kiana Singh DO at [address] [facility]  02/19/2019    SIGMOIDOSCOPY N/A 2/19/2019    SIGMOIDOSCOPY BIOPSY FLEXIBLE performed by Portia Montalvo DO at [address] [facility] N/A 12/23/2019    SIGMOIDOSCOPY DIAGNOSTIC FLEXIBLE WITH FLUORO performed by Glenn Castillo MD at [address] [facility] N/A 3/2/2021    [address] performed by [address] Campuzano MD at [address] [place] N/A 5/16/2019    [address] performed by Ben Herbert MD at [address] [facility] [address] N/A 11/12/2019    COLOSTOMY  CLOSURE EXTENSIVE LYSIS OF ADHESIONS REPAIR OF SMALL BOWEL TEAR, REPAIR OF MULTIPLE VENTRAL HERNIAS, MOBILIZATION OF THE SPLENIC FLEXURE AND TRANSVERSE COLON, ABDOMINAL EXPLORATION performed by Tyler Bartholomew MD at [facility] [address] Left 2012    knee    TOTAL KNEE ARTHROPLASTY Right     knee    UMBILICAL HERNIA REPAIR  2018    UPPER GASTROINTESTINAL ENDOSCOPY  2017    Multiple small gastric polyps, no esophagitis noted no Melara's mucosa. No hiatal hernia, pathology benign fundic gland polyps       Medications Prior to Admission:   Medications Prior to Admission: metoprolol succinate (TOPROL XL) 25 MG extended release tablet, Take 1 tablet by mouth daily  acetaminophen (TYLENOL) 500 MG tablet, Take 500 mg by mouth every 6 hours as needed for Pain  Cholecalciferol (VITAMIN D) 2000 units CAPS capsule, Take 2 capsules by mouth daily     Allergies:  Patient has no known allergies. Social History:   Social History     Socioeconomic History    Marital status: Single     Spouse name: Not on file    Number of children: Not on file    Years of education: Not on file    Highest education level: Not on file   Occupational History    Not on file   Tobacco Use    Smoking status: Former Smoker     Packs/day: 1.00     Years: 3.00     Pack years: 3.00     Quit date: 1975     Years since quittin.0    Smokeless tobacco: Never Used    Tobacco comment: quit smoking age 23   Vaping Use    Vaping Use: Never used   Substance and Sexual Activity    Alcohol use: Yes     Comment: very rare    Drug use: No    Sexual activity: Not on file   Other Topics Concern    Not on file   Social History Narrative    Not on file     Social Determinants of Health     Financial Resource Strain: Low Risk     Difficulty of Paying Living Expenses: Not hard at all   Food Insecurity: No Food Insecurity    Worried About [address] in the Last Year: Never true    [address] in the Last Year: Never true   Transportation Needs:     Lack of Transportation (Medical): Not on file    Lack of Transportation (Non-Medical):  Not on file   Physical Activity:     Days of Exercise per Week: Not on file    Minutes of Exercise per Session: Not on file   Stress:     Feeling of Stress : Not on file   Social Connections:     Frequency of Communication with Friends and Family: Not on file    Frequency of Social Gatherings with Friends and Family: Not on file    Attends [employer] Services: Not on file    Active Member of Clubs or Organizations: Not on file    Attends Club or Organization Meetings: Not on file    Marital Status: Not on file   Intimate Partner Violence:     Fear of Current or Ex-Partner: Not on file    Emotionally Abused: Not on file    Physically Abused: Not on file    Sexually Abused: Not on file   Housing Stability:     Unable to Pay for Housing in the Last Year: Not on file    Number of [place] in the Last Year: Not on file    Unstable Housing in the Last Year: Not on file       Family History:       Problem Relation Age of Onset    Heart Disease Mother     COPD Father     Cancer Brother         thyroid, prostate, lung       REVIEW OF SYSTEMS:    CONSTITUTIONAL: Denies recent weight loss, fatigue, fevers, chills. HEENT: Denies rhinorrhea, dysphagia, odynphagia. CARDIOVASCULAR: Denies history of MI, recent chest pain. RESPIRATORY: Denies recent history of shortness of breath or history of PE. GASTROINTESTINAL: + abdominal pain  GENITOURINARY: Denies increased frequency or dysuria. HEMATOLOGIC/LYMPHATIC: Denies history of anemia or DVTs. ENDOCRINE: + thyroid disease  NEURO: Denies history of CVA, TIA. Review of systems negative unless listed above.     PHYSICAL EXAM:    VITALS:  BP (!) 143/67   Pulse 64   Temp 97.7 °F (36.5 °C) (Oral)   Resp 16   Ht 5' 7\" (1.702 m)   Wt 243 lb (110.2 kg)   LMP  (LMP Unknown)   SpO2 95%   BMI 38.06 kg/m²   INTAKE/OUTPUT:     Intake/Output Summary (Last 24 hours) at 1/13/2022 0631  Last data filed at 1/13/2022 0444  Gross per 24 hour   Intake --   Output 300 ml   Net -300 ml       CONSTITUTIONAL:  awake, alert, not radiation dose to as low as reasonably achievable. COMPARISON: 12/20/2019 HISTORY: ORDERING SYSTEM PROVIDED HISTORY: abd pain and bloating has hernia and colostomy TECHNOLOGIST PROVIDED HISTORY: abd pain and bloating has hernia and colostomy Decision Support Exception - unselect if not a suspected or confirmed emergency medical condition->Emergency Medical Condition (MA) Reason for Exam: abd pain and bloating has hernia and colostomy FINDINGS: Lower Chest: There are few bands of bibasilar atelectasis. The heart is mildly enlarged. There is a small hiatal hernia. Organs: The gallbladder is surgically absent. The liver, spleen, pancreas, adrenal glands and kidneys are normal.  There are few small subtle low-attenuation lesions in the spleen there are nonspecific and possibly perfusional. GI/Bowel: There is a left lower quadrant colostomy with a Simone's pouch. There is a large wide-mouth midline ventral hernia containing multiple large and small bowel loops. There is also evidence of a parastomal hernia containing bowel. There is a small bowel obstruction with moderately dilated fluid-filled small bowel loops in the lower abdomen and pelvis. The terminal ileum is collapsed with transition point suspected in the mid pelvis where there are tethered bowel loops (axial image 127). The appendix is normal. Pelvis: Urinary bladder is unremarkable. Peritoneum/Retroperitoneum: There is no adenopathy, free air or free fluid. No abdominal aortic aneurysm. Bones/Soft Tissues: Degenerative disc disease and facet arthropathy in the lower lumbar spine. No acute bone finding. Small bowel obstruction with point of obstruction suspected in the mid pelvis. Left lower quadrant colostomy with Simone's pouch. There is a large wide-mouthed midline ventral hernia containing multiple loops of large and small bowel. Parastomal hernia containing bowel. No findings to suggest incarceration.      XR ABDOMEN FOR NG/OG/NE TUBE PLACEMENT    Result Date: 1/13/2022  EXAMINATION: ONE SUPINE XRAY VIEW(S) OF THE ABDOMEN 1/13/2022 1:46 am COMPARISON: None. HISTORY: ORDERING SYSTEM PROVIDED HISTORY: Confirmation of course of NG/OG/NE tube and location of tip of tube TECHNOLOGIST PROVIDED HISTORY: Confirmation of course of NG/OG/NE tube and location of tip of tube Portable? ->Yes Reason for Exam: NG tube placement Initial evaluation. FINDINGS: Evaluation is limited due to portable technique and patient body habitus. An enteric tube is seen coursing below the diaphragm. The tip projects in the region of the distal gastric body/gastric antrum. The side port is not well visualized on this exam.  Nonspecific bowel gas pattern. The cardiac silhouette appears enlarged. Enteric tube as above. ASSESSMENT:  Active Hospital Problems    Diagnosis Date Noted    Acute cystitis without hematuria [N30.00]     Thyroid disease [E07.9]     SBO (small bowel obstruction) ([facility] [address] [K56.609] 03/25/2018    GERD (gastroesophageal reflux disease) [K21.9]     Essential hypertension [I8        73year old female with complex surgical history who presents with pSBO    Plan:  Continue medical mgmt and supportive care per primary  Continue NG to LIS  Continue NPO  IVF hydration  Serial abdominal exams  Replete lytes PRN  Monitor urine output  OK for OOB, ambulation    Electronically signed by Louie Kruger MD  on 1/13/2022 at 6:31 AM             I Dr. Zoe Steve saw and examined the patient. I have edited the above and agree with the above. Geoffrey Santacruz  Colorectal Surgery

## 2022-01-13 NOTE — PROGRESS NOTES
Pt admitted to room per wheelchair in stable condition from ER. Oriented to room and surroundings  Bed in lowest position, wheels locked, 2/4 side rails up  Call light in reach, room free of clutter, adequate lighting provided  Denies any further questions at this time  Instructed to call out with any questions/concerns/new onset of pain and/or n/v   White board updated  Continue to monitor with hourly rounding  STAY WITH ME protocol initiated   Bed alarm on/Fall Risk signs in place/Fall risk sticker to wrist band  Non-skid socks on/at bedside  [address] in place for patient/family to view & ask questions.

## 2022-01-13 NOTE — FLOWSHEET NOTE
Patient receives Sacrament of the Sick (anointing) from [facility] .    [facility] will follow as needed. (writer charting for [facility].)     62/94/[birth year] 0913   Encounter Summary   Services provided to: Patient   Referral/Consult From: Rounding   [facility] [address] [place] [address]   (1/13/22 anointed)   Complexity of Encounter Low   Length of Encounter 15 minutes   Routine   Type Initial   Sacraments   Sacrament of Sick-Anointing Anointed  (1/13/22 Fr. Lidia Van)

## 2022-01-13 NOTE — ED NOTES
Bed: 23  Expected date:   Expected time:   Means of arrival:   Comments:  Med [id number] Debbie Devries RN  01/12/22 3787

## 2022-01-14 ENCOUNTER — APPOINTMENT (OUTPATIENT)
Dept: GENERAL RADIOLOGY | Age: 72
DRG: 389 | End: 2022-01-14
Payer: MEDICARE

## 2022-01-14 LAB
ALBUMIN SERPL-MCNC: 3.7 G/DL (ref 3.5–5.2)
ALBUMIN/GLOBULIN RATIO: ABNORMAL (ref 1–2.5)
ALP BLD-CCNC: 104 U/L (ref 35–104)
ALT SERPL-CCNC: 10 U/L (ref 5–33)
AMYLASE: 26 U/L (ref 28–100)
ANION GAP SERPL CALCULATED.3IONS-SCNC: 11 MMOL/L (ref 9–17)
AST SERPL-CCNC: 11 U/L
BILIRUB SERPL-MCNC: 0.47 MG/DL (ref 0.3–1.2)
BUN BLDV-MCNC: 10 MG/DL (ref 8–23)
BUN/CREAT BLD: 18 (ref 9–20)
CALCIUM SERPL-MCNC: 9.2 MG/DL (ref 8.6–10.4)
CHLORIDE BLD-SCNC: 106 MMOL/L (ref 98–107)
CO2: 21 MMOL/L (ref 20–31)
CREAT SERPL-MCNC: 0.56 MG/DL (ref 0.5–0.9)
CULTURE: ABNORMAL
DATE, STOOL #1: NORMAL
DATE, STOOL #2: NORMAL
DATE, STOOL #3: NORMAL
GFR AFRICAN AMERICAN: >60 ML/MIN
GFR NON-AFRICAN AMERICAN: >60 ML/MIN
GFR SERPL CREATININE-BSD FRML MDRD: ABNORMAL ML/MIN/{1.73_M2}
GFR SERPL CREATININE-BSD FRML MDRD: ABNORMAL ML/MIN/{1.73_M2}
GLUCOSE BLD-MCNC: 109 MG/DL (ref 70–99)
HCT VFR BLD CALC: 43 % (ref 36.3–47.1)
HEMOCCULT SP1 STL QL: NEGATIVE
HEMOCCULT SP2 STL QL: NORMAL
HEMOCCULT SP3 STL QL: NORMAL
HEMOGLOBIN: 13.3 G/DL (ref 11.9–15.1)
LACTOFERRIN, QUAL: ABNORMAL
LIPASE: 32 U/L (ref 13–60)
Lab: ABNORMAL
MAGNESIUM: 2.1 MG/DL (ref 1.6–2.6)
MCH RBC QN AUTO: 28.9 PG (ref 25.2–33.5)
MCHC RBC AUTO-ENTMCNC: 30.9 G/DL (ref 28.4–34.8)
MCV RBC AUTO: 93.3 FL (ref 82.6–102.9)
NRBC AUTOMATED: 0 PER 100 WBC
PDW BLD-RTO: 14.4 % (ref 11.8–14.4)
PHOSPHORUS: 2.8 MG/DL (ref 2.6–4.5)
PLATELET # BLD: 190 K/UL (ref 138–453)
PMV BLD AUTO: 10.7 FL (ref 8.1–13.5)
POTASSIUM SERPL-SCNC: 3.5 MMOL/L (ref 3.7–5.3)
RBC # BLD: 4.61 M/UL (ref 3.95–5.11)
SODIUM BLD-SCNC: 138 MMOL/L (ref 135–144)
SPECIMEN DESCRIPTION: ABNORMAL
TIME, STOOL #1: 1315
TIME, STOOL #2: NORMAL
TIME, STOOL #3: NORMAL
TOTAL PROTEIN: 6.5 G/DL (ref 6.4–8.3)
WBC # BLD: 8.2 K/UL (ref 3.5–11.3)

## 2022-01-14 PROCEDURE — 2580000003 HC RX 258: Performed by: NURSE PRACTITIONER

## 2022-01-14 PROCEDURE — 82150 ASSAY OF AMYLASE: CPT

## 2022-01-14 PROCEDURE — 80053 COMPREHEN METABOLIC PANEL: CPT

## 2022-01-14 PROCEDURE — 82272 OCCULT BLD FECES 1-3 TESTS: CPT

## 2022-01-14 PROCEDURE — 6360000004 HC RX CONTRAST MEDICATION: Performed by: STUDENT IN AN ORGANIZED HEALTH CARE EDUCATION/TRAINING PROGRAM

## 2022-01-14 PROCEDURE — 6360000002 HC RX W HCPCS: Performed by: NURSE PRACTITIONER

## 2022-01-14 PROCEDURE — 85027 COMPLETE CBC AUTOMATED: CPT

## 2022-01-14 PROCEDURE — 84100 ASSAY OF PHOSPHORUS: CPT

## 2022-01-14 PROCEDURE — 2500000003 HC RX 250 WO HCPCS: Performed by: NURSE PRACTITIONER

## 2022-01-14 PROCEDURE — 83630 LACTOFERRIN FECAL (QUAL): CPT

## 2022-01-14 PROCEDURE — 1200000000 HC SEMI PRIVATE

## 2022-01-14 PROCEDURE — 99232 SBSQ HOSP IP/OBS MODERATE 35: CPT | Performed by: FAMILY MEDICINE

## 2022-01-14 PROCEDURE — 83690 ASSAY OF LIPASE: CPT

## 2022-01-14 PROCEDURE — 83735 ASSAY OF MAGNESIUM: CPT

## 2022-01-14 PROCEDURE — 36415 COLL VENOUS BLD VENIPUNCTURE: CPT

## 2022-01-14 PROCEDURE — 74250 X-RAY XM SM INT 1CNTRST STD: CPT

## 2022-01-14 RX ORDER — LEVOTHYROXINE SODIUM ANHYDROUS 100 UG/5ML
50 INJECTION, POWDER, LYOPHILIZED, FOR SOLUTION INTRAVENOUS DAILY
Status: DISCONTINUED | OUTPATIENT
Start: 2022-01-14 | End: 2022-01-16 | Stop reason: HOSPADM

## 2022-01-14 RX ORDER — SODIUM CHLORIDE 9 MG/ML
5 INJECTION INTRAVENOUS DAILY
Status: DISCONTINUED | OUTPATIENT
Start: 2022-01-14 | End: 2022-01-16 | Stop reason: HOSPADM

## 2022-01-14 RX ADMIN — SODIUM CHLORIDE: 9 INJECTION, SOLUTION INTRAVENOUS at 13:25

## 2022-01-14 RX ADMIN — CEFTRIAXONE SODIUM 1000 MG: 1 INJECTION, POWDER, FOR SOLUTION INTRAMUSCULAR; INTRAVENOUS at 02:00

## 2022-01-14 RX ADMIN — SODIUM CHLORIDE, PRESERVATIVE FREE 10 ML: 5 INJECTION INTRAVENOUS at 21:30

## 2022-01-14 RX ADMIN — SODIUM CHLORIDE: 9 INJECTION, SOLUTION INTRAVENOUS at 05:19

## 2022-01-14 RX ADMIN — ONDANSETRON 4 MG: 2 INJECTION INTRAMUSCULAR; INTRAVENOUS at 11:11

## 2022-01-14 RX ADMIN — FAMOTIDINE 20 MG: 10 INJECTION, SOLUTION INTRAVENOUS at 07:41

## 2022-01-14 RX ADMIN — HYDRALAZINE HYDROCHLORIDE 10 MG: 20 INJECTION INTRAMUSCULAR; INTRAVENOUS at 07:41

## 2022-01-14 RX ADMIN — SODIUM CHLORIDE: 9 INJECTION, SOLUTION INTRAVENOUS at 21:31

## 2022-01-14 RX ADMIN — DIATRIZOATE MEGLUMINE AND DIATRIZOATE SODIUM 150 ML: 600; 100 SOLUTION ORAL; RECTAL at 09:39

## 2022-01-14 RX ADMIN — FAMOTIDINE 20 MG: 10 INJECTION, SOLUTION INTRAVENOUS at 21:29

## 2022-01-14 RX ADMIN — ONDANSETRON 4 MG: 2 INJECTION INTRAMUSCULAR; INTRAVENOUS at 01:58

## 2022-01-14 ASSESSMENT — ENCOUNTER SYMPTOMS
NAUSEA: 0
BLOOD IN STOOL: 0
WHEEZING: 0
VOMITING: 0
DIARRHEA: 0
SHORTNESS OF BREATH: 0
CONSTIPATION: 0
RHINORRHEA: 0
CHEST TIGHTNESS: 0
COUGH: 0
ABDOMINAL PAIN: 0

## 2022-01-14 NOTE — PROGRESS NOTES
[facility]  Office: [address], DO, Ryanne Jennings, DO, Abhilash Baca, DO, Aminata Light, DO, Trung Morin MD, Demian Ireland MD, Jovana Velazquez MD, John Knox MD, Shireen Dudley MD, Niki Muhammad MD, Mariela Villarreal MD, Paty Charlton, DO, Yani Jordan, DO, Mayda Dueñas MD,  Jose Villegas, DO, Vertell Moritz, MD, Linda Al MD, Shira Hodges MD, Cheyenne Ma MD, Nicole Lino MD, Dale Cartagena MD, Yossi Kelsey MD, Lin Nelson, [facility], [facility], CNP, Maksim Macias, CNP, Elver Contras, CNS, Esteban Appl, CNP, Samia Aguiar, CNP, Marianna Fresh, CNP, Tulio Jasso, CNP, Laly Anna, CNP, Zuleima Ford PA-C, [place] Villar, [facility], Hermelindo Gunderson, [facility], [place] Laura, CNP, Gabriela Lewis, CNP, Marvin Oppenheim, CNP, Cheryl Games, CNP, Marline Landers, [facility], [facility], [facility]      Daily Progress Note     Admit Date: 1/12/2022  Bed/Room No.  2022/2022-01  Admitting Physician : Jovana Velazquez MD  Code Status :Full Code  Hospital Day:  LOS: 1 day   Chief Complaint:     Chief Complaint   Patient presents with    Abdominal Pain    Nausea     Principal Problem:    Small bowel obstruction [facility])  Active Problems:    GERD (gastroesophageal reflux disease)    Essential hypertension    Thyroid disease    Acute cystitis without hematuria  Resolved Problems:    * No resolved hospital problems. *    Subjective : Interval History/Significant events :  01/14/22    Patient denies nausea, vomiting. She still is NG tube in place to low intermittent suction. Vitals - Stable afebrile  Labs -hypokalemia 3.5, normal white count. Nursing notes , Consults notes reviewed. Overnight events and updates discussed with Nursing staff . Background History: Santosh Servin is 73 y.o. female  Who was admitted to the hospital on 1/12/2022 for treatment of Small bowel obstruction ([facility] [address]  Patient presented to emergency room with acute onset abdominal pain associated with nausea, vomiting. She has history of diverticulitis which required colectomy and colostomy. Patient had colostomy reversal unfortunately surgery did not succeed and patient required another laparotomy with colostomy creation. She has a history of bladder prolapse, hypertension, obstructive sleep apnea, hypothyroidism. Patient was found to have elevated blood pressure 519 systolic on presentation. Lab evaluation showed leukocytosis of 13.3. CT abdomen pelvis with IV contrast was suggestive of small bowel obstruction in mid pelvis, large ventral hernia with multiple loops of large and small bowel and parastomal hernia containing bowel without incarceration. Nasogastric tube was placed for stomach decompression with improvement in symptoms      PMH:  Past Medical History:   Diagnosis Date    Allergic rhinitis     Bladder prolapse     Constipation     5/2019 resolved    Fundic gland polyposis of stomach 08/17/2017    per EGD    GERD (gastroesophageal reflux disease)     on no medications    Hiatal hernia     History of cardiac cath 12/2002    pt denies blockage    History of diverticulitis     Hyperlipidemia     borderline, on no medication    Hypertension     MRSA (methicillin resistant staph aureus) culture positive 11/01/2016    nasal    MRSA carrier     follows with ID    Obesity     Osteoarthritis     Pelvic abscess in female     Sleep apnea     CPAP nightly    Systolic murmur     benign systolic murmur (history of). Pt does not follow-up with a cardiologist (Written 09/25/2019).     Thyroid disease     goiter    Wears glasses       Allergies: No Known Allergies   Medications :  metoprolol succinate, 25 mg, Oral, Daily  Vitamin D, 4,000 Units, Oral, Daily  cefTRIAXone (ROCEPHIN) IV, 1,000 mg, IntraVENous, Q24H  sodium chloride flush, 5-40 mL, IntraVENous, 2 times per day  [Held by provider] enoxaparin, 30 mg, SubCUTAneous, BID  famotidine (PEPCID) injection, 20 mg, IntraVENous, BID        Review of Systems   Review of Systems   Constitutional: Negative for appetite change, fatigue, fever and unexpected weight change. HENT: Negative for congestion, rhinorrhea and sneezing. Eyes: Negative for visual disturbance. Respiratory: Negative for cough, chest tightness, shortness of breath and wheezing. Cardiovascular: Negative for chest pain and palpitations. Gastrointestinal: Negative for abdominal pain, blood in stool, constipation, diarrhea, nausea and vomiting. Genitourinary: Negative for dysuria, enuresis, frequency and hematuria. Musculoskeletal: Negative for arthralgias and myalgias. Skin: Negative for rash. Neurological: Negative for dizziness, weakness, light-headedness and headaches. Hematological: Does not bruise/bleed easily. Psychiatric/Behavioral: Negative for dysphoric mood and sleep disturbance. Objective :      Current Vitals : Temp: 98.2 °F (36.8 °C),  Pulse: 69, Resp: 18, BP: (!) 152/69, SpO2: 97 %  Last 24 Hrs Vitals   Patient Vitals for the past 24 hrs:   BP Temp Temp src Pulse Resp SpO2 Weight   01/14/22 0700 (!) 152/69 98.2 °F (36.8 °C) Oral 69 18 97 %    01/14/22 0510       253 lb (114.8 kg)   01/14/22 0355 (!) 153/65 98.6 °F (37 °C) Oral 69 16 94 %    01/13/22 2346 (!) 142/57 97.4 °F (36.3 °C) Oral 63 18 94 %    01/13/22 2012 (!) 152/73 98.8 °F (37.1 °C) Oral 75 17 96 %    01/13/22 1531 (!) 142/61 98.4 °F (36.9 °C) Oral 70 16 94 %    01/13/22 1154 (!) 149/69 98.1 °F (36.7 °C) Oral 68 16 94 %      Intake / output   01/12 1901 - 01/14 0700  In: 3099.8 [I.V.:3002.5]  Out: 1500 [Urine:300]  Physical Exam:  Physical Exam  Vitals and nursing note reviewed. Constitutional:       General: She is not in acute distress. Appearance: She is obese. She is not diaphoretic. HENT:      Head: Normocephalic and atraumatic. Nose:      Right Sinus: No maxillary sinus tenderness or frontal sinus tenderness.       Left Sinus: No Value Ref Range Status   01/12/2022 1.022 1.005 - 1.030 Final     Protein, UA   Date Value Ref Range Status   01/12/2022 NEGATIVE NEGATIVE Final     RBC, UA   Date Value Ref Range Status   01/12/2022 0 TO 2 0 - 2 /HPF Final     Blood, UA POC   Date Value Ref Range Status   11/26/2019 negative  Final     Bacteria, UA   Date Value Ref Range Status   01/12/2022 MANY (A) None Final     Nitrite, Urine   Date Value Ref Range Status   01/12/2022 POSITIVE (A) NEGATIVE Final     WBC, UA   Date Value Ref Range Status   01/12/2022 10 TO 20 0 - 5 /HPF Final     Leukocyte Esterase, Urine   Date Value Ref Range Status   01/12/2022 SMALL (A) NEGATIVE Final       Imaging / Clinical Data :-   CT ABDOMEN PELVIS W IV CONTRAST Additional Contrast? None    Result Date: 1/13/2022  Small bowel obstruction with point of obstruction suspected in the mid pelvis. Left lower quadrant colostomy with Simone's pouch. There is a large wide-mouthed midline ventral hernia containing multiple loops of large and small bowel. Parastomal hernia containing bowel. No findings to suggest incarceration. XR ABDOMEN FOR NG/OG/NE TUBE PLACEMENT    Result Date: 1/13/2022  Enteric tube as above. Clinical Course : stable  Assessment and Plan  :        1. Small bowel obstruction - s/p NG tube for stomach decompression. Bowel rest.  Continue LIS. Colorectal surgery managing NG tube, can be clamped. .  Replace electrolytes as needed. 2. Uncontrolled hypertension -IV antihypertensive medications. -   3. Hypothyroidism -IV levothyroxine   4. Obesity -   5. Ventral hernia without obstruction or incarceration  6. Parastomal hernia without obstruction or incarceration.      PT OT     Continue to monitor vitals , Intake / output ,  Cell count , HGB , Kidney function, oxygenation  as indicated . Plan and updates discussed with patient ,  answers  explained to satisfaction.    Plan discussed with Staff Silver Bustos RN     (Please note that portions of this note were completed with a voice recognition program. Efforts were made to edit the dictations but occasionally words are mis-transcribed.)      Jose Galloway MD  1/14/2022

## 2022-01-14 NOTE — PROGRESS NOTES
Colorectal Surgery:  Daily Progress Note          PATIENT NAME: Leopoldo Messick     TODAY'S DATE: 1/14/2022, 6:03 AM  SUBJECTIVE:     Pt seen and examined at bedside. Afebrile. VSS. No complains of pain this AM. Minimal flatus from ostomy. NG with 1200cc output last 24 hours. Voiding spontaneously.      OBJECTIVE:   VITALS:  BP (!) 153/65   Pulse 69   Temp 98.6 °F (37 °C) (Oral)   Resp 16   Ht 5' 7\" (1.702 m)   Wt 253 lb (114.8 kg)   LMP  (LMP Unknown)   SpO2 94%   BMI 39.63 kg/m²      INTAKE/OUTPUT:      Intake/Output Summary (Last 24 hours) at 1/14/2022 0603  Last data filed at 1/14/2022 0520  Gross per 24 hour   Intake 3099.76 ml   Output 1200 ml   Net 1899.76 ml       PHYSICAL EXAM:  General Appearance: awake, alert, oriented, in no acute distress  HEENT:  Normocephalic, atraumatic, mucus membranes moist, NGT to LIS  Heart: Heart regular rate and rhythm  Lungs: Unlabored respirations  Abdomen: Obese, soft, minimal ttp in suprapubic region, midline hernia soft and reducible, ostomy with flatus in appliance   Extremities: No cyanosis, pitting edema, rashes noted  Skin: Skin color, texture, turgor normal. No rashes or lesions    Data:  CBC with Differential:    Lab Results   Component Value Date    WBC 13.3 01/12/2022    RBC 4.96 01/12/2022    RBC 3.98 12/06/2011    HGB 14.6 01/12/2022    HCT 45.7 01/12/2022     01/12/2022     12/06/2011    MCV 92.1 01/12/2022    MCH 29.4 01/12/2022    MCHC 31.9 01/12/2022    RDW 14.2 01/12/2022    LYMPHOPCT 8 01/12/2022    MONOPCT 4 01/12/2022    BASOPCT 1 01/12/2022    MONOSABS 0.57 01/12/2022    LYMPHSABS 1.09 01/12/2022    EOSABS 0.14 01/12/2022    BASOSABS 0.07 01/12/2022    DIFFTYPE NOT REPORTED 01/12/2022     BMP:    Lab Results   Component Value Date     01/12/2022    K 3.9 01/12/2022     01/12/2022    CO2 26 01/12/2022    BUN 14 01/12/2022    LABALBU 4.6 01/12/2022    LABALBU 4.0 12/06/2011    CREATININE 0.71 01/12/2022    CALCIUM 10.6 01/12/2022    GFRAA >60 01/12/2022    LABGLOM >60 01/12/2022    GLUCOSE 140 01/12/2022    GLUCOSE 94 12/06/2011         ASSESSMENT:  Active Hospital Problems    Diagnosis Date Noted    Acute cystitis without hematuria [N30.00]     Thyroid disease [E07.9]     Small bowel obstruction ([facility] [address] [K56.609] 03/25/2018    GERD (gastroesophageal reflux disease) [K21.9]     Essential hypertension [I10]        73 y.o. female with SBO    Plan:  Continue NPO  Continue NG tube to LIS  SBFT this AM  IVF  Electrolyte repletion prn  Ambulation, OOB    Electronically signed by Jorgito Lee MD  on 1/14/2022 at 6:03 AM   I Dr. Noble Montoya saw and examined the patient. I have edited the above and agree with the above. Geoffrey Santacruz  Colorectal Surgery

## 2022-01-15 PROCEDURE — 6360000002 HC RX W HCPCS: Performed by: NURSE PRACTITIONER

## 2022-01-15 PROCEDURE — 2580000003 HC RX 258: Performed by: NURSE PRACTITIONER

## 2022-01-15 PROCEDURE — 2500000003 HC RX 250 WO HCPCS: Performed by: NURSE PRACTITIONER

## 2022-01-15 PROCEDURE — 99232 SBSQ HOSP IP/OBS MODERATE 35: CPT | Performed by: FAMILY MEDICINE

## 2022-01-15 PROCEDURE — 1200000000 HC SEMI PRIVATE

## 2022-01-15 PROCEDURE — 6370000000 HC RX 637 (ALT 250 FOR IP): Performed by: NURSE PRACTITIONER

## 2022-01-15 RX ORDER — LORAZEPAM 2 MG/ML
0.5 INJECTION INTRAMUSCULAR ONCE
Status: COMPLETED | OUTPATIENT
Start: 2022-01-15 | End: 2022-01-15

## 2022-01-15 RX ADMIN — FAMOTIDINE 20 MG: 10 INJECTION, SOLUTION INTRAVENOUS at 08:30

## 2022-01-15 RX ADMIN — CEFTRIAXONE SODIUM 1000 MG: 1 INJECTION, POWDER, FOR SOLUTION INTRAMUSCULAR; INTRAVENOUS at 01:54

## 2022-01-15 RX ADMIN — SODIUM CHLORIDE: 9 INJECTION, SOLUTION INTRAVENOUS at 14:23

## 2022-01-15 RX ADMIN — SODIUM CHLORIDE: 9 INJECTION, SOLUTION INTRAVENOUS at 06:05

## 2022-01-15 RX ADMIN — SODIUM CHLORIDE, PRESERVATIVE FREE 10 ML: 5 INJECTION INTRAVENOUS at 21:02

## 2022-01-15 RX ADMIN — HYDRALAZINE HYDROCHLORIDE 10 MG: 20 INJECTION INTRAMUSCULAR; INTRAVENOUS at 17:25

## 2022-01-15 RX ADMIN — METOPROLOL SUCCINATE 25 MG: 25 TABLET, EXTENDED RELEASE ORAL at 08:30

## 2022-01-15 RX ADMIN — Medication 4000 UNITS: at 08:30

## 2022-01-15 RX ADMIN — LORAZEPAM 0.5 MG: 2 INJECTION INTRAMUSCULAR; INTRAVENOUS at 01:51

## 2022-01-15 RX ADMIN — KETOROLAC TROMETHAMINE 15 MG: 30 INJECTION, SOLUTION INTRAMUSCULAR; INTRAVENOUS at 01:51

## 2022-01-15 RX ADMIN — FAMOTIDINE 20 MG: 10 INJECTION, SOLUTION INTRAVENOUS at 21:02

## 2022-01-15 ASSESSMENT — ENCOUNTER SYMPTOMS
RHINORRHEA: 0
COUGH: 0
CHEST TIGHTNESS: 0
ABDOMINAL PAIN: 0
BLOOD IN STOOL: 0
DIARRHEA: 0
VOMITING: 0
CONSTIPATION: 0
NAUSEA: 0
SHORTNESS OF BREATH: 0
WHEEZING: 0

## 2022-01-15 ASSESSMENT — PAIN SCALES - GENERAL: PAINLEVEL_OUTOF10: 3

## 2022-01-15 ASSESSMENT — PAIN DESCRIPTION - PAIN TYPE: TYPE: ACUTE PAIN

## 2022-01-15 ASSESSMENT — PAIN DESCRIPTION - FREQUENCY: FREQUENCY: INTERMITTENT

## 2022-01-15 ASSESSMENT — PAIN DESCRIPTION - LOCATION: LOCATION: HEAD

## 2022-01-15 ASSESSMENT — PAIN DESCRIPTION - DESCRIPTORS: DESCRIPTORS: ACHING

## 2022-01-15 NOTE — PROGRESS NOTES
Colorectal Surgery:  Daily Progress Note          PATIENT NAME: Ebony Segovia     TODAY'S DATE: 1/15/2022, 7:02 AM  SUBJECTIVE:     Pt seen and examined at bedside. Afebrile. VSS. No complains of pain this AM. Reports feeling much better, less distended. Had output from ostomy after SBFT yesterday.      OBJECTIVE:   VITALS:  BP (!) 158/89   Pulse 91   Temp 97.7 °F (36.5 °C) (Oral)   Resp 16   Ht 5' 7\" (1.702 m)   Wt 236 lb (107 kg)   LMP  (LMP Unknown)   SpO2 92%   BMI 36.96 kg/m²      INTAKE/OUTPUT:      Intake/Output Summary (Last 24 hours) at 1/15/2022 8492  Last data filed at 1/15/2022 0604  Gross per 24 hour   Intake 1730.34 ml   Output 2150 ml   Net -419.66 ml       PHYSICAL EXAM:  General Appearance: awake, alert, oriented, in no acute distress  HEENT:  Normocephalic, atraumatic, mucus membranes moist, NGT clamped  Heart: Heart regular rate and rhythm  Lungs: Unlabored respirations  Abdomen: Obese, soft, nontender, midline hernia soft and reducible, ostomy with flatus in appliance   Extremities: No cyanosis, pitting edema, rashes noted  Skin: Skin color, texture, turgor normal. No rashes or lesions    Data:  CBC with Differential:    Lab Results   Component Value Date    WBC 8.2 01/14/2022    RBC 4.61 01/14/2022    RBC 3.98 12/06/2011    HGB 13.3 01/14/2022    HCT 43.0 01/14/2022     01/14/2022     12/06/2011    MCV 93.3 01/14/2022    MCH 28.9 01/14/2022    MCHC 30.9 01/14/2022    RDW 14.4 01/14/2022    LYMPHOPCT 8 01/12/2022    MONOPCT 4 01/12/2022    BASOPCT 1 01/12/2022    MONOSABS 0.57 01/12/2022    LYMPHSABS 1.09 01/12/2022    EOSABS 0.14 01/12/2022    BASOSABS 0.07 01/12/2022    DIFFTYPE NOT REPORTED 01/12/2022     BMP:    Lab Results   Component Value Date     01/14/2022    K 3.5 01/14/2022     01/14/2022    CO2 21 01/14/2022    BUN 10 01/14/2022    LABALBU 3.7 01/14/2022    LABALBU 4.0 12/06/2011    CREATININE 0.56 01/14/2022    CALCIUM 9.2 01/14/2022    GFRAA >60 01/14/2022    LABGLOM >60 01/14/2022    GLUCOSE 109 01/14/2022    GLUCOSE 94 12/06/2011         ASSESSMENT:  Active Hospital Problems    Diagnosis Date Noted    Acute cystitis without hematuria [N30.00]     Thyroid disease [E07.9]     Small bowel obstruction ([facility] [address] [K56.609] 03/25/2018    GERD (gastroesophageal reflux disease) [K21.9]     Essential hypertension [I10]        73 y.o. female with SBO, resolved    Plan:  Medical management per primary  OK to clamp NG this morning  Clears around tube, if tolerating will pull NG this afternoon  IVF  Electrolyte repletion prn  Ambulation, OOB    Electronically signed by Wilmer Reid MD  on 1/15/2022 at 7:02 AM          I Dr. Roberto Hong saw and examined the patient. I have edited the above and agree with the above. Geoffrey Santacruz  Colorectal Surgery

## 2022-01-15 NOTE — PLAN OF CARE
Problem: Pain:  Goal: Pain level will decrease  Description: Pain level will decrease  1/15/2022 1450 by Andrea Jeffers RN  Outcome: Met This Shift  Note: Patient denies pain  Encouraged to notify RN of new onset pain       Problem:  Bowel/Gastric:  Goal: Control of bowel function will improve  Description: Control of bowel function will improve  Outcome: Ongoing     Problem: Nutritional:  Goal: Ability to follow a diet with enough fiber (20 to 30 grams) for normal bowel function will improve  Description: Ability to follow a diet with enough fiber (20 to 30 grams) for normal bowel function will improve  Outcome: Ongoing

## 2022-01-15 NOTE — PROGRESS NOTES
Transitions of Care Pharmacy Service   Medication Review    The patient's list of current home medications has been reviewed. Her PCP office prescribes in Epic; Toprol is her only prescription medication. Source(s) of information: patient, Epic, Surescripts refill report      Please feel free to call me with any questions about this encounter. Thank you.     Misty Wang, [address] [place] [address]   Transitions of Care Pharmacy Service  Phone:  [phone]  Fax: [phone]      Electronically signed by Misty Wang [address] [place] [address] on 1/14/2022 at 7:00 PM           Medications Prior to Admission:   Polyethyl Glycol-Propyl Glycol (SYSTANE OP), Place 1 drop into both eyes as needed (dry eyes)  metoprolol succinate (TOPROL XL) 25 MG extended release tablet, Take 1 tablet by mouth daily  acetaminophen (TYLENOL) 500 MG tablet, Take 1,000 mg by mouth nightly   Cholecalciferol (VITAMIN D) 2000 units CAPS capsule, Take 2 capsules by mouth daily

## 2022-01-15 NOTE — PROGRESS NOTES
[facility]  Office: [address], DO, Yaodebi Reeder, DO, Quentin Nicole, DO, Ahsan Light, DO, Celine Dove MD, Cee Cantu MD, Nova Muñoz MD, Manolo Mike MD, Daren Klinefelter, MD, Palomo Jonas MD, Paz Jackson MD, Cher Collet, DO, Ricke Bosworth, DO, Rhett Rios MD,  Fabiola Whitfield DO, Pearl Ryan MD, Jose Macias MD, Cipriano Cooper MD, Jennifer Andre MD, Irineo Macias MD, Albin Barton MD, Ashlie Miller MD, Ramirez Gaming, [facility], [facility], CNP, Maggie Macedo, CNP, Palak Tran, CNS, Jennifer Mccrary, CNP, Jessica Jimenez, CNP, Noemi Barnhatr, CNP, Karthik Salgado, CNP, Gonsalo Waller, CNP, Jenny Cooper PA-C, Krys Wilson, [facility], Kathi Fish, [facility], Vita Moon, CNP, Peg Peng, CNP, Erika Moffett, CNP, Marlon Baum, CNP, German Cruz, [facility], [facility]      Daily Progress Note     Admit Date: 1/12/2022  Bed/Room No.  2022/2022-01  Admitting Physician : Nova Muñoz MD  Code Status :Full Code  Hospital Day:  LOS: 2 days   Chief Complaint:     Chief Complaint   Patient presents with    Abdominal Pain    Nausea     Principal Problem:    Small bowel obstruction [facility])  Active Problems:    GERD (gastroesophageal reflux disease)    Essential hypertension    Thyroid disease    Acute cystitis without hematuria  Resolved Problems:    * No resolved hospital problems. *    Subjective : Interval History/Significant events :  01/15/22    Patient reports improvement in abdominal pain. Nasogastric tube has been clamped. Patient reported multiple episodes of stool in the colostomy bag. She is tolerating clears. Vitals - Stable afebrile  Labs -no new labs. Nursing notes , Consults notes reviewed. Overnight events and updates discussed with Nursing staff . Background History: Wili Castro is 73 y.o. female  Who was admitted to the hospital on 1/12/2022 for treatment of Small bowel obstruction ([facility] [address] Patient presented to emergency room with acute onset abdominal pain associated with nausea, vomiting. She has history of diverticulitis which required colectomy and colostomy. Patient had colostomy reversal unfortunately surgery did not succeed and patient required another laparotomy with colostomy creation. She has a history of bladder prolapse, hypertension, obstructive sleep apnea, hypothyroidism. Patient was found to have elevated blood pressure 939 systolic on presentation. Lab evaluation showed leukocytosis of 13.3. CT abdomen pelvis with IV contrast was suggestive of small bowel obstruction in mid pelvis, large ventral hernia with multiple loops of large and small bowel and parastomal hernia containing bowel without incarceration. Nasogastric tube was placed for stomach decompression with improvement in symptoms      PMH:  Past Medical History:   Diagnosis Date    Allergic rhinitis     Bladder prolapse     Constipation     5/2019 resolved    Fundic gland polyposis of stomach 08/17/2017    per EGD    GERD (gastroesophageal reflux disease)     on no medications    Hiatal hernia     History of cardiac cath 12/2002    pt denies blockage    History of diverticulitis     Hyperlipidemia     borderline, on no medication    Hypertension     MRSA (methicillin resistant staph aureus) culture positive 11/01/2016    nasal    MRSA carrier     follows with ID    Obesity     Osteoarthritis     Pelvic abscess in female     Sleep apnea     CPAP nightly    Systolic murmur     benign systolic murmur (history of). Pt does not follow-up with a cardiologist (Written 09/25/2019).     Thyroid disease     goiter    Wears glasses       Allergies: No Known Allergies   Medications :  levothyroxine, 50 mcg, IntraVENous, Daily   And  sodium chloride (PF), 5 mL, IntraVENous, Daily  metoprolol succinate, 25 mg, Oral, Daily  Vitamin D, 4,000 Units, Oral, Daily  cefTRIAXone (ROCEPHIN) IV, 1,000 mg, IntraVENous, Q24H  sodium chloride flush, 5-40 mL, IntraVENous, 2 times per day  [Held by provider] enoxaparin, 30 mg, SubCUTAneous, BID  famotidine (PEPCID) injection, 20 mg, IntraVENous, BID        Review of Systems   Review of Systems   Constitutional: Negative for appetite change, fatigue, fever and unexpected weight change. HENT: Negative for congestion, rhinorrhea and sneezing. Eyes: Negative for visual disturbance. Respiratory: Negative for cough, chest tightness, shortness of breath and wheezing. Cardiovascular: Negative for chest pain and palpitations. Gastrointestinal: Negative for abdominal pain, blood in stool, constipation, diarrhea, nausea and vomiting. Genitourinary: Negative for dysuria, enuresis, frequency and hematuria. Musculoskeletal: Negative for arthralgias and myalgias. Skin: Negative for rash. Neurological: Negative for dizziness, weakness, light-headedness and headaches. Hematological: Does not bruise/bleed easily. Psychiatric/Behavioral: Negative for dysphoric mood and sleep disturbance. Objective :      Current Vitals : Temp: 98.1 °F (36.7 °C),  Pulse: 72, Resp: 18, BP: (!) 151/66, SpO2: 93 %  Last 24 Hrs Vitals   Patient Vitals for the past 24 hrs:   BP Temp Temp src Pulse Resp SpO2 Weight   01/15/22 0711 (!) 151/66 98.1 °F (36.7 °C) Oral 72 18 93 %    01/15/22 0556       236 lb (107 kg)   01/15/22 0008 (!) 158/89 97.7 °F (36.5 °C) Oral 91 16 92 %    01/14/22 2001 134/60 99 °F (37.2 °C) Oral 78 16 96 %    01/14/22 1540 139/60 98.4 °F (36.9 °C) Oral 81 18 95 %    01/14/22 1224 (!) 153/52 98.2 °F (36.8 °C) Oral 78 18 95 %      Intake / output   01/13 1901 - 01/15 0700  In: 3365.5 [P.O.:150; I.V.:3165.5]  Out: 2750   Physical Exam:  Physical Exam  Vitals and nursing note reviewed. Constitutional:       General: She is not in acute distress. Appearance: She is obese. She is not diaphoretic. HENT:      Head: Normocephalic and atraumatic.       Nose:      Right Sinus: No maxillary sinus tenderness or frontal sinus tenderness. Left Sinus: No maxillary sinus tenderness or frontal sinus tenderness. Mouth/Throat:      Pharynx: No oropharyngeal exudate. Eyes:      General: No scleral icterus. Conjunctiva/sclera: Conjunctivae normal.      Pupils: Pupils are equal, round, and reactive to light. Neck:      Thyroid: No thyromegaly. Vascular: No JVD. Cardiovascular:      Rate and Rhythm: Normal rate and regular rhythm. Pulses:           Dorsalis pedis pulses are 2+ on the right side and 2+ on the left side. Heart sounds: Normal heart sounds. No murmur heard. Pulmonary:      Effort: Pulmonary effort is normal.      Breath sounds: Normal breath sounds. No wheezing or rales. Abdominal:      Palpations: Abdomen is soft. There is no mass. Tenderness: There is no abdominal tenderness. Comments: Ventral hernia, left lower quadrant colostomy in place. Very minute gas in the bag. Musculoskeletal:      Cervical back: Full passive range of motion without pain and neck supple. Lymphadenopathy:      Head:      Right side of head: No submandibular adenopathy. Left side of head: No submandibular adenopathy. Cervical: No cervical adenopathy. Skin:     General: Skin is warm. Neurological:      Mental Status: She is alert and oriented to person, place, and time. Motor: No tremor. Psychiatric:         Behavior: Behavior is cooperative.            Laboratory findings:    Recent Labs     01/12/22 2238 01/14/22  0620   WBC 13.3* 8.2   HGB 14.6 13.3   HCT 45.7 43.0    190     Recent Labs     01/12/22 2238 01/14/22  0620    138   K 3.9 3.5*    106   CO2 26 21   GLUCOSE 140* 109*   BUN 14 10   CREATININE 0.71 0.56   MG  --  2.1   CALCIUM 10.6* 9.2   PHOS  --  2.8     Recent Labs     01/12/22 2238 01/14/22  0620   PROT 7.6 6.5   LABALBU 4.6 3.7   TSH 4.08  --    AST 16 11   ALT 18 10   ALKPHOS 125* 104 BILITOT 0.30 0.47   AMYLASE  --  26*   LIPASE 41 32          Specific Gravity, UA   Date Value Ref Range Status   01/12/2022 1.022 1.005 - 1.030 Final     Protein, UA   Date Value Ref Range Status   01/12/2022 NEGATIVE NEGATIVE Final     RBC, UA   Date Value Ref Range Status   01/12/2022 0 TO 2 0 - 2 /HPF Final     Blood, UA POC   Date Value Ref Range Status   11/26/2019 negative  Final     Bacteria, UA   Date Value Ref Range Status   01/12/2022 MANY (A) None Final     Nitrite, Urine   Date Value Ref Range Status   01/12/2022 POSITIVE (A) NEGATIVE Final     WBC, UA   Date Value Ref Range Status   01/12/2022 10 TO 20 0 - 5 /HPF Final     Leukocyte Esterase, Urine   Date Value Ref Range Status   01/12/2022 SMALL (A) NEGATIVE Final       Imaging / Clinical Data :-   CT ABDOMEN PELVIS W IV CONTRAST Additional Contrast? None    Result Date: 1/13/2022  Small bowel obstruction with point of obstruction suspected in the mid pelvis. Left lower quadrant colostomy with Simone's pouch. There is a large wide-mouthed midline ventral hernia containing multiple loops of large and small bowel. Parastomal hernia containing bowel. No findings to suggest incarceration. XR ABDOMEN FOR NG/OG/NE TUBE PLACEMENT    Result Date: 1/13/2022  Enteric tube as above. Clinical Course : stable  Assessment and Plan  :        1. Small bowel obstruction - s/p NG tube for stomach decompression. Bowel rest.    Treated with LIS. Colorectal surgery managing NG tube, clamped and patient tolerating clear liquid diet. .  Replace electrolytes as needed. 2. Uncontrolled hypertension -IV antihypertensive medications. -   3. Hypothyroidism -IV levothyroxine   4. Acute cystitis -Klebsiella infection - IV Rocephin  5. Obesity -   6. Ventral hernia without obstruction or incarceration  7. Parastomal hernia without obstruction or incarceration.        Monitor BMP tomorrow. Ambulate as tolerated.     Continue to monitor vitals , Intake / output , Cell count , HGB , Kidney function, oxygenation  as indicated . Plan and updates discussed with patient ,  answers  explained to satisfaction.    Plan discussed with Staff  RN     (Please note that portions of this note were completed with a voice recognition program. Efforts were made to edit the dictations but occasionally words are mis-transcribed.)      Jo Mcneal MD  1/15/2022

## 2022-01-16 VITALS
BODY MASS INDEX: 36.88 KG/M2 | RESPIRATION RATE: 18 BRPM | HEART RATE: 51 BPM | HEIGHT: 67 IN | TEMPERATURE: 97.7 F | WEIGHT: 235 LBS | DIASTOLIC BLOOD PRESSURE: 71 MMHG | SYSTOLIC BLOOD PRESSURE: 142 MMHG | OXYGEN SATURATION: 96 %

## 2022-01-16 LAB
ANION GAP SERPL CALCULATED.3IONS-SCNC: 11 MMOL/L (ref 9–17)
BUN BLDV-MCNC: 9 MG/DL (ref 8–23)
BUN/CREAT BLD: 17 (ref 9–20)
CALCIUM SERPL-MCNC: 9.4 MG/DL (ref 8.6–10.4)
CHLORIDE BLD-SCNC: 106 MMOL/L (ref 98–107)
CO2: 24 MMOL/L (ref 20–31)
CREAT SERPL-MCNC: 0.53 MG/DL (ref 0.5–0.9)
GFR AFRICAN AMERICAN: >60 ML/MIN
GFR NON-AFRICAN AMERICAN: >60 ML/MIN
GFR SERPL CREATININE-BSD FRML MDRD: ABNORMAL ML/MIN/{1.73_M2}
GFR SERPL CREATININE-BSD FRML MDRD: ABNORMAL ML/MIN/{1.73_M2}
GLUCOSE BLD-MCNC: 96 MG/DL (ref 70–99)
MAGNESIUM: 1.9 MG/DL (ref 1.6–2.6)
POTASSIUM SERPL-SCNC: 3.4 MMOL/L (ref 3.7–5.3)
SODIUM BLD-SCNC: 141 MMOL/L (ref 135–144)

## 2022-01-16 PROCEDURE — 2580000003 HC RX 258: Performed by: NURSE PRACTITIONER

## 2022-01-16 PROCEDURE — 83735 ASSAY OF MAGNESIUM: CPT

## 2022-01-16 PROCEDURE — 6370000000 HC RX 637 (ALT 250 FOR IP): Performed by: NURSE PRACTITIONER

## 2022-01-16 PROCEDURE — 6360000002 HC RX W HCPCS: Performed by: NURSE PRACTITIONER

## 2022-01-16 PROCEDURE — 36415 COLL VENOUS BLD VENIPUNCTURE: CPT

## 2022-01-16 PROCEDURE — 99238 HOSP IP/OBS DSCHRG MGMT 30/<: CPT | Performed by: FAMILY MEDICINE

## 2022-01-16 PROCEDURE — 80048 BASIC METABOLIC PNL TOTAL CA: CPT

## 2022-01-16 PROCEDURE — 2500000003 HC RX 250 WO HCPCS: Performed by: NURSE PRACTITIONER

## 2022-01-16 RX ORDER — CEPHALEXIN 500 MG/1
500 CAPSULE ORAL 2 TIMES DAILY
Qty: 6 CAPSULE | Refills: 0 | Status: SHIPPED | OUTPATIENT
Start: 2022-01-16 | End: 2022-01-19

## 2022-01-16 RX ADMIN — SODIUM CHLORIDE, PRESERVATIVE FREE 10 ML: 5 INJECTION INTRAVENOUS at 08:59

## 2022-01-16 RX ADMIN — Medication 4000 UNITS: at 08:59

## 2022-01-16 RX ADMIN — FAMOTIDINE 20 MG: 10 INJECTION, SOLUTION INTRAVENOUS at 08:59

## 2022-01-16 RX ADMIN — SODIUM CHLORIDE: 9 INJECTION, SOLUTION INTRAVENOUS at 00:19

## 2022-01-16 RX ADMIN — POTASSIUM CHLORIDE 40 MEQ: 20 TABLET, EXTENDED RELEASE ORAL at 10:16

## 2022-01-16 RX ADMIN — CEFTRIAXONE SODIUM 1000 MG: 1 INJECTION, POWDER, FOR SOLUTION INTRAMUSCULAR; INTRAVENOUS at 03:43

## 2022-01-16 RX ADMIN — METOPROLOL SUCCINATE 25 MG: 25 TABLET, EXTENDED RELEASE ORAL at 08:58

## 2022-01-16 ASSESSMENT — ENCOUNTER SYMPTOMS
ABDOMINAL PAIN: 0
SHORTNESS OF BREATH: 0
BLOOD IN STOOL: 0
VOMITING: 0
CONSTIPATION: 0
WHEEZING: 0
COUGH: 0
DIARRHEA: 0
NAUSEA: 0
RHINORRHEA: 0
CHEST TIGHTNESS: 0

## 2022-01-16 NOTE — PROGRESS NOTES
Colorectal Surgery:  Daily Progress Note          PATIENT NAME: Idalmis Farfan     TODAY'S DATE: 1/16/2022, 6:22 AM  SUBJECTIVE:     Pt seen and examined at bedside. Afebrile. VSS. No complains of pain this AM. NG removed. Tolerating full liquids. Having flatus and stool via ostomy.     OBJECTIVE:   VITALS:  BP (!) 146/65   Pulse 71   Temp 97.7 °F (36.5 °C) (Oral)   Resp 16   Ht 5' 7\" (1.702 m)   Wt 235 lb (106.6 kg)   LMP  (LMP Unknown)   SpO2 94%   BMI 36.81 kg/m²      INTAKE/OUTPUT:      Intake/Output Summary (Last 24 hours) at 1/16/2022 0622  Last data filed at 1/15/2022 1845  Gross per 24 hour   Intake 1753.67 ml   Output --   Net 1753.67 ml       PHYSICAL EXAM:  General Appearance: awake, alert, oriented, in no acute distress  HEENT:  Normocephalic, atraumatic, mucus membranes moist, NGT clamped  Heart: Heart regular rate and rhythm  Lungs: Unlabored respirations  Abdomen: Obese, soft, nontender, midline hernia soft and reducible, ostomy with flatus and stool present in appliance   Extremities: No cyanosis, pitting edema, rashes noted  Skin: Skin color, texture, turgor normal. No rashes or lesions    Data:  CBC with Differential:    Lab Results   Component Value Date    WBC 8.2 01/14/2022    RBC 4.61 01/14/2022    RBC 3.98 12/06/2011    HGB 13.3 01/14/2022    HCT 43.0 01/14/2022     01/14/2022     12/06/2011    MCV 93.3 01/14/2022    MCH 28.9 01/14/2022    MCHC 30.9 01/14/2022    RDW 14.4 01/14/2022    LYMPHOPCT 8 01/12/2022    MONOPCT 4 01/12/2022    BASOPCT 1 01/12/2022    MONOSABS 0.57 01/12/2022    LYMPHSABS 1.09 01/12/2022    EOSABS 0.14 01/12/2022    BASOSABS 0.07 01/12/2022    DIFFTYPE NOT REPORTED 01/12/2022     BMP:    Lab Results   Component Value Date     01/16/2022    K 3.4 01/16/2022     01/16/2022    CO2 24 01/16/2022    BUN 9 01/16/2022    LABALBU 3.7 01/14/2022    LABALBU 4.0 12/06/2011    CREATININE 0.53 01/16/2022    CALCIUM 9.4 01/16/2022    GFRAA >60 01/16/2022    LABGLOM >60 01/16/2022    GLUCOSE 96 01/16/2022    GLUCOSE 94 12/06/2011         ASSESSMENT:  Active Hospital Problems    Diagnosis Date Noted    Acute cystitis without hematuria [N30.00]     Thyroid disease [E07.9]     Small bowel obstruction ([address] [K56.609] 03/25/2018    GERD (gastroesophageal reflux disease) [K21.9]     Essential hypertension [I10]        73 y.o. female with SBO, resolved    Plan:  Medical management per primary  Advance diet this AM  If tolerates, ok for discharge from surgical standpoint  Electrolyte repletion prn  Ambulation, OOB    Electronically signed by Eula Navas MD  on 1/16/2022 at 6:22 AM        I Dr. Mary Knight saw and examined the patient. I have edited the above and agree with the above. Geoffrey Santacruz  Colorectal Surgery

## 2022-01-16 NOTE — PROGRESS NOTES
[facility]  Office: [address], DO, Ashishsallie Wolfe, DO, Diamond Christensen, DO, Ike Colleen Light, DO, Julia Mccormick MD, Torres Rai MD, Adrianna Fabian MD, Jorgito Bravo MD, Joe Ferrari MD, Sam Loza MD, Celeste Gaviria MD, Komal Huntley, DO, Raheem Barnes, DO, Wendi Leone MD,  Lurdes Pineda DO, Shoshana Hernandez MD, Maya Srinivasan MD, Breanna Art MD, Jae Bowman MD, Mona Wong MD, Riccardo Silva MD, Husam Medina MD, Lisa Dowling [facility], [facility], CNP, Colleen Guerrero, CNP, Edwin Lima, CNS, Yina De La Garza, [facility], Beryle Fix, CNP, Atul Kirkpatrick, CNP, Ranjith Hayes, CNP, Edwardo Flores, CNP, Terrie Vargas PA-C, Ron Tee, DNP, Jose Rosado, [facility], Lilli Messina, CNP, Jef Avelar, CNP, Leonora Guzman, CNP, Ignacio Gastelum, CNP, Noel Velasco, CNP, Candy Morales [facility]      Daily Progress Note     Admit Date: 1/12/2022  Bed/Room No.  2022/2022-01  Admitting Physician : Adrianna Fabian MD  Code Status :Full Code  Hospital Day:  LOS: 3 days   Chief Complaint:     Chief Complaint   Patient presents with    Abdominal Pain    Nausea     Principal Problem:    Small bowel obstruction [facility])  Active Problems:    GERD (gastroesophageal reflux disease)    Essential hypertension    Thyroid disease    Acute cystitis without hematuria  Resolved Problems:    * No resolved hospital problems. *    Subjective : Interval History/Significant events :  01/16/22    Patient reports that she is tolerating liquid diet. Nasogastric tube has been removed and she is denying abdominal pain. Vitals - Stable afebrile  Labs -no new labs. Nursing notes , Consults notes reviewed. Overnight events and updates discussed with Nursing staff . Background History: Kike De Souza is 73 y.o. female  Who was admitted to the hospital on 1/12/2022 for treatment of Small bowel obstruction ([facility] [address]  Patient presented to emergency room with acute onset abdominal pain associated with nausea, vomiting. She has history of diverticulitis which required colectomy and colostomy. Patient had colostomy reversal unfortunately surgery did not succeed and patient required another laparotomy with colostomy creation. She has a history of bladder prolapse, hypertension, obstructive sleep apnea, hypothyroidism. Patient was found to have elevated blood pressure 878 systolic on presentation. Lab evaluation showed leukocytosis of 13.3. CT abdomen pelvis with IV contrast was suggestive of small bowel obstruction in mid pelvis, large ventral hernia with multiple loops of large and small bowel and parastomal hernia containing bowel without incarceration. Nasogastric tube was placed for stomach decompression with improvement in symptoms      PMH:  Past Medical History:   Diagnosis Date    Allergic rhinitis     Bladder prolapse     Constipation     5/2019 resolved    Fundic gland polyposis of stomach 08/17/2017    per EGD    GERD (gastroesophageal reflux disease)     on no medications    Hiatal hernia     History of cardiac cath 12/2002    pt denies blockage    History of diverticulitis     Hyperlipidemia     borderline, on no medication    Hypertension     MRSA (methicillin resistant staph aureus) culture positive 11/01/2016    nasal    MRSA carrier     follows with ID    Obesity     Osteoarthritis     Pelvic abscess in female     Sleep apnea     CPAP nightly    Systolic murmur     benign systolic murmur (history of). Pt does not follow-up with a cardiologist (Written 09/25/2019).     Thyroid disease     goiter    Wears glasses       Allergies: No Known Allergies   Medications :  levothyroxine, 50 mcg, IntraVENous, Daily   And  sodium chloride (PF), 5 mL, IntraVENous, Daily  metoprolol succinate, 25 mg, Oral, Daily  Vitamin D, 4,000 Units, Oral, Daily  cefTRIAXone (ROCEPHIN) IV, 1,000 mg, IntraVENous, Q24H  sodium chloride flush, 5-40 mL, IntraVENous, 2 times per day  enoxaparin, 30 mg, SubCUTAneous, BID  famotidine (PEPCID) injection, 20 mg, IntraVENous, BID        Review of Systems   Review of Systems   Constitutional: Negative for appetite change, fatigue, fever and unexpected weight change. HENT: Negative for congestion, rhinorrhea and sneezing. Eyes: Negative for visual disturbance. Respiratory: Negative for cough, chest tightness, shortness of breath and wheezing. Cardiovascular: Negative for chest pain and palpitations. Gastrointestinal: Negative for abdominal pain, blood in stool, constipation, diarrhea, nausea and vomiting. Genitourinary: Negative for dysuria, enuresis, frequency and hematuria. Musculoskeletal: Negative for arthralgias and myalgias. Skin: Negative for rash. Neurological: Negative for dizziness, weakness, light-headedness and headaches. Hematological: Does not bruise/bleed easily. Psychiatric/Behavioral: Negative for dysphoric mood and sleep disturbance. Objective :      Current Vitals : Temp: 97.7 °F (36.5 °C),  Pulse: 51, Resp: 18, BP: (!) 142/71, SpO2: 96 %  Last 24 Hrs Vitals   Patient Vitals for the past 24 hrs:   BP Temp Temp src Pulse Resp SpO2 Weight   01/16/22 0703 (!) 142/71 97.7 °F (36.5 °C) Oral 51 18 96 %    01/16/22 0615       235 lb (106.6 kg)   01/15/22 1912 (!) 146/65 97.7 °F (36.5 °C) Oral 71 16 94 %    01/15/22 1745 (!) 126/52   79      01/15/22 1632 (!) 156/80 98.2 °F (36.8 °C) Oral 66 18     01/15/22 1545 (!) 146/78         01/15/22 1525 (!) 151/76 99.3 °F (37.4 °C) Oral 78 18 96 %    01/15/22 1244 (!) 137/58 98.2 °F (36.8 °C) Oral 69 18 95 %      Intake / output   01/14 1901 - 01/16 0700  In: 1753.7 [P.O.:480; I.V.:1273.7]  Out: 1350   Physical Exam:  Physical Exam  Vitals and nursing note reviewed. Constitutional:       General: She is not in acute distress. Appearance: She is obese. She is not diaphoretic. HENT:      Head: Normocephalic and atraumatic.       Nose: Right Sinus: No maxillary sinus tenderness or frontal sinus tenderness. Left Sinus: No maxillary sinus tenderness or frontal sinus tenderness. Mouth/Throat:      Pharynx: No oropharyngeal exudate. Eyes:      General: No scleral icterus. Conjunctiva/sclera: Conjunctivae normal.      Pupils: Pupils are equal, round, and reactive to light. Neck:      Thyroid: No thyromegaly. Vascular: No JVD. Cardiovascular:      Rate and Rhythm: Normal rate and regular rhythm. Pulses:           Dorsalis pedis pulses are 2+ on the right side and 2+ on the left side. Heart sounds: Normal heart sounds. No murmur heard. Pulmonary:      Effort: Pulmonary effort is normal.      Breath sounds: Normal breath sounds. No wheezing or rales. Abdominal:      Palpations: Abdomen is soft. There is no mass. Tenderness: There is no abdominal tenderness. Comments: Ventral hernia, left lower quadrant colostomy in place. Very minute gas in the bag. Musculoskeletal:      Cervical back: Full passive range of motion without pain and neck supple. Lymphadenopathy:      Head:      Right side of head: No submandibular adenopathy. Left side of head: No submandibular adenopathy. Cervical: No cervical adenopathy. Skin:     General: Skin is warm. Neurological:      Mental Status: She is alert and oriented to person, place, and time. Motor: No tremor. Psychiatric:         Behavior: Behavior is cooperative.            Laboratory findings:    Recent Labs     01/14/22  0620   WBC 8.2   HGB 13.3   HCT 43.0        Recent Labs     01/14/22  0620 01/16/22  0528    141   K 3.5* 3.4*    106   CO2 21 24   GLUCOSE 109* 96   BUN 10 9   CREATININE 0.56 0.53   MG 2.1 1.9   CALCIUM 9.2 9.4   PHOS 2.8  --      Recent Labs     01/14/22  0620   PROT 6.5   LABALBU 3.7   AST 11   ALT 10   ALKPHOS 104   BILITOT 0.47   AMYLASE 26*   LIPASE 32          Specific Gravity, UA   Date Value Ref Staff  RN     (Please note that portions of this note were completed with a voice recognition program. Efforts were made to edit the dictations but occasionally words are mis-transcribed.)      Fabiano Sagastume MD  1/16/2022

## 2022-01-16 NOTE — PROGRESS NOTES
Pt discharged to home in good condition with belongings  Discharge instructions given & signed  Informed of scripts at preferred pharmacy  Instructed to make a follow up with PCP and surgery  Pt denies having any further questions at this time  Locked up home medication(s)/personal items given to patient at discharge  Patient/family state they have everything they were admitted with.

## 2022-01-16 NOTE — DISCHARGE SUMMARY
[facility]  Office: [phone]  [facility] DO, James Heredia MD, Judie Shields MD, Kristina Cabrera MD, Xiomara Multani MD, Melisa Goldmann MD, Anneliese Christensen MD, Rene Baig MD, Kirk Cormier MD, Olga Turner MD, Jj Goldsmith DO, Dane Kenyon MD, Amy Feliciano MD, Joseph Bedolla DO, Kristian Villarreal MD,  Ledy Chamorro DO, Nereida Vazquez MD, Shruthi Johnson MD, Eber Willoughby CNP, [facility] DelGross CNP, Kwame Andi CNP, Nahid Decclaudia CNS, Marlene Mejía CNP, Pippa Deanisker CNP, Linn Flowert CNP, Ana Laura Weller CNP, Kevin Barnes CNP, Mallorie Leal PA-C, Toby Billy CNP, Benjamin Sidhu CNP, Yomi Thornton CNP, Rafat Daniel CNP, Leeanna Park CNP, Sheldon Comp, Andrew [address]      Discharge Summary     Patient ID: Aiden Vivar  :  1950   MRN: [id number]     ACCOUNT:  [de-identified]   Patient Location :   Patient's PCP: Ela Snowden MD  Admit Date: 2022   Discharge Date: 2022     Length of Stay: 3  Code Status:  Prior  Admitting Physician: No admitting provider for patient encounter. Discharge Physician: Kristina Cabrera MD     Active Discharge Diagnosis :     Primary Problem  Small bowel obstruction [facility])      [facility] Problems    Diagnosis Date Noted    Acute cystitis without hematuria [N30.00]     Thyroid disease [E07.9]     Small bowel obstruction ([facility] [address] [K56.609] 2018    GERD (gastroesophageal reflux disease) [K21.9]     Essential hypertension [I10]        Admission Condition:  fair     Discharged Condition: stable    Hospital Stay:     Hospital Course:   Aiden Vivar is a 73 y.o. female who was admitted for the management of   Small bowel obstruction ([facility] [address] , presented to ER with Abdominal Pain and Nausea  Patient presented to emergency room with acute onset abdominal pain associated with nausea, vomiting.  She has history of diverticulitis which required colectomy and colostomy.  Patient had colostomy reversal unfortunately surgery did not succeed and patient required another laparotomy with colostomy creation. Ivelisse Cavazos has a history of bladder prolapse, hypertension, obstructive sleep apnea, hypothyroidism.  Patient was found to have elevated blood pressure 049 systolic on presentation.  Lab evaluation showed leukocytosis of 13.3.  CT abdomen pelvis with IV contrast was suggestive of small bowel obstruction in mid pelvis, large ventral hernia with multiple loops of large and small bowel and parastomal hernia containing bowel without incarceration. Nasogastric tube was placed for stomach decompression with improvement in symptoms        Significant therapeutic interventions:   1. Small bowel obstruction - s/p NG tube for stomach decompression.  Bowel rest.    Treated with LIS.  Colorectal surgery managing NG tube, clamped and patient tolerating clear liquid diet. .  Replace electrolytes as needed. 2. Uncontrolled hypertension -IV antihypertensive medications. -   3. Hypothyroidism -IV levothyroxine   4. Acute cystitis -Klebsiella infection - IV Rocephin  5. Obesity -   6. Ventral hernia without obstruction or incarceration  7. Parastomal hernia without obstruction or incarceration.        Significant Diagnostic Studies:   Labs / Micro:/Radiology  Recent Labs     01/14/22  0620 01/12/22  2238   WBC 8.2 13.3*   HGB 13.3 14.6   HCT 43.0 45.7   MCV 93.3 92.1    212     Labs Renal Latest Ref Rng & Units 1/16/2022 1/14/2022 1/12/2022 1/8/2021 2/20/2020   BUN 8 - 23 mg/dL 9 10 14 20 18   Cr 0.50 - 0.90 mg/dL 0.53 0.56 0.71 0.80 0.75   K 3.7 - 5.3 mmol/L 3.4(L) 3.5(L) 3.9 4.2 4.1   Na 135 - 144 mmol/L 141 138 141 142 142     Lab Results   Component Value Date    ALT 10 01/14/2022    AST 11 01/14/2022    ALKPHOS 104 01/14/2022    BILITOT 0.47 01/14/2022     Lab Results   Component Value Date    TSH 4.08 01/12/2022     No results found for: HAV, HEPAIGM, HEPBIGM, HEPBCAB, HBEAG, HEPCAB  Lab Results   Component Value Date    APPEARANCE urine 03/25/2018    COLORU Yellow 01/12/2022    NITRU POSITIVE 01/12/2022    GLUCOSEU NEGATIVE 01/12/2022    GLUCOSEU NEGATIVE 09/10/2011    KETUA NEGATIVE 01/12/2022    UROBILINOGEN Normal 01/12/2022    BILIRUBINUR NEGATIVE 01/12/2022    BILIRUBINUR Small 11/26/2019    BILIRUBINUR NEGATIVE 09/10/2011     Lab Results   Component Value Date    LABA1C 5.8 01/08/2021     Lab Results   Component Value Date     01/08/2021     Lab Results   Component Value Date    INR 1.2 12/17/2019    INR 1.0 12/06/2019    INR 1.0 12/05/2019    PROTIME 11.8 (H) 12/17/2019    PROTIME 10.6 12/06/2019    PROTIME 10.5 12/05/2019       CT ABDOMEN PELVIS W IV CONTRAST Additional Contrast? None    Result Date: 1/13/2022  Small bowel obstruction with point of obstruction suspected in the mid pelvis. Left lower quadrant colostomy with Simone's pouch. There is a large wide-mouthed midline ventral hernia containing multiple loops of large and small bowel. Parastomal hernia containing bowel. No findings to suggest incarceration. XR ABDOMEN FOR NG/OG/NE TUBE PLACEMENT    Result Date: 1/13/2022  Enteric tube as above. FL SMALL BOWEL FOLLOW THROUGH ONLY    Result Date: 1/14/2022  No bowel obstruction. Gastrografin passage into rectum at 1 hour-45 minutes; persistent mild small bowel dilatation with subtle transition again suspected mid pelvis, better seen on CT; low-grade/partial SBO remains a consideration. RECOMMENDATIONS: Unavailable             Consultations:    Consults:     Final Specialist Recommendations/Findings:   IP CONSULT TO GENERAL SURGERY  IP CONSULT TO COLORECTAL SURGERY      The patient was seen and examined on day of discharge and this discharge summary is in conjunction with any daily progress note from day of discharge.     Discharge plan:     Disposition: Home    Physician Follow Up:     Abran Granado MD  [address]    Schedule an appointment as soon as possible for a visit in 1 week  follow-up    Roxy Terry MD  [address]  [phone]    Schedule an appointment as soon as possible for a visit in 1 week  follow-up       Requiring Further Evaluation/Follow Up POST HOSPITALIZATION/Incidental Findings: Follow-up with colorectal surgery Dr. Tracy Ollie: regular diet    Activity: As tolerated. Instructions to Patient: Follow-up with colorectal surgery. Medication as advised    Discharge Medications:      Medication List      START taking these medications    cephALEXin 500 MG capsule  Commonly known as: KEFLEX  Take 1 capsule by mouth 2 times daily for 3 days        CONTINUE taking these medications    metoprolol succinate 25 MG extended release tablet  Commonly known as: Toprol XL  Take 1 tablet by mouth daily     SYSTANE OP     TYLENOL 500 MG tablet  Generic drug: acetaminophen     vitamin D 50 MCG (2000 UT) Caps capsule           Where to Get Your Medications      These medications were sent to [address], [address] [place] [address]    Phone: [phone]   · cephALEXin 500 MG capsule         Time Spent on discharge is  25 mins in patient examination, evaluation, counseling as well as medication reconciliation, prescriptions for required medications, discharge plan and follow up. Electronically signed by   Kaiser Broussard MD  1/16/2022        Thank you Dr. Chrissy Medina MD for the opportunity to be involved in this patient's care.

## 2022-01-17 ENCOUNTER — CARE COORDINATION (OUTPATIENT)
Dept: CASE MANAGEMENT | Age: 72
End: 2022-01-17

## 2022-01-17 NOTE — CARE COORDINATION
[facility] Transitions Initial Follow Up Call    Call within 2 business days of discharge: Yes    Patient: Sadie Fontenot Patient : 1950   MRN: <[id number]>  Reason for Admission: SBO  UTI  Discharge Date: 22 RARS: Readmission Risk Score: 8.1 ( )      Last Discharge [facility]       Complaint Diagnosis Description Type Department Provider    22 Abdominal Pain; Nausea Small bowel obstruction [facility]) ED to Hosp-Admission (Discharged) (ADMITTED) Paulo Dixon MD; Lynn Greer. .. Spoke with: 24 HOUR INITIAL CALL. No answer. Left HIPPA compliant message to return call to this writer. Facility: [address] [place] [address]    Non-face-to-face services provided:  Obtained and reviewed discharge summary and/or continuity of care documents    Care Transitions 24 Hour Call    Post Acute Services: [address] (Comment: [facility] )  Care Transitions Interventions         Follow Up  No future appointments.     JAIME Colin LPN Care Transitions Nurse   [phone]

## 2022-01-18 ENCOUNTER — CARE COORDINATION (OUTPATIENT)
Dept: CASE MANAGEMENT | Age: 72
End: 2022-01-18

## 2022-01-18 DIAGNOSIS — K56.609 SMALL BOWEL OBSTRUCTION (HCC): Primary | ICD-10-CM

## 2022-01-18 PROCEDURE — 1111F DSCHRG MED/CURRENT MED MERGE: CPT | Performed by: INTERNAL MEDICINE

## 2022-01-18 NOTE — CARE COORDINATION
[facility] Transitions Initial Follow Up Call    Call within 2 business days of discharge: Yes    Patient: Catia Fontenot Patient : 1950   MRN: <[id number]>  Reason for Admission: small bowel obstruction  Discharge Date: 22 RARS: Readmission Risk Score: 8.1 ( )      Last Discharge [facility]       Complaint Diagnosis Description Type Department Provider    22 Abdominal Pain; Nausea Small bowel obstruction [facility]) ED to Hosp-Admission (Discharged) (ADMITTED) Todd Limon MD; Michael Aals. .. Spoke with: 24 hour initial call. Spoke to Rabia Hunter. Rabia Hunter states \" I am really good\". Pt denies abdominal pain. Appetite is good . No N/V. Pt has a colonoscopy functioning without difficulty. 1111f medication reconciliation completed. Pt. Is taking all medications as prescribed. Educated on taking keflex until gone. Pt. Has no HHC or DME needs at this time. Discussed scheduling f/u with PCP and Dr Leana Dueñas. Pt. Verbalized understanding. Will continue to follow. Transitions of Care Initial Call    Was this an external facility discharge? No Discharge Facility: n/a    Challenges to be reviewed by the provider   Additional needs identified to be addressed with provider: No  none             Method of communication with provider : none      Advance Care Planning:   Does patient have an Advance Directive: reviewed and current. Was this a readmission? No  Patient stated reason for admission: SBO  Patients top risk factors for readmission: medical condition-[address] Transition Nurse (CTN) contacted the patient by telephone to perform post hospital discharge assessment. Verified name and  with patient as identifiers. Provided introduction to self, and explanation of the CTN role. CTN reviewed discharge instructions, medical action plan and red flags with patient who verbalized understanding.  Patient given an opportunity to ask questions and does not have any Care Transitions Nurse   [phone]    García Alonso LPN

## 2022-02-01 ENCOUNTER — CARE COORDINATION (OUTPATIENT)
Dept: CASE MANAGEMENT | Age: 72
End: 2022-02-01

## 2022-02-01 NOTE — CARE COORDINATION
[facility] Transitions Follow Up Call    2022    Patient: Marly Fontenot  Patient : 1950   MRN: <[id number]>  Reason for Admission: small bowel obstruction  Discharge Date: 22 RARS: Readmission Risk Score: 8.1 ( )         Spoke with: subsequent transitional call. Pt states \" I am doing very good\" appetite is good. No nausea or vomiting. No issues with bowel and bladder. Taking all medications as directed. Completed Keflex. Colostomy is functioning WNL. Pt. Has no HHC or DME needs at this time. Discussed scheduling an appt with PCP. Pt. States she has not completed scheduling yet. \" I will call him\" no new issues or concerns. Will continue to follow. Care Transitions Follow Up Call    Needs to be reviewed by the provider   Additional needs identified to be addressed with provider: No  none             Method of communication with provider : none      Care Transition Nurse (CTN) contacted the patient by telephone to follow up after admission on 22 . Verified name and  with patient as identifiers. Addressed changes since last contact: none  Discussed follow-up appointments. If no appointment was previously scheduled, appointment scheduling offered: No.   Is follow up appointment scheduled within 7 days of discharge? No.    Advance Care Planning:   Does patient have an Advance Directive: reviewed and current. CTN reviewed discharge instructions, medical action plan and red flags with patient and discussed any barriers to care and/or understanding of plan of care after discharge. Discussed appropriate site of care based on symptoms and resources available to patient including: PCP, When to call 911 and [employer]. The patient agrees to contact the PCP office for questions related to their healthcare.      Patients top risk factors for readmission: medical condition-SBO  Interventions to address risk factors: Obtained and reviewed discharge summary and/or continuity of care documents      Non-BS follow up appointment(s): N/A    CTN provided contact information for future needs. Plan for follow-up call in 7-10 days based on severity of symptoms and risk factors. Plan for next call: symptom management-ABDOMINAL PAIN BOWEL MOVEMENTS  follow up appointment-WITH PCP          Care Transitions Subsequent and Final Call    Subsequent and Final Calls  Are you currently active with any services?: Home Health  Care Transitions Interventions  Other Interventions: Follow Up  No future appointments.     JAIME Tian LPN Care Transitions Nurse   [phone]

## 2022-02-08 ENCOUNTER — CARE COORDINATION (OUTPATIENT)
Dept: CASE MANAGEMENT | Age: 72
End: 2022-02-08

## 2022-02-08 NOTE — CARE COORDINATION
[facility] Transitions Follow Up Call    2022    Patient: Titi Fontenot  Patient : 1950   MRN: <[id number]>  Reason for Admission: Small bowel obstruction UTI  Discharge Date: 22 RARS: Readmission Risk Score: 8.1 ( )         Spoke with: Subsequent transitional call. No answer. Left HIPPA compliant message to return call to this writer. Care Transitions Subsequent and Final Call    Subsequent and Final Calls  Care Transitions Interventions  Other Interventions: Follow Up  No future appointments.     JAIME Major LPN Care Transitions Nurse   [phone]

## 2022-02-09 ENCOUNTER — VIRTUAL VISIT (OUTPATIENT)
Dept: INTERNAL MEDICINE CLINIC | Age: 72
End: 2022-02-09
Payer: MEDICARE

## 2022-02-09 DIAGNOSIS — I10 ESSENTIAL HYPERTENSION: Primary | ICD-10-CM

## 2022-02-09 DIAGNOSIS — E66.01 SEVERE OBESITY (BMI 35.0-39.9) WITH COMORBIDITY (HCC): ICD-10-CM

## 2022-02-09 DIAGNOSIS — Z87.19 S/P SMALL BOWEL OBSTRUCTION: ICD-10-CM

## 2022-02-09 PROCEDURE — 1111F DSCHRG MED/CURRENT MED MERGE: CPT | Performed by: INTERNAL MEDICINE

## 2022-02-09 PROCEDURE — 99495 TRANSJ CARE MGMT MOD F2F 14D: CPT | Performed by: INTERNAL MEDICINE

## 2022-02-09 RX ORDER — ONDANSETRON 4 MG/1
4 TABLET, FILM COATED ORAL DAILY PRN
Qty: 30 TABLET | Refills: 0 | Status: SHIPPED | OUTPATIENT
Start: 2022-02-09

## 2022-02-09 NOTE — PROGRESS NOTES
Post-Discharge Transitional Care Management Services or Hospital Follow Up      Kike De Souza   YOB: 1950    Date of Office Visit:  2/9/2022  Date of Hospital Admission: 1/12/22  Date of Hospital Discharge: 1/16/22  Risk of hospital readmission (high >=14%.  Medium >=10%) :Readmission Risk Score: 8.1 ( )      Care management risk score Rising risk (score 2-5) and Complex Care (Scores >=6): 1     Non face to face  following discharge, date last encounter closed (first attempt may have been earlier): *No documented post hospital discharge outreach found in the last 14 days    Call initiated 2 business days of discharge: *No response recorded in the last 14 days    Patient Active Problem List   Diagnosis    Dyslipidemia    Osteoarthritis    GERD (gastroesophageal reflux disease)    Essential hypertension    Morbidly obese ([address]    Allergic rhinitis    Osteoporosis    Arthritis of knee    Biliary dyskinesia    Right upper quadrant abdominal pain    Fundic gland polyposis of stomach    Small bowel obstruction ([address]    Incarcerated incisional hernia    Sigmoid diverticulitis    Incisional hernia without obstruction or gangrene    Chalazion    S/P colectomy    History of colostomy reversal    Abdominal adhesions    Hypokalemia    Atelectasis of left lung    Postoperative anemia due to acute blood loss    Surgical wound dehiscence    Constipation    Systolic murmur    Thyroid disease    Perforated sigmoid colon ([address]    Large intestine anastomotic leak    Normocytic anemia    Rectal bleeding    Sepsis ([address]    Acute cystitis without hematuria    Leukocytosis    Nausea    Postoperative intra-abdominal abscess    MRSA (methicillin resistant staph aureus) culture positive    E coli infection       No Known Allergies    Medications listed as ordered at the time of discharge from hospital     Medication List          Accurate as of February 9, 2022  1:03 PM. If you have any questions, ask your nurse or doctor. START taking these medications    ondansetron 4 MG tablet  Commonly known as: ZOFRAN  Take 1 tablet by mouth daily as needed for Nausea or Vomiting        CONTINUE taking these medications    metoprolol succinate 25 MG extended release tablet  Commonly known as: Toprol XL  Take 1 tablet by mouth daily     SYSTANE OP     TYLENOL 500 MG tablet  Generic drug: acetaminophen     vitamin D 50 MCG (2000 UT) Caps capsule           Where to Get Your Medications      These medications were sent to [address], [address] [facility] [phone] - f [phone]  [address] [place] [address]    Phone: [phone]   · ondansetron 4 MG tablet           Medications marked \"taking\" at this time  Outpatient Medications Marked as Taking for the 2/9/22 encounter (Virtual Visit) with Ade Chang MD   Medication Sig Dispense Refill    ondansetron (ZOFRAN) 4 MG tablet Take 1 tablet by mouth daily as needed for Nausea or Vomiting 30 tablet 0    Polyethyl Glycol-Propyl Glycol (SYSTANE OP) Place 1 drop into both eyes as needed (dry eyes)      metoprolol succinate (TOPROL XL) 25 MG extended release tablet Take 1 tablet by mouth daily 90 tablet 1    acetaminophen (TYLENOL) 500 MG tablet Take 1,000 mg by mouth nightly       Cholecalciferol (VITAMIN D) 2000 units CAPS capsule Take 2 capsules by mouth daily           Medications patient taking as of now reconciled against medications ordered at time of hospital discharge: Yes    Chief Complaint   Patient presents with    Follow-Up from [address] [place] [address] 1/12/22-1/16/22 small bowel obstruction. feeling better now.     Nausea     states has nausea at times, asking for zofran       History of Present illness - Follow up of Hospital diagnosis(es): Follow-up from the hospital admission in January for abdominal pain and nausea and patient was found to have small bowel obstruction and patient was admitted started NG tube and seen by surgery and is patient slowly improved with low intermittent suction and patient started tolerating diet and was discharged and patient today says that she is feeling good and tolerating diet and having no issues and during her admission patient blood pressure was elevated and so patient was started on metoprolol  Inpatient course: Discharge summary reviewed- see chart. Interval history/Current status: Discussed with patient and she has not been taking her blood pressure and so advised patient to monitor blood pressure and call me back next week and continue metoprolol as before and patient was requesting Zofran in case in the future if she has any nausea and a prescription was sent to the pharmacy  Review as scheduled    A comprehensive review of systems was negative except for what was noted in the HPI. There were no vitals filed for this visit. There is no height or weight on file to calculate BMI. Wt Readings from Last 3 Encounters:   01/16/22 235 lb (106.6 kg)   11/18/21 240 lb (108.9 kg)   05/18/21 237 lb (107.5 kg)     BP Readings from Last 3 Encounters:   01/16/22 (!) 142/71   11/18/21 122/80   05/18/21 138/74        Physical Exam:  Physical examination could not be done as it is a phone call visit    Assessment/Plan:   Diagnosis Orders   1. Essential hypertension  HI DISCHARGE MEDS RECONCILED W/ CURRENT OUTPATIENT MED LIST   2. Severe obesity (BMI 35.0-39. 9) with comorbidity ([facility] [address]  HI DISCHARGE MEDS RECONCILED W/ CURRENT OUTPATIENT MED LIST   3.  S/p small bowel obstruction  HI DISCHARGE MEDS RECONCILED W/ CURRENT OUTPATIENT MED LIST           Medical Decision Making: moderate complexity

## 2022-02-15 ENCOUNTER — CARE COORDINATION (OUTPATIENT)
Dept: CASE MANAGEMENT | Age: 72
End: 2022-02-15

## 2022-02-15 NOTE — CARE COORDINATION
[facility] Transitions Follow Up Call    2/15/2022    Patient: Suman Fontenot  Patient : 1950   MRN: <[id number]>  Reason for Admission: SBO  UTI  Discharge Date: 22 RARS: Readmission Risk Score: 8.1 ( )         Spoke with: SUBSEQUENT call. Spoke to che Barragan. States she is doing well. She has no abdominal pain, nausea or vomiting. No s/s of UTI. Seen by PCP on 22. No new issues or concerns. No pending appts. Final call. Patient is aware of when to contact MD with any new or worsening symptoms. Advised to contact PCP  with any health concerns for early outpatient intervention in an effort to avoid hospitalization. Report any worsening symptoms to PCP and/or Call 911 and/or GO TO EMERGENCY ROOM if symptoms are severe. Expresses understanding. Care Transitions Follow Up Call    Needs to be reviewed by the provider   Additional needs identified to be addressed with provider: No  none             Method of communication with provider : none      Care Transition Nurse (CTN) contacted the patient by telephone to follow up after admission on 22 Verified name and  with patient as identifiers. Addressed changes since last contact: none  Discussed follow-up appointments. If no appointment was previously scheduled, appointment scheduling offered: Yes. Is follow up appointment scheduled within 7 days of discharge? No.    Advance Care Planning:   Does patient have an Advance Directive: reviewed and current. CTN reviewed discharge instructions, medical action plan and red flags with patient and discussed any barriers to care and/or understanding of plan of care after discharge. Discussed appropriate site of care based on symptoms and resources available to patient including: PCP, When to call 911 and [employer]. The patient agrees to contact the PCP office for questions related to their healthcare.      Patients top risk factors for readmission: medical condition-SBO UTI  Interventions to address risk factors: Obtained and reviewed discharge summary and/or continuity of care documents      Non-[facility] follow up appointment(s): N/A    CTN provided contact information for future needs. No further follow-up call indicated based on severity of symptoms and risk factors. Plan for next call: N/A          Care Transitions Subsequent and Final Call    Subsequent and Final Calls  Care Transitions Interventions  Other Interventions: Follow Up  No future appointments.      Onetha Randy SANDOVAL Care Transitions Nurse   [phone]    Olivier Davis LPN

## 2022-03-16 ENCOUNTER — TELEPHONE (OUTPATIENT)
Dept: FAMILY MEDICINE CLINIC | Age: 72
End: 2022-03-16

## 2022-03-16 NOTE — TELEPHONE ENCOUNTER
Alex Martin was contacted as a part of [employer]. A voicemail message was left reminding the patient to schedule an AWV with their PCP.

## 2022-06-01 ENCOUNTER — OFFICE VISIT (OUTPATIENT)
Dept: INTERNAL MEDICINE CLINIC | Age: 72
End: 2022-06-01
Payer: MEDICARE

## 2022-06-01 VITALS
TEMPERATURE: 98.2 F | DIASTOLIC BLOOD PRESSURE: 78 MMHG | BODY MASS INDEX: 38.86 KG/M2 | RESPIRATION RATE: 18 BRPM | HEART RATE: 56 BPM | SYSTOLIC BLOOD PRESSURE: 138 MMHG | OXYGEN SATURATION: 96 % | WEIGHT: 247.6 LBS | HEIGHT: 67 IN

## 2022-06-01 DIAGNOSIS — Z00.00 INITIAL MEDICARE ANNUAL WELLNESS VISIT: Primary | ICD-10-CM

## 2022-06-01 PROCEDURE — G0438 PPPS, INITIAL VISIT: HCPCS | Performed by: INTERNAL MEDICINE

## 2022-06-01 PROCEDURE — 1123F ACP DISCUSS/DSCN MKR DOCD: CPT | Performed by: INTERNAL MEDICINE

## 2022-06-01 PROCEDURE — 3017F COLORECTAL CA SCREEN DOC REV: CPT | Performed by: INTERNAL MEDICINE

## 2022-06-01 SDOH — ECONOMIC STABILITY: FOOD INSECURITY: WITHIN THE PAST 12 MONTHS, YOU WORRIED THAT YOUR FOOD WOULD RUN OUT BEFORE YOU GOT MONEY TO BUY MORE.: NEVER TRUE

## 2022-06-01 SDOH — ECONOMIC STABILITY: FOOD INSECURITY: WITHIN THE PAST 12 MONTHS, THE FOOD YOU BOUGHT JUST DIDN'T LAST AND YOU DIDN'T HAVE MONEY TO GET MORE.: NEVER TRUE

## 2022-06-01 ASSESSMENT — LIFESTYLE VARIABLES
HOW OFTEN DO YOU HAVE A DRINK CONTAINING ALCOHOL: MONTHLY OR LESS
HOW MANY STANDARD DRINKS CONTAINING ALCOHOL DO YOU HAVE ON A TYPICAL DAY: 1 OR 2

## 2022-06-01 ASSESSMENT — PATIENT HEALTH QUESTIONNAIRE - PHQ9
SUM OF ALL RESPONSES TO PHQ QUESTIONS 1-9: 0
SUM OF ALL RESPONSES TO PHQ9 QUESTIONS 1 & 2: 0
SUM OF ALL RESPONSES TO PHQ QUESTIONS 1-9: 0
SUM OF ALL RESPONSES TO PHQ QUESTIONS 1-9: 0
2. FEELING DOWN, DEPRESSED OR HOPELESS: 0
1. LITTLE INTEREST OR PLEASURE IN DOING THINGS: 0
SUM OF ALL RESPONSES TO PHQ QUESTIONS 1-9: 0

## 2022-06-01 ASSESSMENT — SOCIAL DETERMINANTS OF HEALTH (SDOH): HOW HARD IS IT FOR YOU TO PAY FOR THE VERY BASICS LIKE FOOD, HOUSING, MEDICAL CARE, AND HEATING?: NOT HARD AT ALL

## 2022-06-01 NOTE — PROGRESS NOTES
Medicare Annual Wellness Visit    Miguel Villatoro is here for Medicare AWV and Health Maintenance (hep c, tdap, shing, pneumo, dep)    Assessment & Plan   Initial Medicare annual wellness visit      Recommendations for Preventive Services Due: see orders and patient instructions/AVS.  Recommended screening schedule for the next 5-10 years is provided to the patient in written form: see Patient Instructions/AVS.     Return for Medicare Annual Wellness Visit in 1 year. Subjective   The following acute and/or chronic problems were also addressed today: Patient declines any immunizations    Patient's complete Health Risk Assessment and screening values have been reviewed and are found in Flowsheets. The following problems were reviewed today and where indicated follow up appointments were made and/or referrals ordered.     Positive Risk Factor Screenings with Interventions:    Fall Risk:  Do you feel unsteady or are you worried about falling? : (!) yes  2 or more falls in past year?: no  Fall with injury in past year?: no     Fall Risk Interventions:    · Home safety tips provided            General Health and ACP:  General  In general, how would you say your health is?: Very Good  In the past 7 days, have you experienced any of the following: New or Increased Pain, New or Increased Fatigue, Loneliness, Social Isolation, Stress or Anger?: No  Do you get the social and emotional support that you need?: Yes  Do you have a Living Will?: (!) No    Advance Directives     Power of  Living Will ACP-Advance Directive ACP-Power of     Not on File Not on File Not on File Not on File      General Health Risk Interventions:  · No Living Will: Patient declines ACP discussion/assistance    Health Habits/Nutrition:     Physical Activity: Insufficiently Active    Days of Exercise per Week: 2 days    Minutes of Exercise per Session: 30 min     Have you lost any weight without trying in the past 3 months?: No  Body mass index: (!) 38.77  Have you seen the dentist within the past year?: Yes    Health Habits/Nutrition Interventions:  · No intervention    Hearing/Vision:  Do you or your family notice any trouble with your hearing that hasn't been managed with hearing aids?: (!) Yes  Do you have difficulty driving, watching TV, or doing any of your daily activities because of your eyesight?: No  Have you had an eye exam within the past year?: Yes  No exam data present    Hearing/Vision Interventions:  · Hearing concerns:  Patient calling [facility] and make an appointment and calls me back if she needs a referral            Objective   Vitals:    06/01/22 1410   BP: 138/78   Site: Left Upper Arm   Position: Sitting   Cuff Size: Large Adult   Pulse: 56   Resp: 18   Temp: 98.2 °F (36.8 °C)   TempSrc: Temporal   SpO2: 96%   Weight: 247 lb 9.6 oz (112.3 kg)   Height: 5' 7.01\" (1.702 m)      Body mass index is 38.77 kg/m².         General Appearance: alert and oriented to person, place and time, well developed and well- nourished, in no acute distress  Skin: warm and dry, no rash or erythema  Head: normocephalic and atraumatic  Eyes: pupils equal, round, and reactive to light, extraocular eye movements intact, conjunctivae normal  ENT: tympanic membrane, external ear and ear canal normal bilaterally, nose without deformity, nasal mucosa and turbinates normal without polyps  Neck: supple and non-tender without mass, no thyromegaly or thyroid nodules, no cervical lymphadenopathy  Pulmonary/Chest: clear to auscultation bilaterally- no wheezes, rales or rhonchi, normal air movement, no respiratory distress  Cardiovascular: normal rate, regular rhythm, normal S1 and S2, no murmurs, rubs, clicks, or gallops, distal pulses intact, no carotid bruits  Abdomen: soft, non-tender, non-distended, normal bowel sounds, no masses or organomegaly  Extremities: no cyanosis, clubbing or edema  Musculoskeletal: normal range of motion, no joint swelling, deformity or tenderness  Neurologic: reflexes normal and symmetric, no cranial nerve deficit, gait, coordination and speech normal       No Known Allergies  Prior to Visit Medications    Medication Sig Taking? Authorizing Provider   ondansetron (ZOFRAN) 4 MG tablet Take 1 tablet by mouth daily as needed for Nausea or Vomiting Yes Abhi VELASQUEZ MD   metoprolol succinate (TOPROL XL) 25 MG extended release tablet Take 1 tablet by mouth daily Yes Kaiser Pascal MD   acetaminophen (TYLENOL) 500 MG tablet Take 1,000 mg by mouth nightly  Yes Historical Provider, MD   Cholecalciferol (VITAMIN D) 2000 units CAPS capsule Take 2 capsules by mouth daily  Yes Historical Provider, MD   Polyethyl Glycol-Propyl Glycol (SYSTANE OP) Place 1 drop into both eyes as needed (dry eyes)  Patient not taking: Reported on 6/1/2022  Historical Provider, MD Burk[place] (Including outside providers/suppliers regularly involved in providing care):   Patient Care Team:  Kaiser Pascal MD as PCP - General (Internal Medicine)  Kaiser Pascal MD as PCP - [address] [place] [address] Provider  Darleen Holbrook MD as Consulting Physician (Infectious Diseases)  Gilbert Fan MD as Consulting Physician (Gastroenterology)     Reviewed and updated this visit:  Tobacco  Allergies  Meds  Med Hx  Surg Hx  Soc Hx  Fam Hx                 Advance Care Planning   Advanced Care Planning: Discussed the patients choices for care and treatment in case of a health event that adversely affects decision-making abilities. Also discussed the patients long-term treatment options. Reviewed with the patient the appropriate state-specific advance directive documents. Reviewed the process of designating a competent adult as an Agent (or -in-fact) that could take make health care decisions for the patient if incompetent.  Patient was asked to complete the declaration forms, either acknowledge the forms by a public notary or an eligible witness and provide a signed copy to the practice office.   Time spent (minutes): 10

## 2022-06-01 NOTE — PATIENT INSTRUCTIONS
Advance Directives: Care Instructions  Overview  An advance directive is a legal way to state your wishes at the end of your life. It tells your family and your doctor what to do if you can't say what youwant. There are two main types of advance directives. You can change them any timeyour wishes change. Living will. This form tells your family and your doctor your wishes about life support and other treatment. The form is also called a declaration. Medical power of . This form lets you name a person to make treatment decisions for you when you can't speak for yourself. This person is called a health care agent (health care proxy, health care surrogate). The form is also called a durable power of  for health care. If you do not have an advance directive, decisions about your medical care maybe made by a family member, or by a doctor or a  who doesn't know you. It may help to think of an advance directive as a gift to the people who carefor you. If you have one, they won't have to make tough decisions by themselves. Follow-up care is a key part of your treatment and safety. Be sure to make and go to all appointments, and call your doctor if you are having problems. It's also a good idea to know your test results and keep alist of the medicines you take. What should you include in an advance directive? Many states have a unique advance directive form. (It may ask you to address specific issues.) Or you might use a universal form that's approved by manystates. If your form doesn't tell you what to address, it may be hard to know what to include in your advance directive. Use the questions below to help you getstarted.  Who do you want to make decisions about your medical care if you are not able to?  What life-support measures do you want if you have a serious illness that gets worse over time or can't be cured?  What are you most afraid of that might happen?  (Maybe you're afraid of having pain, losing your independence, or being kept alive by machines.)   Where would you prefer to die? (Your home? A hospital? A nursing home?)   Do you want to donate your organs when you die?  Do you want certain Sabianism practices performed before you die? When should you call for help? Be sure to contact your doctor if you have any questions. Where can you learn more? Go to [email or web address] and sign in to your peerTransfer account. Enter R264 in the Sharegate box to learn more about \"Advance Directives: Care Instructions. \"     If you do not have an account, please click on the \"Sign Up Now\" link. Current as of: October 18, 2021               Content Version: 13.2  © 6988-0725 Healthwise, [employer]. Care instructions adapted under license by [facility] ([facility]). If you have questions about a medical condition or this instruction, always ask your healthcare professional. Irene Junes any warranty or liability for your use of this information. Personalized Preventive Plan for Samara Álvarezs - 6/1/2022  Medicare offers a range of preventive health benefits. Some of the tests and screenings are paid in full while other may be subject to a deductible, co-insurance, and/or copay. Some of these benefits include a comprehensive review of your medical history including lifestyle, illnesses that may run in your family, and various assessments and screenings as appropriate. After reviewing your medical record and screening and assessments performed today your provider may have ordered immunizations, labs, imaging, and/or referrals for you. A list of these orders (if applicable) as well as your Preventive Care list are included within your After Visit Summary for your review.     Other Preventive Recommendations:    · A preventive eye exam performed by an eye specialist is recommended every 1-2 years to screen for glaucoma; cataracts, macular degeneration, and other eye disorders. · A preventive dental visit is recommended every 6 months. · Try to get at least 150 minutes of exercise per week or 10,000 steps per day on a pedometer . · Order or download the FREE \"Exercise & Physical Activity: Your Everyday Guide\" from [employer]. Call 6-440.207.1999 or search [employer] online. · You need 1399-5316 mg of calcium and 8507-5478 IU of vitamin D per day. It is possible to meet your calcium requirement with diet alone, but a vitamin D supplement is usually necessary to meet this goal.  · When exposed to the sun, use a sunscreen that protects against both UVA and UVB radiation with an SPF of 30 or greater. Reapply every 2 to 3 hours or after sweating, drying off with a towel, or swimming. · Always wear a seat belt when traveling in a car. Always wear a helmet when riding a bicycle or motorcycle.

## 2022-06-08 ENCOUNTER — HOSPITAL ENCOUNTER (OUTPATIENT)
Dept: MAMMOGRAPHY | Age: 72
Discharge: HOME | End: 2022-06-10
Payer: MEDICARE

## 2022-06-08 DIAGNOSIS — Z12.31 VISIT FOR SCREENING MAMMOGRAM: ICD-10-CM

## 2022-06-08 PROCEDURE — 77063 BREAST TOMOSYNTHESIS BI: CPT

## 2022-06-27 ENCOUNTER — TELEPHONE (OUTPATIENT)
Dept: INTERNAL MEDICINE CLINIC | Age: 72
End: 2022-06-27

## 2022-06-27 DIAGNOSIS — H91.93 HEARING DIFFICULTY, BILATERAL: Primary | ICD-10-CM

## 2022-06-27 NOTE — TELEPHONE ENCOUNTER
Patient asking for a referral to [facility] hearing to be faxed to them?     Please advise

## 2022-07-21 RX ORDER — METOPROLOL SUCCINATE 25 MG/1
25 TABLET, EXTENDED RELEASE ORAL DAILY
Qty: 90 TABLET | Refills: 1 | Status: SHIPPED | OUTPATIENT
Start: 2022-07-21

## 2022-07-21 NOTE — TELEPHONE ENCOUNTER
Sahil [facility] is calling to request a refill on the following medication(s):    Medication Request:    Last filled 11/18/21 #90 with 1 RF      Requested Prescriptions     Pending Prescriptions Disp Refills    metoprolol succinate (TOPROL XL) 25 MG extended release tablet 90 tablet 1     Sig: Take 1 tablet by mouth in the morning.        Last Visit Date (If Applicable):  3/1/8773    Next Visit Date:    Visit date not found

## 2022-08-29 ENCOUNTER — HOSPITAL ENCOUNTER (EMERGENCY)
Age: 72
Discharge: HOME | End: 2022-08-29
Attending: EMERGENCY MEDICINE
Payer: MEDICARE

## 2022-08-29 ENCOUNTER — APPOINTMENT (OUTPATIENT)
Dept: GENERAL RADIOLOGY | Age: 72
End: 2022-08-29
Payer: MEDICARE

## 2022-08-29 VITALS
RESPIRATION RATE: 18 BRPM | DIASTOLIC BLOOD PRESSURE: 79 MMHG | BODY MASS INDEX: 39.08 KG/M2 | OXYGEN SATURATION: 95 % | HEIGHT: 67 IN | SYSTOLIC BLOOD PRESSURE: 158 MMHG | WEIGHT: 249 LBS | HEART RATE: 66 BPM

## 2022-08-29 DIAGNOSIS — S93.515A SPRAIN OF INTERPHALANGEAL JOINT OF LEFT LESSER TOE(S), INITIAL ENCOUNTER: Primary | ICD-10-CM

## 2022-08-29 PROCEDURE — 73630 X-RAY EXAM OF FOOT: CPT

## 2022-08-29 PROCEDURE — 99283 EMERGENCY DEPT VISIT LOW MDM: CPT

## 2022-08-29 ASSESSMENT — PAIN SCALES - GENERAL: PAINLEVEL_OUTOF10: 3

## 2022-08-29 ASSESSMENT — PAIN - FUNCTIONAL ASSESSMENT: PAIN_FUNCTIONAL_ASSESSMENT: 0-10

## 2022-08-29 ASSESSMENT — ENCOUNTER SYMPTOMS: COLOR CHANGE: 1

## 2022-08-29 NOTE — ED PROVIDER NOTES
[address] [facility] ED  EMERGENCY DEPARTMENT ENCOUNTER      Pt Name: Irene Pearce  MRN: [id number]  [place] 1950  Date of evaluation: 8/29/2022  Provider: Samantha Chavez       Chief Complaint   Patient presents with    Foot Injury     L foot. Pt states she fell on Saturday morning. Pt has notable bruising to 4th and 5th toes on top of foot         HISTORY OFPRESENT ILLNESS  (Location/Symptom, Timing/Onset, Context/Setting, Quality, Duration, Modifying Factors, Severity.)   Irene Pearce is a 81 y.o. female who presents to the emergency department by private auto for evaluation of pain and bruising to her fourth and fifth toes of her left foot occurred after she struck a door frame at home 2 days ago. She was barefoot when this occurred. States she only has pain when she is up walking. Pain is a 3-4. Denies other injury. Nursing Notes were reviewed. PASTMEDICAL HISTORY     Past Medical History:   Diagnosis Date    Allergic rhinitis     Bladder prolapse     Constipation     5/2019 resolved    Fundic gland polyposis of stomach 08/17/2017    per EGD    GERD (gastroesophageal reflux disease)     on no medications    Hiatal hernia     History of cardiac cath 12/2002    pt denies blockage    History of diverticulitis     Hyperlipidemia     borderline, on no medication    Hypertension     MRSA (methicillin resistant staph aureus) culture positive 11/01/2016    nasal    MRSA carrier     follows with ID    Obesity     Osteoarthritis     Pelvic abscess in female     Sleep apnea     CPAP nightly    Systolic murmur     benign systolic murmur (history of). Pt does not follow-up with a cardiologist (Written 09/25/2019).     Thyroid disease     goiter    Wears glasses          SURGICAL HISTORY       Past Surgical History:   Procedure Laterality Date    ABDOMINAL EXPLORATION SURGERY  05/16/2019    CHOLECYSTECTOMY, LAPAROSCOPIC  11/15/2016    CHOLECYSTECTOMY, LAPAROSCOPIC  11/15/2016 COLONOSCOPY  2013    neg    COLONOSCOPY N/A 5/16/2019    COLONOSCOPY DIAGNOSTIC performed by Taras Garcia MD at [address] [facility] N/A 12/19/2019    COLONOSCOPY W/STENT X2 [facility] [address] [facility] N/A 12/24/2019    EXPLORATORY LAPAROTOMY, EXTENSIVE LYSIS OF ADHESIONS, TAKE DOWN OF ANASTOMOSIS, COLOSTOMY CREATION, CLOSURE OF SEROMUSCULAR INJURIES performed by Taras Garcia MD at [facility], COLON, DIAGNOSTIC      EGD    FISSURECTOMY ANAL N/A 10/4/2019    FLEXIBLE SIGMOIDOSCOPY & ANAL FISTULECTOMY performed by Taras Garcia MD at [address] ARTHROSCOPY Left     lateral release    NV EGD TRANSORAL BIOPSY SINGLE/MULTIPLE N/A 8/17/2017    EGD BIOPSY performed by Yung Deleon MD at [address] [facility] OFFICE/OUTPT [address] N/A 5/22/2018    XI ROBOTIC LAPAROSCOPIC UMBILICAL HERNIA REPAIR WITH MESH, POSSIBLE OPEN performed by Laura Morris IV, DO at [address] [facility]  02/19/2019    SIGMOIDOSCOPY N/A 2/19/2019    SIGMOIDOSCOPY BIOPSY FLEXIBLE performed by Pierce Barnhart DO at [address] [facility] N/A 12/23/2019    SIGMOIDOSCOPY DIAGNOSTIC FLEXIBLE WITH FLUORO performed by Damon Winters MD at [address] [facility] N/A 3/2/2021    [address] [facility] performed by Yung Deleon MD at  [address] 5/16/2019    [address] performed by Taras Garcia MD at [address] [facility] N/A 11/12/2019    COLOSTOMY  [address] [facility], REPAIR OF MULTIPLE VENTRAL HERNIAS, MOBILIZATION OF THE SPLENIC FLEXURE AND TRANSVERSE COLON, ABDOMINAL EXPLORATION performed by Taras Garcia MD at [facility] Left 9/24/2012    knee    TOTAL KNEE ARTHROPLASTY Right 79/58/0115    knee    UMBILICAL HERNIA REPAIR  05/22/2018    UPPER GASTROINTESTINAL ENDOSCOPY  08/17/2017 Multiple small gastric polyps, no esophagitis noted no Melara's mucosa. No hiatal hernia, pathology benign fundic gland polyps         CURRENT MEDICATIONS     Discharge Medication List as of 2022 11:46 AM        CONTINUE these medications which have NOT CHANGED    Details   metoprolol succinate (TOPROL XL) 25 MG extended release tablet Take 1 tablet by mouth in the morning., Disp-90 tablet, R-1Normal      ondansetron (ZOFRAN) 4 MG tablet Take 1 tablet by mouth daily as needed for Nausea or Vomiting, Disp-30 tablet, R-0Normal      Polyethyl Glycol-Propyl Glycol (SYSTANE OP) Place 1 drop into both eyes as needed (dry eyes)Historical Med      acetaminophen (TYLENOL) 500 MG tablet Take 1,000 mg by mouth nightly Historical Med      Cholecalciferol (VITAMIN D) 2000 units CAPS capsule Take 2 capsules by mouth daily Historical Med             ALLERGIES     Patient has no known allergies.     FAMILY HISTORY       Family History   Problem Relation Age of Onset    Heart Disease Mother     COPD Father     Cancer Brother         thyroid, prostate, lung          SOCIAL HISTORY       Social History     Socioeconomic History    Marital status: Single   Tobacco Use    Smoking status: Former     Packs/day: 1.00     Years: 3.00     Pack years: 3.00     Types: Cigarettes     Quit date: 1975     Years since quittin.6    Smokeless tobacco: Never    Tobacco comments:     quit smoking age 23   Vaping Use    Vaping Use: Never used   Substance and Sexual Activity    Alcohol use: Yes     Comment: very rare    Drug use: No     Social Determinants of Health     Financial Resource Strain: Low Risk     Difficulty of Paying Living Expenses: Not hard at all   Food Insecurity: No Food Insecurity    Worried About Running Out of Food in the Last Year: Never true    Ran Out of Food in the Last Year: Never true   Physical Activity: Insufficiently Active    Days of Exercise per Week: 2 days    Minutes of Exercise per Session: 30 min REVIEW OF SYSTEMS    (2-9 systems for level 4, 10 or more for level 5)     Review of Systems   Constitutional:  Positive for activity change. Musculoskeletal:  Positive for arthralgias, gait problem and joint swelling. Skin:  Positive for color change. Negative for wound. All other systems reviewed and are negative. Except as noted above the remainder of the review of systems was reviewed and negative. PHYSICAL EXAM    (up to 7 for level 4, 8 or more for level 5)     ED Triage Vitals [08/29/22 1054]   BP Temp Temp src Heart Rate Resp SpO2 Height Weight   (!) 158/79 -- -- 66 18 95 % 5' 7\" (1.702 m) 249 lb (112.9 kg)       Physical Exam  Constitutional:       Appearance: Normal appearance. She is well-developed, well-groomed and normal weight. HENT:      Head: Normocephalic. Right Ear: External ear normal.      Left Ear: External ear normal.      Nose: Nose normal.   Eyes:      Conjunctiva/sclera: Conjunctivae normal.   Cardiovascular:      Pulses:           Dorsalis pedis pulses are 2+ on the left side. Posterior tibial pulses are 2+ on the left side. Pulmonary:      Effort: Pulmonary effort is normal. No respiratory distress. Musculoskeletal:      Cervical back: Normal range of motion. Left ankle: Normal.      Left foot: Decreased range of motion. Normal capillary refill. Swelling and tenderness present. No crepitus. Normal pulse. Feet:       Comments: Patient is able to wiggle the toes on her left foot. Tenderness to the fifth toe to palpation. There is bruising to the fifth and fourth toes with mild bruising to the third toe. Nails are intact. There is no wound. No bruising to the sole of the foot. Pulses palpable. Skin:     General: Skin is warm and dry. Findings: Bruising present. Neurological:      Mental Status: She is alert and oriented to person, place, and time.    Psychiatric:         Mood and Affect: Mood normal.         Behavior: Behavior normal.         Thought Content: Thought content normal.         Judgment: Judgment normal.         DIAGNOSTIC RESULTS     EKG:All EKG's are interpreted by the Emergency Department Physician who either signs or Co-signs this chart in the absence of a cardiologist.        RADIOLOGY:   Non-plain film images such as CT, Ultrasound and MRI are read by theradiologist. Plain radiographic images are visualized and preliminarily interpreted by the emergency physician with the below findings:    XR FOOT LEFT (MIN 3 VIEWS)    Result Date: 8/29/2022  EXAMINATION: THREE XRAY VIEWS OF THE LEFT FOOT 8/29/2022 11:07 am COMPARISON: Left foot radiographs performed 04/23/2014. HISTORY: ORDERING SYSTEM PROVIDED HISTORY: Fourth and fifth toe pain after hitting a door frame 2 days ago TECHNOLOGIST PROVIDED HISTORY: Fourth and fifth toe pain after hitting a door frame 2 days ago Reason for Exam: pain Additional signs and symptoms: pain in lt foot in digits 3-5, pt hit lt foot on a door frame x 2 days ago FINDINGS: There is no acute osseous abnormality. There is a calcaneal spur at the Achilles and plantar fascial attachment. There is diffuse degenerative joint disease. The surrounding soft tissues are unremarkable. No acute osseous or soft tissue abnormality. Diffuse degenerative joint disease. Interpretation per the Radiologist below, if available at the time of this note:    XR FOOT LEFT (MIN 3 VIEWS)   Final Result   No acute osseous or soft tissue abnormality. Diffuse degenerative joint disease. EDBEDSIDE ULTRASOUND:   Performed by Malik Borges - none    LABS:  Labs Reviewed - No data to display    All other labs were within normal range or not returned as of this dictation. EMERGENCY DEPARTMENT COURSE andDIFFERENTIAL DIAGNOSIS/MDM:   Patient evaluated in conjunction attending physician. X-ray left foot per radiologist no acute osseous or soft tissue abnormality. Exam shows toe sprain.   I discussed imaging results with the patient. She is provided with a postop shoe. I offered the patient referral to podiatrist but she declined. She will follow-up with her primary care provider if she needs to. Vitals:    Vitals:    08/29/22 1054   BP: (!) 158/79   Pulse: 66   Resp: 18   SpO2: 95%   Weight: 249 lb (112.9 kg)   Height: 5' 7\" (1.702 m)         CONSULTS:  None    RES:  Procedures    FINAL IMPRESSION      1.  Sprain of interphalangeal joint of left lesser toe(s), initial encounter          DISPOSITION/PLAN   DISPOSITION Decision To Discharge 08/29/2022 11:45:35 AM      PATIENT REFERRED TO:   Michelle Nielsen MD  [facility]  [address] [facility] [phone]      As needed    [facility] ED  [address] [facility]  [phone]    As needed    DISCHARGE MEDICATIONS:     Discharge Medication List as of 8/29/2022 11:46 AM        Electronically signed by ROMERO Machuca 8/29/2022 at 3:25 PM            ROMERO Machuca CNP  08/29/22 1529

## 2022-08-29 NOTE — ED PROVIDER NOTES
eMERGENCY dEPARTMENT eNCOUnter   Independent Attestation     Pt Name: Lyle Mukherjee  MRN: [id number]  [place] 1950  Date of evaluation: 8/29/22     Lyle Mukherjee is a 81 y.o. female with CC: Foot Injury (L foot. Pt states she fell on Saturday morning. Pt has notable bruising to 4th and 5th toes on top of foot)        This visit was performed by both a physician and an APC. I performed all aspects of the MDM as documented. The care is provided during an unprecedented national emergency due to the novel coronavirus, COVID 19.     Todd Estes MD  Attending Emergency Physician            Todd Estes MD  08/29/22 8714

## 2022-12-09 RX ORDER — METOPROLOL SUCCINATE 25 MG/1
25 TABLET, EXTENDED RELEASE ORAL DAILY
Qty: 90 TABLET | Refills: 1 | Status: SHIPPED | OUTPATIENT
Start: 2022-12-09

## 2022-12-09 RX ORDER — ONDANSETRON 4 MG/1
4 TABLET, FILM COATED ORAL DAILY PRN
Qty: 30 TABLET | Refills: 0 | Status: SHIPPED | OUTPATIENT
Start: 2022-12-09

## 2022-12-09 NOTE — TELEPHONE ENCOUNTER
Michelle Cruz is calling to request a refill on the following medication(s):    Last Visit Date (If Applicable):  8/6/0115    Next Visit Date:    Visit date not found    Medication Request:  Requested Prescriptions     Pending Prescriptions Disp Refills    ondansetron (ZOFRAN) 4 MG tablet 30 tablet 0     Sig: Take 1 tablet by mouth daily as needed for Nausea or Vomiting    metoprolol succinate (TOPROL XL) 25 MG extended release tablet 90 tablet 1     Sig: Take 1 tablet by mouth daily

## 2022-12-19 ENCOUNTER — NURSE ONLY (OUTPATIENT)
Dept: INTERNAL MEDICINE CLINIC | Age: 72
End: 2022-12-19
Payer: MEDICARE

## 2022-12-19 VITALS — HEIGHT: 67 IN | TEMPERATURE: 97.2 F | BODY MASS INDEX: 39.71 KG/M2 | WEIGHT: 253 LBS

## 2022-12-19 DIAGNOSIS — Z23 FLU VACCINE NEED: Primary | ICD-10-CM

## 2022-12-19 PROCEDURE — G0008 ADMIN INFLUENZA VIRUS VAC: HCPCS | Performed by: INTERNAL MEDICINE

## 2022-12-19 PROCEDURE — 90694 VACC AIIV4 NO PRSRV 0.5ML IM: CPT | Performed by: INTERNAL MEDICINE

## 2023-02-06 ENCOUNTER — OFFICE VISIT (OUTPATIENT)
Dept: INTERNAL MEDICINE CLINIC | Age: 73
End: 2023-02-06
Payer: MEDICARE

## 2023-02-06 VITALS
RESPIRATION RATE: 18 BRPM | WEIGHT: 255.8 LBS | SYSTOLIC BLOOD PRESSURE: 152 MMHG | DIASTOLIC BLOOD PRESSURE: 80 MMHG | HEART RATE: 99 BPM | HEIGHT: 67 IN | TEMPERATURE: 97.2 F | OXYGEN SATURATION: 96 % | BODY MASS INDEX: 40.15 KG/M2

## 2023-02-06 DIAGNOSIS — G57.01 PIRIFORMIS SYNDROME OF RIGHT SIDE: ICD-10-CM

## 2023-02-06 DIAGNOSIS — G89.29 CHRONIC RIGHT-SIDED LOW BACK PAIN, UNSPECIFIED WHETHER SCIATICA PRESENT: ICD-10-CM

## 2023-02-06 DIAGNOSIS — E66.01 OBESITY, CLASS III, BMI 40-49.9 (MORBID OBESITY) (HCC): ICD-10-CM

## 2023-02-06 DIAGNOSIS — N39.0 URINARY TRACT INFECTION WITHOUT HEMATURIA, SITE UNSPECIFIED: ICD-10-CM

## 2023-02-06 DIAGNOSIS — M25.551 RIGHT HIP PAIN: Primary | ICD-10-CM

## 2023-02-06 DIAGNOSIS — M54.50 CHRONIC RIGHT-SIDED LOW BACK PAIN, UNSPECIFIED WHETHER SCIATICA PRESENT: ICD-10-CM

## 2023-02-06 PROCEDURE — G0444 DEPRESSION SCREEN ANNUAL: HCPCS | Performed by: INTERNAL MEDICINE

## 2023-02-06 PROCEDURE — G8484 FLU IMMUNIZE NO ADMIN: HCPCS | Performed by: INTERNAL MEDICINE

## 2023-02-06 PROCEDURE — 99214 OFFICE O/P EST MOD 30 MIN: CPT | Performed by: INTERNAL MEDICINE

## 2023-02-06 PROCEDURE — 1036F TOBACCO NON-USER: CPT | Performed by: INTERNAL MEDICINE

## 2023-02-06 PROCEDURE — 3017F COLORECTAL CA SCREEN DOC REV: CPT | Performed by: INTERNAL MEDICINE

## 2023-02-06 PROCEDURE — 1090F PRES/ABSN URINE INCON ASSESS: CPT | Performed by: INTERNAL MEDICINE

## 2023-02-06 PROCEDURE — 1123F ACP DISCUSS/DSCN MKR DOCD: CPT | Performed by: INTERNAL MEDICINE

## 2023-02-06 PROCEDURE — G8417 CALC BMI ABV UP PARAM F/U: HCPCS | Performed by: INTERNAL MEDICINE

## 2023-02-06 PROCEDURE — G8427 DOCREV CUR MEDS BY ELIG CLIN: HCPCS | Performed by: INTERNAL MEDICINE

## 2023-02-06 PROCEDURE — G8399 PT W/DXA RESULTS DOCUMENT: HCPCS | Performed by: INTERNAL MEDICINE

## 2023-02-06 PROCEDURE — 3077F SYST BP >= 140 MM HG: CPT | Performed by: INTERNAL MEDICINE

## 2023-02-06 PROCEDURE — 3079F DIAST BP 80-89 MM HG: CPT | Performed by: INTERNAL MEDICINE

## 2023-02-06 RX ORDER — CEPHALEXIN 500 MG/1
500 CAPSULE ORAL 3 TIMES DAILY
Qty: 21 CAPSULE | Refills: 0 | Status: SHIPPED | OUTPATIENT
Start: 2023-02-06 | End: 2023-02-13

## 2023-02-06 RX ORDER — PREDNISONE 10 MG/1
10 TABLET ORAL
Qty: 15 TABLET | Refills: 0 | Status: SHIPPED | OUTPATIENT
Start: 2023-02-06 | End: 2023-02-11

## 2023-02-06 SDOH — ECONOMIC STABILITY: FOOD INSECURITY: WITHIN THE PAST 12 MONTHS, YOU WORRIED THAT YOUR FOOD WOULD RUN OUT BEFORE YOU GOT MONEY TO BUY MORE.: NEVER TRUE

## 2023-02-06 SDOH — ECONOMIC STABILITY: INCOME INSECURITY: HOW HARD IS IT FOR YOU TO PAY FOR THE VERY BASICS LIKE FOOD, HOUSING, MEDICAL CARE, AND HEATING?: NOT HARD AT ALL

## 2023-02-06 SDOH — ECONOMIC STABILITY: HOUSING INSECURITY
IN THE LAST 12 MONTHS, WAS THERE A TIME WHEN YOU DID NOT HAVE A STEADY PLACE TO SLEEP OR SLEPT IN A SHELTER (INCLUDING NOW)?: NO

## 2023-02-06 SDOH — ECONOMIC STABILITY: FOOD INSECURITY: WITHIN THE PAST 12 MONTHS, THE FOOD YOU BOUGHT JUST DIDN'T LAST AND YOU DIDN'T HAVE MONEY TO GET MORE.: NEVER TRUE

## 2023-02-06 ASSESSMENT — PATIENT HEALTH QUESTIONNAIRE - PHQ9
SUM OF ALL RESPONSES TO PHQ QUESTIONS 1-9: 0
SUM OF ALL RESPONSES TO PHQ9 QUESTIONS 1 & 2: 0
1. LITTLE INTEREST OR PLEASURE IN DOING THINGS: 0
SUM OF ALL RESPONSES TO PHQ QUESTIONS 1-9: 0
2. FEELING DOWN, DEPRESSED OR HOPELESS: 0

## 2023-02-06 NOTE — PROGRESS NOTES
Ahmet Bay is a 81 y.o. female who presents for   Chief Complaint   Patient presents with    Hip Pain     Right hip pain ; got US guided injection in December; has been taking motrin and tylenol. Pt believes it is sciatica     Health Maintenance     Hep c, dtap, shingles, pneumococcal, a1c, covid     Urinary Tract Infection     Believes she has a UTI     and follow up of chronic medical problems.   Patient Active Problem List   Diagnosis    Dyslipidemia    Osteoarthritis    GERD (gastroesophageal reflux disease)    Essential hypertension    Morbidly obese (HCC)    Allergic rhinitis    Osteoporosis    Arthritis of knee    Biliary dyskinesia    Right upper quadrant abdominal pain    Fundic gland polyposis of stomach    Small bowel obstruction (HCC)    Incarcerated incisional hernia    Sigmoid diverticulitis    Incisional hernia without obstruction or gangrene    Chalazion    S/P colectomy    History of colostomy reversal    Abdominal adhesions    Hypokalemia    Atelectasis of left lung    Postoperative anemia due to acute blood loss    Surgical wound dehiscence    Constipation    Systolic murmur    Thyroid disease    Perforated sigmoid colon (HCC)    Large intestine anastomotic leak    Normocytic anemia    Rectal bleeding    Sepsis (HCC)    Acute cystitis without hematuria    Leukocytosis    Nausea    Postoperative intra-abdominal abscess    MRSA (methicillin resistant staph aureus) culture positive    E coli infection     HPI  Here for evaluation of right hip pain and patient has seen the orthopedics last month and had ultrasound-guided cortisone injection but did not work and she could not see him again soon and so he will place for follow-up and also complains of a UTI    Current Outpatient Medications   Medication Sig Dispense Refill    ondansetron (ZOFRAN) 4 MG tablet Take 1 tablet by mouth daily as needed for Nausea or Vomiting 30 tablet 0    metoprolol succinate (TOPROL XL) 25 MG extended release tablet Take 1 tablet by mouth daily 90 tablet 1    Polyethyl Glycol-Propyl Glycol (SYSTANE OP) Place 1 drop into both eyes as needed (dry eyes)      acetaminophen (TYLENOL) 500 MG tablet Take 1,000 mg by mouth nightly      Cholecalciferol (VITAMIN D) 2000 units CAPS capsule Take 2 capsules by mouth daily       cephALEXin (KEFLEX) 500 MG capsule Take 1 capsule by mouth 3 times daily for 7 days 21 capsule 0    predniSONE (DELTASONE) 10 MG tablet Take 1 tablet by mouth 3 times daily (with meals) for 5 days 15 tablet 0     No current facility-administered medications for this visit. No Known Allergies    Past Medical History:   Diagnosis Date    Allergic rhinitis     Bladder prolapse     Constipation     5/2019 resolved    Fundic gland polyposis of stomach 08/17/2017    per EGD    GERD (gastroesophageal reflux disease)     on no medications    Hiatal hernia     History of cardiac cath 12/2002    pt denies blockage    History of diverticulitis     Hyperlipidemia     borderline, on no medication    Hypertension     MRSA (methicillin resistant staph aureus) culture positive 11/01/2016    nasal    MRSA carrier     follows with ID    Obesity     Osteoarthritis     Pelvic abscess in female     Sleep apnea     CPAP nightly    Systolic murmur     benign systolic murmur (history of). Pt does not follow-up with a cardiologist (Written 09/25/2019).     Thyroid disease     goiter    Wears glasses        Past Surgical History:   Procedure Laterality Date    ABDOMINAL EXPLORATION SURGERY  05/16/2019    CHOLECYSTECTOMY, LAPAROSCOPIC  11/15/2016    CHOLECYSTECTOMY, LAPAROSCOPIC  11/15/2016    COLONOSCOPY  2013    neg    COLONOSCOPY N/A 5/16/2019    COLONOSCOPY DIAGNOSTIC performed by Livier Cornelius MD at [address] N/A 12/19/2019    COLONOSCOPY W/STENT X2 performed by Livier Cornelius MD at [address] N/A 12/24/2019    EXPLORATORY LAPAROTOMY, EXTENSIVE LYSIS OF ADHESIONS, TAKE DOWN OF ANASTOMOSIS, COLOSTOMY CREATION, CLOSURE OF SEROMUSCULAR INJURIES performed by Prince Boaz MD at [facility], COLON, DIAGNOSTIC      EGD    FISSURECTOMY ANAL N/A 10/4/2019    FLEXIBLE SIGMOIDOSCOPY & ANAL FISTULECTOMY performed by Prince Boaz MD at [address] ARTHROSCOPY Left     lateral release    ME EGD TRANSORAL BIOPSY SINGLE/MULTIPLE N/A 8/17/2017    EGD BIOPSY performed by Domenico Tobar MD at [facility] OFFICE/OUTPT VISIT,PROCEDURE ONLY N/A 5/22/2018    XI ROBOTIC LAPAROSCOPIC UMBILICAL HERNIA REPAIR WITH MESH, POSSIBLE OPEN performed by Vin Morris IV, DO at [facility]  02/19/2019    SIGMOIDOSCOPY N/A 2/19/2019    SIGMOIDOSCOPY BIOPSY FLEXIBLE performed by Quita Correa DO at [facility] N/A 12/23/2019    SIGMOIDOSCOPY DIAGNOSTIC FLEXIBLE WITH FLUORO performed by Molly Bustos MD at [facility] N/A 3/2/2021    [address] [facility] performed by Domenico Tobar MD at [address] [facility] N/A 5/16/2019    [address] performed by Prince Boaz MD at [address] [address] N/A 11/12/2019    COLOSTOMY  [address] [facility], REPAIR OF MULTIPLE VENTRAL HERNIAS, MOBILIZATION OF THE SPLENIC FLEXURE AND TRANSVERSE COLON, ABDOMINAL EXPLORATION performed by Prince Boaz MD at [address] [facility] Left 9/24/2012    knee    TOTAL KNEE ARTHROPLASTY Right 89/19/0800    knee    UMBILICAL HERNIA REPAIR  05/22/2018    UPPER GASTROINTESTINAL ENDOSCOPY  08/17/2017    Multiple small gastric polyps, no esophagitis noted no Melara's mucosa.   No hiatal hernia, pathology benign fundic gland polyps       Family History   Problem Relation Age of Onset    Heart Disease Mother     COPD Father     Cancer Brother         thyroid, prostate, lung     ROS  Constitutional:  Negative for fatigue, loss of appetite and unexpected weight change  HEENT            : Negative for neck stiffness and pain, no congestion or sinus pressure  Eyes                : No visual disturbance or pain  Cardiovascular: No chest pain or palpitations or leg swelling  Respiratory      : Negative for cough, shortness of breath or wheezing  Gastrointestinal: Negative for abdominal pain, constipation or diarrhea and bloating No nausea or vomiting  Genitourinary:     Positive for urgency or frequency, no burning or hematuria  Musculoskeletal: Positive for arthralgias, back pain or myalgias  Skin                  : Negative for rash or erythema  Neurological    : Negative for dizziness, weakness, tremors ,light headedness or syncope  Psychiatric       : Negative for dysphoric mood, sleep disturbances, nervous or anxious, or decreased concentration  All other review of systems was negative    Objective  Physical Examination:    Nursing note reviewed    BP (!) 152/80 (Site: Left Upper Arm, Position: Sitting, Cuff Size: Large Adult)   Pulse 99   Temp 97.2 °F (36.2 °C) (Temporal)   Resp 18   Ht 5' 7\" (1.702 m)   Wt 255 lb 12.8 oz (116 kg)   LMP  (LMP Unknown)   SpO2 96%   BMI 40.06 kg/m²   BP Readings from Last 3 Encounters:   02/06/23 (!) 152/80   08/29/22 (!) 158/79   06/01/22 138/78         Constitutional:  Jayson Palumbo is oriented to place, person and time ,appears well-developed and well-nourished  HEENT:  Atraumatic and normocephalic, external ears normal bilaterally, nose normal no oropharyngeal exudate and is clear and moist  Eyes:  EOCM normal; conjunctivae normal; PERRLA bilaterally  Neck:  Normal range of motion, neck supple, no JVD and no thyromegaly  Cardiovascular:  RRR, normal heart sounds and intact distal pulses  Pulmonary:  effort normal and breath sounds normal bilaterally,no wheezes or rales, no respiratory distress  Abdominal:  Soft, non-tender; normal bowel sounds, no masses  Musculoskeletal: Limited range of motion and no edema or tenderness bilaterally except there is tenderness of the lateral aspect of the right hip and internal rotation is painful and leg raising test was negative  No lymphadenopathy  Neurological:  alert, oriented, and normal reflexes bilaterally  Skin: warm and dry  Psychiatric:  normal mood and effect; behavior normal.    Labs:   Lab Results   Component Value Date    LABA1C 5.8 01/08/2021     Lab Results   Component Value Date    CHOL 183 01/08/2021     Lab Results   Component Value Date    HDL 44 01/08/2021     No results found for: [facility]  Lab Results   Component Value Date    TRIG 135 01/08/2021     No results found for: [place], [place]  Lab Results   Component Value Date    WBC 8.2 01/14/2022    HGB 13.3 01/14/2022    HCT 43.0 01/14/2022    MCV 93.3 01/14/2022     01/14/2022     Lab Results   Component Value Date    INR 1.2 12/17/2019    PROTIME 11.8 (H) 12/17/2019     Lab Results   Component Value Date    GLUCOSE 96 01/16/2022    CREATININE 0.53 01/16/2022    BUN 9 01/16/2022     01/16/2022    K 3.4 (L) 01/16/2022     01/16/2022    CO2 24 01/16/2022     Lab Results   Component Value Date    ALT 10 01/14/2022    AST 11 01/14/2022    ALKPHOS 104 01/14/2022    BILITOT 0.47 01/14/2022     Lab Results   Component Value Date    LABPROT 6.4 08/14/2012    LABALBU 3.7 01/14/2022     Lab Results   Component Value Date    TSH 4.08 01/12/2022     Assessment:  1. Right hip pain    2. Obesity, Class III, BMI 40-49.9 (morbid obesity) ([address]    3. Chronic right-sided low back pain, unspecified whether sciatica present    4. Piriformis syndrome of right side    5.  Urinary tract infection without hematuria, site unspecified        Plan:  Patient has symptoms of bursitis on the right hip and patient has guarded cortisone injection but did not work for her and waiting to see orthopedics again and I advised the patient to try prednisone pack and also advised to do physical therapy for possible piriformis syndrome and also low back pain and right hip pain  Patient complaining of urinary tract symptoms and could not leave a urine sample so patient was started on Keflex 500 mg 3 times daily for 7 days and call me back  Review as scheduled           1. Jennifer received counseling on the following healthy behaviors: nutrition and exercise    2. Prior labs and health maintenance reviewed. 3.  Discussed use, benefit, and side effects of prescribed medications. Barriers to medication compliance addressed. All her questions were answered. Pt voiced understanding. Tien Lujan will continue current medications, diet and exercise. Orders Placed This Encounter   Medications    cephALEXin (KEFLEX) 500 MG capsule     Sig: Take 1 capsule by mouth 3 times daily for 7 days     Dispense:  21 capsule     Refill:  0    predniSONE (DELTASONE) 10 MG tablet     Sig: Take 1 tablet by mouth 3 times daily (with meals) for 5 days     Dispense:  15 tablet     Refill:  0          Completed Refills               Requested Prescriptions     Signed Prescriptions Disp Refills    cephALEXin (KEFLEX) 500 MG capsule 21 capsule 0     Sig: Take 1 capsule by mouth 3 times daily for 7 days    predniSONE (DELTASONE) 10 MG tablet 15 tablet 0     Sig: Take 1 tablet by mouth 3 times daily (with meals) for 5 days     4. Patient given educational materials - see patient instructions    5. Was a self-tracking handout given in paper form or via [employer]? NO    Orders Placed This Encounter   Procedures    [facility] Physical Therapy [facility]     Referral Priority:   Routine     Referral Type:   Eval and Treat     Referral Reason:   Specialty Services Required     Requested Specialty:   Physical Therapist     Number of Visits Requested:   1     No follow-ups on file. Patient voiced understanding and agreed to treatment plan.      Electronically signed by Andrew Mares MD on 2/6/2023 at 2:40 PM    This note is created with a voice recognition program and while intend to generate a document that accurately reflects the content of the visit, no guarantee can be provided that every mistake has been identified and corrected by editing.

## 2023-02-13 ENCOUNTER — HOSPITAL ENCOUNTER (OUTPATIENT)
Dept: PHYSICAL THERAPY | Facility: CLINIC | Age: 73
Setting detail: THERAPIES SERIES
Discharge: HOME | End: 2023-02-13
Payer: MEDICARE

## 2023-02-13 PROCEDURE — 97110 THERAPEUTIC EXERCISES: CPT

## 2023-02-13 PROCEDURE — 97161 PT EVAL LOW COMPLEX 20 MIN: CPT

## 2023-02-13 NOTE — CONSULTS
[] [facility]) - [facility]  [address]  [phone]  F: [phone] [x] [address]  [address]   [address]  P: [phone]  F: [phone] [] [place]  [address]  P: [phone]  F: [phone] [] [address]  P: [phone]  F: [phone] [] [address]  [facility]   [address]   [place]: [phone]  F: [phone]      Physical Therapy Lower Extremity Evaluation    Date:  2023  Patient: Sonia Chatterjee  : 1950  MRN: [id number]  Physician: Barbara Sellers MD   Insurance: Medicare  Medical Diagnosis:   M25.551 (ICD-10-CM) - Right hip pain   M54.50, G89.29 (ICD-10-CM) - Chronic right-sided low back pain, unspecified whether sciatica present   G57.01 (ICD-10-CM) - Piriformis syndrome of right side     Rehab Codes: M54.31, M79.1, M79.651, R26.89  Onset date: 22  Next 's appt.: TBD    Subjective:   CC: In Oct, she began having bilateral hip/groin pain. Her plantar fascitis got worse so she went to PT in December. She was doing a balance exercises and \"pulled\"her right hip. Her hip got better over time. Right before , she went back to PT and did the same exercise and pulled her right hip again. She then got a steroid injection after . As long as she takes medication you do okay, but as soon as it wears of she can hardly walk. Patient comes to therapy taking 2 extra strength tylenol or she felt like she would not have been able to make it to therapy. She fell x1 in Sept and has a fear of falling.   HPI: (onset date) 22    PMHx: [] Unremarkable [] Diabetes [] HTN  [] Pacemaker   [] MI/Heart Problems [] Cancer [] Arthritis [x] Other: Critical illness myopathy (2020)              [x] Refer to full medical chart  In EPIC       Comorbidities:   [x] Obesity [] Dialysis  [] N/A   [] Asthma/COPD [] Dementia [] Other:    [] Stroke [] Sleep apnea [] Other:   [] Vascular disease [] Rheumatic disease [] Other:     Tests: [x] X-Ray:  [] MRI:  [] Other:    Medications: [x] Refer to full medical record [] None [] Other:  Allergies:      [x] Refer to full medical record [] None [] Other:    Function:  Hand Dominance  [x] Right  [] Left  Patient lives with: Son lives with her   In what type of home []  One story   [x] Two story   [] Split level   Number of stairs to enter 2 and flight to room   With handrail on the []  Right to enter   [] Left to enter   Employer  at [place] [address] []  Normal duty   [] Light duty   [] Off due to condition    [x]  Retired   [] Not employed   [] Disability  [] Other:  []  Return to work:    Work activities/duties    Other: Fall in September, patient reports poor balance and frequently feels like she will fall    Gait Prior level of function Current level of function    [x] Independent  [] Assist [x] Independent  [] Assist   Device: [x] Independent [x] Independent    [] Straight Cane [] Quad cane [] Straight Cane [] Quad cane    [] Standard walker [] Rolling walker   [] 4 wheeled walker [] Standard walker [] Rolling walker   [] 4 wheeled walker    [] Wheelchair [] Wheelchair   Comments: Had to use a cane over [place] due to significant pain    Pain:  [x] Yes  [] No Location: Right hip Pain Rating: (0-10 scale) 6/10  Pain altered Tx:  [x] Yes  [] No  Action:    Symptoms:  [] Improving [x] Worsening [] Same  Better:  [] AM    [] PM    [] Sit    [] Rise/Sit    []Stand    [] Walk    [] Lying    [x] Other: medication, ice  Worse: [] AM    [] PM    [x] Sit    [] Rise/Sit    [x]Stand    [x] Walk    [] Lying    [] Bend                      [] Valsalva    [] Other:  Sleep: [x] OK    [] Disturbed    Objective:    STRENGTH    Left Right   Hip Flex  2   Ext     ER     IR     ABD  4   ADD  5   Knee Flex  3+   Ext  3+   Ankle DF  4   PF     Comments: PROM/AROM are WFL; difficult to formally assess right lower extremity strength due to pain     ROM   Lumbar    Flexion Increased pain with return to stand (75%)   [facility]   Rotation L Increased pain in R R WFL   Sidebend L Increased pain in R R WFL       OBSERVATION No Deficit Deficit Not Tested Comments   Posture       Forward Head [] [x] []    Rounded Shoulders [] [x] []    Kyphosis [] [x] []    Lordosis [] [x] []    Lateral Shift [] [] [x]    Scoliosis [] [] [x]    Iliac Crest [] [] [x]    PSIS [] [] [x]    ASIS [] [] [x]    Genu Valgus [] [] [x]    Genu Varus [] [] [x]    Genu Recurvatum [] [] [x]    Pronation [] [] [x] Recent history of plantar fascitis   Supination [] [] [x]    Leg Length Discrp [] [] [x]    Slumped Sitting [x] [] []    Palpation [] [x] [] Piriformis tender, bursa increased tenderness   Sensation [] [] [x]    Edema [] [] [x]    Neurological [x] [] []    Patellar Mobility [] [] [x]    Palpation [] [x] [] Right piriformis, right ITB bursa   Gait [] [x] [] Analysis: Antalgic, decreased right stance time       TESTS (+/-) LEFT RIGHT Not Tested Comments   Hamstring (SLR)   + - []    SKTC + - []    DKTC + - []    Slump/Dural + - []    SI JT   [x]    YADY   [x]    Joint Mobility   [x]          FUNCTION Normal Difficult Unable   Sitting [] [x] []   Standing [] [x] []   Ambulation [] [x] []   Groom/Dress [] [x] []   Lift/Carry [] [x] []   Stairs [] [x] []   Bending [] [x] []   Squat [] [x] []   Kneel [] [x] []                                          [facility] Fall Risk Assessment    Patient Name:  Js Carvajal  : 1950    Risk Factor Scale  Score   History of Falls [x] Yes  [] No 25  0 25   Secondary Diagnosis [] Yes  [x] No 15  0    Ambulatory Aid [] Furniture  [x] Crutches/cane/walker  [] None/bedrest/wheelchair/nurse 30  15  0 15   IV/Heparin Lock [] Yes  [x] No 20  0 Gait/Transferring [] Impaired  [x] Weak  [] Normal/bedrest/immobile 20  10  0 10   Mental Status [] Forgets limitations  [x] Oriented to own ability 15  0       Total: 50     Based on the Assessment score: check the appropriate box. []  No intervention needed   Low =   Score of 0-24    []  Use standard prevention interventions Moderate =  Score of 24-44   [] Give patient handout and discuss fall prevention strategies   [] Establish goal of education for patient/family RE: fall prevention strategies    [x]  Use high risk prevention interventions High = Score of 45 and higher   [] Give patient handout and discuss fall prevention strategies   [] Establish goal of education for patient/family Re: fall prevention strategies   [] Discuss lifeline / other resources    Electronically signed by:   Yoselin Burroughs PT  Date: 2/13/2023        Functional Test: LEFTS Score: 46% functionally impaired   Functional Test: TRAM Score: 46% functionally impaired     Comments:    Assessment:  Patient would benefit from skilled physical therapy services in order to improve overall core and pelvic/glut strength in order to stabilize the lumbar spine and decrease pelvic rotation in order to decrease overall pain and improve functional mobility. Patient has increased pain on the right that increased with pelvic rotation and does radiate to her lower leg. In addition, patient demonstrates increased tenderness in the right piriformis and along the ITB. Also, completed education on posture to decrease trunk rotation when standing, reaching, and sitting with bilateral lower extremities flat on the ground. Problem list, as detailed above:   [x] ? Pain: Right hip, radiating to the LE    [x] ? ROM: Lumbar flexion ROM, right hip AROM (limited due to pain)    [x] ? Strength: core/pelvis   [x] ? Function: TRAM/LEFTS  [x] Gait Deviations: Antalgic gait      STG: (to be met in 10 treatments)  ?  Pain: Patient will report no greater than 6-7/10 pain with daily activity to demonstrate improved quality of life.  ? ROM: Patient will increase lumbar range of motion to 100% to demonstrate improved flexibility.  ? Strength: Patient will increased hip/glut strength to 4/5 to improve overall lumbar stability.  ? Function: Patient will report ability to sit for >1 hour without an increase in low back/right hip pain.  Patient to be independent with home exercise program as demonstrated by performance with correct form without cues.  Demonstrate Knowledge of fall prevention  LTG: (to be met in 20 treatments)  ? Pain: Patient will report no greater than 4-5/10 pain with daily activity to demonstrate improved quality of life.  ? ROM: Patient will have negative right SLR to demonstrate improved quality of life and centralization of symptoms  ? Function: Patient will increase TRAM by 6 points to demonstrate improved quality of life.  ? Function: Patient will improve LEFTS score by 9 points to demonstrate improved quality of life.                   Patient goals: \"To improve my pain\"    Rehab Potential:  [x] Good  [] Fair  [] Poor   Suggested Professional Referral:  [x] No  [] Yes:  Barriers to Goal Achievement:  [x] No  [] Yes:  Domestic Concerns:  [x] No  [] Yes:    Pt. Education:  [x] Plans/Goals, Risks/Benefits discussed  [x] Home exercise program    Method of Education: [x] Verbal  [x] Demo  [x] Written  Comprehension of Education:  [x] Verbalizes understanding.  [x] Demonstrates understanding.  [] Needs Review.  [] Demonstrates/verbalizes understanding of HEP/Ed previously given.    Access Code: [email or web address]  URL: [email or web address]  Date: 02/13/2023  Prepared by: Madison Ayala    Exercises  Bent Knee Fallouts - 1 x daily - 1 sets - 10 reps  Hooklying Gluteal Sets - 1 x daily - 2 sets - 10 reps  Supine Bridge - 1 x daily - 2 sets - 10 reps  Seated Correct Posture      Treatment Plan:  [x] Therapeutic Exercise   42983  [] Iontophoresis: 4 mg/mL Dexamethasone Sodium  Phosphate  mAmin  70519   [x] Therapeutic Activity  34101 [] Vasopneumatic cold with compression  76370    [x] Gait Training   50518 [] Ultrasound   [id number]   [x] Neuromuscular Re-education  57327 [] Electrical Stimulation Unattended  13326   [x] Manual Therapy  55481 [] Electrical Stimulation Attended  19025   [x] Instruction in HEP  [] Lumbar/Cervical Traction  [id number]   [] Aquatic Therapy   26646 [] Cold/hotpack    [] Massage   83788      [] Dry Needling, 1 or 2 muscles  41063   [] Biofeedback, first 15 minutes   44865  [] Biofeedback, additional 15 minutes   13368 [] Dry Needling, 3 or more muscles  13894     []  Medication allergies reviewed for use of    Dexamethasone Sodium Phosphate 4mg/ml     with iontophoresis treatments. Pt is not allergic.     Frequency:  2 x/week for 20 visits      Todays Treatment:  Modalities:   Precautions:  Exercises:  Exercise Reps/ Time Weight/ Level Comments   Supine      Bent knee fall outs 5 ea  Decreased range of motion on right compared to left due to pain; minimal cues to decrease pelvic rotation   Hooklying glut sets 10x 2s     Bridge 10  Minimal cues for gluteal contraction prior to bridge   Pelvic tilts -  Did not provided as HEP due to increased upper abdominal activation with exercise   Other:    Specific Instructions for next treatment:  -Progress HEP  -core/glut strengthening        Evaluation Complexity:  History (Personal factors, comorbidities) [] 0 [] 1-2 [x] 3+   Exam (limitations, restrictions) [] 1-2 [x] 3 [] 4+   Clinical presentation (progression) [x] Stable [] Evolving  [] Unstable   Decision Making [x] Low [] Moderate [] High    [x] Low Complexity [] Moderate Complexity [] High Complexity       Treatment Charges: Mins Units   [x] Evaluation       [x]  Low       []  Moderate       []  High 40 1   []  Modalities     [x]  Ther Exercise 10 1   []  Manual Therapy     []  Ther Activities     []  Aquatics     []  Vasocompression     []  Other     Estimated Medicare Cost to date: $552.91  TOTAL TREATMENT TIME: 50    Time in: 11:10 am  Time Out: 12:00 pm    Electronically signed by: Gina Nelson PT

## 2023-02-13 NOTE — FLOWSHEET NOTE
[] [facility]) [facility] &  Therapy  [address]  [phone]  F: [phone] [x] [address]  [facility]   [address]  P: [phone]  F: [phone] [] [address]  [address]  P: [phone]  F: [phone] [] [address]  P: [phone]  F: [phone] [] [address]  [facility]   [address]   [place]: [phone]  F: [phone]        Physical Therapy Plan of Care    Date:  2023  Patient: Devin Barnes  : 1950  MRN: [id number]  Physician: Juan Coronado MD                           Insurance: Medicare  Medical Diagnosis:   M25.551 (ICD-10-CM) - Right hip pain   M54.50, G89.29 (ICD-10-CM) - Chronic right-sided low back pain, unspecified whether sciatica present   G57.01 (ICD-10-CM) - Piriformis syndrome of right side                           Rehab Codes: M54.31, M79.1, M79.651, R26.89  Onset date: 22             Next Dr's appt.: TBD      Subjective:   CC: In Oct, she began having bilateral hip/groin pain. Her plantar fascitis got worse so she went to PT in December. She was doing a balance exercises and \"pulled\"her right hip. Her hip got better over time. Right before , she went back to PT and did the same exercise and pulled her right hip again. She then got a steroid injection after . As long as she takes medication you do okay, but as soon as it wears of she can hardly walk. Patient comes to therapy taking 2 extra strength tylenol or she felt like she would not have been able to make it to therapy. She fell x1 in Sept and has a fear of falling. HPI: (onset date) 22      Assessment:  Problem list:  [x] ?  Pain: Right hip, radiating to the LE [x] ? ROM: Lumbar flexion ROM, right hip AROM (limited due to pain)                      [x] ? Strength: core/pelvis         [x] ? Function: TRAM/LEFTS  [x] Gait Deviations: Antalgic gait        STG: (to be met in 10 treatments)  ? Pain: Patient will report no greater than 6-7/10 pain with daily activity to demonstrate improved quality of life. ? ROM: Patient will increase lumbar range of motion to 100% to demonstrate improved flexibility. ? Strength: Patient will increased hip/glut strength to 4/5 to improve overall lumbar stability. ? Function: Patient will report ability to sit for >1 hour without an increase in low back/right hip pain. Patient to be independent with home exercise program as demonstrated by performance with correct form without cues. Demonstrate Knowledge of fall prevention  LTG: (to be met in 20 treatments)  ? Pain: Patient will report no greater than 4-5/10 pain with daily activity to demonstrate improved quality of life. ? ROM: Patient will have negative right SLR to demonstrate improved quality of life and centralization of symptoms  ? Function: Patient will increase TRAM by 6 points to demonstrate improved quality of life. ? Function: Patient will improve LEFTS score by 9 points to demonstrate improved quality of life.        Treatment Plan:  [x] Therapeutic Exercise   18952  [] Iontophoresis: 4 mg/mL Dexamethasone Sodium Phosphate  mAmin  94254   [x] Therapeutic Activity  17305 [] Vasopneumatic cold with compression  52764    [x] Gait Training   17197 [] Ultrasound   34271   [x] Neuromuscular Re-education  25603 [] Electrical Stimulation Unattended  80518   [x] Manual Therapy  52595 [] Electrical Stimulation Attended  67923   [x] Instruction in HEP  [] Lumbar/Cervical Traction  70849   [] Aquatic Therapy   44017 [x] Cold/hotpack    [] Massage   67903      [] Dry Needling, 1 or 2 muscles  98529   [] Biofeedback, first 15 minutes   98423  [] Biofeedback, additional 15 minutes 65365 [] Dry Needling, 3 or more muscles  20561     []  Medication allergies reviewed for use of    Dexamethasone Sodium Phosphate 4mg/ml     with iontophoresis treatments. Pt is not allergic. Frequency:  2  x/week for 20 visits    Electronically signed by: John Quiroga PT        Physician Signature:________________________________Date:__________________  By signing above or cosigning this note, I have reviewed this plan of care and certify a need for medically necessary rehabilitation services.      *PLEASE SIGN ABOVE AND FAX BACK ALL PAGES*

## 2023-02-14 ENCOUNTER — APPOINTMENT (OUTPATIENT)
Dept: PHYSICAL THERAPY | Facility: CLINIC | Age: 73
End: 2023-02-14
Payer: MEDICARE

## 2023-02-16 ENCOUNTER — HOSPITAL ENCOUNTER (OUTPATIENT)
Dept: PHYSICAL THERAPY | Facility: CLINIC | Age: 73
Setting detail: THERAPIES SERIES
Discharge: HOME | End: 2023-02-16
Payer: MEDICARE

## 2023-02-16 PROCEDURE — 97110 THERAPEUTIC EXERCISES: CPT

## 2023-02-16 PROCEDURE — 97140 MANUAL THERAPY 1/> REGIONS: CPT

## 2023-02-16 NOTE — FLOWSHEET NOTE
[] [address]  [address]  [phone]  F: [phone] [x] [address]  [facility] 36   [address]  P: [phone]  F: [phone] [] [place] &  Therapy  [address]  P: [phone]  F: [phone] [] [address]  P: [phone]  F: [phone] [] [address]  [facility]   [address]   [place]: [phone]  F: [phone]      Physical Therapy Daily Treatment Note    Date:  2023  Patient Name:  Wendy Marin    :  1950  MRN: [id number]  Physician: Edy Lockett MD                           Insurance: Medicare  Medical Diagnosis:   M25.551 (ICD-10-CM) - Right hip pain   M54.50, G89.29 (ICD-10-CM) - Chronic right-sided low back pain, unspecified whether sciatica present   G57.01 (ICD-10-CM) - Piriformis syndrome of right side   Rehab Codes: M54.31, M79.1, M79.651, R26.89  Onset date: 22             Next 's appt.: TBD       Visit# / total visits: ; Progress note for Medicare patient due at visit 10     Cancels/No Shows: 0/0    Subjective:    Pain:  [x] Yes  [] No Location: R hip  Pain Rating: (0-10 scale) 9/10  Pain altered Tx:  [x] No  [] Yes  Action:  Comments: Pt arrives noting she is okay. States pain is 9/10. When lifting leg onto the bike pain went up to a 10/10. Objective:  Modalities: offered HP however declined this date and requested to complete next visit.    Precautions:  Exercises:  Exercise Reps/ Time Weight/ Level Comments   Nustep 5 min  Lv 1          Supine         Bent knee fall outs 5 ea   Decreased range of motion on right compared to left due to pain; minimal cues to decrease pelvic rotation   Hooklying glut sets 10x 2s       Heel slides 2x10 SAQ 15x  Inc pain in knee. Hip add 15x Ball     Bridge 10x2   Minimal cues for gluteal contraction prior to bridge   Pelvic tilts x   Did not provided as HEP due to increased upper abdominal activation with exercise                                 Manual 23 min total  Lateral glides to hip with mobilization belt  Inferior glides to hip with mobilization belt  Hypervolt lv 3 cushion head to all global hip musculature in L sidelying with pillow between legs. Other:     Specific Instructions for next treatment:  -Progress HEP  -core/glut strengthening            Treatment Charges: Mins Units   []  Modalities     [x]  Ther Exercise 18 1   [x]  Manual Therapy 23 2   []  Ther Activities     []  Aquatics     []  Vasocompression     []  Other     Total Treatment time 41 3   Estimated Medicare Cost to date: $618.09    Assessment: [] Progressing toward goals. [] No change. [x] Other: Pt with fair tolerance to treatment this date. Pt with many instances of increased sharp pain reported in R hip into LE. Pt with slow and deliberate movements through out all of treatment. Implemented manual via mobilization belt with lateral and inferior glides. Pt notes a good stretch felt. Utilized hypervot to global hip musculature with good tolerance. Increased antalgic gait demonstrated end of session this date. Recommended pt to use her cane she has for safety. [x] Patient would continue to benefit from skilled physical therapy services in order to: improve overall core and pelvic/glut strength in order to stabilize the lumbar spine and decrease pelvic rotation in order to decrease overall pain and improve functional mobility. Problem list, as detailed above:   [x] ? Pain: Right hip, radiating to the LE                       [x] ? ROM: Lumbar flexion ROM, right hip AROM (limited due to pain)                      [x] ? Strength: core/pelvis         [x] ?  Function: TRAM/LEFTS  [x] Gait Deviations: Antalgic gait        STG: (to be met in 10 treatments)  ? Pain: Patient will report no greater than 6-7/10 pain with daily activity to demonstrate improved quality of life.  ? ROM: Patient will increase lumbar range of motion to 100% to demonstrate improved flexibility.  ? Strength: Patient will increased hip/glut strength to 4/5 to improve overall lumbar stability.  ? Function: Patient will report ability to sit for >1 hour without an increase in low back/right hip pain.  Patient to be independent with home exercise program as demonstrated by performance with correct form without cues.  Demonstrate Knowledge of fall prevention - MET, provided 2/16    LTG: (to be met in 20 treatments)  ? Pain: Patient will report no greater than 4-5/10 pain with daily activity to demonstrate improved quality of life.  ? ROM: Patient will have negative right SLR to demonstrate improved quality of life and centralization of symptoms  ? Function: Patient will increase TRAM by 6 points to demonstrate improved quality of life.  ? Function: Patient will improve LEFTS score by 9 points to demonstrate improved quality of life.                    Patient goals: \"To improve my pain\"    Pt. Education:  [x] Yes  [] No  [] Reviewed Prior HEP/Ed  Method of Education: [x] Verbal  [] Demo  [x] Written - fall prevention   Access Code: [email or web address]  URL: [email or web address]  Date: 02/13/2023  Prepared by: Madison Ayala     Exercises  Bent Knee Fallouts - 1 x daily - 1 sets - 10 reps  Hooklying Gluteal Sets - 1 x daily - 2 sets - 10 reps  Supine Bridge - 1 x daily - 2 sets - 10 reps  Seated Correct Posture  Comprehension of Education:  [x] Verbalizes understanding.  [x] Demonstrates understanding.  [x] Needs review.  [] Demonstrates/verbalizes HEP/Ed previously given.     Plan: [x] Continue current frequency toward long and short term goals.    [x] Specific Instructions for subsequent treatments: monitor response to treatment, progress as able.       Time In:202            Time Out: 255    Electronically signed by:  Bj Coleman PTA

## 2023-02-20 ENCOUNTER — HOSPITAL ENCOUNTER (OUTPATIENT)
Dept: PHYSICAL THERAPY | Facility: CLINIC | Age: 73
Setting detail: THERAPIES SERIES
Discharge: HOME | End: 2023-02-20
Payer: MEDICARE

## 2023-02-20 PROCEDURE — 97140 MANUAL THERAPY 1/> REGIONS: CPT

## 2023-02-20 PROCEDURE — 97110 THERAPEUTIC EXERCISES: CPT

## 2023-02-20 NOTE — FLOWSHEET NOTE
[] [address]  [address]  [phone]  F: [phone] [x] [address]  [facility] 36   [address]  P: [phone]  F: [phone] [] [place] &  Therapy  [address]  P: [phone]  F: [phone] [] [address]  P: [phone]  F: [phone] [] [address]  [facility]   [address]   [place]: [phone]  F: [phone]      Physical Therapy Daily Treatment Note    Date:  2023  Patient Name:  Ruthie Sanchez    :  1950  MRN: [id number]  Physician: Zac Garcia MD                           Insurance: Medicare  Medical Diagnosis:   M25.551 (ICD-10-CM) - Right hip pain   M54.50, G89.29 (ICD-10-CM) - Chronic right-sided low back pain, unspecified whether sciatica present   G57.01 (ICD-10-CM) - Piriformis syndrome of right side   Rehab Codes: M54.31, M79.1, M79.651, R26.89  Onset date: 22             Next 's appt.: TBD    Visit# / total visits: 3/20; Progress note for Medicare patient due at visit 10     Cancels/No Shows: 0/0    Subjective:    Pain:  [x] Yes  [] No Location: R hip  Pain Rating: (0-10 scale) 7/10  Pain altered Tx:  [x] No  [] Yes  Action:  Comments: Pt arrives noting slight improvements in pain levels. States she cant remember how long increased pain lasted after last session. States she did walk on the TM for 30 min on Saturday. Arrives with cane today.      Objective:  Modalities: CP end treatment in side lying to all R hip musculature  Precautions:  Exercises:  Exercise Reps/ Time Weight/ Level Comments   Nustep 7 min  Lv 1          Supine         Bent knee fall outs 5 ea   Decreased range of motion on right compared to left due to pain; minimal cues to decrease pelvic rotation   Hooklying glut sets 10x 2s       Heel slides 2x10     SAQ 15x  Inc pain in knee. Hip add 15x [facility] 10x2   Minimal cues for gluteal contraction prior to bridge   Pelvic tilts x   Did not provided as HEP due to increased upper abdominal activation with exercise         Seated   New 2/20   Glute sets  20x5\"     LAQ 20x A    Hip add 20x ball    Hip abd 20x lime    HS stretch  3x30\" Stool     March  20x ea A    HS curls 20x Lime     Paloff press 20x ea [place]    STS 2x10  22.5 in          Standing      Heel raises 15x     march X   Inc pain. Manual 11 min total  Lateral glides to hip with mobilization belt  Inferior glides to hip with mobilization belt  Hypervolt lv 3 cushion head to all global hip musculature in L sidelying with pillow between legs. Other:     Specific Instructions for next treatment:  -Progress HEP  -core/glut strengthening            Treatment Charges: Mins Units   [x]  Modalities - CP  10 -   [x]  Ther Exercise 30 2   [x]  Manual Therapy 11 1   []  Ther Activities     []  Aquatics     []  Vasocompression     []  Other     Total Treatment time 41 3   Estimated Medicare Cost to date: as of 02/20/23   $681.50    Assessment: [] Progressing toward goals. [] No change. [x] Other: Pt with improved tolerance to all seated exercises and denies pain with completion. Pt did however have increased symptoms into RLE when transitioning from sitting exercises to standing exercises. Attempted to add in standing exercises with poor tolerance and increased pain reported with marching. Ended with CP to global hip musculature. Pt then demonstrates increase antalgic gait upon completion of treatment again.    [x] Patient would continue to benefit from skilled physical therapy services in order to: improve overall core and pelvic/glut strength in order to stabilize the lumbar spine and decrease pelvic rotation in order to decrease overall pain and improve functional mobility. Problem list, as detailed above:   [x] ? Pain: Right hip, radiating to the LE                       [x] ? ROM: Lumbar flexion ROM, right hip AROM (limited due to pain)                      [x] ? Strength: core/pelvis         [x] ? Function: TRAM/LEFTS  [x] Gait Deviations: Antalgic gait        STG: (to be met in 10 treatments)  ? Pain: Patient will report no greater than 6-7/10 pain with daily activity to demonstrate improved quality of life. ? ROM: Patient will increase lumbar range of motion to 100% to demonstrate improved flexibility. ? Strength: Patient will increased hip/glut strength to 4/5 to improve overall lumbar stability. ? Function: Patient will report ability to sit for >1 hour without an increase in low back/right hip pain. Patient to be independent with home exercise program as demonstrated by performance with correct form without cues. Demonstrate Knowledge of fall prevention - MET, provided 2/16    LTG: (to be met in 20 treatments)  ? Pain: Patient will report no greater than 4-5/10 pain with daily activity to demonstrate improved quality of life. ? ROM: Patient will have negative right SLR to demonstrate improved quality of life and centralization of symptoms  ? Function: Patient will increase TRAM by 6 points to demonstrate improved quality of life. ? Function: Patient will improve LEFTS score by 9 points to demonstrate improved quality of life. Patient goals: \"To improve my pain\"    Pt. Education:  [x] Yes  [] No  [x] Reviewed Prior HEP/Ed  Method of Education: [x] Verbal  [x] Demo  [x] Written  Access Code: [email or web address]  URL: [email or web address]  Date: 02/13/2023  Prepared by: Amirah Rise Knee Fallouts - 1 x daily - 1 sets - 10 reps  Hooklying Gluteal Sets - 1 x daily - 2 sets - 10 reps  Supine Bridge - 1 x daily - 2 sets - 10 reps  Seated Correct Posture    Date: 02/20/2023  Prepared by: [address] [place] [address] - 1 x daily - 7 x weekly - 2 sets - 10 reps  Seated Hamstring Stretch - 1 x daily - 7 x weekly - 2 sets - 10 reps  Seated Hip Abduction with Resistance - 1 x daily - 7 x weekly - 2 sets - 10 reps  Seated Hip Adduction Isometrics with Ball - 1 x daily - 7 x weekly - 2 sets - 10 reps  Seated Gluteal Sets - 1 x daily - 7 x weekly - 2 sets - 10 reps      Comprehension of Education:  [x] Verbalizes understanding. [x] Demonstrates understanding. [x] Needs review. [] Demonstrates/verbalizes HEP/Ed previously given. Plan: [x] Continue current frequency toward long and short term goals. [x] Specific Instructions for subsequent treatments: monitor response to treatment, progress as able.        Time In: 1:01            Time Out: 200    Electronically signed by:  Mona Chaidez PTA

## 2023-02-23 ENCOUNTER — HOSPITAL ENCOUNTER (OUTPATIENT)
Dept: PHYSICAL THERAPY | Facility: CLINIC | Age: 73
Setting detail: THERAPIES SERIES
Discharge: HOME | End: 2023-02-23
Payer: MEDICARE

## 2023-02-23 PROCEDURE — 97110 THERAPEUTIC EXERCISES: CPT

## 2023-02-27 ENCOUNTER — HOSPITAL ENCOUNTER (OUTPATIENT)
Dept: PHYSICAL THERAPY | Facility: CLINIC | Age: 73
Setting detail: THERAPIES SERIES
Discharge: HOME | End: 2023-02-27
Payer: MEDICARE

## 2023-02-27 PROCEDURE — 97110 THERAPEUTIC EXERCISES: CPT

## 2023-02-27 NOTE — FLOWSHEET NOTE
[] [address]  [address]  [phone]  F: [phone] [x] [address]  [facility] 36   [address]  P: [phone]  F: [phone] [] [place] &  Therapy  [address] [facility]  P: [phone]  F: [phone] [] [address]  P: [phone]  F: [phone] [] [address]  [facility]   [address]   [place]: [phone]  F: [phone]      Physical Therapy Daily Treatment Note    Date:  2023  Patient Name:  Valerie Ely    :  1950  MRN: [id number]  Physician: Jakob Palacios MD                           Insurance: Medicare  Medical Diagnosis:   M25.551 (ICD-10-CM) - Right hip pain   M54.50, G89.29 (ICD-10-CM) - Chronic right-sided low back pain, unspecified whether sciatica present   G57.01 (ICD-10-CM) - Piriformis syndrome of right side   Rehab Codes: M54.31, M79.1, M79.651, R26.89  Onset date: 22             Next Dr's appt.: 23 (Dr. Lisa Longo)    Visit# / total visits: ; Progress note for Medicare patient due at visit 10     Cancels/No Shows: 0/0    Subjective:    Pain:  [x] Yes  [] No  Location: R hip   Pain Rating: (0-10 scale) 3/10  Pain altered Tx:  [] No  [x] Yes  Action: poor tolerance to attempts at exercises, limiting completed exercises. Held manual and used HP per pt request  Comments: Pt arrived to physical therapy with c/o pain in R hip radiated to RLE rated 3/10. Pt noted took a slow-released pain medication this morning to manage symptoms to tolerate walking. Pt noted has not been able to complete HEP since last therapy session due to high pain level afterward.           Objective:  Modalities: HP to R hip in supine at end of session x 10 min  Precautions: hard of hearing  Exercises:  Exercise Reps/ Time Weight/ Level Comments   Nustep 10 min  Lv 2 Increased time as pain was reduced during warm up 2/23         Supine         HS stretch- R only 2x 30\" Minimal range tolerated only  Reduced to 2 sets d/t inc R med knee pain 2/27   Bent knee fall outs 10x   Decreased range of motion on right compared to left due to pain; minimal cues to decrease pelvic rotation   Hooklying glut sets 10x 2s       Heel slides 2x10     SAQ 15x  Inc pain in knee. Hip add 15x [place]     Bridge 10x2   Minimal cues for gluteal contraction prior to bridge   Pelvic tilts x   Did not provided as HEP due to increased upper abdominal activation with exercise   SLR + quad set  2x10 A New 2/27 - visible mild R ext lag noted    TrA bracing 10x   New 2/27 - cues for proper breathing pattern         Seated   New 2/20   Sciatic nerve glides 10x 5\" Added 2/23   Piriformis stretch 1x 30\" Figure 4- only 1 rep d/t poor tolerance. Glute sets  20x5\"     LAQ 20x A    Hip add 20x ball    Hip abd 20x lime    HS stretch  3x30\" Stool     March  20x ea A    HS curls 20x [place]     Paloff press 20x ea [place]    STS 2x10  22 in Reduced height 2/27               Standing      Slant board calf stretch 3x 30\" First 2 reps done unilaterally for improved tolerance    Heel raises 20x     march X   Inc pain. Shoulder ext  2x10 [place]  New 2/27   Manual 11 min total  Lateral glides to hip with mobilization belt  Inferior glides to hip with mobilization belt  Hypervolt lv 3 cushion head to all global hip musculature in L sidelying with pillow between legs. Other:               Treatment Charges: Mins Units   []  Modalities - HP      [x]  Ther Exercise 43 3   []  Manual Therapy     []  Ther Activities     []  Aquatics     []  Vasocompression     []  Other     Total Treatment time 43 3     Estimated Medicare Cost to date: as of 02/27/23 $799.30    Assessment: [x] Progressing toward goals.   Began session on NuStep to improve tissue extensibility and cardiovascular endurance. Regressed supine R HS stretch as pt reported increased pain in R medial aspect of knee. Pt required max cues in all form for proper breathing technique to improve TrA activation with fair carryover noted as pt continue to demonstrate chest breathing instead of diaphragmatic breathing. Attempted marching however unable to complete d/t difficulty maintaining single leg stability and increased R hip/knee pain with WB through single limb. Pt reported pain increased up to 5-6/10 post-exs but \"not as bad as last time\". [] No change. [x] Other:   [x] Patient would continue to benefit from skilled physical therapy services in order to: improve overall core and pelvic/glut strength in order to stabilize the lumbar spine and decrease pelvic rotation in order to decrease overall pain and improve functional mobility. Problem list, as detailed above:   [x] ? Pain: Right hip, radiating to the LE                       [x] ? ROM: Lumbar flexion ROM, right hip AROM (limited due to pain)                      [x] ? Strength: core/pelvis         [x] ? Function: TRAM/LEFTS  [x] Gait Deviations: Antalgic gait        STG: (to be met in 10 treatments)  ? Pain: Patient will report no greater than 6-7/10 pain with daily activity to demonstrate improved quality of life. ? ROM: Patient will increase lumbar range of motion to 100% to demonstrate improved flexibility. ? Strength: Patient will increased hip/glut strength to 4/5 to improve overall lumbar stability. ? Function: Patient will report ability to sit for >1 hour without an increase in low back/right hip pain. Patient to be independent with home exercise program as demonstrated by performance with correct form without cues. Demonstrate Knowledge of fall prevention - MET, provided 2/16    LTG: (to be met in 20 treatments)  ?  Pain: Patient will report no greater than 4-5/10 pain with daily activity to demonstrate improved quality of life. ? ROM: Patient will have negative right SLR to demonstrate improved quality of life and centralization of symptoms  ? Function: Patient will increase TRAM by 6 points to demonstrate improved quality of life. ? Function: Patient will improve LEFTS score by 9 points to demonstrate improved quality of life. Patient goals: \"To improve my pain\"    Pt. Education:  [x] Yes  [] No  [x] Reviewed Prior HEP/Ed  Method of Education: [x] Verbal- discussed aquatics   [] Demo  [] Written  Access Code: [email or web address]  URL: [email or web address]  Date: 02/13/2023  Prepared by: Jude Baca Knee Fallouts - 1 x daily - 1 sets - 10 reps  Hooklying Gluteal Sets - 1 x daily - 2 sets - 10 reps  Supine Bridge - 1 x daily - 2 sets - 10 reps  Seated Correct Posture    Date: 02/20/2023  Prepared by: [address] [facility] - 1 x daily - 7 x weekly - 2 sets - 10 reps  Seated Hamstring Stretch - 1 x daily - 7 x weekly - 2 sets - 10 reps  Seated Hip Abduction with Resistance - 1 x daily - 7 x weekly - 2 sets - 10 reps  Seated Hip Adduction Isometrics with Ball - 1 x daily - 7 x weekly - 2 sets - 10 reps  Seated Gluteal Sets - 1 x daily - 7 x weekly - 2 sets - 10 reps      Comprehension of Education:  [] Verbalizes understanding. [] Demonstrates understanding. [] Needs review. [x] Demonstrates/verbalizes HEP/Ed previously given. Plan: [x] Continue current frequency toward long and short term goals. [x] Specific Instructions for subsequent treatments: monitor response to treatment, progress as able. Aquatics if tolerance to land does not improve      Time In: 3:00 pm            Time Out: 3:55 pm     Electronically signed by:   Yulissa Wells PT

## 2023-03-02 ENCOUNTER — HOSPITAL ENCOUNTER (OUTPATIENT)
Dept: PHYSICAL THERAPY | Facility: CLINIC | Age: 73
Setting detail: THERAPIES SERIES
Discharge: HOME | End: 2023-03-02

## 2023-03-02 NOTE — FLOWSHEET NOTE
[] [address]  [address]    [phone]  F: [phone]   [x] [address] [facility]   [address]  P: [phone]  F: [phone]  [] [address]  [address]  P: [phone]  F: [phone] [] [address]  P: [phone]  F: [phone]  [] [address]  [facility]   [address]   [place]: [phone]  F: [phone]   [] [address]  [place]: [phone]   F: [phone]     Physical Therapy Cancel/No Show note    Date: 3/2/2023  Patient:  Fede Gunderson  : 1950  MRN: [id number]    Cancels/No Shows to date:     For today's appointment patient:    [x]  Cancelled    [] Rescheduled appointment    [] No-show     Reason given by patient:    []  Patient ill    []  Conflicting appointment    [] No transportation      [] Conflict with work    [] No reason given    [] Weather related    [] COVID-19    [x] Other:      Comments:  \" has the sniffles \"       [x] Next appointment was confirmed    Electronically signed by: Maureen Higuera PTA

## 2023-03-06 ENCOUNTER — HOSPITAL ENCOUNTER (OUTPATIENT)
Dept: PHYSICAL THERAPY | Facility: CLINIC | Age: 73
Setting detail: THERAPIES SERIES
Discharge: HOME | End: 2023-03-06
Payer: MEDICARE

## 2023-03-06 PROCEDURE — 97110 THERAPEUTIC EXERCISES: CPT

## 2023-03-06 NOTE — FLOWSHEET NOTE
[] [facility]  [address]  [phone]  F: [phone] [x] [address]  [facility]   [address]  P: [phone]  F: [phone] [] [address]  [address]  P: [phone]  F: [phone] [] [address]  P: [phone]  F: [phone] [] [address]  [facility]   [address]   [place]: [phone]  F: [phone]      Physical Therapy Daily Treatment Note    Date:  3/6/2023  Patient Name:  Froy Linging    :  1950  MRN: [id number]  Physician: Jasen Lopez MD                           Insurance: Medicare  Medical Diagnosis:   M25.551 (ICD-10-CM) - Right hip pain   M54.50, G89.29 (ICD-10-CM) - Chronic right-sided low back pain, unspecified whether sciatica present   G57.01 (ICD-10-CM) - Piriformis syndrome of right side   Rehab Codes: M54.31, M79.1, M79.651, R26.89  Onset date: 22             Next Dr's appt.: 23 (Dr. Minda Torres)    Visit# / total visits: ; Progress note for Medicare patient due at visit 10     Cancels/No Shows: 1/0    Subjective:    Pain:  [x] Yes  [] No  Location: R hip   Pain Rating: (0-10 scale) 0/10 upon arrival   Pain altered Tx:  [x] No  [] Yes  Action:   Comments: Pt arrives without SPC and denies any R hip pain. Pt reports new medication has been helping.      Objective:  Modalities: HP to R hip in supine at end of session x 10 min- not today   Precautions: hard of hearing  Exercises:  Exercise Reps/ Time Weight/ Level Comments   Nustep 10 min  Lv 2 Increased time as pain was reduced during warm up          Supine         HS stretch- R only 2x 30\" Minimal range tolerated only  Reduced to 2 sets d/t inc R med knee pain    Bent knee fall outs 10x   Decreased range of motion on right compared to left due to pain; minimal cues to decrease pelvic rotation   Hooklying glut sets 10x 2s       Heel slides 2x10     SAQ 15x  Inc pain in knee.    Hip add 15x Ball     Bridge 10x2   Minimal cues for gluteal contraction prior to bridge   Pelvic tilts x   Did not provided as HEP due to increased upper abdominal activation with exercise   SLR + quad set  2x10 A New 2/27 - visible mild R ext lag noted    TrA bracing 10x   New 2/27 - cues for proper breathing pattern         Seated   New 2/20   Sciatic nerve glides 10x 5\" Added 2/23   Piriformis stretch 1x 30\" Figure 4- only 1 rep d/t poor tolerance.    Glute sets  20x5\"     LAQ 20x A    Hip add 20x ball    Hip abd 20x lime    HS stretch  3x30\" Stool     March  20x ea A    HS curls 20x Lime     Paloff press 20x ea [place]    STS 2x10  22 in Reduced height 2/27               Standing      Slant board calf stretch 3x 30\"    Heel raises 20x     Marching  10xea      Step ups  10xea      Hip abduction  x Pain                 Shoulder ext  2x10 [place]  New 2/27   Manual 11 min total  Lateral glides to hip with mobilization belt  Inferior glides to hip with mobilization belt  Hypervolt lv 3 cushion head to all global hip musculature in L sidelying with pillow between legs.    Other:               Treatment Charges: Mins Units   []  Modalities - HP      [x]  Ther Exercise 40 3   []  Manual Therapy     []  Ther Activities     []  Aquatics     []  Vasocompression     []  Other     Total Treatment time 40 3     Estimated Medicare Cost to date: as of 03/06/23 $863.64    Assessment: [x] Progressing toward goals.  Continued with NuStep to improve tissue extensibility and cardiovascular endurance. Pt continues to present with B HS tightness. Pt reports a \"sharp\" pain during R knee fall out however, is tolerable. VC's provided to ensure correct form and proper breathing throughout mat table exercises with progressive carry over.  Completed standing LE strengthening exercises with good tolerance. Pt reports hip abduction is painful therefore, held this date. Reviewed importance of completing HEP daily with verbal understanding. Will continue to monitor pt pain levels and progress as tolerated. [] No change. [x] Other:   [x] Patient would continue to benefit from skilled physical therapy services in order to: improve overall core and pelvic/glut strength in order to stabilize the lumbar spine and decrease pelvic rotation in order to decrease overall pain and improve functional mobility. Problem list, as detailed above:   [x] ? Pain: Right hip, radiating to the LE                       [x] ? ROM: Lumbar flexion ROM, right hip AROM (limited due to pain)                      [x] ? Strength: core/pelvis         [x] ? Function: TRAM/LEFTS  [x] Gait Deviations: Antalgic gait        STG: (to be met in 10 treatments)  ? Pain: Patient will report no greater than 6-7/10 pain with daily activity to demonstrate improved quality of life. ? ROM: Patient will increase lumbar range of motion to 100% to demonstrate improved flexibility. ? Strength: Patient will increased hip/glut strength to 4/5 to improve overall lumbar stability. ? Function: Patient will report ability to sit for >1 hour without an increase in low back/right hip pain. Patient to be independent with home exercise program as demonstrated by performance with correct form without cues. Demonstrate Knowledge of fall prevention - MET, provided 2/16    LTG: (to be met in 20 treatments)  ? Pain: Patient will report no greater than 4-5/10 pain with daily activity to demonstrate improved quality of life. ? ROM: Patient will have negative right SLR to demonstrate improved quality of life and centralization of symptoms  ? Function: Patient will increase TRAM by 6 points to demonstrate improved quality of life. ?  Function: Patient will improve LEFTS score by 9 points to demonstrate improved quality of life. Patient goals: \"To improve my pain\"    Pt. Education:  [x] Yes  [] No  [x] Reviewed Prior HEP/Ed  Method of Education: [x] Verbal   [] Demo  [] Written    Access Code: [email or web address]  URL: [email or web address]  Date: 02/13/2023  Prepared by: Davidryanne Sexton Knee Fallouts - 1 x daily - 1 sets - 10 reps  Hooklying Gluteal Sets - 1 x daily - 2 sets - 10 reps  Supine Bridge - 1 x daily - 2 sets - 10 reps  Seated Correct Posture  Date: 02/20/2023  Prepared by: [address] [facility],8W - 1 x daily - 7 x weekly - 2 sets - 10 reps  Seated Hamstring Stretch - 1 x daily - 7 x weekly - 2 sets - 10 reps  Seated Hip Abduction with Resistance - 1 x daily - 7 x weekly - 2 sets - 10 reps  Seated Hip Adduction Isometrics with Ball - 1 x daily - 7 x weekly - 2 sets - 10 reps  Seated Gluteal Sets - 1 x daily - 7 x weekly - 2 sets - 10 reps      Comprehension of Education:  [] Verbalizes understanding. [] Demonstrates understanding. [] Needs review. [x] Demonstrates/verbalizes HEP/Ed previously given. Plan: [x] Continue current frequency toward long and short term goals. [x] Specific Instructions for subsequent treatments: monitor response to treatment, progress as able.  Aquatics if tolerance to land does not improve      Time In: 3:00 pm            Time Out: 3:50 pm     Electronically signed by:  Pily Cardoza PTA

## 2023-03-09 ENCOUNTER — HOSPITAL ENCOUNTER (OUTPATIENT)
Dept: PHYSICAL THERAPY | Facility: CLINIC | Age: 73
Setting detail: THERAPIES SERIES
Discharge: HOME | End: 2023-03-09
Payer: MEDICARE

## 2023-03-09 PROCEDURE — 97110 THERAPEUTIC EXERCISES: CPT

## 2023-03-09 NOTE — FLOWSHEET NOTE
[] [address]  [address]  [phone]  F: [phone] [x] [address]  [facility]   [address]  P: [phone]  F: [phone] [] [address]  [address]  P: [phone]  F: [phone] [] [address]  P: [phone]  F: [phone] [] [address]  [facility]   [address]   [place]: [phone]  F: [phone]      Physical Therapy Daily Treatment Note    Date:  3/9/2023  Patient Name:  Nereida Cisneros    :  1950  MRN: [id number]  Physician: Breana Rodriguez MD                           Insurance: Medicare  Medical Diagnosis:   M25.551 (ICD-10-CM) - Right hip pain   M54.50, G89.29 (ICD-10-CM) - Chronic right-sided low back pain, unspecified whether sciatica present   G57.01 (ICD-10-CM) - Piriformis syndrome of right side   Rehab Codes: M54.31, M79.1, M79.651, R26.89  Onset date: 22             Next Dr's appt.: 23 (Dr. Marlen Robins)    Visit# / total visits: ; Progress note for Medicare patient due at visit 10     Cancels/No Shows: 1/0    Subjective:    Pain:  [x] Yes  [] No  Location: R hip   Pain Rating: (0-10 scale) 0/10 upon arrival   Pain altered Tx:  [x] No  [] Yes  Action:     Comments: Pt again reports no pain and that she has been doing good. She states she has been running around the past couple of days and she think her medication is helping. No AD today.      Objective:  Modalities: HP to R hip in supine at end of session x 10 min- not today   Precautions: hard of hearing  Exercises:  Exercise Reps/ Time Weight/ Level Comments   Nustep 10 min  Lv 2 Increased time as pain was reduced during warm up          Supine         HS stretch- R only 2x 30\" Minimal range tolerated only  Reduced to 2 sets d/t inc R med knee pain 2/27   Bent knee fall outs 10x   Decreased range of motion on right compared to left due to pain; minimal cues to decrease pelvic rotation   Hooklying glut sets 10x 2s       Heel slides 2x10     SAQ 15x  Inc pain in knee. Hip add 15x Ball     Bridge 10x2   Minimal cues for gluteal contraction prior to bridge   Pelvic tilts x   Did not provided as HEP due to increased upper abdominal activation with exercise   SLR + quad set  2x10 A New 2/27 - visible mild R ext lag noted    TrA bracing 10x   New 2/27 - cues for proper breathing pattern         Seated   New 2/20   Sciatic nerve glides 10x 5\" Added 2/23   Piriformis stretch 1x 30\" Figure 4- only 1 rep d/t poor tolerance. Glute sets  20x5\"     LAQ 20x A    Hip add 20x ball    Hip abd 20x lime    HS stretch  3x30\" Stool     March  20x ea A    HS curls 20x [place]     Paloff press 20x ea [place]    STS 2x10  22 in Reduced height 2/27               Standing      Slant board calf stretch 3x 30\"    Heel raises 20x     Marching  10xea  6\"    Step ups  10xea      Hip abduction  10xea Pain     Hip flexion  10xea     Hip ext 10xea     March  10xea                       Shoulder ext  2x10 [place]  New 2/27   Shoulder retraction  2x10 [place]  Added 3/9   Paloff press  2x10 [place]  Added 3/9                     Manual 11 min total  Lateral glides to hip with mobilization belt  Inferior glides to hip with mobilization belt  Hypervolt lv 3 cushion head to all global hip musculature in L sidelying with pillow between legs. Other:               Treatment Charges: Mins Units   []  Modalities - HP      [x]  Ther Exercise 38 3   []  Manual Therapy     []  Ther Activities     []  Aquatics     []  Vasocompression     []  Other     Total Treatment time 38 3     Estimated Medicare Cost to date: as of 03/09/23 $927.28    Assessment: [x] Progressing toward goals. Continued with charted exercises today. Pt did well with all.  Minimal complaints of pain in the lateral, posterior R hip with standing exercises. Able to add T-band exercises to improve core strength. Pt states feeling good and getting a good work out after the session. [] No change. [x] Other:   [x] Patient would continue to benefit from skilled physical therapy services in order to: improve overall core and pelvic/glut strength in order to stabilize the lumbar spine and decrease pelvic rotation in order to decrease overall pain and improve functional mobility. Problem list, as detailed above:   [x] ? Pain: Right hip, radiating to the LE                       [x] ? ROM: Lumbar flexion ROM, right hip AROM (limited due to pain)                      [x] ? Strength: core/pelvis         [x] ? Function: TRAM/LEFTS  [x] Gait Deviations: Antalgic gait        STG: (to be met in 10 treatments)  ? Pain: Patient will report no greater than 6-7/10 pain with daily activity to demonstrate improved quality of life. ? ROM: Patient will increase lumbar range of motion to 100% to demonstrate improved flexibility. ? Strength: Patient will increased hip/glut strength to 4/5 to improve overall lumbar stability. ? Function: Patient will report ability to sit for >1 hour without an increase in low back/right hip pain. Patient to be independent with home exercise program as demonstrated by performance with correct form without cues. Demonstrate Knowledge of fall prevention - MET, provided 2/16    LTG: (to be met in 20 treatments)  ? Pain: Patient will report no greater than 4-5/10 pain with daily activity to demonstrate improved quality of life. ? ROM: Patient will have negative right SLR to demonstrate improved quality of life and centralization of symptoms  ? Function: Patient will increase TRAM by 6 points to demonstrate improved quality of life. ? Function: Patient will improve LEFTS score by 9 points to demonstrate improved quality of life. Patient goals:  \"To improve my pain\"    Pt. Education:  [x] Yes  [] No  [x] Reviewed Prior HEP/Ed  Method of Education: [x] Verbal   [] Demo  [] Written    Access Code: [email or web address]  URL: [email or web address]  Date: 02/13/2023  Prepared by: Carina Galaviz Knee Fallouts - 1 x daily - 1 sets - 10 reps  Hooklying Gluteal Sets - 1 x daily - 2 sets - 10 reps  Supine Bridge - 1 x daily - 2 sets - 10 reps  Seated Correct Posture  Date: 02/20/2023  Prepared by: [address] [facility],8W - 1 x daily - 7 x weekly - 2 sets - 10 reps  Seated Hamstring Stretch - 1 x daily - 7 x weekly - 2 sets - 10 reps  Seated Hip Abduction with Resistance - 1 x daily - 7 x weekly - 2 sets - 10 reps  Seated Hip Adduction Isometrics with Ball - 1 x daily - 7 x weekly - 2 sets - 10 reps  Seated Gluteal Sets - 1 x daily - 7 x weekly - 2 sets - 10 reps      Comprehension of Education:  [] Verbalizes understanding. [] Demonstrates understanding. [] Needs review. [x] Demonstrates/verbalizes HEP/Ed previously given. Plan: [x] Continue current frequency toward long and short term goals. [x] Specific Instructions for subsequent treatments: monitor response to treatment, progress as able.  Aquatics if tolerance to land does not improve      Time In: 9:03 am            Time Out: 9:52 pm     Electronically signed by:  Toya Bosch PTA

## 2023-03-13 ENCOUNTER — HOSPITAL ENCOUNTER (OUTPATIENT)
Dept: PHYSICAL THERAPY | Facility: CLINIC | Age: 73
Setting detail: THERAPIES SERIES
Discharge: HOME | End: 2023-03-13
Payer: MEDICARE

## 2023-03-13 ENCOUNTER — TELEPHONE (OUTPATIENT)
Dept: INTERNAL MEDICINE CLINIC | Age: 73
End: 2023-03-13

## 2023-03-13 DIAGNOSIS — H91.93 HEARING DIFFICULTY, BILATERAL: Primary | ICD-10-CM

## 2023-03-13 PROCEDURE — 97110 THERAPEUTIC EXERCISES: CPT

## 2023-03-13 NOTE — TELEPHONE ENCOUNTER
----- Message from Jaycee Boyd sent at 3/13/2023 11:39 AM EDT -----  Subject: Referral Request    Reason for referral request? Hearing Test  Provider patient wants to be referred to(if known):     Provider Phone Number(if known): Additional Information for Provider? Patient would like a hearing test at   [facility].   ---------------------------------------------------------------------------  --------------  Ligia Luna JZZV    [phone]; OK to leave message on voicemail  ---------------------------------------------------------------------------  --------------

## 2023-03-13 NOTE — TELEPHONE ENCOUNTER
Patient is asking for a referral to have a hearing test done at [facility]?     Please advise

## 2023-03-13 NOTE — FLOWSHEET NOTE
[] [address]  [address]  [phone]  F: [phone] [x] [address]  [facility]   [address]  P: [phone]  F: [phone] [] [address]  [address]  P: [phone]  F: [phone] [] [address]  P: [phone]  F: [phone] [] [address]  [facility]   [address]   [place]: [phone]  F: [phone]      Physical Therapy Daily Treatment Note    Date:  3/13/2023  Patient Name:  Alexandro Pichardo    :  1950  MRN: [id number]  Physician: Lorena Ragsdale MD                           Insurance: Medicare  Medical Diagnosis:   M25.551 (ICD-10-CM) - Right hip pain   M54.50, G89.29 (ICD-10-CM) - Chronic right-sided low back pain, unspecified whether sciatica present   G57.01 (ICD-10-CM) - Piriformis syndrome of right side   Rehab Codes: M54.31, M79.1, M79.651, R26.89  Onset date: 22             Next 's appt.: 23 (Dr. Kellogg Other)    Visit# / total visits: ; Progress note for Medicare patient due at visit 10     Cancels/No Shows: 1/0    Subjective:    Pain:  [x] Yes  [] No  Location: R hip   Pain Rating: (0-10 scale) 0/10 upon arrival   Pain altered Tx:  [x] No  [] Yes  Action:     Comments: Pt arrives noting she is doing well. Continued medication use but would like to decrease. Pt notes she did see Dr. Bess Newton who said to continue with PT, taking meds and see him back in 3 months if needed. Pt states that he said he thinks it is bursitis.      Objective:  Modalities: HP to R hip in supine at end of session x 10 min- not today   Precautions: hard of hearing  Exercises:  Exercise Reps/ Time Weight/ Level Comments   Nustep 10 min  Lv 2          Supine         HS stretch- R only 2x 30\" Minimal range tolerated only  Reduced to 2 sets d/t inc R med knee pain 2/27   Bent knee fall outs 10x   Decreased range of motion on right compared to left due to pain; minimal cues to decrease pelvic rotation   Hooklying glut sets 10x 2s       Heel slides 2x10     SAQ 15x  Inc pain in knee. Hip add 20x [place]  Cueing for proper breathing technique 3/13   Hip abd 20x lime New 3/13   Bridge 10x2   Minimal cues for gluteal contraction prior to bridge   Pelvic tilts x   Did not provided as HEP due to increased upper abdominal activation with exercise   SLR + quad set  2x10 A New 2/27 - visible mild R ext lag noted    TrA bracing 10x   New 2/27 - cues for proper breathing pattern         Seated   New 2/20   Sciatic nerve glides 10x 5\" Added 2/23   Piriformis stretch 1x 30\" Figure 4- only 1 rep d/t poor tolerance. Glute sets  20x5\"     LAQ 20x A    Hip add 20x ball    Hip abd 20x lime    HS stretch  3x30\" Stool     March  20x ea A    HS curls 20x [place]     Paloff press 20x ea [place]    STS 2x10  22 in Reduced height 2/27               Standing      Slant board calf stretch 3x30\"     Heel raises 20x     Step ups  10xea  6\"    Hip abduction  10x2ea 1# Inc 3/13   Hip flexion  10x2ea 1# Inc 3/13   Hip ext 10x2ea 1# Inc 3/13   March  10x2ea 1# Inc 3/13   Side step                   Shoulder ext  2x10 [place]  New 2/27   Shoulder retraction  2x10 [place]  Added 3/9   Paloff press  2x10 [place]  Added 3/9                     Manual 11 min total  Lateral glides to hip with mobilization belt  Inferior glides to hip with mobilization belt  Hypervolt lv 3 cushion head to all global hip musculature in L sidelying with pillow between legs.     Other:          Treatment Charges: Mins Units   []  Modalities - HP      [x]  Ther Exercise 39 3   []  Manual Therapy     []  Ther Activities     []  Aquatics     []  Vasocompression     []  Other     Total Treatment time 39 3     Estimated Medicare Cost to date: as of 03/13/23 $753.93    Assessment: [x] Progressing toward goals. Pt continues to have improvements each session. Able to make progressions with added weight to standing interventions as well as implement side stepping to further strengthen hips. Intermittent cueing to improve overall form especially with side lying hip abd. Pt denies pain through out treatment but does report muscle fatigue. [] No change. [x] Other:   [x] Patient would continue to benefit from skilled physical therapy services in order to: improve overall core and pelvic/glut strength in order to stabilize the lumbar spine and decrease pelvic rotation in order to decrease overall pain and improve functional mobility. Problem list, as detailed above:   [x] ? Pain: Right hip, radiating to the LE                       [x] ? ROM: Lumbar flexion ROM, right hip AROM (limited due to pain)                      [x] ? Strength: core/pelvis         [x] ? Function: TRAM/LEFTS  [x] Gait Deviations: Antalgic gait        STG: (to be met in 10 treatments)  ? Pain: Patient will report no greater than 6-7/10 pain with daily activity to demonstrate improved quality of life. ? ROM: Patient will increase lumbar range of motion to 100% to demonstrate improved flexibility. ? Strength: Patient will increased hip/glut strength to 4/5 to improve overall lumbar stability. ? Function: Patient will report ability to sit for >1 hour without an increase in low back/right hip pain. Patient to be independent with home exercise program as demonstrated by performance with correct form without cues. Demonstrate Knowledge of fall prevention - MET, provided 2/16    LTG: (to be met in 20 treatments)  ? Pain: Patient will report no greater than 4-5/10 pain with daily activity to demonstrate improved quality of life. ? ROM: Patient will have negative right SLR to demonstrate improved quality of life and centralization of symptoms  ?  Function: Patient will increase TRAM by 6 points to demonstrate improved quality of life. ? Function: Patient will improve LEFTS score by 9 points to demonstrate improved quality of life. Patient goals: \"To improve my pain\"    Pt. Education:  [x] Yes  [] No  [x] Reviewed Prior HEP/Ed  Method of Education: [x] Verbal   [] Demo  [] Written    Access Code: [email or web address]  URL: [email or web address]  Date: 02/13/2023  Prepared by: Stephani Richter Knee Fallouts - 1 x daily - 1 sets - 10 reps  Hooklying Gluteal Sets - 1 x daily - 2 sets - 10 reps  Supine Bridge - 1 x daily - 2 sets - 10 reps  Seated Correct Posture  Date: 02/20/2023  Prepared by: [address] [facility],8W - 1 x daily - 7 x weekly - 2 sets - 10 reps  Seated Hamstring Stretch - 1 x daily - 7 x weekly - 2 sets - 10 reps  Seated Hip Abduction with Resistance - 1 x daily - 7 x weekly - 2 sets - 10 reps  Seated Hip Adduction Isometrics with Ball - 1 x daily - 7 x weekly - 2 sets - 10 reps  Seated Gluteal Sets - 1 x daily - 7 x weekly - 2 sets - 10 reps      Comprehension of Education:  [] Verbalizes understanding. [] Demonstrates understanding. [] Needs review. [x] Demonstrates/verbalizes HEP/Ed previously given. Plan: [x] Continue current frequency toward long and short term goals. [x] Specific Instructions for subsequent treatments: monitor response to treatment, progress as able.  CHECK GOALS      Time In: 2:06 pm            Time Out: 2:55 pm     Electronically signed by:  Ariadna Mary PTA

## 2023-03-16 ENCOUNTER — HOSPITAL ENCOUNTER (OUTPATIENT)
Dept: PHYSICAL THERAPY | Facility: CLINIC | Age: 73
Setting detail: THERAPIES SERIES
Discharge: HOME | End: 2023-03-16
Payer: MEDICARE

## 2023-03-16 PROCEDURE — 97110 THERAPEUTIC EXERCISES: CPT

## 2023-03-16 NOTE — PROGRESS NOTES
[] [facility]) [facility] &  Therapy  [address]  [phone]  F: [phone] [x] [address]  [facility] 36   [address]  P: [phone]  F: [phone] [] [address]  [address]  P: [phone]  F: [phone] [] [address]  P: [phone]  F: [phone] [] [address]  [facility]   [address]   [place]: [phone]  F: [phone]      Physical Therapy Progress Note    Date: 3/16/2023      Patient: Cassia Esqueda  : 1950  MRN: [id number]    Physician: Giana Gaviria MD                           Insurance: Medicare  Medical Diagnosis:   M25.551 (ICD-10-CM) - Right hip pain   M54.50, G89.29 (ICD-10-CM) - Chronic right-sided low back pain, unspecified whether sciatica present   G57.01 (ICD-10-CM) - Piriformis syndrome of right side   Rehab Codes: M54.31, M79.1, M79.651, R26.89  Onset date: 22             Next 's appt.: TBD    Total visits attended:  Cancels/No shows: 1/0  Date range of services: 23 to 3/16/23      Subjective:    Pain:  [x] Yes  [] No               Location: R hip            Pain Rating: (0-10 scale) 0/10 upon arrival   Pain altered Tx:  [x] No  [] Yes  Action:      Comments: Patient reports she has been taking her new medication and it is really helping. Assessment:  [x] Progressing toward goals. Patient doing much better this date with very minimal pain during session, she continues to have mild pain with right hip abduction. Able to continue increase weight bearing exercises. [] No change.                           [] Other:   [x] Patient would continue to benefit from skilled physical therapy services in order to: improve overall core and pelvic/glut strength in order to stabilize the lumbar spine and decrease pelvic rotation in order to decrease overall pain and improve functional mobility. STG: (to be met in 10 treatments) Assessed 3/16 by Pedrito Wilkinson PT  ? Pain: Patient will report no greater than 6-7/10 pain with daily activity to demonstrate improved quality of life. - MET: 1/10  ? ROM: Patient will increase lumbar range of motion to 100% to demonstrate improved flexibility.- MET  ? Strength: Patient will increased hip/glut strength to 4/5 to improve overall lumbar stability. - MET  ? Function: Patient will report ability to sit for >1 hour without an increase in low back/right hip pain. - MET  Patient to be independent with home exercise program as demonstrated by performance with correct form without cues. - Not MET: patient reports she does not complete HEP consistantly  Demonstrate Knowledge of fall prevention - MET, provided 2/16     LTG: (to be met in 20 treatments)  ? Pain: Patient will report no greater than 4-5/10 pain with daily activity to demonstrate improved quality of life. ? ROM: Patient will have negative right SLR to demonstrate improved quality of life and centralization of symptoms  ? Function: Patient will increase TRAM by 6 points to demonstrate improved quality of life. ? Function: Patient will improve LEFTS score by 9 points to demonstrate improved quality of life.     Treatment Plan:  [x] Therapeutic Exercise   16382  [] Iontophoresis: 4 mg/mL Dexamethasone Sodium Phosphate  mAmin  50246   [x] Therapeutic Activity  94614 [x] Vasopneumatic cold with compression  93111    [x] Gait Training   38625 [] Ultrasound   28299   [x] Neuromuscular Re-education  72819 [] Electrical Stimulation Unattended  06217   [x] Manual Therapy  58147 [] Electrical Stimulation Attended  66989   [x] Instruction in HEP  [] Lumbar/Cervical Traction  41446   [] Aquatic Therapy   47687 [x] Cold/hotpack    [] Massage   97581      [] Dry Needling, 1 or 2 muscles  80982   [] Biofeedback, first 15 minutes   21664  [] Biofeedback, additional 15 minutes   15312 [] Dry Needling, 3 or more muscles  40393       Patient Status:     [x] Continue per initial plan of care. [] Additional visits necessary.     [] Other:     Requested Frequency/Duration: 2 times per week for 20 treatments      Electronically signed by Criss Jessica PT on 3/16/2023 at 2:14 PM

## 2023-03-16 NOTE — FLOWSHEET NOTE
[] [facility] [address]  [address]  [phone]  F: [phone] [x] [address]  [facility] 36   [address]  P: [phone]  F: [phone] [] [address]  [address] [facility]  P: [phone]  F: [phone] [] [address]  P: [phone]  F: [phone] [] [address]  [address] [facility] 70   [address]   [place]: [phone]  F: [phone]      Physical Therapy Daily Treatment Note    Date:  3/16/2023  Patient Name:  Taniya Butcher    :  1950  MRN: [id number]  Physician: Юлия Dorantes MD                           Insurance: Medicare ([address] [place] [address])  Medical Diagnosis:   M25.551 (ICD-10-CM) - Right hip pain   M54.50, G89.29 (ICD-10-CM) - Chronic right-sided low back pain, unspecified whether sciatica present   G57.01 (ICD-10-CM) - Piriformis syndrome of right side   Rehab Codes: M54.31, M79.1, M79.651, R26.89  Onset date: 22             Next Dr's appt.: 23 (Dr. Torrie Dewitt)    Visit# / total visits: ; Progress note for Medicare patient due at visit 19     Cancels/No Shows: 1/0    Subjective:    Pain:  [x] Yes  [] No  Location: R hip   Pain Rating: (0-10 scale) 0/10 upon arrival   Pain altered Tx:  [x] No  [] Yes  Action:     Comments: Patient reports she has been taking her new medication and it is really helping.     Objective:  Modalities: HP to R hip in supine at end of session x 10 min- not today   Precautions: hard of hearing  Exercises:  Exercise Reps/ Time Weight/ Level Comments   Nustep 10 min  Lv 2          Supine         HS stretch- R only 2x 30\" Minimal range tolerated only  Reduced to 2 sets d/t inc R med knee pain    Bent knee fall outs 10x   Decreased range of motion on right compared to left due to pain; minimal cues to decrease pelvic rotation   Hooklying glut sets 10x 2s       Heel slides 2x10     SAQ 15x  Inc pain in knee. Hip add 20x Ball  Cueing for proper breathing technique 3/13   Hip abd 20x lime New 3/13   Bridge 10x2   Minimal cues for gluteal contraction prior to bridge   Pelvic tilts x   Did not provided as HEP due to increased upper abdominal activation with exercise   SLR + quad set  2x10 A New 2/27 - visible mild R ext lag noted    TrA bracing 10x   New 2/27 - cues for proper breathing pattern         Seated   New 2/20   Sciatic nerve glides 10x 5\" Added 2/23   Piriformis stretch 1x 30\" Figure 4- only 1 rep d/t poor tolerance. Glute sets  20x5\"     LAQ 20x2s     Hip add 20x ball    Hip abd 20x lime    HS stretch  3x30\" Stool     March  20x ea A    HS curls 20x Lime     Paloff press 20x ea [place]    STS 2x10  From chair  No UE Reduced height 3/16               Standing      Slant board calf stretch 3x30\"     Hamstring Stretch 2x30\"      Heel raises 20x     Step ups  20 ea  6\"    Hip abduction  20 ea 1# Inc 3/13   Hip flexion  20 ea 1# Inc 3/13   Hip ext 20 ea 1# Inc 3/13   March  20 ea 1# Inc 3/13   Lateral step up 15 ea 4\" Added 3/16   Mini-Squats 20  No UE support         Shoulder ext  2x10 Lime  New 2/27   Shoulder retraction  2x10 Lime  Added 3/9   Paloff press  2x10 Lime  Added 3/9         Side Stepping 30' x2           Manual 11 min total  Lateral glides to hip with mobilization belt  Inferior glides to hip with mobilization belt  Hypervolt lv 3 cushion head to all global hip musculature in L sidelying with pillow between legs. Other: Nu-step billed today secondary to discussion around STG assessment. Assessment: [x] Progressing toward goals. Patient doing much better this date with very minimal pain during session, she continues to have mild pain with right hip abduction. Able to continue increase weight bearing exercises. [] No change.      [x] Other:   [x] Patient would continue to benefit from skilled physical therapy services in order to: improve overall core and pelvic/glut strength in order to stabilize the lumbar spine and decrease pelvic rotation in order to decrease overall pain and improve functional mobility. Problem list, as detailed above:   [x] ? Pain: Right hip, radiating to the LE                       [x] ? ROM: Lumbar flexion ROM, right hip AROM (limited due to pain)                      [x] ? Strength: core/pelvis         [x] ? Function: TRAM/LEFTS  [x] Gait Deviations: Antalgic gait        STG: (to be met in 10 treatments)  ? Pain: Patient will report no greater than 6-7/10 pain with daily activity to demonstrate improved quality of life. - MET: 1/10  ? ROM: Patient will increase lumbar range of motion to 100% to demonstrate improved flexibility.- MET  ? Strength: Patient will increased hip/glut strength to 4/5 to improve overall lumbar stability. - MET  ? Function: Patient will report ability to sit for >1 hour without an increase in low back/right hip pain. - MET  Patient to be independent with home exercise program as demonstrated by performance with correct form without cues. - Not MET: patient reports she does not complete HEP consistantly  Demonstrate Knowledge of fall prevention - MET, provided 2/16    LTG: (to be met in 20 treatments)  ? Pain: Patient will report no greater than 4-5/10 pain with daily activity to demonstrate improved quality of life. ? ROM: Patient will have negative right SLR to demonstrate improved quality of life and centralization of symptoms  ? Function: Patient will increase TRAM by 6 points to demonstrate improved quality of life. ? Function: Patient will improve LEFTS score by 9 points to demonstrate improved quality of life. Patient goals: \"To improve my pain\"    Pt.  Education:  [x] Yes  [] No  [x] Reviewed Prior HEP/Ed  Method of Education: [x] Verbal   [] Demo  [] Written    Access Code: [email or web address]  URL: [email or web address]  Date: 02/13/2023  Prepared by: Jake Pryor Knee Fallouts - 1 x daily - 1 sets - 10 reps  Hooklying Gluteal Sets - 1 x daily - 2 sets - 10 reps  Supine Bridge - 1 x daily - 2 sets - 10 reps  Seated Correct Posture  Date: 02/20/2023  Prepared by: [address] [facility],8W - 1 x daily - 7 x weekly - 2 sets - 10 reps  Seated Hamstring Stretch - 1 x daily - 7 x weekly - 2 sets - 10 reps  Seated Hip Abduction with Resistance - 1 x daily - 7 x weekly - 2 sets - 10 reps  Seated Hip Adduction Isometrics with Ball - 1 x daily - 7 x weekly - 2 sets - 10 reps  Seated Gluteal Sets - 1 x daily - 7 x weekly - 2 sets - 10 reps      Comprehension of Education:  [x] Verbalizes understanding. [] Demonstrates understanding. [] Needs review. [x] Demonstrates/verbalizes HEP/Ed previously given. Treatment Charges: Mins Units   []  Modalities - HP      [x]  Ther Exercise 52 3   []  Manual Therapy     []  Ther Activities     []  Aquatics     []  Vasocompression     []  Other     Total Treatment time 52 3     Estimated Medicare Cost to date: as of 03/16/23 $1,064.70     Plan: [x] Continue current frequency toward long and short term goals. [x] Specific Instructions for subsequent treatments: monitor response to treatment, progress as able.        Time In: 14:00           Time Out: 14:52    Electronically signed by:  Delores Dowd, PT

## 2023-03-20 ENCOUNTER — HOSPITAL ENCOUNTER (OUTPATIENT)
Dept: PHYSICAL THERAPY | Facility: CLINIC | Age: 73
Setting detail: THERAPIES SERIES
Discharge: HOME | End: 2023-03-20
Payer: MEDICARE

## 2023-03-20 PROCEDURE — 97110 THERAPEUTIC EXERCISES: CPT

## 2023-03-20 NOTE — FLOWSHEET NOTE
right SLR to demonstrate improved quality of life and centralization of symptoms  ? Function: Patient will increase TRAM by 6 points to demonstrate improved quality of life. ? Function: Patient will improve LEFTS score by 9 points to demonstrate improved quality of life. Patient goals: \"To improve my pain\"    Pt. Education:  [x] Yes  [] No  [x] Reviewed Prior HEP/Ed  Method of Education: [x] Verbal   [] Demo  [] Written    Access Code: [email or web address]  URL: [email or web address]  Date: 02/13/2023  Prepared by: Judith Gutiérrez Knee Fallouts - 1 x daily - 1 sets - 10 reps  Hooklying Gluteal Sets - 1 x daily - 2 sets - 10 reps  Supine Bridge - 1 x daily - 2 sets - 10 reps  Seated Correct Posture  Date: 02/20/2023  Prepared by: [address] [facility],W - 1 x daily - 7 x weekly - 2 sets - 10 reps  Seated Hamstring Stretch - 1 x daily - 7 x weekly - 2 sets - 10 reps  Seated Hip Abduction with Resistance - 1 x daily - 7 x weekly - 2 sets - 10 reps  Seated Hip Adduction Isometrics with Ball - 1 x daily - 7 x weekly - 2 sets - 10 reps  Seated Gluteal Sets - 1 x daily - 7 x weekly - 2 sets - 10 reps      Comprehension of Education:  [x] Verbalizes understanding. [] Demonstrates understanding. [] Needs review. [x] Demonstrates/verbalizes HEP/Ed previously given. Plan: [x] Continue current frequency toward long and short term goals. [x] Specific Instructions for subsequent treatments: monitor response to treatment, progress as able.        Time In: 207          Time Out: 257    Electronically signed by:  Fred Ozuna PTA

## 2023-03-23 ENCOUNTER — HOSPITAL ENCOUNTER (OUTPATIENT)
Dept: PHYSICAL THERAPY | Facility: CLINIC | Age: 73
Setting detail: THERAPIES SERIES
Discharge: HOME | End: 2023-03-23
Payer: MEDICARE

## 2023-03-23 PROCEDURE — 97110 THERAPEUTIC EXERCISES: CPT

## 2023-03-23 NOTE — FLOWSHEET NOTE
[] [facility] [facility]  [address]  [phone]  F: [phone] [x] [address]  [facility]   [address]  P: [phone]  F: [phone] [] [address]  [address] [facility]  P: [phone]  F: [phone] [] [address]  P: [phone]  F: [phone] [] [address]  [address] [facility] 70   [address]   [place]: [phone]  F: [phone]      Physical Therapy Daily Treatment Note    Date:  3/23/2023  Patient Name:  Rell Pearl    :  1950  MRN: [id number]  Physician: Char Maldonado MD                           Insurance: Medicare ([address] [place] [address])  Medical Diagnosis:   M25.551 (ICD-10-CM) - Right hip pain   M54.50, G89.29 (ICD-10-CM) - Chronic right-sided low back pain, unspecified whether sciatica present   G57.01 (ICD-10-CM) - Piriformis syndrome of right side   Rehab Codes: M54.31, M79.1, M79.651, R26.89  Onset date: 22             Next 's appt.: 23 (Dr. Angeles Saini)    Visit# / total visits: ; Progress note for Medicare patient due at visit 19     Cancels/No Shows: 1/0    Subjective:    Pain:  [x] Yes  [] No  Location: R hip   Pain Rating: (0-10 scale) 0/10 upon arrival   Pain altered Tx:  [x] No  [] Yes  Action:     Comments:  Patient reports she is feeling fine, she had a hearing test yesterday.      Objective:  Modalities: HP to R hip in supine at end of session x 10 min- not today   Precautions: hard of hearing  Exercises:  Exercise Reps/ Time Weight/ Level Comments   Nustep 8 min  Lv 2 Bilateral lower extremities only         Supine         HS stretch 2x30s  Minimal range tolerated only  Reduced to 2 sets d/t inc R med knee pain    Bent knee fall outs 10x   Decreased range of motion on

## 2023-03-27 ENCOUNTER — HOSPITAL ENCOUNTER (OUTPATIENT)
Dept: PHYSICAL THERAPY | Facility: CLINIC | Age: 73
Setting detail: THERAPIES SERIES
Discharge: HOME | End: 2023-03-27
Payer: MEDICARE

## 2023-03-27 PROCEDURE — 97110 THERAPEUTIC EXERCISES: CPT

## 2023-03-27 NOTE — FLOWSHEET NOTE
[] [facility] [address]  [address]  [phone]  F: [phone] [x] [address]  [facility]   [address]  P: [phone]  F: [phone] [] [address]  [address] [facility] [address]  P: [phone]  F: [phone] [] [address]  P: [phone]  F: [phone] [] [address]  [facility]   [address]   [place]: [phone]  F: [phone]      Physical Therapy Daily Treatment Note    Date:  3/27/2023  Patient Name:  Devin Barnes    :  1950  MRN: [id number]  Physician: Juan Coronado MD                           Insurance: Medicare ([address] [place] [address])  Medical Diagnosis:   M25.551 (ICD-10-CM) - Right hip pain   M54.50, G89.29 (ICD-10-CM) - Chronic right-sided low back pain, unspecified whether sciatica present   G57.01 (ICD-10-CM) - Piriformis syndrome of right side   Rehab Codes: M54.31, M79.1, M79.651, R26.89  Onset date: 22             Next 's appt.: 23 (Dr. Jordan Soto)    Visit# / total visits: ; Progress note for Medicare patient due at visit 19     Cancels/No Shows: 1/0    Subjective:    Pain:  [x] Yes  [] No  Location: R hip   Pain Rating: (0-10 scale) 0/10 upon arrival   Pain altered Tx:  [x] No  [] Yes  Action:     Comments:  Patient reports she is feeling fine. She is still taking her medication and has two refills left, but wants to begin to taper off.     Objective:  Modalities:   Precautions: hard of hearing  Exercises: Bold completed 3/27/2023  Exercise Reps/ Time Weight/ Level Comments   Nustep 6 min  Lv 3 Bilateral lower extremities only         Supine         HS stretch 2x30s  Minimal range tolerated only  Reduced to 2 sets d/t inc R med knee pain    Bent knee fall outs 10x

## 2023-03-30 ENCOUNTER — HOSPITAL ENCOUNTER (OUTPATIENT)
Dept: PHYSICAL THERAPY | Facility: CLINIC | Age: 73
Setting detail: THERAPIES SERIES
Discharge: HOME | End: 2023-03-30
Payer: MEDICARE

## 2023-03-30 PROCEDURE — 97110 THERAPEUTIC EXERCISES: CPT

## 2023-03-30 NOTE — FLOWSHEET NOTE
life.                    Patient goals: \"To improve my pain\"    Pt. Education:  [x] Yes  [] No  [x] Reviewed Prior HEP/Ed  Method of Education: [x] Verbal   [] Demo  [] Written    Access Code: [email or web address]  URL: [email or web address]  Date: 02/13/2023  Prepared by: Xin Baez Knee Fallouts - 1 x daily - 1 sets - 10 reps  Hooklying Gluteal Sets - 1 x daily - 2 sets - 10 reps  Supine Bridge - 1 x daily - 2 sets - 10 reps  Seated Correct Posture  Date: 02/20/2023  Prepared by: [address] [facility],8W - 1 x daily - 7 x weekly - 2 sets - 10 reps  Seated Hamstring Stretch - 1 x daily - 7 x weekly - 2 sets - 10 reps  Seated Hip Abduction with Resistance - 1 x daily - 7 x weekly - 2 sets - 10 reps  Seated Hip Adduction Isometrics with Ball - 1 x daily - 7 x weekly - 2 sets - 10 reps  Seated Gluteal Sets - 1 x daily - 7 x weekly - 2 sets - 10 reps      Comprehension of Education:  [x] Verbalizes understanding. [] Demonstrates understanding. [] Needs review. [x] Demonstrates/verbalizes HEP/Ed previously given. Treatment Charges: Mins Units   []  Modalities - HP      [x]  Ther Exercise 40 3   []  Manual Therapy     []  Ther Activities     []  Aquatics     []  Vasocompression     []  Other     Total Treatment time 40 3     Estimated Medicare Cost to date: as of 03/20/23 $1,304.06     Plan: [x] Continue current frequency toward long and short term goals. [x] Specific Instructions for subsequent treatments: monitor response to treatment, progress as able.        Time In: 11:15         Time Out: 12:00    Electronically signed by:  Demario Aponte, PT

## 2023-04-03 ENCOUNTER — HOSPITAL ENCOUNTER (OUTPATIENT)
Dept: PHYSICAL THERAPY | Facility: CLINIC | Age: 73
Setting detail: THERAPIES SERIES
Discharge: HOME | End: 2023-04-03
Payer: MEDICARE

## 2023-04-03 PROCEDURE — 97110 THERAPEUTIC EXERCISES: CPT

## 2023-04-03 NOTE — DISCHARGE SUMMARY
[x] Progressing toward goals. Patient has met the majority of her goals with no further complaints of pain or discomfort in her right hip. She is independent with HEP and feels like she is ready to discharge. Patient elects to leave treatment early as a result. STG: (to be met in 10 treatments)  ? Pain: Patient will report no greater than 6-7/10 pain with daily activity to demonstrate improved quality of life. - MET: 1/10  ? ROM: Patient will increase lumbar range of motion to 100% to demonstrate improved flexibility.- MET  ? Strength: Patient will increased hip/glut strength to 4/5 to improve overall lumbar stability. - MET  ? Function: Patient will report ability to sit for >1 hour without an increase in low back/right hip pain. - MET  Patient to be independent with home exercise program as demonstrated by performance with correct form without cues. - Not MET: patient reports she does not complete HEP consistantly  Demonstrate Knowledge of fall prevention - MET, provided 2/16     LTG: (to be met in 20 treatments)  ? Pain: Patient will report no greater than 4-5/10 pain with daily activity to demonstrate improved quality of life. - MET: 0/10  ? ROM: Patient will have negative right SLR to demonstrate improved quality of life and centralization of symptoms - MET  ? Function: Patient will increase TRAM by 6 points to demonstrate improved quality of life. - MET  ? Function: Patient will improve LEFTS score by 9 points to demonstrate improved quality of life.  - MET                   Treatment to Date:  [x] Therapeutic Exercise    [] Modalities:  [x] Therapeutic Activity    [] Ultrasound  [] Electrical Stimulation  [x] Gait Training     [] Massage       [] Lumbar/Cervical Traction  [x] Neuromuscular Re-education [] Cold/hotpack [] Iontophoresis: 4 mg/mL  [x] Instruction in Home Exercise Program                     Dexamethasone Sodium  [] Manual Therapy             Phosphate 40-80 mAmin  [] Aquatic

## 2023-04-03 NOTE — FLOWSHEET NOTE
by: Christopher Soliz  Bent Knee Fallouts - 1 x daily - 1 sets - 10 reps  Hooklying Gluteal Sets - 1 x daily - 2 sets - 10 reps  Supine Bridge - 1 x daily - 2 sets - 10 reps  Seated Correct Posture  Date: 02/20/2023  Prepared by: [address] [facility],8W - 1 x daily - 7 x weekly - 2 sets - 10 reps  Seated Hamstring Stretch - 1 x daily - 7 x weekly - 2 sets - 10 reps  Seated Hip Abduction with Resistance - 1 x daily - 7 x weekly - 2 sets - 10 reps  Seated Hip Adduction Isometrics with Ball - 1 x daily - 7 x weekly - 2 sets - 10 reps  Seated Gluteal Sets - 1 x daily - 7 x weekly - 2 sets - 10 reps    Comprehension of Education:  [x] Verbalizes understanding. [x] Demonstrates understanding. [] Needs review. [x] Demonstrates/verbalizes HEP/Ed previously given. Treatment Charges: Mins Units   []  Modalities - HP      [x]  Ther Exercise 10 1   []  Manual Therapy     []  Ther Activities     []  Aquatics     []  Vasocompression     []  Other     Total Treatment time 10 1     Estimated Medicare Cost to date: as of 03/20/23 $1,332.56     Plan: [x] Continue current frequency toward long and short term goals. [x] Specific Instructions for subsequent treatments: monitor response to treatment, progress as able.        Time In: 14:05         Time Out: 14:20    Electronically signed by:  Christopher Soliz, PT

## 2023-04-06 ENCOUNTER — APPOINTMENT (OUTPATIENT)
Dept: PHYSICAL THERAPY | Facility: CLINIC | Age: 73
End: 2023-04-06
Payer: MEDICARE

## 2023-04-18 ENCOUNTER — APPOINTMENT (OUTPATIENT)
Dept: GENERAL RADIOLOGY | Age: 73
End: 2023-04-18
Payer: MEDICARE

## 2023-04-18 ENCOUNTER — HOSPITAL ENCOUNTER (EMERGENCY)
Age: 73
Discharge: HOME | End: 2023-04-19
Attending: EMERGENCY MEDICINE
Payer: MEDICARE

## 2023-04-18 ENCOUNTER — APPOINTMENT (OUTPATIENT)
Dept: CT IMAGING | Age: 73
End: 2023-04-18
Payer: MEDICARE

## 2023-04-18 DIAGNOSIS — I10 HYPERTENSION, UNSPECIFIED TYPE: Primary | ICD-10-CM

## 2023-04-18 DIAGNOSIS — R51.9 ACUTE NONINTRACTABLE HEADACHE, UNSPECIFIED HEADACHE TYPE: ICD-10-CM

## 2023-04-18 LAB
ABSOLUTE EOS #: 0.21 K/UL (ref 0–0.44)
ABSOLUTE IMMATURE GRANULOCYTE: 0.01 K/UL (ref 0–0.3)
ABSOLUTE LYMPH #: 1.95 K/UL (ref 1.1–3.7)
ABSOLUTE MONO #: 0.45 K/UL (ref 0.1–1.2)
ANION GAP SERPL CALCULATED.3IONS-SCNC: 10 MMOL/L (ref 9–17)
BASOPHILS # BLD: 1 % (ref 0–2)
BASOPHILS ABSOLUTE: 0.05 K/UL (ref 0–0.2)
BNP SERPL-MCNC: 390 PG/ML
BUN SERPL-MCNC: 20 MG/DL (ref 8–23)
BUN/CREAT BLD: 21 (ref 9–20)
CALCIUM SERPL-MCNC: 9.7 MG/DL (ref 8.6–10.4)
CHLORIDE SERPL-SCNC: 102 MMOL/L (ref 98–107)
CO2 SERPL-SCNC: 26 MMOL/L (ref 20–31)
CREAT SERPL-MCNC: 0.94 MG/DL (ref 0.5–0.9)
EOSINOPHILS RELATIVE PERCENT: 3 % (ref 1–4)
GFR SERPL CREATININE-BSD FRML MDRD: >60 ML/MIN/1.73M2
GLUCOSE SERPL-MCNC: 112 MG/DL (ref 70–99)
HCT VFR BLD AUTO: 41.6 % (ref 36.3–47.1)
HGB BLD-MCNC: 13.2 G/DL (ref 11.9–15.1)
IMMATURE GRANULOCYTES: 0 %
LYMPHOCYTES # BLD: 28 % (ref 24–43)
MAGNESIUM SERPL-MCNC: 2 MG/DL (ref 1.6–2.6)
MCH RBC QN AUTO: 30.2 PG (ref 25.2–33.5)
MCHC RBC AUTO-ENTMCNC: 31.7 G/DL (ref 28.4–34.8)
MCV RBC AUTO: 95.2 FL (ref 82.6–102.9)
MONOCYTES # BLD: 7 % (ref 3–12)
NRBC AUTOMATED: 0 PER 100 WBC
PDW BLD-RTO: 13.5 % (ref 11.8–14.4)
PLATELET # BLD AUTO: 205 K/UL (ref 138–453)
PMV BLD AUTO: 10.3 FL (ref 8.1–13.5)
POTASSIUM SERPL-SCNC: 3.9 MMOL/L (ref 3.7–5.3)
RBC # BLD: 4.37 M/UL (ref 3.95–5.11)
SEG NEUTROPHILS: 61 % (ref 36–65)
SEGMENTED NEUTROPHILS ABSOLUTE COUNT: 4.28 K/UL (ref 1.5–8.1)
SODIUM SERPL-SCNC: 138 MMOL/L (ref 135–144)
TROPONIN I SERPL DL<=0.01 NG/ML-MCNC: 11 NG/L (ref 0–14)
WBC # BLD AUTO: 7 K/UL (ref 3.5–11.3)

## 2023-04-18 PROCEDURE — 70450 CT HEAD/BRAIN W/O DYE: CPT

## 2023-04-18 PROCEDURE — 80048 BASIC METABOLIC PNL TOTAL CA: CPT

## 2023-04-18 PROCEDURE — 83735 ASSAY OF MAGNESIUM: CPT

## 2023-04-18 PROCEDURE — 84484 ASSAY OF TROPONIN QUANT: CPT

## 2023-04-18 PROCEDURE — 99285 EMERGENCY DEPT VISIT HI MDM: CPT

## 2023-04-18 PROCEDURE — 83880 ASSAY OF NATRIURETIC PEPTIDE: CPT

## 2023-04-18 PROCEDURE — 96374 THER/PROPH/DIAG INJ IV PUSH: CPT

## 2023-04-18 PROCEDURE — 85025 COMPLETE CBC W/AUTO DIFF WBC: CPT

## 2023-04-18 PROCEDURE — 71045 X-RAY EXAM CHEST 1 VIEW: CPT

## 2023-04-18 PROCEDURE — 93005 ELECTROCARDIOGRAM TRACING: CPT | Performed by: EMERGENCY MEDICINE

## 2023-04-18 ASSESSMENT — ENCOUNTER SYMPTOMS
SHORTNESS OF BREATH: 0
VOMITING: 0
SORE THROAT: 0
RHINORRHEA: 0
EYE REDNESS: 0
DIARRHEA: 0
COUGH: 0
NAUSEA: 0
COLOR CHANGE: 0
EYE DISCHARGE: 0

## 2023-04-18 ASSESSMENT — PAIN DESCRIPTION - PAIN TYPE: TYPE: ACUTE PAIN

## 2023-04-18 ASSESSMENT — PAIN - FUNCTIONAL ASSESSMENT: PAIN_FUNCTIONAL_ASSESSMENT: NONE - DENIES PAIN

## 2023-04-19 VITALS
OXYGEN SATURATION: 94 % | RESPIRATION RATE: 21 BRPM | HEART RATE: 62 BPM | TEMPERATURE: 97.4 F | HEIGHT: 67 IN | BODY MASS INDEX: 39.24 KG/M2 | SYSTOLIC BLOOD PRESSURE: 154 MMHG | WEIGHT: 250 LBS | DIASTOLIC BLOOD PRESSURE: 84 MMHG

## 2023-04-19 LAB
EKG ATRIAL RATE: 56 BPM
EKG P AXIS: 65 DEGREES
EKG P-R INTERVAL: 194 MS
EKG Q-T INTERVAL: 450 MS
EKG QRS DURATION: 102 MS
EKG QTC CALCULATION (BAZETT): 434 MS
EKG R AXIS: -14 DEGREES
EKG T AXIS: 15 DEGREES
EKG VENTRICULAR RATE: 56 BPM
TROPONIN I SERPL DL<=0.01 NG/ML-MCNC: 11 NG/L (ref 0–14)

## 2023-04-19 PROCEDURE — 96374 THER/PROPH/DIAG INJ IV PUSH: CPT

## 2023-04-19 PROCEDURE — 6360000002 HC RX W HCPCS: Performed by: EMERGENCY MEDICINE

## 2023-04-19 PROCEDURE — 99285 EMERGENCY DEPT VISIT HI MDM: CPT

## 2023-04-19 RX ORDER — KETOROLAC TROMETHAMINE 30 MG/ML
30 INJECTION, SOLUTION INTRAMUSCULAR; INTRAVENOUS ONCE
Status: COMPLETED | OUTPATIENT
Start: 2023-04-19 | End: 2023-04-19

## 2023-04-19 RX ADMIN — KETOROLAC TROMETHAMINE 30 MG: 30 INJECTION, SOLUTION INTRAMUSCULAR; INTRAVENOUS at 00:39

## 2023-04-19 NOTE — ED PROVIDER NOTES
normal and S2 normal. No murmur heard. Pulmonary:      Effort: Pulmonary effort is normal. No accessory muscle usage or respiratory distress. Breath sounds: Normal breath sounds. Chest:      Chest wall: No deformity or tenderness. Abdominal:      General: Bowel sounds are normal. There is no distension or abdominal bruit. Palpations: Abdomen is not rigid. Tenderness: There is no abdominal tenderness. There is no guarding or rebound. Musculoskeletal:      Cervical back: Normal range of motion and neck supple. Skin:     General: Skin is warm. Findings: No rash. Neurological:      Mental Status: She is alert and oriented to person, place, and time. GCS: GCS eye subscore is 4. GCS verbal subscore is 5. GCS motor subscore is 6. Psychiatric:         Speech: Speech normal.       MEDICAL DECISION MAKING / ED COURSE:   Summary of Patient Presentation:    82-year-old female presents with a headache and hypertension, plan is basic labs CT brain and reevaluation. 1)  Number and Complexity of Problems  Problem List This Visit: Hypertension, headache    Differential Diagnosis included but not limited: Noncompliance, hypertensive urgency, hypertensive emergency, stroke, tension headache    Diagnoses Considered but Do Not Suspect: Aleena Rodriguez    Pertinent Comorbid Conditions: Hypertension    2)  Data Reviewed  My EKG interpretation: Patient's EKG shows sinus bradycardia with a rate of 56 GA QRS QTc intervals unremarkable patient has left axis deviation no ST elevations or depressions, nonspecific EKG. Imaging that is independently reviewed and interpreted by me as: CT scan negative for acute pathology    See more data below for the lab and radiology tests and orders.     3)  Treatment and Disposition  Patient's blood pressure has come down without any significant intervention, her CT brain is unremarkable, plan will be discharged with outpatient follow-up, return if symptoms worsen or

## 2023-05-03 ENCOUNTER — OFFICE VISIT (OUTPATIENT)
Dept: INTERNAL MEDICINE CLINIC | Age: 73
End: 2023-05-03

## 2023-05-03 VITALS
BODY MASS INDEX: 39.96 KG/M2 | OXYGEN SATURATION: 98 % | TEMPERATURE: 98.4 F | WEIGHT: 254.6 LBS | DIASTOLIC BLOOD PRESSURE: 70 MMHG | HEIGHT: 67 IN | HEART RATE: 52 BPM | RESPIRATION RATE: 15 BRPM | SYSTOLIC BLOOD PRESSURE: 148 MMHG

## 2023-05-03 DIAGNOSIS — M25.551 RIGHT HIP PAIN: ICD-10-CM

## 2023-05-03 DIAGNOSIS — I10 ESSENTIAL HYPERTENSION: Primary | ICD-10-CM

## 2023-05-03 DIAGNOSIS — H91.90 DECREASED HEARING, UNSPECIFIED LATERALITY: ICD-10-CM

## 2023-05-03 DIAGNOSIS — E66.01 SEVERE OBESITY (BMI 35.0-39.9) WITH COMORBIDITY (HCC): ICD-10-CM

## 2023-05-03 DIAGNOSIS — H35.61 RETINAL HEMORRHAGE OF RIGHT EYE: ICD-10-CM

## 2023-05-03 DIAGNOSIS — Z09 HOSPITAL DISCHARGE FOLLOW-UP: ICD-10-CM

## 2023-05-03 DIAGNOSIS — H93.19 TINNITUS, UNSPECIFIED LATERALITY: ICD-10-CM

## 2023-05-03 RX ORDER — LOSARTAN POTASSIUM 50 MG/1
50 TABLET ORAL DAILY
Qty: 90 TABLET | Refills: 1 | Status: SHIPPED | OUTPATIENT
Start: 2023-05-03

## 2023-05-03 RX ORDER — DICLOFENAC SODIUM 75 MG/1
75 TABLET, DELAYED RELEASE ORAL 2 TIMES DAILY
COMMUNITY

## 2023-05-03 RX ORDER — BUPROPION HYDROCHLORIDE 150 MG/1
150 TABLET ORAL EVERY MORNING
Qty: 30 TABLET | Refills: 0 | Status: SHIPPED | OUTPATIENT
Start: 2023-05-03

## 2023-05-03 NOTE — PROGRESS NOTES
Post-Discharge Transitional Care  Follow Up      Omar Magana   YOB: 1950    Date of Office Visit:  5/3/2023  Date of Hospital Admission: 4/18/23  Date of Hospital Discharge: 4/19/23  Risk of hospital readmission (high >=14%. Medium >=10%) :No data recorded    Care management risk score Rising risk (score 2-5) and Complex Care (Scores >=6): No Risk Score On File     Non face to face  following discharge, date last encounter closed (first attempt may have been earlier): *No documented post hospital discharge outreach found in the last 14 days    Call initiated 2 business days of discharge: *No response recorded in the last 14 days    ASSESSMENT/PLAN:   Essential hypertension  Severe obesity (BMI 35.0-39. 9) with comorbidity ([address]  Decreased hearing, unspecified laterality  Tinnitus, unspecified laterality  Retinal hemorrhage of right eye  Right hip pain  Hospital discharge follow-up  -     MD DISCHARGE MEDS RECONCILED W/ CURRENT OUTPATIENT MED LIST      Medical Decision Making: high complexity  Return in about 3 months (around 8/3/2023). On this date 5/3/2023 I have spent 45 minutes reviewing previous notes, test results and face to face with the patient discussing the diagnosis and importance of compliance with the treatment plan as well as documenting on the day of the visit.        Subjective:   HPI:  Follow up of Hospital problems/diagnosis(es): Here for follow-up on elevated blood pressure that she ended up going to the emergency room and was evaluated with multiple testing and patient blood pressure stable and was discharged home and patient had a CT of the head done which was reviewed with the patient and explained to her about the report and the lab work reviewed  Patient also has issues with her hearing and patient has been seeing the ENT and was recommended to get a cochlear implant and patient still debating about getting it done  Also patient had retinal hemorrhage for which she
found for: [place], [place]  Lab Results   Component Value Date    WBC 7.0 2023    HGB 13.2 2023    HCT 41.6 2023    MCV 95.2 2023     2023     Lab Results   Component Value Date    INR 1.2 2019    PROTIME 11.8 (H) 2019     Lab Results   Component Value Date    GLUCOSE 112 (H) 2023    CREATININE 0.94 (H) 2023    BUN 20 2023     2023    K 3.9 2023     2023    CO2 26 2023     Lab Results   Component Value Date    ALT 10 2022    AST 11 2022    ALKPHOS 104 2022    BILITOT 0.47 2022     Lab Results   Component Value Date    LABPROT 6.4 2012    LABALBU 3.7 2022     Lab Results   Component Value Date    TSH 4.08 2022     Assessment:  1. Essential hypertension    2. Severe obesity (BMI 35.0-39. 9) with comorbidity ([address]    3. Decreased hearing, unspecified laterality    4. Tinnitus, unspecified laterality    5. Retinal hemorrhage of right eye    6. Right hip pain        Plan:  Patient blood pressure is stable at this time and is on metoprolol only  Patient's visit to the ophthalmologist regarding retinal hemorrhage and reports reviewed patient getting injections intraocularly  Patient has problems with her hearing and ringing in the ears and has seen the ENT and recommended cochlear implant and patient not sure whether she is going for that  Patient's right ear pain is improved with           1. Jennifer received counseling on the following healthy behaviors: {Health Counselin}    2. Prior labs and health maintenance reviewed. 3.  Discussed use, benefit, and side effects of prescribed medications. Barriers to medication compliance addressed. All her questions were answered. Pt voiced understanding. Bhavya Cuello will continue current medications, diet and exercise. No orders of the defined types were placed in this encounter.          Completed Refills               Requested

## 2023-05-30 RX ORDER — BUPROPION HYDROCHLORIDE 150 MG/1
150 TABLET ORAL EVERY MORNING
Qty: 30 TABLET | Refills: 0 | Status: SHIPPED | OUTPATIENT
Start: 2023-05-30

## 2023-05-30 RX ORDER — METOPROLOL SUCCINATE 25 MG/1
25 TABLET, EXTENDED RELEASE ORAL DAILY
Qty: 90 TABLET | Refills: 1 | Status: SHIPPED | OUTPATIENT
Start: 2023-05-30

## 2023-05-30 NOTE — TELEPHONE ENCOUNTER
Tiana Miranda is calling to request a refill on the following medication(s):    Medication Request:  Requested Prescriptions     Pending Prescriptions Disp Refills    metoprolol succinate (TOPROL XL) 25 MG extended release tablet 90 tablet 1     Sig: Take 1 tablet by mouth daily     Last fill 12/9/22  Last Visit Date (If Applicable):  0/9/0688    Next Visit Date:    5/28/2023              f

## 2023-05-30 NOTE — TELEPHONE ENCOUNTER
Jd Garcia is calling to request a refill on the following medication(s):    Medication Request:  Requested Prescriptions     Pending Prescriptions Disp Refills    buPROPion (WELLBUTRIN XL) 150 MG extended release tablet 30 tablet 0     Sig: Take 1 tablet by mouth every morning     Last fill 5/3/23  Last Visit Date (If Applicable):  8/3/1145    Next Visit Date:    8/9/2023

## 2023-05-31 RX ORDER — METOPROLOL SUCCINATE 25 MG/1
25 TABLET, EXTENDED RELEASE ORAL DAILY
Qty: 90 TABLET | Refills: 1 | OUTPATIENT
Start: 2023-05-31

## 2023-06-30 RX ORDER — BUPROPION HYDROCHLORIDE 150 MG/1
150 TABLET ORAL EVERY MORNING
Qty: 90 TABLET | Refills: 1 | Status: SHIPPED | OUTPATIENT
Start: 2023-06-30

## 2023-09-06 ENCOUNTER — OFFICE VISIT (OUTPATIENT)
Dept: INTERNAL MEDICINE CLINIC | Age: 73
End: 2023-09-06

## 2023-09-06 VITALS
WEIGHT: 248.2 LBS | DIASTOLIC BLOOD PRESSURE: 62 MMHG | HEART RATE: 48 BPM | BODY MASS INDEX: 38.96 KG/M2 | OXYGEN SATURATION: 96 % | SYSTOLIC BLOOD PRESSURE: 120 MMHG | TEMPERATURE: 97.2 F | RESPIRATION RATE: 12 BRPM | HEIGHT: 67 IN

## 2023-09-06 DIAGNOSIS — E16.2 HYPOGLYCEMIA: ICD-10-CM

## 2023-09-06 DIAGNOSIS — R00.1 BRADYCARDIA: ICD-10-CM

## 2023-09-06 DIAGNOSIS — E55.9 VITAMIN D DEFICIENCY: ICD-10-CM

## 2023-09-06 DIAGNOSIS — H91.90 DECREASED HEARING, UNSPECIFIED LATERALITY: ICD-10-CM

## 2023-09-06 DIAGNOSIS — H35.61 RETINAL HEMORRHAGE OF RIGHT EYE: ICD-10-CM

## 2023-09-06 DIAGNOSIS — I10 ESSENTIAL HYPERTENSION: Primary | ICD-10-CM

## 2023-09-06 NOTE — PROGRESS NOTES
No results found for: [place], [place]  Lab Results   Component Value Date    WBC 7.0 04/18/2023    HGB 13.2 04/18/2023    HCT 41.6 04/18/2023    MCV 95.2 04/18/2023     04/18/2023     Lab Results   Component Value Date    INR 1.2 12/17/2019    PROTIME 11.8 (H) 12/17/2019     Lab Results   Component Value Date    GLUCOSE 112 (H) 04/18/2023    CREATININE 0.94 (H) 04/18/2023    BUN 20 04/18/2023     04/18/2023    K 3.9 04/18/2023     04/18/2023    CO2 26 04/18/2023     Lab Results   Component Value Date    ALT 10 01/14/2022    AST 11 01/14/2022    ALKPHOS 104 01/14/2022    BILITOT 0.47 01/14/2022     Lab Results   Component Value Date    LABPROT 6.4 08/14/2012    LABALBU 3.7 01/14/2022     Lab Results   Component Value Date    TSH 4.08 01/12/2022     Assessment:  1. Essential hypertension    2. Vitamin D deficiency    3. Hypoglycemia    4. Bradycardia    5. Decreased hearing, unspecified laterality    6. Retinal hemorrhage of right eye        Plan:  Patient blood pressure stable and patient is on metoprolol and losartan and patient's heart rate is in general in the mid 50s and today it is 48 and advised patient to decrease the metoprolol to half a tablet that is 12.5 mg daily and monitor heart rate and blood pressure and continue losartan as before  Patient not taking Wellbutrin as it did not help her and does not want any other medications  Patient has hearing aids now history of the implants and doing better  Patient is followed with eye doctor and eyesight is improving  Labs ordered check for cholesterol and hemoglobin A1c  Review in 6 months           1. Jennifer received counseling on the following healthy behaviors: nutrition and exercise    2. Prior labs and health maintenance reviewed. 3.  Discussed use, benefit, and side effects of prescribed medications. Barriers to medication compliance addressed. All her questions were answered. Pt voiced understanding.    Kristen Simmons will continue current

## 2023-09-07 ENCOUNTER — TELEPHONE (OUTPATIENT)
Dept: INTERNAL MEDICINE CLINIC | Age: 73
End: 2023-09-07

## 2023-09-07 NOTE — TELEPHONE ENCOUNTER
Patient woke up today with sore throat and dizziness, she did test herself and positive for covid. States feels ok at this time, no temp just feels like a cold.

## 2023-09-14 ENCOUNTER — HOSPITAL ENCOUNTER (OUTPATIENT)
Age: 73
Discharge: HOME | End: 2023-09-14
Payer: MEDICARE

## 2023-09-14 DIAGNOSIS — E16.2 HYPOGLYCEMIA: ICD-10-CM

## 2023-09-14 DIAGNOSIS — E55.9 VITAMIN D DEFICIENCY: ICD-10-CM

## 2023-09-14 DIAGNOSIS — I10 ESSENTIAL HYPERTENSION: ICD-10-CM

## 2023-09-14 LAB
25(OH)D3 SERPL-MCNC: 28.8 NG/ML
ALBUMIN SERPL-MCNC: 4 G/DL (ref 3.5–5.2)
ALBUMIN/GLOB SERPL: 1.5 {RATIO} (ref 1–2.5)
ALP SERPL-CCNC: 96 U/L (ref 35–104)
ALT SERPL-CCNC: 16 U/L (ref 5–33)
ANION GAP SERPL CALCULATED.3IONS-SCNC: 12 MMOL/L (ref 9–17)
AST SERPL-CCNC: 18 U/L
BILIRUB SERPL-MCNC: 0.5 MG/DL (ref 0.3–1.2)
BUN SERPL-MCNC: 15 MG/DL (ref 8–23)
CALCIUM SERPL-MCNC: 9.8 MG/DL (ref 8.6–10.4)
CHLORIDE SERPL-SCNC: 103 MMOL/L (ref 98–107)
CHOLEST SERPL-MCNC: 192 MG/DL
CHOLESTEROL/HDL RATIO: 5.5
CO2 SERPL-SCNC: 23 MMOL/L (ref 20–31)
CREAT SERPL-MCNC: 0.8 MG/DL (ref 0.5–0.9)
ERYTHROCYTE [DISTWIDTH] IN BLOOD BY AUTOMATED COUNT: 13.5 % (ref 11.8–14.4)
EST. AVERAGE GLUCOSE BLD GHB EST-MCNC: 117 MG/DL
GFR SERPL CREATININE-BSD FRML MDRD: >60 ML/MIN/1.73M2
GLUCOSE SERPL-MCNC: 101 MG/DL (ref 70–99)
HBA1C MFR BLD: 5.7 % (ref 4–6)
HCT VFR BLD AUTO: 42.8 % (ref 36.3–47.1)
HDLC SERPL-MCNC: 35 MG/DL
HGB BLD-MCNC: 13.4 G/DL (ref 11.9–15.1)
LDLC SERPL CALC-MCNC: 120 MG/DL (ref 0–130)
MAGNESIUM SERPL-MCNC: 2.1 MG/DL (ref 1.6–2.6)
MCH RBC QN AUTO: 30.1 PG (ref 25.2–33.5)
MCHC RBC AUTO-ENTMCNC: 31.3 G/DL (ref 28.4–34.8)
MCV RBC AUTO: 96.2 FL (ref 82.6–102.9)
NRBC BLD-RTO: 0 PER 100 WBC
PLATELET # BLD AUTO: 222 K/UL (ref 138–453)
PMV BLD AUTO: 10.9 FL (ref 8.1–13.5)
POTASSIUM SERPL-SCNC: 4.1 MMOL/L (ref 3.7–5.3)
PROT SERPL-MCNC: 6.6 G/DL (ref 6.4–8.3)
RBC # BLD AUTO: 4.45 M/UL (ref 3.95–5.11)
SODIUM SERPL-SCNC: 138 MMOL/L (ref 135–144)
TRIGL SERPL-MCNC: 185 MG/DL
TSH SERPL DL<=0.05 MIU/L-ACNC: 2.3 UIU/ML (ref 0.3–5)
VIT B12 SERPL-MCNC: 341 PG/ML (ref 232–1245)
WBC OTHER # BLD: 5.5 K/UL (ref 3.5–11.3)

## 2023-09-14 PROCEDURE — 84443 ASSAY THYROID STIM HORMONE: CPT

## 2023-09-14 PROCEDURE — 82607 VITAMIN B-12: CPT

## 2023-09-14 PROCEDURE — 83036 HEMOGLOBIN GLYCOSYLATED A1C: CPT

## 2023-09-14 PROCEDURE — 82306 VITAMIN D 25 HYDROXY: CPT

## 2023-09-14 PROCEDURE — 36415 COLL VENOUS BLD VENIPUNCTURE: CPT

## 2023-09-14 PROCEDURE — 83735 ASSAY OF MAGNESIUM: CPT

## 2023-09-14 PROCEDURE — 80061 LIPID PANEL: CPT

## 2023-09-14 PROCEDURE — 80053 COMPREHEN METABOLIC PANEL: CPT

## 2023-09-14 PROCEDURE — 85027 COMPLETE CBC AUTOMATED: CPT

## 2023-09-19 RX ORDER — LOSARTAN POTASSIUM 50 MG/1
50 TABLET ORAL DAILY
Qty: 90 TABLET | Refills: 1 | Status: SHIPPED | OUTPATIENT
Start: 2023-09-19

## 2023-09-19 NOTE — TELEPHONE ENCOUNTER
[facility] is calling to request a refill on the following medication(s):    Medication Request:  Requested Prescriptions     Pending Prescriptions Disp Refills    losartan (COZAAR) 50 MG tablet 90 tablet 1     Sig: Take 1 tablet by mouth daily       Last Visit Date (If Applicable):  0/2/1931    Next Visit Date:    3/7/2024      Last refill 5/3/23. Prescription pending.

## 2023-11-21 ENCOUNTER — TELEPHONE (OUTPATIENT)
Dept: INTERNAL MEDICINE CLINIC | Age: 73
End: 2023-11-21

## 2023-11-29 ENCOUNTER — OFFICE VISIT (OUTPATIENT)
Dept: INTERNAL MEDICINE CLINIC | Age: 73
End: 2023-11-29
Payer: MEDICARE

## 2023-11-29 VITALS
WEIGHT: 251.6 LBS | OXYGEN SATURATION: 95 % | HEART RATE: 64 BPM | SYSTOLIC BLOOD PRESSURE: 126 MMHG | HEIGHT: 67 IN | BODY MASS INDEX: 39.49 KG/M2 | RESPIRATION RATE: 16 BRPM | TEMPERATURE: 97 F | DIASTOLIC BLOOD PRESSURE: 70 MMHG

## 2023-11-29 DIAGNOSIS — I10 ESSENTIAL HYPERTENSION: Primary | ICD-10-CM

## 2023-11-29 DIAGNOSIS — M25.551 RIGHT HIP PAIN: ICD-10-CM

## 2023-11-29 DIAGNOSIS — R73.9 HYPERGLYCEMIA: ICD-10-CM

## 2023-11-29 PROCEDURE — 1123F ACP DISCUSS/DSCN MKR DOCD: CPT | Performed by: INTERNAL MEDICINE

## 2023-11-29 PROCEDURE — 3074F SYST BP LT 130 MM HG: CPT | Performed by: INTERNAL MEDICINE

## 2023-11-29 PROCEDURE — 99214 OFFICE O/P EST MOD 30 MIN: CPT | Performed by: INTERNAL MEDICINE

## 2023-11-29 PROCEDURE — 1090F PRES/ABSN URINE INCON ASSESS: CPT | Performed by: INTERNAL MEDICINE

## 2023-11-29 PROCEDURE — 3078F DIAST BP <80 MM HG: CPT | Performed by: INTERNAL MEDICINE

## 2023-11-29 PROCEDURE — 1036F TOBACCO NON-USER: CPT | Performed by: INTERNAL MEDICINE

## 2023-11-29 PROCEDURE — 3017F COLORECTAL CA SCREEN DOC REV: CPT | Performed by: INTERNAL MEDICINE

## 2023-11-29 PROCEDURE — G8417 CALC BMI ABV UP PARAM F/U: HCPCS | Performed by: INTERNAL MEDICINE

## 2023-11-29 PROCEDURE — G8427 DOCREV CUR MEDS BY ELIG CLIN: HCPCS | Performed by: INTERNAL MEDICINE

## 2023-11-29 PROCEDURE — G8399 PT W/DXA RESULTS DOCUMENT: HCPCS | Performed by: INTERNAL MEDICINE

## 2023-11-29 PROCEDURE — G8484 FLU IMMUNIZE NO ADMIN: HCPCS | Performed by: INTERNAL MEDICINE

## 2023-11-29 NOTE — PROGRESS NOTES
09/14/2023    MCV 96.2 09/14/2023     09/14/2023     Lab Results   Component Value Date    INR 1.2 12/17/2019    PROTIME 11.8 (H) 12/17/2019     Lab Results   Component Value Date    GLUCOSE 101 (H) 09/14/2023    CREATININE 0.8 09/14/2023    BUN 15 09/14/2023     09/14/2023    K 4.1 09/14/2023     09/14/2023    CO2 23 09/14/2023     Lab Results   Component Value Date    ALT 16 09/14/2023    AST 18 09/14/2023    ALKPHOS 96 09/14/2023    BILITOT 0.5 09/14/2023     Lab Results   Component Value Date    LABPROT 6.4 08/14/2012    LABALBU 4.0 09/14/2023     Lab Results   Component Value Date    TSH 2.30 09/14/2023     Assessment:  1. Essential hypertension    2. Hyperglycemia    3. Right hip pain        Plan:  Patient blood pressure stable on current medications and patient stopped the metoprolol completely and taking losartan and her blood pressure is within normal limits and heart rate is normal at 64  Patient blood sugars are normal and 100 fasting and globin A1c 5.7 and strongly counseled diet and exercise and weight loss  Patient's visit with orthopedics reviewed and patient advised to continue home exercises for the hip and follow-up with Ortho  Reviewed as scheduled           1. Jennifer received counseling on the following healthy behaviors: nutrition and exercise    2. Prior labs and health maintenance reviewed. 3.  Discussed use, benefit, and side effects of prescribed medications. Barriers to medication compliance addressed. All her questions were answered. Pt voiced understanding. Lanette Driver will continue current medications, diet and exercise. No orders of the defined types were placed in this encounter. Completed Refills               Requested Prescriptions      No prescriptions requested or ordered in this encounter     4. Patient given educational materials - see patient instructions    5. Was a self-tracking handout given in paper form or via [employer]?   NO    No

## 2023-12-06 RX ORDER — ONDANSETRON 4 MG/1
4 TABLET, FILM COATED ORAL DAILY PRN
Qty: 30 TABLET | Refills: 0 | Status: SHIPPED | OUTPATIENT
Start: 2023-12-06

## 2023-12-06 NOTE — TELEPHONE ENCOUNTER
Ajay Blue is calling to request a refill on the following medication(s):    Medication Request:  Requested Prescriptions     Pending Prescriptions Disp Refills    ondansetron (ZOFRAN) 4 MG tablet 30 tablet 0     Sig: Take 1 tablet by mouth daily as needed for Nausea or Vomiting       Last Visit Date (If Applicable):  53/17/5075    Next Visit Date:    3/7/2024        Last refill 12/9/22. Prescription pending.

## 2024-01-09 ENCOUNTER — TELEPHONE (OUTPATIENT)
Dept: INTERNAL MEDICINE CLINIC | Age: 74
End: 2024-01-09

## 2024-01-09 DIAGNOSIS — R42 VERTIGO: Primary | ICD-10-CM

## 2024-01-09 DIAGNOSIS — R42 DIZZINESS: ICD-10-CM

## 2024-01-09 RX ORDER — MECLIZINE HCL 12.5 MG/1
12.5 TABLET ORAL 3 TIMES DAILY PRN
Qty: 30 TABLET | Refills: 0 | Status: SHIPPED | OUTPATIENT
Start: 2024-01-09 | End: 2024-01-19

## 2024-01-09 NOTE — TELEPHONE ENCOUNTER
Patient here on 11/29/23 she states that she is still having dizziness on and off    States it is driving her crazy    Its when she wakes up, stands up, even just sitting down.    Is asking what she should do? Also she want want to see you as soon as possible     Ok to LM    Please advise

## 2024-01-18 ENCOUNTER — HOSPITAL ENCOUNTER (OUTPATIENT)
Dept: MRI IMAGING | Age: 74
Discharge: HOME | End: 2024-01-20
Attending: INTERNAL MEDICINE
Payer: MEDICARE

## 2024-01-18 DIAGNOSIS — R42 DIZZINESS: ICD-10-CM

## 2024-01-18 DIAGNOSIS — R42 VERTIGO: ICD-10-CM

## 2024-01-18 PROCEDURE — 70551 MRI BRAIN STEM W/O DYE: CPT

## 2024-01-24 ENCOUNTER — HOSPITAL ENCOUNTER (OUTPATIENT)
Dept: MAMMOGRAPHY | Age: 74
Discharge: HOME | End: 2024-01-26
Payer: MEDICARE

## 2024-01-24 DIAGNOSIS — Z12.31 ENCOUNTER FOR SCREENING MAMMOGRAM FOR BREAST CANCER: ICD-10-CM

## 2024-01-24 PROCEDURE — 77063 BREAST TOMOSYNTHESIS BI: CPT

## 2024-02-27 ENCOUNTER — APPOINTMENT (OUTPATIENT)
Dept: CT IMAGING | Age: 74
End: 2024-02-27
Payer: MEDICARE

## 2024-02-27 ENCOUNTER — HOSPITAL ENCOUNTER (EMERGENCY)
Age: 74
Discharge: HOME | End: 2024-02-27
Attending: EMERGENCY MEDICINE
Payer: MEDICARE

## 2024-02-27 VITALS
WEIGHT: 250 LBS | SYSTOLIC BLOOD PRESSURE: 118 MMHG | DIASTOLIC BLOOD PRESSURE: 51 MMHG | HEART RATE: 107 BPM | BODY MASS INDEX: 39.24 KG/M2 | OXYGEN SATURATION: 90 % | TEMPERATURE: 98.6 F | HEIGHT: 67 IN | RESPIRATION RATE: 16 BRPM

## 2024-02-27 DIAGNOSIS — R10.9 ABDOMINAL PAIN, UNSPECIFIED ABDOMINAL LOCATION: Primary | ICD-10-CM

## 2024-02-27 DIAGNOSIS — R19.7 NAUSEA VOMITING AND DIARRHEA: ICD-10-CM

## 2024-02-27 DIAGNOSIS — R11.2 NAUSEA VOMITING AND DIARRHEA: ICD-10-CM

## 2024-02-27 LAB
ALBUMIN SERPL-MCNC: 4.2 G/DL (ref 3.5–5.2)
ALP SERPL-CCNC: 87 U/L (ref 35–104)
ALT SERPL-CCNC: 15 U/L (ref 5–33)
ANION GAP SERPL CALCULATED.3IONS-SCNC: 16 MMOL/L (ref 9–17)
AST SERPL-CCNC: 17 U/L
BACTERIA URNS QL MICRO: ABNORMAL
BASOPHILS # BLD: 0 K/UL (ref 0–0.2)
BASOPHILS NFR BLD: 0 %
BILIRUB DIRECT SERPL-MCNC: 0.1 MG/DL
BILIRUB INDIRECT SERPL-MCNC: 0.5 MG/DL (ref 0–1)
BILIRUB SERPL-MCNC: 0.6 MG/DL (ref 0.3–1.2)
BILIRUB UR QL STRIP: ABNORMAL
BUN SERPL-MCNC: 24 MG/DL (ref 8–23)
BUN/CREAT SERPL: 30 (ref 9–20)
CALCIUM SERPL-MCNC: 9.4 MG/DL (ref 8.6–10.4)
CHLORIDE SERPL-SCNC: 100 MMOL/L (ref 98–107)
CLARITY UR: ABNORMAL
CO2 SERPL-SCNC: 21 MMOL/L (ref 20–31)
COLOR UR: YELLOW
CREAT SERPL-MCNC: 0.8 MG/DL (ref 0.5–0.9)
EOSINOPHIL # BLD: 0 K/UL (ref 0–0.4)
EOSINOPHILS RELATIVE PERCENT: 0 % (ref 1–4)
EPI CELLS #/AREA URNS HPF: ABNORMAL /HPF (ref 0–5)
ERYTHROCYTE [DISTWIDTH] IN BLOOD BY AUTOMATED COUNT: 13.7 % (ref 11.8–14.4)
GFR SERPL CREATININE-BSD FRML MDRD: >60 ML/MIN/1.73M2
GLUCOSE SERPL-MCNC: 167 MG/DL (ref 70–99)
GLUCOSE UR STRIP-MCNC: NEGATIVE MG/DL
HCT VFR BLD AUTO: 44.8 % (ref 36.3–47.1)
HGB BLD-MCNC: 14.6 G/DL (ref 11.9–15.1)
HGB UR QL STRIP.AUTO: NEGATIVE
IMM GRANULOCYTES # BLD AUTO: 0.09 K/UL (ref 0–0.3)
IMM GRANULOCYTES NFR BLD: 1 %
KETONES UR STRIP-MCNC: ABNORMAL MG/DL
LEUKOCYTE ESTERASE UR QL STRIP: ABNORMAL
LIPASE SERPL-CCNC: 25 U/L (ref 13–60)
LYMPHOCYTES NFR BLD: 0.09 K/UL (ref 1–4.8)
LYMPHOCYTES RELATIVE PERCENT: 1 % (ref 24–44)
MCH RBC QN AUTO: 30.4 PG (ref 25.2–33.5)
MCHC RBC AUTO-ENTMCNC: 32.6 G/DL (ref 28.4–34.8)
MCV RBC AUTO: 93.1 FL (ref 82.6–102.9)
MONOCYTES NFR BLD: 0.79 K/UL (ref 0.2–0.8)
MONOCYTES NFR BLD: 9 % (ref 1–7)
MORPHOLOGY: ABNORMAL
MORPHOLOGY: ABNORMAL
MUCOUS THREADS URNS QL MICRO: ABNORMAL
NEUTROPHILS NFR BLD: 89 % (ref 36–66)
NEUTS SEG NFR BLD: 7.83 K/UL (ref 1.8–7.7)
NITRITE UR QL STRIP: NEGATIVE
NRBC BLD-RTO: 0 PER 100 WBC
PH UR STRIP: 6 [PH] (ref 5–8)
PLATELET # BLD AUTO: 161 K/UL (ref 138–453)
PMV BLD AUTO: 10.8 FL (ref 8.1–13.5)
POTASSIUM SERPL-SCNC: 3.9 MMOL/L (ref 3.7–5.3)
PROT SERPL-MCNC: 6.7 G/DL (ref 6.4–8.3)
PROT UR STRIP-MCNC: ABNORMAL MG/DL
RBC # BLD AUTO: 4.81 M/UL (ref 3.95–5.11)
RBC #/AREA URNS HPF: ABNORMAL /HPF (ref 0–2)
SODIUM SERPL-SCNC: 137 MMOL/L (ref 135–144)
SP GR UR STRIP: 1.04 (ref 1–1.03)
UROBILINOGEN UR STRIP-ACNC: NORMAL EU/DL (ref 0–1)
WBC #/AREA URNS HPF: ABNORMAL /HPF (ref 0–5)
WBC OTHER # BLD: 8.8 K/UL (ref 3.5–11.3)

## 2024-02-27 PROCEDURE — 83690 ASSAY OF LIPASE: CPT

## 2024-02-27 PROCEDURE — 6360000004 HC RX CONTRAST MEDICATION: Performed by: NURSE PRACTITIONER

## 2024-02-27 PROCEDURE — 74177 CT ABD & PELVIS W/CONTRAST: CPT

## 2024-02-27 PROCEDURE — 80076 HEPATIC FUNCTION PANEL: CPT

## 2024-02-27 PROCEDURE — 99285 EMERGENCY DEPT VISIT HI MDM: CPT

## 2024-02-27 PROCEDURE — 81001 URINALYSIS AUTO W/SCOPE: CPT

## 2024-02-27 PROCEDURE — 2580000003 HC RX 258: Performed by: NURSE PRACTITIONER

## 2024-02-27 PROCEDURE — 80048 BASIC METABOLIC PNL TOTAL CA: CPT

## 2024-02-27 PROCEDURE — 85025 COMPLETE CBC W/AUTO DIFF WBC: CPT

## 2024-02-27 RX ORDER — SODIUM CHLORIDE 0.9 % (FLUSH) 0.9 %
10 SYRINGE (ML) INJECTION PRN
Status: DISCONTINUED | OUTPATIENT
Start: 2024-02-27 | End: 2024-02-27 | Stop reason: HOSPADM

## 2024-02-27 RX ORDER — 0.9 % SODIUM CHLORIDE 0.9 %
80 INTRAVENOUS SOLUTION INTRAVENOUS ONCE
Status: COMPLETED | OUTPATIENT
Start: 2024-02-27 | End: 2024-02-27

## 2024-02-27 RX ADMIN — SODIUM CHLORIDE, PRESERVATIVE FREE 10 ML: 5 INJECTION INTRAVENOUS at 18:38

## 2024-02-27 RX ADMIN — IOPAMIDOL 75 ML: 755 INJECTION, SOLUTION INTRAVENOUS at 18:38

## 2024-02-27 RX ADMIN — SODIUM CHLORIDE 80 ML: 9 INJECTION, SOLUTION INTRAVENOUS at 18:38

## 2024-02-27 ASSESSMENT — ENCOUNTER SYMPTOMS
COLOR CHANGE: 0
BACK PAIN: 0
COUGH: 0
ABDOMINAL PAIN: 1
NAUSEA: 1
DIARRHEA: 1
SHORTNESS OF BREATH: 0
VOMITING: 1

## 2024-02-27 NOTE — ED NOTES
Patient presents with c/o abdominal pain, N/V/D which started in the last couple days. Patient has a history of bowel obstruction with a colostomy which she presently has.

## 2024-02-27 NOTE — ED TRIAGE NOTES
worsening symptoms.        FINAL IMPRESSION      1. Abdominal pain, unspecified abdominal location    2. Nausea vomiting and diarrhea            Problem List  Patient Active Problem List   Diagnosis Code    Dyslipidemia E78.5    Osteoarthritis M19.90    GERD (gastroesophageal reflux disease) K21.9    Essential hypertension I10    Morbidly obese ([facility]) E66.01    Allergic rhinitis J30.9    Osteoporosis M81.0    Arthritis of knee M17.10    Biliary dyskinesia K82.8    Right upper quadrant abdominal pain R10.11    Fundic gland polyposis of stomach K31.7    Small bowel obstruction ([facility]) K56.609    Incarcerated incisional hernia K43.0    Sigmoid diverticulitis K57.32    Incisional hernia without obstruction or gangrene K43.2    Chalazion H00.19    S/P colectomy Z90.49    History of colostomy reversal Z98.890    Abdominal adhesions K66.0    Hypokalemia E87.6    Atelectasis of left lung J98.11    Postoperative anemia due to acute blood loss D62    Surgical wound dehiscence T81.31XA    Constipation K59.00    Systolic murmur R01.1    Thyroid disease E07.9    Perforated sigmoid colon ([facility]) K63.1    Large intestine anastomotic leak K91.89    Normocytic anemia D64.9    Rectal bleeding K62.5    Sepsis ([facility]) A41.9    Acute cystitis without hematuria N30.00    Leukocytosis D72.829    Nausea R11.0    Postoperative intra-abdominal abscess T81.43XA    MRSA (methicillin resistant staph aureus) culture positive Z22.322    E coli infection A49.8         DISPOSITION/PLAN   DISPOSITION Decision To Discharge 02/27/2024 07:49:40 PM      PATIENT REFERRED TO:   Tahir Bowser MD  [address]  [address]  [facility] [address] [phone]    Schedule an appointment as soon as possible for a visit       [facility] ED  [address] [id number] [phone]    If symptoms worsen, As needed      DISCHARGE MEDICATIONS:     Discharge Medication List as of 2/27/2024  7:49 PM              (Please note that portions of

## 2024-02-28 NOTE — ED NOTES
Team [facility] ED  eMERGENCY dEPARTMENT eNCOUnter      Pt Name: Jennifer Fontenot  MRN: [id number]  Birthdate 1950  Date of evaluation: 2/27/2024  Provider: ROMERO Sadler CNP    CHIEF COMPLAINT       Chief Complaint   Patient presents with    Nausea    Emesis    Diarrhea    Abdominal Pain     Pt has colostomy. Pt also states hx bowel obstruction          HISTORY OF PRESENT ILLNESS  (Location/Symptom, Timing/Onset, Context/Setting, Quality, Duration, Modifying Factors, Severity.)   Jennifer Fontenot is a 73 y.o. female who presents to the emergency department. C/o abd pain, N/V/D. Onset was this morning. Denies fever, chills. She has hx bowel obstruction. She has a colostomy. States her pain has since improved. She is now feeling better, but is concerned due to her history. Rates her pain 0/10.     Nursing Notes were reviewed.    ALLERGIES     Patient has no known allergies.    CURRENT MEDICATIONS       Discharge Medication List as of 2/27/2024  7:49 PM        CONTINUE these medications which have NOT CHANGED    Details   ondansetron (ZOFRAN) 4 MG tablet Take 1 tablet by mouth daily as needed for Nausea or Vomiting, Disp-30 tablet, R-0Normal      losartan (COZAAR) 50 MG tablet Take 1 tablet by mouth daily, Disp-90 tablet, R-1Normal      acetaminophen (TYLENOL) 500 MG tablet Take 2 tablets by mouth nightlyHistorical Med      Cholecalciferol (VITAMIN D) 2000 units CAPS capsule Take 2 capsules by mouth dailyHistorical Med             PAST MEDICAL HISTORY         Diagnosis Date    Allergic rhinitis     Bladder prolapse     Constipation     5/2019 resolved    Fundic gland polyposis of stomach 08/17/2017    per EGD    GERD (gastroesophageal reflux disease)     on no medications    Hiatal hernia     History of cardiac cath 12/2002    pt denies blockage    History of diverticulitis     Hyperlipidemia     borderline, on no medication    Hypertension     MRSA (methicillin resistant staph aureus)

## 2024-02-28 NOTE — ED PROVIDER NOTES
eMERGENCY dEPARTMENT eNCOUnter   Independent Attestation     Pt Name: Jennifer Fontenot  MRN: [id number]  Birthdate 1950  Date of evaluation: 2/28/24     Jennifer Fontenot is a 73 y.o. female with CC: Nausea, Emesis, Diarrhea, and Abdominal Pain (Pt has colostomy. Pt also states hx bowel obstruction )      Based on the medical record the care appears appropriate.  I was personally available for consultation in the Emergency Department.    Lynette Schafer MD  Attending Emergency Physician                  Lynette Schafer MD  02/28/24 [id number]    
     MRSA (methicillin resistant staph aureus) culture positive 11/01/2016     nasal    MRSA carrier       follows with ID    Obesity      Osteoarthritis      Pelvic abscess in female      Sleep apnea       CPAP nightly    Systolic murmur       benign systolic murmur (history of). Pt does not follow-up with a cardiologist (Written 09/25/2019).    Thyroid disease       goiter    Wears glasses           SURGICAL HISTORY                  Procedure Laterality Date    ABDOMINAL EXPLORATION SURGERY   05/16/2019    CHOLECYSTECTOMY, LAPAROSCOPIC   11/15/2016    CHOLECYSTECTOMY, LAPAROSCOPIC   11/15/2016    COLONOSCOPY   2013     neg    COLONOSCOPY N/A 5/16/2019     COLONOSCOPY DIAGNOSTIC performed by Geoffrey Santacruz MD at [facility] OR    COLONOSCOPY N/A 12/19/2019     COLONOSCOPY W/STENT X2 performed by Geoffrey Santacruz MD at [facility] OR    COLOSTOMY N/A 12/24/2019     EXPLORATORY LAPAROTOMY, EXTENSIVE LYSIS OF ADHESIONS, TAKE DOWN OF ANASTOMOSIS, COLOSTOMY CREATION, CLOSURE OF SEROMUSCULAR INJURIES performed by Geoffrey Santacruz MD at [facility] OR    ENDOSCOPY, COLON, DIAGNOSTIC         EGD    FISSURECTOMY ANAL N/A 10/4/2019     FLEXIBLE SIGMOIDOSCOPY & ANAL FISTULECTOMY performed by Geoffrey Santacruz MD at [facility] OR    JOINT REPLACEMENT        KNEE ARTHROSCOPY Left       lateral release    AZ EGD TRANSORAL BIOPSY SINGLE/MULTIPLE N/A 8/17/2017     EGD BIOPSY performed by Odilon Justice MD at [facility] OR    AZ OFFICE/OUTPT VISIT,PROCEDURE ONLY N/A 5/22/2018     XI ROBOTIC LAPAROSCOPIC UMBILICAL HERNIA REPAIR WITH MESH, POSSIBLE OPEN performed by Trae Morris IV, DO at [facility] OR    SIGMOIDOSCOPY   02/19/2019    SIGMOIDOSCOPY N/A 2/19/2019     SIGMOIDOSCOPY BIOPSY FLEXIBLE performed by Katelynn Lowe DO at [facility] OR    SIGMOIDOSCOPY N/A 12/23/2019     SIGMOIDOSCOPY DIAGNOSTIC FLEXIBLE WITH FLUORO performed by Alix Gunn MD at [facility] OR    SIGMOIDOSCOPY N/A 3/2/2021     SIGMOIDOSCOPY DIAGNOSTIC FLEXIBLE performed by Odilon Justice MD at [facility]

## 2024-03-18 SDOH — ECONOMIC STABILITY: FOOD INSECURITY: WITHIN THE PAST 12 MONTHS, YOU WORRIED THAT YOUR FOOD WOULD RUN OUT BEFORE YOU GOT MONEY TO BUY MORE.: PATIENT DECLINED

## 2024-03-18 SDOH — ECONOMIC STABILITY: HOUSING INSECURITY
IN THE LAST 12 MONTHS, WAS THERE A TIME WHEN YOU DID NOT HAVE A STEADY PLACE TO SLEEP OR SLEPT IN A SHELTER (INCLUDING NOW)?: PATIENT DECLINED

## 2024-03-18 SDOH — ECONOMIC STABILITY: FOOD INSECURITY: WITHIN THE PAST 12 MONTHS, THE FOOD YOU BOUGHT JUST DIDN'T LAST AND YOU DIDN'T HAVE MONEY TO GET MORE.: PATIENT DECLINED

## 2024-03-18 SDOH — ECONOMIC STABILITY: INCOME INSECURITY: HOW HARD IS IT FOR YOU TO PAY FOR THE VERY BASICS LIKE FOOD, HOUSING, MEDICAL CARE, AND HEATING?: PATIENT DECLINED

## 2024-03-18 SDOH — ECONOMIC STABILITY: TRANSPORTATION INSECURITY
IN THE PAST 12 MONTHS, HAS LACK OF TRANSPORTATION KEPT YOU FROM MEETINGS, WORK, OR FROM GETTING THINGS NEEDED FOR DAILY LIVING?: PATIENT DECLINED

## 2024-03-18 ASSESSMENT — PATIENT HEALTH QUESTIONNAIRE - PHQ9
SUM OF ALL RESPONSES TO PHQ QUESTIONS 1-9: 0
1. LITTLE INTEREST OR PLEASURE IN DOING THINGS: NOT AT ALL
2. FEELING DOWN, DEPRESSED OR HOPELESS: NOT AT ALL
SUM OF ALL RESPONSES TO PHQ QUESTIONS 1-9: 0
2. FEELING DOWN, DEPRESSED OR HOPELESS: NOT AT ALL
1. LITTLE INTEREST OR PLEASURE IN DOING THINGS: NOT AT ALL
SUM OF ALL RESPONSES TO PHQ QUESTIONS 1-9: 0
SUM OF ALL RESPONSES TO PHQ QUESTIONS 1-9: 0
SUM OF ALL RESPONSES TO PHQ9 QUESTIONS 1 & 2: 0
SUM OF ALL RESPONSES TO PHQ9 QUESTIONS 1 & 2: 0

## 2024-03-21 ENCOUNTER — OFFICE VISIT (OUTPATIENT)
Dept: INTERNAL MEDICINE CLINIC | Age: 74
End: 2024-03-21

## 2024-03-21 VITALS
SYSTOLIC BLOOD PRESSURE: 130 MMHG | BODY MASS INDEX: 39.46 KG/M2 | RESPIRATION RATE: 16 BRPM | DIASTOLIC BLOOD PRESSURE: 84 MMHG | HEART RATE: 83 BPM | WEIGHT: 251.4 LBS | TEMPERATURE: 97.2 F | HEIGHT: 67 IN | OXYGEN SATURATION: 99 %

## 2024-03-21 DIAGNOSIS — Z72.89 OTHER PROBLEMS RELATED TO LIFESTYLE: ICD-10-CM

## 2024-03-21 DIAGNOSIS — Z11.59 NEED FOR HEPATITIS C SCREENING TEST: ICD-10-CM

## 2024-03-21 DIAGNOSIS — Z00.00 MEDICARE ANNUAL WELLNESS VISIT, SUBSEQUENT: Primary | ICD-10-CM

## 2024-03-21 RX ORDER — LOSARTAN POTASSIUM 50 MG/1
50 TABLET ORAL DAILY
Qty: 90 TABLET | Refills: 1 | Status: SHIPPED | OUTPATIENT
Start: 2024-03-21

## 2024-03-21 SDOH — ECONOMIC STABILITY: FOOD INSECURITY

## 2024-03-21 SDOH — ECONOMIC STABILITY: HOUSING INSECURITY

## 2024-03-21 SDOH — ECONOMIC STABILITY: INCOME INSECURITY

## 2024-03-21 NOTE — TELEPHONE ENCOUNTER
Jennifer Fontenot is calling to request a refill on the following medication(s):    Medication Request:  Requested Prescriptions     Pending Prescriptions Disp Refills    losartan (COZAAR) 50 MG tablet 90 tablet 1     Sig: Take 1 tablet by mouth daily       Last Visit Date (If Applicable):  3/21/2024    Next Visit Date:    Visit date not found      Last refill 9/19/23. Prescription pending.

## 2024-03-21 NOTE — PROGRESS NOTES
Medicare Annual Wellness Visit    Jennifer Fontenot is here for Medicare AWV and Health Maintenance (Hep c, awv)    Assessment & Plan   Medicare annual wellness visit, subsequent  Need for hepatitis C screening test  -     Hepatitis C Antibody; Future  Other problems related to lifestyle  -     Hepatitis C Antibody; Future  Recommendations for Preventive Services Due: see orders and patient instructions/AVS.  Recommended screening schedule for the next 5-10 years is provided to the patient in written form: see Patient Instructions/AVS.     Return in 6 months (on 9/21/2024).     Subjective   Discussed about colonoscopy and advised her to follow-up with the colorectal surgeon  And patient refuses immunizations    Patient's complete Health Risk Assessment and screening values have been reviewed and are found in Flowsheets. The following problems were reviewed today and where indicated follow up appointments were made and/or referrals ordered.    Positive Risk Factor Screenings with Interventions:    Fall Risk:  Do you feel unsteady or are you worried about falling? : (!) yes  2 or more falls in past year?: no  Fall with injury in past year?: no     Interventions:    Reviewed medications, home hazards, visual acuity, and co-morbidities that can increase risk for falls            General HRA Questions:  Select all that apply: (!) New or Increased Pain    Pain Interventions:  Patient declined any further interventions or treatment      Activity, Diet, and Weight:  On average, how many days per week do you engage in moderate to strenuous exercise (like a brisk walk)?: 4 days  On average, how many minutes do you engage in exercise at this level?: 40 min    Do you eat balanced/healthy meals regularly?: (!) No    Body mass index is 39.37 kg/m². (!) Abnormal    Do you eat balanced/healthy meals regularly Interventions:  Patient declines any further evaluation or treatment  Obesity Interventions:  Patient declines any further

## 2024-03-27 ENCOUNTER — HOSPITAL ENCOUNTER (OUTPATIENT)
Age: 74
Discharge: HOME | End: 2024-03-27
Payer: MEDICARE

## 2024-03-27 DIAGNOSIS — Z11.59 NEED FOR HEPATITIS C SCREENING TEST: ICD-10-CM

## 2024-03-27 DIAGNOSIS — Z72.89 OTHER PROBLEMS RELATED TO LIFESTYLE: ICD-10-CM

## 2024-03-27 LAB — HCV AB SERPL QL IA: NONREACTIVE

## 2024-03-27 PROCEDURE — 86803 HEPATITIS C AB TEST: CPT

## 2024-03-27 PROCEDURE — 36415 COLL VENOUS BLD VENIPUNCTURE: CPT

## 2024-04-18 ENCOUNTER — TELEPHONE (OUTPATIENT)
Dept: INTERNAL MEDICINE CLINIC | Age: 74
End: 2024-04-18

## 2024-04-18 RX ORDER — CEPHALEXIN 500 MG/1
500 CAPSULE ORAL 3 TIMES DAILY
Qty: 21 CAPSULE | Refills: 0 | Status: SHIPPED | OUTPATIENT
Start: 2024-04-18 | End: 2024-04-25

## 2024-04-18 NOTE — TELEPHONE ENCOUNTER
Left message for patient that a script has been called in and to call the office if any questions.

## 2024-04-18 NOTE — TELEPHONE ENCOUNTER
Patient calling in today stating she has burning with urination Frequency of urination. No fever.  Started 2 days ago.    Pharmacy: [facility]    Please advise.

## 2024-06-10 RX ORDER — LOSARTAN POTASSIUM 50 MG/1
50 TABLET ORAL DAILY
Qty: 90 TABLET | Refills: 1 | Status: SHIPPED | OUTPATIENT
Start: 2024-06-10

## 2024-06-10 NOTE — TELEPHONE ENCOUNTER
Jennifer Fontenot is calling to request a refill on the following medication(s):    Medication Request:  Requested Prescriptions     Pending Prescriptions Disp Refills    losartan (COZAAR) 50 MG tablet 90 tablet 1     Sig: Take 1 tablet by mouth daily       Last Visit Date (If Applicable):  3/21/2024    Next Visit Date:    9/23/2024

## 2024-07-20 ENCOUNTER — HOSPITAL ENCOUNTER (INPATIENT)
Age: 74
LOS: 1 days | Discharge: HOME | DRG: 390 | End: 2024-07-21
Attending: STUDENT IN AN ORGANIZED HEALTH CARE EDUCATION/TRAINING PROGRAM | Admitting: INTERNAL MEDICINE
Payer: MEDICARE

## 2024-07-20 ENCOUNTER — APPOINTMENT (OUTPATIENT)
Dept: CT IMAGING | Age: 74
DRG: 390 | End: 2024-07-20
Payer: MEDICARE

## 2024-07-20 DIAGNOSIS — K56.600 PARTIAL SMALL BOWEL OBSTRUCTION (HCC): Primary | ICD-10-CM

## 2024-07-20 DIAGNOSIS — R11.2 NAUSEA AND VOMITING, UNSPECIFIED VOMITING TYPE: ICD-10-CM

## 2024-07-20 PROBLEM — Z93.3 COLOSTOMY IN PLACE (HCC): Status: ACTIVE | Noted: 2024-07-20

## 2024-07-20 LAB
ALBUMIN SERPL-MCNC: 4.4 G/DL (ref 3.5–5.2)
ALP SERPL-CCNC: 94 U/L (ref 35–104)
ALT SERPL-CCNC: 17 U/L (ref 5–33)
ANION GAP SERPL CALCULATED.3IONS-SCNC: 10 MMOL/L (ref 9–17)
AST SERPL-CCNC: 14 U/L
BASOPHILS # BLD: 0.06 K/UL (ref 0–0.2)
BASOPHILS NFR BLD: 1 % (ref 0–2)
BILIRUB SERPL-MCNC: 0.4 MG/DL (ref 0.3–1.2)
BUN SERPL-MCNC: 21 MG/DL (ref 8–23)
BUN/CREAT SERPL: 26 (ref 9–20)
CALCIUM SERPL-MCNC: 10.6 MG/DL (ref 8.6–10.4)
CHLORIDE SERPL-SCNC: 106 MMOL/L (ref 98–107)
CO2 SERPL-SCNC: 24 MMOL/L (ref 20–31)
CREAT SERPL-MCNC: 0.8 MG/DL (ref 0.5–0.9)
EOSINOPHIL # BLD: 0.09 K/UL (ref 0–0.44)
EOSINOPHILS RELATIVE PERCENT: 1 % (ref 1–4)
ERYTHROCYTE [DISTWIDTH] IN BLOOD BY AUTOMATED COUNT: 14.2 % (ref 11.8–14.4)
GFR, ESTIMATED: 78 ML/MIN/1.73M2
GLUCOSE SERPL-MCNC: 132 MG/DL (ref 70–99)
HCT VFR BLD AUTO: 46.3 % (ref 36.3–47.1)
HGB BLD-MCNC: 14.7 G/DL (ref 11.9–15.1)
IMM GRANULOCYTES # BLD AUTO: 0.05 K/UL (ref 0–0.3)
IMM GRANULOCYTES NFR BLD: 0 %
LIPASE SERPL-CCNC: 42 U/L (ref 13–60)
LYMPHOCYTES NFR BLD: 1.73 K/UL (ref 1.1–3.7)
LYMPHOCYTES RELATIVE PERCENT: 14 % (ref 24–43)
MCH RBC QN AUTO: 30.5 PG (ref 25.2–33.5)
MCHC RBC AUTO-ENTMCNC: 31.7 G/DL (ref 28.4–34.8)
MCV RBC AUTO: 96.1 FL (ref 82.6–102.9)
MONOCYTES NFR BLD: 0.76 K/UL (ref 0.1–1.2)
MONOCYTES NFR BLD: 6 % (ref 3–12)
NEUTROPHILS NFR BLD: 78 % (ref 36–65)
NEUTS SEG NFR BLD: 9.83 K/UL (ref 1.5–8.1)
NRBC BLD-RTO: 0 PER 100 WBC
PLATELET # BLD AUTO: 209 K/UL (ref 138–453)
PMV BLD AUTO: 10.5 FL (ref 8.1–13.5)
POTASSIUM SERPL-SCNC: 4 MMOL/L (ref 3.7–5.3)
PROT SERPL-MCNC: 7.2 G/DL (ref 6.4–8.3)
RBC # BLD AUTO: 4.82 M/UL (ref 3.95–5.11)
SODIUM SERPL-SCNC: 140 MMOL/L (ref 135–144)
WBC OTHER # BLD: 12.5 K/UL (ref 3.5–11.3)

## 2024-07-20 PROCEDURE — 2580000003 HC RX 258: Performed by: STUDENT IN AN ORGANIZED HEALTH CARE EDUCATION/TRAINING PROGRAM

## 2024-07-20 PROCEDURE — 99285 EMERGENCY DEPT VISIT HI MDM: CPT

## 2024-07-20 PROCEDURE — 2580000003 HC RX 258: Performed by: NURSE PRACTITIONER

## 2024-07-20 PROCEDURE — 2500000003 HC RX 250 WO HCPCS: Performed by: NURSE PRACTITIONER

## 2024-07-20 PROCEDURE — 99222 1ST HOSP IP/OBS MODERATE 55: CPT | Performed by: NURSE PRACTITIONER

## 2024-07-20 PROCEDURE — 83690 ASSAY OF LIPASE: CPT

## 2024-07-20 PROCEDURE — 85025 COMPLETE CBC W/AUTO DIFF WBC: CPT

## 2024-07-20 PROCEDURE — 6360000004 HC RX CONTRAST MEDICATION: Performed by: STUDENT IN AN ORGANIZED HEALTH CARE EDUCATION/TRAINING PROGRAM

## 2024-07-20 PROCEDURE — 96375 TX/PRO/DX INJ NEW DRUG ADDON: CPT

## 2024-07-20 PROCEDURE — 2500000003 HC RX 250 WO HCPCS: Performed by: STUDENT IN AN ORGANIZED HEALTH CARE EDUCATION/TRAINING PROGRAM

## 2024-07-20 PROCEDURE — 1200000000 HC SEMI PRIVATE

## 2024-07-20 PROCEDURE — 80053 COMPREHEN METABOLIC PANEL: CPT

## 2024-07-20 PROCEDURE — 6370000000 HC RX 637 (ALT 250 FOR IP): Performed by: NURSE PRACTITIONER

## 2024-07-20 PROCEDURE — 74177 CT ABD & PELVIS W/CONTRAST: CPT

## 2024-07-20 PROCEDURE — 96374 THER/PROPH/DIAG INJ IV PUSH: CPT

## 2024-07-20 PROCEDURE — 6360000002 HC RX W HCPCS: Performed by: STUDENT IN AN ORGANIZED HEALTH CARE EDUCATION/TRAINING PROGRAM

## 2024-07-20 RX ORDER — ENOXAPARIN SODIUM 100 MG/ML
30 INJECTION SUBCUTANEOUS 2 TIMES DAILY
Status: DISCONTINUED | OUTPATIENT
Start: 2024-07-20 | End: 2024-07-21 | Stop reason: HOSPADM

## 2024-07-20 RX ORDER — ONDANSETRON 4 MG/1
4 TABLET, ORALLY DISINTEGRATING ORAL EVERY 8 HOURS PRN
Status: DISCONTINUED | OUTPATIENT
Start: 2024-07-20 | End: 2024-07-21 | Stop reason: HOSPADM

## 2024-07-20 RX ORDER — SODIUM CHLORIDE 9 MG/ML
INJECTION, SOLUTION INTRAVENOUS CONTINUOUS
Status: DISCONTINUED | OUTPATIENT
Start: 2024-07-20 | End: 2024-07-21 | Stop reason: HOSPADM

## 2024-07-20 RX ORDER — POTASSIUM CHLORIDE 20 MEQ/1
40 TABLET, EXTENDED RELEASE ORAL PRN
Status: DISCONTINUED | OUTPATIENT
Start: 2024-07-20 | End: 2024-07-21 | Stop reason: HOSPADM

## 2024-07-20 RX ORDER — MORPHINE SULFATE 2 MG/ML
2 INJECTION, SOLUTION INTRAMUSCULAR; INTRAVENOUS ONCE
Status: COMPLETED | OUTPATIENT
Start: 2024-07-20 | End: 2024-07-20

## 2024-07-20 RX ORDER — LOSARTAN POTASSIUM 50 MG/1
50 TABLET ORAL DAILY
Status: DISCONTINUED | OUTPATIENT
Start: 2024-07-20 | End: 2024-07-21 | Stop reason: HOSPADM

## 2024-07-20 RX ORDER — LIDOCAINE 4 G/G
1 PATCH TOPICAL DAILY
Status: DISCONTINUED | OUTPATIENT
Start: 2024-07-20 | End: 2024-07-20

## 2024-07-20 RX ORDER — ACETAMINOPHEN 650 MG/1
650 SUPPOSITORY RECTAL EVERY 6 HOURS PRN
Status: DISCONTINUED | OUTPATIENT
Start: 2024-07-20 | End: 2024-07-21 | Stop reason: HOSPADM

## 2024-07-20 RX ORDER — MAGNESIUM SULFATE 1 G/100ML
1000 INJECTION INTRAVENOUS PRN
Status: DISCONTINUED | OUTPATIENT
Start: 2024-07-20 | End: 2024-07-21 | Stop reason: HOSPADM

## 2024-07-20 RX ORDER — SODIUM CHLORIDE 0.9 % (FLUSH) 0.9 %
5-40 SYRINGE (ML) INJECTION EVERY 12 HOURS SCHEDULED
Status: DISCONTINUED | OUTPATIENT
Start: 2024-07-20 | End: 2024-07-21 | Stop reason: HOSPADM

## 2024-07-20 RX ORDER — ACETAMINOPHEN 325 MG/1
650 TABLET ORAL EVERY 6 HOURS PRN
Status: DISCONTINUED | OUTPATIENT
Start: 2024-07-20 | End: 2024-07-21 | Stop reason: HOSPADM

## 2024-07-20 RX ORDER — ONDANSETRON 2 MG/ML
4 INJECTION INTRAMUSCULAR; INTRAVENOUS ONCE
Status: COMPLETED | OUTPATIENT
Start: 2024-07-20 | End: 2024-07-20

## 2024-07-20 RX ORDER — SODIUM CHLORIDE 9 MG/ML
INJECTION, SOLUTION INTRAVENOUS PRN
Status: DISCONTINUED | OUTPATIENT
Start: 2024-07-20 | End: 2024-07-21 | Stop reason: HOSPADM

## 2024-07-20 RX ORDER — SODIUM CHLORIDE 0.9 % (FLUSH) 0.9 %
10 SYRINGE (ML) INJECTION PRN
Status: DISCONTINUED | OUTPATIENT
Start: 2024-07-20 | End: 2024-07-21 | Stop reason: HOSPADM

## 2024-07-20 RX ORDER — 0.9 % SODIUM CHLORIDE 0.9 %
80 INTRAVENOUS SOLUTION INTRAVENOUS ONCE
Status: COMPLETED | OUTPATIENT
Start: 2024-07-20 | End: 2024-07-20

## 2024-07-20 RX ORDER — POTASSIUM CHLORIDE 7.45 MG/ML
10 INJECTION INTRAVENOUS PRN
Status: DISCONTINUED | OUTPATIENT
Start: 2024-07-20 | End: 2024-07-21 | Stop reason: HOSPADM

## 2024-07-20 RX ORDER — SODIUM CHLORIDE 0.9 % (FLUSH) 0.9 %
10 SYRINGE (ML) INJECTION PRN
Status: DISCONTINUED | OUTPATIENT
Start: 2024-07-20 | End: 2024-07-20

## 2024-07-20 RX ORDER — LIDOCAINE HYDROCHLORIDE 20 MG/ML
JELLY TOPICAL PRN
Status: DISCONTINUED | OUTPATIENT
Start: 2024-07-20 | End: 2024-07-20

## 2024-07-20 RX ORDER — POLYETHYLENE GLYCOL 3350 17 G/17G
17 POWDER, FOR SOLUTION ORAL DAILY PRN
Status: DISCONTINUED | OUTPATIENT
Start: 2024-07-20 | End: 2024-07-21 | Stop reason: HOSPADM

## 2024-07-20 RX ORDER — ONDANSETRON 2 MG/ML
4 INJECTION INTRAMUSCULAR; INTRAVENOUS EVERY 6 HOURS PRN
Status: DISCONTINUED | OUTPATIENT
Start: 2024-07-20 | End: 2024-07-21 | Stop reason: HOSPADM

## 2024-07-20 RX ADMIN — LOSARTAN POTASSIUM 50 MG: 50 TABLET, FILM COATED ORAL at 17:35

## 2024-07-20 RX ADMIN — FAMOTIDINE 20 MG: 10 INJECTION, SOLUTION INTRAVENOUS at 05:55

## 2024-07-20 RX ADMIN — MORPHINE SULFATE 2 MG: 2 INJECTION, SOLUTION INTRAMUSCULAR; INTRAVENOUS at 06:25

## 2024-07-20 RX ADMIN — ACETAMINOPHEN 650 MG: 325 TABLET ORAL at 15:52

## 2024-07-20 RX ADMIN — ONDANSETRON 4 MG: 2 INJECTION INTRAMUSCULAR; INTRAVENOUS at 05:54

## 2024-07-20 RX ADMIN — SODIUM CHLORIDE: 9 INJECTION, SOLUTION INTRAVENOUS at 22:03

## 2024-07-20 RX ADMIN — IOPAMIDOL 75 ML: 755 INJECTION, SOLUTION INTRAVENOUS at 06:58

## 2024-07-20 RX ADMIN — SODIUM CHLORIDE, PRESERVATIVE FREE 10 ML: 5 INJECTION INTRAVENOUS at 12:25

## 2024-07-20 RX ADMIN — SODIUM CHLORIDE, PRESERVATIVE FREE 10 ML: 5 INJECTION INTRAVENOUS at 06:59

## 2024-07-20 RX ADMIN — FAMOTIDINE 20 MG: 10 INJECTION, SOLUTION INTRAVENOUS at 21:57

## 2024-07-20 RX ADMIN — ONDANSETRON 4 MG: 4 TABLET, ORALLY DISINTEGRATING ORAL at 15:31

## 2024-07-20 RX ADMIN — SODIUM CHLORIDE: 9 INJECTION, SOLUTION INTRAVENOUS at 12:20

## 2024-07-20 RX ADMIN — SODIUM CHLORIDE 80 ML: 9 INJECTION, SOLUTION INTRAVENOUS at 06:59

## 2024-07-20 ASSESSMENT — PAIN - FUNCTIONAL ASSESSMENT: PAIN_FUNCTIONAL_ASSESSMENT: 0-10

## 2024-07-20 ASSESSMENT — PAIN SCALES - GENERAL
PAINLEVEL_OUTOF10: 10
PAINLEVEL_OUTOF10: 3
PAINLEVEL_OUTOF10: 10
PAINLEVEL_OUTOF10: 3
PAINLEVEL_OUTOF10: 0

## 2024-07-20 ASSESSMENT — PAIN DESCRIPTION - DESCRIPTORS: DESCRIPTORS: ACHING

## 2024-07-20 ASSESSMENT — PAIN DESCRIPTION - LOCATION: LOCATION: HEAD

## 2024-07-20 NOTE — ED NOTES
Pts SPO2 decreased to 88% after morphine administration. Pt placed on 2L nasal cannula, her SPO2 increased to 90%. Pt reports she wears a CPAP at home, pt denies respiratory distress or SOB, her respirations are equal and non-labored with no use of accessory muscles.

## 2024-07-20 NOTE — CONSULTS
General Surgery:  Consult Note        PATIENT NAME: Jennifer Fontenot   YOB: 1950    ADMISSION DATE: 7/20/2024  5:16 AM     Admitting Provider: [unfilled]    Consulted Physician: Dr. Santacruz     TODAY'S DATE: 7/20/2024    Chief Complaint: Abdominal pain, nausea  Consult Regarding: Partial small bowel obstruction    HISTORY OF PRESENT ILLNESS:  The patient is a 74 y.o. female  who was admitted on 7/20/2024 with complaints of abdominal pain, cramping, bloating, nausea that started yesterday evening after consuming a large volume of cooked cauliflower.  Patient states she initially thought it was gas pains, took Gas-X, took Zofran, did not help and decided to ultimately come to the emergency department.  She has continued to have colostomy output. Labs significant for leukocytosis of 12 and CT showing some distended small bowel but no obvious transition point. On my exam, vitals are normal, no abdominal pain to palpation, abdomen is soft. Pain and nausea have resolved. Patient walks to restroom to empty her bag and asks for something to drink, stating she feels much better.   Patient has surgical history of laparoscopic  cholecystectomy and unbiblical hernia repair with mesh. She underwent exploratory laparotomy with explantation of mesh, SANCHEZ and low anterior resection, and end colostomy creation for rectal stricture 5/2019. She had colostomy reversal 11/2019. She had delayed leak which required  ex lap resection of anastomosis and creation of end colostomy. She has loss of domain with small and large bowel protruding through fascial defect. She was sent and evaluated at [facility] by abdominal wall reconstruction specialists for loss of domain and colostomy reversal. Ultimately she decided to hold off on surgery.       Past Medical History:        Diagnosis Date    Allergic rhinitis     Bladder prolapse     Constipation     5/2019 resolved    Fundic gland polyposis of stomach 08/17/2017    per  06:00 AM     07/20/2024 06:00 AM    CO2 24 07/20/2024 06:00 AM    BUN 21 07/20/2024 06:00 AM    CREATININE 0.8 07/20/2024 06:00 AM    CALCIUM 10.6 07/20/2024 06:00 AM    GFRAA >60 01/16/2022 05:28 AM    LABGLOM 78 07/20/2024 06:00 AM    LABGLOM >60 02/27/2024 05:04 PM    GLUCOSE 132 07/20/2024 06:00 AM    GLUCOSE 94 12/06/2011 06:25 AM       Pertinent Radiology:   CT ABDOMEN PELVIS W IV CONTRAST Additional Contrast? None    Result Date: 7/20/2024  EXAMINATION: CT OF THE ABDOMEN AND PELVIS WITH CONTRAST 7/20/2024 6:39 am TECHNIQUE: CT of the abdomen and pelvis was performed with the administration of intravenous contrast. Multiplanar reformatted images are provided for review. Automated exposure control, iterative reconstruction, and/or weight based adjustment of the mA/kV was utilized to reduce the radiation dose to as low as reasonably achievable. COMPARISON: 02/27/2024, 01/12/2022 HISTORY: ORDERING SYSTEM PROVIDED HISTORY: History of bowel obstructions, history of colostomy, generalized abdominal pain nausea vomiting TECHNOLOGIST PROVIDED HISTORY: History of bowel obstructions, history of colostomy, generalized abdominal pain nausea vomiting Decision Support Exception - unselect if not a suspected or confirmed emergency medical condition->Emergency Medical Condition (MA) Reason for Exam: Mid abdominal pain; hx SBO, colostomy. Isovue 370 - 75ml FINDINGS: Lower Chest: No acute findings. Organs: The liver, pancreas, spleen, adrenals and kidneys reveal no acute findings. GI/Bowel: Sequela of distal colonic resection and left-sided colostomy again demonstrated.  Scarring and tethering involving the remaining distal colon in the right pelvis appears unchanged.  Mild small bowel distension without focal transition identified.  Diastasis of the abdominal wall is again demonstrated containing stomach, portions of colon and small bowel without inflammatory change identified.  Parastomal hernia is also noted containing

## 2024-07-20 NOTE — ED NOTES
Pt arrived to ED with c/o ABD pain. Patient reported she has been having GI discomfort since yesterday morning. Pt denies having any episodes of vomiting, she has been nauseated. She reports having flatus and bloating. Pt reports she previously had a bowel obstruction and her pain and discomfort feel similar. Pt denies having difficulties with her colostomy. Patient denies chest pain. Patient denies SOB, her respirations are equal and non-labored with no use of accessory muscles.     Call light within reach. Patient denies any other needs at this time.

## 2024-07-20 NOTE — PROGRESS NOTES
Pt admitted to room from ER.  Oriented to room and call light/tv controls.  Bed in lowest position, wheels locked, 2/4 side rails up  Call light in reach, room free of clutter, adequate lighting provided.

## 2024-07-20 NOTE — H&P
[facility]  Office: [phone]  Wes Raphael DO, Torsten Neri DO, Todd Avendano DO, Royer Light DO, Ace Bueno MD, Makenzie Butcher MD, Phoebe Jimenez MD, Marni Traylor MD,  Wyatt Obrien MD, Anabella Delgado MD, Kelsi Miranda MD,  Kelvin Camejo DO, Anjel Holcomb MD, Luis Batista MD, Cirilo Raphael DO, Dyan Christine MD,  Bear Dickson DO, Elba Londono MD, Kat Mckay MD, Bebe Apple MD, Octavio Durand MD,  Zana Andino MD, Rafa Stephens MD, Obdulio Temple MD, James Montero MD, Anil Benitez MD, Lexy Amanda MD, Narciso Florentino DO, Margarito Jenkins DO, Ashley Snow MD,  Gabe Shelley MD, Shirley Waterhouse, CNP,  oJ Ozuna CNP, Logan Resendez, CNP,  Yvette Davies, DNP, Angelina Escobar, CNP, Radha Ramos, CNP, Nirali Nunez, CNP, Cristal Ca, CNP, Carmenza Estrada, PA-C, Whitney Packer PA-C, Elizabeth Ang, CNP, Sidra Adams, CNP, Fatuma Stuart, CNP, Ana Laura Jordan, CNP, Smiley Yarbrough, CNP, Dione Ford, CNS, Dena Plummer, CNP, Mariaa Durham CNP, Tracy Schwab, CNP         [facility]   IN-PATIENT SERVICE   [facility]    HISTORY AND PHYSICAL EXAMINATION            Date:   7/20/2024  Patient name:  Jennifer Fontenot  Date of admission:  7/20/2024  5:16 AM  MRN:   [id number]  Account:  [id number]  YOB: 1950  PCP:    Tahir Bowser MD  Room:   2008/2008-02  Code Status:    Full Code    Chief Complaint:     Chief Complaint   Patient presents with    Abdominal Pain       History Obtained From:     patient    History of Present Illness:     Jennifer Fontenot is a 74 y.o. Non- / non  female who presents with Abdominal Pain   and is admitted to the hospital for the management of Partial small bowel obstruction (HCC).    Patient has a colostomy in place due to history of diverticulitis with abscess, history of colostomy reversal and subsequent leak, and colostomy post leak.  She also has a history  Findings: No bruising, erythema, lesion or rash.   Neurological:      General: No focal deficit present.      Mental Status: She is alert and oriented to person, place, and time.   Psychiatric:         Behavior: Behavior normal.         Thought Content: Thought content normal.         Judgment: Judgment normal.         Investigations:      Laboratory Testing:  Recent Results (from the past 24 hour(s))   CBC with Auto Differential    Collection Time: 07/20/24  6:00 AM   Result Value Ref Range    WBC 12.5 (H) 3.5 - 11.3 k/uL    RBC 4.82 3.95 - 5.11 m/uL    Hemoglobin 14.7 11.9 - 15.1 g/dL    Hematocrit 46.3 36.3 - 47.1 %    MCV 96.1 82.6 - 102.9 fL    MCH 30.5 25.2 - 33.5 pg    MCHC 31.7 28.4 - 34.8 g/dL    RDW 14.2 11.8 - 14.4 %    Platelets 209 138 - 453 k/uL    MPV 10.5 8.1 - 13.5 fL    NRBC Automated 0.0 0.0 per 100 WBC    Neutrophils % 78 (H) 36 - 65 %    Lymphocytes % 14 (L) 24 - 43 %    Monocytes % 6 3 - 12 %    Eosinophils % 1 1 - 4 %    Basophils % 1 0 - 2 %    Immature Granulocytes % 0 0 %    Neutrophils Absolute 9.83 (H) 1.50 - 8.10 k/uL    Lymphocytes Absolute 1.73 1.10 - 3.70 k/uL    Monocytes Absolute 0.76 0.10 - 1.20 k/uL    Eosinophils Absolute 0.09 0.00 - 0.44 k/uL    Basophils Absolute 0.06 0.00 - 0.20 k/uL    Immature Granulocytes Absolute 0.05 0.00 - 0.30 k/uL   Comprehensive Metabolic Panel    Collection Time: 07/20/24  6:00 AM   Result Value Ref Range    Sodium 140 135 - 144 mmol/L    Potassium 4.0 3.7 - 5.3 mmol/L    Chloride 106 98 - 107 mmol/L    CO2 24 20 - 31 mmol/L    Anion Gap 10 9 - 17 mmol/L    Glucose 132 (H) 70 - 99 mg/dL    BUN 21 8 - 23 mg/dL    Creatinine 0.8 0.5 - 0.9 mg/dL    Est, Glom Filt Rate 78 >60 mL/min/1.73m2    BUN/Creatinine Ratio 26 (H) 9 - 20    Calcium 10.6 (H) 8.6 - 10.4 mg/dL    Total Protein 7.2 6.4 - 8.3 g/dL    Albumin 4.4 3.5 - 5.2 g/dL    Total Bilirubin 0.4 0.3 - 1.2 mg/dL    Alkaline Phosphatase 94 35 - 104 U/L    ALT 17 5 - 33 U/L    AST 14 <32 U/L   Lipase

## 2024-07-20 NOTE — ED PROVIDER NOTES
[facility] ED  EMERGENCY DEPARTMENT ENCOUNTER   ATTENDING ATTESTATION     Pt Name: Jennifer Fontenot  MRN: [id number]  Birthdate 1950  Date of evaluation: 7/20/24       Jennifer Fontenot is a 74 y.o. female who presents with Abdominal Pain      MDM:     74-year-old female presents with complaints of abdominal pain and distention, CT scan shows a possible partial obstruction versus ileus, consulted the colorectal surgeon Dr. Hutchins who recommended admission with an NG tube placement.  Patient is agreeable, I discussed the case with the hospitalist service who agrees with admission.    Impression: Partial small bowel obstruction    Vitals:   Vitals:    07/20/24 0629 07/20/24 0630 07/20/24 0631 07/20/24 0632   BP:  (!) 151/69     Pulse:       Resp:       Temp:       TempSrc:       SpO2: (!) 89% 91% 90% 90%   Weight:       Height:                 Rashard Robert MD  Attending Emergency  Physician                 Rashard Robert MD  07/20/24 0903    
Sepsis    Pertinent Comorbid Conditions:    Past Medical History:   Diagnosis Date    Allergic rhinitis     Bladder prolapse     Constipation     5/2019 resolved    Fundic gland polyposis of stomach 08/17/2017    per EGD    GERD (gastroesophageal reflux disease)     on no medications    Hiatal hernia     History of cardiac cath 12/2002    pt denies blockage    History of diverticulitis     Hyperlipidemia     borderline, on no medication    Hypertension     MRSA (methicillin resistant staph aureus) culture positive 11/01/2016    nasal    MRSA carrier     follows with ID    Obesity     Osteoarthritis     Pelvic abscess in female     Sleep apnea     CPAP nightly    Systolic murmur     benign systolic murmur (history of). Pt does not follow-up with a cardiologist (Written 09/25/2019).    Thyroid disease     goiter    Wears glasses        2)  Data Reviewed    External Documents Reviewed: Previous ER visits reviewed by myself      3)  Treatment and Disposition  [Patient repeat assessment, Disposition discussion with patient/family, Case discussed with consulting clinician, MIPS, Social determinants of health impacting treatment or disposition, Shared Decision Making, Code Status Discussion:]    Will get CT imaging, symptomatic control.  Signed out oncoming edition awaiting completion of imaging.    DATA FOR LAB AND RADIOLOGY TESTS ORDERED BELOW ARE REVIEWED BY THE ED CLINICIAN:    RADIOLOGY: All x-rays, CT, MRI, and formal ultrasound images (except ED bedside ultrasound) are read by the radiologist, see reports below, unless otherwise noted in MDM or here.  Reports below are reviewed by myself.  CT ABDOMEN PELVIS W IV CONTRAST Additional Contrast? None   Final Result   Mild small bowel distension without discrete transition point identified, for   which partial obstructing process or ileus are considerations.      Sequela of bowel surgery and scarring in the pelvis again demonstrated.   Diastasis of the abdominal wall and

## 2024-07-20 NOTE — PROGRESS NOTES
Transitions of Care Pharmacy Service   Medication Review    The patient's list of current home medications has been reviewed.     Source(s) of information: Patient, electronic chart    Based on information provided by the above source(s), I have updated the patient's home med list.       Please feel free to call me with any questions about this encounter. Thank you.    Susi Vargas RPH   Transitions of Care Pharmacy Service  Phone:  [phone]  Fax: [phone]      Electronically signed by Susi Vargas RPH on 7/20/2024 at 4:22 PM       Medications Prior to Admission: losartan (COZAAR) 50 MG tablet, Take 1 tablet by mouth daily  ondansetron (ZOFRAN) 4 MG tablet, Take 1 tablet by mouth daily as needed for Nausea or Vomiting  acetaminophen (TYLENOL) 500 MG tablet, Take 2 tablets by mouth every 6 hours as needed for Pain  Cholecalciferol (VITAMIN D) 2000 units CAPS capsule, Take 2 capsules by mouth daily             For Pharmacy Admin Tracking Only  # meds added to list: 0  # meds removed from list: 0  # meds adjusted on list (dose/frequency/formulation): 1

## 2024-07-21 ENCOUNTER — APPOINTMENT (OUTPATIENT)
Dept: GENERAL RADIOLOGY | Age: 74
DRG: 390 | End: 2024-07-21
Payer: MEDICARE

## 2024-07-21 VITALS
HEIGHT: 67 IN | BODY MASS INDEX: 38.45 KG/M2 | TEMPERATURE: 98.8 F | OXYGEN SATURATION: 97 % | SYSTOLIC BLOOD PRESSURE: 157 MMHG | WEIGHT: 245 LBS | DIASTOLIC BLOOD PRESSURE: 98 MMHG | RESPIRATION RATE: 16 BRPM | HEART RATE: 74 BPM

## 2024-07-21 LAB
ALBUMIN SERPL-MCNC: 3.8 G/DL (ref 3.5–5.2)
ALP SERPL-CCNC: 87 U/L (ref 35–104)
ALT SERPL-CCNC: 15 U/L (ref 5–33)
ANION GAP SERPL CALCULATED.3IONS-SCNC: 8 MMOL/L (ref 9–17)
AST SERPL-CCNC: 12 U/L
BASOPHILS # BLD: <0.03 K/UL (ref 0–0.2)
BASOPHILS NFR BLD: 0 % (ref 0–2)
BILIRUB SERPL-MCNC: 0.7 MG/DL (ref 0.3–1.2)
BUN SERPL-MCNC: 13 MG/DL (ref 8–23)
BUN/CREAT SERPL: 19 (ref 9–20)
CALCIUM SERPL-MCNC: 9.3 MG/DL (ref 8.6–10.4)
CHLORIDE SERPL-SCNC: 106 MMOL/L (ref 98–107)
CO2 SERPL-SCNC: 24 MMOL/L (ref 20–31)
CREAT SERPL-MCNC: 0.7 MG/DL (ref 0.5–0.9)
EOSINOPHIL # BLD: 0.13 K/UL (ref 0–0.44)
EOSINOPHILS RELATIVE PERCENT: 1 % (ref 1–4)
ERYTHROCYTE [DISTWIDTH] IN BLOOD BY AUTOMATED COUNT: 14.4 % (ref 11.8–14.4)
GFR, ESTIMATED: >90 ML/MIN/1.73M2
GLUCOSE SERPL-MCNC: 100 MG/DL (ref 70–99)
HCT VFR BLD AUTO: 41.6 % (ref 36.3–47.1)
HGB BLD-MCNC: 13.2 G/DL (ref 11.9–15.1)
IMM GRANULOCYTES # BLD AUTO: 0.03 K/UL (ref 0–0.3)
IMM GRANULOCYTES NFR BLD: 0 %
LIPASE SERPL-CCNC: 50 U/L (ref 13–60)
LYMPHOCYTES NFR BLD: 1.49 K/UL (ref 1.1–3.7)
LYMPHOCYTES RELATIVE PERCENT: 15 % (ref 24–43)
MAGNESIUM SERPL-MCNC: 2.1 MG/DL (ref 1.6–2.6)
MCH RBC QN AUTO: 30.8 PG (ref 25.2–33.5)
MCHC RBC AUTO-ENTMCNC: 31.7 G/DL (ref 28.4–34.8)
MCV RBC AUTO: 97 FL (ref 82.6–102.9)
MONOCYTES NFR BLD: 0.66 K/UL (ref 0.1–1.2)
MONOCYTES NFR BLD: 7 % (ref 3–12)
NEUTROPHILS NFR BLD: 77 % (ref 36–65)
NEUTS SEG NFR BLD: 7.76 K/UL (ref 1.5–8.1)
NRBC BLD-RTO: 0 PER 100 WBC
PHOSPHATE SERPL-MCNC: 2.6 MG/DL (ref 2.6–4.5)
PLATELET # BLD AUTO: 182 K/UL (ref 138–453)
PMV BLD AUTO: 10.5 FL (ref 8.1–13.5)
POTASSIUM SERPL-SCNC: 4 MMOL/L (ref 3.7–5.3)
PROT SERPL-MCNC: 6.2 G/DL (ref 6.4–8.3)
RBC # BLD AUTO: 4.29 M/UL (ref 3.95–5.11)
SODIUM SERPL-SCNC: 138 MMOL/L (ref 135–144)
WBC OTHER # BLD: 10.1 K/UL (ref 3.5–11.3)

## 2024-07-21 PROCEDURE — 2580000003 HC RX 258: Performed by: NURSE PRACTITIONER

## 2024-07-21 PROCEDURE — 85025 COMPLETE CBC W/AUTO DIFF WBC: CPT

## 2024-07-21 PROCEDURE — 80053 COMPREHEN METABOLIC PANEL: CPT

## 2024-07-21 PROCEDURE — 6370000000 HC RX 637 (ALT 250 FOR IP): Performed by: NURSE PRACTITIONER

## 2024-07-21 PROCEDURE — 2500000003 HC RX 250 WO HCPCS: Performed by: NURSE PRACTITIONER

## 2024-07-21 PROCEDURE — 36415 COLL VENOUS BLD VENIPUNCTURE: CPT

## 2024-07-21 PROCEDURE — 83690 ASSAY OF LIPASE: CPT

## 2024-07-21 PROCEDURE — 74018 RADEX ABDOMEN 1 VIEW: CPT

## 2024-07-21 PROCEDURE — 99232 SBSQ HOSP IP/OBS MODERATE 35: CPT | Performed by: INTERNAL MEDICINE

## 2024-07-21 PROCEDURE — 84100 ASSAY OF PHOSPHORUS: CPT

## 2024-07-21 PROCEDURE — 83735 ASSAY OF MAGNESIUM: CPT

## 2024-07-21 RX ADMIN — FAMOTIDINE 20 MG: 10 INJECTION, SOLUTION INTRAVENOUS at 09:03

## 2024-07-21 RX ADMIN — LOSARTAN POTASSIUM 50 MG: 50 TABLET, FILM COATED ORAL at 09:04

## 2024-07-21 RX ADMIN — SODIUM CHLORIDE: 9 INJECTION, SOLUTION INTRAVENOUS at 08:31

## 2024-07-21 RX ADMIN — ACETAMINOPHEN 650 MG: 325 TABLET ORAL at 06:30

## 2024-07-21 ASSESSMENT — PAIN DESCRIPTION - LOCATION: LOCATION: HEAD

## 2024-07-21 ASSESSMENT — PAIN DESCRIPTION - ORIENTATION: ORIENTATION: ANTERIOR

## 2024-07-21 ASSESSMENT — PAIN SCALES - GENERAL: PAINLEVEL_OUTOF10: 3

## 2024-07-21 ASSESSMENT — PAIN DESCRIPTION - DESCRIPTORS: DESCRIPTORS: ACHING

## 2024-07-21 NOTE — PROGRESS NOTES
[facility]  Office: [phone]  Wes Raphael DO, Torsten Neri DO, Todd Avendano DO, Royer Light, DO, Ace Bueno MD, Makenzie Butcher MD, Phoebe Jimenez MD, Marni Traylor MD,  Wyatt Obrien MD, Anabella Delgado MD, Kelsi Miranda MD,  Kelvin Camejo DO, Anjel Holcomb MD, Luis Batista MD, Cirilo Raphael DO, Dyan Christine MD,  Bear Dickson DO, Elba Londono MD, Kat Mckay MD, Bebe Apple MD, Octavio Durand MD,  Zana Andino MD, Rafa Stephens MD, Obdulio Temple MD, James Montero MD, Anil Benitez MD, Lexy Amanda MD, Narciso Florentino DO, Margarito Jenkins DO, Ashley Snow MD,  Gabe Shelley MD, Shirley Waterhouse, CNP,  Jo Ozuna CNP, Logan Resendez, CNP,  Yvette Davies, DNP, Angelina Escobar, CNP, Radha Ramos, CNP, Nirali Nunez, CNP, Cristal Ca, CNP, Carmenza Estrada, PA-C, Whitney Packer PA-C, Elizabeth Ang, CNP, Sidra Adams, CNP, Fatuma Stuart, CNP, Ana Laura Jordan, CNP, Smiley Yarbrough, CNP, Dione Ford, CNS, Dena Plummer, CNP, Mariaa Durham CNP, Tracy Schwab, CNP         [facility]   IN-PATIENT SERVICE   [facility]    Progress Note    7/21/2024    8:47 AM    Name:   Jennifer Fontenot  MRN:     [id number]     Acct:      [id number]   Room:   2008/2008-02  IP Day:  1  Admit Date:  7/20/2024  5:16 AM    PCP:   Tahir Bowser MD  Code Status:  Full Code    Subjective:     C/C:   Chief Complaint   Patient presents with    Abdominal Pain     Interval History Status: improved.     Patient is resting comfortably denies any complaints of chest pain, shortness of breath, nausea vomiting, fevers or chills.  Started on full liquid diet this morning per surgery with plans to advance this afternoon with possible discharge.    Brief History:     This is a 74-year-old female with history of prior diverticulitis that required bowel resection with colostomy formation.  She presents at this time with increased abdominal pain and  reports that she does not use drugs.     Family History:   Family History   Problem Relation Age of Onset    Heart Disease Mother     COPD Father     Cancer Brother         thyroid, prostate, lung       Vitals:  BP (!) 153/76   Pulse 63   Temp 97.9 °F (36.6 °C) (Oral)   Resp 16   Ht 1.702 m (5' 7\")   Wt 111.1 kg (245 lb)   LMP  (LMP Unknown)   SpO2 94%   BMI 38.37 kg/m²   Temp (24hrs), Av.1 °F (36.7 °C), Min:97.7 °F (36.5 °C), Max:99.1 °F (37.3 °C)    No results for input(s): \"POCGLU\" in the last 72 hours.    I/O (24Hr):    Intake/Output Summary (Last 24 hours) at 2024 0847  Last data filed at 2024 0639  Gross per 24 hour   Intake 2369.75 ml   Output 890 ml   Net 1479.75 ml       Labs:  Hematology:  Recent Labs     24  0600 24  0645   WBC 12.5* 10.1   RBC 4.82 4.29   HGB 14.7 13.2   HCT 46.3 41.6   MCV 96.1 97.0   MCH 30.5 30.8   MCHC 31.7 31.7   RDW 14.2 14.4    182   MPV 10.5 10.5     Chemistry:  Recent Labs     24  0600 24  0645    138   K 4.0 4.0    106   CO2 24 24   GLUCOSE 132* 100*   BUN 21 13   CREATININE 0.8 0.7   MG  --  2.1   ANIONGAP 10 8*   LABGLOM 78 >90   CALCIUM 10.6* 9.3   PHOS  --  2.6     Recent Labs     24  0600 24  0645   AST 14 12   ALT 17 15   ALKPHOS 94 87   BILITOT 0.4 0.7   LIPASE 42 50     ABG:  Lab Results   Component Value Date/Time    POCPH 7.32 2019 04:26 PM    POCPCO2 46 2019 04:26 PM    POCPO2 229 2019 04:26 PM    POCHCO3 23.9 2019 04:26 PM    NBEA 2 2019 04:26 PM    PBEA NOT REPORTED 2019 04:26 PM    HYB8UVZ 25 2019 04:26 PM    ZAWT7KOD 100 2019 04:26 PM    FIO2 NOT REPORTED 2019 04:26 PM     Lab Results   Component Value Date/Time    SPECIAL NOT REPORTED 2022 11:10 PM     Lab Results   Component Value Date/Time    CULTURE KLEBSIELLA PNEUMONIAE >929475 CFU/ML (A) 2022 11:10 PM       Radiology:  XR ABDOMEN (KUB) (SINGLE AP VIEW)    Result

## 2024-07-21 NOTE — PROGRESS NOTES
BASOSABS <0.03 07/21/2024 06:45 AM    DIFFTYPE NOT REPORTED 01/12/2022 10:38 PM     BMP:    Lab Results   Component Value Date/Time     07/21/2024 06:45 AM    K 4.0 07/21/2024 06:45 AM     07/21/2024 06:45 AM    CO2 24 07/21/2024 06:45 AM    BUN 13 07/21/2024 06:45 AM    CREATININE 0.7 07/21/2024 06:45 AM    CALCIUM 9.3 07/21/2024 06:45 AM    GFRAA >60 01/16/2022 05:28 AM    LABGLOM >90 07/21/2024 06:45 AM    LABGLOM >60 02/27/2024 05:04 PM    GLUCOSE 100 07/21/2024 06:45 AM    GLUCOSE 94 12/06/2011 06:25 AM       Radiology Review:  CT ABDOMEN PELVIS W IV CONTRAST Additional Contrast? None    Result Date: 7/20/2024  Mild small bowel distension without discrete transition point identified, for which partial obstructing process or ileus are considerations. Sequela of bowel surgery and scarring in the pelvis again demonstrated. Diastasis of the abdominal wall and parastomal hernia again demonstrated.        ASSESSMENT:  Active Hospital Problems    Diagnosis Date Noted    Partial small bowel obstruction (HCC) [K56.600] 07/20/2024    Colostomy in place (HCC) [Z93.3] 07/20/2024    Systolic murmur [R01.1]     GERD (gastroesophageal reflux disease) [K21.9]     Essential hypertension [I10]     Dyslipidemia [E78.5]        74-year-old female with complex abdominal surgical history, loss of domain, colostomy and concern for partial small bowel obstruction       Plan:  Advance to full liquid diet- if tolerating advance to regular, low fiber at lunch time  Dc MIVF once tolerating PO  KUB reviewed  Strict record intake output  Ambulate TID in halls  Is/deep breathing cough  Leukocytosis resolved without antibiotics  Bmp electrolytes reviewed  Dispo - ok for dc home once toelrating diet  Patient due for colonoscopy will schedule outpatient    Case discussed with attending    Electronically signed by Kita Reynoso DO  on 7/21/2024 at 8:05 AM   I Dr. Santacruz saw and examined the patient. I have edited the above and agree with

## 2024-07-21 NOTE — DISCHARGE SUMMARY
[facility]  Office: [phone]  Wes Raphael DO, Torsten Neri DO, Todd Avendano DO, Royer Light DO, Ace Bueno MD, Makenzie Butcher MD, Phoebe Jimenez MD, Marni Traylor MD,  Wyatt Obrien MD, Anabella Delgado MD, Kelsi Miranda MD,  Kelvin Camejo DO, Anjel Holcomb MD, Luis Batista MD, Cirilo Raphael DO, Dyan Christine MD,  Bear Dickson DO, Elba Londono MD, Kat Mckay MD, Bebe Apple MD, Octavio Duarnd MD,  Zana Andino MD, Rafa Stephens MD, Obdulio Temple MD, James Montero MD, Anil Benitez MD, Lexy Amanda MD, Narciso Florentino DO, Margarito Jenkins DO, Ashley Snow MD,  Gabe Shelley MD, Shirley Waterhouse, CNP,  Jo Ozuna CNP, Logan Resendez, CNP,  Yvette Davies, DNP, Angelina Escobar, CNP, Radha Ramos, CNP, Nirali Nunez CNP, Cristal Ca, CNP, Carmenza Estrada, PA-C, Whitney Packer PA-C, Elizabeth Ang, CNP, Sidra Adams, CNP, Fatuma Stuart, CNP, Ana Laura Jordan, CNP, Smiley Yarbrough, CNP, Dione Ford, CNS, Dena Plummer, CNP, Mariaa Durham CNP, Tracy Schwab, CNP         [facility]   IN-PATIENT SERVICE   [facility]    Discharge Summary     Patient ID: Jennifer Fontenot  :  1950   MRN: [id number]     ACCOUNT:  [id number]   Patient's PCP: Tahir Bowser MD  Admit Date: 2024   Discharge Date: 2024     Length of Stay: 1  Code Status:  Full Code  Admitting Physician: Todd Avendano DO  Discharge Physician: Todd J Orlop, DO     Active Discharge Diagnoses:     Hospital Problem Lists:  Principal Problem:    Partial small bowel obstruction (HCC)  Active Problems:    Dyslipidemia    GERD (gastroesophageal reflux disease)    Essential hypertension    Systolic murmur    Colostomy in place (HCC)  Resolved Problems:    * No resolved hospital problems. *      Admission Condition:  fair     Discharged Condition: stable    Hospital Stay:     Hospital Course:  Jennifer Fontenot is a 74 y.o. female who was

## 2024-07-21 NOTE — CARE COORDINATION
Case Management Assessment  Initial Evaluation    Date/Time of Evaluation: 7/21/2024 3:43 PM  Assessment Completed by: JORGE SOTELO RN    If patient is discharged prior to next notation, then this note serves as note for discharge by case management.    Patient Name: Jennifer Fontenot                   YOB: 1950  Diagnosis: Partial small bowel obstruction (HCC) [K56.600]  Nausea and vomiting, unspecified vomiting type [R11.2]                   Date / Time: 7/20/2024  5:16 AM    Patient Admission Status: Inpatient   Readmission Risk (Low < 19, Mod (19-27), High > 27): Readmission Risk Score: 6.2    Current PCP: Tahir Bowser MD  PCP verified by ? Yes    Chart Reviewed: Yes      History Provided by: Patient  Patient Orientation: Alert and Oriented, Person, Place, Situation, Self    Patient Cognition: Alert    Hospitalization in the last 30 days (Readmission):  No    If yes, Readmission Assessment in  Navigator will be completed.    Advance Directives:      Code Status: Full Code   Patient's Primary Decision Maker is: Legal Next of Kin    Primary Decision Maker: Grant Fontenot - Child - [phone]    Discharge Planning:    Patient lives with: Children Type of Home: House  Primary Care Giver:    Patient Support Systems include: Children   Current Financial resources: None  Current community resources: None  Current services prior to admission: C-pap ([employer] thru [employer])            Current DME:              Type of Home Care services:  None    ADLS  Prior functional level: Independent in ADLs/IADLs  Current functional level: Independent in ADLs/IADLs    PT AM-PAC:   /24  OT AM-PAC:   /24    Family can provide assistance at DC: Yes  Would you like Case Management to discuss the discharge plan with any other family members/significant others, and if so, who? No  Plans to Return to Present Housing: Yes  Other Identified Issues/Barriers to RETURNING to current housing:  Patient Representative Agree with the Discharge Plan? Yes    JORGE SOTELO RN  Case Management Department  Ph: [phone] Fax: [phone]

## 2024-07-21 NOTE — PROGRESS NOTES
Patient tolerated diet well no nausea/vomiting. Loose output from ostomy.    Pt discharged to home in stable condition with belongings  Discharge instructions given  Pt denies having any further questions at this time  Personal items given to patient at discharge  Patient state they have everything they were admitted with.

## 2024-07-21 NOTE — PLAN OF CARE
Problem: Discharge Planning  Goal: Discharge to home or other facility with appropriate resources  Outcome: Progressing  Flowsheets (Taken 7/20/2024 1028)  Discharge to home or other facility with appropriate resources:   Identify barriers to discharge with patient and caregiver   Arrange for needed discharge resources and transportation as appropriate   Identify discharge learning needs (meds, wound care, etc)   Refer to discharge planning if patient needs post-hospital services based on physician order or complex needs related to functional status, cognitive ability or social support system     Problem: Pain  Goal: Verbalizes/displays adequate comfort level or baseline comfort level  Outcome: Progressing     Problem: ABCDS Injury Assessment  Goal: Absence of physical injury  Outcome: Progressing     Problem: Cardiovascular - Adult  Goal: Maintains optimal cardiac output and hemodynamic stability  Outcome: Progressing  Goal: Absence of cardiac dysrhythmias or at baseline  Outcome: Progressing     Problem: Skin/Tissue Integrity - Adult  Goal: Skin integrity remains intact  Outcome: Progressing     Problem: Gastrointestinal - Adult  Goal: Minimal or absence of nausea and vomiting  Outcome: Progressing  Goal: Maintains or returns to baseline bowel function  Outcome: Progressing  Goal: Maintains adequate nutritional intake  Outcome: Progressing  Goal: Establish and maintain optimal ostomy function  Outcome: Progressing     Problem: Genitourinary - Adult  Goal: Absence of urinary retention  Outcome: Progressing     Problem: Metabolic/Fluid and Electrolytes - Adult  Goal: Electrolytes maintained within normal limits  Outcome: Progressing  Goal: Hemodynamic stability and optimal renal function maintained  Outcome: Progressing     Problem: Hematologic - Adult  Goal: Maintains hematologic stability  Outcome: Progressing     
  Problem: Discharge Planning  Goal: Discharge to home or other facility with appropriate resources  Outcome: Progressing  Flowsheets (Taken 7/21/2024 0815)  Discharge to home or other facility with appropriate resources:   Identify barriers to discharge with patient and caregiver   Arrange for needed discharge resources and transportation as appropriate   Identify discharge learning needs (meds, wound care, etc)   Refer to discharge planning if patient needs post-hospital services based on physician order or complex needs related to functional status, cognitive ability or social support system     Problem: Pain  Goal: Verbalizes/displays adequate comfort level or baseline comfort level  Outcome: Progressing     Problem: ABCDS Injury Assessment  Goal: Absence of physical injury  Outcome: Progressing     Problem: Cardiovascular - Adult  Goal: Maintains optimal cardiac output and hemodynamic stability  Outcome: Progressing  Goal: Absence of cardiac dysrhythmias or at baseline  Outcome: Progressing     Problem: Skin/Tissue Integrity - Adult  Goal: Skin integrity remains intact  Outcome: Progressing  Flowsheets  Taken 7/21/2024 0815  Skin Integrity Remains Intact: Monitor for areas of redness and/or skin breakdown       Problem: Gastrointestinal - Adult  Goal: Minimal or absence of nausea and vomiting  7/21/2024 1501 by Mirlande Velarde RN  Outcome: Progressing  Flowsheets (Taken 7/21/2024 0815)  Minimal or absence of nausea and vomiting: Advance diet as tolerated, if ordered    Goal: Maintains or returns to baseline bowel function  7/21/2024 1501 by Mirlande Velarde RN  Outcome: Progressing  Flowsheets (Taken 7/21/2024 0815)  Maintains or returns to baseline bowel function:   Encourage oral fluids to ensure adequate hydration   Assess bowel function    Goal: Maintains adequate nutritional intake  7/21/2024 1501 by Mirlande Velarde RN  Outcome: Progressing  Flowsheets (Taken 7/21/2024 0815)  Maintains adequate 
Patient ambulating hallways, gait steady. Ostomy output charted. Patient denies nausea, denies pain. Patient is tolerating clears at this time.  Problem: Gastrointestinal - Adult  Goal: Minimal or absence of nausea and vomiting  Outcome: Progressing     Problem: Gastrointestinal - Adult  Goal: Maintains or returns to baseline bowel function  Outcome: Progressing     Problem: Gastrointestinal - Adult  Goal: Maintains adequate nutritional intake  Outcome: Progressing     Problem: Gastrointestinal - Adult  Goal: Establish and maintain optimal ostomy function  Outcome: Progressing     
Mirlande Velarde, RN  Outcome: Completed  7

## 2024-07-22 ENCOUNTER — TELEPHONE (OUTPATIENT)
Dept: FAMILY MEDICINE CLINIC | Age: 74
End: 2024-07-22

## 2024-07-22 NOTE — TELEPHONE ENCOUNTER
Care Transitions Initial Follow Up Call    Outreach made within 2 business days of discharge: Yes    Patient: Jennifer Fontenot Patient : 1950   MRN: [id number]  Reason for Admission: Partial bowel obstruction  Discharge Date: 24       Spoke with: left message to call office and schedule hospital follow up with available provider    Discharge department/facility: [facility]      Follow Up  Future Appointments   Date Time Provider Department Center   2024  2:45 PM Tahir Bowesr MD [facility]       [facility] [place]

## 2024-07-24 ENCOUNTER — TELEPHONE (OUTPATIENT)
Dept: FAMILY MEDICINE CLINIC | Age: 74
End: 2024-07-24

## 2024-07-24 ENCOUNTER — PATIENT MESSAGE (OUTPATIENT)
Dept: FAMILY MEDICINE CLINIC | Age: 74
End: 2024-07-24

## 2024-07-24 NOTE — TELEPHONE ENCOUNTER
From: Jennifer Fontenot  To: Dr. Tahir Bowser  Sent: 7/23/2024 10:39 AM EDT  Subject: Appointment Request    Appointment Request From: Jennifer Fontenot    With Provider: Tahir Bowser MD [[facility]]    Preferred Date Range: Any    Preferred Times: Any Time    Reason for visit: Request an Appointment    Comments:  Hospital Follow Up

## 2024-07-24 NOTE — TELEPHONE ENCOUNTER
Care Transitions Initial Follow Up Call    Outreach made within 2 business days of discharge: Yes    Patient: Jennifer Fontenot Patient : 1950   MRN: [id number]  Reason for Admission: partial bowel obstruction  Discharge Date: 24       Spoke with: left message to call and schedule appt    Discharge department/facility: [facility]    Follow Up  Future Appointments   Date Time Provider Department Center   2024  2:45 PM Tahir Bowser MD [facility]       [facility] [place]

## 2024-07-29 ENCOUNTER — OFFICE VISIT (OUTPATIENT)
Dept: FAMILY MEDICINE CLINIC | Age: 74
End: 2024-07-29

## 2024-07-29 VITALS
BODY MASS INDEX: 39.16 KG/M2 | OXYGEN SATURATION: 95 % | TEMPERATURE: 96.6 F | HEART RATE: 66 BPM | WEIGHT: 250 LBS | DIASTOLIC BLOOD PRESSURE: 78 MMHG | SYSTOLIC BLOOD PRESSURE: 118 MMHG

## 2024-07-29 DIAGNOSIS — Z12.11 SCREEN FOR COLON CANCER: ICD-10-CM

## 2024-07-29 DIAGNOSIS — K56.600 PARTIAL SMALL BOWEL OBSTRUCTION (HCC): ICD-10-CM

## 2024-07-29 DIAGNOSIS — Z09 HOSPITAL DISCHARGE FOLLOW-UP: Primary | ICD-10-CM

## 2024-07-29 DIAGNOSIS — Z93.3 COLOSTOMY IN PLACE (HCC): ICD-10-CM

## 2024-07-29 ASSESSMENT — PATIENT HEALTH QUESTIONNAIRE - PHQ9
SUM OF ALL RESPONSES TO PHQ QUESTIONS 1-9: 0
1. LITTLE INTEREST OR PLEASURE IN DOING THINGS: NOT AT ALL
SUM OF ALL RESPONSES TO PHQ9 QUESTIONS 1 & 2: 0
2. FEELING DOWN, DEPRESSED OR HOPELESS: NOT AT ALL
SUM OF ALL RESPONSES TO PHQ QUESTIONS 1-9: 0

## 2024-07-29 NOTE — PROGRESS NOTES
Post-Discharge Transitional Care Follow Up      Jennifer Fontenot   YOB: 1950    Date of Office Visit:  7/29/2024  Date of Hospital Admission: 7/20/24  Date of Hospital Discharge: 7/21/24  Readmission Risk Score (high >=14%. Medium >=10%):Readmission Risk Score: 6      Care management risk score Rising risk (score 2-5) and Complex Care (Scores >=6): No Risk Score On File     Non face to face  following discharge, date last encounter closed (first attempt may have been earlier): 07/24/2024     Call initiated 2 business days of discharge: Yes     Hospital discharge follow-up  -     [place] DISCHARGE MEDS RECONCILED W/ CURRENT OUTPATIENT MED LIST  Partial small bowel obstruction (HCC)  -     Stas Garcia DO, Gastroenterology, [place]  Colostomy in place (HCC)  -     Stas Garcia DO Gastroenterology, [facility]  Screen for colon cancer  -     Stas Garcia DO Gastroenterology, [place]    Medical Decision Making: moderate complexity  Return for as scheduled.    On this date 7/29/2024 I have spent 40 minutes reviewing previous notes, test results and face to face with the patient discussing the diagnosis and importance of compliance with the treatment plan as well as documenting on the day of the visit.       Subjective:   HPI    Inpatient course: Discharge summary reviewed- see chart.    Hospital Course:  Jennifer Fontenot is a 74 y.o. female who was admitted for the management of  Partial small bowel obstruction (HCC) , presented to ER with Abdominal Pain     This is a 74-year-old female with history of prior diverticulitis with abscess and also had colon resection with ostomy formation.  She presents this time with increased abdominal pain and distention is found to have partial bowel obstruction on imaging.  She is admitted and placed on IV fluid resuscitation and bowel rest and underwent colorectal surgery evaluation.  Her gastrointestinal symptoms improved and she began having

## 2024-07-31 ENCOUNTER — TELEPHONE (OUTPATIENT)
Dept: FAMILY MEDICINE CLINIC | Age: 74
End: 2024-07-31

## 2024-07-31 NOTE — TELEPHONE ENCOUNTER
[facility] called in requesting more information as to why patient was referred to them? And what are you looking for them to do?

## 2024-07-31 NOTE — TELEPHONE ENCOUNTER
[facility] has been notified of this information and all questions answered.     29 yo M no PMH recent travel to [place] presenting with febrile illness.  Fever, tachycardia  Diarrhea, chills, mild dry cough--no other symptoms noted  Malaria smear neg  Treated with PO augmentin as outpatient in [facility]  Now suspected infectious diarrhea vs pyelonephritis as cause of symptoms  UCX with CRE E coli R Zosyn (low CFU)  GI PCR with Salmonella, Campylobacter  Lower suspicion for UTI presently, now afebrile--for now, treat for infectious diarrhea 2/2 campy/salmonella  Elevated LFTs--also caused by febrile illness vs adverse effect to Augmentin; USG negative  Fever yesterday, unclear etiology unless is slowly resolving typhoid  Overall, persistent fever, foreign travel, tachycardia, intra-abd infection/campylobacter, new CRE culture finding  - Azithromycin 500mg q 24 x 3 doses  - Would obtain hepatology input re: persistent elevated LFTs  - So far: acute EBV, HIV, Dengue, CMV, RVP, all negative  - Repeat UA/UCX  - Check Procalcitonin, Brucella serology  - F/U Hep E, BCX, ChikV  - If fevers persist (for now afeb x 24 hours), consideration for tagged WBC scan    Mj Akins MD  Pager [phone]  After 5pm and on weekends call [phone] 29 yo M no PMH recent travel to [place] presenting with febrile illness.  Fever, tachycardia  Diarrhea, chills, mild dry cough--no other symptoms noted  Malaria smear neg  Treated with PO augmentin as outpatient in [facility]  Now suspected infectious diarrhea vs pyelonephritis as cause of symptoms  UCX with CRE E coli R Zosyn (low CFU)  GI PCR with Salmonella, Campylobacter  Lower suspicion for UTI presently, now afebrile--for now, treat for infectious diarrhea 2/2 campy/salmonella  Elevated LFTs--also caused by febrile illness vs adverse effect to Augmentin; USG negative  Fever yesterday, unclear etiology unless is slowly resolving typhoid  Overall, persistent fever, foreign travel, tachycardia, intra-abd infection/campylobacter, new CRE culture finding  - Azithromycin 500mg q 24 x 3 doses  - Would obtain hepatology input re: persistent elevated LFTs  - So far: acute EBV, HIV, Dengue, CMV, RVP, all negative  - Repeat UA/UCX  - Check Procalcitonin, Brucella serology  - F/U Hep E, BCX, ChikV  - If fevers persist (for now afeb x 24 hours), consideration for tagged WBC scan  - Discussed with medicine team    Mj Akins MD  Pager [phone]  After 5pm and on weekends call [phone]

## 2024-09-23 ENCOUNTER — OFFICE VISIT (OUTPATIENT)
Dept: FAMILY MEDICINE CLINIC | Age: 74
End: 2024-09-23
Payer: MEDICARE

## 2024-09-23 VITALS
DIASTOLIC BLOOD PRESSURE: 78 MMHG | OXYGEN SATURATION: 99 % | BODY MASS INDEX: 38.84 KG/M2 | HEART RATE: 96 BPM | WEIGHT: 248 LBS | SYSTOLIC BLOOD PRESSURE: 118 MMHG | TEMPERATURE: 98 F

## 2024-09-23 DIAGNOSIS — L53.9 ERYTHEMA OF SKIN OF EYELID: ICD-10-CM

## 2024-09-23 DIAGNOSIS — I10 ESSENTIAL HYPERTENSION: Primary | ICD-10-CM

## 2024-09-23 DIAGNOSIS — E66.01 SEVERE OBESITY (BMI 35.0-39.9) WITH COMORBIDITY: ICD-10-CM

## 2024-09-23 DIAGNOSIS — K56.609 SMALL BOWEL OBSTRUCTION (HCC): ICD-10-CM

## 2024-09-23 PROCEDURE — 1036F TOBACCO NON-USER: CPT | Performed by: INTERNAL MEDICINE

## 2024-09-23 PROCEDURE — 1123F ACP DISCUSS/DSCN MKR DOCD: CPT | Performed by: INTERNAL MEDICINE

## 2024-09-23 PROCEDURE — 1090F PRES/ABSN URINE INCON ASSESS: CPT | Performed by: INTERNAL MEDICINE

## 2024-09-23 PROCEDURE — G8399 PT W/DXA RESULTS DOCUMENT: HCPCS | Performed by: INTERNAL MEDICINE

## 2024-09-23 PROCEDURE — 3074F SYST BP LT 130 MM HG: CPT | Performed by: INTERNAL MEDICINE

## 2024-09-23 PROCEDURE — G8417 CALC BMI ABV UP PARAM F/U: HCPCS | Performed by: INTERNAL MEDICINE

## 2024-09-23 PROCEDURE — 3078F DIAST BP <80 MM HG: CPT | Performed by: INTERNAL MEDICINE

## 2024-09-23 PROCEDURE — 3017F COLORECTAL CA SCREEN DOC REV: CPT | Performed by: INTERNAL MEDICINE

## 2024-09-23 PROCEDURE — G8427 DOCREV CUR MEDS BY ELIG CLIN: HCPCS | Performed by: INTERNAL MEDICINE

## 2024-09-23 PROCEDURE — 99214 OFFICE O/P EST MOD 30 MIN: CPT | Performed by: INTERNAL MEDICINE

## 2024-10-02 RX ORDER — LOSARTAN POTASSIUM 50 MG/1
50 TABLET ORAL DAILY
Qty: 90 TABLET | Refills: 1 | Status: SHIPPED | OUTPATIENT
Start: 2024-10-02

## 2024-10-02 RX ORDER — ONDANSETRON 4 MG/1
4 TABLET, FILM COATED ORAL DAILY PRN
Qty: 30 TABLET | Refills: 0 | Status: SHIPPED | OUTPATIENT
Start: 2024-10-02

## 2024-10-02 NOTE — TELEPHONE ENCOUNTER
Jennifer Fontenot is calling to request a refill on the following medication(s):    Last Visit Date (If Applicable):  9/23/2024    Next Visit Date:    Visit date not found    Medication Request:  Requested Prescriptions     Pending Prescriptions Disp Refills    ondansetron (ZOFRAN) 4 MG tablet 30 tablet 0     Sig: Take 1 tablet by mouth daily as needed for Nausea or Vomiting    losartan (COZAAR) 50 MG tablet 90 tablet 1     Sig: Take 1 tablet by mouth daily

## 2025-01-15 ENCOUNTER — OFFICE VISIT (OUTPATIENT)
Dept: FAMILY MEDICINE CLINIC | Age: 75
End: 2025-01-15

## 2025-01-15 VITALS
DIASTOLIC BLOOD PRESSURE: 82 MMHG | BODY MASS INDEX: 37.59 KG/M2 | HEART RATE: 65 BPM | WEIGHT: 240 LBS | OXYGEN SATURATION: 95 % | SYSTOLIC BLOOD PRESSURE: 112 MMHG

## 2025-01-15 DIAGNOSIS — Z71.89 ENCOUNTER FOR MEDICATION REVIEW AND COUNSELING: ICD-10-CM

## 2025-01-15 DIAGNOSIS — E66.812 CLASS 2 SEVERE OBESITY DUE TO EXCESS CALORIES WITH SERIOUS COMORBIDITY AND BODY MASS INDEX (BMI) OF 37.0 TO 37.9 IN ADULT: ICD-10-CM

## 2025-01-15 DIAGNOSIS — K21.9 GASTROESOPHAGEAL REFLUX DISEASE, UNSPECIFIED WHETHER ESOPHAGITIS PRESENT: ICD-10-CM

## 2025-01-15 DIAGNOSIS — I10 ESSENTIAL HYPERTENSION: ICD-10-CM

## 2025-01-15 DIAGNOSIS — E66.01 CLASS 2 SEVERE OBESITY DUE TO EXCESS CALORIES WITH SERIOUS COMORBIDITY AND BODY MASS INDEX (BMI) OF 37.0 TO 37.9 IN ADULT: ICD-10-CM

## 2025-01-15 DIAGNOSIS — Z76.89 ENCOUNTER TO ESTABLISH CARE: Primary | ICD-10-CM

## 2025-01-15 DIAGNOSIS — E78.5 DYSLIPIDEMIA: ICD-10-CM

## 2025-01-15 DIAGNOSIS — Z93.3 COLOSTOMY IN PLACE (HCC): ICD-10-CM

## 2025-01-15 PROBLEM — K56.600 PARTIAL SMALL BOWEL OBSTRUCTION (HCC): Status: RESOLVED | Noted: 2024-07-20 | Resolved: 2025-01-15

## 2025-01-15 PROBLEM — G47.33 OBSTRUCTIVE SLEEP APNEA SYNDROME: Status: ACTIVE | Noted: 2019-12-30

## 2025-01-15 PROBLEM — K91.89 LARGE INTESTINE ANASTOMOTIC LEAK: Status: RESOLVED | Noted: 2019-11-30 | Resolved: 2025-01-15

## 2025-01-15 ASSESSMENT — ENCOUNTER SYMPTOMS
GASTROINTESTINAL NEGATIVE: 1
EYES NEGATIVE: 1
RESPIRATORY NEGATIVE: 1
ALLERGIC/IMMUNOLOGIC NEGATIVE: 1

## 2025-01-15 NOTE — PROGRESS NOTES
[facility]     Date of Visit:  1/15/2025  Patient Name: Jennifer Fontenot   Patient :  1950     CHIEF COMPLAINT:     Jennifer Fontenot is a 74 y.o. female who presents today for an general visit to be evaluated for the following condition(s):    Chief Complaint   Patient presents with    Establish Care     New to Clinic and Provider - not [facility].  Last PCP was Dr. Tahir Bowser with [facility].  Last visit with him was 2024.       HISTORY OF PRESENT ILLNESS:         Follows with:  GI: Betty Lovell - [facility]  Ortho: Jack Styles MD   ENT: Cirilo Sharma MD   Derm: Felix Butt  Ophthalmology: Arturo Boateng MD    Will update labs with Medicare Visit in the Spring 2025.       REVIEW OF SYSTEMS:        Review of Systems   Constitutional: Negative.    HENT: Negative.     Eyes: Negative.    Respiratory: Negative.     Cardiovascular: Negative.    Gastrointestinal: Negative.    Endocrine: Negative.    Genitourinary: Negative.    Musculoskeletal: Negative.    Skin: Negative.    Allergic/Immunologic: Negative.    Neurological: Negative.    Hematological: Negative.    Psychiatric/Behavioral: Negative.          REVIEWED INFORMATION      Current Outpatient Medications   Medication Sig Dispense Refill    ondansetron (ZOFRAN) 4 MG tablet Take 1 tablet by mouth daily as needed for Nausea or Vomiting 30 tablet 0    losartan (COZAAR) 50 MG tablet Take 1 tablet by mouth daily 90 tablet 1    acetaminophen (TYLENOL) 500 MG tablet Take 2 tablets by mouth every 6 hours as needed for Pain      Cholecalciferol (VITAMIN D) 2000 units CAPS capsule Take 2 capsules by mouth daily       No current facility-administered medications for this visit.        No Known Allergies    Patient Active Problem List   Diagnosis    Dyslipidemia    Osteoarthritis    GERD (gastroesophageal reflux disease)    Essential hypertension    Allergic rhinitis    Osteoporosis

## 2025-01-27 NOTE — DISCHARGE SUMMARY
[facility]    Discharge Summary     Patient ID: Shant Nichols  :  1950   MRN: [id number]     ACCOUNT:  [de-identified]   Patient's PCP: Peg Little MD  Admit Date: 2019   Discharge Date: 2019     Length of Stay: 6  Code Status:  Full Code  Admitting Physician: Michelle Yang MD  Discharge Physician: Brian Tilley DO     Active Discharge Diagnoses:     Hospital Problem Lists:  Hospital Problems           Last Modified POA    Dyslipidemia 2019 Yes    Essential hypertension 2019 Yes    History of colostomy reversal 2019 Yes    Abdominal adhesions 2019 Yes    Hypokalemia 2019 Yes    Atelectasis of left lung 2019 No    Postoperative anemia due to acute blood loss 11/15/2019 No                Admission Condition:  good     Discharged Condition: good    Hospital Stay:     Hospital Course:      C/C: Elective reversal of colostomy initially performed for recurring diverticulitis     Interval History: Status: improved. Patient is tolerating diet. She is having bowel movements/diarrhea. She is cleared by surgery for discharge today. SERAFIN will be completed for home VNS. Follow-up will be with PCP, pulmonary and surgery. She will need outpatient work-up for KIM. Patient was not discharged yesterday secondary to fluid overload. She is actively diuresed 2250 mL's and currently states she has lost 5 pounds of water weight and is feeling much improved. She is agreeable to discharge today.   Electrolytes have remained stable.     History: Per my partner signout note  \"This is a 47-year-old  female who was admitted for colostomy reversal.  She had recurrent episodes of diverticulitis and underwent bowel resection this past May with colostomy formation.  Subsequently she has recovered and at this time is admitted for colostomy reversal and were asked to assist in her postoperative care of Type of Form Received: [facility] Hold    Form Received (Date) 1/27/25   Form Filled out Yes, faxed 1/30   Placed in provider folder Yes        SPECGRAV 1.020 05/13/2019    HGBUR NEGATIVE 05/13/2019    PHUR 6.0 07/25/2019    PHUR 6.0 05/13/2019    PROTEINU neg 07/25/2019    PROTEINU NEGATIVE 05/13/2019    GLUCOSEU neg 07/25/2019    GLUCOSEU NEGATIVE 05/13/2019    GLUCOSEU NEGATIVE 09/10/2011    KETUA neg 07/25/2019    KETUA NEGATIVE 05/13/2019    BILIRUBINUR neg 07/25/2019    BILIRUBINUR NEGATIVE 09/10/2011    UROBILINOGEN Normal 05/13/2019    NITRU NEGATIVE 05/13/2019    LEUKOCYTESUR small 07/25/2019    LEUKOCYTESUR NEGATIVE 05/13/2019     TSH:    Lab Results   Component Value Date    TSH 1.55 02/08/2019       Radiology:    Xr Chest (single View Frontal)  Result Date: 11/17/2019  Findings consistent with interstitial edema. Left basilar opacity likely a combination of atelectasis/consolidation and small effusion. Cardiac silhouette enlargement. Xr Chest Portable  Result Date: 11/14/2019  Left basilar opacity, either infiltrate or atelectasis. RECOMMENDATION: Follow-up to resolution. Consultations:    Consults:     Final Specialist Recommendations/Findings:   IP CONSULT TO CRITICAL CARE  IP CONSULT TO INTERNAL MEDICINE      The patient was seen and examined on day of discharge and this discharge summary is in conjunction with any daily progress note from day of discharge.     Discharge plan:     Disposition: Home    Physician Follow Up:     Modesto Mike MD  [facility]  [address] [facility] [phone]    In 1 week      Raheem Ramírez MD  [address] [place] [address]  [phone]    In 1 week  follow up, wound check    Franco Lai MD  [address] [facility]  [phone]    In 2 weeks  follow up       Requiring Further Evaluation/Follow Up POST HOSPITALIZATION/Incidental Findings:   Outpatient follow-up with pulmonary for KIM  Outpatient follow-up with surgery    Diet: regular diet    Activity: As tolerated    Instructions to Patient:   Outpatient follow-up with physicians as instructed    Discharge Medications:      Medication List      START taking these medications    docusate sodium 100 MG capsule  Commonly known as:  COLACE  Take 1 capsule by mouth 2 times daily as needed for Constipation     HYDROcodone-acetaminophen 5-325 MG per tablet  Commonly known as:  NORCO  Take 1 tablet by mouth every 6 hours as needed for Pain for up to 7 days. ondansetron 4 MG tablet  Commonly known as:  ZOFRAN  Take 1 tablet by mouth every 12 hours as needed for Nausea or Vomiting        CHANGE how you take these medications    clotrimazole-betamethasone 1-0.05 % cream  Commonly known as:  LOTRISONE  What changed:  Another medication with the same name was removed. Continue taking this medication, and follow the directions you see here. CONTINUE taking these medications    losartan 50 MG tablet  Commonly known as:  COZAAR  Take 1 tablet by mouth daily     vitamin D 50 MCG (2000 UT) Caps capsule        STOP taking these medications    acetaminophen 325 MG tablet  Commonly known as:  TYLENOL           Where to Get Your Medications      You can get these medications from any pharmacy    Bring a paper prescription for each of these medications  · docusate sodium 100 MG capsule  · HYDROcodone-acetaminophen 5-325 MG per tablet  · ondansetron 4 MG tablet         Time Spent on discharge is  43 mins in patient examination, evaluation, counseling as well as medication reconciliation, prescriptions for required medications, discharge plan and follow up. Electronically signed by   Sumanth Chavez DO  11/18/2019  1:25 PM      Thank you Dr. Ida Valenzuela MD for the opportunity to be involved in this patient's care.

## 2025-04-21 RX ORDER — LOSARTAN POTASSIUM 50 MG/1
50 TABLET ORAL DAILY
Qty: 90 TABLET | Refills: 1 | Status: SHIPPED | OUTPATIENT
Start: 2025-04-21

## 2025-04-21 NOTE — TELEPHONE ENCOUNTER
Jennifer Fontenot is calling to request a refill on the following medication(s):    Medication Request:  Requested Prescriptions     Pending Prescriptions Disp Refills    losartan (COZAAR) 50 MG tablet 90 tablet 1     Sig: Take 1 tablet by mouth daily       Last Visit Date (If Applicable):  9/23/2024    Next Visit Date:    05/16/2025

## 2025-05-20 SDOH — HEALTH STABILITY: PHYSICAL HEALTH: ON AVERAGE, HOW MANY MINUTES DO YOU ENGAGE IN EXERCISE AT THIS LEVEL?: 70 MIN

## 2025-05-20 SDOH — HEALTH STABILITY: PHYSICAL HEALTH: ON AVERAGE, HOW MANY DAYS PER WEEK DO YOU ENGAGE IN MODERATE TO STRENUOUS EXERCISE (LIKE A BRISK WALK)?: 6 DAYS

## 2025-05-20 ASSESSMENT — PATIENT HEALTH QUESTIONNAIRE - PHQ9
SUM OF ALL RESPONSES TO PHQ QUESTIONS 1-9: 0
SUM OF ALL RESPONSES TO PHQ QUESTIONS 1-9: 0
1. LITTLE INTEREST OR PLEASURE IN DOING THINGS: NOT AT ALL
2. FEELING DOWN, DEPRESSED OR HOPELESS: NOT AT ALL
SUM OF ALL RESPONSES TO PHQ QUESTIONS 1-9: 0
SUM OF ALL RESPONSES TO PHQ QUESTIONS 1-9: 0

## 2025-05-20 ASSESSMENT — LIFESTYLE VARIABLES
HOW OFTEN DO YOU HAVE A DRINK CONTAINING ALCOHOL: 2
HOW MANY STANDARD DRINKS CONTAINING ALCOHOL DO YOU HAVE ON A TYPICAL DAY: PATIENT DOES NOT DRINK
HOW MANY STANDARD DRINKS CONTAINING ALCOHOL DO YOU HAVE ON A TYPICAL DAY: 0
HOW OFTEN DO YOU HAVE A DRINK CONTAINING ALCOHOL: MONTHLY OR LESS
HOW OFTEN DO YOU HAVE SIX OR MORE DRINKS ON ONE OCCASION: 1

## 2025-05-29 SDOH — ECONOMIC STABILITY: INCOME INSECURITY: IN THE LAST 12 MONTHS, WAS THERE A TIME WHEN YOU WERE NOT ABLE TO PAY THE MORTGAGE OR RENT ON TIME?: NO

## 2025-05-29 SDOH — ECONOMIC STABILITY: TRANSPORTATION INSECURITY
IN THE PAST 12 MONTHS, HAS THE LACK OF TRANSPORTATION KEPT YOU FROM MEDICAL APPOINTMENTS OR FROM GETTING MEDICATIONS?: NO

## 2025-05-29 SDOH — ECONOMIC STABILITY: FOOD INSECURITY: WITHIN THE PAST 12 MONTHS, YOU WORRIED THAT YOUR FOOD WOULD RUN OUT BEFORE YOU GOT MONEY TO BUY MORE.: NEVER TRUE

## 2025-05-29 SDOH — ECONOMIC STABILITY: FOOD INSECURITY: WITHIN THE PAST 12 MONTHS, THE FOOD YOU BOUGHT JUST DIDN'T LAST AND YOU DIDN'T HAVE MONEY TO GET MORE.: NEVER TRUE

## 2025-05-29 SDOH — ECONOMIC STABILITY: TRANSPORTATION INSECURITY
IN THE PAST 12 MONTHS, HAS LACK OF TRANSPORTATION KEPT YOU FROM MEETINGS, WORK, OR FROM GETTING THINGS NEEDED FOR DAILY LIVING?: NO

## 2025-05-30 ENCOUNTER — OFFICE VISIT (OUTPATIENT)
Dept: FAMILY MEDICINE CLINIC | Age: 75
End: 2025-05-30

## 2025-05-30 ENCOUNTER — HOSPITAL ENCOUNTER (OUTPATIENT)
Age: 75
Setting detail: SPECIMEN
Discharge: HOME | End: 2025-05-30

## 2025-05-30 VITALS
DIASTOLIC BLOOD PRESSURE: 84 MMHG | BODY MASS INDEX: 36.76 KG/M2 | HEART RATE: 65 BPM | HEIGHT: 67 IN | WEIGHT: 234.2 LBS | OXYGEN SATURATION: 96 % | SYSTOLIC BLOOD PRESSURE: 136 MMHG

## 2025-05-30 DIAGNOSIS — I10 ESSENTIAL HYPERTENSION: ICD-10-CM

## 2025-05-30 DIAGNOSIS — E55.9 VITAMIN D INSUFFICIENCY: ICD-10-CM

## 2025-05-30 DIAGNOSIS — E07.9 THYROID DISEASE: ICD-10-CM

## 2025-05-30 DIAGNOSIS — E78.5 DYSLIPIDEMIA: ICD-10-CM

## 2025-05-30 DIAGNOSIS — Z71.89 ENCOUNTER FOR MEDICATION REVIEW AND COUNSELING: ICD-10-CM

## 2025-05-30 DIAGNOSIS — Z00.00 MEDICARE ANNUAL WELLNESS VISIT, SUBSEQUENT: Primary | ICD-10-CM

## 2025-05-30 DIAGNOSIS — E66.01 CLASS 2 SEVERE OBESITY DUE TO EXCESS CALORIES WITH SERIOUS COMORBIDITY AND BODY MASS INDEX (BMI) OF 36.0 TO 36.9 IN ADULT (HCC): ICD-10-CM

## 2025-05-30 DIAGNOSIS — Z13.1 DIABETES MELLITUS SCREENING: ICD-10-CM

## 2025-05-30 DIAGNOSIS — K21.9 GASTROESOPHAGEAL REFLUX DISEASE, UNSPECIFIED WHETHER ESOPHAGITIS PRESENT: ICD-10-CM

## 2025-05-30 DIAGNOSIS — Z93.3 STATUS POST HARTMANN PROCEDURE (HCC): ICD-10-CM

## 2025-05-30 DIAGNOSIS — Z71.89 ADVANCED DIRECTIVES, COUNSELING/DISCUSSION: ICD-10-CM

## 2025-05-30 DIAGNOSIS — E66.812 CLASS 2 SEVERE OBESITY DUE TO EXCESS CALORIES WITH SERIOUS COMORBIDITY AND BODY MASS INDEX (BMI) OF 36.0 TO 36.9 IN ADULT (HCC): ICD-10-CM

## 2025-05-30 DIAGNOSIS — Z12.31 ENCOUNTER FOR SCREENING MAMMOGRAM FOR MALIGNANT NEOPLASM OF BREAST: ICD-10-CM

## 2025-05-30 PROBLEM — K63.1 PERFORATED SIGMOID COLON (HCC): Status: ACTIVE | Noted: 2019-11-30

## 2025-05-30 PROBLEM — T81.31XA SURGICAL WOUND DEHISCENCE: Status: RESOLVED | Noted: 2019-11-21 | Resolved: 2025-05-30

## 2025-05-30 PROBLEM — K43.0 INCARCERATED INCISIONAL HERNIA: Status: ACTIVE | Noted: 2018-03-25

## 2025-05-30 PROBLEM — K57.32 SIGMOID DIVERTICULITIS: Status: ACTIVE | Noted: 2018-03-25

## 2025-05-30 PROBLEM — T81.31XA SURGICAL WOUND DEHISCENCE: Status: ACTIVE | Noted: 2019-11-21

## 2025-05-30 PROBLEM — H93.13 TINNITUS OF BOTH EARS: Status: ACTIVE | Noted: 2025-05-30

## 2025-05-30 LAB
25(OH)D3 SERPL-MCNC: 27.6 NG/ML (ref 30–100)
ALBUMIN SERPL-MCNC: 4.2 G/DL (ref 3.5–5.2)
ALBUMIN/GLOB SERPL: 1.7 {RATIO} (ref 1–2.5)
ALP SERPL-CCNC: 91 U/L (ref 35–104)
ALT SERPL-CCNC: 17 U/L (ref 10–35)
ANION GAP SERPL CALCULATED.3IONS-SCNC: 8 MMOL/L (ref 9–16)
AST SERPL-CCNC: 16 U/L (ref 10–35)
BASOPHILS # BLD: 0.03 K/UL (ref 0–0.2)
BASOPHILS NFR BLD: 1 % (ref 0–2)
BILIRUB SERPL-MCNC: 0.5 MG/DL (ref 0–1.2)
BILIRUB UR QL STRIP: NEGATIVE
BUN SERPL-MCNC: 13 MG/DL (ref 8–23)
CALCIUM SERPL-MCNC: 10.2 MG/DL (ref 8.6–10.4)
CHLORIDE SERPL-SCNC: 105 MMOL/L (ref 98–107)
CHOLEST SERPL-MCNC: 203 MG/DL (ref 0–199)
CHOLESTEROL/HDL RATIO: 4.1
CLARITY UR: CLEAR
CO2 SERPL-SCNC: 27 MMOL/L (ref 20–31)
COLOR UR: YELLOW
COMMENT: NORMAL
CREAT SERPL-MCNC: 0.9 MG/DL (ref 0.6–0.9)
EOSINOPHIL # BLD: 0.09 K/UL (ref 0–0.44)
EOSINOPHILS RELATIVE PERCENT: 2 % (ref 1–4)
ERYTHROCYTE [DISTWIDTH] IN BLOOD BY AUTOMATED COUNT: 14.3 % (ref 11.8–14.4)
EST. AVERAGE GLUCOSE BLD GHB EST-MCNC: 114 MG/DL
GFR, ESTIMATED: 68 ML/MIN/1.73M2
GLUCOSE SERPL-MCNC: 99 MG/DL (ref 74–99)
GLUCOSE UR STRIP-MCNC: NEGATIVE MG/DL
HBA1C MFR BLD: 5.6 % (ref 4–6)
HCT VFR BLD AUTO: 43.9 % (ref 36.3–47.1)
HDLC SERPL-MCNC: 49 MG/DL
HGB BLD-MCNC: 13.9 G/DL (ref 11.9–15.1)
HGB UR QL STRIP.AUTO: NEGATIVE
IMM GRANULOCYTES # BLD AUTO: <0.03 K/UL (ref 0–0.3)
IMM GRANULOCYTES NFR BLD: 0 %
KETONES UR STRIP-MCNC: NEGATIVE MG/DL
LDLC SERPL CALC-MCNC: 127 MG/DL (ref 0–100)
LEUKOCYTE ESTERASE UR QL STRIP: NEGATIVE
LYMPHOCYTES NFR BLD: 1.58 K/UL (ref 1.1–3.7)
LYMPHOCYTES RELATIVE PERCENT: 30 % (ref 24–43)
MCH RBC QN AUTO: 30.2 PG (ref 25.2–33.5)
MCHC RBC AUTO-ENTMCNC: 31.7 G/DL (ref 28.4–34.8)
MCV RBC AUTO: 95.2 FL (ref 82.6–102.9)
MONOCYTES NFR BLD: 0.32 K/UL (ref 0.1–1.2)
MONOCYTES NFR BLD: 6 % (ref 3–12)
NEUTROPHILS NFR BLD: 61 % (ref 36–65)
NEUTS SEG NFR BLD: 3.32 K/UL (ref 1.5–8.1)
NITRITE UR QL STRIP: NEGATIVE
NRBC BLD-RTO: 0 PER 100 WBC
PH UR STRIP: 6 [PH] (ref 5–8)
PLATELET # BLD AUTO: 203 K/UL (ref 138–453)
PMV BLD AUTO: 11.3 FL (ref 8.1–13.5)
POTASSIUM SERPL-SCNC: 4.8 MMOL/L (ref 3.7–5.3)
PROT SERPL-MCNC: 6.7 G/DL (ref 6.6–8.7)
PROT UR STRIP-MCNC: NEGATIVE MG/DL
RBC # BLD AUTO: 4.61 M/UL (ref 3.95–5.11)
SODIUM SERPL-SCNC: 140 MMOL/L (ref 136–145)
SP GR UR STRIP: 1.02 (ref 1–1.03)
TRIGL SERPL-MCNC: 137 MG/DL
TSH SERPL DL<=0.05 MIU/L-ACNC: 1.4 UIU/ML (ref 0.27–4.2)
UROBILINOGEN UR STRIP-ACNC: NORMAL EU/DL (ref 0–1)
VLDLC SERPL CALC-MCNC: 27 MG/DL (ref 1–30)
WBC OTHER # BLD: 5.4 K/UL (ref 3.5–11.3)

## 2025-05-30 NOTE — PROGRESS NOTES
Medicare Annual Wellness Visit    Jennifer Fontenot is here for Medicare AWV (Medicare Annual Wellness.  Follows with:/GI: Betty Lovell - [facility]/Ortho: Jack Styles MD /ENT: Cirilo Sharma MD /Derm: Felix Butt/Ophthalmology: Arturo Boateng MD/Cardio: Dr. Santacruz/Pulmonology: Kapil and Bette/)    Assessment & Plan   Medicare annual wellness visit, subsequent  -     [facility] Ref[facility] to [facility] Clinical Specialist  Essential hypertension  Comments:  Chronic, at goal (stable), continue current treatment plan, medication adherence emphasized, and lifestyle modifications recommended  Orders:  -     Comprehensive Metabolic Panel; Future  -     Magnesium; Future  -     TSH; Future  -     Urinalysis; Future  Gastroesophageal reflux disease, unspecified whether esophagitis present  Comments:  Chronic, at goal (stable), continue current treatment plan, medication adherence emphasized, and lifestyle modifications recommended  Orders:  -     CBC with Auto Differential; Future  Thyroid disease  Comments:  Chronic, at goal (stable), continue current treatment plan, medication adherence emphasized, and lifestyle modifications recommended  Orders:  -     TSH; Future  Status post Simone procedure (HCC)  Comments:  Monitored by specialist- no acute findings meriting change in the plan  Dyslipidemia  Comments:  Chronic, at goal (stable), continue current treatment plan, medication adherence emphasized, and lifestyle modifications recommended  Orders:  -     Lipid Panel; Future  Diabetes mellitus screening  -     Comprehensive Metabolic Panel; Future  -     Hemoglobin A1C; Future  -     Urinalysis; Future  Advanced directives, counseling/discussion  -     [facility] Referral to ACP Clinical Specialist  Encounter for screening mammogram for malignant neoplasm of breast  -     DEYVI RHONA DIGITAL SCREEN BILATERAL; Future  Encounter for medication review and counseling  Comments:  no changes in current

## 2025-05-30 NOTE — PATIENT INSTRUCTIONS

## 2025-06-01 ENCOUNTER — RESULTS FOLLOW-UP (OUTPATIENT)
Dept: FAMILY MEDICINE CLINIC | Age: 75
End: 2025-06-01

## 2025-06-02 ENCOUNTER — CLINICAL DOCUMENTATION (OUTPATIENT)
Age: 75
End: 2025-06-02

## 2025-06-02 NOTE — ACP (ADVANCE CARE PLANNING)
Advance Care Planning   Ambulatory ACP Specialist Patient Outreach    Date:  6/2/2025    ACP Specialist:  TAI Loyola    Outreach call to patient in follow-up to ACP Specialist referral from:Reinaldo Loera DO    [x] PCP  [] Provider   [] Ambulatory Care Management [] Other     For:                  [x] Advance Directive Assistance              [] Complete Portable DNR order              [] Complete POST/POLST/MOST              [] Code Status Discussion             [] Discuss Goals of Care             [] Early ACP Decision-Making              [x] Other (Specify) ACP needed    Date Referral Received: 5/30/25    Next Step:   [] ACP scheduled conversation  [x] Outreach again in one week               [] Email / Mail ACP Info Sheets  [] Email / Mail Advance Directive   [] Closing referral.  Routing closure to referring provider/staff and to ACP Specialist .    [] Closure letter mailed to patient with invitation to contact ACP Specialist if / when ready.   [] Other (Specify here):       [x] At this time, Healthcare Decision Maker Is:     Primary Decision Maker: Grant Fontenot - Child - [phone]          [] Primary agent named in scanned advance directive.    [x] Legal Next of Kin.     [] Unable to determine legal decision maker at this time.    Outreaches:       [x] 1st -  Date:  6/2/25               Intervention:  [] Spoke with Patient   [x] Left Voice mail [] Email / Mail    [] MyChart  [] Other (Specify) :     Outcomes: ACP Specialist tried to call patient at home/mobile number [phone] and left a message.             [x] 2nd -  Date:  6/6/25               Intervention:  [] Spoke with Patient  [x] Left Voice mail [] Email / Mail    [x] MyChart  [] Other (Specify) :              Outcomes: ACP Specialist left another message for patient and a [employer] message will be sent to patient.                [] 3rd -  Date:                Intervention:  [] Spoke with Patient   [] Left Voice mail []

## 2025-06-18 ENCOUNTER — HOSPITAL ENCOUNTER (OUTPATIENT)
Dept: MAMMOGRAPHY | Age: 75
Discharge: HOME | End: 2025-06-20
Attending: FAMILY MEDICINE
Payer: MEDICARE

## 2025-06-18 DIAGNOSIS — Z12.31 ENCOUNTER FOR SCREENING MAMMOGRAM FOR MALIGNANT NEOPLASM OF BREAST: ICD-10-CM

## 2025-06-18 PROCEDURE — 77067 SCR MAMMO BI INCL CAD: CPT

## 2025-07-21 RX ORDER — LOSARTAN POTASSIUM 50 MG/1
50 TABLET ORAL DAILY
Qty: 90 TABLET | Refills: 1 | Status: SHIPPED | OUTPATIENT
Start: 2025-07-21

## 2025-08-13 ENCOUNTER — CLINICAL DOCUMENTATION (OUTPATIENT)
Age: 75
End: 2025-08-13

## 2025-08-14 ENCOUNTER — OFFICE VISIT (OUTPATIENT)
Age: 75
End: 2025-08-14

## 2025-08-14 VITALS
DIASTOLIC BLOOD PRESSURE: 74 MMHG | WEIGHT: 243 LBS | TEMPERATURE: 98.3 F | RESPIRATION RATE: 18 BRPM | OXYGEN SATURATION: 94 % | HEART RATE: 68 BPM | SYSTOLIC BLOOD PRESSURE: 150 MMHG | HEIGHT: 67 IN | BODY MASS INDEX: 38.14 KG/M2

## 2025-08-14 DIAGNOSIS — R09.81 NASAL CONGESTION: ICD-10-CM

## 2025-08-14 DIAGNOSIS — J02.9 SORE THROAT: Primary | ICD-10-CM

## 2025-08-14 LAB
Lab: NORMAL
QC PASS/FAIL: NORMAL
S PYO AG THROAT QL: NORMAL
SARS-COV-2, POC: NORMAL

## 2025-08-14 ASSESSMENT — ENCOUNTER SYMPTOMS
GASTROINTESTINAL NEGATIVE: 1
FACIAL SWELLING: 0
SORE THROAT: 1
RESPIRATORY NEGATIVE: 1
RHINORRHEA: 1
TROUBLE SWALLOWING: 0

## 2025-08-30 ENCOUNTER — HOSPITAL ENCOUNTER (EMERGENCY)
Age: 75
Discharge: HOME | End: 2025-08-30
Attending: STUDENT IN AN ORGANIZED HEALTH CARE EDUCATION/TRAINING PROGRAM
Payer: MEDICARE

## 2025-08-30 VITALS
OXYGEN SATURATION: 93 % | RESPIRATION RATE: 17 BRPM | TEMPERATURE: 98.3 F | HEART RATE: 76 BPM | SYSTOLIC BLOOD PRESSURE: 167 MMHG | DIASTOLIC BLOOD PRESSURE: 91 MMHG

## 2025-08-30 DIAGNOSIS — Z00.00 ENCOUNTER FOR GENERAL MEDICAL EXAMINATION: Primary | ICD-10-CM

## 2025-08-30 PROCEDURE — 99282 EMERGENCY DEPT VISIT SF MDM: CPT

## 2025-08-30 ASSESSMENT — ENCOUNTER SYMPTOMS
NAUSEA: 0
SHORTNESS OF BREATH: 0
VOMITING: 0

## (undated) DEVICE — COVER LT HNDL BLU PLAS

## (undated) DEVICE — 3M™ IOBAN™ 2 ANTIMICROBIAL INCISE DRAPE 6650EZ: Brand: IOBAN™ 2

## (undated) DEVICE — SUTURE ETHIB EXCL BR SUTUPAK 4-0 30 X303H

## (undated) DEVICE — SUTURE MCRYL SZ 4-0 L18IN ABSRB UD L16MM PC-3 3/8 CIR PRIM Y845G

## (undated) DEVICE — Z DISCONTINUED USE 2624853 GLOVE SURG SZ 75 L12IN THK91MIL BRN LTX FREE

## (undated) DEVICE — SPONGE LAP W18XL18IN WHT COT 4 PLY FLD STRUNG RADPQ DISP ST

## (undated) DEVICE — GOWN,AURORA,NONRNF,XL,30/CS: Brand: MEDLINE

## (undated) DEVICE — GOWN,SIRUS,NON REINFRCD,LARGE,SET IN SL: Brand: MEDLINE

## (undated) DEVICE — CUSHION PRONEVIEW L HD NK FOAM

## (undated) DEVICE — ADHESIVE SKIN CLSR 0.7ML TOP DERMBND ADV

## (undated) DEVICE — DRAIN SURG 19FR 100% SIL RADPQ RND CHN FULL FLUT

## (undated) DEVICE — TUBING, SUCTION, 1/4" X 12', STRAIGHT: Brand: MEDLINE

## (undated) DEVICE — INTENDED FOR TISSUE SEPARATION, AND OTHER PROCEDURES THAT REQUIRE A SHARP SURGICAL BLADE TO PUNCTURE OR CUT.: Brand: BARD-PARKER ® CARBON RIB-BACK BLADES

## (undated) DEVICE — STANDARD HYPODERMIC NEEDLE,POLYPROPYLENE HUB: Brand: MONOJECT

## (undated) DEVICE — SURGICAL PROCEDURE KIT BASIN MAJ SET UP

## (undated) DEVICE — DEVICE TRCR 12X9X3IN WHT CLSR DISP OMNICLOSE

## (undated) DEVICE — SUTURE V-LOC 180 SZ 0 L9IN ABSRB GRN GS-21 L37MM 1/2 CIR VLOCL0346

## (undated) DEVICE — AGENT HEMSTAT W2XL14IN OXIDIZED REGENERATED CELOS ABSRB FOR

## (undated) DEVICE — GAUZE,SPONGE,FLUFF,6"X6.75",STRL,5/TRAY: Brand: MEDLINE

## (undated) DEVICE — Z INACTIVE USE 2273136 JELLY EXAM LUBRICATING 5GM PKT TB ST K-Y E-Z LUBE

## (undated) DEVICE — GARMENT,MEDLINE,DVT,INT,CALF,MED, GEN2: Brand: MEDLINE

## (undated) DEVICE — GLOVE SURG SZ 65 L12IN FNGR THK87MIL WHT LTX FREE

## (undated) DEVICE — PROTECTOR EYE PT SELF ADH NS OPT GRD LF

## (undated) DEVICE — DRAPE IRRIG FLD WRM W44XL44IN W/ AORN STD PRTBL INTRATEMP

## (undated) DEVICE — FORCEPS BX L240CM WRK CHN 2.8MM STD CAP W/ NDL MIC MESH

## (undated) DEVICE — POUCH OST L12IN STOMA DIA19-64MM TRNSPAR PRECUT OPN 1 SIDE

## (undated) DEVICE — BLANKET WRM W29.9XL79.1IN UP BODY FORC AIR MISTRAL-AIR

## (undated) DEVICE — TOTAL TRAY, DB, 100% SILI FOLEY, 16FR 10: Brand: MEDLINE

## (undated) DEVICE — DRESSING TRNSPAR W2XL2.75IN FLM SHT SEMIPERMEABLE WIND

## (undated) DEVICE — FORCEPS ENDOSCP L230CM 2.4MM GRSP RATCH TOOTH RETRV

## (undated) DEVICE — KENDALL SCD EXPRESS SLEEVES, KNEE LENGTH, MEDIUM: Brand: KENDALL SCD

## (undated) DEVICE — SOLUTION ANTIFOG VIS SYS CLEARIFY LAPSCP

## (undated) DEVICE — FORCEPS ENDOSCP L230CM WRK CHN 2.8CM SHTH 2.4MM JAW OPN

## (undated) DEVICE — GLOVE SURG SZ 7 L12IN FNGR THK87MIL WHT LTX FREE

## (undated) DEVICE — GLOVE ORANGE PI 7 1/2   MSG9075

## (undated) DEVICE — INSUFFLATION NEEDLE TO ESTABLISH PNEUMOPERITONEUM.: Brand: INSUFFLATION NEEDLE

## (undated) DEVICE — CLIP LIG M TI 6 SIL HNDL FOR OPN AND ENDOSCP SGL APPL

## (undated) DEVICE — MARKER,SKIN,WI/RULER AND LABELS: Brand: MEDLINE

## (undated) DEVICE — GLOVE SURG SZ 75 L12IN FNGR THK87MIL WHT LTX FREE

## (undated) DEVICE — E-Z CLEAN, NON-STICK, PTFE COATED, ELECTROSURGICAL BLADE ELECTRODE, 6.5 INCH (16.5 CM): Brand: MEGADYNE

## (undated) DEVICE — PAD,ABDOMINAL,5"X9",ST,LF,25/BX: Brand: MEDLINE INDUSTRIES, INC.

## (undated) DEVICE — SUTURE VCRL SZ 3-0 L27IN ABSRB UD L26MM SH 1/2 CIR J416H

## (undated) DEVICE — 3M™ WARMING BLANKET, UPPER BODY, 10 PER CASE, 42268: Brand: BAIR HUGGER™

## (undated) DEVICE — YANKAUER,FLEXIBLE HANDLE,REGLR CAPACITY: Brand: MEDLINE INDUSTRIES, INC.

## (undated) DEVICE — TAPE ADH W1INXL10YD PLAS TRNSPAR H2O RESIST HYPOALRG CURAD

## (undated) DEVICE — GOWN,AURORA,NONREINFORCED,LARGE: Brand: MEDLINE

## (undated) DEVICE — ARM DRAPE

## (undated) DEVICE — TRAY PREP DRY W/ PREM GLV 2 APPL 6 SPNG 2 UNDPD 1 OVERWRAP

## (undated) DEVICE — PACK PROCEDURE SURG FACILITY SPEC GI BWL SPT SVMMC

## (undated) DEVICE — YANKAUER,POOLE TIP,STERILE,50/CS: Brand: MEDLINE

## (undated) DEVICE — FORCEPS BX L240CM JAW DIA2.4MM ORNG L CAP W/ NDL DISP RAD

## (undated) DEVICE — SEALER ENDOSCP NANO COAT OPN DIV CRV L JAW LIGASURE IMPACT

## (undated) DEVICE — WOUND RETRACTOR AND PROTECTOR: Brand: ALEXIS O WOUND PROTECTOR-RETRACTOR

## (undated) DEVICE — TOTAL TRAY, 16FR 10ML SIL FOLEY, URN: Brand: MEDLINE

## (undated) DEVICE — DRAPE,UNDERBUTTOCKS,PCH,STERILE: Brand: MEDLINE

## (undated) DEVICE — MINOR BSIN PK

## (undated) DEVICE — OSTOMY POUCH CLAMP: Brand: HOLLISTER

## (undated) DEVICE — STERILE POLYISOPRENE POWDER-FREE SURGICAL GLOVES WITH EMOLLIENT COATING: Brand: PROTEXIS

## (undated) DEVICE — DRAPE,REIN 53X77,STERILE: Brand: MEDLINE

## (undated) DEVICE — LOOP VES W25MM THK1MM MAXI RED SIL FLD REPELLENT 100 PER

## (undated) DEVICE — Device

## (undated) DEVICE — SUTURE PERMAHAND SZ 4-0 L18IN NONABSORBABLE BLK SILK BRAID A183H

## (undated) DEVICE — RESERVOIR,SUCTION,100CC,SILICONE: Brand: MEDLINE

## (undated) DEVICE — APPLIER LIG CLP M L11IN TI STR RNG HNDL FOR 20 CLP DISP

## (undated) DEVICE — DVD-R MAXWELL ROBOTIC SURGERY

## (undated) DEVICE — TIP COVER ACCESSORY

## (undated) DEVICE — TOWEL,OR,DSP,ST,BLUE,DLX,XR,4/PK,20PK/CS: Brand: MEDLINE

## (undated) DEVICE — SYRINGE IRRIG 60ML SFT PLIABLE BLB EZ TO GRP 1 HND USE W/

## (undated) DEVICE — GUIDEWIRE ENDOSCP STR 0.035 INX260 CM STIFF RND DREAMWIRE ST

## (undated) DEVICE — LEGGINGS, PAIR, 33X51 XL, STERILE: Brand: MEDLINE

## (undated) DEVICE — STERILE SURGICAL LUBRICANT, METAL TUBE: Brand: SURGILUBE

## (undated) DEVICE — BLADELESS OBTURATOR: Brand: WECK VISTA

## (undated) DEVICE — ENDO KIT W/SYRINGE: Brand: MEDLINE INDUSTRIES, INC.

## (undated) DEVICE — SYRINGE, LUER LOCK, 10ML: Brand: MEDLINE

## (undated) DEVICE — DEFENDO AIR WATER SUCTION AND BIOPSY VALVE KIT FOR  OLYMPUS: Brand: DEFENDO AIR/WATER/SUCTION AND BIOPSY VALVE

## (undated) DEVICE — GLOVE ORANGE PI 7   MSG9070

## (undated) DEVICE — Z DUPLICATE USE 2716240 STAPLER INT CUT LN L40MM STPL L51MM GRN CRV HD B FRM

## (undated) DEVICE — CHLORAPREP 26ML ORANGE

## (undated) DEVICE — SKIN PREP TRAY W/CHG: Brand: MEDLINE INDUSTRIES, INC.

## (undated) DEVICE — BLADE ES L6IN ELASTOMERIC COAT EXT DURABLE BEND UPTO 90DEG

## (undated) DEVICE — PROBE COVER KIT: Brand: MEDLINE INDUSTRIES, INC.

## (undated) DEVICE — GLOVE SURG SZ 65 L12IN FNGR THK79MIL GRN LTX FREE

## (undated) DEVICE — SHEET, T, LAPAROTOMY, STERILE: Brand: MEDLINE

## (undated) DEVICE — GOWN,SIRUS,POLYRNF,SETINSLV,XL,20/CS: Brand: MEDLINE

## (undated) DEVICE — SUTURE PDS II SZ 0 L60IN ABSRB VLT L65MM TP-1 1/2 CIR Z991G

## (undated) DEVICE — SUTURE DEV SZ 2-0 WND CLSR ABSRB GS-22 VLOC COVIDIEN VLOCM2145

## (undated) DEVICE — TROCAR: Brand: KII FIOS FIRST ENTRY

## (undated) DEVICE — APPLIER SUT CLP FOR 2-0 3-0 4-0 VCRL ABSRB SUT

## (undated) DEVICE — DRESSING ADH N ADH 8X35 IN 6X175 IN SFT CLTH MEDIPORE +

## (undated) DEVICE — CANNULA SEAL

## (undated) DEVICE — TOWEL,OR,DSP,ST,BLUE,STD,4/PK,20PK/CS: Brand: MEDLINE

## (undated) DEVICE — SUTURE MCRYL + SZ 4-0 L18IN ABSRB UD L19MM PS-2 3/8 CIR MCP496G

## (undated) DEVICE — INSUFFLATION TUBING SET WITH FILTER, FUNNEL CONNECTOR AND LUER LOCK: Brand: JOSNOE MEDICAL INC

## (undated) DEVICE — GAUZE,PACKING STRIP,IODOFORM,1/2"X5YD,ST: Brand: CURAD

## (undated) DEVICE — BASIN EMSIS 700ML GRAPHITE PLAS KID SHP GRAD

## (undated) DEVICE — PROTECTOR ULN NRV PUR FOAM HK LOOP STRP ANATOMICALLY

## (undated) DEVICE — Z DISCONTINUED USE 2716238 PER MEDLINE STAPLER INT L40MM DIA4.7MM GRN CRV HD B FRM TECHNOLOGY DISP

## (undated) DEVICE — NDL CNTR 40CT FM MAG: Brand: MEDLINE INDUSTRIES, INC.

## (undated) DEVICE — SPONGE GZ W4XL4IN RAYON POLY FILL CVR W/ NONWOVEN FAB

## (undated) DEVICE — POSITIONER HD W8XH4XL8.5IN RASPBERRY FOAM SLT

## (undated) DEVICE — GLOVE ORANGE PI 8   MSG9080

## (undated) DEVICE — SUTURE VCRL SZ 4-0 L27IN ABSRB UD L26MM SH 1/2 CIR J415H